# Patient Record
Sex: FEMALE | Race: WHITE | Employment: OTHER | URBAN - METROPOLITAN AREA
[De-identification: names, ages, dates, MRNs, and addresses within clinical notes are randomized per-mention and may not be internally consistent; named-entity substitution may affect disease eponyms.]

---

## 2017-01-03 ENCOUNTER — HOSPITAL ENCOUNTER (OUTPATIENT)
Dept: ULTRASOUND IMAGING | Age: 72
Discharge: HOME | End: 2017-01-03
Payer: MEDICARE

## 2017-01-03 DIAGNOSIS — C50.911 LOBULAR BREAST CANCER, RIGHT (HCC): ICD-10-CM

## 2017-01-03 DIAGNOSIS — N63.20 LEFT BREAST MASS: ICD-10-CM

## 2017-01-03 PROCEDURE — 76642 ULTRASOUND BREAST LIMITED: CPT

## 2017-01-06 DIAGNOSIS — I10 ESSENTIAL HYPERTENSION: ICD-10-CM

## 2017-01-06 RX ORDER — CARVEDILOL 12.5 MG/1
TABLET ORAL
Qty: 60 TABLET | Refills: 2 | Status: SHIPPED | OUTPATIENT
Start: 2017-01-06 | End: 2017-02-13 | Stop reason: SDUPTHER

## 2017-01-07 RX ORDER — FUROSEMIDE 20 MG/1
TABLET ORAL
Qty: 60 TABLET | Refills: 5 | Status: SHIPPED | OUTPATIENT
Start: 2017-01-07 | End: 2017-02-15 | Stop reason: SDUPTHER

## 2017-01-11 ENCOUNTER — OFFICE VISIT (OUTPATIENT)
Dept: PHYSICAL MEDICINE AND REHAB | Age: 72
End: 2017-01-11

## 2017-01-11 VITALS
DIASTOLIC BLOOD PRESSURE: 80 MMHG | HEIGHT: 60 IN | WEIGHT: 221 LBS | SYSTOLIC BLOOD PRESSURE: 118 MMHG | BODY MASS INDEX: 43.39 KG/M2

## 2017-01-11 DIAGNOSIS — Z79.899 HIGH RISK MEDICATION USE: Chronic | ICD-10-CM

## 2017-01-11 DIAGNOSIS — M48.02 CERVICAL STENOSIS OF SPINAL CANAL: ICD-10-CM

## 2017-01-11 DIAGNOSIS — S14.109S INJURY OF CERVICAL SPINAL CORD, SEQUELA (HCC): ICD-10-CM

## 2017-01-11 DIAGNOSIS — M43.10 SPONDYLOLISTHESIS, UNSPECIFIED SPINAL REGION: ICD-10-CM

## 2017-01-11 DIAGNOSIS — N63.20 LEFT BREAST MASS: Primary | ICD-10-CM

## 2017-01-11 PROCEDURE — 99213 OFFICE O/P EST LOW 20 MIN: CPT | Performed by: PHYSICAL MEDICINE & REHABILITATION

## 2017-01-11 RX ORDER — HYDROCODONE BITARTRATE AND ACETAMINOPHEN 7.5; 325 MG/1; MG/1
TABLET ORAL
Qty: 90 TABLET | Refills: 0 | Status: SHIPPED | OUTPATIENT
Start: 2017-01-11 | End: 2017-03-08 | Stop reason: SDUPTHER

## 2017-01-11 ASSESSMENT — ENCOUNTER SYMPTOMS
BACK PAIN: 1
CONSTIPATION: 1
VOMITING: 0
DIARRHEA: 0
SHORTNESS OF BREATH: 0
CHEST TIGHTNESS: 0
ALLERGIC/IMMUNOLOGIC NEGATIVE: 1
NAUSEA: 0
APNEA: 0
EYES NEGATIVE: 1

## 2017-01-18 ENCOUNTER — PREP FOR PROCEDURE (OUTPATIENT)
Dept: SURGERY | Age: 72
End: 2017-01-18

## 2017-01-18 ENCOUNTER — OFFICE VISIT (OUTPATIENT)
Dept: SURGERY | Age: 72
End: 2017-01-18

## 2017-01-18 VITALS
DIASTOLIC BLOOD PRESSURE: 86 MMHG | WEIGHT: 224 LBS | BODY MASS INDEX: 43.98 KG/M2 | TEMPERATURE: 97.2 F | HEIGHT: 60 IN | SYSTOLIC BLOOD PRESSURE: 134 MMHG

## 2017-01-18 DIAGNOSIS — N63.20 LEFT BREAST MASS: Primary | ICD-10-CM

## 2017-01-18 PROCEDURE — 4040F PNEUMOC VAC/ADMIN/RCVD: CPT | Performed by: SURGERY

## 2017-01-18 PROCEDURE — 99212 OFFICE O/P EST SF 10 MIN: CPT | Performed by: SURGERY

## 2017-01-18 PROCEDURE — G8484 FLU IMMUNIZE NO ADMIN: HCPCS | Performed by: SURGERY

## 2017-01-18 PROCEDURE — 1090F PRES/ABSN URINE INCON ASSESS: CPT | Performed by: SURGERY

## 2017-01-18 PROCEDURE — 1036F TOBACCO NON-USER: CPT | Performed by: SURGERY

## 2017-01-18 PROCEDURE — G8427 DOCREV CUR MEDS BY ELIG CLIN: HCPCS | Performed by: SURGERY

## 2017-01-18 PROCEDURE — 1123F ACP DISCUSS/DSCN MKR DOCD: CPT | Performed by: SURGERY

## 2017-01-18 PROCEDURE — 3014F SCREEN MAMMO DOC REV: CPT | Performed by: SURGERY

## 2017-01-18 PROCEDURE — G8399 PT W/DXA RESULTS DOCUMENT: HCPCS | Performed by: SURGERY

## 2017-01-18 PROCEDURE — 3017F COLORECTAL CA SCREEN DOC REV: CPT | Performed by: SURGERY

## 2017-01-18 PROCEDURE — G8419 CALC BMI OUT NRM PARAM NOF/U: HCPCS | Performed by: SURGERY

## 2017-01-18 RX ORDER — DICLOFENAC POTASSIUM 50 MG/1
TABLET, FILM COATED ORAL
COMMUNITY
Start: 2017-01-10 | End: 2017-02-13 | Stop reason: SDUPTHER

## 2017-01-23 RX ORDER — LIDOCAINE HYDROCHLORIDE 10 MG/ML
1 INJECTION, SOLUTION EPIDURAL; INFILTRATION; INTRACAUDAL; PERINEURAL
Status: CANCELLED | OUTPATIENT
Start: 2017-01-23 | End: 2017-01-23

## 2017-01-23 RX ORDER — SODIUM CHLORIDE, SODIUM LACTATE, POTASSIUM CHLORIDE, CALCIUM CHLORIDE 600; 310; 30; 20 MG/100ML; MG/100ML; MG/100ML; MG/100ML
INJECTION, SOLUTION INTRAVENOUS CONTINUOUS
Status: CANCELLED | OUTPATIENT
Start: 2017-01-23

## 2017-01-23 RX ORDER — SODIUM CHLORIDE 0.9 % (FLUSH) 0.9 %
10 SYRINGE (ML) INJECTION EVERY 12 HOURS SCHEDULED
Status: CANCELLED | OUTPATIENT
Start: 2017-01-23

## 2017-01-23 RX ORDER — SODIUM CHLORIDE 0.9 % (FLUSH) 0.9 %
10 SYRINGE (ML) INJECTION PRN
Status: CANCELLED | OUTPATIENT
Start: 2017-01-23

## 2017-01-24 ENCOUNTER — HOSPITAL ENCOUNTER (OUTPATIENT)
Age: 72
Setting detail: OUTPATIENT SURGERY
Discharge: HOME | End: 2017-01-24
Attending: SURGERY | Admitting: SURGERY
Payer: MEDICARE

## 2017-01-24 VITALS
HEART RATE: 70 BPM | TEMPERATURE: 97.8 F | BODY MASS INDEX: 42.21 KG/M2 | DIASTOLIC BLOOD PRESSURE: 76 MMHG | WEIGHT: 215 LBS | OXYGEN SATURATION: 93 % | SYSTOLIC BLOOD PRESSURE: 154 MMHG | HEIGHT: 60 IN

## 2017-01-24 PROCEDURE — 3600000002 HC SURGERY LEVEL 2 BASE: Performed by: SURGERY

## 2017-01-24 PROCEDURE — 3700000000 HC ANESTHESIA ATTENDED CARE: Performed by: SURGERY

## 2017-01-24 PROCEDURE — 88305 TISSUE EXAM BY PATHOLOGIST: CPT

## 2017-01-24 PROCEDURE — 3600000012 HC SURGERY LEVEL 2 ADDTL 15MIN: Performed by: SURGERY

## 2017-01-24 PROCEDURE — 19100 BX BREAST PERCUT W/O IMAGE: CPT | Performed by: SURGERY

## 2017-01-24 PROCEDURE — 2500000003 HC RX 250 WO HCPCS: Performed by: SURGERY

## 2017-01-24 PROCEDURE — 2580000003 HC RX 258: Performed by: SURGERY

## 2017-01-24 PROCEDURE — 3700000001 HC ADD 15 MINUTES (ANESTHESIA): Performed by: SURGERY

## 2017-01-24 RX ORDER — BUPIVACAINE HYDROCHLORIDE 2.5 MG/ML
INJECTION, SOLUTION EPIDURAL; INFILTRATION; INTRACAUDAL PRN
Status: DISCONTINUED | OUTPATIENT
Start: 2017-01-24 | End: 2017-01-24 | Stop reason: HOSPADM

## 2017-01-24 RX ORDER — MAGNESIUM HYDROXIDE 1200 MG/15ML
LIQUID ORAL CONTINUOUS PRN
Status: DISCONTINUED | OUTPATIENT
Start: 2017-01-24 | End: 2017-01-24 | Stop reason: HOSPADM

## 2017-01-24 ASSESSMENT — PAIN - FUNCTIONAL ASSESSMENT: PAIN_FUNCTIONAL_ASSESSMENT: 0-10

## 2017-02-02 ENCOUNTER — OFFICE VISIT (OUTPATIENT)
Dept: SURGERY | Age: 72
End: 2017-02-02

## 2017-02-02 VITALS
BODY MASS INDEX: 44.37 KG/M2 | DIASTOLIC BLOOD PRESSURE: 92 MMHG | WEIGHT: 226 LBS | SYSTOLIC BLOOD PRESSURE: 140 MMHG | TEMPERATURE: 98.3 F | HEIGHT: 60 IN

## 2017-02-02 DIAGNOSIS — N63.20 LEFT BREAST MASS: Primary | ICD-10-CM

## 2017-02-02 PROCEDURE — G8419 CALC BMI OUT NRM PARAM NOF/U: HCPCS | Performed by: SURGERY

## 2017-02-02 PROCEDURE — 1036F TOBACCO NON-USER: CPT | Performed by: SURGERY

## 2017-02-02 PROCEDURE — G8427 DOCREV CUR MEDS BY ELIG CLIN: HCPCS | Performed by: SURGERY

## 2017-02-02 PROCEDURE — 1090F PRES/ABSN URINE INCON ASSESS: CPT | Performed by: SURGERY

## 2017-02-02 PROCEDURE — G8399 PT W/DXA RESULTS DOCUMENT: HCPCS | Performed by: SURGERY

## 2017-02-02 PROCEDURE — G8484 FLU IMMUNIZE NO ADMIN: HCPCS | Performed by: SURGERY

## 2017-02-02 PROCEDURE — 99212 OFFICE O/P EST SF 10 MIN: CPT | Performed by: SURGERY

## 2017-02-02 PROCEDURE — 1123F ACP DISCUSS/DSCN MKR DOCD: CPT | Performed by: SURGERY

## 2017-02-02 PROCEDURE — 3014F SCREEN MAMMO DOC REV: CPT | Performed by: SURGERY

## 2017-02-02 PROCEDURE — 3017F COLORECTAL CA SCREEN DOC REV: CPT | Performed by: SURGERY

## 2017-02-02 PROCEDURE — 4040F PNEUMOC VAC/ADMIN/RCVD: CPT | Performed by: SURGERY

## 2017-02-13 DIAGNOSIS — M43.10 SPONDYLOLISTHESIS, UNSPECIFIED SPINAL REGION: ICD-10-CM

## 2017-02-13 DIAGNOSIS — I10 ESSENTIAL HYPERTENSION: ICD-10-CM

## 2017-02-13 DIAGNOSIS — M48.02 CERVICAL STENOSIS OF SPINAL CANAL: ICD-10-CM

## 2017-02-13 DIAGNOSIS — F32.A DEPRESSION, UNSPECIFIED DEPRESSION TYPE: ICD-10-CM

## 2017-02-13 RX ORDER — POTASSIUM CHLORIDE 750 MG/1
10 TABLET, EXTENDED RELEASE ORAL 2 TIMES DAILY
Qty: 180 TABLET | Refills: 2 | Status: SHIPPED | OUTPATIENT
Start: 2017-02-13 | End: 2017-10-15 | Stop reason: SDUPTHER

## 2017-02-13 RX ORDER — LEVOTHYROXINE SODIUM 0.05 MG/1
50 TABLET ORAL DAILY
Qty: 90 TABLET | Refills: 2 | Status: SHIPPED | OUTPATIENT
Start: 2017-02-13 | End: 2017-10-15 | Stop reason: SDUPTHER

## 2017-02-13 RX ORDER — DICLOFENAC POTASSIUM 50 MG/1
50 TABLET, FILM COATED ORAL 2 TIMES DAILY
Qty: 90 TABLET | Refills: 2 | Status: SHIPPED | OUTPATIENT
Start: 2017-02-13 | End: 2017-03-06 | Stop reason: SDUPTHER

## 2017-02-13 RX ORDER — BUPROPION HYDROCHLORIDE 150 MG/1
150 TABLET ORAL EVERY MORNING
Qty: 180 TABLET | Refills: 2 | Status: SHIPPED | OUTPATIENT
Start: 2017-02-13 | End: 2017-03-13 | Stop reason: SDUPTHER

## 2017-02-13 RX ORDER — CARVEDILOL 12.5 MG/1
12.5 TABLET ORAL 2 TIMES DAILY
Qty: 180 TABLET | Refills: 2 | Status: SHIPPED | OUTPATIENT
Start: 2017-02-13 | End: 2017-04-24 | Stop reason: SDUPTHER

## 2017-02-13 RX ORDER — GABAPENTIN 600 MG/1
TABLET ORAL
Qty: 450 TABLET | Refills: 2 | Status: SHIPPED | OUTPATIENT
Start: 2017-02-13 | End: 2017-10-15 | Stop reason: SDUPTHER

## 2017-02-13 RX ORDER — DULOXETIN HYDROCHLORIDE 60 MG/1
60 CAPSULE, DELAYED RELEASE ORAL DAILY
Qty: 90 CAPSULE | Refills: 2 | Status: SHIPPED | OUTPATIENT
Start: 2017-02-13 | End: 2017-08-31

## 2017-02-15 DIAGNOSIS — F32.A DEPRESSION: ICD-10-CM

## 2017-02-15 RX ORDER — DULOXETIN HYDROCHLORIDE 60 MG/1
CAPSULE, DELAYED RELEASE ORAL
Qty: 30 CAPSULE | Refills: 4 | Status: SHIPPED | OUTPATIENT
Start: 2017-02-15 | End: 2017-03-06 | Stop reason: SDUPTHER

## 2017-02-15 RX ORDER — BUPROPION HYDROCHLORIDE 150 MG/1
TABLET ORAL
Qty: 60 TABLET | Refills: 4 | Status: SHIPPED | OUTPATIENT
Start: 2017-02-15 | End: 2017-03-27 | Stop reason: SDUPTHER

## 2017-02-16 RX ORDER — FUROSEMIDE 20 MG/1
TABLET ORAL
Qty: 180 TABLET | Refills: 2 | Status: SHIPPED | OUTPATIENT
Start: 2017-02-16 | End: 2017-05-27 | Stop reason: DRUGHIGH

## 2017-02-16 RX ORDER — ATORVASTATIN CALCIUM 20 MG/1
20 TABLET, FILM COATED ORAL NIGHTLY
Qty: 90 TABLET | Refills: 2 | Status: SHIPPED | OUTPATIENT
Start: 2017-02-16 | End: 2017-10-15 | Stop reason: SDUPTHER

## 2017-03-06 ENCOUNTER — OFFICE VISIT (OUTPATIENT)
Dept: FAMILY MEDICINE CLINIC | Age: 72
End: 2017-03-06

## 2017-03-06 VITALS
HEART RATE: 78 BPM | OXYGEN SATURATION: 95 % | HEIGHT: 68 IN | WEIGHT: 221.4 LBS | TEMPERATURE: 98.8 F | DIASTOLIC BLOOD PRESSURE: 80 MMHG | SYSTOLIC BLOOD PRESSURE: 136 MMHG | BODY MASS INDEX: 33.56 KG/M2

## 2017-03-06 DIAGNOSIS — E03.9 HYPOTHYROIDISM, UNSPECIFIED TYPE: ICD-10-CM

## 2017-03-06 DIAGNOSIS — R42 DIZZINESS: Primary | ICD-10-CM

## 2017-03-06 DIAGNOSIS — F32.A DEPRESSION, UNSPECIFIED DEPRESSION TYPE: ICD-10-CM

## 2017-03-06 DIAGNOSIS — F43.21 GRIEF REACTION: ICD-10-CM

## 2017-03-06 PROCEDURE — 1123F ACP DISCUSS/DSCN MKR DOCD: CPT | Performed by: NURSE PRACTITIONER

## 2017-03-06 PROCEDURE — 3014F SCREEN MAMMO DOC REV: CPT | Performed by: NURSE PRACTITIONER

## 2017-03-06 PROCEDURE — 99213 OFFICE O/P EST LOW 20 MIN: CPT | Performed by: NURSE PRACTITIONER

## 2017-03-06 PROCEDURE — G8427 DOCREV CUR MEDS BY ELIG CLIN: HCPCS | Performed by: NURSE PRACTITIONER

## 2017-03-06 PROCEDURE — G8484 FLU IMMUNIZE NO ADMIN: HCPCS | Performed by: NURSE PRACTITIONER

## 2017-03-06 PROCEDURE — G8417 CALC BMI ABV UP PARAM F/U: HCPCS | Performed by: NURSE PRACTITIONER

## 2017-03-06 PROCEDURE — 1090F PRES/ABSN URINE INCON ASSESS: CPT | Performed by: NURSE PRACTITIONER

## 2017-03-06 PROCEDURE — G8399 PT W/DXA RESULTS DOCUMENT: HCPCS | Performed by: NURSE PRACTITIONER

## 2017-03-06 PROCEDURE — 1036F TOBACCO NON-USER: CPT | Performed by: NURSE PRACTITIONER

## 2017-03-06 PROCEDURE — 3017F COLORECTAL CA SCREEN DOC REV: CPT | Performed by: NURSE PRACTITIONER

## 2017-03-06 PROCEDURE — 4040F PNEUMOC VAC/ADMIN/RCVD: CPT | Performed by: NURSE PRACTITIONER

## 2017-03-06 RX ORDER — OXYBUTYNIN CHLORIDE 10 MG/1
10 TABLET, EXTENDED RELEASE ORAL DAILY
Qty: 90 TABLET | Refills: 1 | Status: SHIPPED | OUTPATIENT
Start: 2017-03-06 | End: 2017-05-22 | Stop reason: CLARIF

## 2017-03-06 ASSESSMENT — ENCOUNTER SYMPTOMS
NAUSEA: 0
VISUAL CHANGE: 1
SHORTNESS OF BREATH: 0
COUGH: 0
ABDOMINAL PAIN: 0

## 2017-03-08 ENCOUNTER — OFFICE VISIT (OUTPATIENT)
Dept: PHYSICAL MEDICINE AND REHAB | Age: 72
End: 2017-03-08

## 2017-03-08 VITALS
HEIGHT: 60 IN | SYSTOLIC BLOOD PRESSURE: 122 MMHG | WEIGHT: 221 LBS | BODY MASS INDEX: 43.39 KG/M2 | DIASTOLIC BLOOD PRESSURE: 70 MMHG

## 2017-03-08 DIAGNOSIS — M48.02 CERVICAL STENOSIS OF SPINAL CANAL: ICD-10-CM

## 2017-03-08 DIAGNOSIS — M15.9 GENERALIZED OSTEOARTHRITIS: Primary | ICD-10-CM

## 2017-03-08 DIAGNOSIS — M43.10 SPONDYLOLISTHESIS, UNSPECIFIED SPINAL REGION: ICD-10-CM

## 2017-03-08 DIAGNOSIS — E03.9 HYPOTHYROIDISM, UNSPECIFIED TYPE: ICD-10-CM

## 2017-03-08 DIAGNOSIS — Z79.899 HIGH RISK MEDICATION USE: Chronic | ICD-10-CM

## 2017-03-08 DIAGNOSIS — R26.2 DIFFICULTY WALKING: ICD-10-CM

## 2017-03-08 DIAGNOSIS — R42 DIZZINESS: ICD-10-CM

## 2017-03-08 DIAGNOSIS — M96.1 CERVICAL POST-LAMINECTOMY SYNDROME: ICD-10-CM

## 2017-03-08 DIAGNOSIS — M96.1 FAILED BACK SYNDROME OF LUMBAR SPINE: ICD-10-CM

## 2017-03-08 LAB
ALBUMIN SERPL-MCNC: 4.3 G/DL (ref 3.9–4.9)
ALP BLD-CCNC: 145 U/L (ref 40–130)
ALT SERPL-CCNC: 17 U/L (ref 0–33)
ANION GAP SERPL CALCULATED.3IONS-SCNC: 14 MEQ/L (ref 7–13)
AST SERPL-CCNC: 24 U/L (ref 0–35)
BILIRUB SERPL-MCNC: 0.7 MG/DL (ref 0–1.2)
BUN BLDV-MCNC: 26 MG/DL (ref 8–23)
CALCIUM SERPL-MCNC: 9.7 MG/DL (ref 8.6–10.2)
CHLORIDE BLD-SCNC: 102 MEQ/L (ref 98–107)
CO2: 25 MEQ/L (ref 22–29)
CREAT SERPL-MCNC: 0.86 MG/DL (ref 0.5–0.9)
GFR AFRICAN AMERICAN: >60
GFR NON-AFRICAN AMERICAN: >60
GLOBULIN: 2.1 G/DL (ref 2.3–3.5)
GLUCOSE BLD-MCNC: 84 MG/DL (ref 74–109)
HCT VFR BLD CALC: 43.9 % (ref 37–47)
HEMOGLOBIN: 14.5 G/DL (ref 12–16)
MCH RBC QN AUTO: 29.9 PG (ref 27–31.3)
MCHC RBC AUTO-ENTMCNC: 33 % (ref 33–37)
MCV RBC AUTO: 90.4 FL (ref 82–100)
PDW BLD-RTO: 15.1 % (ref 11.5–14.5)
PLATELET # BLD: 218 K/UL (ref 130–400)
POTASSIUM SERPL-SCNC: 4.7 MEQ/L (ref 3.5–5.1)
RBC # BLD: 4.86 M/UL (ref 4.2–5.4)
SODIUM BLD-SCNC: 141 MEQ/L (ref 132–144)
TOTAL PROTEIN: 6.4 G/DL (ref 6.4–8.1)
TSH SERPL DL<=0.05 MIU/L-ACNC: 0.36 UIU/ML (ref 0.27–4.2)
WBC # BLD: 5.8 K/UL (ref 4.8–10.8)

## 2017-03-08 PROCEDURE — G8427 DOCREV CUR MEDS BY ELIG CLIN: HCPCS | Performed by: PHYSICAL MEDICINE & REHABILITATION

## 2017-03-08 PROCEDURE — 1123F ACP DISCUSS/DSCN MKR DOCD: CPT | Performed by: PHYSICAL MEDICINE & REHABILITATION

## 2017-03-08 PROCEDURE — 1036F TOBACCO NON-USER: CPT | Performed by: PHYSICAL MEDICINE & REHABILITATION

## 2017-03-08 PROCEDURE — 4040F PNEUMOC VAC/ADMIN/RCVD: CPT | Performed by: PHYSICAL MEDICINE & REHABILITATION

## 2017-03-08 PROCEDURE — 3014F SCREEN MAMMO DOC REV: CPT | Performed by: PHYSICAL MEDICINE & REHABILITATION

## 2017-03-08 PROCEDURE — 99213 OFFICE O/P EST LOW 20 MIN: CPT | Performed by: PHYSICAL MEDICINE & REHABILITATION

## 2017-03-08 PROCEDURE — G8399 PT W/DXA RESULTS DOCUMENT: HCPCS | Performed by: PHYSICAL MEDICINE & REHABILITATION

## 2017-03-08 PROCEDURE — 1090F PRES/ABSN URINE INCON ASSESS: CPT | Performed by: PHYSICAL MEDICINE & REHABILITATION

## 2017-03-08 PROCEDURE — G8484 FLU IMMUNIZE NO ADMIN: HCPCS | Performed by: PHYSICAL MEDICINE & REHABILITATION

## 2017-03-08 PROCEDURE — G8417 CALC BMI ABV UP PARAM F/U: HCPCS | Performed by: PHYSICAL MEDICINE & REHABILITATION

## 2017-03-08 PROCEDURE — 3017F COLORECTAL CA SCREEN DOC REV: CPT | Performed by: PHYSICAL MEDICINE & REHABILITATION

## 2017-03-08 RX ORDER — HYDROCODONE BITARTRATE AND ACETAMINOPHEN 7.5; 325 MG/1; MG/1
TABLET ORAL
Qty: 90 TABLET | Refills: 0 | Status: SHIPPED | OUTPATIENT
Start: 2017-03-08 | End: 2017-05-11 | Stop reason: SDUPTHER

## 2017-03-08 ASSESSMENT — ENCOUNTER SYMPTOMS
SHORTNESS OF BREATH: 0
NAUSEA: 0
EYES NEGATIVE: 1
VOMITING: 0
DIARRHEA: 0
CONSTIPATION: 1
ALLERGIC/IMMUNOLOGIC NEGATIVE: 1
APNEA: 0
CHEST TIGHTNESS: 0
BACK PAIN: 1

## 2017-03-13 RX ORDER — BUPROPION HYDROCHLORIDE 150 MG/1
150 TABLET ORAL EVERY MORNING
Qty: 180 TABLET | Refills: 2 | Status: SHIPPED | OUTPATIENT
Start: 2017-03-13 | End: 2017-03-16 | Stop reason: SDUPTHER

## 2017-03-16 RX ORDER — BUPROPION HYDROCHLORIDE 150 MG/1
150 TABLET ORAL EVERY MORNING
Qty: 180 TABLET | Refills: 2 | Status: SHIPPED | OUTPATIENT
Start: 2017-03-16 | End: 2017-05-22 | Stop reason: CLARIF

## 2017-03-27 RX ORDER — BUPROPION HYDROCHLORIDE 150 MG/1
150 TABLET ORAL EVERY MORNING
Qty: 180 TABLET | Refills: 1 | Status: SHIPPED | OUTPATIENT
Start: 2017-03-27 | End: 2017-04-03

## 2017-04-03 ENCOUNTER — PREP FOR PROCEDURE (OUTPATIENT)
Dept: SURGERY | Age: 72
End: 2017-04-03

## 2017-04-03 ENCOUNTER — OFFICE VISIT (OUTPATIENT)
Dept: SURGERY | Age: 72
End: 2017-04-03

## 2017-04-03 VITALS
DIASTOLIC BLOOD PRESSURE: 86 MMHG | TEMPERATURE: 98.4 F | SYSTOLIC BLOOD PRESSURE: 128 MMHG | HEIGHT: 60 IN | BODY MASS INDEX: 42.21 KG/M2 | WEIGHT: 215 LBS

## 2017-04-03 DIAGNOSIS — N63.20 LEFT BREAST MASS: Primary | ICD-10-CM

## 2017-04-03 PROCEDURE — 99999 PR OFFICE/OUTPT VISIT,PROCEDURE ONLY: CPT | Performed by: SURGERY

## 2017-04-03 PROCEDURE — 1123F ACP DISCUSS/DSCN MKR DOCD: CPT | Performed by: SURGERY

## 2017-04-03 PROCEDURE — 3017F COLORECTAL CA SCREEN DOC REV: CPT | Performed by: SURGERY

## 2017-04-03 PROCEDURE — 1090F PRES/ABSN URINE INCON ASSESS: CPT | Performed by: SURGERY

## 2017-04-03 PROCEDURE — G8417 CALC BMI ABV UP PARAM F/U: HCPCS | Performed by: SURGERY

## 2017-04-03 PROCEDURE — 4040F PNEUMOC VAC/ADMIN/RCVD: CPT | Performed by: SURGERY

## 2017-04-03 PROCEDURE — 3014F SCREEN MAMMO DOC REV: CPT | Performed by: SURGERY

## 2017-04-03 PROCEDURE — G8399 PT W/DXA RESULTS DOCUMENT: HCPCS | Performed by: SURGERY

## 2017-04-03 PROCEDURE — G8427 DOCREV CUR MEDS BY ELIG CLIN: HCPCS | Performed by: SURGERY

## 2017-04-03 PROCEDURE — 1036F TOBACCO NON-USER: CPT | Performed by: SURGERY

## 2017-04-03 RX ORDER — DICLOFENAC POTASSIUM 50 MG/1
TABLET, FILM COATED ORAL
Qty: 180 TABLET | Refills: 1 | Status: SHIPPED | OUTPATIENT
Start: 2017-04-03 | End: 2017-04-03

## 2017-04-10 ENCOUNTER — ANESTHESIA EVENT (OUTPATIENT)
Dept: OPERATING ROOM | Age: 72
End: 2017-04-10
Payer: MEDICARE

## 2017-04-10 ASSESSMENT — ENCOUNTER SYMPTOMS: SHORTNESS OF BREATH: 1

## 2017-04-11 ENCOUNTER — ANESTHESIA (OUTPATIENT)
Dept: OPERATING ROOM | Age: 72
End: 2017-04-11
Payer: MEDICARE

## 2017-04-11 ENCOUNTER — APPOINTMENT (OUTPATIENT)
Dept: CT IMAGING | Age: 72
End: 2017-04-11
Payer: MEDICARE

## 2017-04-11 ENCOUNTER — HOSPITAL ENCOUNTER (EMERGENCY)
Age: 72
Discharge: HOME | End: 2017-04-11
Attending: EMERGENCY MEDICINE
Payer: MEDICARE

## 2017-04-11 ENCOUNTER — HOSPITAL ENCOUNTER (OUTPATIENT)
Age: 72
Setting detail: OUTPATIENT SURGERY
Discharge: HOME | End: 2017-04-11
Attending: SURGERY | Admitting: SURGERY
Payer: MEDICARE

## 2017-04-11 VITALS
OXYGEN SATURATION: 99 % | RESPIRATION RATE: 18 BRPM | BODY MASS INDEX: 41.01 KG/M2 | HEART RATE: 82 BPM | SYSTOLIC BLOOD PRESSURE: 144 MMHG | WEIGHT: 210 LBS | DIASTOLIC BLOOD PRESSURE: 80 MMHG | TEMPERATURE: 98.2 F

## 2017-04-11 VITALS — DIASTOLIC BLOOD PRESSURE: 68 MMHG | OXYGEN SATURATION: 100 % | TEMPERATURE: 97.2 F | SYSTOLIC BLOOD PRESSURE: 139 MMHG

## 2017-04-11 VITALS
OXYGEN SATURATION: 98 % | WEIGHT: 215 LBS | HEIGHT: 60 IN | SYSTOLIC BLOOD PRESSURE: 161 MMHG | DIASTOLIC BLOOD PRESSURE: 97 MMHG | TEMPERATURE: 98.2 F | BODY MASS INDEX: 42.21 KG/M2 | HEART RATE: 85 BPM | RESPIRATION RATE: 18 BRPM

## 2017-04-11 DIAGNOSIS — I10 BENIGN ESSENTIAL HTN: Primary | ICD-10-CM

## 2017-04-11 DIAGNOSIS — I10 ESSENTIAL HYPERTENSION: Primary | ICD-10-CM

## 2017-04-11 LAB
ALBUMIN SERPL-MCNC: 4.4 G/DL (ref 3.9–4.9)
ALP BLD-CCNC: 149 U/L (ref 40–130)
ALT SERPL-CCNC: 21 U/L (ref 0–33)
ANION GAP SERPL CALCULATED.3IONS-SCNC: 14 MEQ/L (ref 7–13)
ANION GAP SERPL CALCULATED.3IONS-SCNC: 9 MEQ/L (ref 7–13)
AST SERPL-CCNC: 32 U/L (ref 0–35)
BASOPHILS ABSOLUTE: 0 K/UL (ref 0–0.2)
BASOPHILS RELATIVE PERCENT: 0.5 %
BILIRUB SERPL-MCNC: 0.8 MG/DL (ref 0–1.2)
BUN BLDV-MCNC: 16 MG/DL (ref 8–23)
BUN BLDV-MCNC: 22 MG/DL (ref 8–23)
CALCIUM SERPL-MCNC: 9.6 MG/DL (ref 8.6–10.2)
CALCIUM SERPL-MCNC: 9.8 MG/DL (ref 8.6–10.2)
CHLORIDE BLD-SCNC: 102 MEQ/L (ref 98–107)
CHLORIDE BLD-SCNC: 106 MEQ/L (ref 98–107)
CO2: 24 MEQ/L (ref 22–29)
CO2: 26 MEQ/L (ref 22–29)
CREAT SERPL-MCNC: 0.67 MG/DL (ref 0.5–0.9)
CREAT SERPL-MCNC: 0.74 MG/DL (ref 0.5–0.9)
EOSINOPHILS ABSOLUTE: 0.1 K/UL (ref 0–0.7)
EOSINOPHILS RELATIVE PERCENT: 1.2 %
GFR AFRICAN AMERICAN: >60
GFR AFRICAN AMERICAN: >60
GFR NON-AFRICAN AMERICAN: >60
GFR NON-AFRICAN AMERICAN: >60
GLOBULIN: 2.2 G/DL (ref 2.3–3.5)
GLUCOSE BLD-MCNC: 112 MG/DL (ref 74–109)
GLUCOSE BLD-MCNC: 83 MG/DL (ref 60–115)
GLUCOSE BLD-MCNC: 89 MG/DL (ref 74–109)
HCT VFR BLD CALC: 43.5 % (ref 37–47)
HCT VFR BLD CALC: 43.9 % (ref 37–47)
HEMOGLOBIN: 14.6 G/DL (ref 12–16)
HEMOGLOBIN: 14.7 G/DL (ref 12–16)
LYMPHOCYTES ABSOLUTE: 1 K/UL (ref 1–4.8)
LYMPHOCYTES RELATIVE PERCENT: 10.4 %
MCH RBC QN AUTO: 30 PG (ref 27–31.3)
MCH RBC QN AUTO: 30.3 PG (ref 27–31.3)
MCHC RBC AUTO-ENTMCNC: 33.5 % (ref 33–37)
MCHC RBC AUTO-ENTMCNC: 33.6 % (ref 33–37)
MCV RBC AUTO: 89.5 FL (ref 82–100)
MCV RBC AUTO: 90.2 FL (ref 82–100)
MONOCYTES ABSOLUTE: 0.5 K/UL (ref 0.2–0.8)
MONOCYTES RELATIVE PERCENT: 5.2 %
NEUTROPHILS ABSOLUTE: 7.8 K/UL (ref 1.4–6.5)
NEUTROPHILS RELATIVE PERCENT: 82.7 %
PDW BLD-RTO: 14.4 % (ref 11.5–14.5)
PDW BLD-RTO: 14.5 % (ref 11.5–14.5)
PERFORMED ON: NORMAL
PLATELET # BLD: 175 K/UL (ref 130–400)
PLATELET # BLD: 193 K/UL (ref 130–400)
POTASSIUM SERPL-SCNC: 4 MEQ/L (ref 3.5–5.1)
POTASSIUM SERPL-SCNC: 4.2 MEQ/L (ref 3.5–5.1)
RBC # BLD: 4.85 M/UL (ref 4.2–5.4)
RBC # BLD: 4.87 M/UL (ref 4.2–5.4)
SODIUM BLD-SCNC: 140 MEQ/L (ref 132–144)
SODIUM BLD-SCNC: 141 MEQ/L (ref 132–144)
TOTAL PROTEIN: 6.6 G/DL (ref 6.4–8.1)
WBC # BLD: 6.2 K/UL (ref 4.8–10.8)
WBC # BLD: 9.5 K/UL (ref 4.8–10.8)

## 2017-04-11 PROCEDURE — 6360000002 HC RX W HCPCS: Performed by: EMERGENCY MEDICINE

## 2017-04-11 PROCEDURE — 6360000002 HC RX W HCPCS: Performed by: SURGERY

## 2017-04-11 PROCEDURE — 2580000003 HC RX 258: Performed by: SURGERY

## 2017-04-11 PROCEDURE — 2500000003 HC RX 250 WO HCPCS: Performed by: NURSE PRACTITIONER

## 2017-04-11 PROCEDURE — 96374 THER/PROPH/DIAG INJ IV PUSH: CPT

## 2017-04-11 PROCEDURE — 7100000010 HC PHASE II RECOVERY - FIRST 15 MIN: Performed by: SURGERY

## 2017-04-11 PROCEDURE — 7100000000 HC PACU RECOVERY - FIRST 15 MIN: Performed by: SURGERY

## 2017-04-11 PROCEDURE — 80053 COMPREHEN METABOLIC PANEL: CPT

## 2017-04-11 PROCEDURE — 21552 EXC NECK LES SC 3 CM/>: CPT | Performed by: SURGERY

## 2017-04-11 PROCEDURE — 96375 TX/PRO/DX INJ NEW DRUG ADDON: CPT

## 2017-04-11 PROCEDURE — 85027 COMPLETE CBC AUTOMATED: CPT

## 2017-04-11 PROCEDURE — 6370000000 HC RX 637 (ALT 250 FOR IP): Performed by: SURGERY

## 2017-04-11 PROCEDURE — 7100000001 HC PACU RECOVERY - ADDTL 15 MIN: Performed by: SURGERY

## 2017-04-11 PROCEDURE — 6360000002 HC RX W HCPCS: Performed by: NURSE ANESTHETIST, CERTIFIED REGISTERED

## 2017-04-11 PROCEDURE — 70450 CT HEAD/BRAIN W/O DYE: CPT

## 2017-04-11 PROCEDURE — 93005 ELECTROCARDIOGRAM TRACING: CPT

## 2017-04-11 PROCEDURE — 2500000003 HC RX 250 WO HCPCS: Performed by: NURSE ANESTHETIST, CERTIFIED REGISTERED

## 2017-04-11 PROCEDURE — 3700000001 HC ADD 15 MINUTES (ANESTHESIA): Performed by: SURGERY

## 2017-04-11 PROCEDURE — 3700000000 HC ANESTHESIA ATTENDED CARE: Performed by: SURGERY

## 2017-04-11 PROCEDURE — 2580000003 HC RX 258: Performed by: EMERGENCY MEDICINE

## 2017-04-11 PROCEDURE — 6360000002 HC RX W HCPCS

## 2017-04-11 PROCEDURE — 3600000012 HC SURGERY LEVEL 2 ADDTL 15MIN: Performed by: SURGERY

## 2017-04-11 PROCEDURE — 2580000003 HC RX 258: Performed by: NURSE PRACTITIONER

## 2017-04-11 PROCEDURE — 3600000002 HC SURGERY LEVEL 2 BASE: Performed by: SURGERY

## 2017-04-11 PROCEDURE — 88305 TISSUE EXAM BY PATHOLOGIST: CPT

## 2017-04-11 PROCEDURE — 99284 EMERGENCY DEPT VISIT MOD MDM: CPT

## 2017-04-11 PROCEDURE — 6360000002 HC RX W HCPCS: Performed by: ANESTHESIOLOGY

## 2017-04-11 PROCEDURE — 36415 COLL VENOUS BLD VENIPUNCTURE: CPT

## 2017-04-11 PROCEDURE — 7100000011 HC PHASE II RECOVERY - ADDTL 15 MIN: Performed by: SURGERY

## 2017-04-11 PROCEDURE — 2500000003 HC RX 250 WO HCPCS: Performed by: SURGERY

## 2017-04-11 PROCEDURE — 85025 COMPLETE CBC W/AUTO DIFF WBC: CPT

## 2017-04-11 PROCEDURE — 2500000003 HC RX 250 WO HCPCS: Performed by: EMERGENCY MEDICINE

## 2017-04-11 RX ORDER — PROMETHAZINE HYDROCHLORIDE 25 MG/ML
25 INJECTION, SOLUTION INTRAMUSCULAR; INTRAVENOUS
Status: DISCONTINUED | OUTPATIENT
Start: 2017-04-11 | End: 2017-04-11 | Stop reason: HOSPADM

## 2017-04-11 RX ORDER — KETOROLAC TROMETHAMINE 30 MG/ML
30 INJECTION, SOLUTION INTRAMUSCULAR; INTRAVENOUS
Status: COMPLETED | OUTPATIENT
Start: 2017-04-11 | End: 2017-04-11

## 2017-04-11 RX ORDER — SODIUM CHLORIDE 0.9 % (FLUSH) 0.9 %
10 SYRINGE (ML) INJECTION EVERY 12 HOURS SCHEDULED
Status: DISCONTINUED | OUTPATIENT
Start: 2017-04-11 | End: 2017-04-11 | Stop reason: HOSPADM

## 2017-04-11 RX ORDER — SODIUM CHLORIDE 0.9 % (FLUSH) 0.9 %
10 SYRINGE (ML) INJECTION PRN
Status: DISCONTINUED | OUTPATIENT
Start: 2017-04-11 | End: 2017-04-11 | Stop reason: HOSPADM

## 2017-04-11 RX ORDER — BUPIVACAINE HYDROCHLORIDE AND EPINEPHRINE 2.5; 5 MG/ML; UG/ML
INJECTION, SOLUTION EPIDURAL; INFILTRATION; INTRACAUDAL; PERINEURAL PRN
Status: DISCONTINUED | OUTPATIENT
Start: 2017-04-11 | End: 2017-04-11 | Stop reason: HOSPADM

## 2017-04-11 RX ORDER — MAGNESIUM HYDROXIDE 1200 MG/15ML
LIQUID ORAL CONTINUOUS PRN
Status: DISCONTINUED | OUTPATIENT
Start: 2017-04-11 | End: 2017-04-11 | Stop reason: HOSPADM

## 2017-04-11 RX ORDER — HYDROCODONE BITARTRATE AND ACETAMINOPHEN 5; 325 MG/1; MG/1
2 TABLET ORAL EVERY 4 HOURS PRN
Status: DISCONTINUED | OUTPATIENT
Start: 2017-04-11 | End: 2017-04-11 | Stop reason: HOSPADM

## 2017-04-11 RX ORDER — SODIUM CHLORIDE, SODIUM LACTATE, POTASSIUM CHLORIDE, CALCIUM CHLORIDE 600; 310; 30; 20 MG/100ML; MG/100ML; MG/100ML; MG/100ML
INJECTION, SOLUTION INTRAVENOUS CONTINUOUS
Status: DISCONTINUED | OUTPATIENT
Start: 2017-04-11 | End: 2017-04-11 | Stop reason: HOSPADM

## 2017-04-11 RX ORDER — DIPHENHYDRAMINE HYDROCHLORIDE 50 MG/ML
25 INJECTION INTRAMUSCULAR; INTRAVENOUS ONCE
Status: COMPLETED | OUTPATIENT
Start: 2017-04-11 | End: 2017-04-11

## 2017-04-11 RX ORDER — HYDRALAZINE HYDROCHLORIDE 20 MG/ML
INJECTION INTRAMUSCULAR; INTRAVENOUS
Status: COMPLETED
Start: 2017-04-11 | End: 2017-04-11

## 2017-04-11 RX ORDER — LIDOCAINE HYDROCHLORIDE 10 MG/ML
1 INJECTION, SOLUTION EPIDURAL; INFILTRATION; INTRACAUDAL; PERINEURAL
Status: COMPLETED | OUTPATIENT
Start: 2017-04-11 | End: 2017-04-11

## 2017-04-11 RX ORDER — PROPOFOL 10 MG/ML
INJECTION, EMULSION INTRAVENOUS PRN
Status: DISCONTINUED | OUTPATIENT
Start: 2017-04-11 | End: 2017-04-11 | Stop reason: SDUPTHER

## 2017-04-11 RX ORDER — LIDOCAINE HYDROCHLORIDE 20 MG/ML
INJECTION, SOLUTION INFILTRATION; PERINEURAL PRN
Status: DISCONTINUED | OUTPATIENT
Start: 2017-04-11 | End: 2017-04-11 | Stop reason: SDUPTHER

## 2017-04-11 RX ORDER — METOCLOPRAMIDE HYDROCHLORIDE 5 MG/ML
10 INJECTION INTRAMUSCULAR; INTRAVENOUS ONCE
Status: COMPLETED | OUTPATIENT
Start: 2017-04-11 | End: 2017-04-11

## 2017-04-11 RX ORDER — FENTANYL CITRATE 50 UG/ML
50 INJECTION, SOLUTION INTRAMUSCULAR; INTRAVENOUS EVERY 5 MIN PRN
Status: DISCONTINUED | OUTPATIENT
Start: 2017-04-11 | End: 2017-04-11 | Stop reason: HOSPADM

## 2017-04-11 RX ORDER — LABETALOL HYDROCHLORIDE 5 MG/ML
20 INJECTION, SOLUTION INTRAVENOUS ONCE
Status: COMPLETED | OUTPATIENT
Start: 2017-04-11 | End: 2017-04-11

## 2017-04-11 RX ORDER — MORPHINE SULFATE 2 MG/ML
1 INJECTION, SOLUTION INTRAMUSCULAR; INTRAVENOUS
Status: DISCONTINUED | OUTPATIENT
Start: 2017-04-11 | End: 2017-04-11 | Stop reason: HOSPADM

## 2017-04-11 RX ORDER — 0.9 % SODIUM CHLORIDE 0.9 %
1000 INTRAVENOUS SOLUTION INTRAVENOUS ONCE
Status: COMPLETED | OUTPATIENT
Start: 2017-04-11 | End: 2017-04-11

## 2017-04-11 RX ORDER — HYDROCODONE BITARTRATE AND ACETAMINOPHEN 5; 325 MG/1; MG/1
1 TABLET ORAL EVERY 4 HOURS PRN
Status: DISCONTINUED | OUTPATIENT
Start: 2017-04-11 | End: 2017-04-11 | Stop reason: HOSPADM

## 2017-04-11 RX ORDER — ONDANSETRON 2 MG/ML
4 INJECTION INTRAMUSCULAR; INTRAVENOUS EVERY 6 HOURS PRN
Status: DISCONTINUED | OUTPATIENT
Start: 2017-04-11 | End: 2017-04-11 | Stop reason: HOSPADM

## 2017-04-11 RX ORDER — FENTANYL CITRATE 50 UG/ML
INJECTION, SOLUTION INTRAMUSCULAR; INTRAVENOUS PRN
Status: DISCONTINUED | OUTPATIENT
Start: 2017-04-11 | End: 2017-04-11 | Stop reason: SDUPTHER

## 2017-04-11 RX ORDER — ONDANSETRON 2 MG/ML
4 INJECTION INTRAMUSCULAR; INTRAVENOUS
Status: DISCONTINUED | OUTPATIENT
Start: 2017-04-11 | End: 2017-04-11 | Stop reason: HOSPADM

## 2017-04-11 RX ADMIN — HYDROMORPHONE HYDROCHLORIDE 0.5 MG: 1 INJECTION, SOLUTION INTRAMUSCULAR; INTRAVENOUS; SUBCUTANEOUS at 19:47

## 2017-04-11 RX ADMIN — SODIUM CHLORIDE, POTASSIUM CHLORIDE, SODIUM LACTATE AND CALCIUM CHLORIDE: 600; 310; 30; 20 INJECTION, SOLUTION INTRAVENOUS at 08:46

## 2017-04-11 RX ADMIN — HYDRALAZINE HYDROCHLORIDE 10 MG: 20 INJECTION INTRAMUSCULAR; INTRAVENOUS at 12:11

## 2017-04-11 RX ADMIN — FENTANYL CITRATE 50 MCG: 50 INJECTION, SOLUTION INTRAMUSCULAR; INTRAVENOUS at 12:12

## 2017-04-11 RX ADMIN — PROPOFOL 184 MG: 10 INJECTION, EMULSION INTRAVENOUS at 10:32

## 2017-04-11 RX ADMIN — SODIUM CHLORIDE 1000 ML: 9 INJECTION, SOLUTION INTRAVENOUS at 19:47

## 2017-04-11 RX ADMIN — LABETALOL HYDROCHLORIDE 20 MG: 5 INJECTION, SOLUTION INTRAVENOUS at 21:07

## 2017-04-11 RX ADMIN — FENTANYL CITRATE 50 MCG: 50 INJECTION, SOLUTION INTRAMUSCULAR; INTRAVENOUS at 10:36

## 2017-04-11 RX ADMIN — LIDOCAINE HYDROCHLORIDE 92 MG: 20 INJECTION, SOLUTION INFILTRATION; PERINEURAL at 10:32

## 2017-04-11 RX ADMIN — HYDRALAZINE HYDROCHLORIDE 5 MG: 20 INJECTION INTRAMUSCULAR; INTRAVENOUS at 12:28

## 2017-04-11 RX ADMIN — LIDOCAINE HYDROCHLORIDE 0.1 ML: 10 INJECTION, SOLUTION EPIDURAL; INFILTRATION; INTRACAUDAL; PERINEURAL at 08:45

## 2017-04-11 RX ADMIN — HYDRALAZINE HYDROCHLORIDE 10 MG: 20 INJECTION INTRAMUSCULAR; INTRAVENOUS at 13:09

## 2017-04-11 RX ADMIN — FENTANYL CITRATE 50 MCG: 50 INJECTION, SOLUTION INTRAMUSCULAR; INTRAVENOUS at 10:41

## 2017-04-11 RX ADMIN — METOCLOPRAMIDE 10 MG: 5 INJECTION, SOLUTION INTRAMUSCULAR; INTRAVENOUS at 19:46

## 2017-04-11 RX ADMIN — KETOROLAC TROMETHAMINE 30 MG: 30 INJECTION, SOLUTION INTRAMUSCULAR; INTRAVENOUS at 08:55

## 2017-04-11 RX ADMIN — Medication 2 G: at 10:23

## 2017-04-11 RX ADMIN — DIPHENHYDRAMINE HYDROCHLORIDE 25 MG: 50 INJECTION, SOLUTION INTRAMUSCULAR; INTRAVENOUS at 19:46

## 2017-04-11 ASSESSMENT — PAIN SCALES - GENERAL
PAINLEVEL_OUTOF10: 9
PAINLEVEL_OUTOF10: 10
PAINLEVEL_OUTOF10: 2
PAINLEVEL_OUTOF10: 0

## 2017-04-11 ASSESSMENT — ENCOUNTER SYMPTOMS
WHEEZING: 0
VOMITING: 0
SHORTNESS OF BREATH: 0
CHEST TIGHTNESS: 0
EYE DISCHARGE: 0
PHOTOPHOBIA: 0
STRIDOR: 0
SORE THROAT: 0
ABDOMINAL PAIN: 0
ABDOMINAL DISTENTION: 0
COUGH: 0

## 2017-04-11 ASSESSMENT — PAIN DESCRIPTION - ORIENTATION: ORIENTATION: MID

## 2017-04-11 ASSESSMENT — PAIN DESCRIPTION - DESCRIPTORS: DESCRIPTORS: ACHING

## 2017-04-11 ASSESSMENT — PAIN DESCRIPTION - PAIN TYPE: TYPE: ACUTE PAIN

## 2017-04-11 ASSESSMENT — PAIN DESCRIPTION - LOCATION: LOCATION: HEAD

## 2017-04-11 ASSESSMENT — PAIN DESCRIPTION - PROGRESSION: CLINICAL_PROGRESSION: GRADUALLY WORSENING

## 2017-04-11 ASSESSMENT — PAIN DESCRIPTION - ONSET: ONSET: ON-GOING

## 2017-04-11 ASSESSMENT — PAIN DESCRIPTION - FREQUENCY: FREQUENCY: INTERMITTENT

## 2017-04-12 LAB
EKG ATRIAL RATE: 78 BPM
EKG P AXIS: 19 DEGREES
EKG P-R INTERVAL: 166 MS
EKG Q-T INTERVAL: 404 MS
EKG QRS DURATION: 74 MS
EKG QTC CALCULATION (BAZETT): 460 MS
EKG R AXIS: -6 DEGREES
EKG T AXIS: 26 DEGREES
EKG VENTRICULAR RATE: 78 BPM

## 2017-04-14 ENCOUNTER — CARE COORDINATION (OUTPATIENT)
Dept: CARE COORDINATION | Age: 72
End: 2017-04-14

## 2017-04-24 ENCOUNTER — OFFICE VISIT (OUTPATIENT)
Dept: FAMILY MEDICINE CLINIC | Age: 72
End: 2017-04-24

## 2017-04-24 VITALS
SYSTOLIC BLOOD PRESSURE: 162 MMHG | DIASTOLIC BLOOD PRESSURE: 90 MMHG | BODY MASS INDEX: 42.6 KG/M2 | OXYGEN SATURATION: 95 % | HEART RATE: 73 BPM | HEIGHT: 60 IN | WEIGHT: 217 LBS

## 2017-04-24 DIAGNOSIS — M14.672 CHARCOT'S JOINT OF FOOT, LEFT: ICD-10-CM

## 2017-04-24 DIAGNOSIS — Z79.899 HIGH RISK MEDICATION USE: Primary | ICD-10-CM

## 2017-04-24 DIAGNOSIS — G60.9 IDIOPATHIC PERIPHERAL NEUROPATHY: ICD-10-CM

## 2017-04-24 DIAGNOSIS — G89.29 CHRONIC LOW BACK PAIN, UNSPECIFIED BACK PAIN LATERALITY, WITH SCIATICA PRESENCE UNSPECIFIED: ICD-10-CM

## 2017-04-24 DIAGNOSIS — E66.01 MORBID OBESITY DUE TO EXCESS CALORIES (HCC): ICD-10-CM

## 2017-04-24 DIAGNOSIS — M54.5 CHRONIC LOW BACK PAIN, UNSPECIFIED BACK PAIN LATERALITY, WITH SCIATICA PRESENCE UNSPECIFIED: ICD-10-CM

## 2017-04-24 DIAGNOSIS — M54.40 LOW BACK PAIN WITH SCIATICA, SCIATICA LATERALITY UNSPECIFIED, UNSPECIFIED BACK PAIN LATERALITY, UNSPECIFIED CHRONICITY: ICD-10-CM

## 2017-04-24 DIAGNOSIS — I21.4 NSTEMI (NON-ST ELEVATED MYOCARDIAL INFARCTION) (HCC): ICD-10-CM

## 2017-04-24 DIAGNOSIS — I48.0 PAROXYSMAL ATRIAL FIBRILLATION (HCC): ICD-10-CM

## 2017-04-24 DIAGNOSIS — C50.911 LOBULAR BREAST CANCER, RIGHT (HCC): ICD-10-CM

## 2017-04-24 DIAGNOSIS — I10 ESSENTIAL HYPERTENSION: ICD-10-CM

## 2017-04-24 DIAGNOSIS — Z89.511 HX OF RIGHT BKA (HCC): ICD-10-CM

## 2017-04-24 DIAGNOSIS — G64 DISORDER OF PERIPHERAL NERVOUS SYSTEM: ICD-10-CM

## 2017-04-24 PROCEDURE — 1036F TOBACCO NON-USER: CPT | Performed by: FAMILY MEDICINE

## 2017-04-24 PROCEDURE — 3014F SCREEN MAMMO DOC REV: CPT | Performed by: FAMILY MEDICINE

## 2017-04-24 PROCEDURE — 99214 OFFICE O/P EST MOD 30 MIN: CPT | Performed by: FAMILY MEDICINE

## 2017-04-24 PROCEDURE — 4040F PNEUMOC VAC/ADMIN/RCVD: CPT | Performed by: FAMILY MEDICINE

## 2017-04-24 PROCEDURE — G8417 CALC BMI ABV UP PARAM F/U: HCPCS | Performed by: FAMILY MEDICINE

## 2017-04-24 PROCEDURE — 3017F COLORECTAL CA SCREEN DOC REV: CPT | Performed by: FAMILY MEDICINE

## 2017-04-24 PROCEDURE — 1123F ACP DISCUSS/DSCN MKR DOCD: CPT | Performed by: FAMILY MEDICINE

## 2017-04-24 PROCEDURE — 1090F PRES/ABSN URINE INCON ASSESS: CPT | Performed by: FAMILY MEDICINE

## 2017-04-24 PROCEDURE — G8427 DOCREV CUR MEDS BY ELIG CLIN: HCPCS | Performed by: FAMILY MEDICINE

## 2017-04-24 PROCEDURE — G8399 PT W/DXA RESULTS DOCUMENT: HCPCS | Performed by: FAMILY MEDICINE

## 2017-04-24 RX ORDER — CARVEDILOL 25 MG/1
25 TABLET ORAL 2 TIMES DAILY
Qty: 180 TABLET | Refills: 2 | Status: SHIPPED | OUTPATIENT
Start: 2017-04-24 | End: 2017-10-23 | Stop reason: SDUPTHER

## 2017-04-27 ENCOUNTER — OFFICE VISIT (OUTPATIENT)
Dept: SURGERY | Age: 72
End: 2017-04-27

## 2017-04-27 VITALS
SYSTOLIC BLOOD PRESSURE: 130 MMHG | BODY MASS INDEX: 43 KG/M2 | HEIGHT: 60 IN | WEIGHT: 219 LBS | TEMPERATURE: 98.5 F | DIASTOLIC BLOOD PRESSURE: 84 MMHG

## 2017-04-27 DIAGNOSIS — N63.20 LEFT BREAST MASS: Primary | ICD-10-CM

## 2017-04-27 PROCEDURE — 99024 POSTOP FOLLOW-UP VISIT: CPT | Performed by: SURGERY

## 2017-05-11 ENCOUNTER — OFFICE VISIT (OUTPATIENT)
Dept: PHYSICAL MEDICINE AND REHAB | Age: 72
End: 2017-05-11

## 2017-05-11 VITALS
HEIGHT: 60 IN | DIASTOLIC BLOOD PRESSURE: 82 MMHG | SYSTOLIC BLOOD PRESSURE: 110 MMHG | BODY MASS INDEX: 42.21 KG/M2 | WEIGHT: 215 LBS

## 2017-05-11 DIAGNOSIS — G89.29 CHRONIC LOW BACK PAIN WITH SCIATICA, SCIATICA LATERALITY UNSPECIFIED, UNSPECIFIED BACK PAIN LATERALITY: Primary | ICD-10-CM

## 2017-05-11 DIAGNOSIS — M48.02 CERVICAL STENOSIS OF SPINAL CANAL: ICD-10-CM

## 2017-05-11 DIAGNOSIS — M43.10 SPONDYLOLISTHESIS, UNSPECIFIED SPINAL REGION: ICD-10-CM

## 2017-05-11 DIAGNOSIS — M54.2 NECK PAIN ON RIGHT SIDE: ICD-10-CM

## 2017-05-11 DIAGNOSIS — M54.40 CHRONIC LOW BACK PAIN WITH SCIATICA, SCIATICA LATERALITY UNSPECIFIED, UNSPECIFIED BACK PAIN LATERALITY: Primary | ICD-10-CM

## 2017-05-11 DIAGNOSIS — Z79.899 HIGH RISK MEDICATION USE: Chronic | ICD-10-CM

## 2017-05-11 DIAGNOSIS — Z79.899 HIGH RISK MEDICATION USE: ICD-10-CM

## 2017-05-11 DIAGNOSIS — F32.A MELANCHOLIA: ICD-10-CM

## 2017-05-11 DIAGNOSIS — G62.0 DRUG-INDUCED POLYNEUROPATHY (HCC): ICD-10-CM

## 2017-05-11 DIAGNOSIS — M79.10 MYALGIA: ICD-10-CM

## 2017-05-11 DIAGNOSIS — M14.679 CHARCOT'S JOINT OF FOOT, UNSPECIFIED LATERALITY: ICD-10-CM

## 2017-05-11 DIAGNOSIS — E66.01 MORBID OBESITY DUE TO EXCESS CALORIES (HCC): ICD-10-CM

## 2017-05-11 DIAGNOSIS — M15.9 PRIMARY OSTEOARTHRITIS INVOLVING MULTIPLE JOINTS: ICD-10-CM

## 2017-05-11 PROBLEM — M15.0 PRIMARY OSTEOARTHRITIS INVOLVING MULTIPLE JOINTS: Status: ACTIVE | Noted: 2017-05-11

## 2017-05-11 LAB
AMPHETAMINE SCREEN, URINE: ABNORMAL
BARBITURATE SCREEN URINE: ABNORMAL
BENZODIAZEPINE SCREEN, URINE: ABNORMAL
CANNABINOID SCREEN URINE: ABNORMAL
COCAINE METABOLITE SCREEN URINE: ABNORMAL
Lab: ABNORMAL
OPIATE SCREEN URINE: POSITIVE
PHENCYCLIDINE SCREEN URINE: ABNORMAL

## 2017-05-11 PROCEDURE — G8399 PT W/DXA RESULTS DOCUMENT: HCPCS | Performed by: PHYSICAL MEDICINE & REHABILITATION

## 2017-05-11 PROCEDURE — 1036F TOBACCO NON-USER: CPT | Performed by: PHYSICAL MEDICINE & REHABILITATION

## 2017-05-11 PROCEDURE — G8427 DOCREV CUR MEDS BY ELIG CLIN: HCPCS | Performed by: PHYSICAL MEDICINE & REHABILITATION

## 2017-05-11 PROCEDURE — 4040F PNEUMOC VAC/ADMIN/RCVD: CPT | Performed by: PHYSICAL MEDICINE & REHABILITATION

## 2017-05-11 PROCEDURE — 1123F ACP DISCUSS/DSCN MKR DOCD: CPT | Performed by: PHYSICAL MEDICINE & REHABILITATION

## 2017-05-11 PROCEDURE — 3017F COLORECTAL CA SCREEN DOC REV: CPT | Performed by: PHYSICAL MEDICINE & REHABILITATION

## 2017-05-11 PROCEDURE — 99213 OFFICE O/P EST LOW 20 MIN: CPT | Performed by: PHYSICAL MEDICINE & REHABILITATION

## 2017-05-11 PROCEDURE — 1090F PRES/ABSN URINE INCON ASSESS: CPT | Performed by: PHYSICAL MEDICINE & REHABILITATION

## 2017-05-11 PROCEDURE — G8417 CALC BMI ABV UP PARAM F/U: HCPCS | Performed by: PHYSICAL MEDICINE & REHABILITATION

## 2017-05-11 PROCEDURE — 3014F SCREEN MAMMO DOC REV: CPT | Performed by: PHYSICAL MEDICINE & REHABILITATION

## 2017-05-11 RX ORDER — HYDROCODONE BITARTRATE AND ACETAMINOPHEN 7.5; 325 MG/1; MG/1
TABLET ORAL
Qty: 90 TABLET | Refills: 0 | Status: SHIPPED | OUTPATIENT
Start: 2017-05-11 | End: 2017-07-13 | Stop reason: SDUPTHER

## 2017-05-11 ASSESSMENT — ENCOUNTER SYMPTOMS
APNEA: 0
EYES NEGATIVE: 1
ABDOMINAL PAIN: 0
WHEEZING: 0
BACK PAIN: 1
VOMITING: 0
CONSTIPATION: 1
SHORTNESS OF BREATH: 0
DIARRHEA: 0
ALLERGIC/IMMUNOLOGIC NEGATIVE: 1
CHEST TIGHTNESS: 0
NAUSEA: 0

## 2017-05-22 ENCOUNTER — OFFICE VISIT (OUTPATIENT)
Dept: UROLOGY | Age: 72
End: 2017-05-22

## 2017-05-22 ENCOUNTER — PROCEDURE VISIT (OUTPATIENT)
Dept: PHYSICAL MEDICINE AND REHAB | Age: 72
End: 2017-05-22

## 2017-05-22 VITALS — SYSTOLIC BLOOD PRESSURE: 120 MMHG | HEART RATE: 54 BPM | DIASTOLIC BLOOD PRESSURE: 80 MMHG

## 2017-05-22 DIAGNOSIS — M79.10 MYALGIA: Primary | ICD-10-CM

## 2017-05-22 DIAGNOSIS — R31.9 HEMATURIA: Primary | ICD-10-CM

## 2017-05-22 LAB
BILIRUBIN, POC: ABNORMAL
BLOOD URINE, POC: ABNORMAL
CLARITY, POC: CLEAR
COLOR, POC: ABNORMAL
GLUCOSE URINE, POC: ABNORMAL
KETONES, POC: 15
LEUKOCYTE EST, POC: ABNORMAL
NITRITE, POC: ABNORMAL
PH, POC: 5
PROTEIN, POC: 30
SPECIFIC GRAVITY, POC: 1.02
UROBILINOGEN, POC: 0.2

## 2017-05-22 PROCEDURE — 4040F PNEUMOC VAC/ADMIN/RCVD: CPT | Performed by: UROLOGY

## 2017-05-22 PROCEDURE — G8399 PT W/DXA RESULTS DOCUMENT: HCPCS | Performed by: UROLOGY

## 2017-05-22 PROCEDURE — 1090F PRES/ABSN URINE INCON ASSESS: CPT | Performed by: UROLOGY

## 2017-05-22 PROCEDURE — 81003 URINALYSIS AUTO W/O SCOPE: CPT | Performed by: UROLOGY

## 2017-05-22 PROCEDURE — 3014F SCREEN MAMMO DOC REV: CPT | Performed by: UROLOGY

## 2017-05-22 PROCEDURE — G8427 DOCREV CUR MEDS BY ELIG CLIN: HCPCS | Performed by: UROLOGY

## 2017-05-22 PROCEDURE — 3017F COLORECTAL CA SCREEN DOC REV: CPT | Performed by: UROLOGY

## 2017-05-22 PROCEDURE — 1123F ACP DISCUSS/DSCN MKR DOCD: CPT | Performed by: UROLOGY

## 2017-05-22 PROCEDURE — G8417 CALC BMI ABV UP PARAM F/U: HCPCS | Performed by: UROLOGY

## 2017-05-22 PROCEDURE — 20553 NJX 1/MLT TRIGGER POINTS 3/>: CPT | Performed by: PHYSICAL MEDICINE & REHABILITATION

## 2017-05-22 PROCEDURE — 99214 OFFICE O/P EST MOD 30 MIN: CPT | Performed by: UROLOGY

## 2017-05-22 PROCEDURE — 1036F TOBACCO NON-USER: CPT | Performed by: UROLOGY

## 2017-05-22 RX ORDER — BUPROPION HYDROCHLORIDE 150 MG/1
150 TABLET, EXTENDED RELEASE ORAL DAILY
COMMUNITY
End: 2017-08-31

## 2017-05-22 ASSESSMENT — ENCOUNTER SYMPTOMS: ABDOMINAL DISTENTION: 0

## 2017-05-24 ENCOUNTER — OFFICE VISIT (OUTPATIENT)
Dept: FAMILY MEDICINE CLINIC | Age: 72
End: 2017-05-24

## 2017-05-24 VITALS
HEART RATE: 64 BPM | SYSTOLIC BLOOD PRESSURE: 112 MMHG | BODY MASS INDEX: 45.94 KG/M2 | WEIGHT: 234 LBS | TEMPERATURE: 98.2 F | HEIGHT: 60 IN | OXYGEN SATURATION: 98 % | DIASTOLIC BLOOD PRESSURE: 74 MMHG

## 2017-05-24 DIAGNOSIS — Z91.81 AT HIGH RISK FOR FALLS: ICD-10-CM

## 2017-05-24 DIAGNOSIS — R60.0 BILATERAL LOWER EXTREMITY EDEMA: Primary | ICD-10-CM

## 2017-05-24 DIAGNOSIS — R60.0 BILATERAL LOWER EXTREMITY EDEMA: ICD-10-CM

## 2017-05-24 DIAGNOSIS — R06.02 SHORTNESS OF BREATH: ICD-10-CM

## 2017-05-24 LAB
ALBUMIN SERPL-MCNC: 4 G/DL (ref 3.9–4.9)
ALP BLD-CCNC: 129 U/L (ref 40–130)
ALT SERPL-CCNC: 16 U/L (ref 0–33)
ANION GAP SERPL CALCULATED.3IONS-SCNC: 14 MEQ/L (ref 7–13)
AST SERPL-CCNC: 23 U/L (ref 0–35)
BILIRUB SERPL-MCNC: 0.5 MG/DL (ref 0–1.2)
BUN BLDV-MCNC: 24 MG/DL (ref 8–23)
CALCIUM SERPL-MCNC: 9.3 MG/DL (ref 8.6–10.2)
CHLORIDE BLD-SCNC: 103 MEQ/L (ref 98–107)
CO2: 25 MEQ/L (ref 22–29)
CREAT SERPL-MCNC: 0.81 MG/DL (ref 0.5–0.9)
GFR AFRICAN AMERICAN: >60
GFR NON-AFRICAN AMERICAN: >60
GLOBULIN: 2.2 G/DL (ref 2.3–3.5)
GLUCOSE BLD-MCNC: 90 MG/DL (ref 74–109)
HCT VFR BLD CALC: 39.6 % (ref 37–47)
HEMOGLOBIN: 13.1 G/DL (ref 12–16)
MCH RBC QN AUTO: 30 PG (ref 27–31.3)
MCHC RBC AUTO-ENTMCNC: 33 % (ref 33–37)
MCV RBC AUTO: 90.8 FL (ref 82–100)
PDW BLD-RTO: 14.4 % (ref 11.5–14.5)
PLATELET # BLD: 184 K/UL (ref 130–400)
POTASSIUM SERPL-SCNC: 4.3 MEQ/L (ref 3.5–5.1)
RBC # BLD: 4.36 M/UL (ref 4.2–5.4)
SODIUM BLD-SCNC: 142 MEQ/L (ref 132–144)
TOTAL PROTEIN: 6.2 G/DL (ref 6.4–8.1)
WBC # BLD: 5.7 K/UL (ref 4.8–10.8)

## 2017-05-24 PROCEDURE — 3014F SCREEN MAMMO DOC REV: CPT | Performed by: NURSE PRACTITIONER

## 2017-05-24 PROCEDURE — G8427 DOCREV CUR MEDS BY ELIG CLIN: HCPCS | Performed by: NURSE PRACTITIONER

## 2017-05-24 PROCEDURE — 3017F COLORECTAL CA SCREEN DOC REV: CPT | Performed by: NURSE PRACTITIONER

## 2017-05-24 PROCEDURE — 1123F ACP DISCUSS/DSCN MKR DOCD: CPT | Performed by: NURSE PRACTITIONER

## 2017-05-24 PROCEDURE — 1036F TOBACCO NON-USER: CPT | Performed by: NURSE PRACTITIONER

## 2017-05-24 PROCEDURE — 1090F PRES/ABSN URINE INCON ASSESS: CPT | Performed by: NURSE PRACTITIONER

## 2017-05-24 PROCEDURE — 4040F PNEUMOC VAC/ADMIN/RCVD: CPT | Performed by: NURSE PRACTITIONER

## 2017-05-24 PROCEDURE — G8417 CALC BMI ABV UP PARAM F/U: HCPCS | Performed by: NURSE PRACTITIONER

## 2017-05-24 PROCEDURE — 99213 OFFICE O/P EST LOW 20 MIN: CPT | Performed by: NURSE PRACTITIONER

## 2017-05-24 PROCEDURE — G8399 PT W/DXA RESULTS DOCUMENT: HCPCS | Performed by: NURSE PRACTITIONER

## 2017-05-24 ASSESSMENT — ENCOUNTER SYMPTOMS
SHORTNESS OF BREATH: 1
COUGH: 0

## 2017-05-27 ENCOUNTER — OFFICE VISIT (OUTPATIENT)
Dept: FAMILY MEDICINE CLINIC | Age: 72
End: 2017-05-27

## 2017-05-27 VITALS
HEART RATE: 61 BPM | HEIGHT: 60 IN | DIASTOLIC BLOOD PRESSURE: 80 MMHG | BODY MASS INDEX: 43.98 KG/M2 | WEIGHT: 224 LBS | SYSTOLIC BLOOD PRESSURE: 124 MMHG | OXYGEN SATURATION: 96 %

## 2017-05-27 DIAGNOSIS — Z74.09 IMPAIRED MOBILITY AND ACTIVITIES OF DAILY LIVING: ICD-10-CM

## 2017-05-27 DIAGNOSIS — I48.0 PAROXYSMAL ATRIAL FIBRILLATION (HCC): ICD-10-CM

## 2017-05-27 DIAGNOSIS — R60.9 EDEMA, UNSPECIFIED TYPE: Primary | ICD-10-CM

## 2017-05-27 DIAGNOSIS — Z78.9 IMPAIRED MOBILITY AND ACTIVITIES OF DAILY LIVING: ICD-10-CM

## 2017-05-27 PROCEDURE — 3014F SCREEN MAMMO DOC REV: CPT | Performed by: FAMILY MEDICINE

## 2017-05-27 PROCEDURE — 99213 OFFICE O/P EST LOW 20 MIN: CPT | Performed by: FAMILY MEDICINE

## 2017-05-27 PROCEDURE — G8417 CALC BMI ABV UP PARAM F/U: HCPCS | Performed by: FAMILY MEDICINE

## 2017-05-27 PROCEDURE — 1090F PRES/ABSN URINE INCON ASSESS: CPT | Performed by: FAMILY MEDICINE

## 2017-05-27 PROCEDURE — G8427 DOCREV CUR MEDS BY ELIG CLIN: HCPCS | Performed by: FAMILY MEDICINE

## 2017-05-27 PROCEDURE — 1036F TOBACCO NON-USER: CPT | Performed by: FAMILY MEDICINE

## 2017-05-27 PROCEDURE — 1123F ACP DISCUSS/DSCN MKR DOCD: CPT | Performed by: FAMILY MEDICINE

## 2017-05-27 PROCEDURE — G8399 PT W/DXA RESULTS DOCUMENT: HCPCS | Performed by: FAMILY MEDICINE

## 2017-05-27 PROCEDURE — 3017F COLORECTAL CA SCREEN DOC REV: CPT | Performed by: FAMILY MEDICINE

## 2017-05-27 PROCEDURE — 4040F PNEUMOC VAC/ADMIN/RCVD: CPT | Performed by: FAMILY MEDICINE

## 2017-05-27 RX ORDER — FUROSEMIDE 20 MG/1
40 TABLET ORAL DAILY
Qty: 180 TABLET | Refills: 2 | Status: SHIPPED | OUTPATIENT
Start: 2017-05-27 | End: 2018-06-08 | Stop reason: DRUGHIGH

## 2017-05-30 ENCOUNTER — HOSPITAL ENCOUNTER (OUTPATIENT)
Dept: CT IMAGING | Age: 72
Discharge: HOME | End: 2017-05-30
Payer: MEDICARE

## 2017-05-30 ENCOUNTER — TELEPHONE (OUTPATIENT)
Dept: UROLOGY | Age: 72
End: 2017-05-30

## 2017-05-30 ENCOUNTER — HOSPITAL ENCOUNTER (OUTPATIENT)
Dept: GENERAL RADIOLOGY | Age: 72
Discharge: HOME | End: 2017-05-30
Payer: MEDICARE

## 2017-05-30 VITALS — WEIGHT: 215 LBS | HEIGHT: 60 IN | BODY MASS INDEX: 42.21 KG/M2

## 2017-05-30 DIAGNOSIS — N20.0 KIDNEY STONE: Primary | ICD-10-CM

## 2017-05-30 DIAGNOSIS — R31.9 HEMATURIA: ICD-10-CM

## 2017-05-30 DIAGNOSIS — N20.0 KIDNEY STONES: ICD-10-CM

## 2017-05-30 PROCEDURE — 6360000004 HC RX CONTRAST MEDICATION: Performed by: RADIOLOGY

## 2017-05-30 PROCEDURE — 74000 XR ABDOMEN LIMITED (KUB): CPT

## 2017-05-30 PROCEDURE — 74178 CT ABD&PLV WO CNTR FLWD CNTR: CPT

## 2017-05-30 RX ADMIN — IOPAMIDOL 100 ML: 755 INJECTION, SOLUTION INTRAVENOUS at 14:46

## 2017-06-06 ENCOUNTER — PROCEDURE VISIT (OUTPATIENT)
Dept: UROLOGY | Age: 72
End: 2017-06-06

## 2017-06-06 VITALS
HEART RATE: 54 BPM | BODY MASS INDEX: 43.19 KG/M2 | SYSTOLIC BLOOD PRESSURE: 138 MMHG | WEIGHT: 220 LBS | HEIGHT: 60 IN | DIASTOLIC BLOOD PRESSURE: 80 MMHG

## 2017-06-06 DIAGNOSIS — R31.9 HEMATURIA: Primary | ICD-10-CM

## 2017-06-06 LAB
BILIRUBIN, POC: ABNORMAL
BLOOD URINE, POC: ABNORMAL
CLARITY, POC: CLEAR
COLOR, POC: YELLOW
GLUCOSE URINE, POC: ABNORMAL
KETONES, POC: ABNORMAL
LEUKOCYTE EST, POC: ABNORMAL
NITRITE, POC: ABNORMAL
PH, POC: 5
PROTEIN, POC: ABNORMAL
SPECIFIC GRAVITY, POC: 1.02
UROBILINOGEN, POC: 0.2

## 2017-06-06 PROCEDURE — 99214 OFFICE O/P EST MOD 30 MIN: CPT | Performed by: UROLOGY

## 2017-06-06 PROCEDURE — G8427 DOCREV CUR MEDS BY ELIG CLIN: HCPCS | Performed by: UROLOGY

## 2017-06-06 PROCEDURE — 4040F PNEUMOC VAC/ADMIN/RCVD: CPT | Performed by: UROLOGY

## 2017-06-06 PROCEDURE — 1123F ACP DISCUSS/DSCN MKR DOCD: CPT | Performed by: UROLOGY

## 2017-06-06 PROCEDURE — 3014F SCREEN MAMMO DOC REV: CPT | Performed by: UROLOGY

## 2017-06-06 PROCEDURE — 3017F COLORECTAL CA SCREEN DOC REV: CPT | Performed by: UROLOGY

## 2017-06-06 PROCEDURE — 1090F PRES/ABSN URINE INCON ASSESS: CPT | Performed by: UROLOGY

## 2017-06-06 PROCEDURE — 52000 CYSTOURETHROSCOPY: CPT | Performed by: UROLOGY

## 2017-06-06 PROCEDURE — 81003 URINALYSIS AUTO W/O SCOPE: CPT | Performed by: UROLOGY

## 2017-06-06 PROCEDURE — G8399 PT W/DXA RESULTS DOCUMENT: HCPCS | Performed by: UROLOGY

## 2017-06-06 PROCEDURE — 1036F TOBACCO NON-USER: CPT | Performed by: UROLOGY

## 2017-06-06 PROCEDURE — G8417 CALC BMI ABV UP PARAM F/U: HCPCS | Performed by: UROLOGY

## 2017-06-06 RX ORDER — SULFAMETHOXAZOLE AND TRIMETHOPRIM 800; 160 MG/1; MG/1
1 TABLET ORAL ONCE
Qty: 1 TABLET | Refills: 0 | COMMUNITY
Start: 2017-06-06 | End: 2017-06-06

## 2017-06-06 ASSESSMENT — ENCOUNTER SYMPTOMS
ABDOMINAL PAIN: 0
SHORTNESS OF BREATH: 0

## 2017-06-19 ENCOUNTER — HOSPITAL ENCOUNTER (OUTPATIENT)
Dept: PREADMISSION TESTING | Age: 72
Discharge: HOME | End: 2017-06-19
Payer: MEDICARE

## 2017-06-20 ENCOUNTER — HOSPITAL ENCOUNTER (OUTPATIENT)
Dept: PREADMISSION TESTING | Age: 72
Discharge: HOME | End: 2017-06-20
Payer: MEDICARE

## 2017-06-20 VITALS
HEIGHT: 57 IN | BODY MASS INDEX: 47.59 KG/M2 | TEMPERATURE: 98.4 F | DIASTOLIC BLOOD PRESSURE: 60 MMHG | HEART RATE: 72 BPM | OXYGEN SATURATION: 94 % | SYSTOLIC BLOOD PRESSURE: 108 MMHG | WEIGHT: 220.6 LBS | RESPIRATION RATE: 16 BRPM

## 2017-06-20 DIAGNOSIS — R31.9 HEMATURIA: ICD-10-CM

## 2017-06-20 LAB
ABO/RH: NORMAL
ANION GAP SERPL CALCULATED.3IONS-SCNC: 12 MEQ/L (ref 7–13)
ANTIBODY IDENTIFICATION: NORMAL
ANTIBODY SCREEN: NORMAL
BILIRUBIN URINE: NEGATIVE
BLOOD, URINE: NEGATIVE
BUN BLDV-MCNC: 16 MG/DL (ref 8–23)
CALCIUM SERPL-MCNC: 9.5 MG/DL (ref 8.6–10.2)
CHLORIDE BLD-SCNC: 102 MEQ/L (ref 98–107)
CLARITY: CLEAR
CO2: 26 MEQ/L (ref 22–29)
COLOR: YELLOW
CREAT SERPL-MCNC: 0.83 MG/DL (ref 0.5–0.9)
GFR AFRICAN AMERICAN: >60
GFR NON-AFRICAN AMERICAN: >60
GLUCOSE BLD-MCNC: 82 MG/DL (ref 74–109)
GLUCOSE URINE: NEGATIVE MG/DL
HCT VFR BLD CALC: 42.7 % (ref 37–47)
HEMOGLOBIN: 14.2 G/DL (ref 12–16)
KETONES, URINE: NEGATIVE MG/DL
LEUKOCYTE ESTERASE, URINE: NEGATIVE
MCH RBC QN AUTO: 29.9 PG (ref 27–31.3)
MCHC RBC AUTO-ENTMCNC: 33.1 % (ref 33–37)
MCV RBC AUTO: 90.3 FL (ref 82–100)
NITRITE, URINE: NEGATIVE
PDW BLD-RTO: 14.5 % (ref 11.5–14.5)
PH UA: 7 (ref 5–9)
PLATELET # BLD: 197 K/UL (ref 130–400)
POTASSIUM SERPL-SCNC: 4.2 MEQ/L (ref 3.5–5.1)
PROTEIN UA: NEGATIVE MG/DL
RBC # BLD: 4.73 M/UL (ref 4.2–5.4)
SODIUM BLD-SCNC: 140 MEQ/L (ref 132–144)
SPECIFIC GRAVITY UA: 1.02 (ref 1–1.03)
URINE REFLEX TO CULTURE: NORMAL
UROBILINOGEN, URINE: 0.2 E.U./DL
WBC # BLD: 8 K/UL (ref 4.8–10.8)

## 2017-06-20 PROCEDURE — 86850 RBC ANTIBODY SCREEN: CPT

## 2017-06-20 PROCEDURE — 85027 COMPLETE CBC AUTOMATED: CPT

## 2017-06-20 PROCEDURE — 86870 RBC ANTIBODY IDENTIFICATION: CPT

## 2017-06-20 PROCEDURE — 86901 BLOOD TYPING SEROLOGIC RH(D): CPT

## 2017-06-20 PROCEDURE — 80048 BASIC METABOLIC PNL TOTAL CA: CPT

## 2017-06-20 PROCEDURE — 81003 URINALYSIS AUTO W/O SCOPE: CPT

## 2017-06-20 PROCEDURE — 86900 BLOOD TYPING SEROLOGIC ABO: CPT

## 2017-06-20 RX ORDER — TAMOXIFEN CITRATE 10 MG/1
10 TABLET ORAL DAILY
COMMUNITY
End: 2017-07-13 | Stop reason: DRUGHIGH

## 2017-06-20 RX ORDER — SODIUM CHLORIDE 0.9 % (FLUSH) 0.9 %
10 SYRINGE (ML) INJECTION PRN
Status: CANCELLED | OUTPATIENT
Start: 2017-06-20

## 2017-06-20 RX ORDER — GENTAMICIN SULFATE 80 MG/100ML
80 INJECTION, SOLUTION INTRAVENOUS ONCE
Status: CANCELLED | OUTPATIENT
Start: 2017-06-26

## 2017-06-20 RX ORDER — SODIUM CHLORIDE, SODIUM LACTATE, POTASSIUM CHLORIDE, CALCIUM CHLORIDE 600; 310; 30; 20 MG/100ML; MG/100ML; MG/100ML; MG/100ML
INJECTION, SOLUTION INTRAVENOUS CONTINUOUS
Status: CANCELLED | OUTPATIENT
Start: 2017-06-20

## 2017-06-20 RX ORDER — SODIUM CHLORIDE 0.9 % (FLUSH) 0.9 %
10 SYRINGE (ML) INJECTION EVERY 12 HOURS SCHEDULED
Status: CANCELLED | OUTPATIENT
Start: 2017-06-20

## 2017-06-20 RX ORDER — LIDOCAINE HYDROCHLORIDE 10 MG/ML
1 INJECTION, SOLUTION EPIDURAL; INFILTRATION; INTRACAUDAL; PERINEURAL
Status: CANCELLED | OUTPATIENT
Start: 2017-06-20 | End: 2017-06-20

## 2017-06-20 ASSESSMENT — ENCOUNTER SYMPTOMS
EYES NEGATIVE: 1
EYE PAIN: 0
RESPIRATORY NEGATIVE: 1
VOMITING: 0
DIARRHEA: 0
BACK PAIN: 1
NAUSEA: 0
SHORTNESS OF BREATH: 0
COUGH: 0
HEARTBURN: 0
CONSTIPATION: 1
WHEEZING: 0
SORE THROAT: 0

## 2017-06-26 ENCOUNTER — ANESTHESIA EVENT (OUTPATIENT)
Dept: OPERATING ROOM | Age: 72
End: 2017-06-26
Payer: MEDICARE

## 2017-06-26 ENCOUNTER — ANESTHESIA (OUTPATIENT)
Dept: OPERATING ROOM | Age: 72
End: 2017-06-26
Payer: MEDICARE

## 2017-06-26 ENCOUNTER — HOSPITAL ENCOUNTER (OUTPATIENT)
Age: 72
Setting detail: OUTPATIENT SURGERY
Discharge: HOME | End: 2017-06-26
Attending: UROLOGY | Admitting: UROLOGY
Payer: MEDICARE

## 2017-06-26 VITALS
BODY MASS INDEX: 47.46 KG/M2 | RESPIRATION RATE: 14 BRPM | DIASTOLIC BLOOD PRESSURE: 72 MMHG | WEIGHT: 220 LBS | OXYGEN SATURATION: 91 % | SYSTOLIC BLOOD PRESSURE: 160 MMHG | TEMPERATURE: 97.5 F | HEIGHT: 57 IN | HEART RATE: 73 BPM

## 2017-06-26 VITALS — OXYGEN SATURATION: 100 % | DIASTOLIC BLOOD PRESSURE: 83 MMHG | SYSTOLIC BLOOD PRESSURE: 148 MMHG

## 2017-06-26 PROCEDURE — 2500000003 HC RX 250 WO HCPCS: Performed by: STUDENT IN AN ORGANIZED HEALTH CARE EDUCATION/TRAINING PROGRAM

## 2017-06-26 PROCEDURE — 88305 TISSUE EXAM BY PATHOLOGIST: CPT

## 2017-06-26 PROCEDURE — 6360000002 HC RX W HCPCS: Performed by: NURSE ANESTHETIST, CERTIFIED REGISTERED

## 2017-06-26 PROCEDURE — 3600000004 HC SURGERY LEVEL 4 BASE: Performed by: UROLOGY

## 2017-06-26 PROCEDURE — 3700000001 HC ADD 15 MINUTES (ANESTHESIA): Performed by: UROLOGY

## 2017-06-26 PROCEDURE — 7100000001 HC PACU RECOVERY - ADDTL 15 MIN: Performed by: UROLOGY

## 2017-06-26 PROCEDURE — 7100000010 HC PHASE II RECOVERY - FIRST 15 MIN: Performed by: UROLOGY

## 2017-06-26 PROCEDURE — 3700000000 HC ANESTHESIA ATTENDED CARE: Performed by: UROLOGY

## 2017-06-26 PROCEDURE — 2580000003 HC RX 258: Performed by: UROLOGY

## 2017-06-26 PROCEDURE — 6360000002 HC RX W HCPCS: Performed by: UROLOGY

## 2017-06-26 PROCEDURE — 7100000000 HC PACU RECOVERY - FIRST 15 MIN: Performed by: UROLOGY

## 2017-06-26 PROCEDURE — 2500000003 HC RX 250 WO HCPCS: Performed by: NURSE ANESTHETIST, CERTIFIED REGISTERED

## 2017-06-26 PROCEDURE — 52234 CYSTOSCOPY AND TREATMENT: CPT | Performed by: UROLOGY

## 2017-06-26 PROCEDURE — 2580000003 HC RX 258: Performed by: STUDENT IN AN ORGANIZED HEALTH CARE EDUCATION/TRAINING PROGRAM

## 2017-06-26 PROCEDURE — 6370000000 HC RX 637 (ALT 250 FOR IP): Performed by: UROLOGY

## 2017-06-26 PROCEDURE — 3600000014 HC SURGERY LEVEL 4 ADDTL 15MIN: Performed by: UROLOGY

## 2017-06-26 RX ORDER — SODIUM CHLORIDE 0.9 % (FLUSH) 0.9 %
10 SYRINGE (ML) INJECTION EVERY 12 HOURS SCHEDULED
Status: DISCONTINUED | OUTPATIENT
Start: 2017-06-26 | End: 2017-06-26 | Stop reason: HOSPADM

## 2017-06-26 RX ORDER — ONDANSETRON 2 MG/ML
INJECTION INTRAMUSCULAR; INTRAVENOUS PRN
Status: DISCONTINUED | OUTPATIENT
Start: 2017-06-26 | End: 2017-06-26 | Stop reason: SDUPTHER

## 2017-06-26 RX ORDER — MAGNESIUM HYDROXIDE 1200 MG/15ML
LIQUID ORAL PRN
Status: DISCONTINUED | OUTPATIENT
Start: 2017-06-26 | End: 2017-06-26 | Stop reason: HOSPADM

## 2017-06-26 RX ORDER — HYDROCODONE BITARTRATE AND ACETAMINOPHEN 5; 325 MG/1; MG/1
2 TABLET ORAL PRN
Status: DISCONTINUED | OUTPATIENT
Start: 2017-06-26 | End: 2017-06-26 | Stop reason: HOSPADM

## 2017-06-26 RX ORDER — FENTANYL CITRATE 50 UG/ML
50 INJECTION, SOLUTION INTRAMUSCULAR; INTRAVENOUS EVERY 10 MIN PRN
Status: DISCONTINUED | OUTPATIENT
Start: 2017-06-26 | End: 2017-06-26 | Stop reason: HOSPADM

## 2017-06-26 RX ORDER — SODIUM CHLORIDE, SODIUM LACTATE, POTASSIUM CHLORIDE, CALCIUM CHLORIDE 600; 310; 30; 20 MG/100ML; MG/100ML; MG/100ML; MG/100ML
INJECTION, SOLUTION INTRAVENOUS
Status: DISCONTINUED
Start: 2017-06-26 | End: 2017-06-26 | Stop reason: HOSPADM

## 2017-06-26 RX ORDER — ONDANSETRON 2 MG/ML
4 INJECTION INTRAMUSCULAR; INTRAVENOUS
Status: DISCONTINUED | OUTPATIENT
Start: 2017-06-26 | End: 2017-06-26 | Stop reason: HOSPADM

## 2017-06-26 RX ORDER — FENTANYL CITRATE 50 UG/ML
INJECTION, SOLUTION INTRAMUSCULAR; INTRAVENOUS PRN
Status: DISCONTINUED | OUTPATIENT
Start: 2017-06-26 | End: 2017-06-26 | Stop reason: SDUPTHER

## 2017-06-26 RX ORDER — LIDOCAINE HYDROCHLORIDE 20 MG/ML
INJECTION, SOLUTION INFILTRATION; PERINEURAL PRN
Status: DISCONTINUED | OUTPATIENT
Start: 2017-06-26 | End: 2017-06-26 | Stop reason: SDUPTHER

## 2017-06-26 RX ORDER — HYDROCODONE BITARTRATE AND ACETAMINOPHEN 5; 325 MG/1; MG/1
1 TABLET ORAL PRN
Status: DISCONTINUED | OUTPATIENT
Start: 2017-06-26 | End: 2017-06-26 | Stop reason: HOSPADM

## 2017-06-26 RX ORDER — SODIUM CHLORIDE, SODIUM LACTATE, POTASSIUM CHLORIDE, CALCIUM CHLORIDE 600; 310; 30; 20 MG/100ML; MG/100ML; MG/100ML; MG/100ML
INJECTION, SOLUTION INTRAVENOUS CONTINUOUS
Status: DISCONTINUED | OUTPATIENT
Start: 2017-06-26 | End: 2017-06-26 | Stop reason: HOSPADM

## 2017-06-26 RX ORDER — LIDOCAINE HYDROCHLORIDE 10 MG/ML
1 INJECTION, SOLUTION EPIDURAL; INFILTRATION; INTRACAUDAL; PERINEURAL
Status: COMPLETED | OUTPATIENT
Start: 2017-06-26 | End: 2017-06-26

## 2017-06-26 RX ORDER — SODIUM CHLORIDE 0.9 % (FLUSH) 0.9 %
10 SYRINGE (ML) INJECTION PRN
Status: DISCONTINUED | OUTPATIENT
Start: 2017-06-26 | End: 2017-06-26 | Stop reason: HOSPADM

## 2017-06-26 RX ORDER — MEPERIDINE HYDROCHLORIDE 25 MG/ML
12.5 INJECTION INTRAMUSCULAR; INTRAVENOUS; SUBCUTANEOUS EVERY 5 MIN PRN
Status: DISCONTINUED | OUTPATIENT
Start: 2017-06-26 | End: 2017-06-26 | Stop reason: HOSPADM

## 2017-06-26 RX ORDER — PROPOFOL 10 MG/ML
INJECTION, EMULSION INTRAVENOUS PRN
Status: DISCONTINUED | OUTPATIENT
Start: 2017-06-26 | End: 2017-06-26 | Stop reason: SDUPTHER

## 2017-06-26 RX ORDER — GENTAMICIN SULFATE 80 MG/100ML
80 INJECTION, SOLUTION INTRAVENOUS ONCE
Status: COMPLETED | OUTPATIENT
Start: 2017-06-26 | End: 2017-06-26

## 2017-06-26 RX ORDER — DIPHENHYDRAMINE HYDROCHLORIDE 50 MG/ML
12.5 INJECTION INTRAMUSCULAR; INTRAVENOUS
Status: DISCONTINUED | OUTPATIENT
Start: 2017-06-26 | End: 2017-06-26 | Stop reason: HOSPADM

## 2017-06-26 RX ORDER — ACETAMINOPHEN 325 MG/1
650 TABLET ORAL EVERY 4 HOURS PRN
Status: DISCONTINUED | OUTPATIENT
Start: 2017-06-26 | End: 2017-06-26 | Stop reason: HOSPADM

## 2017-06-26 RX ORDER — METOCLOPRAMIDE HYDROCHLORIDE 5 MG/ML
10 INJECTION INTRAMUSCULAR; INTRAVENOUS
Status: DISCONTINUED | OUTPATIENT
Start: 2017-06-26 | End: 2017-06-26 | Stop reason: HOSPADM

## 2017-06-26 RX ADMIN — PROPOFOL 150 MG: 10 INJECTION, EMULSION INTRAVENOUS at 14:04

## 2017-06-26 RX ADMIN — FENTANYL CITRATE 50 MCG: 50 INJECTION, SOLUTION INTRAMUSCULAR; INTRAVENOUS at 14:35

## 2017-06-26 RX ADMIN — LIDOCAINE HYDROCHLORIDE 100 MG: 20 INJECTION, SOLUTION INFILTRATION; PERINEURAL at 14:04

## 2017-06-26 RX ADMIN — ONDANSETRON 4 MG: 2 INJECTION, SOLUTION INTRAMUSCULAR; INTRAVENOUS at 14:26

## 2017-06-26 RX ADMIN — SODIUM CHLORIDE, POTASSIUM CHLORIDE, SODIUM LACTATE AND CALCIUM CHLORIDE: 600; 310; 30; 20 INJECTION, SOLUTION INTRAVENOUS at 12:07

## 2017-06-26 RX ADMIN — LIDOCAINE HYDROCHLORIDE 0.1 ML: 10 INJECTION, SOLUTION EPIDURAL; INFILTRATION; INTRACAUDAL; PERINEURAL at 12:06

## 2017-06-26 RX ADMIN — GENTAMICIN SULFATE 80 MG: 80 INJECTION, SOLUTION INTRAVENOUS at 13:57

## 2017-06-26 RX ADMIN — SODIUM CHLORIDE, POTASSIUM CHLORIDE, SODIUM LACTATE AND CALCIUM CHLORIDE: 600; 310; 30; 20 INJECTION, SOLUTION INTRAVENOUS at 15:27

## 2017-06-26 RX ADMIN — FENTANYL CITRATE 50 MCG: 50 INJECTION, SOLUTION INTRAMUSCULAR; INTRAVENOUS at 14:04

## 2017-06-26 ASSESSMENT — PAIN - FUNCTIONAL ASSESSMENT: PAIN_FUNCTIONAL_ASSESSMENT: 0-10

## 2017-06-26 ASSESSMENT — PAIN DESCRIPTION - DESCRIPTORS: DESCRIPTORS: CONSTANT

## 2017-06-26 ASSESSMENT — ENCOUNTER SYMPTOMS: SHORTNESS OF BREATH: 1

## 2017-06-30 LAB
BLOOD BANK DISPENSE STATUS: NORMAL
BLOOD BANK DISPENSE STATUS: NORMAL
BLOOD BANK PRODUCT CODE: NORMAL
BLOOD BANK PRODUCT CODE: NORMAL
BPU ID: NORMAL
BPU ID: NORMAL
DESCRIPTION BLOOD BANK: NORMAL
DESCRIPTION BLOOD BANK: NORMAL

## 2017-07-13 ENCOUNTER — OFFICE VISIT (OUTPATIENT)
Dept: PHYSICAL MEDICINE AND REHAB | Age: 72
End: 2017-07-13

## 2017-07-13 ENCOUNTER — OFFICE VISIT (OUTPATIENT)
Dept: UROLOGY | Age: 72
End: 2017-07-13

## 2017-07-13 VITALS
DIASTOLIC BLOOD PRESSURE: 80 MMHG | BODY MASS INDEX: 43.19 KG/M2 | SYSTOLIC BLOOD PRESSURE: 132 MMHG | HEIGHT: 60 IN | WEIGHT: 220 LBS | HEART RATE: 82 BPM

## 2017-07-13 VITALS
HEIGHT: 60 IN | BODY MASS INDEX: 43.19 KG/M2 | DIASTOLIC BLOOD PRESSURE: 76 MMHG | SYSTOLIC BLOOD PRESSURE: 120 MMHG | WEIGHT: 220 LBS

## 2017-07-13 DIAGNOSIS — M15.9 PRIMARY OSTEOARTHRITIS INVOLVING MULTIPLE JOINTS: ICD-10-CM

## 2017-07-13 DIAGNOSIS — M43.10 SPONDYLOLISTHESIS, UNSPECIFIED SPINAL REGION: ICD-10-CM

## 2017-07-13 DIAGNOSIS — M54.2 NECK PAIN ON RIGHT SIDE: ICD-10-CM

## 2017-07-13 DIAGNOSIS — Z79.899 HIGH RISK MEDICATION USE: Chronic | ICD-10-CM

## 2017-07-13 DIAGNOSIS — M96.1 FAILED BACK SYNDROME OF LUMBAR SPINE: ICD-10-CM

## 2017-07-13 DIAGNOSIS — R31.9 HEMATURIA: Primary | ICD-10-CM

## 2017-07-13 DIAGNOSIS — Z79.899 HIGH RISK MEDICATION USE: ICD-10-CM

## 2017-07-13 DIAGNOSIS — M48.02 CERVICAL STENOSIS OF SPINAL CANAL: ICD-10-CM

## 2017-07-13 DIAGNOSIS — G89.29 CHRONIC LOW BACK PAIN WITH SCIATICA, SCIATICA LATERALITY UNSPECIFIED, UNSPECIFIED BACK PAIN LATERALITY: Primary | ICD-10-CM

## 2017-07-13 DIAGNOSIS — M96.1 CERVICAL POST-LAMINECTOMY SYNDROME: ICD-10-CM

## 2017-07-13 DIAGNOSIS — M81.0 OSTEOPOROSIS: ICD-10-CM

## 2017-07-13 DIAGNOSIS — S88.111S: ICD-10-CM

## 2017-07-13 DIAGNOSIS — M54.40 CHRONIC LOW BACK PAIN WITH SCIATICA, SCIATICA LATERALITY UNSPECIFIED, UNSPECIFIED BACK PAIN LATERALITY: Primary | ICD-10-CM

## 2017-07-13 PROCEDURE — 1090F PRES/ABSN URINE INCON ASSESS: CPT | Performed by: UROLOGY

## 2017-07-13 PROCEDURE — 4040F PNEUMOC VAC/ADMIN/RCVD: CPT | Performed by: UROLOGY

## 2017-07-13 PROCEDURE — 1123F ACP DISCUSS/DSCN MKR DOCD: CPT | Performed by: UROLOGY

## 2017-07-13 PROCEDURE — 3014F SCREEN MAMMO DOC REV: CPT | Performed by: UROLOGY

## 2017-07-13 PROCEDURE — 3017F COLORECTAL CA SCREEN DOC REV: CPT | Performed by: PHYSICAL MEDICINE & REHABILITATION

## 2017-07-13 PROCEDURE — G8417 CALC BMI ABV UP PARAM F/U: HCPCS | Performed by: PHYSICAL MEDICINE & REHABILITATION

## 2017-07-13 PROCEDURE — 99213 OFFICE O/P EST LOW 20 MIN: CPT | Performed by: PHYSICAL MEDICINE & REHABILITATION

## 2017-07-13 PROCEDURE — G8427 DOCREV CUR MEDS BY ELIG CLIN: HCPCS | Performed by: PHYSICAL MEDICINE & REHABILITATION

## 2017-07-13 PROCEDURE — G8427 DOCREV CUR MEDS BY ELIG CLIN: HCPCS | Performed by: UROLOGY

## 2017-07-13 PROCEDURE — 4040F PNEUMOC VAC/ADMIN/RCVD: CPT | Performed by: PHYSICAL MEDICINE & REHABILITATION

## 2017-07-13 PROCEDURE — 4005F PHARM THX FOR OP RXD: CPT | Performed by: PHYSICAL MEDICINE & REHABILITATION

## 2017-07-13 PROCEDURE — 1036F TOBACCO NON-USER: CPT | Performed by: PHYSICAL MEDICINE & REHABILITATION

## 2017-07-13 PROCEDURE — G8399 PT W/DXA RESULTS DOCUMENT: HCPCS | Performed by: UROLOGY

## 2017-07-13 PROCEDURE — G8417 CALC BMI ABV UP PARAM F/U: HCPCS | Performed by: UROLOGY

## 2017-07-13 PROCEDURE — 1036F TOBACCO NON-USER: CPT | Performed by: UROLOGY

## 2017-07-13 PROCEDURE — 3017F COLORECTAL CA SCREEN DOC REV: CPT | Performed by: UROLOGY

## 2017-07-13 PROCEDURE — 1123F ACP DISCUSS/DSCN MKR DOCD: CPT | Performed by: PHYSICAL MEDICINE & REHABILITATION

## 2017-07-13 PROCEDURE — 3014F SCREEN MAMMO DOC REV: CPT | Performed by: PHYSICAL MEDICINE & REHABILITATION

## 2017-07-13 PROCEDURE — 99213 OFFICE O/P EST LOW 20 MIN: CPT | Performed by: UROLOGY

## 2017-07-13 PROCEDURE — 1090F PRES/ABSN URINE INCON ASSESS: CPT | Performed by: PHYSICAL MEDICINE & REHABILITATION

## 2017-07-13 PROCEDURE — G8399 PT W/DXA RESULTS DOCUMENT: HCPCS | Performed by: PHYSICAL MEDICINE & REHABILITATION

## 2017-07-13 RX ORDER — HYDROCODONE BITARTRATE AND ACETAMINOPHEN 7.5; 325 MG/1; MG/1
TABLET ORAL
Qty: 90 TABLET | Refills: 0 | Status: SHIPPED | OUTPATIENT
Start: 2017-07-13 | End: 2017-09-13 | Stop reason: SDUPTHER

## 2017-07-13 RX ORDER — TAMOXIFEN CITRATE 20 MG/1
1 TABLET ORAL DAILY
COMMUNITY
Start: 2017-06-15 | End: 2022-05-16

## 2017-07-13 ASSESSMENT — ENCOUNTER SYMPTOMS
EYES NEGATIVE: 1
BACK PAIN: 1
APNEA: 0
SHORTNESS OF BREATH: 0
VOMITING: 0
WHEEZING: 0
DIARRHEA: 0
ABDOMINAL PAIN: 0
CONSTIPATION: 1
ALLERGIC/IMMUNOLOGIC NEGATIVE: 1
CHEST TIGHTNESS: 0
NAUSEA: 0

## 2017-07-31 ENCOUNTER — OFFICE VISIT (OUTPATIENT)
Dept: FAMILY MEDICINE CLINIC | Age: 72
End: 2017-07-31

## 2017-07-31 VITALS
OXYGEN SATURATION: 96 % | SYSTOLIC BLOOD PRESSURE: 102 MMHG | BODY MASS INDEX: 43.78 KG/M2 | HEIGHT: 60 IN | TEMPERATURE: 97.1 F | HEART RATE: 69 BPM | DIASTOLIC BLOOD PRESSURE: 56 MMHG | WEIGHT: 223 LBS

## 2017-07-31 DIAGNOSIS — Z83.49 FAMILY HISTORY OF THYROID DISEASE: ICD-10-CM

## 2017-07-31 DIAGNOSIS — R61 DIAPHORESIS: ICD-10-CM

## 2017-07-31 DIAGNOSIS — F33.1 MAJOR DEPRESSIVE DISORDER, RECURRENT, MODERATE (HCC): ICD-10-CM

## 2017-07-31 DIAGNOSIS — F33.1 MODERATE EPISODE OF RECURRENT MAJOR DEPRESSIVE DISORDER (HCC): Primary | ICD-10-CM

## 2017-07-31 LAB — PARATHYROID HORMONE INTACT: 46.4 PG/ML (ref 15–65)

## 2017-07-31 PROCEDURE — 1036F TOBACCO NON-USER: CPT | Performed by: NURSE PRACTITIONER

## 2017-07-31 PROCEDURE — 1123F ACP DISCUSS/DSCN MKR DOCD: CPT | Performed by: NURSE PRACTITIONER

## 2017-07-31 PROCEDURE — G8417 CALC BMI ABV UP PARAM F/U: HCPCS | Performed by: NURSE PRACTITIONER

## 2017-07-31 PROCEDURE — 1090F PRES/ABSN URINE INCON ASSESS: CPT | Performed by: NURSE PRACTITIONER

## 2017-07-31 PROCEDURE — G8427 DOCREV CUR MEDS BY ELIG CLIN: HCPCS | Performed by: NURSE PRACTITIONER

## 2017-07-31 PROCEDURE — 3014F SCREEN MAMMO DOC REV: CPT | Performed by: NURSE PRACTITIONER

## 2017-07-31 PROCEDURE — 3017F COLORECTAL CA SCREEN DOC REV: CPT | Performed by: NURSE PRACTITIONER

## 2017-07-31 PROCEDURE — 99213 OFFICE O/P EST LOW 20 MIN: CPT | Performed by: NURSE PRACTITIONER

## 2017-07-31 PROCEDURE — G8399 PT W/DXA RESULTS DOCUMENT: HCPCS | Performed by: NURSE PRACTITIONER

## 2017-07-31 PROCEDURE — 4040F PNEUMOC VAC/ADMIN/RCVD: CPT | Performed by: NURSE PRACTITIONER

## 2017-07-31 RX ORDER — DULOXETIN HYDROCHLORIDE 30 MG/1
30 CAPSULE, DELAYED RELEASE ORAL DAILY
Qty: 90 CAPSULE | Refills: 1 | Status: SHIPPED | OUTPATIENT
Start: 2017-07-31 | End: 2017-08-10

## 2017-07-31 ASSESSMENT — ENCOUNTER SYMPTOMS
COUGH: 0
SHORTNESS OF BREATH: 0

## 2017-08-01 PROBLEM — Z91.199 NONCOMPLIANCE: Status: ACTIVE | Noted: 2017-08-01

## 2017-08-10 ENCOUNTER — OFFICE VISIT (OUTPATIENT)
Dept: FAMILY MEDICINE CLINIC | Age: 72
End: 2017-08-10

## 2017-08-10 VITALS
DIASTOLIC BLOOD PRESSURE: 78 MMHG | HEIGHT: 60 IN | SYSTOLIC BLOOD PRESSURE: 108 MMHG | WEIGHT: 226 LBS | BODY MASS INDEX: 44.37 KG/M2 | OXYGEN SATURATION: 98 % | HEART RATE: 59 BPM

## 2017-08-10 DIAGNOSIS — F32.A DEPRESSION, UNSPECIFIED DEPRESSION TYPE: Primary | ICD-10-CM

## 2017-08-10 PROCEDURE — 1036F TOBACCO NON-USER: CPT | Performed by: FAMILY MEDICINE

## 2017-08-10 PROCEDURE — 99213 OFFICE O/P EST LOW 20 MIN: CPT | Performed by: FAMILY MEDICINE

## 2017-08-10 PROCEDURE — G8417 CALC BMI ABV UP PARAM F/U: HCPCS | Performed by: FAMILY MEDICINE

## 2017-08-10 PROCEDURE — 3014F SCREEN MAMMO DOC REV: CPT | Performed by: FAMILY MEDICINE

## 2017-08-10 PROCEDURE — 1123F ACP DISCUSS/DSCN MKR DOCD: CPT | Performed by: FAMILY MEDICINE

## 2017-08-10 PROCEDURE — G8427 DOCREV CUR MEDS BY ELIG CLIN: HCPCS | Performed by: FAMILY MEDICINE

## 2017-08-10 PROCEDURE — 1090F PRES/ABSN URINE INCON ASSESS: CPT | Performed by: FAMILY MEDICINE

## 2017-08-10 PROCEDURE — G8399 PT W/DXA RESULTS DOCUMENT: HCPCS | Performed by: FAMILY MEDICINE

## 2017-08-10 PROCEDURE — 3017F COLORECTAL CA SCREEN DOC REV: CPT | Performed by: FAMILY MEDICINE

## 2017-08-10 PROCEDURE — 4040F PNEUMOC VAC/ADMIN/RCVD: CPT | Performed by: FAMILY MEDICINE

## 2017-08-10 RX ORDER — VITAMIN B COMPLEX
1 CAPSULE ORAL DAILY
COMMUNITY

## 2017-08-29 DIAGNOSIS — F32.A DEPRESSION, UNSPECIFIED DEPRESSION TYPE: ICD-10-CM

## 2017-08-31 ENCOUNTER — OFFICE VISIT (OUTPATIENT)
Dept: FAMILY MEDICINE CLINIC | Age: 72
End: 2017-08-31

## 2017-08-31 VITALS
WEIGHT: 219 LBS | HEART RATE: 71 BPM | DIASTOLIC BLOOD PRESSURE: 88 MMHG | BODY MASS INDEX: 43 KG/M2 | HEIGHT: 60 IN | SYSTOLIC BLOOD PRESSURE: 134 MMHG | OXYGEN SATURATION: 98 %

## 2017-08-31 DIAGNOSIS — G89.4 CHRONIC PAIN SYNDROME: ICD-10-CM

## 2017-08-31 DIAGNOSIS — R61 HYPERHIDROSIS: ICD-10-CM

## 2017-08-31 DIAGNOSIS — F32.A DEPRESSION, UNSPECIFIED DEPRESSION TYPE: Primary | ICD-10-CM

## 2017-08-31 PROCEDURE — 1090F PRES/ABSN URINE INCON ASSESS: CPT | Performed by: FAMILY MEDICINE

## 2017-08-31 PROCEDURE — G8427 DOCREV CUR MEDS BY ELIG CLIN: HCPCS | Performed by: FAMILY MEDICINE

## 2017-08-31 PROCEDURE — G8417 CALC BMI ABV UP PARAM F/U: HCPCS | Performed by: FAMILY MEDICINE

## 2017-08-31 PROCEDURE — G8399 PT W/DXA RESULTS DOCUMENT: HCPCS | Performed by: FAMILY MEDICINE

## 2017-08-31 PROCEDURE — 3017F COLORECTAL CA SCREEN DOC REV: CPT | Performed by: FAMILY MEDICINE

## 2017-08-31 PROCEDURE — 3014F SCREEN MAMMO DOC REV: CPT | Performed by: FAMILY MEDICINE

## 2017-08-31 PROCEDURE — 99213 OFFICE O/P EST LOW 20 MIN: CPT | Performed by: FAMILY MEDICINE

## 2017-08-31 PROCEDURE — 1123F ACP DISCUSS/DSCN MKR DOCD: CPT | Performed by: FAMILY MEDICINE

## 2017-08-31 PROCEDURE — 4040F PNEUMOC VAC/ADMIN/RCVD: CPT | Performed by: FAMILY MEDICINE

## 2017-08-31 PROCEDURE — 1036F TOBACCO NON-USER: CPT | Performed by: FAMILY MEDICINE

## 2017-08-31 RX ORDER — GLYCOPYRROLATE 1 MG/1
1 TABLET ORAL 2 TIMES DAILY
Qty: 60 TABLET | Refills: 3 | Status: SHIPPED | OUTPATIENT
Start: 2017-08-31 | End: 2017-10-27 | Stop reason: ALTCHOICE

## 2017-08-31 RX ORDER — SERTRALINE HYDROCHLORIDE 100 MG/1
100 TABLET, FILM COATED ORAL DAILY
Qty: 90 TABLET | Refills: 1 | Status: SHIPPED | OUTPATIENT
Start: 2017-08-31 | End: 2017-09-28 | Stop reason: SDUPTHER

## 2017-08-31 RX ORDER — DULOXETIN HYDROCHLORIDE 30 MG/1
30 CAPSULE, DELAYED RELEASE ORAL DAILY
COMMUNITY
Start: 2017-08-31 | End: 2018-01-31 | Stop reason: SDUPTHER

## 2017-08-31 ASSESSMENT — PATIENT HEALTH QUESTIONNAIRE - PHQ9
SUM OF ALL RESPONSES TO PHQ QUESTIONS 1-9: 2
SUM OF ALL RESPONSES TO PHQ9 QUESTIONS 1 & 2: 2
2. FEELING DOWN, DEPRESSED OR HOPELESS: 1
1. LITTLE INTEREST OR PLEASURE IN DOING THINGS: 1

## 2017-09-05 ENCOUNTER — TELEPHONE (OUTPATIENT)
Dept: FAMILY MEDICINE CLINIC | Age: 72
End: 2017-09-05

## 2017-09-08 ENCOUNTER — OFFICE VISIT (OUTPATIENT)
Dept: CARDIOLOGY | Age: 72
End: 2017-09-08

## 2017-09-08 VITALS
WEIGHT: 227 LBS | BODY MASS INDEX: 44.57 KG/M2 | HEART RATE: 68 BPM | SYSTOLIC BLOOD PRESSURE: 120 MMHG | HEIGHT: 60 IN | DIASTOLIC BLOOD PRESSURE: 80 MMHG

## 2017-09-08 DIAGNOSIS — I10 BENIGN ESSENTIAL HTN: ICD-10-CM

## 2017-09-08 DIAGNOSIS — E66.01 MORBID OBESITY DUE TO EXCESS CALORIES (HCC): ICD-10-CM

## 2017-09-08 DIAGNOSIS — I48.91 ATRIAL FIBRILLATION, UNSPECIFIED TYPE (HCC): Primary | ICD-10-CM

## 2017-09-08 PROCEDURE — 1123F ACP DISCUSS/DSCN MKR DOCD: CPT | Performed by: INTERNAL MEDICINE

## 2017-09-08 PROCEDURE — G8399 PT W/DXA RESULTS DOCUMENT: HCPCS | Performed by: INTERNAL MEDICINE

## 2017-09-08 PROCEDURE — 3017F COLORECTAL CA SCREEN DOC REV: CPT | Performed by: INTERNAL MEDICINE

## 2017-09-08 PROCEDURE — 3014F SCREEN MAMMO DOC REV: CPT | Performed by: INTERNAL MEDICINE

## 2017-09-08 PROCEDURE — 93000 ELECTROCARDIOGRAM COMPLETE: CPT | Performed by: INTERNAL MEDICINE

## 2017-09-08 PROCEDURE — G8417 CALC BMI ABV UP PARAM F/U: HCPCS | Performed by: INTERNAL MEDICINE

## 2017-09-08 PROCEDURE — 4040F PNEUMOC VAC/ADMIN/RCVD: CPT | Performed by: INTERNAL MEDICINE

## 2017-09-08 PROCEDURE — 1036F TOBACCO NON-USER: CPT | Performed by: INTERNAL MEDICINE

## 2017-09-08 PROCEDURE — G8427 DOCREV CUR MEDS BY ELIG CLIN: HCPCS | Performed by: INTERNAL MEDICINE

## 2017-09-08 PROCEDURE — 1090F PRES/ABSN URINE INCON ASSESS: CPT | Performed by: INTERNAL MEDICINE

## 2017-09-08 PROCEDURE — 99213 OFFICE O/P EST LOW 20 MIN: CPT | Performed by: INTERNAL MEDICINE

## 2017-09-11 ENCOUNTER — TELEPHONE (OUTPATIENT)
Dept: FAMILY MEDICINE CLINIC | Age: 72
End: 2017-09-11

## 2017-09-13 ENCOUNTER — OFFICE VISIT (OUTPATIENT)
Dept: PHYSICAL MEDICINE AND REHAB | Age: 72
End: 2017-09-13

## 2017-09-13 VITALS
DIASTOLIC BLOOD PRESSURE: 76 MMHG | HEIGHT: 65 IN | WEIGHT: 220 LBS | BODY MASS INDEX: 36.65 KG/M2 | SYSTOLIC BLOOD PRESSURE: 122 MMHG

## 2017-09-13 DIAGNOSIS — M15.9 PRIMARY OSTEOARTHRITIS INVOLVING MULTIPLE JOINTS: Primary | ICD-10-CM

## 2017-09-13 DIAGNOSIS — Z79.899 HIGH RISK MEDICATION USE: ICD-10-CM

## 2017-09-13 DIAGNOSIS — G89.29 CHRONIC LOW BACK PAIN WITH SCIATICA, SCIATICA LATERALITY UNSPECIFIED, UNSPECIFIED BACK PAIN LATERALITY: ICD-10-CM

## 2017-09-13 DIAGNOSIS — G47.10 SLEEPING EXCESSIVE: ICD-10-CM

## 2017-09-13 DIAGNOSIS — M96.1 POSTLAMINECTOMY SYNDROME OF CERVICAL REGION: ICD-10-CM

## 2017-09-13 DIAGNOSIS — M48.02 CERVICAL STENOSIS OF SPINAL CANAL: ICD-10-CM

## 2017-09-13 DIAGNOSIS — Z79.899 HIGH RISK MEDICATION USE: Chronic | ICD-10-CM

## 2017-09-13 DIAGNOSIS — M96.1 POSTLAMINECTOMY SYNDROME OF LUMBAR REGION: ICD-10-CM

## 2017-09-13 DIAGNOSIS — F32.A DEPRESSION, UNSPECIFIED DEPRESSION TYPE: ICD-10-CM

## 2017-09-13 DIAGNOSIS — M43.10 SPONDYLOLISTHESIS, UNSPECIFIED SPINAL REGION: ICD-10-CM

## 2017-09-13 DIAGNOSIS — Z78.9 IMPAIRED MOBILITY AND ACTIVITIES OF DAILY LIVING: ICD-10-CM

## 2017-09-13 DIAGNOSIS — F32.9 REACTIVE DEPRESSION: ICD-10-CM

## 2017-09-13 DIAGNOSIS — M54.40 CHRONIC LOW BACK PAIN WITH SCIATICA, SCIATICA LATERALITY UNSPECIFIED, UNSPECIFIED BACK PAIN LATERALITY: ICD-10-CM

## 2017-09-13 DIAGNOSIS — E66.01 MORBID OBESITY DUE TO EXCESS CALORIES (HCC): ICD-10-CM

## 2017-09-13 DIAGNOSIS — M15.9 GENERALIZED OSTEOARTHRITIS: ICD-10-CM

## 2017-09-13 DIAGNOSIS — R53.83 FATIGUE DUE TO TREATMENT: ICD-10-CM

## 2017-09-13 DIAGNOSIS — R26.2 DIFFICULTY WALKING: ICD-10-CM

## 2017-09-13 DIAGNOSIS — Z74.09 IMPAIRED MOBILITY AND ACTIVITIES OF DAILY LIVING: ICD-10-CM

## 2017-09-13 PROCEDURE — 99213 OFFICE O/P EST LOW 20 MIN: CPT | Performed by: PHYSICAL MEDICINE & REHABILITATION

## 2017-09-13 PROCEDURE — 1036F TOBACCO NON-USER: CPT | Performed by: PHYSICAL MEDICINE & REHABILITATION

## 2017-09-13 PROCEDURE — 4040F PNEUMOC VAC/ADMIN/RCVD: CPT | Performed by: PHYSICAL MEDICINE & REHABILITATION

## 2017-09-13 PROCEDURE — G8399 PT W/DXA RESULTS DOCUMENT: HCPCS | Performed by: PHYSICAL MEDICINE & REHABILITATION

## 2017-09-13 PROCEDURE — 3014F SCREEN MAMMO DOC REV: CPT | Performed by: PHYSICAL MEDICINE & REHABILITATION

## 2017-09-13 PROCEDURE — 1090F PRES/ABSN URINE INCON ASSESS: CPT | Performed by: PHYSICAL MEDICINE & REHABILITATION

## 2017-09-13 PROCEDURE — 3017F COLORECTAL CA SCREEN DOC REV: CPT | Performed by: PHYSICAL MEDICINE & REHABILITATION

## 2017-09-13 PROCEDURE — G8427 DOCREV CUR MEDS BY ELIG CLIN: HCPCS | Performed by: PHYSICAL MEDICINE & REHABILITATION

## 2017-09-13 PROCEDURE — G8417 CALC BMI ABV UP PARAM F/U: HCPCS | Performed by: PHYSICAL MEDICINE & REHABILITATION

## 2017-09-13 PROCEDURE — 1123F ACP DISCUSS/DSCN MKR DOCD: CPT | Performed by: PHYSICAL MEDICINE & REHABILITATION

## 2017-09-13 RX ORDER — HYDROCODONE BITARTRATE AND ACETAMINOPHEN 7.5; 325 MG/1; MG/1
TABLET ORAL
Qty: 90 TABLET | Refills: 0 | Status: SHIPPED | OUTPATIENT
Start: 2017-09-13 | End: 2018-01-31 | Stop reason: SDUPTHER

## 2017-09-13 ASSESSMENT — ENCOUNTER SYMPTOMS
ALLERGIC/IMMUNOLOGIC NEGATIVE: 1
VOMITING: 0
SHORTNESS OF BREATH: 0
BACK PAIN: 1
CONSTIPATION: 1
EYES NEGATIVE: 1
ABDOMINAL PAIN: 0
NAUSEA: 0
CHEST TIGHTNESS: 0
DIARRHEA: 0
WHEEZING: 0
APNEA: 0

## 2017-09-21 ENCOUNTER — TELEPHONE (OUTPATIENT)
Dept: FAMILY MEDICINE CLINIC | Age: 72
End: 2017-09-21

## 2017-09-28 ENCOUNTER — OFFICE VISIT (OUTPATIENT)
Dept: FAMILY MEDICINE CLINIC | Age: 72
End: 2017-09-28

## 2017-09-28 VITALS
HEART RATE: 66 BPM | OXYGEN SATURATION: 95 % | WEIGHT: 220 LBS | BODY MASS INDEX: 36.65 KG/M2 | SYSTOLIC BLOOD PRESSURE: 120 MMHG | DIASTOLIC BLOOD PRESSURE: 82 MMHG | HEIGHT: 65 IN

## 2017-09-28 DIAGNOSIS — Z23 NEEDS FLU SHOT: ICD-10-CM

## 2017-09-28 DIAGNOSIS — F33.1 MODERATE EPISODE OF RECURRENT MAJOR DEPRESSIVE DISORDER (HCC): Primary | ICD-10-CM

## 2017-09-28 DIAGNOSIS — F32.A DEPRESSION, UNSPECIFIED DEPRESSION TYPE: ICD-10-CM

## 2017-09-28 PROCEDURE — 3014F SCREEN MAMMO DOC REV: CPT | Performed by: FAMILY MEDICINE

## 2017-09-28 PROCEDURE — 1090F PRES/ABSN URINE INCON ASSESS: CPT | Performed by: FAMILY MEDICINE

## 2017-09-28 PROCEDURE — 99213 OFFICE O/P EST LOW 20 MIN: CPT | Performed by: FAMILY MEDICINE

## 2017-09-28 PROCEDURE — G0008 ADMIN INFLUENZA VIRUS VAC: HCPCS | Performed by: FAMILY MEDICINE

## 2017-09-28 PROCEDURE — G8417 CALC BMI ABV UP PARAM F/U: HCPCS | Performed by: FAMILY MEDICINE

## 2017-09-28 PROCEDURE — 90662 IIV NO PRSV INCREASED AG IM: CPT | Performed by: FAMILY MEDICINE

## 2017-09-28 PROCEDURE — 4040F PNEUMOC VAC/ADMIN/RCVD: CPT | Performed by: FAMILY MEDICINE

## 2017-09-28 PROCEDURE — G8399 PT W/DXA RESULTS DOCUMENT: HCPCS | Performed by: FAMILY MEDICINE

## 2017-09-28 PROCEDURE — 1036F TOBACCO NON-USER: CPT | Performed by: FAMILY MEDICINE

## 2017-09-28 PROCEDURE — 1123F ACP DISCUSS/DSCN MKR DOCD: CPT | Performed by: FAMILY MEDICINE

## 2017-09-28 PROCEDURE — G8427 DOCREV CUR MEDS BY ELIG CLIN: HCPCS | Performed by: FAMILY MEDICINE

## 2017-09-28 PROCEDURE — 3017F COLORECTAL CA SCREEN DOC REV: CPT | Performed by: FAMILY MEDICINE

## 2017-09-28 RX ORDER — SERTRALINE HYDROCHLORIDE 100 MG/1
150 TABLET, FILM COATED ORAL DAILY
Qty: 135 TABLET | Refills: 1 | Status: SHIPPED | OUTPATIENT
Start: 2017-09-28 | End: 2018-02-20 | Stop reason: SDUPTHER

## 2017-10-03 ENCOUNTER — PROCEDURE VISIT (OUTPATIENT)
Dept: PHYSICAL MEDICINE AND REHAB | Age: 72
End: 2017-10-03

## 2017-10-03 DIAGNOSIS — M79.10 MYALGIA: ICD-10-CM

## 2017-10-03 DIAGNOSIS — M79.7 FIBROMYALGIA: Primary | ICD-10-CM

## 2017-10-03 PROCEDURE — 20552 NJX 1/MLT TRIGGER POINT 1/2: CPT | Performed by: PHYSICAL MEDICINE & REHABILITATION

## 2017-10-03 PROCEDURE — 1036F TOBACCO NON-USER: CPT | Performed by: PHYSICAL MEDICINE & REHABILITATION

## 2017-10-03 NOTE — MR AVS SNAPSHOT
gabapentin (NEURONTIN) 600 MG tablet Take 1 Tablet by mouth in the morning, 2 tabs in the afternoon, and 2 in the evening as tolerated    potassium chloride (KLOR-CON M) 10 MEQ extended release tablet Take 1 tablet by mouth 2 times daily    levothyroxine (SYNTHROID) 50 MCG tablet Take 1 tablet by mouth daily    acyclovir (ZOVIRAX) 5 % CREA Apply topically    Vitamin D (CHOLECALCIFEROL) 1000 UNITS CAPS capsule Take 1,000 Units by mouth daily.       Allergies              Ciprofloxacin Itching    Oxycontin [Oxycodone Hcl] Itching         Additional Information        Basic Information     Date Of Birth Sex Race Ethnicity Preferred Language    1945 Female White Non-/Non  English      Problem List as of 10/3/2017  Date Reviewed: 8/31/2017                Hematuria    High risk medication use -  07/12/17 OARRS PM&R, 09/12/17 OARRS PM&R, 05/11/17 Urine Drug Screen positive opiates PM&R, 01/11/17 Med Contract PM&R    Moderate episode of recurrent major depressive disorder (HCC)    Chronic low back pain with sciatica    Noncompliance - No Show inject 07/17/17    Hematuria, gross    Primary osteoarthritis involving multiple joints    Left breast mass    Anemia    Cancer ([address]    Morbid obesity due to excess calories (HCC)    Reactive depression    Sleeping excessive    Injury of cervical spinal cord (HCC)    Chronic low back pain    Other specified postprocedural states    Fatigue due to treatment    Lobular breast cancer ([address]    Traumatic amputation of one lower extremity below knee with complication (HCC)    Acquired hallux valgus    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 However these guidelines can be individualized by your provider. 4/24/2022 (Originally 9/29/2017)    Colonoscopy 1/1/2020    Cholesterol Screening 8/3/2020    Tetanus Combination Vaccine (2 - Td) 6/17/2023            MyChart Signup           Our records indicate that you have an active [employer] account. You can view your After Visit Summary by going to [email or web address] and logging in with your [employer] username and password. If you don't have a [employer] username and password but a parent or guardian has access to your record, the parent or guardian should login with their own [employer] username and password and access your record to view the After Visit Summary. Additional Information  If you have questions, please contact the physician practice where you receive care. Remember, [employer] is NOT to be used for urgent needs. For medical emergencies, dial 911. For questions regarding your [employer] account call [phone]. If you have a clinical question, please call your doctor's office.

## 2017-10-11 ENCOUNTER — HOSPITAL ENCOUNTER (EMERGENCY)
Age: 72
Discharge: HOME | End: 2017-10-11
Payer: MEDICARE

## 2017-10-11 ENCOUNTER — APPOINTMENT (OUTPATIENT)
Dept: GENERAL RADIOLOGY | Age: 72
End: 2017-10-11
Payer: MEDICARE

## 2017-10-11 VITALS
BODY MASS INDEX: 42.21 KG/M2 | SYSTOLIC BLOOD PRESSURE: 96 MMHG | HEART RATE: 65 BPM | OXYGEN SATURATION: 96 % | DIASTOLIC BLOOD PRESSURE: 57 MMHG | TEMPERATURE: 98.5 F | RESPIRATION RATE: 20 BRPM | HEIGHT: 60 IN | WEIGHT: 215 LBS

## 2017-10-11 DIAGNOSIS — S82.832A CLOSED FRACTURE OF PROXIMAL FIBULA, LEFT, CLOSED, INITIAL ENCOUNTER: Primary | ICD-10-CM

## 2017-10-11 DIAGNOSIS — S63.502A LEFT WRIST SPRAIN, INITIAL ENCOUNTER: ICD-10-CM

## 2017-10-11 PROCEDURE — 6370000000 HC RX 637 (ALT 250 FOR IP): Performed by: PHYSICIAN ASSISTANT

## 2017-10-11 PROCEDURE — 73564 X-RAY EXAM KNEE 4 OR MORE: CPT

## 2017-10-11 PROCEDURE — 29125 APPL SHORT ARM SPLINT STATIC: CPT

## 2017-10-11 PROCEDURE — 73110 X-RAY EXAM OF WRIST: CPT

## 2017-10-11 PROCEDURE — 99284 EMERGENCY DEPT VISIT MOD MDM: CPT

## 2017-10-11 RX ORDER — HYDROCODONE BITARTRATE AND ACETAMINOPHEN 5; 325 MG/1; MG/1
1 TABLET ORAL ONCE
Status: COMPLETED | OUTPATIENT
Start: 2017-10-11 | End: 2017-10-11

## 2017-10-11 RX ADMIN — HYDROCODONE BITARTRATE AND ACETAMINOPHEN 1 TABLET: 5; 325 TABLET ORAL at 11:23

## 2017-10-11 ASSESSMENT — PAIN DESCRIPTION - ORIENTATION: ORIENTATION: LEFT

## 2017-10-11 ASSESSMENT — PAIN DESCRIPTION - DESCRIPTORS: DESCRIPTORS: SHARP;ACHING

## 2017-10-11 ASSESSMENT — ENCOUNTER SYMPTOMS
RESPIRATORY NEGATIVE: 1
EYES NEGATIVE: 1
GASTROINTESTINAL NEGATIVE: 1

## 2017-10-11 ASSESSMENT — PAIN DESCRIPTION - PAIN TYPE: TYPE: ACUTE PAIN

## 2017-10-11 ASSESSMENT — PAIN DESCRIPTION - LOCATION: LOCATION: KNEE

## 2017-10-11 ASSESSMENT — PAIN SCALES - GENERAL
PAINLEVEL_OUTOF10: 7
PAINLEVEL_OUTOF10: 7

## 2017-10-11 NOTE — ED NOTES
Velcro splint to left wrist. MSP'S intact. Ace wrap to left knee.      Becky Darby LPN  29/79/56 [id number]

## 2017-10-11 NOTE — ED PROVIDER NOTES
amputation) [facility])          SURGICAL HISTORY       Past Surgical History:   Procedure Laterality Date    ABDOMEN SURGERY Left 4/11/2017    EXCISION OF CHEST WALL MASS, UPDATE LABS ON ADMIT  performed by Bridget Herrera MD at [address] BLEPHAROPLASTY  2-5-13    both eyes    BREAST SURGERY      CARDIAC CATHETERIZATION      COLONOSCOPY  1/1/2010    normal    CYSTOSCOPY W BIOPSY OF BLADDER N/A 6/26/2017    CYSTOSCOPY BLADDER BIOPSY AND FULGURATION performed by Brittany Love MD at [address] Bilateral 2012    cataract removal with IOL implants    HYSTERECTOMY      LEG AMPUTATION BELOW KNEE Right 2/1/11    DR Valdo Miller due to CHARCOTS    MASTECTOMY  11/16/2015    Bilateral simple mastectomies with right SNB by DR Lilia Brownlee    [place] REMOVAL OF BREAST LESION Left 1/24/2017    CORE NEEDLE BIOPSY OF  LEFT BREAST MASS performed by Bridget Herrera MD at [facility]      cervical and lumbar         CURRENT MEDICATIONS       Current Discharge Medication List      CONTINUE these medications which have NOT CHANGED    Details   sertraline (ZOLOFT) 100 MG tablet Take 1.5 tablets by mouth daily  Qty: 135 tablet, Refills: 1    Associated Diagnoses: Depression, unspecified depression type      HYDROcodone-acetaminophen (NORCO) 7.5-325 MG per tablet Take one or two tablets every 4 hours as needed for pain max of 3 per day  Generic equivalent acceptable, unless otherwise noted. Sherrill Day Date: 9/13/17  Qty: 90 tablet, Refills: 0    Associated Diagnoses: Cervical stenosis of spinal canal; Spondylolisthesis, unspecified spinal region;  High risk medication use      DULoxetine (CYMBALTA) 30 MG extended release capsule Take 1 capsule by mouth daily    Associated Diagnoses: Depression, unspecified depression type; Chronic pain syndrome      b complex vitamins capsule Take 1 capsule by mouth daily      Multiple Vitamins-Minerals (CENTRUM

## 2017-10-15 DIAGNOSIS — M43.10 SPONDYLOLISTHESIS, UNSPECIFIED SPINAL REGION: ICD-10-CM

## 2017-10-15 DIAGNOSIS — M48.02 CERVICAL STENOSIS OF SPINAL CANAL: ICD-10-CM

## 2017-10-16 RX ORDER — GABAPENTIN 600 MG/1
TABLET ORAL
Qty: 450 TABLET | Refills: 1 | Status: SHIPPED | OUTPATIENT
Start: 2017-10-16 | End: 2018-05-11 | Stop reason: SDUPTHER

## 2017-10-16 RX ORDER — LEVOTHYROXINE SODIUM 0.05 MG/1
50 TABLET ORAL DAILY
Qty: 90 TABLET | Refills: 1 | Status: SHIPPED | OUTPATIENT
Start: 2017-10-16 | End: 2018-05-11 | Stop reason: SDUPTHER

## 2017-10-16 RX ORDER — BUPROPION HYDROCHLORIDE 150 MG/1
TABLET ORAL
Qty: 90 TABLET | Refills: 1 | Status: SHIPPED | OUTPATIENT
Start: 2017-10-16 | End: 2017-10-27 | Stop reason: ALTCHOICE

## 2017-10-16 RX ORDER — POTASSIUM CHLORIDE 750 MG/1
TABLET, EXTENDED RELEASE ORAL
Qty: 180 TABLET | Refills: 1 | Status: SHIPPED | OUTPATIENT
Start: 2017-10-16 | End: 2018-05-11 | Stop reason: SDUPTHER

## 2017-10-17 ENCOUNTER — TELEPHONE (OUTPATIENT)
Dept: PHYSICAL MEDICINE AND REHAB | Age: 72
End: 2017-10-17

## 2017-10-17 RX ORDER — ATORVASTATIN CALCIUM 20 MG/1
20 TABLET, FILM COATED ORAL NIGHTLY
Qty: 90 TABLET | Refills: 3 | Status: SHIPPED | OUTPATIENT
Start: 2017-10-17 | End: 2018-09-01 | Stop reason: SDUPTHER

## 2017-10-17 RX ORDER — FUROSEMIDE 20 MG/1
TABLET ORAL
Qty: 180 TABLET | Refills: 3 | Status: SHIPPED | OUTPATIENT
Start: 2017-10-17 | End: 2017-10-27 | Stop reason: SDUPTHER

## 2017-10-23 DIAGNOSIS — I10 ESSENTIAL HYPERTENSION: ICD-10-CM

## 2017-10-23 RX ORDER — CARVEDILOL 25 MG/1
TABLET ORAL
Qty: 180 TABLET | Refills: 2 | Status: SHIPPED | OUTPATIENT
Start: 2017-10-23 | End: 2018-05-26 | Stop reason: SDUPTHER

## 2017-10-27 ENCOUNTER — OFFICE VISIT (OUTPATIENT)
Dept: FAMILY MEDICINE CLINIC | Age: 72
End: 2017-10-27

## 2017-10-27 VITALS
DIASTOLIC BLOOD PRESSURE: 72 MMHG | OXYGEN SATURATION: 96 % | HEART RATE: 64 BPM | WEIGHT: 222 LBS | HEIGHT: 60 IN | SYSTOLIC BLOOD PRESSURE: 130 MMHG | BODY MASS INDEX: 43.59 KG/M2

## 2017-10-27 DIAGNOSIS — Z74.09 IMPAIRED MOBILITY AND ACTIVITIES OF DAILY LIVING: ICD-10-CM

## 2017-10-27 DIAGNOSIS — M75.122 COMPLETE ROTATOR CUFF RUPTURE OF LEFT SHOULDER: ICD-10-CM

## 2017-10-27 DIAGNOSIS — M96.1 CERVICAL POST-LAMINECTOMY SYNDROME: ICD-10-CM

## 2017-10-27 DIAGNOSIS — M96.1 FAILED BACK SYNDROME OF LUMBAR SPINE: ICD-10-CM

## 2017-10-27 DIAGNOSIS — Z89.511 HX OF RIGHT BKA (HCC): ICD-10-CM

## 2017-10-27 DIAGNOSIS — F33.1 MODERATE EPISODE OF RECURRENT MAJOR DEPRESSIVE DISORDER (HCC): Primary | ICD-10-CM

## 2017-10-27 DIAGNOSIS — Z78.9 IMPAIRED MOBILITY AND ACTIVITIES OF DAILY LIVING: ICD-10-CM

## 2017-10-27 DIAGNOSIS — M79.7 FIBROMYALGIA MUSCLE PAIN: ICD-10-CM

## 2017-10-27 DIAGNOSIS — M14.60 ARTHRITIS, NEUROPATHIC: ICD-10-CM

## 2017-10-27 DIAGNOSIS — L82.1 SEBORRHEIC KERATOSES: ICD-10-CM

## 2017-10-27 DIAGNOSIS — M14.679 CHARCOT'S JOINT OF FOOT, UNSPECIFIED LATERALITY: ICD-10-CM

## 2017-10-27 DIAGNOSIS — G64 DISORDER OF PERIPHERAL NERVOUS SYSTEM: ICD-10-CM

## 2017-10-27 PROCEDURE — G8484 FLU IMMUNIZE NO ADMIN: HCPCS | Performed by: FAMILY MEDICINE

## 2017-10-27 PROCEDURE — G8427 DOCREV CUR MEDS BY ELIG CLIN: HCPCS | Performed by: FAMILY MEDICINE

## 2017-10-27 PROCEDURE — 3017F COLORECTAL CA SCREEN DOC REV: CPT | Performed by: FAMILY MEDICINE

## 2017-10-27 PROCEDURE — 3014F SCREEN MAMMO DOC REV: CPT | Performed by: FAMILY MEDICINE

## 2017-10-27 PROCEDURE — 1090F PRES/ABSN URINE INCON ASSESS: CPT | Performed by: FAMILY MEDICINE

## 2017-10-27 PROCEDURE — G8399 PT W/DXA RESULTS DOCUMENT: HCPCS | Performed by: FAMILY MEDICINE

## 2017-10-27 PROCEDURE — G8598 ASA/ANTIPLAT THER USED: HCPCS | Performed by: FAMILY MEDICINE

## 2017-10-27 PROCEDURE — 4040F PNEUMOC VAC/ADMIN/RCVD: CPT | Performed by: FAMILY MEDICINE

## 2017-10-27 PROCEDURE — G8417 CALC BMI ABV UP PARAM F/U: HCPCS | Performed by: FAMILY MEDICINE

## 2017-10-27 PROCEDURE — 1123F ACP DISCUSS/DSCN MKR DOCD: CPT | Performed by: FAMILY MEDICINE

## 2017-10-27 PROCEDURE — 1036F TOBACCO NON-USER: CPT | Performed by: FAMILY MEDICINE

## 2017-10-27 PROCEDURE — 99213 OFFICE O/P EST LOW 20 MIN: CPT | Performed by: FAMILY MEDICINE

## 2017-10-27 NOTE — PROGRESS NOTES
Lobular breast [facility] ([facility]    Traumatic amputation of one lower extremity below knee with complication ([facility]    Acquired hallux valgus    Fatigue due to treatment    Anemia    Cancer ([facility]    Injury of cervical spinal cord (HCC)    Complete traumatic amputation at level between right knee and ankle ([facility]    Morbid obesity due to excess calories (HCC)    Reactive depression    Sleeping excessive    Chronic low back pain    Left breast mass    Other specified postprocedural states    Primary osteoarthritis involving multiple joints    Hematuria    Hematuria, gross    Noncompliance - No Show inject 07/17/17    Chronic low back pain with sciatica    Moderate episode of recurrent major depressive disorder (HCC)       Current Outpatient Prescriptions   Medication Sig Dispense Refill    carvedilol (COREG) 25 MG tablet TAKE 1 TABLET BY MOUTH TWO  TIMES DAILY 180 tablet 2    atorvastatin (LIPITOR) 20 MG tablet TAKE 1 TABLET BY MOUTH  NIGHTLY 90 tablet 3    levothyroxine (SYNTHROID) 50 MCG tablet TAKE 1 TABLET BY MOUTH  DAILY 90 tablet 1    gabapentin (NEURONTIN) 600 MG tablet TAKE 1 TABLET BY MOUTH IN  THE MORNING, 2 TABS IN THE  AFTERNOON, AND 2 IN THE  EVENING AS TOLERATED 450 tablet 1    diclofenac (VOLTAREN) 50 MG EC tablet TAKE 1 TABLET BY MOUTH TWO  TIMES DAILY 180 tablet 1    potassium chloride (KLOR-CON M) 10 MEQ extended release tablet TAKE 1 TABLET BY MOUTH TWO  TIMES DAILY 180 tablet 1    sertraline (ZOLOFT) 100 MG tablet Take 1.5 tablets by mouth daily 135 tablet 1    HYDROcodone-acetaminophen (NORCO) 7.5-325 MG per tablet Take one or two tablets every 4 hours as needed for pain max of 3 per day  Generic equivalent acceptable, unless otherwise noted. Juliana Offer Date: 9/13/17 90 tablet 0    DULoxetine (CYMBALTA) 30 MG extended release capsule Take 1 capsule by mouth daily      b complex vitamins capsule Take 1 capsule by mouth daily      Multiple Vitamins-Minerals (CENTRUM SILVER

## 2017-10-31 ENCOUNTER — TELEPHONE (OUTPATIENT)
Dept: FAMILY MEDICINE CLINIC | Age: 72
End: 2017-10-31

## 2017-10-31 NOTE — TELEPHONE ENCOUNTER
Ramonita calling, need to you to addend the last office note on 10/27/17. Needs to say pt still needs a power wheel chair and that it is medically necessary.

## 2017-11-07 ENCOUNTER — PROCEDURE VISIT (OUTPATIENT)
Dept: PHYSICAL MEDICINE AND REHAB | Age: 72
End: 2017-11-07

## 2017-11-07 DIAGNOSIS — M79.7 FIBROMYALGIA: Primary | ICD-10-CM

## 2017-11-07 DIAGNOSIS — M79.10 MYALGIA: ICD-10-CM

## 2017-11-07 DIAGNOSIS — R53.81 MALAISE AND FATIGUE: ICD-10-CM

## 2017-11-07 DIAGNOSIS — R53.83 MALAISE AND FATIGUE: ICD-10-CM

## 2017-11-07 PROCEDURE — 96372 THER/PROPH/DIAG INJ SC/IM: CPT | Performed by: PHYSICAL MEDICINE & REHABILITATION

## 2017-11-07 PROCEDURE — 20553 NJX 1/MLT TRIGGER POINTS 3/>: CPT | Performed by: PHYSICAL MEDICINE & REHABILITATION

## 2017-11-17 RX ORDER — CYANOCOBALAMIN 1000 UG/ML
1000 INJECTION INTRAMUSCULAR; SUBCUTANEOUS ONCE
Status: COMPLETED | OUTPATIENT
Start: 2017-11-17 | End: 2017-11-17

## 2017-11-17 RX ADMIN — CYANOCOBALAMIN 1000 MCG: 1000 INJECTION INTRAMUSCULAR; SUBCUTANEOUS at 09:22

## 2017-12-13 ENCOUNTER — PROCEDURE VISIT (OUTPATIENT)
Dept: PHYSICAL MEDICINE AND REHAB | Age: 72
End: 2017-12-13

## 2017-12-13 DIAGNOSIS — M79.10 MYALGIA: ICD-10-CM

## 2017-12-13 DIAGNOSIS — M79.7 FIBROMYALGIA: Primary | ICD-10-CM

## 2017-12-13 PROCEDURE — 20553 NJX 1/MLT TRIGGER POINTS 3/>: CPT | Performed by: PHYSICAL MEDICINE & REHABILITATION

## 2018-01-20 DIAGNOSIS — F33.1 MODERATE EPISODE OF RECURRENT MAJOR DEPRESSIVE DISORDER (HCC): ICD-10-CM

## 2018-01-20 RX ORDER — DULOXETIN HYDROCHLORIDE 30 MG/1
30 CAPSULE, DELAYED RELEASE ORAL DAILY
Qty: 90 CAPSULE | Refills: 2 | Status: SHIPPED | OUTPATIENT
Start: 2018-01-20 | End: 2018-09-01 | Stop reason: SDUPTHER

## 2018-01-31 ENCOUNTER — OFFICE VISIT (OUTPATIENT)
Dept: PHYSICAL MEDICINE AND REHAB | Age: 73
End: 2018-01-31
Payer: MEDICARE

## 2018-01-31 VITALS
HEIGHT: 60 IN | DIASTOLIC BLOOD PRESSURE: 66 MMHG | BODY MASS INDEX: 42.41 KG/M2 | WEIGHT: 216 LBS | SYSTOLIC BLOOD PRESSURE: 100 MMHG

## 2018-01-31 DIAGNOSIS — Z79.899 HIGH RISK MEDICATION USE: ICD-10-CM

## 2018-01-31 DIAGNOSIS — M96.1 POSTLAMINECTOMY SYNDROME OF CERVICAL REGION: ICD-10-CM

## 2018-01-31 DIAGNOSIS — M79.7 FIBROMYALGIA MUSCLE PAIN: ICD-10-CM

## 2018-01-31 DIAGNOSIS — M96.1 FAILED BACK SYNDROME OF LUMBAR SPINE: Primary | ICD-10-CM

## 2018-01-31 DIAGNOSIS — M43.10 SPONDYLOLISTHESIS, UNSPECIFIED SPINAL REGION: ICD-10-CM

## 2018-01-31 DIAGNOSIS — Z79.899 HIGH RISK MEDICATION USE: Chronic | ICD-10-CM

## 2018-01-31 DIAGNOSIS — M48.02 CERVICAL STENOSIS OF SPINAL CANAL: ICD-10-CM

## 2018-01-31 DIAGNOSIS — M54.40 BILATERAL LOW BACK PAIN WITH SCIATICA, SCIATICA LATERALITY UNSPECIFIED, UNSPECIFIED CHRONICITY: ICD-10-CM

## 2018-01-31 DIAGNOSIS — M96.1 POSTLAMINECTOMY SYNDROME OF LUMBAR REGION: ICD-10-CM

## 2018-01-31 PROBLEM — M14.672 CHARCOT ANKLE, LEFT: Status: ACTIVE | Noted: 2018-01-25

## 2018-01-31 PROCEDURE — G8599 NO ASA/ANTIPLAT THER USE RNG: HCPCS | Performed by: PHYSICAL MEDICINE & REHABILITATION

## 2018-01-31 PROCEDURE — 1090F PRES/ABSN URINE INCON ASSESS: CPT | Performed by: PHYSICAL MEDICINE & REHABILITATION

## 2018-01-31 PROCEDURE — 3017F COLORECTAL CA SCREEN DOC REV: CPT | Performed by: PHYSICAL MEDICINE & REHABILITATION

## 2018-01-31 PROCEDURE — 1123F ACP DISCUSS/DSCN MKR DOCD: CPT | Performed by: PHYSICAL MEDICINE & REHABILITATION

## 2018-01-31 PROCEDURE — 1036F TOBACCO NON-USER: CPT | Performed by: PHYSICAL MEDICINE & REHABILITATION

## 2018-01-31 PROCEDURE — G8417 CALC BMI ABV UP PARAM F/U: HCPCS | Performed by: PHYSICAL MEDICINE & REHABILITATION

## 2018-01-31 PROCEDURE — 4040F PNEUMOC VAC/ADMIN/RCVD: CPT | Performed by: PHYSICAL MEDICINE & REHABILITATION

## 2018-01-31 PROCEDURE — 99214 OFFICE O/P EST MOD 30 MIN: CPT | Performed by: PHYSICAL MEDICINE & REHABILITATION

## 2018-01-31 PROCEDURE — G8484 FLU IMMUNIZE NO ADMIN: HCPCS | Performed by: PHYSICAL MEDICINE & REHABILITATION

## 2018-01-31 PROCEDURE — 3014F SCREEN MAMMO DOC REV: CPT | Performed by: PHYSICAL MEDICINE & REHABILITATION

## 2018-01-31 PROCEDURE — G8399 PT W/DXA RESULTS DOCUMENT: HCPCS | Performed by: PHYSICAL MEDICINE & REHABILITATION

## 2018-01-31 PROCEDURE — G8427 DOCREV CUR MEDS BY ELIG CLIN: HCPCS | Performed by: PHYSICAL MEDICINE & REHABILITATION

## 2018-01-31 RX ORDER — HYDROCODONE BITARTRATE AND ACETAMINOPHEN 7.5; 325 MG/1; MG/1
TABLET ORAL
Qty: 90 TABLET | Refills: 0 | Status: SHIPPED | OUTPATIENT
Start: 2018-01-31 | End: 2018-03-02

## 2018-01-31 ASSESSMENT — ENCOUNTER SYMPTOMS
ABDOMINAL PAIN: 0
BACK PAIN: 1
CONSTIPATION: 0
EYES NEGATIVE: 1
VOMITING: 0
SHORTNESS OF BREATH: 0
ALLERGIC/IMMUNOLOGIC NEGATIVE: 1
APNEA: 0
CHEST TIGHTNESS: 0
DIARRHEA: 0
NAUSEA: 0
WHEEZING: 0

## 2018-01-31 NOTE — PROGRESS NOTES
Chest pain 7/2/2015    Colitis     DDD (degenerative disc disease), lumbar 2/12/2013    Depression     Disorder of peripheral nervous system ([facility] 9/1/2010    Dyspnea 7/2/2015    Family history of coronary artery disease 8/29/2014    History of blood transfusion 2011    following chemotherapy    Hx of right BKA ([facility]     Hyperlipidemia     Hypertension     Hypothyroid 6/12/2015    [facility] 10/2015    NSTEMI (non-ST elevated myocardial infarction) ([facility] 8/29/2014    Obesity     Paroxysmal atrial fibrillation ([facility] 8/29/2014    S/P BKA (below knee amputation) ([facility]      Past Surgical History:   Procedure Laterality Date    ABDOMEN SURGERY Left 4/11/2017    EXCISION OF CHEST WALL MASS, UPDATE LABS ON ADMIT  performed by Verner Sams, MD at [address] BLEPHAROPLASTY  2-5-13    both eyes    BREAST SURGERY      CARDIAC CATHETERIZATION      COLONOSCOPY  1/1/2010    normal    CYSTOSCOPY W BIOPSY OF BLADDER N/A 6/26/2017    CYSTOSCOPY BLADDER BIOPSY AND FULGURATION performed by Chari Shipman MD at [address] Bilateral 2012    cataract removal with IOL implants    HYSTERECTOMY      LEG AMPUTATION BELOW KNEE Right 2/1/11    DR Duarte Beasley due to CHARCOTS    MASTECTOMY  11/16/2015    Bilateral simple mastectomies with right SNB by DR Lakeshia Medina    [place] REMOVAL OF BREAST LESION Left 1/24/2017    CORE NEEDLE BIOPSY OF  LEFT BREAST MASS performed by Verner Sams, MD at [address] [place] [address]      cervical and lumbar     Social History     Social History    Marital status:       Spouse name: N/A    Number of children: N/A    Years of education: N/A     Occupational History    retired      Social History Main Topics    Smoking status: Never Smoker    Smokeless tobacco: Never Used    Alcohol use Yes      Comment: social    Drug use: No    Sexual activity: No     Other Topics Concern    None inappropriate. Her speech is not rapid and/or pressured, not delayed, not tangential and not slurred. She is not agitated, not aggressive, not hyperactive, not slowed, not withdrawn, not actively hallucinating and not combative. Thought content is not paranoid and not delusional. Cognition and memory are normal. Cognition and memory are not impaired. She does not express impulsivity or inappropriate judgment. She does not exhibit a depressed mood. She expresses no homicidal and no suicidal ideation. She expresses no suicidal plans and no homicidal plans. She is communicative. She exhibits normal recent memory and normal remote memory. She is attentive. Vitals reviewed. Ortho Exam  Neurologic Exam     Mental Status   Oriented to person, place, and time. Speech: not slurred   Level of consciousness: alert  Knowledge: good. Able to name object. Able to read. Able to repeat. Able to write. Cranial Nerves     CN III, IV, VI   Pupils are equal, round, and reactive to light. Extraocular motions are normal.       After a thorough review and discussion of the previous medical records, patient comprehensive medical, surgical, and family and social history, Review of Systems, their OARRS, their Screener and Opioid Assessment for Patients with Pain (SOAPP®-R), recent diagnostics, and symptomatic results to previous treatment, it is my impression that the patients is suffering with progressive and severe:    1. Failed back syndrome of lumbar spine     2. Bilateral low back pain with sciatica, sciatica laterality unspecified, unspecified chronicity     3. Fibromyalgia muscle pain  FL INJECT TRIGGER POINTS, 3 OR GREATER    FL INJECT TRIGGER POINTS, 3 OR GREATER   4. High risk medication use - 11/13/17 OARRS PM&R, 01/10/18 OARRS PM&R, 05/11/17 Urine Drug Screen positive opiates PM&R, 01/11/17 Med Contract PM&R  Urine Drug Screen   5. Postlaminectomy syndrome of cervical region     6.  Postlaminectomy syndrome of lumbar region     7. Cervical stenosis of spinal canal  HYDROcodone-acetaminophen (NORCO) 7.5-325 MG per tablet   8. Spondylolisthesis, unspecified spinal region  HYDROcodone-acetaminophen (NORCO) 7.5-325 MG per tablet   9. High risk medication use  HYDROcodone-acetaminophen (NORCO) 7.5-325 MG per tablet    OARRS and last refill reviewed. 2016 narcotic contract signed. I am also concerned by lifestyle and mood issues including:    Past Medical History:   Diagnosis Date    Arthritis     Blood transfusion     Cancer [facility])     GALLBLADDER 2009, 2011 OMENTUM    Charcot's joint     left foot    Chest pain 7/2/2015    Colitis     DDD (degenerative disc disease), lumbar 2/12/2013    Depression     Disorder of peripheral nervous system ([facility] 9/1/2010    Dyspnea 7/2/2015    Family history of coronary artery disease 8/29/2014    History of blood transfusion 2011    following chemotherapy    Hx of right BKA ([address]     Hyperlipidemia     Hypertension     Hypothyroid 6/12/2015    Lobular breast cancer ([facility] 10/2015    NSTEMI (non-ST elevated myocardial infarction) ([address] 8/29/2014    Obesity     Paroxysmal atrial fibrillation ([address] 8/29/2014    S/P BKA (below knee amputation) ([facility]            Given their medication, chronic pain and lifestyle and medications they are at risk for :    Falls, constipation, addiction  Loss of livelyhood due to severe pain, debility, weight gain and  vitamin D deficiency    The patient was educated regarding proper diet, fitness routine, and regulatory restrictions concerning pain medications. Previous notes, comprehensive past medical, surgical, family history, and diagnostics were reviewed. Patient education and councelling were provided regarding off label use,treatment options and medication and injection risks.     Current and old OARRS ([place] Automated Prescription Reporting System) records reviewed, all refills reviewed since last visit,  Behavioral agreement/GIRMA

## 2018-02-13 ENCOUNTER — PROCEDURE VISIT (OUTPATIENT)
Dept: PHYSICAL MEDICINE AND REHAB | Age: 73
End: 2018-02-13
Payer: MEDICARE

## 2018-02-13 DIAGNOSIS — D64.9 ANEMIA, UNSPECIFIED TYPE: Primary | ICD-10-CM

## 2018-02-13 DIAGNOSIS — M79.7 FIBROMYALGIA MUSCLE PAIN: ICD-10-CM

## 2018-02-13 DIAGNOSIS — R53.83 FATIGUE DUE TO TREATMENT: ICD-10-CM

## 2018-02-13 PROCEDURE — 20553 NJX 1/MLT TRIGGER POINTS 3/>: CPT | Performed by: PHYSICAL MEDICINE & REHABILITATION

## 2018-02-13 PROCEDURE — 96372 THER/PROPH/DIAG INJ SC/IM: CPT | Performed by: PHYSICAL MEDICINE & REHABILITATION

## 2018-02-20 DIAGNOSIS — F32.A DEPRESSION, UNSPECIFIED DEPRESSION TYPE: ICD-10-CM

## 2018-02-20 RX ORDER — SERTRALINE HYDROCHLORIDE 100 MG/1
TABLET, FILM COATED ORAL
Qty: 135 TABLET | Refills: 2 | Status: SHIPPED | OUTPATIENT
Start: 2018-02-20 | End: 2018-06-08 | Stop reason: ALTCHOICE

## 2018-02-22 RX ORDER — CYANOCOBALAMIN 1000 UG/ML
1000 INJECTION INTRAMUSCULAR; SUBCUTANEOUS ONCE
Status: COMPLETED | OUTPATIENT
Start: 2018-02-22 | End: 2018-02-22

## 2018-02-22 RX ADMIN — CYANOCOBALAMIN 1000 MCG: 1000 INJECTION INTRAMUSCULAR; SUBCUTANEOUS at 11:16

## 2018-03-20 ENCOUNTER — PROCEDURE VISIT (OUTPATIENT)
Dept: PHYSICAL MEDICINE AND REHAB | Age: 73
End: 2018-03-20
Payer: MEDICARE

## 2018-03-20 DIAGNOSIS — M79.7 FIBROMYALGIA MUSCLE PAIN: ICD-10-CM

## 2018-03-20 PROCEDURE — 20553 NJX 1/MLT TRIGGER POINTS 3/>: CPT | Performed by: PHYSICAL MEDICINE & REHABILITATION

## 2018-03-20 PROCEDURE — 96372 THER/PROPH/DIAG INJ SC/IM: CPT | Performed by: PHYSICAL MEDICINE & REHABILITATION

## 2018-03-27 ENCOUNTER — OFFICE VISIT (OUTPATIENT)
Dept: PHYSICAL MEDICINE AND REHAB | Age: 73
End: 2018-03-27
Payer: MEDICARE

## 2018-03-27 VITALS
HEIGHT: 60 IN | DIASTOLIC BLOOD PRESSURE: 78 MMHG | BODY MASS INDEX: 42.21 KG/M2 | SYSTOLIC BLOOD PRESSURE: 126 MMHG | WEIGHT: 215 LBS

## 2018-03-27 DIAGNOSIS — Z74.09 IMPAIRED MOBILITY AND ACTIVITIES OF DAILY LIVING: ICD-10-CM

## 2018-03-27 DIAGNOSIS — M79.7 FIBROMYALGIA MUSCLE PAIN: ICD-10-CM

## 2018-03-27 DIAGNOSIS — R53.83 MALAISE AND FATIGUE: ICD-10-CM

## 2018-03-27 DIAGNOSIS — Z79.899 HIGH RISK MEDICATION USE: ICD-10-CM

## 2018-03-27 DIAGNOSIS — M54.40 BILATERAL LOW BACK PAIN WITH SCIATICA, SCIATICA LATERALITY UNSPECIFIED, UNSPECIFIED CHRONICITY: Primary | ICD-10-CM

## 2018-03-27 DIAGNOSIS — M54.2 NECK PAIN ON RIGHT SIDE: ICD-10-CM

## 2018-03-27 DIAGNOSIS — R26.2 DIFFICULTY WALKING: ICD-10-CM

## 2018-03-27 DIAGNOSIS — M15.9 GENERALIZED OSTEOARTHRITIS: ICD-10-CM

## 2018-03-27 DIAGNOSIS — R53.81 MALAISE AND FATIGUE: ICD-10-CM

## 2018-03-27 DIAGNOSIS — M75.122 COMPLETE ROTATOR CUFF RUPTURE OF LEFT SHOULDER: ICD-10-CM

## 2018-03-27 DIAGNOSIS — Z78.9 IMPAIRED MOBILITY AND ACTIVITIES OF DAILY LIVING: ICD-10-CM

## 2018-03-27 PROCEDURE — G8417 CALC BMI ABV UP PARAM F/U: HCPCS | Performed by: PHYSICAL MEDICINE & REHABILITATION

## 2018-03-27 PROCEDURE — 4040F PNEUMOC VAC/ADMIN/RCVD: CPT | Performed by: PHYSICAL MEDICINE & REHABILITATION

## 2018-03-27 PROCEDURE — 99214 OFFICE O/P EST MOD 30 MIN: CPT | Performed by: PHYSICAL MEDICINE & REHABILITATION

## 2018-03-27 PROCEDURE — 1123F ACP DISCUSS/DSCN MKR DOCD: CPT | Performed by: PHYSICAL MEDICINE & REHABILITATION

## 2018-03-27 PROCEDURE — G8399 PT W/DXA RESULTS DOCUMENT: HCPCS | Performed by: PHYSICAL MEDICINE & REHABILITATION

## 2018-03-27 PROCEDURE — 1036F TOBACCO NON-USER: CPT | Performed by: PHYSICAL MEDICINE & REHABILITATION

## 2018-03-27 PROCEDURE — G8427 DOCREV CUR MEDS BY ELIG CLIN: HCPCS | Performed by: PHYSICAL MEDICINE & REHABILITATION

## 2018-03-27 PROCEDURE — G8599 NO ASA/ANTIPLAT THER USE RNG: HCPCS | Performed by: PHYSICAL MEDICINE & REHABILITATION

## 2018-03-27 PROCEDURE — 3017F COLORECTAL CA SCREEN DOC REV: CPT | Performed by: PHYSICAL MEDICINE & REHABILITATION

## 2018-03-27 PROCEDURE — 1090F PRES/ABSN URINE INCON ASSESS: CPT | Performed by: PHYSICAL MEDICINE & REHABILITATION

## 2018-03-27 PROCEDURE — G8482 FLU IMMUNIZE ORDER/ADMIN: HCPCS | Performed by: PHYSICAL MEDICINE & REHABILITATION

## 2018-03-27 PROCEDURE — 3014F SCREEN MAMMO DOC REV: CPT | Performed by: PHYSICAL MEDICINE & REHABILITATION

## 2018-03-27 RX ORDER — HYDROCODONE BITARTRATE AND ACETAMINOPHEN 5; 325 MG/1; MG/1
1 TABLET ORAL EVERY 8 HOURS PRN
Qty: 90 TABLET | Refills: 0 | Status: SHIPPED | OUTPATIENT
Start: 2018-03-27 | End: 2018-06-14 | Stop reason: SDUPTHER

## 2018-03-27 ASSESSMENT — ENCOUNTER SYMPTOMS
SHORTNESS OF BREATH: 0
CHEST TIGHTNESS: 1
ABDOMINAL PAIN: 0
WHEEZING: 0
VOMITING: 0
NAUSEA: 0
ALLERGIC/IMMUNOLOGIC NEGATIVE: 1
BACK PAIN: 1
CONSTIPATION: 0
APNEA: 0
DIARRHEA: 0
EYES NEGATIVE: 1

## 2018-03-27 NOTE — PROGRESS NOTES
movement. She has tried NSAIDs for the symptoms. The treatment provided mild relief. Past Medical History:   Diagnosis Date    Arthritis     Blood transfusion     Cancer [facility])     GALLBLADDER 2009, 2011 OMENTUM    Charcot's joint     left foot    Chest pain 7/2/2015    Colitis     DDD (degenerative disc disease), lumbar 2/12/2013    Depression     Disorder of peripheral nervous system ([address] 9/1/2010    Dyspnea 7/2/2015    Family history of coronary artery disease 8/29/2014    History of blood transfusion 2011    following chemotherapy    Hx of right BKA ([address]     Hyperlipidemia     Hypertension     Hypothyroid 6/12/2015    Lobular breast [facility] ([facility] 10/2015    NSTEMI (non-ST elevated myocardial infarction) ([facility] 8/29/2014    Obesity     Paroxysmal atrial fibrillation ([facility] 8/29/2014    S/P BKA (below knee amputation) ([facility]      Past Surgical History:   Procedure Laterality Date    ABDOMEN SURGERY Left 4/11/2017    EXCISION OF CHEST WALL MASS, UPDATE LABS ON ADMIT  [facility]  2-5-13    both eyes    BREAST SURGERY      CARDIAC CATHETERIZATION      COLONOSCOPY  1/1/2010    normal    CYSTOSCOPY W BIOPSY OF BLADDER N/A 6/26/2017    CYSTOSCOPY BLADDER BIOPSY AND FULGURATION performed by Vazquez Vega MD at [address] Bilateral 2012    cataract removal with IOL implants    HYSTERECTOMY      LEG AMPUTATION BELOW KNEE Right 2/1/11    DR Parvin zAul due to CHARCOTS    MASTECTOMY  11/16/2015    Bilateral simple mastectomies with right SNB by DR [place]    [place] REMOVAL OF BRE[place] LESION Left 1/24/2017    CORE NEEDLE BIOPSY OF  LEFT BREAST MASS performed by Darren Vargas MD at [address]      cervical and lumbar     Social History     Social History    Marital status:       Spouse name: N/A    Number of children: N/A    Years of education: N/A     Occupational History    retired      Social History Main Topics    Smoking status: Never Smoker    Smokeless tobacco: Never Used    Alcohol use Yes      Comment: social    Drug use: No    Sexual activity: No     Other Topics Concern    None     Social History Narrative    None     Family History   Problem Relation Age of Onset    Heart Disease Mother     Arthritis Mother     Heart Disease Father     Arthritis Father     Cancer Sister      Colon    Thyroid Disease Son     Other Son      Down's syndrome       Allergies   Allergen Reactions    Ciprofloxacin Itching    Oxycontin [Oxycodone Hcl] Itching       Review of Systems   Constitutional: Positive for activity change and fatigue. Eyes: Negative. Respiratory: Positive for chest tightness. Negative for apnea, shortness of breath and wheezing. Cardiovascular: Negative for chest pain, palpitations and leg swelling. Gastrointestinal: Negative for abdominal pain, constipation, diarrhea, nausea and vomiting. Genitourinary: Negative. Musculoskeletal: Positive for arthralgias, back pain, gait problem, myalgias and neck stiffness. Skin: Negative. Allergic/Immunologic: Negative. Neurological: Positive for dizziness, weakness, light-headedness, numbness and headaches. Negative for paresthesias. Hematological: Does not bruise/bleed easily. Psychiatric/Behavioral: Positive for dysphoric mood. Negative for sleep disturbance. The patient is not nervous/anxious. Objective    Vitals:    03/27/18 1532   BP: 126/78   Weight: 215 lb (97.5 kg)   Height: 5' (1.524 m)     Pain Score: Three     Physical Exam   Constitutional: She is oriented to person, place, and time. Vital signs are normal. She appears well-developed and well-nourished. Non-toxic appearance. She does not have a sickly appearance. She does not appear ill. No distress. HENT:   Head: Normocephalic and atraumatic.    Right Ear: Hearing normal.   Left Ear: 12/21/2017    K 4.9 12/21/2017     12/21/2017    CO2 24 12/21/2017    BUN 27 12/21/2017    CREATININE 0.77 12/21/2017    CALCIUM 9.4 12/21/2017    LABALBU 4.5 12/21/2017    LABALBU 4.3 04/06/2012    BILITOT 0.4 12/21/2017    ALKPHOS 118 12/21/2017    AST 29 12/21/2017    ALT 14 12/21/2017     Lab Results   Component Value Date    WBC 8.0 06/20/2017    RBC 4.73 06/20/2017    RBC 4.16 11/04/2011    HGB 14.2 06/20/2017    HCT 42.7 06/20/2017    MCV 90.3 06/20/2017    MCH 29.9 06/20/2017    MCHC 33.1 06/20/2017    RDW 14.5 06/20/2017     06/20/2017    MPV 8.4 08/03/2015       Lab Results   Component Value Date    LABAMPH Neg 05/11/2017    BARBSCNU Neg 05/11/2017    LABBENZ Neg 05/11/2017    CANSU Neg 05/11/2017    COCAIMETSCRU Neg 05/11/2017    PHENCYCLIDINESCREENURINE Neg 05/11/2017    DSCOMMENT see below 05/11/2017       No results found for: CODEINE, MORPHINE, ACETYLMORPHI, OXYCODONE, NOROXYCODONE, NOROXYMU, HYDRCO, NORHYDU, HYDROMO, BUPREN, NORBUPRNOR, FENTA, NORFENT, MEPERIDINE, TAPENU, TAPOSULFUR, METHADONE, LABPROP, TRAM, AMPH, METHAMP, MDMA, ECMDA    No results found for: METPHEN, PHENTERMINE, BENZOYL, ALPRAZ, ALPHAOHALPRA, CLONAZEPAM, 7AMINOCLONAZ, DIAZEP, MASOOD, OXAZ, TEMAZ, LORAZEPAM, MIDAZOLAM, ZOLPIDEM, JAIME, ETG, MARIJMET, PCP, PAINMGTDRUGP, EERPAINMGTPA, LABCREA      , I discussed results with patient. See follow-up plans for new studies. Therapies:  HEP-gentle stretching and relaxation techniques-demonstrated with patient-they are to do them twice a day.   They are also advised to make the following lifestyle changes:  Goals      Exercise 3x per week (30 min per time)      SOAPP-R GOAL LESS THAN 9            11/10/16 SCORE: 16-MODERATE RISK   01/11/17 score: 8-low risk   03/08/17 score: 13-moderate risk   05/11/17 score: 9-low risk  07/13/17 score: 12-moderate risk  09/13/17 score: 12-moderate risk  03/27/18 score: 12-moderate risk       Weight < 200 lb (90.719 kg)           Injections or Epidurals:  Injection options were discussed. Patient gave verbal consent to ordered injections. See follow-up plans for planned injections. Supplements:  Vitamin D,   Education was given on:   Dietary and Fitness             Follow up with Primary Care Physician regarding their general medical needs. They are to follow up in 2 months to review medication, efficacy of injections, pill counts, OARRS check, SOAPPR assessment, review diagnostics, to review previous and future treatment plans and assess appropriateness for continued therapy. New Diagnostics  No orders of the defined types were placed in this encounter.       Augie Chaidez DO

## 2018-04-03 ENCOUNTER — OFFICE VISIT (OUTPATIENT)
Dept: FAMILY MEDICINE CLINIC | Age: 73
End: 2018-04-03
Payer: MEDICARE

## 2018-04-03 VITALS
SYSTOLIC BLOOD PRESSURE: 134 MMHG | BODY MASS INDEX: 42.21 KG/M2 | HEART RATE: 59 BPM | OXYGEN SATURATION: 98 % | HEIGHT: 60 IN | WEIGHT: 215 LBS | DIASTOLIC BLOOD PRESSURE: 74 MMHG

## 2018-04-03 DIAGNOSIS — Z89.511 HX OF RIGHT BKA (HCC): ICD-10-CM

## 2018-04-03 DIAGNOSIS — E03.9 HYPOTHYROIDISM, UNSPECIFIED TYPE: ICD-10-CM

## 2018-04-03 DIAGNOSIS — Z79.899 HIGH RISK MEDICATION USE: Chronic | ICD-10-CM

## 2018-04-03 DIAGNOSIS — G64 DISORDER OF PERIPHERAL NERVOUS SYSTEM: ICD-10-CM

## 2018-04-03 DIAGNOSIS — R53.81 MALAISE AND FATIGUE: ICD-10-CM

## 2018-04-03 DIAGNOSIS — E66.01 MORBID OBESITY DUE TO EXCESS CALORIES (HCC): ICD-10-CM

## 2018-04-03 DIAGNOSIS — R53.83 FATIGUE, UNSPECIFIED TYPE: ICD-10-CM

## 2018-04-03 DIAGNOSIS — R53.83 MALAISE AND FATIGUE: ICD-10-CM

## 2018-04-03 DIAGNOSIS — F32.A MELANCHOLIA: ICD-10-CM

## 2018-04-03 DIAGNOSIS — M79.7 FIBROMYALGIA MUSCLE PAIN: ICD-10-CM

## 2018-04-03 DIAGNOSIS — Z79.899 HIGH RISK MEDICATION USE: ICD-10-CM

## 2018-04-03 DIAGNOSIS — R10.12 LUQ PAIN: ICD-10-CM

## 2018-04-03 DIAGNOSIS — I21.4 NSTEMI (NON-ST ELEVATED MYOCARDIAL INFARCTION) (HCC): ICD-10-CM

## 2018-04-03 DIAGNOSIS — G89.4 CHRONIC PAIN SYNDROME: ICD-10-CM

## 2018-04-03 DIAGNOSIS — C50.911 LOBULAR CARCINOMA OF RIGHT BREAST (HCC): ICD-10-CM

## 2018-04-03 DIAGNOSIS — R53.83 FATIGUE, UNSPECIFIED TYPE: Primary | ICD-10-CM

## 2018-04-03 LAB
ALBUMIN SERPL-MCNC: 4 G/DL (ref 3.9–4.9)
ALP BLD-CCNC: 123 U/L (ref 40–130)
ALT SERPL-CCNC: 16 U/L (ref 0–33)
AMPHETAMINE SCREEN, URINE: ABNORMAL
ANION GAP SERPL CALCULATED.3IONS-SCNC: 12 MEQ/L (ref 7–13)
AST SERPL-CCNC: 25 U/L (ref 0–35)
BARBITURATE SCREEN URINE: ABNORMAL
BENZODIAZEPINE SCREEN, URINE: ABNORMAL
BILIRUB SERPL-MCNC: 0.6 MG/DL (ref 0–1.2)
BUN BLDV-MCNC: 25 MG/DL (ref 8–23)
CALCIUM SERPL-MCNC: 8.8 MG/DL (ref 8.6–10.2)
CANNABINOID SCREEN URINE: ABNORMAL
CHLORIDE BLD-SCNC: 106 MEQ/L (ref 98–107)
CO2: 26 MEQ/L (ref 22–29)
COCAINE METABOLITE SCREEN URINE: ABNORMAL
CREAT SERPL-MCNC: 0.66 MG/DL (ref 0.5–0.9)
GFR AFRICAN AMERICAN: >60
GFR NON-AFRICAN AMERICAN: >60
GLOBULIN: 1.9 G/DL (ref 2.3–3.5)
GLUCOSE BLD-MCNC: 120 MG/DL (ref 74–109)
HCT VFR BLD CALC: 40.9 % (ref 37–47)
HEMOGLOBIN: 13.5 G/DL (ref 12–16)
Lab: ABNORMAL
MCH RBC QN AUTO: 30.2 PG (ref 27–31.3)
MCHC RBC AUTO-ENTMCNC: 32.9 % (ref 33–37)
MCV RBC AUTO: 91.8 FL (ref 82–100)
OPIATE SCREEN URINE: POSITIVE
PDW BLD-RTO: 14.7 % (ref 11.5–14.5)
PHENCYCLIDINE SCREEN URINE: ABNORMAL
PLATELET # BLD: 188 K/UL (ref 130–400)
POTASSIUM SERPL-SCNC: 4.4 MEQ/L (ref 3.5–5.1)
RBC # BLD: 4.45 M/UL (ref 4.2–5.4)
SODIUM BLD-SCNC: 144 MEQ/L (ref 132–144)
TOTAL PROTEIN: 5.9 G/DL (ref 6.4–8.1)
TSH SERPL DL<=0.05 MIU/L-ACNC: 1.07 UIU/ML (ref 0.27–4.2)
WBC # BLD: 5.2 K/UL (ref 4.8–10.8)

## 2018-04-03 PROCEDURE — 1036F TOBACCO NON-USER: CPT | Performed by: FAMILY MEDICINE

## 2018-04-03 PROCEDURE — G8427 DOCREV CUR MEDS BY ELIG CLIN: HCPCS | Performed by: FAMILY MEDICINE

## 2018-04-03 PROCEDURE — G8399 PT W/DXA RESULTS DOCUMENT: HCPCS | Performed by: FAMILY MEDICINE

## 2018-04-03 PROCEDURE — 99213 OFFICE O/P EST LOW 20 MIN: CPT | Performed by: FAMILY MEDICINE

## 2018-04-03 PROCEDURE — 3017F COLORECTAL CA SCREEN DOC REV: CPT | Performed by: FAMILY MEDICINE

## 2018-04-03 PROCEDURE — G8599 NO ASA/ANTIPLAT THER USE RNG: HCPCS | Performed by: FAMILY MEDICINE

## 2018-04-03 PROCEDURE — G8417 CALC BMI ABV UP PARAM F/U: HCPCS | Performed by: FAMILY MEDICINE

## 2018-04-03 PROCEDURE — 4040F PNEUMOC VAC/ADMIN/RCVD: CPT | Performed by: FAMILY MEDICINE

## 2018-04-03 PROCEDURE — 3014F SCREEN MAMMO DOC REV: CPT | Performed by: FAMILY MEDICINE

## 2018-04-03 PROCEDURE — 1090F PRES/ABSN URINE INCON ASSESS: CPT | Performed by: FAMILY MEDICINE

## 2018-04-03 PROCEDURE — 1123F ACP DISCUSS/DSCN MKR DOCD: CPT | Performed by: FAMILY MEDICINE

## 2018-04-10 ENCOUNTER — HOSPITAL ENCOUNTER (OUTPATIENT)
Dept: CT IMAGING | Age: 73
Discharge: HOME | End: 2018-04-12
Payer: MEDICARE

## 2018-04-10 VITALS — BODY MASS INDEX: 42.21 KG/M2 | WEIGHT: 215 LBS | HEIGHT: 60 IN

## 2018-04-10 DIAGNOSIS — R10.12 LUQ PAIN: ICD-10-CM

## 2018-04-10 PROCEDURE — 2580000003 HC RX 258: Performed by: FAMILY MEDICINE

## 2018-04-10 PROCEDURE — 2500000003 HC RX 250 WO HCPCS: Performed by: FAMILY MEDICINE

## 2018-04-10 PROCEDURE — 6360000004 HC RX CONTRAST MEDICATION: Performed by: FAMILY MEDICINE

## 2018-04-10 PROCEDURE — 74177 CT ABD & PELVIS W/CONTRAST: CPT

## 2018-04-10 RX ORDER — SODIUM CHLORIDE 0.9 % (FLUSH) 0.9 %
10 SYRINGE (ML) INJECTION
Status: COMPLETED | OUTPATIENT
Start: 2018-04-10 | End: 2018-04-10

## 2018-04-10 RX ADMIN — SODIUM CHLORIDE, PRESERVATIVE FREE 10 ML: 5 INJECTION INTRAVENOUS at 10:49

## 2018-04-10 RX ADMIN — BARIUM SULFATE 450 ML: 21 SUSPENSION ORAL at 09:40

## 2018-04-10 RX ADMIN — IOPAMIDOL 100 ML: 755 INJECTION, SOLUTION INTRAVENOUS at 10:48

## 2018-05-03 ENCOUNTER — OFFICE VISIT (OUTPATIENT)
Dept: FAMILY MEDICINE CLINIC | Age: 73
End: 2018-05-03
Payer: MEDICARE

## 2018-05-03 VITALS
HEART RATE: 81 BPM | WEIGHT: 228 LBS | OXYGEN SATURATION: 97 % | TEMPERATURE: 97.7 F | DIASTOLIC BLOOD PRESSURE: 78 MMHG | HEIGHT: 60 IN | SYSTOLIC BLOOD PRESSURE: 132 MMHG | BODY MASS INDEX: 44.76 KG/M2

## 2018-05-03 DIAGNOSIS — R10.12 LUQ ABDOMINAL PAIN: Primary | ICD-10-CM

## 2018-05-03 PROCEDURE — 99213 OFFICE O/P EST LOW 20 MIN: CPT | Performed by: NURSE PRACTITIONER

## 2018-05-03 PROCEDURE — G8599 NO ASA/ANTIPLAT THER USE RNG: HCPCS | Performed by: NURSE PRACTITIONER

## 2018-05-03 PROCEDURE — G8399 PT W/DXA RESULTS DOCUMENT: HCPCS | Performed by: NURSE PRACTITIONER

## 2018-05-03 PROCEDURE — G8427 DOCREV CUR MEDS BY ELIG CLIN: HCPCS | Performed by: NURSE PRACTITIONER

## 2018-05-03 PROCEDURE — 3017F COLORECTAL CA SCREEN DOC REV: CPT | Performed by: NURSE PRACTITIONER

## 2018-05-03 PROCEDURE — 1123F ACP DISCUSS/DSCN MKR DOCD: CPT | Performed by: NURSE PRACTITIONER

## 2018-05-03 PROCEDURE — 3288F FALL RISK ASSESSMENT DOCD: CPT | Performed by: NURSE PRACTITIONER

## 2018-05-03 PROCEDURE — 1036F TOBACCO NON-USER: CPT | Performed by: NURSE PRACTITIONER

## 2018-05-03 PROCEDURE — G8417 CALC BMI ABV UP PARAM F/U: HCPCS | Performed by: NURSE PRACTITIONER

## 2018-05-03 PROCEDURE — 1090F PRES/ABSN URINE INCON ASSESS: CPT | Performed by: NURSE PRACTITIONER

## 2018-05-03 PROCEDURE — 4040F PNEUMOC VAC/ADMIN/RCVD: CPT | Performed by: NURSE PRACTITIONER

## 2018-05-03 ASSESSMENT — ENCOUNTER SYMPTOMS
BACK PAIN: 1
VOMITING: 0
ABDOMINAL PAIN: 1
NAUSEA: 0

## 2018-05-11 DIAGNOSIS — M43.10 SPONDYLOLISTHESIS, UNSPECIFIED SPINAL REGION: ICD-10-CM

## 2018-05-11 DIAGNOSIS — M48.02 CERVICAL STENOSIS OF SPINAL CANAL: ICD-10-CM

## 2018-05-11 RX ORDER — POTASSIUM CHLORIDE 750 MG/1
TABLET, EXTENDED RELEASE ORAL
Qty: 180 TABLET | Refills: 2 | Status: SHIPPED | OUTPATIENT
Start: 2018-05-11 | End: 2019-01-17 | Stop reason: SDUPTHER

## 2018-05-11 RX ORDER — LEVOTHYROXINE SODIUM 0.05 MG/1
50 TABLET ORAL DAILY
Qty: 90 TABLET | Refills: 2 | Status: SHIPPED | OUTPATIENT
Start: 2018-05-11 | End: 2019-01-17 | Stop reason: SDUPTHER

## 2018-05-11 RX ORDER — GABAPENTIN 600 MG/1
TABLET ORAL
Qty: 450 TABLET | Refills: 1 | Status: SHIPPED | OUTPATIENT
Start: 2018-05-11 | End: 2018-11-01 | Stop reason: SDUPTHER

## 2018-05-26 DIAGNOSIS — I10 ESSENTIAL HYPERTENSION: ICD-10-CM

## 2018-05-29 RX ORDER — CARVEDILOL 25 MG/1
TABLET ORAL
Qty: 180 TABLET | Refills: 2 | Status: SHIPPED | OUTPATIENT
Start: 2018-05-29 | End: 2019-02-15 | Stop reason: SDUPTHER

## 2018-06-08 ENCOUNTER — OFFICE VISIT (OUTPATIENT)
Dept: CARDIOLOGY CLINIC | Age: 73
End: 2018-06-08
Payer: MEDICARE

## 2018-06-08 VITALS
HEIGHT: 60 IN | HEART RATE: 61 BPM | BODY MASS INDEX: 45.12 KG/M2 | WEIGHT: 229.8 LBS | SYSTOLIC BLOOD PRESSURE: 148 MMHG | DIASTOLIC BLOOD PRESSURE: 90 MMHG

## 2018-06-08 DIAGNOSIS — I10 BENIGN ESSENTIAL HTN: Primary | ICD-10-CM

## 2018-06-08 DIAGNOSIS — E78.2 MIXED HYPERLIPIDEMIA: ICD-10-CM

## 2018-06-08 DIAGNOSIS — R00.0 RACING HEART BEAT: ICD-10-CM

## 2018-06-08 PROCEDURE — G8399 PT W/DXA RESULTS DOCUMENT: HCPCS | Performed by: INTERNAL MEDICINE

## 2018-06-08 PROCEDURE — 99213 OFFICE O/P EST LOW 20 MIN: CPT | Performed by: INTERNAL MEDICINE

## 2018-06-08 PROCEDURE — 1123F ACP DISCUSS/DSCN MKR DOCD: CPT | Performed by: INTERNAL MEDICINE

## 2018-06-08 PROCEDURE — 1036F TOBACCO NON-USER: CPT | Performed by: INTERNAL MEDICINE

## 2018-06-08 PROCEDURE — G8427 DOCREV CUR MEDS BY ELIG CLIN: HCPCS | Performed by: INTERNAL MEDICINE

## 2018-06-08 PROCEDURE — 4040F PNEUMOC VAC/ADMIN/RCVD: CPT | Performed by: INTERNAL MEDICINE

## 2018-06-08 PROCEDURE — 3017F COLORECTAL CA SCREEN DOC REV: CPT | Performed by: INTERNAL MEDICINE

## 2018-06-08 PROCEDURE — 1090F PRES/ABSN URINE INCON ASSESS: CPT | Performed by: INTERNAL MEDICINE

## 2018-06-08 PROCEDURE — G8417 CALC BMI ABV UP PARAM F/U: HCPCS | Performed by: INTERNAL MEDICINE

## 2018-06-08 PROCEDURE — 93000 ELECTROCARDIOGRAM COMPLETE: CPT | Performed by: INTERNAL MEDICINE

## 2018-06-08 PROCEDURE — G8599 NO ASA/ANTIPLAT THER USE RNG: HCPCS | Performed by: INTERNAL MEDICINE

## 2018-06-08 RX ORDER — FUROSEMIDE 20 MG/1
20 TABLET ORAL PRN
COMMUNITY
End: 2020-02-11

## 2018-06-14 ENCOUNTER — OFFICE VISIT (OUTPATIENT)
Dept: PHYSICAL MEDICINE AND REHAB | Age: 73
End: 2018-06-14
Payer: MEDICARE

## 2018-06-14 VITALS
WEIGHT: 229 LBS | HEART RATE: 63 BPM | DIASTOLIC BLOOD PRESSURE: 62 MMHG | BODY MASS INDEX: 44.72 KG/M2 | SYSTOLIC BLOOD PRESSURE: 112 MMHG | RESPIRATION RATE: 16 BRPM

## 2018-06-14 DIAGNOSIS — M54.40 BILATERAL LOW BACK PAIN WITH SCIATICA, SCIATICA LATERALITY UNSPECIFIED, UNSPECIFIED CHRONICITY: ICD-10-CM

## 2018-06-14 DIAGNOSIS — M79.7 FIBROMYALGIA MUSCLE PAIN: ICD-10-CM

## 2018-06-14 DIAGNOSIS — Z78.9 IMPAIRED MOBILITY AND ACTIVITIES OF DAILY LIVING: ICD-10-CM

## 2018-06-14 DIAGNOSIS — M15.9 GENERALIZED OSTEOARTHRITIS: ICD-10-CM

## 2018-06-14 DIAGNOSIS — Z74.09 IMPAIRED MOBILITY AND ACTIVITIES OF DAILY LIVING: ICD-10-CM

## 2018-06-14 DIAGNOSIS — M75.122 COMPLETE ROTATOR CUFF RUPTURE OF LEFT SHOULDER: ICD-10-CM

## 2018-06-14 DIAGNOSIS — Z79.899 HIGH RISK MEDICATION USE: ICD-10-CM

## 2018-06-14 PROCEDURE — 1123F ACP DISCUSS/DSCN MKR DOCD: CPT | Performed by: PHYSICAL MEDICINE & REHABILITATION

## 2018-06-14 PROCEDURE — G8427 DOCREV CUR MEDS BY ELIG CLIN: HCPCS | Performed by: PHYSICAL MEDICINE & REHABILITATION

## 2018-06-14 PROCEDURE — 1036F TOBACCO NON-USER: CPT | Performed by: PHYSICAL MEDICINE & REHABILITATION

## 2018-06-14 PROCEDURE — 1090F PRES/ABSN URINE INCON ASSESS: CPT | Performed by: PHYSICAL MEDICINE & REHABILITATION

## 2018-06-14 PROCEDURE — G8599 NO ASA/ANTIPLAT THER USE RNG: HCPCS | Performed by: PHYSICAL MEDICINE & REHABILITATION

## 2018-06-14 PROCEDURE — G8417 CALC BMI ABV UP PARAM F/U: HCPCS | Performed by: PHYSICAL MEDICINE & REHABILITATION

## 2018-06-14 PROCEDURE — 99213 OFFICE O/P EST LOW 20 MIN: CPT | Performed by: PHYSICAL MEDICINE & REHABILITATION

## 2018-06-14 PROCEDURE — 3017F COLORECTAL CA SCREEN DOC REV: CPT | Performed by: PHYSICAL MEDICINE & REHABILITATION

## 2018-06-14 PROCEDURE — G8399 PT W/DXA RESULTS DOCUMENT: HCPCS | Performed by: PHYSICAL MEDICINE & REHABILITATION

## 2018-06-14 PROCEDURE — 4040F PNEUMOC VAC/ADMIN/RCVD: CPT | Performed by: PHYSICAL MEDICINE & REHABILITATION

## 2018-06-14 RX ORDER — HYDROCODONE BITARTRATE AND ACETAMINOPHEN 5; 325 MG/1; MG/1
1 TABLET ORAL EVERY 8 HOURS PRN
Qty: 90 TABLET | Refills: 0 | Status: SHIPPED | OUTPATIENT
Start: 2018-06-14 | End: 2018-09-06 | Stop reason: SDUPTHER

## 2018-06-14 ASSESSMENT — ENCOUNTER SYMPTOMS
WHEEZING: 0
SHORTNESS OF BREATH: 0
NAUSEA: 0
EYES NEGATIVE: 1
CHEST TIGHTNESS: 1
APNEA: 0
ABDOMINAL PAIN: 0
VOMITING: 0
BACK PAIN: 1
DIARRHEA: 0
CONSTIPATION: 0
ALLERGIC/IMMUNOLOGIC NEGATIVE: 1

## 2018-06-18 ENCOUNTER — HOSPITAL ENCOUNTER (OUTPATIENT)
Dept: NON INVASIVE DIAGNOSTICS | Age: 73
Discharge: HOME | End: 2018-06-18
Payer: MEDICARE

## 2018-06-21 ENCOUNTER — HOSPITAL ENCOUNTER (OUTPATIENT)
Dept: NON INVASIVE DIAGNOSTICS | Age: 73
Discharge: HOME | End: 2018-06-21
Payer: MEDICARE

## 2018-06-21 PROCEDURE — 93227 XTRNL ECG REC<48 HR R&I: CPT | Performed by: INTERNAL MEDICINE

## 2018-06-21 PROCEDURE — 93225 XTRNL ECG REC<48 HRS REC: CPT

## 2018-06-21 PROCEDURE — 93226 XTRNL ECG REC<48 HR SCAN A/R: CPT

## 2018-07-10 ENCOUNTER — PROCEDURE VISIT (OUTPATIENT)
Dept: PHYSICAL MEDICINE AND REHAB | Age: 73
End: 2018-07-10
Payer: MEDICARE

## 2018-07-10 DIAGNOSIS — M79.7 FIBROMYALGIA: Primary | ICD-10-CM

## 2018-07-10 DIAGNOSIS — M79.10 MYALGIA: ICD-10-CM

## 2018-07-10 DIAGNOSIS — M54.40 BILATERAL LOW BACK PAIN WITH SCIATICA, SCIATICA LATERALITY UNSPECIFIED, UNSPECIFIED CHRONICITY: ICD-10-CM

## 2018-07-10 PROCEDURE — 20553 NJX 1/MLT TRIGGER POINTS 3/>: CPT | Performed by: PHYSICAL MEDICINE & REHABILITATION

## 2018-07-31 ENCOUNTER — OFFICE VISIT (OUTPATIENT)
Dept: UROLOGY | Age: 73
End: 2018-07-31
Payer: MEDICARE

## 2018-07-31 VITALS
DIASTOLIC BLOOD PRESSURE: 70 MMHG | HEART RATE: 74 BPM | HEIGHT: 60 IN | BODY MASS INDEX: 42.21 KG/M2 | SYSTOLIC BLOOD PRESSURE: 118 MMHG | WEIGHT: 215 LBS

## 2018-07-31 DIAGNOSIS — R31.0 GROSS HEMATURIA: ICD-10-CM

## 2018-07-31 DIAGNOSIS — R32 INCONTINENCE IN FEMALE: Primary | ICD-10-CM

## 2018-07-31 LAB
BILIRUBIN, POC: ABNORMAL
BLOOD URINE, POC: ABNORMAL
CLARITY, POC: CLEAR
COLOR, POC: YELLOW
GLUCOSE URINE, POC: ABNORMAL
KETONES, POC: ABNORMAL
LEUKOCYTE EST, POC: ABNORMAL
NITRITE, POC: ABNORMAL
PH, POC: 5
PROTEIN, POC: 30
SPECIFIC GRAVITY, POC: >1.03
UROBILINOGEN, POC: 0.2

## 2018-07-31 PROCEDURE — G8399 PT W/DXA RESULTS DOCUMENT: HCPCS | Performed by: UROLOGY

## 2018-07-31 PROCEDURE — 4040F PNEUMOC VAC/ADMIN/RCVD: CPT | Performed by: UROLOGY

## 2018-07-31 PROCEDURE — 1036F TOBACCO NON-USER: CPT | Performed by: UROLOGY

## 2018-07-31 PROCEDURE — 99213 OFFICE O/P EST LOW 20 MIN: CPT | Performed by: UROLOGY

## 2018-07-31 PROCEDURE — 1090F PRES/ABSN URINE INCON ASSESS: CPT | Performed by: UROLOGY

## 2018-07-31 PROCEDURE — 1101F PT FALLS ASSESS-DOCD LE1/YR: CPT | Performed by: UROLOGY

## 2018-07-31 PROCEDURE — 81003 URINALYSIS AUTO W/O SCOPE: CPT | Performed by: UROLOGY

## 2018-07-31 PROCEDURE — 0509F URINE INCON PLAN DOCD: CPT | Performed by: UROLOGY

## 2018-07-31 PROCEDURE — 3017F COLORECTAL CA SCREEN DOC REV: CPT | Performed by: UROLOGY

## 2018-07-31 PROCEDURE — G8417 CALC BMI ABV UP PARAM F/U: HCPCS | Performed by: UROLOGY

## 2018-07-31 PROCEDURE — 1123F ACP DISCUSS/DSCN MKR DOCD: CPT | Performed by: UROLOGY

## 2018-07-31 PROCEDURE — G8599 NO ASA/ANTIPLAT THER USE RNG: HCPCS | Performed by: UROLOGY

## 2018-07-31 PROCEDURE — G8427 DOCREV CUR MEDS BY ELIG CLIN: HCPCS | Performed by: UROLOGY

## 2018-07-31 ASSESSMENT — ENCOUNTER SYMPTOMS
ABDOMINAL DISTENTION: 0
ABDOMINAL PAIN: 0

## 2018-07-31 NOTE — PROGRESS NOTES
nightly ) 90 capsule 2    atorvastatin (LIPITOR) 20 MG tablet TAKE 1 TABLET BY MOUTH  NIGHTLY 90 tablet 3    b complex vitamins capsule Take 1 capsule by mouth daily      Multiple Vitamins-Minerals (CENTRUM SILVER 50+WOMEN PO) Take by mouth      tamoxifen (NOLVADEX) 20 MG tablet Take 1 tablet by mouth daily      acyclovir (ZOVIRAX) 5 % CREA Apply topically as needed       Vitamin D (CHOLECALCIFEROL) 1000 UNITS CAPS capsule Take 1,000 Units by mouth daily. No current facility-administered medications for this visit. Ciprofloxacin and Oxycontin [oxycodone hcl]  reviewed    Review of Systems   Constitutional: Negative for fever and unexpected weight change. Cardiovascular: Negative for chest pain. Gastrointestinal: Negative for abdominal distention and abdominal pain. Genitourinary: Positive for hematuria. Negative for dysuria and flank pain. Objective:   Physical Exam   Constitutional: She appears well-developed and well-nourished. Abdominal: Soft. She exhibits no distension. There is no tenderness. There is no rebound. Neurological: She is alert. Psychiatric: She has a normal mood and affect. Her behavior is normal.       7/31/2018  9:22 AM - Ignacio Sharma CMA ([facility])     Component Results     Component Collected Lab   Color, UA 07/31/2018  9:21 AM Unknown   yellow    Clarity, UA 07/31/2018  9:21 AM Unknown   clear    Glucose, UA [facility] 07/31/2018  9:21 AM Unknown   neg    Bilirubin, UA 07/31/2018  9:21 AM Unknown   neg    Ketones, UA 07/31/2018  9:21 AM Unknown   neg    Spec Grav, UA 07/31/2018  9:21 AM Unknown   >1.030    Blood, UA [facility] 07/31/2018  9:21 AM Unknown   large    pH, UA 07/31/2018  9:21 AM Unknown   5.0    Protein, UA POC 07/31/2018  9:21 AM Unknown   30    Urobilinogen, UA 07/31/2018  9:21 AM Unknown   0.2    Leukocytes, UA 07/31/2018  9:21 AM Unknown   trace    Nitrite, UA 07/31/2018  9:21 AM Unknown   neg        Assessment:       This is a 75 yo white female with h/o

## 2018-08-02 LAB — URINE CULTURE, ROUTINE: NORMAL

## 2018-08-10 ENCOUNTER — OFFICE VISIT (OUTPATIENT)
Dept: FAMILY MEDICINE CLINIC | Age: 73
End: 2018-08-10
Payer: MEDICARE

## 2018-08-10 VITALS
OXYGEN SATURATION: 96 % | HEIGHT: 60 IN | SYSTOLIC BLOOD PRESSURE: 138 MMHG | HEART RATE: 71 BPM | DIASTOLIC BLOOD PRESSURE: 80 MMHG | BODY MASS INDEX: 45.94 KG/M2 | WEIGHT: 234 LBS

## 2018-08-10 DIAGNOSIS — G89.29 CHRONIC LOW BACK PAIN, UNSPECIFIED BACK PAIN LATERALITY, WITH SCIATICA PRESENCE UNSPECIFIED: ICD-10-CM

## 2018-08-10 DIAGNOSIS — R53.81 MALAISE AND FATIGUE: ICD-10-CM

## 2018-08-10 DIAGNOSIS — G60.9 HEREDITARY AND IDIOPATHIC PERIPHERAL NEUROPATHY: ICD-10-CM

## 2018-08-10 DIAGNOSIS — M79.7 FIBROMYALGIA MUSCLE PAIN: ICD-10-CM

## 2018-08-10 DIAGNOSIS — F33.1 MODERATE EPISODE OF RECURRENT MAJOR DEPRESSIVE DISORDER (HCC): Primary | ICD-10-CM

## 2018-08-10 DIAGNOSIS — E66.01 MORBID OBESITY DUE TO EXCESS CALORIES (HCC): ICD-10-CM

## 2018-08-10 DIAGNOSIS — M54.5 CHRONIC LOW BACK PAIN, UNSPECIFIED BACK PAIN LATERALITY, WITH SCIATICA PRESENCE UNSPECIFIED: ICD-10-CM

## 2018-08-10 DIAGNOSIS — R53.83 MALAISE AND FATIGUE: ICD-10-CM

## 2018-08-10 PROCEDURE — 1036F TOBACCO NON-USER: CPT | Performed by: FAMILY MEDICINE

## 2018-08-10 PROCEDURE — G8399 PT W/DXA RESULTS DOCUMENT: HCPCS | Performed by: FAMILY MEDICINE

## 2018-08-10 PROCEDURE — G8417 CALC BMI ABV UP PARAM F/U: HCPCS | Performed by: FAMILY MEDICINE

## 2018-08-10 PROCEDURE — 4040F PNEUMOC VAC/ADMIN/RCVD: CPT | Performed by: FAMILY MEDICINE

## 2018-08-10 PROCEDURE — 1101F PT FALLS ASSESS-DOCD LE1/YR: CPT | Performed by: FAMILY MEDICINE

## 2018-08-10 PROCEDURE — 1090F PRES/ABSN URINE INCON ASSESS: CPT | Performed by: FAMILY MEDICINE

## 2018-08-10 PROCEDURE — 3017F COLORECTAL CA SCREEN DOC REV: CPT | Performed by: FAMILY MEDICINE

## 2018-08-10 PROCEDURE — 99213 OFFICE O/P EST LOW 20 MIN: CPT | Performed by: FAMILY MEDICINE

## 2018-08-10 PROCEDURE — G8599 NO ASA/ANTIPLAT THER USE RNG: HCPCS | Performed by: FAMILY MEDICINE

## 2018-08-10 PROCEDURE — G8427 DOCREV CUR MEDS BY ELIG CLIN: HCPCS | Performed by: FAMILY MEDICINE

## 2018-08-10 PROCEDURE — 1123F ACP DISCUSS/DSCN MKR DOCD: CPT | Performed by: FAMILY MEDICINE

## 2018-08-10 RX ORDER — BUPROPION HYDROCHLORIDE 150 MG/1
TABLET ORAL
Qty: 180 TABLET | Refills: 1 | Status: SHIPPED | OUTPATIENT
Start: 2018-08-10 | End: 2018-12-25 | Stop reason: SDUPTHER

## 2018-08-10 NOTE — PROGRESS NOTES
Chief Complaint   Patient presents with    Depression     check up, zoloft did not help, started wellutrin again        HPI:  Alla Bowens is a 74 y.o. female    She is doing better  Changed from zoloft to wellbutrin    Wants to do physical therapy      Energy still loow    She is on multiple sedating meds    Also is hypothyroid  Dose has been same for some time        Patient Active Problem List   Diagnosis    Back pain OARRS RUN 11/9/16    Neck pain on right side    Obesity    High risk medication use - 01/30/18 OARRS PM&R, 03/26/18 OARRS PM&R, 04/03/18 Urine Drug Screen positive opiates PM&R, 01/31/18 Med Contract PM&R    Impaired mobility and activities of daily living    Hx of right BKA ([address]    Family history of coronary artery disease    NSTEMI (non-ST elevated myocardial infarction) ([address]    Arthritis, neuropathic    Charcot's joint of foot    Closed dislocation of ankle    Closed dislocation of foot    Complete rotator cuff rupture of left shoulder    Clinical depression    Difficulty walking    Benign essential HTN    Acquired flat foot    Closed fracture of tarsal and metatarsal bones    Myogenic ptosis    Disorder of peripheral nervous system    Arthritis of ankle or foot, degenerative    HLD (hyperlipidemia)    Cervical post-laminectomy syndrome    Failed back syndrome of lumbar spine    Low back pain with sciatica    Fibromyalgia muscle pain    Malaise and fatigue    Melancholia    Complete tear of left rotator cuff    Generalized osteoarthritis    Peripheral neuropathy    Osteoporosis    Postlaminectomy syndrome of cervical region    Postlaminectomy syndrome of lumbar region    Tibialis tendinitis    Hereditary and idiopathic peripheral neuropathy ([address]    Hypothyroid    Lobular breast cancer ([address]    Traumatic amputation of one lower extremity below knee with complication ([address]    Acquired hallux valgus    Fatigue due to treatment    Anemia    Cancer [facility])    Cervical spinal cord injury [facility]    Complete traumatic amputation at level between right knee and ankle ([facility]    Morbid obesity due to excess calories ([facility])    Reactive depression    Sleeping excessive    Chronic low back pain    Left breast mass    Other specified postprocedural states    Primary osteoarthritis involving multiple joints    Hematuria    Hematuria, gross    Noncompliance - No Show inject 07/17/17, No Show F/U 1/11/18    Chronic low back pain with sciatica    Moderate episode of recurrent major depressive disorder ([facility]    Charcot ankle, left    Below knee amputation status, right ([facility])    Chronic pain syndrome    Racing heart beat       Current Outpatient Prescriptions   Medication Sig Dispense Refill    buPROPion (WELLBUTRIN XL) 150 MG extended release tablet TAKE 2 TABLET BY MOUTH  EVERY MORNING 180 tablet 1    furosemide (LASIX) 20 MG tablet Take 20 mg by mouth as needed (edema)      carvedilol (COREG) 25 MG tablet TAKE 1 TABLET BY MOUTH TWO  TIMES DAILY 180 tablet 2    gabapentin (NEURONTIN) 600 MG tablet TAKE 1 TABLET BY MOUTH IN  THE MORNING, 2 TABS IN THE  AFTERNOON, AND 2 TABS IN  THE EVENING AS TOLERATED 450 tablet 1    diclofenac (VOLTAREN) 50 MG EC tablet TAKE 1 TABLET BY MOUTH TWO  TIMES DAILY 180 tablet 2    potassium chloride (KLOR-CON M) 10 MEQ extended release tablet TAKE 1 TABLET BY MOUTH TWO  TIMES DAILY 180 tablet 2    levothyroxine (SYNTHROID) 50 MCG tablet TAKE 1 TABLET BY MOUTH  DAILY 90 tablet 2    DULoxetine (CYMBALTA) 30 MG extended release capsule TAKE 1 CAPSULE BY MOUTH  DAILY (Patient taking differently: Take 30 mg by mouth nightly ) 90 capsule 2    atorvastatin (LIPITOR) 20 MG tablet TAKE 1 TABLET BY MOUTH  NIGHTLY 90 tablet 3    b complex vitamins capsule Take 1 capsule by mouth daily      Multiple Vitamins-Minerals (CENTRUM SILVER 50+WOMEN PO) Take by mouth      tamoxifen (NOLVADEX) 20 MG tablet Take 1 tablet by mouth daily      referral to Physical Therapy       Ongoing follow up with urology, pain mgmt    PT referral    wellbutrin XL to 300mg        Jose Luis Jacobo MD

## 2018-08-17 ENCOUNTER — PROCEDURE VISIT (OUTPATIENT)
Dept: UROLOGY | Age: 73
End: 2018-08-17
Payer: MEDICARE

## 2018-08-17 VITALS
HEIGHT: 60 IN | BODY MASS INDEX: 43.19 KG/M2 | SYSTOLIC BLOOD PRESSURE: 114 MMHG | WEIGHT: 220 LBS | DIASTOLIC BLOOD PRESSURE: 80 MMHG | HEART RATE: 58 BPM

## 2018-08-17 DIAGNOSIS — R31.0 GROSS HEMATURIA: ICD-10-CM

## 2018-08-17 DIAGNOSIS — R32 URINARY INCONTINENCE, UNSPECIFIED TYPE: Primary | ICD-10-CM

## 2018-08-17 LAB
BILIRUBIN, POC: ABNORMAL
BLOOD URINE, POC: ABNORMAL
CLARITY, POC: CLEAR
COLOR, POC: YELLOW
GLUCOSE URINE, POC: ABNORMAL
KETONES, POC: ABNORMAL
LEUKOCYTE EST, POC: ABNORMAL
NITRITE, POC: ABNORMAL
PH, POC: 6
PROTEIN, POC: 100
SPECIFIC GRAVITY, POC: >1.03
UROBILINOGEN, POC: 1

## 2018-08-17 PROCEDURE — G8417 CALC BMI ABV UP PARAM F/U: HCPCS | Performed by: UROLOGY

## 2018-08-17 PROCEDURE — 1123F ACP DISCUSS/DSCN MKR DOCD: CPT | Performed by: UROLOGY

## 2018-08-17 PROCEDURE — 3017F COLORECTAL CA SCREEN DOC REV: CPT | Performed by: UROLOGY

## 2018-08-17 PROCEDURE — 0509F URINE INCON PLAN DOCD: CPT | Performed by: UROLOGY

## 2018-08-17 PROCEDURE — G8427 DOCREV CUR MEDS BY ELIG CLIN: HCPCS | Performed by: UROLOGY

## 2018-08-17 PROCEDURE — 81003 URINALYSIS AUTO W/O SCOPE: CPT | Performed by: UROLOGY

## 2018-08-17 PROCEDURE — 1090F PRES/ABSN URINE INCON ASSESS: CPT | Performed by: UROLOGY

## 2018-08-17 PROCEDURE — G8399 PT W/DXA RESULTS DOCUMENT: HCPCS | Performed by: UROLOGY

## 2018-08-17 PROCEDURE — 99212 OFFICE O/P EST SF 10 MIN: CPT | Performed by: UROLOGY

## 2018-08-17 PROCEDURE — 1101F PT FALLS ASSESS-DOCD LE1/YR: CPT | Performed by: UROLOGY

## 2018-08-17 PROCEDURE — 52000 CYSTOURETHROSCOPY: CPT | Performed by: UROLOGY

## 2018-08-17 PROCEDURE — G8599 NO ASA/ANTIPLAT THER USE RNG: HCPCS | Performed by: UROLOGY

## 2018-08-17 PROCEDURE — 4040F PNEUMOC VAC/ADMIN/RCVD: CPT | Performed by: UROLOGY

## 2018-08-17 PROCEDURE — 1036F TOBACCO NON-USER: CPT | Performed by: UROLOGY

## 2018-08-17 RX ORDER — SULFAMETHOXAZOLE AND TRIMETHOPRIM 800; 160 MG/1; MG/1
1 TABLET ORAL ONCE
Qty: 1 TABLET | Refills: 0 | COMMUNITY
Start: 2018-08-17 | End: 2018-08-17

## 2018-08-17 ASSESSMENT — ENCOUNTER SYMPTOMS
ABDOMINAL DISTENTION: 0
ABDOMINAL PAIN: 0

## 2018-08-17 NOTE — PROGRESS NOTES
Subjective:      Patient ID: Christ Mckinney is a 74 y.o. female. 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 Since last seen on 7/31/18, she has no new complaints. She denies dysuria or pain. She has noticed occasional mild pink urine over the last year and I reviewed the interval C/S and cytology reports. She has no other new medical or surgical problems reported.  She wants to proceed with cystoscopy today.      Cysto/bladder biopsy 6/26/17: inflammation  Cysto/Bladder biopsy 5/12: neg  cytol 4/2013: neg      Past Medical History:   Diagnosis Date    Arthritis     Blood transfusion     Cancer [facility])     GALLBLADDER 2009, 2011 OMENTUM    Charcot's joint     left foot    Chest pain 7/2/2015    Colitis     DDD (degenerative disc disease), lumbar 2/12/2013    Depression     Disorder of peripheral nervous system 9/1/2010    Dyspnea 7/2/2015    Family history of coronary artery disease 8/29/2014    History of blood transfusion 2011    following chemotherapy    Hx of right BKA ([address]     Hyperlipidemia     Hypertension     Hypothyroid 6/12/2015    Lobular breast cancer ([address] 10/2015    NSTEMI (non-ST elevated myocardial infarction) ([address] 8/29/2014    Obesity     Paroxysmal atrial fibrillation ([address] 8/29/2014    S/P BKA (below knee amputation) ([address]      Past Surgical History:   Procedure Laterality Date    ABDOMEN SURGERY Left 4/11/2017    EXCISION OF CHEST WALL MASS, UPDATE LABS ON ADMIT  performed by Matty Kwan MD at [address] BLEPHAROPLASTY  2-5-13    both eyes    BREAST SURGERY      CARDIAC CATHETERIZATION      COLONOSCOPY  1/1/2010    normal    CYSTOSCOPY W BIOPSY OF BLADDER N/A 6/26/2017    CYSTOSCOPY BLADDER Nitrite, UA 08/17/2018  1:11 PM Unknown   neg      Cystoscopy Procedure Note    Pre-operative Diagnosis: Hematuria    Post-operative Diagnosis: Same plus    Surgeon: Becca Carrington     Assistants: Alexandrea Pulido. MATT Manriquez    Anesthesia: Local anesthesia topical 2% lidocaine gel    Procedure Details   The risks, benefits, complications, treatment options, and expected outcomes were discussed with the patient. The patient concurred with the proposed plan, giving informed consent. Cystoscopy was performed today under local anesthesia, using sterile technique. The patient was placed in the supine position, prepped and draped in the usual sterile fashion. A flexible cystoscope was used to inspect the entire bladder including retroflexion. Findings:  Urethra:normal  Bladder: The posterior bladder wall inflammation has improved since last year. This is localized and there are no other areas of concern and no papillary tumors, stones, clots or FB  Ureteral orifice(s) were normal . Ureteral orifice(s) were in the normal location. Specimens: None                 Complications:  None; patient tolerated the procedure well. Disposition: To home. Condition: stable    Assessment: This is a 75 yo white female with h/o AFib, OAB/MELY on Ditropan 15mg, Papillary cancer of the omentum, HTN, OA, DJD/chronic low back pain, Depression and bladder biopsy last year showing inflammation and improved appearance of posterior BW inflammation. The option of another biopsy vs continued yearly follow-up as discussed and she wants the latter. She understands the unlikely possibility of missing an early bladder malignancy. She will call if gets worsened gross hematuria. Plan:      1. F/U 1 yr for U/A  2.  Bactrim DS x one prior        Becca Carrington MD

## 2018-08-31 ENCOUNTER — HOSPITAL ENCOUNTER (OUTPATIENT)
Dept: PHYSICAL THERAPY | Age: 73
Setting detail: THERAPIES SERIES
Discharge: HOME | End: 2018-08-31
Payer: MEDICARE

## 2018-09-01 DIAGNOSIS — F33.1 MODERATE EPISODE OF RECURRENT MAJOR DEPRESSIVE DISORDER (HCC): ICD-10-CM

## 2018-09-04 RX ORDER — DULOXETIN HYDROCHLORIDE 30 MG/1
30 CAPSULE, DELAYED RELEASE ORAL DAILY
Qty: 90 CAPSULE | Refills: 2 | Status: SHIPPED | OUTPATIENT
Start: 2018-09-04 | End: 2019-02-15 | Stop reason: SDUPTHER

## 2018-09-06 ENCOUNTER — HOSPITAL ENCOUNTER (OUTPATIENT)
Dept: PHYSICAL THERAPY | Age: 73
Setting detail: THERAPIES SERIES
Discharge: HOME | End: 2018-09-06
Payer: MEDICARE

## 2018-09-06 ENCOUNTER — OFFICE VISIT (OUTPATIENT)
Dept: PHYSICAL MEDICINE AND REHAB | Age: 73
End: 2018-09-06
Payer: MEDICARE

## 2018-09-06 VITALS
HEIGHT: 60 IN | SYSTOLIC BLOOD PRESSURE: 120 MMHG | BODY MASS INDEX: 42.21 KG/M2 | WEIGHT: 215 LBS | DIASTOLIC BLOOD PRESSURE: 70 MMHG

## 2018-09-06 DIAGNOSIS — M15.9 GENERALIZED OSTEOARTHRITIS: ICD-10-CM

## 2018-09-06 DIAGNOSIS — Z78.9 IMPAIRED MOBILITY AND ACTIVITIES OF DAILY LIVING: ICD-10-CM

## 2018-09-06 DIAGNOSIS — M79.7 FIBROMYALGIA MUSCLE PAIN: ICD-10-CM

## 2018-09-06 DIAGNOSIS — Z79.899 HIGH RISK MEDICATION USE: ICD-10-CM

## 2018-09-06 DIAGNOSIS — M54.40 BILATERAL LOW BACK PAIN WITH SCIATICA, SCIATICA LATERALITY UNSPECIFIED, UNSPECIFIED CHRONICITY: ICD-10-CM

## 2018-09-06 DIAGNOSIS — Z74.09 IMPAIRED MOBILITY AND ACTIVITIES OF DAILY LIVING: ICD-10-CM

## 2018-09-06 DIAGNOSIS — M75.122 COMPLETE ROTATOR CUFF RUPTURE OF LEFT SHOULDER: ICD-10-CM

## 2018-09-06 PROCEDURE — 3017F COLORECTAL CA SCREEN DOC REV: CPT | Performed by: PHYSICAL MEDICINE & REHABILITATION

## 2018-09-06 PROCEDURE — 4040F PNEUMOC VAC/ADMIN/RCVD: CPT | Performed by: PHYSICAL MEDICINE & REHABILITATION

## 2018-09-06 PROCEDURE — 1090F PRES/ABSN URINE INCON ASSESS: CPT | Performed by: PHYSICAL MEDICINE & REHABILITATION

## 2018-09-06 PROCEDURE — 97163 PT EVAL HIGH COMPLEX 45 MIN: CPT

## 2018-09-06 PROCEDURE — G8399 PT W/DXA RESULTS DOCUMENT: HCPCS | Performed by: PHYSICAL MEDICINE & REHABILITATION

## 2018-09-06 PROCEDURE — G8427 DOCREV CUR MEDS BY ELIG CLIN: HCPCS | Performed by: PHYSICAL MEDICINE & REHABILITATION

## 2018-09-06 PROCEDURE — 1036F TOBACCO NON-USER: CPT | Performed by: PHYSICAL MEDICINE & REHABILITATION

## 2018-09-06 PROCEDURE — 99214 OFFICE O/P EST MOD 30 MIN: CPT | Performed by: PHYSICAL MEDICINE & REHABILITATION

## 2018-09-06 PROCEDURE — 97110 THERAPEUTIC EXERCISES: CPT

## 2018-09-06 PROCEDURE — G8417 CALC BMI ABV UP PARAM F/U: HCPCS | Performed by: PHYSICAL MEDICINE & REHABILITATION

## 2018-09-06 PROCEDURE — 1101F PT FALLS ASSESS-DOCD LE1/YR: CPT | Performed by: PHYSICAL MEDICINE & REHABILITATION

## 2018-09-06 PROCEDURE — G8978 MOBILITY CURRENT STATUS: HCPCS

## 2018-09-06 PROCEDURE — G8979 MOBILITY GOAL STATUS: HCPCS

## 2018-09-06 PROCEDURE — G8599 NO ASA/ANTIPLAT THER USE RNG: HCPCS | Performed by: PHYSICAL MEDICINE & REHABILITATION

## 2018-09-06 PROCEDURE — 1123F ACP DISCUSS/DSCN MKR DOCD: CPT | Performed by: PHYSICAL MEDICINE & REHABILITATION

## 2018-09-06 RX ORDER — HYDROCODONE BITARTRATE AND ACETAMINOPHEN 5; 325 MG/1; MG/1
1 TABLET ORAL EVERY 8 HOURS PRN
Qty: 90 TABLET | Refills: 0 | Status: SHIPPED | OUTPATIENT
Start: 2018-09-06 | End: 2018-11-13 | Stop reason: SDUPTHER

## 2018-09-06 RX ORDER — ATORVASTATIN CALCIUM 20 MG/1
20 TABLET, FILM COATED ORAL NIGHTLY
Qty: 90 TABLET | Refills: 2 | Status: SHIPPED | OUTPATIENT
Start: 2018-09-06 | End: 2019-02-15 | Stop reason: SDUPTHER

## 2018-09-06 RX ORDER — CYCLOBENZAPRINE HCL 10 MG
10 TABLET ORAL 2 TIMES DAILY PRN
Qty: 20 TABLET | Refills: 0 | Status: SHIPPED | OUTPATIENT
Start: 2018-09-06 | End: 2018-09-16

## 2018-09-06 ASSESSMENT — PAIN DESCRIPTION - FREQUENCY: FREQUENCY: INTERMITTENT

## 2018-09-06 ASSESSMENT — ENCOUNTER SYMPTOMS
CONSTIPATION: 0
DIARRHEA: 0
WHEEZING: 0
VOMITING: 0
SHORTNESS OF BREATH: 0
CHEST TIGHTNESS: 1
ABDOMINAL PAIN: 0
EYES NEGATIVE: 1
BACK PAIN: 1
APNEA: 0
ALLERGIC/IMMUNOLOGIC NEGATIVE: 1
NAUSEA: 0

## 2018-09-06 ASSESSMENT — PAIN DESCRIPTION - LOCATION: LOCATION: BACK

## 2018-09-06 ASSESSMENT — PAIN DESCRIPTION - PAIN TYPE: TYPE: CHRONIC PAIN

## 2018-09-06 ASSESSMENT — PAIN SCALES - GENERAL: PAINLEVEL_OUTOF10: 5

## 2018-09-06 ASSESSMENT — PAIN DESCRIPTION - DESCRIPTORS: DESCRIPTORS: ACHING

## 2018-09-06 NOTE — PROGRESS NOTES
[address]ree[address] alicea, [address]     Ph: [phone]  Fax: [phone]    [x] Certification  [] Recertification [x]  Plan of Care  [] Progress Note [] Discharge      To:  Referring Practitioner: Alina Figueroa MD      From:  Bogdan Renner, PT, DPT  Patient: Lorena Cerna     : 1945  Diagnosis: Other malaise; fibromyalgia; low back pain     Date: 2018  Treatment Diagnosis: neck pain, LBP, B LE weakness, core weakness, impaired functional activity tolerance, impaired cardiopulmonary endurance, impaired balance with history of recent falls    Plan of Care/Certification Expiration Date: 18  Progress Report Period from:  2018  to 2018    Total # of Visits to Date: 1   No Show: 0    Canceled Appointment: 0     OBJECTIVE: Long Term Goals - Time Frame for Long term goals : 5 weeks  Goals Current/ Discharge status Met   Long term goal 1: Pt will be indep and compliant with HEP to manage symtoms. In progress [] yes  [] no   Long term goal 2: Pt will increase lumbar ROM to >75% of normal limits facilitate improved ability to perform ADLs 25% in all planes [] yes  [] no   Long term goal 3: Pt will increase B knee and hip strength to >/= 4+/5 improve functional mobility Strength RLE  Comment: hip flex, ext , abd 4-/5, hip add 4/5knee flex 3+/5, ext 4/5  Strength LLE  Comment: hip flex, ext , abd 4/5, hip add 4/5knee flex 3+/5, ext 4/5       [] yes  [] no   Long term goal 4: Pt will improve KACIE to </= 17/50 demonstrate improved functional activity tolerance 24/50 [] yes  [] no   Long term goal 5: Pt will perform 5 xSTS  (modified with use of hands) <15.0 seconds to demonstrate decreased risk for falls based on age related norms.  18.6 sec [] yes  [] no        Body structures, Functions, Activity limitations: Decreased functional mobility , Decreased ROM, Decreased strength, Decreased balance  Assessment: Pt presets with visits:          Signature: Electronically signed by Rob Beltrán PT, DPT on 9/6/18 at 10:34 AM      If you have any questions or concerns, please don't hesitate to call. Thank you for your referral.    I have reviewed this plan of care and certify a need for medically necessary rehabilitation services.     Physician Signature:__________________________________________________________  Date:  Please sign and return

## 2018-09-06 NOTE — PROGRESS NOTES
[address]  PHYSICAL THERAPY EVALUATION    Date: 2018  Patient Name: Jamee Angelo       MRN: [id number]   Account: [de-identified]   : 1945  (74 y.o.)   Gender: female   Referring Practitioner: Karine Harmon MD                 Diagnosis: Other malaise; fibromyalgia; low back pain  Treatment Diagnosis: neck pain, LBP, B LE weakness, core weakness, impaired functional activity tolerance, impaired cardiopulmonary endurance, impaired balance with history of recent falls  Additional Pertinent Hx: BKA        Past Medical History:  has a past medical history of Arthritis; Blood transfusion; Cancer ([facility] Charcot's joint; Chest pain; Colitis; DDD (degenerative disc disease), lumbar; Depression; Disorder of peripheral nervous system; Dyspnea; Family history of coronary artery disease; History of blood transfusion; Hx of right BKA ([facility] Hyperlipidemia; Hypertension; Hypothyroid; Lobular breast cancer [facility]); NSTEMI (non-ST elevated myocardial infarction) ([facility] Obesity; Paroxysmal atrial fibrillation ([facility] and S/P BKA (below knee amputation) ([facility] Past Surgical History:   has a past surgical history that includes Hysterectomy; Appendectomy; back surgery; Breast surgery; Leg amputation below knee (Right, 11); Spine surgery; Blepharoplasty (13); skin biopsy; Cardiac catheterization; Mastectomy (2015); pr removal of breast lesion (Left, 2017); Abdomen surgery (Left, 2017); eye surgery (Bilateral, ); Colonoscopy (2010); and cystoscopy w biopsy of bladder (N/A, 2017). Vital Signs  Patient Currently in Pain: Yes   Pain Screening  Patient Currently in Pain: Yes  Pain Assessment  Pain Assessment: 0-10  Pain Level: 5 (Worst 7/10, best 1/10)  Pain Type: Chronic pain  Pain Location: Back  Pain Descriptors: Aching  Pain Frequency: Intermittent  Pain Intervention(s): Medication (see eMar); Shower; Rest                Lives With: Alone  Type of Home: falls. The above limitations, restiction, and decreased functional activity tolerance negatively impact her QOL. Pt would benefit from physical therapy to address above deficits, decrease pain, restore function, address pt's goals, and improve QOL. Specific instructions for Next Treatment: use chair lift to enter and exit pool  Prognosis: Good  Discharge Recommendations: Continue to assess pending progress  Activity Tolerance: Patient Tolerated treatment well     Decision Making: High Complexity  History: high  Exam: high  Clinical Presentation: high, high risk for falls and high medical complexity      Plan  Frequency/Duration:  Plan  Times per week: 2  Times per day: Daily  Plan weeks: 5  Specific instructions for Next Treatment: use chair lift to enter and exit pool  Current Treatment Recommendations: Strengthening, ROM, Balance Training, Neuromuscular Re-education, Home Exercise Program, Safety Education & Training, Patient/Caregiver Education & Training, Transfer Training, Gait Training, Endurance Training, Manual Therapy - Soft Tissue Mobilization, Manual Therapy - Joint Manipulation, Modalities, Aquatics, Functional Mobility Training  Plan Comment: 1:1 pool:land      G-Codes  PT G-Codes  Functional Assessment Tool Used: clinical judgement  Score: KACIE 24/50; 48% disability  Functional Limitation: Mobility: Walking and moving around  Mobility: Walking and Moving Around Current Status (): At least 40 percent but less than 60 percent impaired, limited or restricted  Mobility: Walking and Moving Around Goal Status (): At least 20 percent but less than 40 percent impaired, limited or restricted    Patient Education  New Education Provided: PT POC, benefits of land and aquatic physical therapy, TA stengthening    POST-PAIN     Pain Rating (0-10 pain scale):  3 /10  Location and pain description same as pre-treatment unless indicated.    Action: [] NA  [] Call Physician  [x] Perform HEP  [x] Meds as

## 2018-09-06 NOTE — PROGRESS NOTES
Subjective  Suyapa Ponce, 74 y.o. female presents today with:       Back Pain TP injection 7/11/18 % pain reduction X 1 month. Patient would like to schedule more injections today. Patient started PT this morning . Will be in pool x weekly and on land 1 x weekly. Hip Pain right    Leg Pain right    Shoulder Pain bilat    Wrist Pain bilat Right worse then left. Swollen today. Medication Refill norco pill count today- compliant        She is doing well on her current dose of medication she does not exceeded or show any signs of overuse or abuse. She is able to be more functional with friends family and had better emotional and physical quality of life with her medications as opposed to without them. has flare up neck pain needs refill of Fleeril which helps without side-effects. Back Pain   This is a chronic problem. The current episode started more than 1 year ago. The problem occurs constantly. The problem is unchanged. The pain is present in the lumbar spine. The quality of the pain is described as aching. Radiates to: Bilateral legs and hips. The pain is at a severity of 5/10. The pain is moderate. The pain is the same all the time. The symptoms are aggravated by standing, bending and position (Walking ). Associated symptoms include headaches, leg pain, numbness and weakness. Pertinent negatives include no abdominal pain, chest pain, paresis, paresthesias or perianal numbness. Risk factors include history of cancer, lack of exercise, menopause, obesity, sedentary lifestyle, poor posture and recent trauma. She has tried ice, heat, muscle relaxant, NSAIDs and home exercises (Inj, Ultracet, Norco) for the symptoms. The treatment provided moderate relief. Hip Pain    The incident occurred more than 1 week ago. The incident occurred at home. There was no injury mechanism. The pain is present in the left leg and right hip (lt is worse than right).  The quality of the pain is described as aching. The pain is at a severity of 8/10. The pain is severe. The pain has been fluctuating since onset. Associated symptoms include numbness. She reports no foreign bodies present. The symptoms are aggravated by movement. She has tried NSAIDs for the symptoms. The treatment provided mild relief.        Past Medical History:   Diagnosis Date    Arthritis     Blood transfusion     Cancer [facility])     GALLBLADDER 2009, 2011 OMENTUM    Charcot's joint     left foot    Chest pain 7/2/2015    Colitis     DDD (degenerative disc disease), lumbar 2/12/2013    Depression     Disorder of peripheral nervous system 9/1/2010    Dyspnea 7/2/2015    Family history of coronary artery disease 8/29/2014    History of blood transfusion 2011    following chemotherapy    [facility]     Hyperlipidemia     Hypertension     Hypothyroid 6/12/2015    Lobular breast cancer ([facility] 10/2015    NSTEMI (non-ST elevated myocardial infarction) ([facility] 8/29/2014    Obesity     Paroxysmal atrial fibrillation ([facility] 8/29/2014    S/P BKA (below knee amputation) ([facility]      Past Surgical History:   Procedure Laterality Date    ABDOMEN SURGERY Left 4/11/2017    EXCISION OF CHEST WALL MASS, UPDATE LABS ON ADMIT  performed by Caryn Tran MD at [address] BLEPHAROPLASTY  2-5-13    both eyes    BREAST SURGERY      CARDIAC CATHETERIZATION      COLONOSCOPY  1/1/2010    normal    CYSTOSCOPY W BIOPSY OF BLADDER N/A 6/26/2017    CYSTOSCOPY BLADDER BIOPSY AND FULGURATION performed by Spencer Carballo MD at [address] Bilateral 2012    cataract removal with IOL implants    HYSTERECTOMY      LEG AMPUTATION BELOW KNEE Right 2/1/11    DR Juanjose Elizabeth due to CHARCOTS    MASTECTOMY  11/16/2015    Bilateral simple mastectomies with right SNB by DR Any Downing    MI REMOVAL OF BREAST LESION Left 1/24/2017    CORE NEEDLE BIOPSY OF  LEFT BREAST MASS performed by Caryn Tran MD at [facility] Babinski's sign on the right side. She displays no Babinski's sign on the left side. Right BKA   Skin: Skin is warm, dry and intact. No abrasion, no bruising, no ecchymosis, no laceration, no petechiae and no rash noted. Rash is not macular, not pustular and not urticarial. She is not diaphoretic. No cyanosis or erythema. No pallor. Nails show no clubbing. Psychiatric: Her behavior is normal. Judgment and thought content normal. Her mood appears not anxious. Her affect is not angry, not blunt, not labile and not inappropriate. Her speech is not rapid and/or pressured, not delayed, not tangential and not slurred. She is not agitated, not aggressive, not hyperactive, not slowed, not withdrawn, not actively hallucinating and not combative. Thought content is not paranoid and not delusional. Cognition and memory are normal. Cognition and memory are not impaired. She does not express impulsivity or inappropriate judgment. She does not exhibit a depressed mood. She expresses no homicidal and no suicidal ideation. She expresses no suicidal plans and no homicidal plans. She is communicative. She exhibits normal recent memory and normal remote memory. She is attentive. Vitals reviewed. Ortho Exam  Neurologic Exam     Mental Status   Oriented to person, place, and time. Speech: not slurred     Cranial Nerves     CN III, IV, VI   Pupils are equal, round, and reactive to light. Extraocular motions are normal.       After a thorough review of the previous medical records, patient comprehensive medical, surgical, and family and social history, their OARRS, recent diagnostics, and symptomatic results to previous treatment, it is my impression that the patients is suffering with progressive and severe:     Diagnosis Orders   1.  Bilateral low back pain with sciatica, sciatica laterality unspecified, unspecified chronicity  HYDROcodone-acetaminophen (NORCO) 5-325 MG per tablet    MI ARTHROCENTESIS ASPIR&/INJ MAJOR atorvastatin, gabapentin, diclofenac, potassium chloride, levothyroxine, carvedilol, furosemide, buPROPion, and DULoxetine. I also recommend the following Medications:    Orders Placed This Encounter   Medications    HYDROcodone-acetaminophen (NORCO) 5-325 MG per tablet     Sig: Take 1 tablet by mouth every 8 hours as needed for Pain (Max of 3 per day) for up to 30 days. Mary Jo Sams Date: 9/6/18     Dispense:  90 tablet     Refill:  0    cyclobenzaprine (FLEXERIL) 10 MG tablet     Sig: Take 1 tablet by mouth 2 times daily as needed for Muscle spasms     Dispense:  20 tablet     Refill:  0        -which helps with pain and function. Otherwise, continue the current pain medications that I have prescibed. Radiologic:   Old films reviewed  ,     I discussed results with patients. see Follow up plans below  For any new studies. Care Everywhere Updates:  requested and reviewed. No new issues noted.            Labs:  Previous labs reviewed     Lab Results   Component Value Date     06/07/2018    K 4.5 06/07/2018     06/07/2018    CO2 23 06/07/2018    BUN 21 06/07/2018    CREATININE 0.94 06/07/2018    CALCIUM 9.2 06/07/2018    LABALBU 3.8 06/07/2018    LABALBU 4.3 04/06/2012    BILITOT 0.6 06/07/2018    ALKPHOS 92 06/07/2018    AST 23 06/07/2018    ALT 11 06/07/2018     Lab Results   Component Value Date    WBC 5.2 04/03/2018    RBC 4.45 04/03/2018    RBC 4.16 11/04/2011    HGB 13.5 04/03/2018    HCT 40.9 04/03/2018    MCV 91.8 04/03/2018    MCH 30.2 04/03/2018    MCHC 32.9 04/03/2018    RDW 14.7 04/03/2018     04/03/2018    MPV 8.4 08/03/2015       Lab Results   Component Value Date    LABAMPH Neg 04/03/2018    BARBSCNU Neg 04/03/2018    LABBENZ Neg 04/03/2018    CANSU Neg 04/03/2018    COCAIMETSCRU Neg 04/03/2018    PHENCYCLIDINESCREENURINE Neg 04/03/2018    DSCOMMENT see below 04/03/2018       No results found for: CODEINE, MORPHINE, ACETYLMORPHI, OXYCODONE, NOROXYCODONE, NOROXYMU, HYDRCO, NORHYDU, HYDROMO, BUPREN, NORBUPRNOR, FENTA, NORFENT, MEPERIDINE, TAPENU, TAPOSULFUR, METHADONE, LABPROP, TRAM, AMPH, METHAMP, MDMA, ECMDA    No results found for: METPHEN, PHENTERMINE, BENZOYL, ALPRAZ, ALPHAOHALPRA, CLONAZEPAM, 7AMINOCLONAZ, DIAZEP, MASOOD, OXAZ, TEMAZ, LORAZEPAM, MIDAZOLAM, ZOLPIDEM, JAIME, ETG, MARIJMET, PCP, PAINMGTDRUGP, EERPAINMGTPA, LABCREA      , I discussed results with patient. See follow-up plans for new studies. Therapies:  HEP-gentle stretching and relaxation techniques-demonstrated with patient-they are to do them twice a day. They are also advised to make the following lifestyle changes:  Goals      Exercise 3x per week (30 min per time)      SOAPP-R GOAL LESS THAN 9            11/10/16 SCORE: 16-MODERATE RISK   01/11/17 score: 8-low risk   03/08/17 score: 13-moderate risk   05/11/17 score: 9-low risk  07/13/17 score: 12-moderate risk  09/13/17 score: 12-moderate risk  03/27/18 score: 12-moderate risk  6/14/18 score: 6-low risk       Weight < 200 lb (90.719 kg)           Injections or Epidurals:  Injection options were discussed. Patient gave verbal consent to ordered injections. See follow-up plans for planned injections. Supplements:  Vitamin D,   Education was given on:   Dietary and Fitness             Follow up with Primary Care Physician regarding their general medical needs. They are to follow up in 2 months to review medication, efficacy of injections, pill counts, OARRS check, SOAPPR assessment, review diagnostics, to review previous and future treatment plans and assess appropriateness for continued therapy.         New Diagnostics  Orders Placed This Encounter   Procedures    [place] INJECT TRIGGER POINTS, 3 OR GREATER    [place] INJECT TRIGGER POINTS, 3 OR GREATER    [place] ARTHROCENTESIS ASPIR&/INJ MAJOR JT/BURSA W/O US Karyn Silva DO

## 2018-09-10 ENCOUNTER — HOSPITAL ENCOUNTER (OUTPATIENT)
Dept: PHYSICAL THERAPY | Age: 73
Setting detail: THERAPIES SERIES
Discharge: HOME | End: 2018-09-10
Payer: MEDICARE

## 2018-09-14 ENCOUNTER — HOSPITAL ENCOUNTER (OUTPATIENT)
Dept: PHYSICAL THERAPY | Age: 73
Setting detail: THERAPIES SERIES
Discharge: HOME | End: 2018-09-14
Payer: MEDICARE

## 2018-09-17 ENCOUNTER — HOSPITAL ENCOUNTER (OUTPATIENT)
Dept: PHYSICAL THERAPY | Age: 73
Setting detail: THERAPIES SERIES
Discharge: HOME | End: 2018-09-17
Payer: MEDICARE

## 2018-09-21 ENCOUNTER — HOSPITAL ENCOUNTER (OUTPATIENT)
Dept: PHYSICAL THERAPY | Age: 73
Setting detail: THERAPIES SERIES
End: 2018-09-21
Payer: MEDICARE

## 2018-09-24 ENCOUNTER — APPOINTMENT (OUTPATIENT)
Dept: PHYSICAL THERAPY | Age: 73
End: 2018-09-24
Payer: MEDICARE

## 2018-09-25 ENCOUNTER — PROCEDURE VISIT (OUTPATIENT)
Dept: PHYSICAL MEDICINE AND REHAB | Age: 73
End: 2018-09-25
Payer: MEDICARE

## 2018-09-25 DIAGNOSIS — M54.40 BILATERAL LOW BACK PAIN WITH SCIATICA, SCIATICA LATERALITY UNSPECIFIED, UNSPECIFIED CHRONICITY: ICD-10-CM

## 2018-09-25 DIAGNOSIS — M79.10 MYALGIA: ICD-10-CM

## 2018-09-25 DIAGNOSIS — M79.7 FIBROMYALGIA: Primary | ICD-10-CM

## 2018-09-25 PROCEDURE — 20553 NJX 1/MLT TRIGGER POINTS 3/>: CPT | Performed by: PHYSICAL MEDICINE & REHABILITATION

## 2018-09-25 RX ORDER — LIDOCAINE HYDROCHLORIDE 5 MG/ML
30 INJECTION, SOLUTION INFILTRATION; INTRAVENOUS ONCE
Status: COMPLETED | OUTPATIENT
Start: 2018-09-25 | End: 2018-09-25

## 2018-09-25 RX ADMIN — LIDOCAINE HYDROCHLORIDE 30 ML: 5 INJECTION, SOLUTION INFILTRATION; INTRAVENOUS at 15:01

## 2018-09-28 ENCOUNTER — APPOINTMENT (OUTPATIENT)
Dept: PHYSICAL THERAPY | Age: 73
End: 2018-09-28
Payer: MEDICARE

## 2018-10-12 ENCOUNTER — HOSPITAL ENCOUNTER (OUTPATIENT)
Dept: PHYSICAL THERAPY | Age: 73
Setting detail: THERAPIES SERIES
Discharge: HOME | End: 2018-10-12
Payer: MEDICARE

## 2018-10-31 NOTE — PROGRESS NOTES
[address]                                  Ph: [phone]  Fax: [phone]     [] Certification  [] Recertification  []  Plan of Care  [] Progress Note [x] Discharge                            To:  Referring Practitioner: Bere Black MD             From:  Marissa Spence, PT, DPT  Patient: Eduard Gore     : 1945  Diagnosis: Other malaise; fibromyalgia; low back pain     Date 10/31/2018  Treatment Diagnosis: neck pain, LBP, B LE weakness, core weakness, impaired functional activity tolerance, impaired cardiopulmonary endurance, impaired balance with history of recent falls     Plan of Care/Certification Expiration Date: 18  Progress Report Period from:  2018  to 2018     Total # of Visits to Date: 1   No Show: 0    Canceled Appointment: 3     OBJECTIVE: Long Term Goals - Time Frame for Long term goals : 5 weeks  Goals Current/ Discharge status Met   Long term goal 1: Pt will be indep and compliant with HEP to manage symtoms. NT d/t unexpected D/C [] yes  [x] no   Long term goal 2: Pt will increase lumbar ROM to >75% of normal limits facilitate improved ability to perform ADLs NT d/t unexpected D/C [] yes  [x] no   Long term goal 3: Pt will increase B knee and hip strength to >/= 4+/5 improve functional mobility NT d/t unexpected D/C [] yes  [x] no   Long term goal 4: Pt will improve KACIE to </= 17/50 demonstrate improved functional activity tolerance NT d/t unexpected D/C [] yes  [x] no   Long term goal 5: Pt will perform 5 xSTS  (modified with use of hands) <15.0 seconds to demonstrate decreased risk for falls based on age related norms. NT d/t unexpected D/C [] yes  [x] no        Body structures, Functions, Activity limitations: Decreased functional mobility , Decreased ROM, Decreased strength, Decreased balance  Assessment: Pt discharged from PT POC d/t attendance policy.  Pt seen

## 2018-11-01 DIAGNOSIS — M48.02 CERVICAL STENOSIS OF SPINAL CANAL: ICD-10-CM

## 2018-11-01 DIAGNOSIS — M43.10 SPONDYLOLISTHESIS, UNSPECIFIED SPINAL REGION: ICD-10-CM

## 2018-11-01 RX ORDER — GABAPENTIN 600 MG/1
TABLET ORAL
Qty: 450 TABLET | Refills: 1 | Status: SHIPPED | OUTPATIENT
Start: 2018-11-01 | End: 2019-02-15 | Stop reason: SDUPTHER

## 2018-11-13 ENCOUNTER — OFFICE VISIT (OUTPATIENT)
Dept: FAMILY MEDICINE CLINIC | Age: 73
End: 2018-11-13
Payer: MEDICARE

## 2018-11-13 ENCOUNTER — OFFICE VISIT (OUTPATIENT)
Dept: PHYSICAL MEDICINE AND REHAB | Age: 73
End: 2018-11-13
Payer: MEDICARE

## 2018-11-13 VITALS
BODY MASS INDEX: 45.55 KG/M2 | HEART RATE: 68 BPM | HEIGHT: 60 IN | DIASTOLIC BLOOD PRESSURE: 80 MMHG | OXYGEN SATURATION: 97 % | SYSTOLIC BLOOD PRESSURE: 126 MMHG | WEIGHT: 232 LBS

## 2018-11-13 VITALS
BODY MASS INDEX: 45.75 KG/M2 | WEIGHT: 233 LBS | SYSTOLIC BLOOD PRESSURE: 122 MMHG | HEIGHT: 60 IN | DIASTOLIC BLOOD PRESSURE: 80 MMHG

## 2018-11-13 DIAGNOSIS — R42 VERTIGO: ICD-10-CM

## 2018-11-13 DIAGNOSIS — M15.9 GENERALIZED OSTEOARTHRITIS: ICD-10-CM

## 2018-11-13 DIAGNOSIS — Z74.09 IMPAIRED MOBILITY AND ACTIVITIES OF DAILY LIVING: ICD-10-CM

## 2018-11-13 DIAGNOSIS — M54.40 BILATERAL LOW BACK PAIN WITH SCIATICA, SCIATICA LATERALITY UNSPECIFIED, UNSPECIFIED CHRONICITY: ICD-10-CM

## 2018-11-13 DIAGNOSIS — Z79.899 HIGH RISK MEDICATION USE: ICD-10-CM

## 2018-11-13 DIAGNOSIS — Z23 NEEDS FLU SHOT: ICD-10-CM

## 2018-11-13 DIAGNOSIS — Z78.9 IMPAIRED MOBILITY AND ACTIVITIES OF DAILY LIVING: ICD-10-CM

## 2018-11-13 DIAGNOSIS — H93.19 TINNITUS, UNSPECIFIED LATERALITY: ICD-10-CM

## 2018-11-13 DIAGNOSIS — H93.8X1 EAR FULLNESS, RIGHT: ICD-10-CM

## 2018-11-13 DIAGNOSIS — M75.122 COMPLETE ROTATOR CUFF RUPTURE OF LEFT SHOULDER: ICD-10-CM

## 2018-11-13 DIAGNOSIS — F33.1 MODERATE EPISODE OF RECURRENT MAJOR DEPRESSIVE DISORDER (HCC): Primary | ICD-10-CM

## 2018-11-13 DIAGNOSIS — M79.7 FIBROMYALGIA MUSCLE PAIN: ICD-10-CM

## 2018-11-13 PROCEDURE — 1123F ACP DISCUSS/DSCN MKR DOCD: CPT | Performed by: FAMILY MEDICINE

## 2018-11-13 PROCEDURE — G8417 CALC BMI ABV UP PARAM F/U: HCPCS | Performed by: FAMILY MEDICINE

## 2018-11-13 PROCEDURE — G8399 PT W/DXA RESULTS DOCUMENT: HCPCS | Performed by: PHYSICAL MEDICINE & REHABILITATION

## 2018-11-13 PROCEDURE — G8482 FLU IMMUNIZE ORDER/ADMIN: HCPCS | Performed by: FAMILY MEDICINE

## 2018-11-13 PROCEDURE — 90662 IIV NO PRSV INCREASED AG IM: CPT | Performed by: FAMILY MEDICINE

## 2018-11-13 PROCEDURE — 1123F ACP DISCUSS/DSCN MKR DOCD: CPT | Performed by: PHYSICAL MEDICINE & REHABILITATION

## 2018-11-13 PROCEDURE — 99214 OFFICE O/P EST MOD 30 MIN: CPT | Performed by: PHYSICAL MEDICINE & REHABILITATION

## 2018-11-13 PROCEDURE — G8427 DOCREV CUR MEDS BY ELIG CLIN: HCPCS | Performed by: FAMILY MEDICINE

## 2018-11-13 PROCEDURE — G8599 NO ASA/ANTIPLAT THER USE RNG: HCPCS | Performed by: PHYSICAL MEDICINE & REHABILITATION

## 2018-11-13 PROCEDURE — 1101F PT FALLS ASSESS-DOCD LE1/YR: CPT | Performed by: PHYSICAL MEDICINE & REHABILITATION

## 2018-11-13 PROCEDURE — 1090F PRES/ABSN URINE INCON ASSESS: CPT | Performed by: PHYSICAL MEDICINE & REHABILITATION

## 2018-11-13 PROCEDURE — 4040F PNEUMOC VAC/ADMIN/RCVD: CPT | Performed by: PHYSICAL MEDICINE & REHABILITATION

## 2018-11-13 PROCEDURE — G8427 DOCREV CUR MEDS BY ELIG CLIN: HCPCS | Performed by: PHYSICAL MEDICINE & REHABILITATION

## 2018-11-13 PROCEDURE — G8417 CALC BMI ABV UP PARAM F/U: HCPCS | Performed by: PHYSICAL MEDICINE & REHABILITATION

## 2018-11-13 PROCEDURE — 3017F COLORECTAL CA SCREEN DOC REV: CPT | Performed by: PHYSICAL MEDICINE & REHABILITATION

## 2018-11-13 PROCEDURE — 1101F PT FALLS ASSESS-DOCD LE1/YR: CPT | Performed by: FAMILY MEDICINE

## 2018-11-13 PROCEDURE — 1090F PRES/ABSN URINE INCON ASSESS: CPT | Performed by: FAMILY MEDICINE

## 2018-11-13 PROCEDURE — 4040F PNEUMOC VAC/ADMIN/RCVD: CPT | Performed by: FAMILY MEDICINE

## 2018-11-13 PROCEDURE — G0008 ADMIN INFLUENZA VIRUS VAC: HCPCS | Performed by: FAMILY MEDICINE

## 2018-11-13 PROCEDURE — 1036F TOBACCO NON-USER: CPT | Performed by: FAMILY MEDICINE

## 2018-11-13 PROCEDURE — 3017F COLORECTAL CA SCREEN DOC REV: CPT | Performed by: FAMILY MEDICINE

## 2018-11-13 PROCEDURE — 1036F TOBACCO NON-USER: CPT | Performed by: PHYSICAL MEDICINE & REHABILITATION

## 2018-11-13 PROCEDURE — 99213 OFFICE O/P EST LOW 20 MIN: CPT | Performed by: FAMILY MEDICINE

## 2018-11-13 PROCEDURE — G8482 FLU IMMUNIZE ORDER/ADMIN: HCPCS | Performed by: PHYSICAL MEDICINE & REHABILITATION

## 2018-11-13 PROCEDURE — G8399 PT W/DXA RESULTS DOCUMENT: HCPCS | Performed by: FAMILY MEDICINE

## 2018-11-13 PROCEDURE — G8599 NO ASA/ANTIPLAT THER USE RNG: HCPCS | Performed by: FAMILY MEDICINE

## 2018-11-13 RX ORDER — MECLIZINE HYDROCHLORIDE 25 MG/1
25 TABLET ORAL 3 TIMES DAILY PRN
Qty: 30 TABLET | Refills: 0 | Status: SHIPPED | OUTPATIENT
Start: 2018-11-13 | End: 2018-11-23

## 2018-11-13 RX ORDER — HYDROCODONE BITARTRATE AND ACETAMINOPHEN 5; 325 MG/1; MG/1
1 TABLET ORAL EVERY 8 HOURS PRN
Qty: 90 TABLET | Refills: 0 | Status: SHIPPED | OUTPATIENT
Start: 2018-11-13 | End: 2019-02-22 | Stop reason: SDUPTHER

## 2018-11-13 ASSESSMENT — ENCOUNTER SYMPTOMS
DIARRHEA: 0
BACK PAIN: 1
APNEA: 0
CHEST TIGHTNESS: 1
VOMITING: 0
EYES NEGATIVE: 1
NAUSEA: 0
WHEEZING: 0
ALLERGIC/IMMUNOLOGIC NEGATIVE: 1
CONSTIPATION: 0
SHORTNESS OF BREATH: 0
ABDOMINAL PAIN: 0

## 2018-11-13 NOTE — PROGRESS NOTES
Vaccine Information Sheet, \"Influenza - Inactivated\"  given to Erasmo Choi, or parent/legal guardian of  Erasmo Choi and verbalized understanding. Patient responses:    Have you ever had a reaction to a flu vaccine? No  Are you able to eat eggs without adverse effects? Yes  Do you have any current illness? No  Have you ever had Guillian [place] Syndrome? No    Flu vaccine given per order. Please see immunization tab.

## 2018-11-13 NOTE — PROGRESS NOTES
Chief Complaint   Patient presents with    Dizziness     having a lot more issues with vertigo     Ear Fullness     right ear, feels like there is water in it all the time        HPI:  Eduard Gore is a 74 y.o. female    \"3 month checkup\"  Back hurting  3year old great-grandson at her house for last few days    Also is hypothyroid  Dose has been same for some time    Vertigo with position change      Patient Active Problem List   Diagnosis    Back pain OARRS RUN 11/9/16    Neck pain on right side    Obesity    High risk medication use - 01/30/18 OARRS PM&R, 03/26/18 OARRS PM&R, 04/03/18 Urine Drug Screen positive opiates PM&R, 01/31/18 Med Contract PM&R    Impaired mobility and activities of daily living    Hx of right B[address] ([address]    Family history of coronary artery disease    NSTEMI (non-ST elevated myocardial infarction) ([address]    Arthritis, neuropathic    Charcot's joint of foot    Closed dislocation of ankle    Closed dislocation of foot    Complete rotator cuff rupture of left shoulder    Clinical depression    Difficulty walking    Benign essential HTN    Acquired flat foot    Closed fracture of tarsal and metatarsal bones    Myogenic ptosis    Disorder of peripheral nervous system    Arthritis of ankle or foot, degenerative    HLD (hyperlipidemia)    Cervical post-laminectomy syndrome    Failed back syndrome of lumbar spine    Low back pain with sciatica    Fibromyalgia muscle pain    Malaise and fatigue    Melancholia    Complete tear of left rotator cuff    Generalized osteoarthritis    Peripheral neuropathy    Osteoporosis    Postlaminectomy syndrome of cervical region    Postlaminectomy syndrome of lumbar region    Tibialis tendinitis    Hereditary and idiopathic peripheral neuropathy ([address]    Hypothyroid    Lobular breast cancer ([address]    Traumatic amputation of one lower extremity below knee with complication ([address]    Acquired hallux valgus    Fatigue due to treatment    Anemia    [facility]    Cervical spinal cord injury ([facility]    Complete traumatic amputation at level between right knee and ankle ([facility]    Morbid obesity due to excess calories ([facility])    Reactive depression    Sleeping excessive    Chronic low back pain    Left breast mass    Other specified postprocedural states    Primary osteoarthritis involving multiple joints    Hematuria    Hematuria, gross    Noncompliance - No Show inject 07/17/17, No Show F/U 1/11/18    Chronic low back pain with sciatica    Moderate episode of recurrent major depressive disorder ([facility]    Charcot ankle, left    Below knee amputation status, right (HCC)    Chronic pain syndrome    Racing heart beat       Current Outpatient Prescriptions   Medication Sig Dispense Refill    meclizine (ANTIVERT) 25 MG tablet Take 1 tablet by mouth 3 times daily as needed for Dizziness 30 tablet 0    gabapentin (NEURONTIN) 600 MG tablet TAKE 1 TABLET BY MOUTH IN  THE MORNING, 2 TABS IN THE  AFTERNOON, AND 2 TABS IN  THE EVENING AS TOLERATED 450 tablet 1    atorvastatin (LIPITOR) 20 MG tablet TAKE 1 TABLET BY MOUTH  NIGHTLY 90 tablet 2    DULoxetine (CYMBALTA) 30 MG extended release capsule TAKE 1 CAPSULE BY MOUTH  DAILY 90 capsule 2    buPROPion (WELLBUTRIN XL) 150 MG extended release tablet TAKE 2 TABLET BY MOUTH  EVERY MORNING 180 tablet 1    furosemide (LASIX) 20 MG tablet Take 20 mg by mouth as needed (edema)      carvedilol (COREG) 25 MG tablet TAKE 1 TABLET BY MOUTH TWO  TIMES DAILY 180 tablet 2    diclofenac (VOLTAREN) 50 MG EC tablet TAKE 1 TABLET BY MOUTH TWO  TIMES DAILY 180 tablet 2    potassium chloride (KLOR-CON M) 10 MEQ extended release tablet TAKE 1 TABLET BY MOUTH TWO  TIMES DAILY 180 tablet 2    levothyroxine (SYNTHROID) 50 MCG tablet TAKE 1 TABLET BY MOUTH  DAILY 90 tablet 2    b complex vitamins capsule Take 1 capsule by mouth daily      Multiple Vitamins-Minerals (CENTRUM SILVER 50+WOMEN PO) Take by mouth      tamoxifen (NOLVADEX) 20 MG tablet Take 1 tablet by mouth daily      acyclovir (ZOVIRAX) 5 % CREA Apply topically as needed       Vitamin D (CHOLECALCIFEROL) 1000 UNITS CAPS capsule Take 1,000 Units by mouth daily. No current facility-administered medications for this visit. Patient's medications, allergies, past medical, surgical, social and family histories were reviewed and updated as appropriate. Review of Systems:   General ROS: fatigue, worse lately, but is ongoing  Respiratory ROS: no cough, shortness of breath, or wheezing  Cardiovascular ROS: per HPI  Gastrointestinal ROS: no change in bowel habits, or black or bloody stools      In general patient otherwise reports feeling well. Physical Exam:  /80 (Site: Right Upper Arm)   Pulse 68   Ht 5' (1.524 m)   Wt 232 lb (105.2 kg)   SpO2 97%   Breastfeeding? No   BMI 45.31 kg/m²     Gen: Well, NAD, Alert, Oriented x 3   HEENT: EOMI, eyes clear, MMM,   Skin: no rash or jaundice  Neuro: no current vertigo in office  Psych: euthymic       Lab Results   Component Value Date    WBC 5.2 04/03/2018    HGB 13.5 04/03/2018    HCT 40.9 04/03/2018     04/03/2018    CHOL 148 08/03/2015    TRIG 186 08/03/2015    HDL 50 08/03/2015    ALT 11 06/07/2018    AST 23 06/07/2018     06/07/2018    K 4.5 06/07/2018     06/07/2018    CREATININE 0.94 (H) 06/07/2018    BUN 21 06/07/2018    CO2 23 06/07/2018    TSH 1.070 04/03/2018    INR 1.0 08/03/2015    LABA1C 5.3 12/19/2013         A&P   Diagnosis Orders   1. Moderate episode of recurrent major depressive disorder (HCC)     2. Below knee amputation status, right (HCC)     3. Ear fullness, right     4. Vertigo  meclizine (ANTIVERT) 25 MG tablet   5. Tinnitus, unspecified laterality     6.  Needs flu shot  INFLUENZA, HIGH DOSE, 65 YRS +, IM, PF, PREFILL SYR, 0.5ML (FLUZONE HD)       Ongoing follow up with urology, pain mgmt    Flu shot    See pt instructions about vertigo

## 2018-11-13 NOTE — PROGRESS NOTES
status:      Spouse name: N/A    Number of children: N/A    Years of education: N/A     Occupational History    retired      Social History Main Topics    Smoking status: Never Smoker    Smokeless tobacco: Never Used    Alcohol use Yes      Comment: social    Drug use: No    Sexual activity: No     Other Topics Concern    None     Social History Narrative    None     Family History   Problem Relation Age of Onset    Heart Disease Mother     Arthritis Mother     Heart Disease Father     Arthritis Father     Cancer Sister         Colon    Thyroid Disease Son     Other Son         Down's syndrome       Allergies   Allergen Reactions    Ciprofloxacin Itching    Oxycontin [Oxycodone Hcl] Itching       Review of Systems   Constitutional: Positive for activity change and fatigue. Eyes: Negative. Respiratory: Positive for chest tightness. Negative for apnea, shortness of breath and wheezing. Cardiovascular: Negative for chest pain, palpitations and leg swelling. Gastrointestinal: Negative for abdominal pain, constipation, diarrhea, nausea and vomiting. Genitourinary: Negative. Musculoskeletal: Positive for arthralgias, back pain, gait problem, myalgias and neck stiffness. Skin: Negative. Allergic/Immunologic: Negative. Neurological: Positive for dizziness, weakness, light-headedness, numbness and headaches. Negative for paresthesias. Hematological: Does not bruise/bleed easily. Psychiatric/Behavioral: Positive for dysphoric mood. Negative for sleep disturbance. The patient is not nervous/anxious. Objective    Vitals:    11/13/18 1413   BP: 122/80   Site: Right Upper Arm   Position: Sitting   Cuff Size: Large Adult   Weight: 233 lb (105.7 kg)   Height: 5' (1.524 m)     Pain Score: SIX     Physical Exam   Constitutional: She is oriented to person, place, and time. Vital signs are normal. She appears well-developed and well-nourished. Non-toxic appearance.  She does Placed This Encounter   Procedures    [place] INJECT TRIGGER POINTS, 3 OR GREATER    [place] INJECT TRIGGER POINTS, 3 OR GREATER       Karyn Scullin, DO

## 2018-12-13 DIAGNOSIS — C50.411 MALIGNANT NEOPLASM OF UPPER-OUTER QUADRANT OF RIGHT FEMALE BREAST, UNSPECIFIED ESTROGEN RECEPTOR STATUS (HCC): ICD-10-CM

## 2018-12-13 LAB
ALBUMIN SERPL-MCNC: 3.8 G/DL (ref 3.9–4.9)
ALP BLD-CCNC: 101 U/L (ref 40–130)
ALT SERPL-CCNC: 14 U/L (ref 0–33)
ANION GAP SERPL CALCULATED.3IONS-SCNC: 15 MEQ/L (ref 7–13)
AST SERPL-CCNC: 23 U/L (ref 0–35)
BILIRUB SERPL-MCNC: 0.6 MG/DL (ref 0–1.2)
BUN BLDV-MCNC: 20 MG/DL (ref 8–23)
CALCIUM SERPL-MCNC: 8.9 MG/DL (ref 8.6–10.2)
CHLORIDE BLD-SCNC: 106 MEQ/L (ref 98–107)
CO2: 24 MEQ/L (ref 22–29)
CREAT SERPL-MCNC: 0.75 MG/DL (ref 0.5–0.9)
GFR AFRICAN AMERICAN: >60
GFR NON-AFRICAN AMERICAN: >60
GLOBULIN: 2.3 G/DL (ref 2.3–3.5)
GLUCOSE BLD-MCNC: 117 MG/DL (ref 74–109)
POTASSIUM SERPL-SCNC: 4.4 MEQ/L (ref 3.5–5.1)
SODIUM BLD-SCNC: 145 MEQ/L (ref 132–144)
TOTAL PROTEIN: 6.1 G/DL (ref 6.4–8.1)

## 2018-12-15 LAB — CA 27-29: 23 U/ML (ref 0–38)

## 2018-12-25 DIAGNOSIS — F33.1 MODERATE EPISODE OF RECURRENT MAJOR DEPRESSIVE DISORDER (HCC): ICD-10-CM

## 2018-12-26 RX ORDER — BUPROPION HYDROCHLORIDE 150 MG/1
TABLET ORAL
Qty: 180 TABLET | Refills: 2 | Status: SHIPPED | OUTPATIENT
Start: 2018-12-26 | End: 2019-02-15 | Stop reason: SDUPTHER

## 2019-01-17 RX ORDER — POTASSIUM CHLORIDE 750 MG/1
TABLET, EXTENDED RELEASE ORAL
Qty: 180 TABLET | Refills: 2 | Status: SHIPPED | OUTPATIENT
Start: 2019-01-17 | End: 2020-01-02 | Stop reason: SDUPTHER

## 2019-01-17 RX ORDER — LEVOTHYROXINE SODIUM 0.05 MG/1
50 TABLET ORAL DAILY
Qty: 90 TABLET | Refills: 2 | Status: SHIPPED | OUTPATIENT
Start: 2019-01-17 | End: 2019-02-15 | Stop reason: SDUPTHER

## 2019-02-01 ENCOUNTER — OFFICE VISIT (OUTPATIENT)
Dept: FAMILY MEDICINE CLINIC | Age: 74
End: 2019-02-01
Payer: MEDICARE

## 2019-02-01 VITALS
BODY MASS INDEX: 44.65 KG/M2 | OXYGEN SATURATION: 95 % | HEART RATE: 81 BPM | HEIGHT: 60 IN | SYSTOLIC BLOOD PRESSURE: 136 MMHG | DIASTOLIC BLOOD PRESSURE: 86 MMHG | WEIGHT: 227.4 LBS

## 2019-02-01 DIAGNOSIS — I21.4 NSTEMI (NON-ST ELEVATED MYOCARDIAL INFARCTION) (HCC): ICD-10-CM

## 2019-02-01 DIAGNOSIS — Z89.511 HX OF RIGHT BKA (HCC): ICD-10-CM

## 2019-02-01 DIAGNOSIS — E03.9 HYPOTHYROIDISM, UNSPECIFIED TYPE: ICD-10-CM

## 2019-02-01 DIAGNOSIS — F33.1 MODERATE EPISODE OF RECURRENT MAJOR DEPRESSIVE DISORDER (HCC): Primary | ICD-10-CM

## 2019-02-01 DIAGNOSIS — M81.0 OSTEOPOROSIS, UNSPECIFIED OSTEOPOROSIS TYPE, UNSPECIFIED PATHOLOGICAL FRACTURE PRESENCE: ICD-10-CM

## 2019-02-01 DIAGNOSIS — C80.1 CANCER (HCC): ICD-10-CM

## 2019-02-01 DIAGNOSIS — S88.119S: ICD-10-CM

## 2019-02-01 DIAGNOSIS — M79.7 FIBROMYALGIA MUSCLE PAIN: ICD-10-CM

## 2019-02-01 DIAGNOSIS — E78.2 MIXED HYPERLIPIDEMIA: ICD-10-CM

## 2019-02-01 DIAGNOSIS — G60.9 HEREDITARY AND IDIOPATHIC PERIPHERAL NEUROPATHY: ICD-10-CM

## 2019-02-01 DIAGNOSIS — C50.911 LOBULAR CARCINOMA OF RIGHT BREAST (HCC): ICD-10-CM

## 2019-02-01 DIAGNOSIS — E66.01 MORBID OBESITY DUE TO EXCESS CALORIES (HCC): ICD-10-CM

## 2019-02-01 DIAGNOSIS — I10 BENIGN ESSENTIAL HTN: ICD-10-CM

## 2019-02-01 DIAGNOSIS — S14.109S INJURY OF CERVICAL SPINAL CORD, SEQUELA (HCC): ICD-10-CM

## 2019-02-01 LAB
CHOLESTEROL, TOTAL: 105 MG/DL (ref 0–199)
HDLC SERPL-MCNC: 46 MG/DL (ref 40–59)
LDL CHOLESTEROL CALCULATED: 33 MG/DL (ref 0–129)
TRIGL SERPL-MCNC: 129 MG/DL (ref 0–200)
TSH SERPL DL<=0.05 MIU/L-ACNC: 1.28 UIU/ML (ref 0.27–4.2)

## 2019-02-01 PROCEDURE — 99213 OFFICE O/P EST LOW 20 MIN: CPT | Performed by: FAMILY MEDICINE

## 2019-02-01 PROCEDURE — G8482 FLU IMMUNIZE ORDER/ADMIN: HCPCS | Performed by: FAMILY MEDICINE

## 2019-02-01 PROCEDURE — G8417 CALC BMI ABV UP PARAM F/U: HCPCS | Performed by: FAMILY MEDICINE

## 2019-02-01 PROCEDURE — 1036F TOBACCO NON-USER: CPT | Performed by: FAMILY MEDICINE

## 2019-02-01 PROCEDURE — G8427 DOCREV CUR MEDS BY ELIG CLIN: HCPCS | Performed by: FAMILY MEDICINE

## 2019-02-01 PROCEDURE — 1101F PT FALLS ASSESS-DOCD LE1/YR: CPT | Performed by: FAMILY MEDICINE

## 2019-02-01 PROCEDURE — 1090F PRES/ABSN URINE INCON ASSESS: CPT | Performed by: FAMILY MEDICINE

## 2019-02-01 PROCEDURE — G8599 NO ASA/ANTIPLAT THER USE RNG: HCPCS | Performed by: FAMILY MEDICINE

## 2019-02-01 PROCEDURE — 4040F PNEUMOC VAC/ADMIN/RCVD: CPT | Performed by: FAMILY MEDICINE

## 2019-02-01 PROCEDURE — 3017F COLORECTAL CA SCREEN DOC REV: CPT | Performed by: FAMILY MEDICINE

## 2019-02-01 PROCEDURE — 1123F ACP DISCUSS/DSCN MKR DOCD: CPT | Performed by: FAMILY MEDICINE

## 2019-02-01 PROCEDURE — G8399 PT W/DXA RESULTS DOCUMENT: HCPCS | Performed by: FAMILY MEDICINE

## 2019-02-14 ENCOUNTER — HOSPITAL ENCOUNTER (EMERGENCY)
Age: 74
Discharge: HOME | End: 2019-02-14
Attending: EMERGENCY MEDICINE
Payer: MEDICARE

## 2019-02-14 VITALS
OXYGEN SATURATION: 91 % | TEMPERATURE: 97.6 F | DIASTOLIC BLOOD PRESSURE: 69 MMHG | RESPIRATION RATE: 18 BRPM | HEIGHT: 60 IN | WEIGHT: 225 LBS | SYSTOLIC BLOOD PRESSURE: 129 MMHG | HEART RATE: 72 BPM | BODY MASS INDEX: 44.17 KG/M2

## 2019-02-14 DIAGNOSIS — J20.9 ACUTE BRONCHITIS, UNSPECIFIED ORGANISM: Primary | ICD-10-CM

## 2019-02-14 DIAGNOSIS — R42 LIGHTHEADEDNESS: ICD-10-CM

## 2019-02-14 LAB
CHP ED QC CHECK: NORMAL
EKG ATRIAL RATE: 73 BPM
EKG P AXIS: 24 DEGREES
EKG P-R INTERVAL: 186 MS
EKG Q-T INTERVAL: 448 MS
EKG QRS DURATION: 84 MS
EKG QTC CALCULATION (BAZETT): 493 MS
EKG R AXIS: -4 DEGREES
EKG T AXIS: 12 DEGREES
EKG VENTRICULAR RATE: 73 BPM
GLUCOSE BLD-MCNC: 108 MG/DL
GLUCOSE BLD-MCNC: 108 MG/DL (ref 60–115)
PERFORMED ON: NORMAL

## 2019-02-14 PROCEDURE — 6370000000 HC RX 637 (ALT 250 FOR IP): Performed by: EMERGENCY MEDICINE

## 2019-02-14 PROCEDURE — 93005 ELECTROCARDIOGRAM TRACING: CPT

## 2019-02-14 PROCEDURE — 6360000002 HC RX W HCPCS: Performed by: EMERGENCY MEDICINE

## 2019-02-14 PROCEDURE — 96372 THER/PROPH/DIAG INJ SC/IM: CPT

## 2019-02-14 PROCEDURE — 99284 EMERGENCY DEPT VISIT MOD MDM: CPT

## 2019-02-14 RX ORDER — DEXAMETHASONE SODIUM PHOSPHATE 10 MG/ML
6 INJECTION INTRAMUSCULAR; INTRAVENOUS ONCE
Status: COMPLETED | OUTPATIENT
Start: 2019-02-14 | End: 2019-02-14

## 2019-02-14 RX ORDER — AZITHROMYCIN 250 MG/1
TABLET, FILM COATED ORAL
Qty: 1 PACKET | Refills: 0 | Status: SHIPPED | OUTPATIENT
Start: 2019-02-14 | End: 2019-05-01 | Stop reason: ALTCHOICE

## 2019-02-14 RX ORDER — AZITHROMYCIN 500 MG/1
500 TABLET, FILM COATED ORAL ONCE
Status: COMPLETED | OUTPATIENT
Start: 2019-02-14 | End: 2019-02-14

## 2019-02-14 RX ADMIN — DEXAMETHASONE SODIUM PHOSPHATE 6 MG: 10 INJECTION INTRAMUSCULAR; INTRAVENOUS at 21:57

## 2019-02-14 RX ADMIN — AZITHROMYCIN 500 MG: 500 TABLET, FILM COATED ORAL at 21:58

## 2019-02-15 DIAGNOSIS — F33.1 MODERATE EPISODE OF RECURRENT MAJOR DEPRESSIVE DISORDER (HCC): ICD-10-CM

## 2019-02-15 DIAGNOSIS — M48.02 CERVICAL STENOSIS OF SPINAL CANAL: ICD-10-CM

## 2019-02-15 DIAGNOSIS — I10 ESSENTIAL HYPERTENSION: ICD-10-CM

## 2019-02-15 DIAGNOSIS — M43.10 SPONDYLOLISTHESIS, UNSPECIFIED SPINAL REGION: ICD-10-CM

## 2019-02-15 PROCEDURE — 93010 ELECTROCARDIOGRAM REPORT: CPT | Performed by: INTERNAL MEDICINE

## 2019-02-15 RX ORDER — GABAPENTIN 600 MG/1
TABLET ORAL
Qty: 450 TABLET | Refills: 1 | Status: SHIPPED | OUTPATIENT
Start: 2019-02-15 | End: 2019-02-19 | Stop reason: SDUPTHER

## 2019-02-15 RX ORDER — ATORVASTATIN CALCIUM 20 MG/1
20 TABLET, FILM COATED ORAL NIGHTLY
Qty: 90 TABLET | Refills: 2 | Status: SHIPPED | OUTPATIENT
Start: 2019-02-15 | End: 2019-02-19 | Stop reason: SDUPTHER

## 2019-02-15 RX ORDER — DULOXETIN HYDROCHLORIDE 30 MG/1
30 CAPSULE, DELAYED RELEASE ORAL DAILY
Qty: 90 CAPSULE | Refills: 2 | Status: SHIPPED | OUTPATIENT
Start: 2019-02-15 | End: 2019-02-19 | Stop reason: SDUPTHER

## 2019-02-15 RX ORDER — CARVEDILOL 25 MG/1
TABLET ORAL
Qty: 180 TABLET | Refills: 2 | Status: SHIPPED | OUTPATIENT
Start: 2019-02-15 | End: 2019-02-19 | Stop reason: SDUPTHER

## 2019-02-15 RX ORDER — LEVOTHYROXINE SODIUM 0.05 MG/1
50 TABLET ORAL DAILY
Qty: 90 TABLET | Refills: 2 | Status: SHIPPED | OUTPATIENT
Start: 2019-02-15 | End: 2019-02-19 | Stop reason: SDUPTHER

## 2019-02-15 RX ORDER — BUPROPION HYDROCHLORIDE 150 MG/1
TABLET ORAL
Qty: 180 TABLET | Refills: 2 | Status: SHIPPED | OUTPATIENT
Start: 2019-02-15 | End: 2019-02-19 | Stop reason: SDUPTHER

## 2019-02-19 DIAGNOSIS — M48.02 CERVICAL STENOSIS OF SPINAL CANAL: ICD-10-CM

## 2019-02-19 DIAGNOSIS — I10 ESSENTIAL HYPERTENSION: ICD-10-CM

## 2019-02-19 DIAGNOSIS — F33.1 MODERATE EPISODE OF RECURRENT MAJOR DEPRESSIVE DISORDER (HCC): ICD-10-CM

## 2019-02-19 DIAGNOSIS — M43.10 SPONDYLOLISTHESIS, UNSPECIFIED SPINAL REGION: ICD-10-CM

## 2019-02-19 RX ORDER — CARVEDILOL 25 MG/1
TABLET ORAL
Qty: 180 TABLET | Refills: 2 | Status: SHIPPED | OUTPATIENT
Start: 2019-02-19 | End: 2019-11-05 | Stop reason: SDUPTHER

## 2019-02-19 RX ORDER — GABAPENTIN 600 MG/1
TABLET ORAL
Qty: 450 TABLET | Refills: 1 | Status: SHIPPED | OUTPATIENT
Start: 2019-02-19 | End: 2019-11-18 | Stop reason: SDUPTHER

## 2019-02-19 RX ORDER — LEVOTHYROXINE SODIUM 0.05 MG/1
50 TABLET ORAL DAILY
Qty: 90 TABLET | Refills: 2 | Status: SHIPPED | OUTPATIENT
Start: 2019-02-19 | End: 2019-11-13 | Stop reason: SDUPTHER

## 2019-02-19 RX ORDER — DULOXETIN HYDROCHLORIDE 30 MG/1
30 CAPSULE, DELAYED RELEASE ORAL DAILY
Qty: 90 CAPSULE | Refills: 2 | Status: SHIPPED | OUTPATIENT
Start: 2019-02-19 | End: 2019-11-18 | Stop reason: SDUPTHER

## 2019-02-19 RX ORDER — BUPROPION HYDROCHLORIDE 150 MG/1
TABLET ORAL
Qty: 180 TABLET | Refills: 2 | Status: SHIPPED | OUTPATIENT
Start: 2019-02-19 | End: 2019-11-13 | Stop reason: SDUPTHER

## 2019-02-19 RX ORDER — ATORVASTATIN CALCIUM 20 MG/1
20 TABLET, FILM COATED ORAL NIGHTLY
Qty: 90 TABLET | Refills: 2 | Status: SHIPPED | OUTPATIENT
Start: 2019-02-19 | End: 2019-11-18 | Stop reason: SDUPTHER

## 2019-02-22 DIAGNOSIS — Z79.899 HIGH RISK MEDICATION USE: ICD-10-CM

## 2019-02-22 DIAGNOSIS — Z78.9 IMPAIRED MOBILITY AND ACTIVITIES OF DAILY LIVING: ICD-10-CM

## 2019-02-22 DIAGNOSIS — M79.7 FIBROMYALGIA MUSCLE PAIN: ICD-10-CM

## 2019-02-22 DIAGNOSIS — M54.40 BILATERAL LOW BACK PAIN WITH SCIATICA, SCIATICA LATERALITY UNSPECIFIED, UNSPECIFIED CHRONICITY: ICD-10-CM

## 2019-02-22 DIAGNOSIS — M15.9 GENERALIZED OSTEOARTHRITIS: ICD-10-CM

## 2019-02-22 DIAGNOSIS — M75.122 COMPLETE ROTATOR CUFF RUPTURE OF LEFT SHOULDER: ICD-10-CM

## 2019-02-22 DIAGNOSIS — Z74.09 IMPAIRED MOBILITY AND ACTIVITIES OF DAILY LIVING: ICD-10-CM

## 2019-02-22 RX ORDER — HYDROCODONE BITARTRATE AND ACETAMINOPHEN 5; 325 MG/1; MG/1
1 TABLET ORAL EVERY 8 HOURS PRN
Qty: 90 TABLET | Refills: 0 | Status: SHIPPED | OUTPATIENT
Start: 2019-02-22 | End: 2019-02-28 | Stop reason: SDUPTHER

## 2019-02-28 ENCOUNTER — OFFICE VISIT (OUTPATIENT)
Dept: PHYSICAL MEDICINE AND REHAB | Age: 74
End: 2019-02-28
Payer: MEDICARE

## 2019-02-28 VITALS
DIASTOLIC BLOOD PRESSURE: 70 MMHG | BODY MASS INDEX: 43.19 KG/M2 | SYSTOLIC BLOOD PRESSURE: 130 MMHG | WEIGHT: 220 LBS | HEIGHT: 60 IN

## 2019-02-28 DIAGNOSIS — Z78.9 IMPAIRED MOBILITY AND ACTIVITIES OF DAILY LIVING: ICD-10-CM

## 2019-02-28 DIAGNOSIS — Z79.899 HIGH RISK MEDICATION USE: ICD-10-CM

## 2019-02-28 DIAGNOSIS — M54.41 CHRONIC BILATERAL LOW BACK PAIN WITH BILATERAL SCIATICA: Primary | ICD-10-CM

## 2019-02-28 DIAGNOSIS — M75.122 COMPLETE ROTATOR CUFF RUPTURE OF LEFT SHOULDER: ICD-10-CM

## 2019-02-28 DIAGNOSIS — M79.10 MYALGIA: ICD-10-CM

## 2019-02-28 DIAGNOSIS — M15.9 GENERALIZED OSTEOARTHRITIS: ICD-10-CM

## 2019-02-28 DIAGNOSIS — M79.7 FIBROMYALGIA MUSCLE PAIN: ICD-10-CM

## 2019-02-28 DIAGNOSIS — Z89.511 HX OF RIGHT BKA (HCC): ICD-10-CM

## 2019-02-28 DIAGNOSIS — M54.40 BILATERAL LOW BACK PAIN WITH SCIATICA, SCIATICA LATERALITY UNSPECIFIED, UNSPECIFIED CHRONICITY: ICD-10-CM

## 2019-02-28 DIAGNOSIS — S14.109A INJURY OF CERVICAL SPINAL CORD, INITIAL ENCOUNTER (HCC): ICD-10-CM

## 2019-02-28 DIAGNOSIS — Z74.09 IMPAIRED MOBILITY AND ACTIVITIES OF DAILY LIVING: ICD-10-CM

## 2019-02-28 DIAGNOSIS — M54.42 CHRONIC BILATERAL LOW BACK PAIN WITH BILATERAL SCIATICA: Primary | ICD-10-CM

## 2019-02-28 DIAGNOSIS — G89.29 CHRONIC BILATERAL LOW BACK PAIN WITH BILATERAL SCIATICA: Primary | ICD-10-CM

## 2019-02-28 PROCEDURE — G8427 DOCREV CUR MEDS BY ELIG CLIN: HCPCS | Performed by: PHYSICAL MEDICINE & REHABILITATION

## 2019-02-28 PROCEDURE — 1123F ACP DISCUSS/DSCN MKR DOCD: CPT | Performed by: PHYSICAL MEDICINE & REHABILITATION

## 2019-02-28 PROCEDURE — 99214 OFFICE O/P EST MOD 30 MIN: CPT | Performed by: PHYSICAL MEDICINE & REHABILITATION

## 2019-02-28 PROCEDURE — 3017F COLORECTAL CA SCREEN DOC REV: CPT | Performed by: PHYSICAL MEDICINE & REHABILITATION

## 2019-02-28 PROCEDURE — G8599 NO ASA/ANTIPLAT THER USE RNG: HCPCS | Performed by: PHYSICAL MEDICINE & REHABILITATION

## 2019-02-28 PROCEDURE — 1101F PT FALLS ASSESS-DOCD LE1/YR: CPT | Performed by: PHYSICAL MEDICINE & REHABILITATION

## 2019-02-28 PROCEDURE — G8399 PT W/DXA RESULTS DOCUMENT: HCPCS | Performed by: PHYSICAL MEDICINE & REHABILITATION

## 2019-02-28 PROCEDURE — G8482 FLU IMMUNIZE ORDER/ADMIN: HCPCS | Performed by: PHYSICAL MEDICINE & REHABILITATION

## 2019-02-28 PROCEDURE — 4040F PNEUMOC VAC/ADMIN/RCVD: CPT | Performed by: PHYSICAL MEDICINE & REHABILITATION

## 2019-02-28 PROCEDURE — 1090F PRES/ABSN URINE INCON ASSESS: CPT | Performed by: PHYSICAL MEDICINE & REHABILITATION

## 2019-02-28 PROCEDURE — G8417 CALC BMI ABV UP PARAM F/U: HCPCS | Performed by: PHYSICAL MEDICINE & REHABILITATION

## 2019-02-28 PROCEDURE — 1036F TOBACCO NON-USER: CPT | Performed by: PHYSICAL MEDICINE & REHABILITATION

## 2019-02-28 RX ORDER — HYDROCODONE BITARTRATE AND ACETAMINOPHEN 5; 325 MG/1; MG/1
1 TABLET ORAL EVERY 8 HOURS PRN
Qty: 90 TABLET | Refills: 0 | Status: SHIPPED | OUTPATIENT
Start: 2019-02-28 | End: 2019-05-16 | Stop reason: SDUPTHER

## 2019-02-28 RX ORDER — NALOXONE HYDROCHLORIDE 4 MG/.1ML
1 SPRAY NASAL PRN
Qty: 1 EACH | Refills: 2 | Status: SHIPPED | OUTPATIENT
Start: 2019-02-28 | End: 2020-07-15

## 2019-02-28 ASSESSMENT — PATIENT HEALTH QUESTIONNAIRE - PHQ9
2. FEELING DOWN, DEPRESSED OR HOPELESS: 0
SUM OF ALL RESPONSES TO PHQ QUESTIONS 1-9: 0
1. LITTLE INTEREST OR PLEASURE IN DOING THINGS: 0
SUM OF ALL RESPONSES TO PHQ9 QUESTIONS 1 & 2: 0
SUM OF ALL RESPONSES TO PHQ QUESTIONS 1-9: 0

## 2019-02-28 ASSESSMENT — ENCOUNTER SYMPTOMS
EYES NEGATIVE: 1
ABDOMINAL PAIN: 0
CHEST TIGHTNESS: 1
VOMITING: 0
CONSTIPATION: 0
DIARRHEA: 1
NAUSEA: 0
ALLERGIC/IMMUNOLOGIC NEGATIVE: 1
WHEEZING: 0
SHORTNESS OF BREATH: 0
APNEA: 0
BACK PAIN: 1

## 2019-05-01 ENCOUNTER — OFFICE VISIT (OUTPATIENT)
Dept: FAMILY MEDICINE CLINIC | Age: 74
End: 2019-05-01
Payer: MEDICARE

## 2019-05-01 VITALS
HEIGHT: 60 IN | HEART RATE: 71 BPM | WEIGHT: 229.6 LBS | BODY MASS INDEX: 45.07 KG/M2 | SYSTOLIC BLOOD PRESSURE: 136 MMHG | OXYGEN SATURATION: 97 % | DIASTOLIC BLOOD PRESSURE: 84 MMHG

## 2019-05-01 DIAGNOSIS — M54.2 NECK PAIN ON RIGHT SIDE: ICD-10-CM

## 2019-05-01 DIAGNOSIS — S14.109S INJURY OF CERVICAL SPINAL CORD, SEQUELA (HCC): ICD-10-CM

## 2019-05-01 DIAGNOSIS — M96.1 CERVICAL POST-LAMINECTOMY SYNDROME: Primary | ICD-10-CM

## 2019-05-01 DIAGNOSIS — M96.1 POSTLAMINECTOMY SYNDROME OF CERVICAL REGION: ICD-10-CM

## 2019-05-01 DIAGNOSIS — Z91.81 AT HIGH RISK FOR FALLS: ICD-10-CM

## 2019-05-01 PROCEDURE — 99213 OFFICE O/P EST LOW 20 MIN: CPT | Performed by: FAMILY MEDICINE

## 2019-05-01 PROCEDURE — 1036F TOBACCO NON-USER: CPT | Performed by: FAMILY MEDICINE

## 2019-05-01 PROCEDURE — G8427 DOCREV CUR MEDS BY ELIG CLIN: HCPCS | Performed by: FAMILY MEDICINE

## 2019-05-01 PROCEDURE — 4040F PNEUMOC VAC/ADMIN/RCVD: CPT | Performed by: FAMILY MEDICINE

## 2019-05-01 PROCEDURE — 3017F COLORECTAL CA SCREEN DOC REV: CPT | Performed by: FAMILY MEDICINE

## 2019-05-01 PROCEDURE — G8399 PT W/DXA RESULTS DOCUMENT: HCPCS | Performed by: FAMILY MEDICINE

## 2019-05-01 PROCEDURE — 1123F ACP DISCUSS/DSCN MKR DOCD: CPT | Performed by: FAMILY MEDICINE

## 2019-05-01 PROCEDURE — G8417 CALC BMI ABV UP PARAM F/U: HCPCS | Performed by: FAMILY MEDICINE

## 2019-05-01 PROCEDURE — 1090F PRES/ABSN URINE INCON ASSESS: CPT | Performed by: FAMILY MEDICINE

## 2019-05-01 PROCEDURE — G8599 NO ASA/ANTIPLAT THER USE RNG: HCPCS | Performed by: FAMILY MEDICINE

## 2019-05-01 RX ORDER — ACYCLOVIR 50 MG/G
CREAM TOPICAL
Qty: 5 G | Refills: 1 | Status: SHIPPED | OUTPATIENT
Start: 2019-05-01

## 2019-05-01 NOTE — PROGRESS NOTES
Chief Complaint   Patient presents with    Depression     3 month        HPI:  Ruth Deleon is a 74 y.o. female    \"3 month checkup\"  Depression hx  Doing well    Having some \"neck problems\"  Cervical vertebrae feel like pinching on   Has been doing exercises from PT   Not helping  History of 6 spinal surgeries  Wants to see neurosurgeon    Hypothyroid  Dose has been same for some time    Denies any issues with meds    Has some tinnitus    Long problem list, but sees specialists, no real acute issues    Patient Active Problem List   Diagnosis    Neck pain on right side    Obesity    High risk medication use - 01/30/18 OARRS PM&R, 03/26/18 OARRS PM&R, 04/03/18 Urine Drug Screen positive opiates PM&R, 01/31/18 Med Contract PM&R    Impaired mobility and activities of daily living    Hx of right BKA ([address]    Family history of coronary artery disease    NSTEMI (non-ST elevated myocardial infarction) ([address]    Arthritis, neuropathic    Charcot's joint of foot    Closed dislocation of ankle    Closed dislocation of foot    Complete rotator cuff rupture of left shoulder    Clinical depression    Difficulty walking    Benign essential HTN    Acquired flat foot    Closed fracture of tarsal and metatarsal bones    Myogenic ptosis    Disorder of peripheral nervous system    Arthritis of ankle or foot, degenerative    HLD (hyperlipidemia)    Cervical post-laminectomy syndrome    Failed back syndrome of lumbar spine    Low back pain with sciatica    Fibromyalgia muscle pain    Malaise and fatigue    Melancholia    Complete tear of left rotator cuff    Generalized osteoarthritis    Peripheral neuropathy    Osteoporosis    Postlaminectomy syndrome of cervical region    Postlaminectomy syndrome of lumbar region    Tibialis tendinitis    Hereditary and idiopathic peripheral neuropathy ([address]    Hypothyroid    Lobular breast cancer ([address]    Traumatic amputation of one lower extremity below knee with complication ([address]    Acquired hallux valgus    Fatigue due to treatment    Anemia    Cancer ([address]    Cervical spinal cord injury ([address]    Complete traumatic amputation at level between right knee and ankle [facility]    Morbid obesity due to excess calories ([address]    Reactive depression    Sleeping excessive    Chronic low back pain    Left breast mass    Other specified postprocedural states    Primary osteoarthritis involving multiple joints    Hematuria    Hematuria, gross    Noncompliance - No Show inject 07/17/17, No Show F/U 1/11/18    Chronic low back pain with sciatica    Moderate episode of recurrent major depressive disorder ([facility]    Charcot ankle, left    Below knee amputation status, right (HCC)    Chronic pain syndrome    Racing heart beat       Current Outpatient Medications   Medication Sig Dispense Refill    acyclovir (ZOVIRAX) 5 % CREA Apply topically as needed 5 g 1    naloxone (NARCAN) 4 MG/0.1ML LIQD nasal spray 1 spray by Nasal route as needed for Opioid Reversal 1 each 2    carvedilol (COREG) 25 MG tablet TAKE 1 TABLET BY MOUTH TWO  TIMES DAILY 180 tablet 2    levothyroxine (SYNTHROID) 50 MCG tablet Take 1 tablet by mouth daily 90 tablet 2    buPROPion (WELLBUTRIN XL) 150 MG extended release tablet TAKE 2 TABLETS BY MOUTH  EVERY MORNING 180 tablet 2    atorvastatin (LIPITOR) 20 MG tablet Take 1 tablet by mouth nightly 90 tablet 2    gabapentin (NEURONTIN) 600 MG tablet TAKE 1 TABLET BY MOUTH IN  THE MORNING, 2 TABS IN THE  AFTERNOON, AND 2 TABS IN  THE EVENING AS TOLERATED. 450 tablet 1    diclofenac (VOLTAREN) 50 MG EC tablet TAKE 1 TABLET BY MOUTH TWO  TIMES DAILY 180 tablet 2    DULoxetine (CYMBALTA) 30 MG extended release capsule Take 1 capsule by mouth daily 90 capsule 2    potassium chloride (KLOR-CON M) 10 MEQ extended release tablet TAKE 1 TABLET BY MOUTH TWO  TIMES DAILY 180 tablet 2    furosemide (LASIX) 20 MG tablet Take 20 mg by mouth as needed (edema)      b complex vitamins capsule Take 1 capsule by mouth daily      Multiple Vitamins-Minerals (CENTRUM SILVER 50+WOMEN PO) Take by mouth      tamoxifen (NOLVADEX) 20 MG tablet Take 1 tablet by mouth daily      Vitamin D (CHOLECALCIFEROL) 1000 UNITS CAPS capsule Take 1,000 Units by mouth daily. No current facility-administered medications for this visit. Patient's medications, allergies, past medical, surgical, social and family histories were reviewed and updated as appropriate. Review of Systems:   General ROS: fatigue, worse lately, but is ongoing  Respiratory ROS: no cough, shortness of breath, or wheezing  Cardiovascular ROS: no CP or KRAFT  Gastrointestinal ROS: no change in bowel habits, or black or bloody stools      In general patient otherwise reports feeling well. Physical Exam:  /84 (Site: Left Upper Arm)   Pulse 71   Ht 5' (1.524 m)   Wt 229 lb 9.6 oz (104.1 kg)   SpO2 97%   Breastfeeding? No   BMI 44.84 kg/m²     Gen: Well, NAD, Alert, Oriented x 3   HEENT: EOMI, eyes clear, MMM,   Skin: no rash or jaundice  Neuro: nonfocal  Psych: euthymic   Heart s1s2 RRR  Lungs CTAB    Lab Results   Component Value Date    WBC 6.0 12/13/2018    HGB 13.9 12/13/2018    HCT 41.6 12/13/2018     12/13/2018    CHOL 105 02/01/2019    TRIG 129 02/01/2019    HDL 46 02/01/2019    ALT 14 12/13/2018    AST 23 12/13/2018     (H) 12/13/2018    K 4.4 12/13/2018     12/13/2018    CREATININE 0.75 12/13/2018    BUN 20 12/13/2018    CO2 24 12/13/2018    TSH 1.280 02/01/2019    INR 1.0 08/03/2015    LABA1C 5.3 12/19/2013         A&P   Diagnosis Orders   1. Cervical post-laminectomy syndrome  AFL - ([facility]) - Aayush Hawkins MD, Neurosurgery, [place]   2. At high risk for falls     3. Neck pain on right side  [facility]   4.  Postlaminectomy syndrome of cervical region  ABAD - ([facility]) - Aayush Hawkins MD, Neurosurgery,

## 2019-05-01 NOTE — PATIENT INSTRUCTIONS
Patient Education        Tinnitus: Care Instructions  Your Care Instructions    Many people have some ringing sounds in their ears once in a while. You may hear a roar, a hiss, a tinkle, or a buzz. The sound usually lasts only a few minutes. If it goes on all the time, you may have tinnitus. Tinnitus is usually caused by long-term exposure to loud noise. This damages the nerves in the inner ear. It can occur with all types of hearing loss. It may be a symptom of almost any ear problem. Tinnitus may be caused by a buildup of earwax. Or it may be caused by ear infections or certain medicines (especially antibiotics or large amounts of aspirin). You can also hear noises in your ears because of an injury to the ears, drinking too much alcohol or caffeine, or a medical condition. You may need tests to evaluate your hearing and to find causes of long-lasting tinnitus. Your doctor may suggest one or more treatments to help you cope with it. You can also do things at home to help reduce symptoms. Follow-up care is a key part of your treatment and safety. Be sure to make and go to all appointments, and call your doctor if you are having problems. It's also a good idea to know your test results and keep a list of the medicines you take. How can you care for yourself at home? · Limit or cut out alcohol and caffeine. They can make your symptoms worse. · Do not smoke or use other tobacco products. Nicotine reduces blood flow to the ear and makes tinnitus worse. If you need help quitting, talk to your doctor about stop-smoking programs and medicines. These can increase your chances of quitting for good. · Talk to your doctor about whether to stop taking aspirin and similar products such as ibuprofen or naproxen. · Get exercise often. It can improve blood flow to the ear. Ways to cope with noise  Some tinnitus may last a long time. To cope with noise, try to:  · Avoid noises that you think caused your tinnitus.  If you can't avoid loud noises, wear earplugs or earmuffs. · Ignore the sound by paying attention to other things. · Relax using biofeedback, meditation, or yoga. Feeling stressed and being tired can make tinnitus worse. · Play music or white noise to help you sleep. Background noise may cover up the noise that you hear in your ears. You can buy a machine that makes soothing sounds, such as ocean waves. When should you call for help? Call 911 anytime you think you may need emergency care. For example, call if:    · You have symptoms of a stroke. These may include:  ? Sudden numbness, tingling, weakness, or loss of movement in your face, arm, or leg, especially on only one side of your body. ? Sudden vision changes. ? Sudden trouble speaking. ? Sudden confusion or trouble understanding simple statements. ? Sudden problems with walking or balance. ? A sudden, severe headache that is different from past headaches.    Call your doctor now or seek immediate medical care if:    · You develop other symptoms. These may include hearing loss (or worse hearing loss), balance problems, dizziness, nausea, or vomiting.    Watch closely for changes in your health, and be sure to contact your doctor if:    · Your tinnitus moves from both ears to one ear.     · Your hearing loss gets worse within 1 day after an ear injury.     · Your tinnitus or hearing loss does not get better within 1 week after an ear injury.     · Your tinnitus bothers you enough that you want to take medicines to help you cope with it. Where can you learn more? Go to [email or web address] org and sign in to your [employer] account. Enter S165 in the [facility] box to learn more about \"Tinnitus: Care Instructions. \"     If you do not have an account, please click on the \"Sign Up Now\" link. Current as of: March 27, 2018  Content Version: 11.9  © 3370-1010 [employer].  Care instructions adapted under license by [facility]. If you have questions about a medical condition or this instruction, always ask your healthcare professional. [place] [address] any warranty or liability for your use of this information.

## 2019-05-16 DIAGNOSIS — Z79.899 HIGH RISK MEDICATION USE: ICD-10-CM

## 2019-05-16 DIAGNOSIS — M15.9 GENERALIZED OSTEOARTHRITIS: ICD-10-CM

## 2019-05-16 DIAGNOSIS — Z78.9 IMPAIRED MOBILITY AND ACTIVITIES OF DAILY LIVING: ICD-10-CM

## 2019-05-16 DIAGNOSIS — Z74.09 IMPAIRED MOBILITY AND ACTIVITIES OF DAILY LIVING: ICD-10-CM

## 2019-05-16 DIAGNOSIS — M54.40 BILATERAL LOW BACK PAIN WITH SCIATICA, SCIATICA LATERALITY UNSPECIFIED, UNSPECIFIED CHRONICITY: ICD-10-CM

## 2019-05-16 DIAGNOSIS — M79.7 FIBROMYALGIA MUSCLE PAIN: ICD-10-CM

## 2019-05-16 DIAGNOSIS — M75.122 COMPLETE ROTATOR CUFF RUPTURE OF LEFT SHOULDER: ICD-10-CM

## 2019-05-16 RX ORDER — HYDROCODONE BITARTRATE AND ACETAMINOPHEN 5; 325 MG/1; MG/1
1 TABLET ORAL EVERY 8 HOURS PRN
Qty: 90 TABLET | Refills: 0 | Status: SHIPPED | OUTPATIENT
Start: 2019-05-16 | End: 2019-06-15

## 2019-05-28 ENCOUNTER — OFFICE VISIT (OUTPATIENT)
Dept: PHYSICAL MEDICINE AND REHAB | Age: 74
End: 2019-05-28
Payer: MEDICARE

## 2019-05-28 VITALS
BODY MASS INDEX: 43.59 KG/M2 | SYSTOLIC BLOOD PRESSURE: 136 MMHG | WEIGHT: 222 LBS | HEIGHT: 60 IN | DIASTOLIC BLOOD PRESSURE: 82 MMHG

## 2019-05-28 DIAGNOSIS — M54.2 NECK PAIN ON RIGHT SIDE: ICD-10-CM

## 2019-05-28 DIAGNOSIS — M14.679 CHARCOT'S JOINT OF FOOT, UNSPECIFIED LATERALITY: Primary | ICD-10-CM

## 2019-05-28 DIAGNOSIS — Z89.511 HX OF RIGHT BKA (HCC): ICD-10-CM

## 2019-05-28 DIAGNOSIS — M54.40 BILATERAL LOW BACK PAIN WITH SCIATICA, SCIATICA LATERALITY UNSPECIFIED, UNSPECIFIED CHRONICITY: ICD-10-CM

## 2019-05-28 DIAGNOSIS — M50.30 DDD (DEGENERATIVE DISC DISEASE), CERVICAL: ICD-10-CM

## 2019-05-28 DIAGNOSIS — Z79.899 HIGH RISK MEDICATION USE: ICD-10-CM

## 2019-05-28 DIAGNOSIS — M51.36 DDD (DEGENERATIVE DISC DISEASE), LUMBAR: ICD-10-CM

## 2019-05-28 PROCEDURE — 4040F PNEUMOC VAC/ADMIN/RCVD: CPT | Performed by: PHYSICAL MEDICINE & REHABILITATION

## 2019-05-28 PROCEDURE — 1123F ACP DISCUSS/DSCN MKR DOCD: CPT | Performed by: PHYSICAL MEDICINE & REHABILITATION

## 2019-05-28 PROCEDURE — 1090F PRES/ABSN URINE INCON ASSESS: CPT | Performed by: PHYSICAL MEDICINE & REHABILITATION

## 2019-05-28 PROCEDURE — G8399 PT W/DXA RESULTS DOCUMENT: HCPCS | Performed by: PHYSICAL MEDICINE & REHABILITATION

## 2019-05-28 PROCEDURE — G8599 NO ASA/ANTIPLAT THER USE RNG: HCPCS | Performed by: PHYSICAL MEDICINE & REHABILITATION

## 2019-05-28 PROCEDURE — G8427 DOCREV CUR MEDS BY ELIG CLIN: HCPCS | Performed by: PHYSICAL MEDICINE & REHABILITATION

## 2019-05-28 PROCEDURE — 1036F TOBACCO NON-USER: CPT | Performed by: PHYSICAL MEDICINE & REHABILITATION

## 2019-05-28 PROCEDURE — G8417 CALC BMI ABV UP PARAM F/U: HCPCS | Performed by: PHYSICAL MEDICINE & REHABILITATION

## 2019-05-28 PROCEDURE — 99215 OFFICE O/P EST HI 40 MIN: CPT | Performed by: PHYSICAL MEDICINE & REHABILITATION

## 2019-05-28 PROCEDURE — 3017F COLORECTAL CA SCREEN DOC REV: CPT | Performed by: PHYSICAL MEDICINE & REHABILITATION

## 2019-05-28 ASSESSMENT — ENCOUNTER SYMPTOMS
APNEA: 0
SHORTNESS OF BREATH: 0
DIARRHEA: 1
CONSTIPATION: 0
ABDOMINAL PAIN: 0
WHEEZING: 0
CHEST TIGHTNESS: 1
VOMITING: 0
NAUSEA: 0
EYES NEGATIVE: 1
BACK PAIN: 1

## 2019-05-28 NOTE — PROGRESS NOTES
Subjective  Shakira Machado, 74 y.o. female presents today with:    Chief Complaint           Back Pain lower back    Neck Pain wakes up with stiff neck in morning    Hip Pain L4 & L5     Wrist Pain Bilateral    Medication Refill Norco pill count today- pt did not bring meds    Mental Health Problem Depression anxiety stabilizing        She is doing well on her current dose of medication she does not exceeded or show any signs of overuse or abuse. She is able to be more functional with friends family and had better emotional and physical quality of life with her medications as opposed to without them. Pain flare up neck and L spine--needs updated MRI and to see Dr Eunice RICO.     has flare up neck pain needs refill of Flexeril which helps without side-effects. Back Pain   This is a chronic problem. The current episode started more than 1 year ago. The problem occurs constantly. The problem is unchanged. The pain is present in the lumbar spine. The quality of the pain is described as aching. Radiates to: Bilateral legs and hips. The pain is at a severity of 6/10. The pain is moderate. The pain is the same all the time. The symptoms are aggravated by standing, bending and position (Walking ). Associated symptoms include headaches, leg pain, numbness and weakness. Pertinent negatives include no abdominal pain, chest pain, paresis, paresthesias or perianal numbness. Risk factors include history of cancer, lack of exercise, menopause, obesity, sedentary lifestyle, poor posture and recent trauma. She has tried ice, heat, muscle relaxant, NSAIDs and home exercises (Inj, Ultracet, Norco) for the symptoms. The treatment provided moderate relief. Hip Pain    The incident occurred more than 1 week ago. The incident occurred at home. There was no injury mechanism. The pain is present in the left leg and right hip (lt is worse than right). The quality of the pain is described as aching. The pain is at a severity of 8/10. The pain is severe. The pain has been fluctuating since onset. Associated symptoms include numbness. She reports no foreign bodies present. The symptoms are aggravated by movement. She has tried NSAIDs for the symptoms. The treatment provided mild relief.        Past Medical History:   Diagnosis Date    Arthritis     Blood transfusion     Cancer [facility])     GALLBLADDER 2009, 2011 OMENTUM    Charcot's joint     left foot    Chest pain 7/2/2015    Colitis     DDD (degenerative disc disease), lumbar 2/12/2013    Depression     Disorder of peripheral nervous system 9/1/2010    Dyspnea 7/2/2015    Family history of coronary artery disease 8/29/2014    History of blood transfusion 2011    following chemotherapy    Hx of right BKA ([facility]     Hyperlipidemia     Hypertension     Hypothyroid 6/12/2015    Lobular breast cancer ([facility] 10/2015    NSTEMI (non-ST elevated myocardial infarction) ([facility] [facility] 8/29/2014    Obesity     Paroxysmal atrial fibrillation ([facility] [facility] 8/29/2014    S/P BKA (below knee amputation) ([facility] [facility]      Past Surgical History:   Procedure Laterality Date    ABDOMEN SURGERY Left 4/11/2017    EXCISION OF CHEST WALL MASS, UPDATE LABS ON ADMIT  performed by Jose Duong MD at [address] BLEPHAROPLASTY  2-5-13    both eyes    BREAST SURGERY      CARDIAC CATHETERIZATION      COLONOSCOPY  1/1/2010    normal    CYSTOSCOPY W BIOPSY OF BLADDER N/A 6/26/2017    CYSTOSCOPY BLADDER BIOPSY AND FULGURATION performed by Alix Green MD at [address] Bilateral 2012    cataract removal with IOL implants    HYSTERECTOMY      LEG AMPUTATION BELOW KNEE Right 2/1/11    DR Michaela Mendosa due to CHARCOTS    MASTECTOMY  11/16/2015    Bilateral simple mastectomies with right SNB by DR Anny Dotson    AR REMOVAL OF BREAST LESION Left 1/24/2017    CORE NEEDLE BIOPSY OF  LEFT BREAST MASS performed by Jose Duong MD at [address] cervical and lumbar     Social History     Socioeconomic History    Marital status:      Spouse name: None    Number of children: None    Years of education: None    Highest education level: None   Occupational History    Occupation: retired   Social Needs    Financial resource strain: None    Food insecurity:     Worry: None     Inability: None    Transportation needs:     Medical: None     Non-medical: None   Tobacco Use    Smoking status: Never Smoker    Smokeless tobacco: Never Used   Substance and Sexual Activity    Alcohol use: Yes     Comment: social    Drug use: No    Sexual activity: Never   Lifestyle    Physical activity:     Days per week: None     Minutes per session: None    Stress: None   Relationships    Social connections:     Talks on phone: None     Gets together: None     Attends Am[employer] service: None     Active member of club or organization: None     Attends meetings of clubs or organizations: None     Relationship status: None    Intimate partner violence:     Fear of current or ex partner: None     Emotionally abused: None     Physically abused: None     Forced sexual activity: None   Other Topics Concern    None   Social History Narrative    None     Family History   Problem Relation Age of Onset    Heart Disease Mother     Arthritis Mother     Heart Disease Father     Arthritis Father     Cancer Sister         Colon    Thyroid Disease Son     Other Son         Down's syndrome       Allergies   Allergen Reactions    Ciprofloxacin Itching    Oxycontin [Oxycodone Hcl] Itching       Review of Systems   Constitutional: Positive for activity change and fatigue. Eyes: Negative. Respiratory: Positive for chest tightness. Negative for apnea, shortness of breath and wheezing. Cardiovascular: Negative for chest pain, palpitations and leg swelling. Gastrointestinal: Positive for diarrhea. Negative for abdominal pain, constipation, nausea and vomiting. No respiratory distress. She has no wheezes. She has no rales. She exhibits no tenderness. Abdominal: Soft. Bowel sounds are normal. She exhibits no distension and no mass. There is no tenderness. There is no rebound and no guarding. Musculoskeletal: She exhibits tenderness. She exhibits no edema. Right shoulder: She exhibits decreased range of motion, tenderness and bony tenderness. Left shoulder: She exhibits decreased range of motion, tenderness and bony tenderness. Right elbow: Normal.       Left elbow: Normal.        Right wrist: She exhibits decreased range of motion, tenderness, bony tenderness, crepitus and deformity. Left wrist: She exhibits decreased range of motion, tenderness, crepitus and deformity. Right hip: Normal.        Left hip: She exhibits decreased range of motion, decreased strength and tenderness. Right knee: She exhibits decreased range of motion. Left knee: She exhibits decreased range of motion. Left ankle: She exhibits decreased range of motion. Tenderness. Lateral malleolus and medial malleolus tenderness found. Achilles tendon normal.        Cervical back: She exhibits decreased range of motion, tenderness, bony tenderness, pain and spasm. Thoracic back: She exhibits decreased range of motion, tenderness, bony tenderness, pain and spasm. Lumbar back: She exhibits decreased range of motion, tenderness, bony tenderness and pain. She exhibits no swelling, no edema, no deformity, no laceration and normal pulse. Back:         Right upper arm: Normal.        Left upper arm: Normal.        Right forearm: Normal.        Left forearm: Normal.        Arms:       Right hand: She exhibits decreased range of motion, tenderness and bony tenderness. Decreased sensation noted. Decreased sensation is present in the medial distribution. Decreased strength noted.         Left hand: She exhibits decreased range of motion, communicative. She exhibits normal recent memory and normal remote memory. She is attentive. Vitals reviewed. Ortho Exam  Neurologic Exam     Mental Status   Oriented to person, place, and time. Speech: not slurred   Level of consciousness: alert  Knowledge: good. Able to name object. Able to read. Able to repeat. Able to write. Normal comprehension. Cranial Nerves     CN III, IV, VI   Pupils are equal, round, and reactive to light. Extraocular motions are normal.     Gait, Coordination, and Reflexes     Gait  Gait: wide-based    Coordination   Romberg: positive  Finger to nose coordination: abnormal  Heel to shin coordination: abnormal  Tandem walking coordination: abnormal      After a thorough review and discussion of the previous medical records, patient comprehensive medical, surgical, and family and social history, Review of Systems, their OARRS, their Screener and Opioid Assessment for Patients with Pain (SOAPP®-R), recent diagnostics, and symptomatic results to previous treatment, it is my impression that the patients is suffering with progressive and severe:     Diagnosis Orders   1. Charcot's joint of foot, unspecified laterality     2. Hx of right BKA ([facility] [address]     3. High risk medication use - 01/30/18 OARRS PM&R, 03/26/18 OARRS PM&R, 04/03/18 Urine Drug Screen positive opiates PM&R, 01/31/18 Med Contract PM&R  Urine Drug Screen   4. Neck pain on right side     5. Bilateral low back pain with sciatica, sciatica laterality unspecified, unspecified chronicity     6. DDD (degenerative disc disease), cervical  MRI CERVICAL SPINE W WO CONTRAST   7.  DDD (degenerative disc disease), lumbar  MRI LUMBAR SPINE W WO CONTRAST       I am also concerned by lifestyle and mood issues including:    Past Medical History:   Diagnosis Date    Arthritis     Blood transfusion     Cancer [facility])     GALLBLADDER 2009, 2011 OMENTUM    Charcot's joint     left foot    Chest pain 7/2/2015    Colitis     DDD therapies for pain have not been effective including nonopiate medications. Injections and exercises are only partially effective. A Rx for Narcan was offered to help prevent accidental overdose. RX Monitoring 5/28/2019   Attestation The Prescription Monitoring Report for this patient was reviewed today. Chronic Pain Routine Monitoring No signs of potential drug abuse or diversion identified: otherwise, see note documentation;Obtaining appropriate analgesic effect of treatment.;Possible medication side effects, risk of tolerance/dependence & alternative treatments discussed. Chronic Pain > 50 MEDD Obtained or confirmened \"Consent of Opioid Use\" on file.;Considered consultation with a specialist.;Re-evaluated the status of the patient's underlying condition causing pain. Chronic Pain > 80 MEDD -       Attestation: The Prescription Monitoring Report for this patient was reviewed today. (Samuel Sharpe DO)  Chronic Pain Routine Monitoring: No signs of potential drug abuse or diversion identified: otherwise, see note documentation, Obtaining appropriate analgesic effect of treatment. , Possible medication side effects, risk of tolerance/dependence & alternative treatments discussed. (Samuel Sharpe DO)       Patient is currently taking:       I am having Alysha Rodriguez maintain her Vitamin D, tamoxifen, Multiple Vitamins-Minerals (CENTRUM SILVER 50+WOMEN PO), b complex vitamins, furosemide, potassium chloride, carvedilol, levothyroxine, buPROPion, atorvastatin, gabapentin, diclofenac, DULoxetine, naloxone, acyclovir, and HYDROcodone-acetaminophen. I also recommend the following Medications:    No orders of the defined types were placed in this encounter. Norco and vitamin D     -which helps with pain and function. Otherwise, continue the current pain medications that I have prescibed.       Radiologic:   Old films reviewed  severe DDD DJD throughout spine and in hands shoulders knees and hips,     I discussed results with patients. see Follow up plans below  For any new studies. Care Everywhere Updates:  requested and reviewed. No new issues noted. Electrodiagnostic:  Previous studies requested,     I discussed results with patient. See follow-up plans for new studies. Labs:  Previous labs reviewed     Lab Results   Component Value Date     12/13/2018    K 4.4 12/13/2018     12/13/2018    CO2 24 12/13/2018    BUN 20 12/13/2018    CREATININE 0.75 12/13/2018    CALCIUM 8.9 12/13/2018    LABALBU 3.8 12/13/2018    LABALBU 4.3 04/06/2012    BILITOT 0.6 12/13/2018    ALKPHOS 101 12/13/2018    AST 23 12/13/2018    ALT 14 12/13/2018     Lab Results   Component Value Date    WBC 6.0 12/13/2018    RBC 4.45 04/03/2018    RBC 4.16 11/04/2011    HGB 13.9 12/13/2018    HCT 41.6 12/13/2018    MCV 91.8 04/03/2018    MCH 30.2 04/03/2018    MCHC 32.9 04/03/2018    RDW 14.7 04/03/2018     12/13/2018    MPV 8.4 08/03/2015       Lab Results   Component Value Date    LABAMPH Neg 04/03/2018    BARBSCNU Neg 04/03/2018    LABBENZ Neg 04/03/2018    CANSU Neg 04/03/2018    COCAIMETSCRU Neg 04/03/2018    PHENCYCLIDINESCREENURINE Neg 04/03/2018    DSCOMMENT see below 04/03/2018       No results found for: CODEINE, MORPHINE, ACETYLMORPHI, OXYCODONE, NOROXYCODONE, NOROXYMU, HYDRCO, NORHYDU, HYDROMO, BUPREN, NORBUPRNOR, FENTA, NORFENT, MEPERIDINE, TAPENU, TAPOSULFUR, METHADONE, LABPROP, TRAM, AMPH, METHAMP, MDMA, ECMDA    No results found for: METPHEN, PHENTERMINE, BENZOYL, ALPRAZ, ALPHAOHALPRA, CLONAZEPAM, 7AMINOCLONAZ, DIAZEP, MASOOD, OXAZ, TEMAZ, LORAZEPAM, MIDAZOLAM, ZOLPIDEM, JAIME, ETG, MARIJMET, PCP, PAINMGTDRUGP, EERPAINMGTPA, LABCREA      , I discussed results with patient. See follow-up plans for new studies. Therapies:  HEP-gentle stretching and relaxation techniques-demonstrated with patient-they are to do them twice a day.   They are also advised to make the following lifestyle changes:  Goals      Exercise 3x per week (30 min per time)      SOAPP-R GOAL LESS THAN 9      11/10/16 SCORE: 16-MODERATE RISK   01/11/17 score: 8-low risk   03/08/17 score: 13-moderate risk   05/11/17 score: 9-low risk  07/13/17 score: 12-moderate risk  09/13/17 score: 12-moderate risk  03/27/18 score: 12-moderate risk  6/14/18 score: 6-low-risk  2/28/19 score: 8- low risk  5/2819 score: 7- low risk       Weight < 200 lb (90.719 kg)           Injections or Epidurals:  Injection options were discussed. Patient gave verbal consent to ordered injections. See follow-up plans for planned injections. Supplements:  Vitamin D with increased dosing during the rainy months,   Education was given on:   Dietary and Fitness--daily stretches and low carb diet-in chair Yoga when possible             Follow up with Primary Care Physician regarding their general medical needs. Consult Dr Gab Mays for surg opinion. They are to follow up in 2 months to review medication, efficacy of injections, pill counts, OARRS check, SOAPPR assessment, review diagnostics, to review previous and future treatment plans and assess appropriateness for continued therapy.         New Diagnostics  Orders Placed This Encounter   Procedures    MRI CERVICAL SPINE W WO CONTRAST    MRI LUMBAR SPINE W WO CONTRAST    Urine Drug Haseebe [address], DO

## 2019-06-06 ENCOUNTER — HOSPITAL ENCOUNTER (OUTPATIENT)
Dept: MRI IMAGING | Age: 74
Discharge: HOME | End: 2019-06-08
Payer: MEDICARE

## 2019-06-06 DIAGNOSIS — M50.30 DDD (DEGENERATIVE DISC DISEASE), CERVICAL: ICD-10-CM

## 2019-06-06 DIAGNOSIS — M51.36 DDD (DEGENERATIVE DISC DISEASE), LUMBAR: ICD-10-CM

## 2019-06-06 LAB
GFR AFRICAN AMERICAN: >60
GFR NON-AFRICAN AMERICAN: >60
PERFORMED ON: NORMAL
POC CREATININE: 0.9 MG/DL (ref 0.6–1.2)
POC SAMPLE TYPE: NORMAL

## 2019-06-06 PROCEDURE — 72156 MRI NECK SPINE W/O & W/DYE: CPT

## 2019-06-06 PROCEDURE — A9577 INJ MULTIHANCE: HCPCS | Performed by: PHYSICAL MEDICINE & REHABILITATION

## 2019-06-06 PROCEDURE — 6360000004 HC RX CONTRAST MEDICATION: Performed by: PHYSICAL MEDICINE & REHABILITATION

## 2019-06-06 PROCEDURE — 72158 MRI LUMBAR SPINE W/O & W/DYE: CPT

## 2019-06-06 RX ORDER — 0.9 % SODIUM CHLORIDE 0.9 %
10 VIAL (ML) INJECTION ONCE
Status: DISCONTINUED | OUTPATIENT
Start: 2019-06-06 | End: 2019-06-09 | Stop reason: HOSPADM

## 2019-06-06 RX ADMIN — GADOBENATE DIMEGLUMINE 20 ML: 529 INJECTION, SOLUTION INTRAVENOUS at 12:32

## 2019-06-18 PROBLEM — M14.60 CHARCOT'S ARTHROPATHY: Status: ACTIVE | Noted: 2018-01-25

## 2019-06-18 PROBLEM — Q76.1 CERVICAL FUSION SYNDROME: Status: ACTIVE | Noted: 2019-06-18

## 2019-11-05 DIAGNOSIS — I10 ESSENTIAL HYPERTENSION: ICD-10-CM

## 2019-11-05 RX ORDER — CARVEDILOL 25 MG/1
TABLET ORAL
Qty: 180 TABLET | Refills: 2 | Status: SHIPPED | OUTPATIENT
Start: 2019-11-05 | End: 2020-04-08

## 2019-11-13 DIAGNOSIS — F33.1 MODERATE EPISODE OF RECURRENT MAJOR DEPRESSIVE DISORDER (HCC): ICD-10-CM

## 2019-11-13 RX ORDER — LEVOTHYROXINE SODIUM 0.05 MG/1
50 TABLET ORAL DAILY
Qty: 90 TABLET | Refills: 0 | Status: SHIPPED | OUTPATIENT
Start: 2019-11-13 | End: 2020-01-02

## 2019-11-13 RX ORDER — BUPROPION HYDROCHLORIDE 150 MG/1
TABLET ORAL
Qty: 180 TABLET | Refills: 0 | Status: SHIPPED | OUTPATIENT
Start: 2019-11-13 | End: 2020-01-02

## 2019-11-18 ENCOUNTER — OFFICE VISIT (OUTPATIENT)
Dept: FAMILY MEDICINE CLINIC | Age: 74
End: 2019-11-18
Payer: MEDICARE

## 2019-11-18 VITALS
SYSTOLIC BLOOD PRESSURE: 136 MMHG | RESPIRATION RATE: 22 BRPM | OXYGEN SATURATION: 95 % | HEIGHT: 60 IN | HEART RATE: 77 BPM | DIASTOLIC BLOOD PRESSURE: 80 MMHG | TEMPERATURE: 97.9 F | WEIGHT: 228.6 LBS | BODY MASS INDEX: 44.88 KG/M2

## 2019-11-18 DIAGNOSIS — E78.2 MIXED HYPERLIPIDEMIA: ICD-10-CM

## 2019-11-18 DIAGNOSIS — M14.672 CHARCOT ANKLE, LEFT: ICD-10-CM

## 2019-11-18 DIAGNOSIS — I21.4 NSTEMI (NON-ST ELEVATED MYOCARDIAL INFARCTION) (HCC): ICD-10-CM

## 2019-11-18 DIAGNOSIS — D64.9 ANEMIA, UNSPECIFIED TYPE: ICD-10-CM

## 2019-11-18 DIAGNOSIS — M43.10 SPONDYLOLISTHESIS, UNSPECIFIED SPINAL REGION: ICD-10-CM

## 2019-11-18 DIAGNOSIS — F33.1 MODERATE EPISODE OF RECURRENT MAJOR DEPRESSIVE DISORDER (HCC): ICD-10-CM

## 2019-11-18 DIAGNOSIS — Z23 NEED FOR INFLUENZA VACCINATION: ICD-10-CM

## 2019-11-18 DIAGNOSIS — M48.02 CERVICAL STENOSIS OF SPINAL CANAL: Primary | ICD-10-CM

## 2019-11-18 DIAGNOSIS — E03.9 HYPOTHYROIDISM, UNSPECIFIED TYPE: ICD-10-CM

## 2019-11-18 DIAGNOSIS — M48.02 CERVICAL STENOSIS OF SPINAL CANAL: ICD-10-CM

## 2019-11-18 PROCEDURE — 99214 OFFICE O/P EST MOD 30 MIN: CPT | Performed by: NURSE PRACTITIONER

## 2019-11-18 PROCEDURE — G8417 CALC BMI ABV UP PARAM F/U: HCPCS | Performed by: NURSE PRACTITIONER

## 2019-11-18 PROCEDURE — 1123F ACP DISCUSS/DSCN MKR DOCD: CPT | Performed by: NURSE PRACTITIONER

## 2019-11-18 PROCEDURE — G0008 ADMIN INFLUENZA VIRUS VAC: HCPCS | Performed by: NURSE PRACTITIONER

## 2019-11-18 PROCEDURE — 4040F PNEUMOC VAC/ADMIN/RCVD: CPT | Performed by: NURSE PRACTITIONER

## 2019-11-18 PROCEDURE — 90653 IIV ADJUVANT VACCINE IM: CPT | Performed by: NURSE PRACTITIONER

## 2019-11-18 PROCEDURE — G8482 FLU IMMUNIZE ORDER/ADMIN: HCPCS | Performed by: NURSE PRACTITIONER

## 2019-11-18 PROCEDURE — 1036F TOBACCO NON-USER: CPT | Performed by: NURSE PRACTITIONER

## 2019-11-18 PROCEDURE — 1090F PRES/ABSN URINE INCON ASSESS: CPT | Performed by: NURSE PRACTITIONER

## 2019-11-18 PROCEDURE — G8599 NO ASA/ANTIPLAT THER USE RNG: HCPCS | Performed by: NURSE PRACTITIONER

## 2019-11-18 PROCEDURE — G8427 DOCREV CUR MEDS BY ELIG CLIN: HCPCS | Performed by: NURSE PRACTITIONER

## 2019-11-18 PROCEDURE — G8399 PT W/DXA RESULTS DOCUMENT: HCPCS | Performed by: NURSE PRACTITIONER

## 2019-11-18 RX ORDER — GABAPENTIN 600 MG/1
TABLET ORAL
Qty: 450 TABLET | Refills: 1 | Status: SHIPPED | OUTPATIENT
Start: 2019-11-18 | End: 2019-11-19 | Stop reason: SDUPTHER

## 2019-11-18 RX ORDER — ATORVASTATIN CALCIUM 20 MG/1
20 TABLET, FILM COATED ORAL NIGHTLY
Qty: 90 TABLET | Refills: 2 | Status: SHIPPED | OUTPATIENT
Start: 2019-11-18 | End: 2019-11-19 | Stop reason: SDUPTHER

## 2019-11-18 RX ORDER — DULOXETIN HYDROCHLORIDE 30 MG/1
30 CAPSULE, DELAYED RELEASE ORAL DAILY
Qty: 90 CAPSULE | Refills: 2 | Status: SHIPPED | OUTPATIENT
Start: 2019-11-18 | End: 2019-11-19 | Stop reason: SDUPTHER

## 2019-11-18 ASSESSMENT — ENCOUNTER SYMPTOMS
SHORTNESS OF BREATH: 0
BACK PAIN: 1
CHEST TIGHTNESS: 0

## 2019-11-19 RX ORDER — GABAPENTIN 600 MG/1
TABLET ORAL
Qty: 450 TABLET | Refills: 1 | Status: SHIPPED | OUTPATIENT
Start: 2019-11-19 | End: 2020-03-17

## 2019-11-19 RX ORDER — ATORVASTATIN CALCIUM 20 MG/1
20 TABLET, FILM COATED ORAL NIGHTLY
Qty: 90 TABLET | Refills: 2 | Status: SHIPPED | OUTPATIENT
Start: 2019-11-19 | End: 2020-06-12

## 2019-11-19 RX ORDER — DULOXETIN HYDROCHLORIDE 30 MG/1
30 CAPSULE, DELAYED RELEASE ORAL DAILY
Qty: 90 CAPSULE | Refills: 2 | Status: SHIPPED | OUTPATIENT
Start: 2019-11-19 | End: 2020-07-01

## 2020-01-02 RX ORDER — BUPROPION HYDROCHLORIDE 150 MG/1
TABLET ORAL
Qty: 180 TABLET | Refills: 0 | Status: SHIPPED | OUTPATIENT
Start: 2020-01-02 | End: 2020-03-12

## 2020-01-02 RX ORDER — LEVOTHYROXINE SODIUM 0.05 MG/1
50 TABLET ORAL DAILY
Qty: 90 TABLET | Refills: 0 | Status: SHIPPED | OUTPATIENT
Start: 2020-01-02 | End: 2020-02-14 | Stop reason: SDUPTHER

## 2020-01-02 RX ORDER — POTASSIUM CHLORIDE 750 MG/1
TABLET, EXTENDED RELEASE ORAL
Qty: 180 TABLET | Refills: 2 | Status: SHIPPED | OUTPATIENT
Start: 2020-01-02 | End: 2020-08-12

## 2020-01-02 NOTE — TELEPHONE ENCOUNTER
Pharmacy  requesting medication refill. Please approve or deny this request.    Rx requested:  Requested Prescriptions     Pending Prescriptions Disp Refills    levothyroxine (SYNTHROID) 50 MCG tablet [Pharmacy Med Name: LEVOTHYROXINE  50MCG  TAB] 90 tablet 0     Sig: TAKE 1 TABLET BY MOUTH  DAILY    buPROPion (WELLBUTRIN XL) 150 MG extended release tablet [Pharmacy Med Name: BUPROPION  150MG  TAB  XL 24 HR] 180 tablet 0     Sig: TAKE 2 TABLETS BY MOUTH  EVERY MORNING         Last Office Visit:   11/18/2019      Next Visit Date:  No future appointments.

## 2020-02-03 DIAGNOSIS — E78.2 MIXED HYPERLIPIDEMIA: ICD-10-CM

## 2020-02-03 DIAGNOSIS — E03.9 HYPOTHYROIDISM, UNSPECIFIED TYPE: ICD-10-CM

## 2020-02-03 DIAGNOSIS — D64.9 ANEMIA, UNSPECIFIED TYPE: ICD-10-CM

## 2020-02-03 LAB
ALBUMIN SERPL-MCNC: 4.1 G/DL (ref 3.5–4.6)
ALP BLD-CCNC: 84 U/L (ref 40–130)
ALT SERPL-CCNC: 11 U/L (ref 0–33)
ANION GAP SERPL CALCULATED.3IONS-SCNC: 12 MEQ/L (ref 9–15)
AST SERPL-CCNC: 24 U/L (ref 0–35)
BASOPHILS ABSOLUTE: 0.1 K/UL (ref 0–0.2)
BASOPHILS RELATIVE PERCENT: 1.1 %
BILIRUB SERPL-MCNC: 0.6 MG/DL (ref 0.2–0.7)
BUN BLDV-MCNC: 23 MG/DL (ref 8–23)
CALCIUM SERPL-MCNC: 9.3 MG/DL (ref 8.5–9.9)
CHLORIDE BLD-SCNC: 106 MEQ/L (ref 95–107)
CHOLESTEROL, TOTAL: 137 MG/DL (ref 0–199)
CO2: 23 MEQ/L (ref 20–31)
CREAT SERPL-MCNC: 0.91 MG/DL (ref 0.5–0.9)
EOSINOPHILS ABSOLUTE: 0.1 K/UL (ref 0–0.7)
EOSINOPHILS RELATIVE PERCENT: 2.5 %
GFR AFRICAN AMERICAN: >60
GFR NON-AFRICAN AMERICAN: >60
GLOBULIN: 2.3 G/DL (ref 2.3–3.5)
GLUCOSE BLD-MCNC: 97 MG/DL (ref 70–99)
HCT VFR BLD CALC: 43 % (ref 37–47)
HDLC SERPL-MCNC: 43 MG/DL (ref 40–59)
HEMOGLOBIN: 14.3 G/DL (ref 12–16)
LDL CHOLESTEROL CALCULATED: 63 MG/DL (ref 0–129)
LYMPHOCYTES ABSOLUTE: 1.6 K/UL (ref 1–4.8)
LYMPHOCYTES RELATIVE PERCENT: 27.2 %
MCH RBC QN AUTO: 31.6 PG (ref 27–31.3)
MCHC RBC AUTO-ENTMCNC: 33.3 % (ref 33–37)
MCV RBC AUTO: 95 FL (ref 82–100)
MONOCYTES ABSOLUTE: 0.5 K/UL (ref 0.2–0.8)
MONOCYTES RELATIVE PERCENT: 7.8 %
NEUTROPHILS ABSOLUTE: 3.6 K/UL (ref 1.4–6.5)
NEUTROPHILS RELATIVE PERCENT: 61.4 %
PDW BLD-RTO: 14.5 % (ref 11.5–14.5)
PLATELET # BLD: 202 K/UL (ref 130–400)
POTASSIUM SERPL-SCNC: 4.5 MEQ/L (ref 3.4–4.9)
RBC # BLD: 4.53 M/UL (ref 4.2–5.4)
SODIUM BLD-SCNC: 141 MEQ/L (ref 135–144)
TOTAL PROTEIN: 6.4 G/DL (ref 6.3–8)
TRIGL SERPL-MCNC: 156 MG/DL (ref 0–150)
TSH REFLEX: 1.17 UIU/ML (ref 0.44–3.86)
WBC # BLD: 5.9 K/UL (ref 4.8–10.8)

## 2020-02-11 ENCOUNTER — OFFICE VISIT (OUTPATIENT)
Dept: FAMILY MEDICINE CLINIC | Age: 75
End: 2020-02-11
Payer: MEDICARE

## 2020-02-11 VITALS
OXYGEN SATURATION: 99 % | SYSTOLIC BLOOD PRESSURE: 120 MMHG | HEIGHT: 60 IN | BODY MASS INDEX: 44.84 KG/M2 | WEIGHT: 228.4 LBS | TEMPERATURE: 98.5 F | HEART RATE: 73 BPM | DIASTOLIC BLOOD PRESSURE: 84 MMHG

## 2020-02-11 PROCEDURE — 1090F PRES/ABSN URINE INCON ASSESS: CPT | Performed by: NURSE PRACTITIONER

## 2020-02-11 PROCEDURE — G8427 DOCREV CUR MEDS BY ELIG CLIN: HCPCS | Performed by: NURSE PRACTITIONER

## 2020-02-11 PROCEDURE — 1123F ACP DISCUSS/DSCN MKR DOCD: CPT | Performed by: NURSE PRACTITIONER

## 2020-02-11 PROCEDURE — G8399 PT W/DXA RESULTS DOCUMENT: HCPCS | Performed by: NURSE PRACTITIONER

## 2020-02-11 PROCEDURE — G8482 FLU IMMUNIZE ORDER/ADMIN: HCPCS | Performed by: NURSE PRACTITIONER

## 2020-02-11 PROCEDURE — 1036F TOBACCO NON-USER: CPT | Performed by: NURSE PRACTITIONER

## 2020-02-11 PROCEDURE — G8417 CALC BMI ABV UP PARAM F/U: HCPCS | Performed by: NURSE PRACTITIONER

## 2020-02-11 PROCEDURE — 4040F PNEUMOC VAC/ADMIN/RCVD: CPT | Performed by: NURSE PRACTITIONER

## 2020-02-11 PROCEDURE — 99213 OFFICE O/P EST LOW 20 MIN: CPT | Performed by: NURSE PRACTITIONER

## 2020-02-11 ASSESSMENT — ENCOUNTER SYMPTOMS
COUGH: 0
SHORTNESS OF BREATH: 0

## 2020-02-11 NOTE — PROGRESS NOTES
Subjective  Chief Complaint   Patient presents with    Discuss Medications     ADD meds     Annual Exam       HPI     Pt states that she has always had issues with organizing thoughts and being scattered. 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 She is here today to discuss options of treatment.      Patient Active Problem List    Diagnosis Date Noted    Hematuria 06/20/2017     Priority: High    High risk medication use - 01/30/18 OARRS PM&R, 03/26/18 OARRS PM&R, 04/03/18 Urine Drug Screen positive opiates PM&R, 01/31/18 Med Contract PM&R 06/11/2013     Priority: High    Cervical fusion syndrome 06/18/2019    Racing heart beat 06/08/2018    Chronic pain syndrome 04/03/2018    Charcot's arthropathy 01/25/2018    Below knee amputation status, right 01/25/2018    Moderate episode of recurrent major depressive disorder ([address] 09/28/2017    Chronic low back pain with sciatica 09/13/2017    Noncompliance - No Show inject 07/17/17, No Show F/U 1/11/18 08/01/2017    Hematuria, gross     Primary osteoarthritis involving multiple joints 05/11/2017    Left breast mass 12/19/2016    Anemia 07/11/2016    Cancer ([address] 07/11/2016    Morbid obesity due to excess calories ([address] 07/11/2016    Reactive depression 07/11/2016    Sleeping excessive 07/11/2016    Cervical spinal cord injury ([address] 06/10/2016    Chronic low back pain 06/10/2016    Other specified postprocedural states 06/10/2016    Fatigue due to treatment 04/11/2016    Lobular breast cancer ([address] 10/26/2015    Traumatic amputation of one lower extremity below knee with complication ([address] 23/78/2481    Acquired hallux valgus 07/13/2015    Complete traumatic amputation at level between right knee and ankle ([address] 07/13/2015    Hypothyroid 06/12/2015    Melancholia 03/16/2015    Complete tear of left rotator cuff 03/16/2015    Generalized osteoarthritis 03/16/2015    Drug use: No    Sexual activity: Never   Lifestyle    Physical activity:     Days per week: None     Minutes per session: None    Stress: None   Relationships    Social connections:     Talks on phone: None     Gets together: None     Attends Buddhist service: None     Active member of club or organization: None     Attends meetings of clubs or organizations: None     Relationship status: None    Intimate partner violence:     Fear of current or ex partner: None     Emotionally abused: None     Physically abused: None     Forced sexual activity: None   Other Topics Concern    None   Social History Narrative    None     Current Outpatient Medications on File Prior to Visit   Medication Sig Dispense Refill    levothyroxine (SYNTHROID) 50 MCG tablet TAKE 1 TABLET BY MOUTH  DAILY 90 tablet 0    buPROPion (WELLBUTRIN XL) 150 MG extended release tablet TAKE 2 TABLETS BY MOUTH  EVERY MORNING 180 tablet 0    potassium chloride (KLOR-CON M) 10 MEQ extended release tablet TAKE 1 TABLET BY MOUTH TWO  TIMES DAILY 180 tablet 2    diclofenac (VOLTAREN) 50 MG EC tablet TAKE 1 TABLET BY MOUTH TWO  TIMES DAILY 180 tablet 0    DULoxetine (CYMBALTA) 30 MG extended release capsule Take 1 capsule by mouth daily 90 capsule 2    gabapentin (NEURONTIN) 600 MG tablet TAKE 1 TABLET BY MOUTH IN  THE MORNING, 2 TABS IN THE  AFTERNOON, AND 2 TABS IN  THE EVENING AS TOLERATED 450 tablet 1    atorvastatin (LIPITOR) 20 MG tablet Take 1 tablet by mouth nightly 90 tablet 2    carvedilol (COREG) 25 MG tablet TAKE 1 TABLET BY MOUTH TWO  TIMES DAILY 180 tablet 2    acyclovir (ZOVIRAX) 5 % CREA Apply topically as needed 5 g 1    b complex vitamins capsule Take 1 capsule by mouth daily      Multiple Vitamins-Minerals (CENTRUM SILVER 50+WOMEN PO) Take by mouth      tamoxifen (NOLVADEX) 20 MG tablet Take 1 tablet by mouth daily      Vitamin D (CHOLECALCIFEROL) 1000 UNITS CAPS capsule Take 1,000 Units by mouth daily.       naloxone

## 2020-02-14 RX ORDER — LEVOTHYROXINE SODIUM 0.05 MG/1
50 TABLET ORAL DAILY
Qty: 90 TABLET | Refills: 0 | Status: SHIPPED | OUTPATIENT
Start: 2020-02-14 | End: 2020-06-04

## 2020-02-14 NOTE — TELEPHONE ENCOUNTER
Pt states that [facility] wouldn't allow these, for the last time you refilled. She is almost out of these meds.  lov 5/1/19

## 2020-02-28 ENCOUNTER — OFFICE VISIT (OUTPATIENT)
Dept: BEHAVIORAL/MENTAL HEALTH CLINIC | Age: 75
End: 2020-02-28
Payer: MEDICARE

## 2020-02-28 PROBLEM — F32.0 CURRENT MILD EPISODE OF MAJOR DEPRESSIVE DISORDER (HCC): Status: ACTIVE | Noted: 2020-02-28

## 2020-02-28 PROCEDURE — 90792 PSYCH DIAG EVAL W/MED SRVCS: CPT | Performed by: PSYCHIATRY & NEUROLOGY

## 2020-02-28 PROCEDURE — 1036F TOBACCO NON-USER: CPT | Performed by: PSYCHIATRY & NEUROLOGY

## 2020-02-28 NOTE — PROGRESS NOTES
Outpatient Medications:     levothyroxine (SYNTHROID) 50 MCG tablet, Take 1 tablet by mouth daily, Disp: 90 tablet, Rfl: 0    diclofenac (VOLTAREN) 50 MG EC tablet, TAKE 1 TABLET BY MOUTH TWO  TIMES DAILY, Disp: 180 tablet, Rfl: 0    buPROPion (WELLBUTRIN XL) 150 MG extended release tablet, TAKE 2 TABLETS BY MOUTH  EVERY MORNING, Disp: 180 tablet, Rfl: 0    potassium chloride (KLOR-CON M) 10 MEQ extended release tablet, TAKE 1 TABLET BY MOUTH TWO  TIMES DAILY, Disp: 180 tablet, Rfl: 2    DULoxetine (CYMBALTA) 30 MG extended release capsule, Take 1 capsule by mouth daily, Disp: 90 capsule, Rfl: 2    gabapentin (NEURONTIN) 600 MG tablet, TAKE 1 TABLET BY MOUTH IN  THE MORNING, 2 TABS IN THE  AFTERNOON, AND 2 TABS IN  THE EVENING AS TOLERATED, Disp: 450 tablet, Rfl: 1    atorvastatin (LIPITOR) 20 MG tablet, Take 1 tablet by mouth nightly, Disp: 90 tablet, Rfl: 2    carvedilol (COREG) 25 MG tablet, TAKE 1 TABLET BY MOUTH TWO  TIMES DAILY, Disp: 180 tablet, Rfl: 2    acyclovir (ZOVIRAX) 5 % CREA, Apply topically as needed, Disp: 5 g, Rfl: 1    naloxone (NARCAN) 4 MG/0.1ML LIQD nasal spray, 1 spray by Nasal route as needed for Opioid Reversal (Patient not taking: Reported on 2/11/2020), Disp: 1 each, Rfl: 2    b complex vitamins capsule, Take 1 capsule by mouth daily, Disp: , Rfl:     Multiple Vitamins-Minerals (CENTRUM SILVER 50+WOMEN PO), Take by mouth, Disp: , Rfl:     tamoxifen (NOLVADEX) 20 MG tablet, Take 1 tablet by mouth daily, Disp: , Rfl:     Vitamin D (CHOLECALCIFEROL) 1000 UNITS CAPS capsule, Take 1,000 Units by mouth daily. , Disp: , Rfl:     Allergies  Allergies   Allergen Reactions    Ciprofloxacin Itching    Oxycontin [Oxycodone Hcl] Itching       Past Psychiatric History (over the past 6 months, unless otherwise specified):  Previous diagnoses/symptoms: Depression  Previous therapy: had seen a therapist  Self-injurious behavior/risky thoughts or behaviors (past suicidal ideation/attempt): 09/01/2010    Osteoporosis 02/05/2009    Postlaminectomy syndrome of cervical region 10/22/2009    Postlaminectomy syndrome of lumbar region 10/22/2009    Tibialis tendinitis 12/12/2007    Hereditary and idiopathic peripheral neuropathy ([address] 06/18/2009    Hypothyroid 06/12/2015    Lobular breast cancer ([address] 10/26/2015    Traumatic amputation of one lower extremity below knee with complication ([address] 74/60/2956    Acquired hallux valgus 07/13/2015    Fatigue due to treatment 04/11/2016    Anemia 07/11/2016    Cancer ([address] 07/11/2016    Cervical spinal cord injury ([address] 06/10/2016    Complete traumatic amputation at level between right knee and ankle ([address] 07/13/2015    Morbid obesity due to excess calories ([address] 07/11/2016    Reactive depression 07/11/2016    Sleeping excessive 07/11/2016    Chronic low back pain 06/10/2016    Left breast mass 12/19/2016    Other specified postprocedural states 06/10/2016    Primary osteoarthritis involving multiple joints 05/11/2017    Hematuria 06/20/2017    Hematuria, gross     Noncompliance - No Show inject 07/17/17, No Show F/U 1/11/18 08/01/2017    Chronic low back pain with sciatica 09/13/2017    Moderate episode of recurrent major depressive disorder ([address] 09/28/2017    Charcot's arthropathy 01/25/2018    Below knee amputation status, right 01/25/2018    Chronic pain syndrome 04/03/2018    Racing heart beat 06/08/2018    Cervical fusion syndrome 06/18/2019    Current mild episode of major depressive disorder ([address] 02/28/2020     Resolved Ambulatory Problems     Diagnosis Date Noted    Hypertension     Arthritis     Hyperlipidemia     Spondylolisthesis 12/27/2012    Back pain OARRS RUN 11/9/16 12/27/2012    Cervical stenosis of spinal canal 12/27/2012    DDD (degenerative disc disease), lumbar 02/12/2013    Shoulder pain, bilateral 02/12/2013    Thoracic back pain 02/12/2013    Obesity (BMI 30-39.9) 02/12/2013    Depression energy. Pt had a sleep study done 6-7 years ago, did not warrant a CPAP at that time. Pt reports poor sleep, daytime somnolence and fatigue, and low energy during the day. Currently on cymbalta and wellbutrin. Pt has a history of palpitations and AFIB, s/p ablation, due to cardiac risk, will request cardiologist clear patient prior to starting stimulant medications. Perhaps more importantly, pt may benefit from sleep study. Administered MOCA today. Pt scored perfect 30/30, no gross cognitive dysfunction. Recent TSH WNL (1.17) 2/3/20. EKG 2/14/2019 Qtc 493 regular      Pt is jodie for safety and denies thoughts of SI/HI. Amenable to plan. No acute concerns to address regarding medications.      Medical Diagnoses:  Patient Active Problem List   Diagnosis    Neck pain on right side    Obesity    High risk medication use - 01/30/18 OARRS PM&R, 03/26/18 OARRS PM&R, 04/03/18 Urine Drug Screen positive opiates PM&R, 01/31/18 Med Contract PM&R    Impaired mobility and activities of daily living    Hx of right BKA ([facility]    Family history of coronary artery disease    NSTEMI (non-ST elevated myocardial infarction) (HCC)    Arthritis, neuropathic    Charcot's joint of foot    Closed dislocation of ankle    Closed dislocation of foot    Complete rotator cuff rupture of left shoulder    Clinical depression    Difficulty walking    Benign essential HTN    Acquired flat foot    Closed fracture of tarsal and metatarsal bones    Myogenic ptosis    Disorder of peripheral nervous system    Arthritis of ankle or foot, degenerative    HLD (hyperlipidemia)    Cervical post-laminectomy syndrome    Failed back syndrome of lumbar spine    Low back pain with sciatica    Fibromyalgia muscle pain    Malaise and fatigue    Melancholia    Complete tear of left rotator cuff    Generalized osteoarthritis    Peripheral neuropathy    Osteoporosis    Postlaminectomy syndrome of cervical region    Postlaminectomy syndrome of lumbar region    Tibialis tendinitis    Hereditary and idiopathic peripheral neuropathy ([address]    Hypothyroid    Lobular breast cancer ([address]    Traumatic amputation of one lower extremity below knee with complication ([address]    Acquired hallux valgus    Fatigue due to treatment    Anemia    Cancer ([address]    Cervical spinal cord injury ([address]    Complete traumatic amputation at level between right knee and ankle ([address]    Morbid obesity due to excess calories ([address]    Reactive depression    Sleeping excessive    Chronic low back pain    Left breast mass    Other specified postprocedural states    Primary osteoarthritis involving multiple joints    Hematuria    Hematuria, gross    Noncompliance - No Show inject 07/17/17, No Show F/U 1/11/18    Chronic low back pain with sciatica    Moderate episode of recurrent major depressive disorder (HCC)    Charcot's arthropathy    Below knee amputation status, right    Chronic pain syndrome    Racing heart beat    Cervical fusion syndrome    Current mild episode of major depressive disorder ([facility]       Psychiatric Diagnoses:  1. Current mild episode of major depressive disorder, unspecified whether recurrent ([address]      Plan:  -  No changes to medications today  -  Start/Continue Cymbalta 30 mg   -  Wellbutrin  mg    -  Referral for cardiac clearance prior to initiating stimulants (prolonged Qtc, cardiac arrhythmia in history s/p ablation)   -  Sleep medicine referral, high risk for sleep apnea   -  Crisis plan reviewed and patient verbally contracts for safety. Go to ED with emergent symptoms or safety concerns.  -  Risks, benefits, side effects of medications, including any / all black box warnings, discussed with patient, who verbalizes their understanding.      Disposition:  home    Follow Up:  Return in 1 month, or call in the interim for any side-effects, decompensation, questions, or problems between now and the next visit. Thais Ball MD  Psychiatrist  [place] [phone]      Psychiatry     1 hour spent with patient in face to face encounter

## 2020-03-12 RX ORDER — BUPROPION HYDROCHLORIDE 150 MG/1
TABLET ORAL
Qty: 180 TABLET | Refills: 0 | Status: SHIPPED | OUTPATIENT
Start: 2020-03-12 | End: 2020-06-04

## 2020-03-17 RX ORDER — GABAPENTIN 600 MG/1
TABLET ORAL
Qty: 450 TABLET | Refills: 1 | Status: SHIPPED | OUTPATIENT
Start: 2020-03-17 | End: 2020-09-06

## 2020-04-02 ENCOUNTER — VIRTUAL VISIT (OUTPATIENT)
Dept: PULMONOLOGY | Age: 75
End: 2020-04-02
Payer: MEDICARE

## 2020-04-02 PROCEDURE — G8428 CUR MEDS NOT DOCUMENT: HCPCS | Performed by: INTERNAL MEDICINE

## 2020-04-02 PROCEDURE — G8399 PT W/DXA RESULTS DOCUMENT: HCPCS | Performed by: INTERNAL MEDICINE

## 2020-04-02 PROCEDURE — 4040F PNEUMOC VAC/ADMIN/RCVD: CPT | Performed by: INTERNAL MEDICINE

## 2020-04-02 PROCEDURE — 1090F PRES/ABSN URINE INCON ASSESS: CPT | Performed by: INTERNAL MEDICINE

## 2020-04-02 PROCEDURE — 99204 OFFICE O/P NEW MOD 45 MIN: CPT | Performed by: INTERNAL MEDICINE

## 2020-04-02 PROCEDURE — 1123F ACP DISCUSS/DSCN MKR DOCD: CPT | Performed by: INTERNAL MEDICINE

## 2020-04-02 ASSESSMENT — ENCOUNTER SYMPTOMS
EYE DISCHARGE: 0
EYE ITCHING: 0
RHINORRHEA: 0
WHEEZING: 0
SHORTNESS OF BREATH: 0
VOICE CHANGE: 0
TROUBLE SWALLOWING: 0
ABDOMINAL PAIN: 0
SORE THROAT: 0
DIARRHEA: 0
COUGH: 0
VOMITING: 0
CHEST TIGHTNESS: 0
NAUSEA: 0
SINUS PRESSURE: 0

## 2020-04-02 NOTE — PROGRESS NOTES
Provider   gabapentin (NEURONTIN) 600 MG tablet TAKE 1 TABLET BY MOUTH IN  THE MORNING, 2 TABS IN THE  AFTERNOON, AND 2 TABS IN  THE EVENING AS TOLERATED  Ja Shepherd MD   buPROPion (WELLBUTRIN XL) 150 MG extended release tablet TAKE 2 TABLETS BY MOUTH  EVERY MORNING  Ja Shepherd MD   levothyroxine (SYNTHROID) 50 MCG tablet Take 1 tablet by mouth daily  Ja Shepherd MD   diclofenac (VOLTAREN) 50 MG EC tablet TAKE 1 TABLET BY MOUTH TWO  TIMES DAILY  Ja Shepherd MD   potassium chloride (KLOR-CON M) 10 MEQ extended release tablet TAKE 1 TABLET BY MOUTH TWO  TIMES DAILY  Ja Shepherd MD   DULoxetine (CYMBALTA) 30 MG extended release capsule Take 1 capsule by mouth daily  Ja Shepherd MD   atorvastatin (LIPITOR) 20 MG tablet Take 1 tablet by mouth nightly  Ja Shepherd MD   carvedilol (COREG) 25 MG tablet TAKE 1 TABLET BY MOUTH TWO  TIMES DAILY  Ja Shepherd MD   acyclovir (ZOVIRAX) 5 % CREA Apply topically as needed  Ja Shepherd MD   naloxone [facility]) 4 MG/0.1ML LIQD nasal spray 1 spray by Nasal route as needed for Opioid Reversal  Patient not taking: Reported on 2/11/2020  Karyn Silva DO   b complex vitamins capsule Take 1 capsule by mouth daily  Historical Provider, MD   Multiple Vitamins-Minerals (CENTRUM SILVER 50+WOMEN PO) Take by mouth  Historical Provider, MD   tamoxifen (NOLVADEX) 20 MG tablet Take 1 tablet by mouth daily  Historical Provider, MD   Vitamin D (CHOLECALCIFEROL) 1000 UNITS CAPS capsule Take 1,000 Units by mouth daily. Historical Provider, MD       Social History     Tobacco Use    Smoking status: Never Smoker    Smokeless tobacco: Never Used   Substance Use Topics    Alcohol use: Yes     Comment: social    Drug use:  No            PHYSICAL EXAMINATION:  [ INSTRUCTIONS:  \"[x]\" Indicates a positive item  \"[]\" Indicates a negative item  -- DELETE ALL ITEMS NOT EXAMINED]  Vital Signs: (As obtained by patient/caregiver or practitioner observation)    Blood pressure-  Heart rate-

## 2020-04-08 RX ORDER — CARVEDILOL 25 MG/1
TABLET ORAL
Qty: 180 TABLET | Refills: 2 | Status: SHIPPED | OUTPATIENT
Start: 2020-04-08 | End: 2021-01-11

## 2020-05-12 ENCOUNTER — HOSPITAL ENCOUNTER (OUTPATIENT)
Dept: SLEEP CENTER | Age: 75
Discharge: HOME | End: 2020-05-14
Payer: MEDICARE

## 2020-05-12 PROCEDURE — 95810 POLYSOM 6/> YRS 4/> PARAM: CPT | Performed by: INTERNAL MEDICINE

## 2020-05-12 PROCEDURE — 95810 POLYSOM 6/> YRS 4/> PARAM: CPT

## 2020-05-26 ENCOUNTER — VIRTUAL VISIT (OUTPATIENT)
Dept: FAMILY MEDICINE CLINIC | Age: 75
End: 2020-05-26
Payer: MEDICARE

## 2020-05-26 ENCOUNTER — HOSPITAL ENCOUNTER (OUTPATIENT)
Dept: SLEEP CENTER | Age: 75
Discharge: HOME | End: 2020-05-28
Payer: MEDICARE

## 2020-05-26 PROCEDURE — 4040F PNEUMOC VAC/ADMIN/RCVD: CPT | Performed by: NURSE PRACTITIONER

## 2020-05-26 PROCEDURE — 95811 POLYSOM 6/>YRS CPAP 4/> PARM: CPT | Performed by: INTERNAL MEDICINE

## 2020-05-26 PROCEDURE — 1123F ACP DISCUSS/DSCN MKR DOCD: CPT | Performed by: NURSE PRACTITIONER

## 2020-05-26 PROCEDURE — 95811 POLYSOM 6/>YRS CPAP 4/> PARM: CPT

## 2020-05-26 PROCEDURE — G0438 PPPS, INITIAL VISIT: HCPCS | Performed by: NURSE PRACTITIONER

## 2020-05-26 ASSESSMENT — LIFESTYLE VARIABLES
HOW OFTEN DURING THE LAST YEAR HAVE YOU BEEN UNABLE TO REMEMBER WHAT HAPPENED THE NIGHT BEFORE BECAUSE YOU HAD BEEN DRINKING: 0
HOW OFTEN DURING THE LAST YEAR HAVE YOU NEEDED AN ALCOHOLIC DRINK FIRST THING IN THE MORNING TO GET YOURSELF GOING AFTER A NIGHT OF HEAVY DRINKING: 0
AUDIT-C TOTAL SCORE: 3
HOW MANY STANDARD DRINKS CONTAINING ALCOHOL DO YOU HAVE ON A TYPICAL DAY: 0
HOW OFTEN DURING THE LAST YEAR HAVE YOU HAD A FEELING OF GUILT OR REMORSE AFTER DRINKING: 0
AUDIT TOTAL SCORE: 3
HOW OFTEN DURING THE LAST YEAR HAVE YOU FAILED TO DO WHAT WAS NORMALLY EXPECTED FROM YOU BECAUSE OF DRINKING: 0
HAVE YOU OR SOMEONE ELSE BEEN INJURED AS A RESULT OF YOUR DRINKING: 0
HOW OFTEN DO YOU HAVE SIX OR MORE DRINKS ON ONE OCCASION: 0
HAS A RELATIVE, FRIEND, DOCTOR, OR ANOTHER HEALTH PROFESSIONAL EXPRESSED CONCERN ABOUT YOUR DRINKING OR SUGGESTED YOU CUT DOWN: 0
HOW OFTEN DURING THE LAST YEAR HAVE YOU FOUND THAT YOU WERE NOT ABLE TO STOP DRINKING ONCE YOU HAD STARTED: 0
HOW OFTEN DO YOU HAVE A DRINK CONTAINING ALCOHOL: 3

## 2020-05-26 ASSESSMENT — PATIENT HEALTH QUESTIONNAIRE - PHQ9
SUM OF ALL RESPONSES TO PHQ QUESTIONS 1-9: 2
SUM OF ALL RESPONSES TO PHQ QUESTIONS 1-9: 2

## 2020-05-26 NOTE — PROGRESS NOTES
ready to increase his/her physical activity level at this time    Hearing/Vision:  No exam data present  Hearing/Vision  Do you or your family notice any trouble with your hearing?: No  Do you have difficulty driving, watching TV, or doing any of your daily activities because of your eyesight?: (!) Yes(driving only)  Have you had an eye exam within the past year?: (!) No  Hearing/Vision Interventions:  · Vision concerns:  patient encouraged to make appointment with his/her eye specialist    Personalized Preventive Plan   Current Health Maintenance Status  Immunization History   Administered Date(s) Administered    Influenza Virus Vaccine 12/26/2013    Influenza Whole 01/26/2010    Influenza, High Dose (Fluzone 65 yrs and older) 09/15/2015, 09/28/2017, 11/13/2018    Influenza, Triv, inactivated, subunit, adjuvanted, IM (Fluad 65 yrs and older) 11/18/2019    Pneumococcal Conjugate 13-valent ([id number]) 09/15/2015    Pneumococcal Conjugate 7-valent (Prevnar7) 01/01/2008    Pneumococcal Polysaccharide ([id number]) 06/26/2014    Tdap (Boostrix, Adacel) 06/17/2013    Tetanus 09/01/2003        Health Maintenance   Topic Date Due    Shingles Vaccine (1 of 2) 07/09/1993    Annual Wellness Visit (AWV)  05/29/2019    Lipid screen  02/03/2021    TSH testing  02/03/2021    Potassium monitoring  02/03/2021    Creatinine monitoring  02/03/2021    DTaP/Tdap/Td vaccine (2 - Td) 06/17/2023    DEXA (modify frequency per FRAX score)  Completed    Flu vaccine  Completed    Pneumococcal 65+ years Vaccine  Completed    Hepatitis A vaccine  Aged Out    Hepatitis B vaccine  Aged Out    Hib vaccine  Aged Out    Meningococcal (ACWY) vaccine  Aged Out     Recommendations for [employer] Due: see orders and patient instructions/AVS.  .   Recommended screening schedule for the next 5-10 years is provided to the patient in written form: see Patient Sonal Santiago was seen today for medicare

## 2020-06-04 RX ORDER — LEVOTHYROXINE SODIUM 0.05 MG/1
50 TABLET ORAL DAILY
Qty: 90 TABLET | Refills: 0 | Status: SHIPPED | OUTPATIENT
Start: 2020-06-04 | End: 2020-08-31

## 2020-06-04 RX ORDER — BUPROPION HYDROCHLORIDE 150 MG/1
TABLET ORAL
Qty: 180 TABLET | Refills: 0 | Status: SHIPPED | OUTPATIENT
Start: 2020-06-04 | End: 2020-08-31

## 2020-06-08 ENCOUNTER — VIRTUAL VISIT (OUTPATIENT)
Dept: PULMONOLOGY | Age: 75
End: 2020-06-08
Payer: MEDICARE

## 2020-06-08 PROCEDURE — 99443 PR PHYS/QHP TELEPHONE EVALUATION 21-30 MIN: CPT | Performed by: INTERNAL MEDICINE

## 2020-06-08 ASSESSMENT — ENCOUNTER SYMPTOMS
COUGH: 0
WHEEZING: 0
NAUSEA: 0
SHORTNESS OF BREATH: 0
EYE ITCHING: 0
RHINORRHEA: 0
VOMITING: 0
VOICE CHANGE: 0
DIARRHEA: 0
ABDOMINAL PAIN: 0
SINUS PRESSURE: 0
TROUBLE SWALLOWING: 0
SORE THROAT: 0
CHEST TIGHTNESS: 0
EYE DISCHARGE: 0

## 2020-06-08 NOTE — PROGRESS NOTES
Subjective: Krystal Yang is a 66 y.o. female who complains today of:     Chief Complaint   Patient presents with    Follow-up       HPI  Patient has PSG done on 5/12/20    an shows KEI. AHI  14.2  RDI 18.8  CPAP titration study done on shows therapeutic CPAP at 8  She is complaining of snoring and daytime sleepiness and tiredness. C/o witness apnea. She wakes up with gasping for air. C/o wakes up frequently during sleep . No complaint of morning headache. She  does not have restful sleep.     Allergies:  Ciprofloxacin and Oxycontin [oxycodone hcl]  Past Medical History:   Diagnosis Date    Arthritis     Blood transfusion     Cancer [facility])     GALLBLADDER 2009, 2011 OMENTUM    Charcot's joint     left foot    Chest pain 7/2/2015    Colitis     DDD (degenerative disc disease), lumbar 2/12/2013    Depression     Disorder of peripheral nervous system 9/1/2010    Dyspnea 7/2/2015    Family history of coronary artery disease 8/29/2014    History of blood transfusion 2011    following chemotherapy    Hx of right BKA ([facility]     Hyperlipidemia     Hypertension     Hypothyroid 6/12/2015    Lobular breast cancer ([address] 10/2015    NSTEMI (non-ST elevated myocardial infarction) ([address] 8/29/2014    Obesity     Paroxysmal atrial fibrillation ([address] 8/29/2014    S/P BKA (below knee amputation) ([address]      Past Surgical History:   Procedure Laterality Date    ABDOMEN SURGERY Left 4/11/2017    EXCISION OF CHEST WALL MASS, UPDATE LABS ON ADMIT  performed by Yin Car MD at [address] BLEPHAROPLASTY  2-5-13    both eyes    BREAST SURGERY      CARDIAC CATHETERIZATION      COLONOSCOPY  1/1/2010    normal    CYSTOSCOPY W BIOPSY OF BLADDER N/A 6/26/2017    CYSTOSCOPY BLADDER BIOPSY AND FULGURATION performed by Mary Jane Zamora MD at [address] Bilateral 2012    cataract removal with IOL implants    HYSTERECTOMY      LEG AMPUTATION BELOW KNEE Right 2/1/11    DR Yaz Patel due to CHARCOTS    MASTECTOMY  11/16/2015    Bilateral simple mastectomies with right SNB by DR Dario Charles    [place] REMOVAL OF BREAST LESION Left 1/24/2017    CORE NEEDLE BIOPSY OF  LEFT BREAST MASS performed [facility] Tammie Colin MD at [facility] SURGERY      cervical and lumbar     Family History   Problem Relation Age of Onset    Heart Disease Mother     Arthritis Mother     Heart Disease Father     Arthritis Father     Cancer Sister         Colon    Thyroid Disease Son     Other Son         Down's syndrome     Social History     Socioeconomic History    Marital status:       Spouse name: Not on file    Number of children: Not on file    Years of education: Not on file    Highest education level: Not on file   Occupational History    Occupation: retired   Social Needs    Financial resource strain: Not on file    Food insecurity     Worry: Not on file     Inability: Not on file   [employer] needs     Medical: Not on file     Non-medical: Not on file   Tobacco Use    Smoking status: Never Smoker    Smokeless tobacco: Never Used   Substance and Sexual Activity    Alcohol use: Yes     Comment: social    Drug use: No    Sexual activity: Never   Lifestyle    Physical activity     Days per week: Not on file     Minutes per session: Not on file    Stress: Not on file   Relationships    Social connections     Talks on phone: Not on file     Gets together: Not on file     Attends Confucianism service: Not on file     Active member of club or organization: Not on file     Attends meetings of clubs or organizations: Not on file     Relationship status: Not on file    Intimate partner violence     Fear of current or ex partner: Not on file     Emotionally abused: Not on file     Physically abused: Not on file     Forced sexual activity: Not on file   Other Topics Concern    Not on file   Social History Narrative    Not on file TIMES DAILY 180 tablet 2    DULoxetine (CYMBALTA) 30 MG extended release capsule Take 1 capsule by mouth daily 90 capsule 2    atorvastatin (LIPITOR) 20 MG tablet Take 1 tablet by mouth nightly 90 tablet 2    acyclovir (ZOVIRAX) 5 % CREA Apply topically as needed 5 g 1    naloxone (NARCAN) 4 MG/0.1ML LIQD nasal spray 1 spray by Nasal route as needed for Opioid Reversal (Patient not taking: Reported on 2/11/2020) 1 each 2    b complex vitamins capsule Take 1 capsule by mouth daily      Multiple Vitamins-Minerals (CENTRUM SILVER 50+WOMEN PO) Take by mouth      tamoxifen (NOLVADEX) 20 MG tablet Take 1 tablet by mouth daily      Vitamin D (CHOLECALCIFEROL) 1000 UNITS CAPS capsule Take 1,000 Units by mouth daily. No current facility-administered medications for this visit. Results for orders placed in visit on 05/24/17   XR CHEST STANDARD TWO VW    Narrative EXAMINATION: CHEST X-RAY, PA AND LATERAL    CLINICAL HISTORY: BILATERAL LOWER EXTREMITY EDEMA, BILATERAL PEDAL EDEMA AND DYSPNEA, EVALUATE FOR POSSIBLE CONGESTIVE HEART FAILURE    COMPARISONS: 12/7/2015    FINDINGS: There is borderline cardiomegaly and a tortuous aorta. The lungs appear clear without pulmonary infiltrate or pleural effusion. There are no radiographic findings suggesting CHF at this time. There is degenerative bone spurring throughout the   spine and bilateral shoulder prostheses. Impression 1. BORDERLINE CARDIOMEGALY AND A TORTUOUS AORTA. 2. LUNGS APPEAR CLEAR WITHOUT A PULMONARY INFILTRATE OR PLEURAL EFFUSION. Assessment/Plan:     1. Obstructive sleep apnea  She  has PSG done on 5/12/20  And it  shows KEI. AHI  14.2  RDI 18.8  CPAP titration study done on shows therapeutic CPAP at 8 cm. She is complaining of snoring and daytime sleepiness and tiredness. C/o witness apnea. She wakes up with gasping for air. C/o wakes up frequently during sleep . She  does not have restful sleep.     She will be started on CPAP with 8 cm     Counseling: CPAP/BiPAP uses, patient advised to use CPAP at least 5-6 hours every night. Driving: patient is advised for extreme caution when driving or operating machinery if there is a feeling of drowsiness, especially while driving it is preferable to stop driving and take a brief nap. Sleep hygiene:Avoid supine sleep, sleep on  sides. Avoid  sleep deprivation. Explained sleep hygiene. Advice to avoid Alcohol and sedative    2. Obesity (BMI 30-39. 9)  Patient patient is advised try to lose weight. obesity related risk explained to the patient ,  Suggested weight control approaches, including dietary changes , exercise, behavioral modification. Patient notified that this is a billable service and has given verbal consent for telehealth services. Time spent with patient 25   minutes. Return in about 4 weeks (around 7/6/2020) for massimo.       Hans Elizalde MD

## 2020-06-12 RX ORDER — ATORVASTATIN CALCIUM 20 MG/1
20 TABLET, FILM COATED ORAL NIGHTLY
Qty: 90 TABLET | Refills: 1 | Status: SHIPPED | OUTPATIENT
Start: 2020-06-12 | End: 2021-02-16

## 2020-07-01 RX ORDER — DULOXETIN HYDROCHLORIDE 30 MG/1
30 CAPSULE, DELAYED RELEASE ORAL DAILY
Qty: 90 CAPSULE | Refills: 2 | Status: SHIPPED | OUTPATIENT
Start: 2020-07-01 | End: 2021-03-05

## 2020-07-07 ENCOUNTER — VIRTUAL VISIT (OUTPATIENT)
Dept: CARDIOLOGY CLINIC | Age: 75
End: 2020-07-07
Payer: MEDICARE

## 2020-07-07 PROCEDURE — 99442 PR PHYS/QHP TELEPHONE EVALUATION 11-20 MIN: CPT | Performed by: INTERNAL MEDICINE

## 2020-07-07 ASSESSMENT — ENCOUNTER SYMPTOMS
ALLERGIC/IMMUNOLOGIC NEGATIVE: 1
WHEEZING: 0
NAUSEA: 0
EYES NEGATIVE: 1
SHORTNESS OF BREATH: 0
VOMITING: 0
ABDOMINAL PAIN: 0
GASTROINTESTINAL NEGATIVE: 1

## 2020-07-07 NOTE — PROGRESS NOTES
7/7/2020    TELEHEALTH EVALUATION -- Audio/Visual (During KSXUX-33 public health emergency)    Due to COVID 19 outbreak, patient's office visit was converted to a virtual visit. Patient was contacted and agreed to proceed with a virtual visit via Telephone Visit  The risks and benefits of converting to a virtual visit were discussed in light of the current infectious disease epidemic. Patient also understood that insurance coverage and co-pays are up to their individual insurance plans. HPI:    8-29-14: Patient presents for initial medical evaluation. Patient is followed on a regular basis by Dr. Alyssa Hernandez. S/p hospitalization for ARF/Dehydration/ urosepsis, NSTEMI with mildly elevated troponin. S/p normal nuclear stress test with normal LVF. Echo with normal LVF, mild TR, grade I DD. 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 No hx of LE procedures. No hx of LHC. Pt denies chest pain, dyspnea, dyspnea on exertion, change in exercise capacity, fatigue,  nausea, vomiting, diarrhea, constipation, motor weakness, insomnia, weight loss, syncope, dizziness, lightheadedness, palpitations, PND, orthopnea. On Eliquis due to PAF on presentation to ED, currently in NSR. Hx of ablation at [facility] [place], ? For svt.      9-25-24: as above, s/p holter monitor with no signs of atrial fibb. Feeling good overall. Will start to go to cardiac rehab. Pt denies chest pain, dyspnea, dyspnea on exertion, change in exercise capacity, fatigue,  nausea, vomiting, diarrhea, constipation, motor weakness, insomnia, weight loss, syncope, dizziness, lightheadedness, palpitations, PND, orthopnea, or claudication. No bleedign issues on coumadin.      12-19-14: as above, doing well overall. Enjoying cardiac rehab. EKG is in NSR today.  Pt denies chest pain, dyspnea, dyspnea on exertion, change in exercise capacity, fatigue,  nausea, vomiting, diarrhea, constipation, motor weakness, insomnia, weight loss, syncope, dizziness, lightheadedness, palpitations, PND, orthopnea, or claudication.     3-19-15: as above, doing well overall. We had stopped coumadin a while back, she would like take Volatren for arthiritis issues.      6-26-15: as above, states she's been very SOB with very minimal exertion, started about a month ago and progressively getting worse. In January she states she was doing 2 hours of exercise and now cant even do 5 minutes. States her legs have also been swollen and her lasix dose was doubled. S/p normal LE venous dupplex US, CXR with no acute process. BNP was 114. She states she has some chest tightness with these SOB episodes.      7-2-15: as above, s/p CT of chest which was negative for a PE. States she continues to be very SOB with minimal exertion. Cannot walk far at all. LE edema has improved since increasing her lasix. There is a definite drop in her functional capacity. Did have a  chest tightness episode after walking 50 feet and felt she was about to pass out a few days ago. She was really SOB. HTN: HER BP is under control today, compliant with meds. As for her Afibb, its under control and not on any [address]      9-18-15: as above, s/p LHC with normal coronaries, normal LVDF and LVEDP. Pt denies chest pain,  nausea, vomiting, diarrhea, constipation, motor weakness, insomnia, weight loss, syncope, dizziness, lightheadedness, palpitations, PND, orthopnea, or claudication. BP is high today in office, did not take BP meds this am yet. Continues to have SOB but getting better, states she's walking more.      3-25-16: as above, had CP a week ago with irregular heart beat. Coreg was increased. Pt denies fatigue,  nausea, vomiting, diarrhea, constipation, motor weakness, insomnia, weight loss, syncope, dizziness, lightheadedness, palpitations, PND, orthopnea, or claudication. BP is good. EKG in NSR>         12-2-16: as above, her  meryl just passed away at [facility] a month or so after having CABG.  Pt denies chest pain, does have SOB with walking and some wheezing. Does not follow up with a pulmonologist.   nausea, vomiting, diarrhea, constipation, motor weakness, insomnia, weight loss, syncope, dizziness, lightheadedness, palpitations, PND, orthopnea, or claudication. No nitro use. BP and hr are good. CAD is stable. No LE discoloration or ulcers. No LE edema. No CHF type symptoms. Lipid profile is normal.      17: Pt denies chest pain, dyspnea, dyspnea on exertion, change in exercise capacity, fatigue,  nausea, vomiting, diarrhea, constipation, motor weakness, insomnia, weight loss, syncope, dizziness, lightheadedness, palpitations, PND, orthopnea, or claudication. No nitro use. BP and hr are good. CAD is stable. No LE discoloration or ulcers. No LE edema. No CHF type symptoms. Lipid profile is normal. EKG with NSR. No hosp.      18: did have racing heart beat for 4 hours 2 weeks ago. Resolved on its own. She did not go to ER  Does have hx of PAfibb in  and had ablation procedure in  for racing heart beat. Pt denies chest pain, dyspnea, dyspnea on exertion, change in exercise capacity, fatigue,  nausea, vomiting, diarrhea, constipation, motor weakness, insomnia, weight loss, syncope, dizziness, lightheadedness, palpitations, PND, orthopnea, or claudication. EKG with NSR today. No excessive stimulants. BP is mildly elevated today. She does snore.        20: Clement Rodriguez (:  1945) has requested an audio/video evaluation for the following concern(s):    Following with psychiatrist and diagnosed with ADD. Needs clearance to go on Adderall or Ritalin. Patient with history of paroxysmal atrial fibrillation, recently in normal sinus rhythm. History of remote paroxysmal atrial fibrillation during an episode of acute renal failure/dehydration. Has been in normal sinus rhythm ever since. Positive history of KEI. She denies any palpitations chest pain shortness of breath or syncope.       Review of Systems   Constitutional: Negative. Negative for chills and fever. HENT: Negative. Eyes: Negative. Respiratory: Negative for shortness of breath and wheezing. Cardiovascular: Negative for chest pain, palpitations and leg swelling. Gastrointestinal: Negative. Negative for abdominal pain, nausea and vomiting. Endocrine: Negative. Genitourinary: Negative. Musculoskeletal: Negative. Skin: Negative. Negative for rash. Allergic/Immunologic: Negative. Neurological: Negative for dizziness, weakness and headaches. Hematological: Negative. Psychiatric/Behavioral: Negative. Prior to Visit Medications    Medication Sig Taking?  Authorizing Provider   DULoxetine (CYMBALTA) 30 MG extended release capsule TAKE 1 CAPSULE BY MOUTH  DAILY  Lizbeth Driver MD   diclofenac (VOLTAREN) 50 MG EC tablet TAKE 1 TABLET BY MOUTH  TWICE DAILY  Lizbeth Driver MD   atorvastatin (LIPITOR) 20 MG tablet TAKE 1 TABLET BY MOUTH  NIGHTLY  Lizbeth Driver MD   CPAP Machine MISC by Does not apply route New CPAP with 8 cm  Leydi Rodriguez MD   buPROPion (WELLBUTRIN XL) 150 MG extended release tablet TAKE 2 TABLETS BY MOUTH IN  THE MORNING  Lizbeth Driver MD   levothyroxine (SYNTHROID) 50 MCG tablet TAKE 1 TABLET BY MOUTH  DAILY  Lizbeth Driver MD   carvedilol (COREG) 25 MG tablet TAKE 1 TABLET BY MOUTH TWO  TIMES DAILY  Lizbeth Driver MD   gabapentin (NEURONTIN) 600 MG tablet TAKE 1 TABLET BY MOUTH IN  THE MORNING, 2 TABS IN THE  AFTERNOON, AND 2 TABS IN  THE EVENING AS TOLERATED  Lizbeth Driver MD   potassium chloride (KLOR-CON M) 10 MEQ extended release tablet TAKE 1 TABLET BY MOUTH TWO  TIMES DAILY  Lizbeth Driver MD   acyclovir (ZOVIRAX) 5 % CREA Apply topically as needed  Lizbeth Driver MD   naloxone [facility]) 4 MG/0.1ML LIQD nasal spray 1 spray by Nasal route as needed for Opioid Reversal  Patient not taking: Reported on 2/11/2020  Karyn Silva DO   b complex vitamins capsule Take 1 capsule by mouth daily  Historical Provider, MD   Multiple Vitamins-Minerals (CENTRUM SILVER 50+WOMEN PO) Take by mouth  Historical Provider, MD   tamoxifen (NOLVADEX) 20 MG tablet Take 1 tablet by mouth daily  Historical Provider, MD   Vitamin D (CHOLECALCIFEROL) 1000 UNITS CAPS capsule Take 1,000 Units by mouth daily.   Historical Provider, MD       Social History     Tobacco Use    Smoking status: Never Smoker    Smokeless tobacco: Never Used   Substance Use Topics    Alcohol use: Yes     Comment: social    Drug use: No        Allergies   Allergen Reactions    Ciprofloxacin Itching    Oxycontin [Oxycodone Hcl] Itching   ,   Past Medical History:   Diagnosis Date    Arthritis     Blood transfusion     Cancer ([facility] [address]     GALLBLADDER 2009, 2011 OMENTUM    Charcot's joint     left foot    Chest pain 7/2/2015    Colitis     DDD (degenerative disc disease), lumbar 2/12/2013    Depression     Disorder of peripheral nervous system 9/1/2010    Dyspnea 7/2/2015    Family history of coronary artery disease 8/29/2014    History of blood transfusion 2011    following chemotherapy    Hx of right BKA ([facility] [address]     Hyperlipidemia     Hypertension     Hypothyroid 6/12/2015    Lobular breast cancer ([facility] [address] 10/2015    NSTEMI (non-ST elevated myocardial infarction) ([facility] [address] 8/29/2014    Obesity     Paroxysmal atrial fibrillation ([address] 8/29/2014    S/P BKA (below knee amputation) ([facility] [address]    ,   Past Surgical History:   Procedure Laterality Date    ABDOMEN SURGERY Left 4/11/2017    EXCISION OF CHEST WALL MASS, UPDATE LABS ON ADMIT  performed by Mabel Cortes MD at [address] BLEPHAROPLASTY  2-5-13    both eyes    BREAST SURGERY      CARDIAC CATHETERIZATION      COLONOSCOPY  1/1/2010    normal    CYSTOSCOPY W BIOPSY OF BLADDER N/A 6/26/2017    CYSTOSCOPY BLADDER BIOPSY AND FULGURATION performed by Dago Belcher MD at [address] Bilateral 2012    cataract removal with IOL implants    HYSTERECTOMY      LEG AMPUTATION BELOW KNEE Right 2/1/11    DR Kang Cleveland due to CHARCOTS    MASTECTOMY  11/16/2015    Bilateral simple mastectomies with right SNB by DR Nayely Abebe    HI REMOVAL OF BREAST LESION Left 1/24/2017    CORE NEEDLE BIOPSY OF  LEFT BREAST MASS performed by José Antonio Bill MD at [place] [address] SPINE SURGERY      cervical and lumbar   ,   Family History   Problem Relation Age of Onset    Heart Disease Mother     Arthritis Mother     Heart Disease Father     Arthritis Father     Cancer Sister         Colon    Thyroid Disease Son     Other Son         Down's syndrome       PHYSICAL EXAMINATION:  [ INSTRUCTIONS:  \"[x]\" Indicates a positive item  \"[]\" Indicates a negative item  -- DELETE ALL ITEMS NOT EXAMINED]  [x] Alert  [x] Oriented to person/place/time    [x] No apparent distress  [] Toxic appearing    [] Face flushed appearing [x] Sclera clear  [] Lips are cyanotic      [x] Breathing appears normal  [] Appears tachypneic      [] Rash on visible skin    [] Cranial Nerves II-XII grossly intact    [] Motor grossly intact in visible upper extremities    [] Motor grossly intact in visible lower extremities    [x] Normal Mood  [] Anxious appearing    [] Depressed appearing  [] Confused appearing      [] Poor short term memory  [] Poor long term memory    [] OTHER:      Due to this being a TeleHealth encounter, evaluation of the following organ systems is limited: Vitals/Constitutional/EENT/Resp/CV/GI//MS/Neuro/Skin/Heme-Lymph-Imm. ASSESSMENT:        Diagnosis Orders   1. Benign essential HTN     2. KEI (obstructive sleep apnea)       History of paroxysmal atrial fibrillation, normal sinus rhythm recently      PLAN:    Patient will need to continue to follow up with you for their general medical care      As always, aggressive risk factor modification is strongly recommended.  We should adhere to the [address] guidelines for HTN management and the NCEP ATP III guidelines for LDL-C management.     Cardiac diet is always recommended with low fat, cholesterol, calories and sodium.     Continue medications at current doses.      No need for oral anticoagulation as atrial fibrillation was an isolated event during dehydration/renal failure episode and has been in normal sinus rhythm ever since. Patient to start Ritalin/Adderall/stimulants and will monitor heart rate. Patient was advised and encouraged to check blood pressure at home or at a pharmacy, maintain a logbook, and also call us back if blood pressure are above the target ranges or if it is low. Patient clearly understands and agrees to the instructions.      We will need to continue to monitor muscle and liver enzymes, BUN, CR, and electrolytes.     Thank you for allowing me to participate in the care of your patient, please don't hesitate to contact me if you have any further questions. Return if symptoms worsen or fail to improve. An  electronic signature was used to authenticate this note. --Alexis Nicolasa, DO on 7/7/2020 at 9:53 AM  9}    Pursuant to the emergency declaration under the [facility] waiver authority and the [employer] Act, this Virtual  Visit was conducted, with patient's consent, to reduce the patient's risk of exposure to COVID-19 and provide continuity of care for an established patient. Services were provided through a video synchronous discussion virtually to substitute for in-person clinic visit. Total Virtual Visit time spent:11 min      Please note this report has been partially produced using speech recognition software and may cause contain errors related to that system including grammar, punctuation and spelling as well as words and phrases that may seem inappropriate. If there are questions or concerns please feel free to contact me to clarify.

## 2020-07-10 ENCOUNTER — HOSPITAL ENCOUNTER (EMERGENCY)
Age: 75
Discharge: HOME | End: 2020-07-10
Attending: EMERGENCY MEDICINE
Payer: MEDICARE

## 2020-07-10 ENCOUNTER — APPOINTMENT (OUTPATIENT)
Dept: ULTRASOUND IMAGING | Age: 75
End: 2020-07-10
Payer: MEDICARE

## 2020-07-10 ENCOUNTER — NURSE TRIAGE (OUTPATIENT)
Dept: OTHER | Facility: CLINIC | Age: 75
End: 2020-07-10

## 2020-07-10 VITALS
HEART RATE: 69 BPM | SYSTOLIC BLOOD PRESSURE: 123 MMHG | BODY MASS INDEX: 42.21 KG/M2 | OXYGEN SATURATION: 94 % | RESPIRATION RATE: 16 BRPM | HEIGHT: 60 IN | TEMPERATURE: 98 F | WEIGHT: 215 LBS | DIASTOLIC BLOOD PRESSURE: 68 MMHG

## 2020-07-10 LAB
ALBUMIN SERPL-MCNC: 3.7 G/DL (ref 3.5–4.6)
ALP BLD-CCNC: 69 U/L (ref 40–130)
ALT SERPL-CCNC: 18 U/L (ref 0–33)
ANION GAP SERPL CALCULATED.3IONS-SCNC: 8 MEQ/L (ref 9–15)
AST SERPL-CCNC: 27 U/L (ref 0–35)
BASOPHILS ABSOLUTE: 0 K/UL (ref 0–0.2)
BASOPHILS RELATIVE PERCENT: 0.7 %
BILIRUB SERPL-MCNC: 0.7 MG/DL (ref 0.2–0.7)
BUN BLDV-MCNC: 19 MG/DL (ref 8–23)
CALCIUM SERPL-MCNC: 9 MG/DL (ref 8.5–9.9)
CHLORIDE BLD-SCNC: 106 MEQ/L (ref 95–107)
CO2: 29 MEQ/L (ref 20–31)
CREAT SERPL-MCNC: 0.83 MG/DL (ref 0.5–0.9)
EOSINOPHILS ABSOLUTE: 0.2 K/UL (ref 0–0.7)
EOSINOPHILS RELATIVE PERCENT: 3.2 %
GFR AFRICAN AMERICAN: >60
GFR NON-AFRICAN AMERICAN: >60
GLOBULIN: 2.1 G/DL (ref 2.3–3.5)
GLUCOSE BLD-MCNC: 106 MG/DL (ref 70–99)
HCT VFR BLD CALC: 39.7 % (ref 37–47)
HEMOGLOBIN: 13.1 G/DL (ref 12–16)
LYMPHOCYTES ABSOLUTE: 1.4 K/UL (ref 1–4.8)
LYMPHOCYTES RELATIVE PERCENT: 27.6 %
MAGNESIUM: 1.7 MG/DL (ref 1.7–2.4)
MCH RBC QN AUTO: 31.1 PG (ref 27–31.3)
MCHC RBC AUTO-ENTMCNC: 33.1 % (ref 33–37)
MCV RBC AUTO: 93.8 FL (ref 82–100)
MONOCYTES ABSOLUTE: 0.4 K/UL (ref 0.2–0.8)
MONOCYTES RELATIVE PERCENT: 7.4 %
NEUTROPHILS ABSOLUTE: 3 K/UL (ref 1.4–6.5)
NEUTROPHILS RELATIVE PERCENT: 61.1 %
PDW BLD-RTO: 14.5 % (ref 11.5–14.5)
PLATELET # BLD: 166 K/UL (ref 130–400)
POTASSIUM SERPL-SCNC: 4 MEQ/L (ref 3.4–4.9)
PRO-BNP: 235 PG/ML
RBC # BLD: 4.23 M/UL (ref 4.2–5.4)
SODIUM BLD-SCNC: 143 MEQ/L (ref 135–144)
TOTAL PROTEIN: 5.8 G/DL (ref 6.3–8)
TROPONIN: <0.01 NG/ML (ref 0–0.01)
TSH SERPL DL<=0.05 MIU/L-ACNC: 0.61 UIU/ML (ref 0.44–3.86)
WBC # BLD: 4.9 K/UL (ref 4.8–10.8)

## 2020-07-10 PROCEDURE — 84443 ASSAY THYROID STIM HORMONE: CPT

## 2020-07-10 PROCEDURE — 84484 ASSAY OF TROPONIN QUANT: CPT

## 2020-07-10 PROCEDURE — 93971 EXTREMITY STUDY: CPT

## 2020-07-10 PROCEDURE — 80053 COMPREHEN METABOLIC PANEL: CPT

## 2020-07-10 PROCEDURE — 83880 ASSAY OF NATRIURETIC PEPTIDE: CPT

## 2020-07-10 PROCEDURE — 36415 COLL VENOUS BLD VENIPUNCTURE: CPT

## 2020-07-10 PROCEDURE — 99283 EMERGENCY DEPT VISIT LOW MDM: CPT

## 2020-07-10 PROCEDURE — 85025 COMPLETE CBC W/AUTO DIFF WBC: CPT

## 2020-07-10 PROCEDURE — 83735 ASSAY OF MAGNESIUM: CPT

## 2020-07-10 RX ORDER — FUROSEMIDE 20 MG/1
20 TABLET ORAL DAILY
Qty: 5 TABLET | Refills: 0 | Status: SHIPPED | OUTPATIENT
Start: 2020-07-10 | End: 2021-12-22

## 2020-07-10 ASSESSMENT — ENCOUNTER SYMPTOMS
SHORTNESS OF BREATH: 0
VOMITING: 0
COUGH: 0
SORE THROAT: 0
BACK PAIN: 0
DIARRHEA: 0
NAUSEA: 0
ABDOMINAL PAIN: 0

## 2020-07-10 NOTE — TELEPHONE ENCOUNTER
Left leg is swollen up toward knee for 3 days. Today she is noticing the foot is now red. It is not hot to touch. History of RT BKA. Reason for Disposition   Thigh, calf, or ankle swelling in only one leg    Answer Assessment - Initial Assessment Questions  1. ONSET: \"When did the swelling start? \" (e.g., minutes, hours, days)      See above    2. LOCATION: \"What part of the leg is swollen? \"  \"Are both legs swollen or just one leg? \"      See above    3. SEVERITY: \"How bad is the swelling? \" (e.g., localized; mild, moderate, severe)   - Localized - small area of swelling localized to one leg   - MILD pedal edema - swelling limited to foot and ankle, pitting edema < 1/4 inch (6 mm) deep, rest and elevation eliminate most or all swelling   - MODERATE edema - swelling of lower leg to knee, pitting edema > 1/4 inch (6 mm) deep, rest and elevation only partially reduce swelling   - SEVERE edema - swelling extends above knee, facial or hand swelling present       Unable to get her AFO and shoes on today. There is pitting edema. She took a lasix 40mg yesterday without any relief. 4. REDNESS: \"Does the swelling look red or infected? \"      It is red. Doesn't look infected. 5. PAIN: \"Is the swelling painful to touch? \" If so, ask: \"How painful is it? \"   (Scale 1-10; mild, moderate or severe)      Denies    6. FEVER: \"Do you have a fever? \" If so, ask: \"What is it, how was it measured, and when did it start? \"       Denies    7. CAUSE: \"What do you think is causing the leg swelling? \"      Unsure    8. MEDICAL HISTORY: \"Do you have a history of heart failure, kidney disease, liver failure, or cancer? \"      Bone issues, neuropathy, multiple cancers in the past.      9. RECURRENT SYMPTOM: \"Have you had leg swelling before? \" If so, ask: \"When was the last time? \" \"What happened that time? \"       A long time ago. Related to shifting of bones in her ankles. 10. OTHER SYMPTOMS: \"Do you have any other symptoms? \" (e.g., chest pain, difficulty breathing)        Denies    11. PREGNANCY: \"Is there any chance you are pregnant? \" \"When was your last menstrual period? \"        NA    Protocols used: LEG SWELLING AND EDEMA-ADULT-[place]    Patient called [facility]) to schedule appointment, with red flag complaint, transferred to RN access for triage. See above questions and answers. Caller talking full sentences without any distress on phone. Discussed disposition and patient agreeable. Discussed potential consequences for not following disposition recommendation. Aware to call back with any concerns or persistent, worsening, or new symptoms develop. Contacted [facility] for second level triage. Recommendation ED for appropriate treatment. Please do not respond to the triage nurse through this encounter. Any subsequent communication should be directly with the patient.

## 2020-07-10 NOTE — ED NOTES
Discharge instructions reviewed with pt and signed. Prescription given to pt. No questions at this time. Pt left in stable condition via wheelchair with friend at discharge.      Chuyita Romero RN  07/10/20 [id number]

## 2020-07-10 NOTE — ED PROVIDER NOTES
[facility] ED  eMERGENCYdEPARTMENT eNCOUnter      Pt Name: Gume Morrissey  MRN: [id number]  Emilgfpetr 1945  Date of evaluation: 7/10/2020  Yael Chua MD    CHIEF COMPLAINT           HPI  Gume Morrissey is a 66 y.o. female per chart review has a h/o colitis, low back pain, CAD, R BKA presents to the ED with L leg pain, swelling. Pt notes gradual onset, moderate, intermittent, aching, L leg pain x 3 days. +Swelling. Pt denies recent travel, surgery. Pt denies fever, n/v, cp, sob, dysuria, diarrhea. ROS  Review of Systems   Constitutional: Negative for activity change, chills and fever. HENT: Negative for ear pain and sore throat. Eyes: Negative for visual disturbance. Respiratory: Negative for cough and shortness of breath. Cardiovascular: Negative for chest pain, palpitations and leg swelling. Gastrointestinal: Negative for abdominal pain, diarrhea, nausea and vomiting. Genitourinary: Negative for dysuria. Musculoskeletal: Negative for back pain. L leg pain, swelling   Skin: Negative for rash. Neurological: Negative for dizziness and weakness. Except as noted above the remainder of the review of systems was reviewed and negative.        PAST MEDICAL HISTORY     Past Medical History:   Diagnosis Date    Arthritis     Blood transfusion     Cancer [facility])     GALLBLADDER 2009, 2011 OMENTUM    Charcot's joint     left foot    Chest pain 7/2/2015    Colitis     DDD (degenerative disc disease), lumbar 2/12/2013    Depression     Disorder of peripheral nervous system 9/1/2010    Dyspnea 7/2/2015    Family history of coronary artery disease 8/29/2014    History of blood transfusion 2011    following chemotherapy    Hx of right BKA ([facility] [address]     Hyperlipidemia     Hypertension     Hypothyroid 6/12/2015    Lobular breast cancer ([address] 10/2015    NSTEMI (non-ST elevated myocardial infarction) ([facility] [address] 8/29/2014    Obesity     Paroxysmal atrial fibrillation ([facility] [address] 8/29/2014    S/P BKA (below knee amputation) ([facility] [address]          SURGICAL HISTORY       Past Surgical History:   Procedure Laterality Date    ABDOMEN SURGERY Left 4/11/2017    EXCISION OF CHEST WALL MASS, UPDATE LABS ON ADMIT  performed by Adan Tam MD at [address] BLEPHAROPLASTY  2-5-13    both eyes    BREAST SURGERY      CARDIAC CATHETERIZATION      COLONOSCOPY  1/1/2010    normal    CYSTOSCOPY W BIOPSY OF BLADDER N/A 6/26/2017    CYSTOSCOPY BLADDER BIOPSY AND FULGURATION performed by Ben Savage MD at [facility] [place] Bilateral 2012    cataract removal with IOL implants    HYSTERECTOMY      LEG AMPUTATION BELOW KNEE Right 2/1/11    DR Volodymyr Whatley due to CHARCOTS    MASTECTOMY  11/16/2015    Bilateral simple mastectomies with right SNB by DR Alivia Hammond    [place] REMOVAL OF BREAST LESION Left 1/24/2017    CORE NEEDLE BIOPSY OF  LEFT BREAST MASS performed by Adan Tam MD at [place] [address] SPINE SURGERY      cervical and lumbar         CURRENTMEDICATIONS       Previous Medications    ACYCLOVIR (ZOVIRAX) 5 % CREA    Apply topically as needed    ATORVASTATIN (LIPITOR) 20 MG TABLET    TAKE 1 TABLET BY MOUTH  NIGHTLY    B COMPLEX VITAMINS CAPSULE    Take 1 capsule by mouth daily    BUPROPION (WELLBUTRIN XL) 150 MG EXTENDED RELEASE TABLET    TAKE 2 TABLETS BY MOUTH IN  THE MORNING    CARVEDILOL (COREG) 25 MG TABLET    TAKE 1 TABLET BY MOUTH TWO  TIMES DAILY    CPAP MACHINE MISC    by Does not apply route New CPAP with 8 cm    DICLOFENAC (VOLTAREN) 50 MG EC TABLET    TAKE 1 TABLET BY MOUTH  TWICE DAILY    DULOXETINE (CYMBALTA) 30 MG EXTENDED RELEASE CAPSULE    TAKE 1 CAPSULE BY MOUTH  DAILY    GABAPENTIN (NEURONTIN) 600 MG TABLET    TAKE 1 TABLET BY MOUTH IN  THE MORNING, 2 TABS IN THE  AFTERNOON, AND 2 TABS IN  THE EVENING AS TOLERATED    LEVOTHYROXINE (SYNTHROID) 50 MCG TABLET    TAKE 1 TABLET BY MOUTH  DAILY MULTIPLE VITAMINS-MINERALS (CENTRUM SILVER 50+WOMEN PO)    Take by mouth    NALOXONE (NARCAN) 4 MG/0.1ML LIQD NASAL SPRAY    1 spray by Nasal route as needed for Opioid Reversal    POTASSIUM CHLORIDE (KLOR-CON M) 10 MEQ EXTENDED RELEASE TABLET    TAKE 1 TABLET BY MOUTH TWO  TIMES DAILY    TAMOXIFEN (NOLVADEX) 20 MG TABLET    Take 1 tablet by mouth daily    VITAMIN D (CHOLECALCIFEROL) 1000 UNITS CAPS CAPSULE    Take 1,000 Units by mouth daily. ALLERGIES     Ciprofloxacin and Oxycontin [oxycodone hcl]    FAMILY HISTORY       Family History   Problem Relation Age of Onset    Heart Disease Mother     Arthritis Mother     Heart Disease Father     Arthritis Father     Cancer Sister         Colon    Thyroid Disease Son     Other Son         Down's syndrome          SOCIAL HISTORY       Social History     Socioeconomic History    Marital status:       Spouse name: None    Number of children: None    Years of education: None    Highest education level: None   Occupational History    Occupation: retired   Social Needs    Financial resource strain: None    Food insecurity     Worry: None     Inability: None    Transportation needs     Medical: None     Non-medical: None   Tobacco Use    Smoking status: Never Smoker    Smokeless tobacco: Never Used   Substance and Sexual Activity    Alcohol use: Yes     Comment: social    Drug use: No    Sexual activity: Never   Lifestyle    Physical activity     Days per week: None     Minutes per session: None    Stress: None   Relationships    Social connections     Talks on phone: None     Gets together: None     Attends Spiritism service: None     Active member of club or organization: None     Attends meetings of clubs or organizations: None     Relationship status: None    Intimate partner violence     Fear of current or ex partner: None     Emotionally abused: None     Physically abused: None     Forced sexual activity: None   Other Topics Concern    None   Social History Narrative    None         PHYSICAL EXAM       ED Triage Vitals [07/10/20 1237]   BP Temp Temp Source Pulse Resp SpO2 Height Weight   117/72 98 °F (36.7 °C) Temporal 64 18 93 % 5' (1.524 m) 215 lb (97.5 kg)       Physical Exam  Vitals signs and nursing note reviewed. Constitutional:       Appearance: She is well-developed. HENT:      Head: Normocephalic. Right Ear: External ear normal.      Left Ear: External ear normal.   Eyes:      Conjunctiva/sclera: Conjunctivae normal.      Pupils: Pupils are equal, round, and reactive to light. Neck:      Musculoskeletal: Normal range of motion and neck supple. Cardiovascular:      Rate and Rhythm: Normal rate and regular rhythm. Heart sounds: Normal heart sounds. Pulmonary:      Effort: Pulmonary effort is normal.      Breath sounds: Normal breath sounds. Abdominal:      General: Bowel sounds are normal. There is no distension. Palpations: Abdomen is soft. Tenderness: There is no abdominal tenderness. Musculoskeletal: Normal range of motion. Comments: R BKA noted. 1+ edema noted in LLE. Nontender to palpation. 2+ L DP pulse. Skin:     General: Skin is warm and dry. Neurological:      Mental Status: She is alert and oriented to person, place, and time. Psychiatric:         Mood and Affect: Mood normal.           MDM  65 yo female presents to the ED with L leg pain, swelling. Pt is afebrile, hemodynamically stable. Labs unremarkable. US of leg negative. Pt reassessed and feels much better. Suspect likely lymphedema. Pt educated about lymphedema. Pt given prescription for lasix, leg swelling and pain warning signs and will f/u with pcp. Pt understands plan. FINAL IMPRESSION      1. Lymphedema    2.  Leg swelling          DISPOSITION/PLAN   DISPOSITION Decision To Discharge 07/10/2020 02:38:44 PM        DISCHARGE MEDICATIONS:  [unfilled]         Toby Wilson MD(electronically signed)  Attending Emergency Physician            Arlen Dominique MD  07/10/20 [id number]

## 2020-07-11 ENCOUNTER — CARE COORDINATION (OUTPATIENT)
Dept: CARE COORDINATION | Age: 75
End: 2020-07-11

## 2020-07-11 NOTE — CARE COORDINATION
Patient contacted regarding recent discharge and COVID-19 risk. Discussed COVID-19 related testing which was not done at this time. Test results were not done. Patient informed of results, if available? No testing     Care Transition Nurse/ Ambulatory Care Manager contacted the patient by telephone to perform post discharge assessment. Verified name and  with patient as identifiers. Patient has following risk factors of: no known risk factors. CTN/ACM reviewed discharge instructions, medical action plan and red flags related to discharge diagnosis. Reviewed and educated them on any new and changed medications related to discharge diagnosis. Advised obtaining a 90-day supply of all daily and as-needed medications. Education provided regarding infection prevention, and signs and symptoms of COVID-19 and when to seek medical attention with patient who verbalized understanding. Discussed exposure protocols and quarantine from [address] you at higher risk for severe illness  and given an opportunity for questions and concerns. The patient agrees to contact the COVID-19 hotline [phone] or PCP office for questions related to their healthcare. CTN/ACM provided contact information for future reference. From CDC: Are you at higher risk for severe illness?  Wash your hands often.  Avoid close contact (6 feet, which is about two arm lengths) with people who are sick.  Put distance between yourself and other people if COVID-19 is spreading in your community.  Clean and disinfect frequently touched surfaces.  Avoid all cruise travel and non-essential air travel.  Call your healthcare professional if you have concerns about COVID-19 and your underlying condition or if you are sick. For more information on steps you can take to protect yourself, see CDC's How to Mario for follow-up call in 7-14 days based on severity of symptoms and risk factors.     Call to art due to recent er visit. She reports continued edema to leg. She has obtained lasix and has taken this am. She is elevating leg. No other symptoms.  She will contact pcp office on Monday for follow up

## 2020-07-15 ENCOUNTER — VIRTUAL VISIT (OUTPATIENT)
Dept: FAMILY MEDICINE CLINIC | Age: 75
End: 2020-07-15
Payer: MEDICARE

## 2020-07-15 PROCEDURE — G8399 PT W/DXA RESULTS DOCUMENT: HCPCS | Performed by: NURSE PRACTITIONER

## 2020-07-15 PROCEDURE — 4040F PNEUMOC VAC/ADMIN/RCVD: CPT | Performed by: NURSE PRACTITIONER

## 2020-07-15 PROCEDURE — G8427 DOCREV CUR MEDS BY ELIG CLIN: HCPCS | Performed by: NURSE PRACTITIONER

## 2020-07-15 PROCEDURE — 1123F ACP DISCUSS/DSCN MKR DOCD: CPT | Performed by: NURSE PRACTITIONER

## 2020-07-15 PROCEDURE — 99213 OFFICE O/P EST LOW 20 MIN: CPT | Performed by: NURSE PRACTITIONER

## 2020-07-15 PROCEDURE — 1090F PRES/ABSN URINE INCON ASSESS: CPT | Performed by: NURSE PRACTITIONER

## 2020-07-15 ASSESSMENT — ENCOUNTER SYMPTOMS
SHORTNESS OF BREATH: 0
COUGH: 0

## 2020-07-15 NOTE — PROGRESS NOTES
7/15/2020    TELEHEALTH EVALUATION -- Audio/Visual (During [place]-14 public health emergency)    Due to [place] 19 outbreak, patient's office visit was converted to a virtual visit. Patient was contacted and agreed to proceed with a virtual visit via Doxy. me  The risks and benefits of converting to a virtual visit were discussed in light of the current infectious disease epidemic. Patient also understood that insurance coverage and co-pays are up to their individual insurance plans. HPI:    Cassie Younger (:  1945) has requested an audio/video evaluation for the following concern(s):    Went to to ER on 7/10 for leg swelling and cramping. Dx with lymphedema/leg swelling in left leg. Has been taking lasix which has helped some. Otherwise, pt feeling well with no complaints. Review of Systems   Constitutional: Negative for fatigue. Respiratory: Negative for cough and shortness of breath. Cardiovascular: Positive for leg swelling. Negative for chest pain. Prior to Visit Medications    Medication Sig Taking?  Authorizing Provider   furosemide (LASIX) 20 MG tablet Take 1 tablet by mouth daily for 5 days  Patient taking differently: Take 20 mg by mouth daily as needed  Yes Natty Greer MD   DULoxetine (CYMBALTA) 30 MG extended release capsule TAKE 1 CAPSULE BY MOUTH  DAILY Yes Pebbles Chahal MD   diclofenac (VOLTAREN) 50 MG EC tablet TAKE 1 TABLET BY MOUTH  TWICE DAILY Yes Pebbles Chahal MD   atorvastatin (LIPITOR) 20 MG tablet TAKE 1 TABLET BY MOUTH  NIGHTLY Yes Pebbles Chahal MD   CPAP Machine MISC by Does not apply route New CPAP with 8 cm Yes Pedro Lo MD   buPROPion (WELLBUTRIN XL) 150 MG extended release tablet TAKE 2 TABLETS BY MOUTH IN  THE MORNING Yes Pebbles Chahal MD   levothyroxine (SYNTHROID) 50 MCG tablet TAKE 1 TABLET BY MOUTH  DAILY Yes Pebbles Chahal MD   carvedilol (COREG) 25 MG tablet TAKE 1 TABLET BY MOUTH TWO  TIMES DAILY Yes Pebbles Chahal MD   gabapentin (NEURONTIN) 600 MG tablet TAKE 1 TABLET BY MOUTH IN  THE MORNING, 2 TABS IN THE  AFTERNOON, AND 2 TABS IN  THE EVENING AS TOLERATED Yes Austin Jeter MD   potassium chloride (KLOR-CON M) 10 MEQ extended release tablet TAKE 1 TABLET BY MOUTH TWO  TIMES DAILY Yes Austin Jeter MD   acyclovir (ZOVIRAX) 5 % CREA Apply topically as needed Yes Austin Jeter MD   b complex vitamins capsule Take 1 capsule by mouth daily Yes Historical Provider, MD   Multiple Vitamins-Minerals (CENTRUM SILVER 50+WOMEN PO) Take by mouth Yes Historical Provider, MD   tamoxifen (NOLVADEX) 20 MG tablet Take 1 tablet by mouth daily Yes Historical Provider, MD   Vitamin D (CHOLECALCIFEROL) 1000 UNITS CAPS capsule Take 1,000 Units by mouth daily.  Yes Historical Provider, MD       Social History     Tobacco Use    Smoking status: Never Smoker    Smokeless tobacco: Never Used   Substance Use Topics    Alcohol use: Yes     Comment: social    Drug use: No        Allergies   Allergen Reactions    Ciprofloxacin Itching    Oxycontin [Oxycodone Hcl] Itching   ,   Past Medical History:   Diagnosis Date    Arthritis     Blood transfusion     Cancer ([facility] [address]     GALLBLADDER 2009, 2011 OMENTUM    Charcot's joint     left foot    Chest pain 7/2/2015    Colitis     DDD (degenerative disc disease), lumbar 2/12/2013    Depression     Disorder of peripheral nervous system 9/1/2010    Dyspnea 7/2/2015    Family history of coronary artery disease 8/29/2014    History of blood transfusion 2011    following chemotherapy    Hx of right BKA ([facility] [address]     Hyperlipidemia     Hypertension     Hypothyroid 6/12/2015    Lobular breast cancer ([facility] [address] 10/2015    NSTEMI (non-ST elevated myocardial infarction) ([facility] [address] 8/29/2014    Obesity     Paroxysmal atrial fibrillation ([facility] [address] 8/29/2014    S/P BKA (below knee amputation) ([facility] [address]    ,   Past Surgical History:   Procedure Laterality Date    ABDOMEN SURGERY Left 4/11/2017    EXCISION OF CHEST WALL MASS, UPDATE LABS ON ADMIT  performed by Analisa Dickinson MD at [address] BLEPHAROPLASTY  2-5-13    both eyes    BREAST SURGERY      CARDIAC CATHETERIZATION      COLONOSCOPY  1/1/2010    normal    CYSTOSCOPY W BIOPSY OF BLADDER N/A 6/26/2017    CYSTOSCOPY BLADDER BIOPSY AND FULGURATION performed by Lidya Cho MD at [address] Bilateral 2012    cataract removal with IOL implants    HYSTERECTOMY      LEG AMPUTATION BELOW KNEE Right 2/1/11    DR Evans  due to CHARCOTS    MASTECTOMY  11/16/2015    Bilateral simple mastectomies with right SNB by DR No Plan    [place] REMOVAL OF BREAST LESION Left 1/24/2017    CORE NEEDLE BIOPSY OF  LEFT BREAST MASS performed by Analisa Dicknison MD at [facility]    SPINE SURGERY      cervical and lumbar   ,   Social History     Tobacco Use    Smoking status: Never Smoker    Smokeless tobacco: Never Used   Substance Use Topics    Alcohol use: Yes     Comment: social    Drug use: No   ,   Family History   Problem Relation Age of Onset    Heart Disease Mother     Arthritis Mother     Heart Disease Father     Arthritis Father     Cancer Sister         Colon    Thyroid Disease Son     Other Son         Down's syndrome       PHYSICAL EXAMINATION:  [ INSTRUCTIONS:  \"[x]\" Indicates a positive item  \"[]\" Indicates a negative item  -- DELETE ALL ITEMS NOT EXAMINED]  [x] Alert  [x] Oriented to person/place/time    [x] No apparent distress  [] Toxic appearing    [] Face flushed appearing [x] Sclera clear  [] Lips are cyanotic      [x] Breathing appears normal  [] Appears tachypneic      [] Rash on visible skin    [x] Cranial Nerves II-XII grossly intact    [x] Motor grossly intact in visible upper extremities    [x] Motor grossly intact in visible lower extremities    [x] Normal Mood  [] Anxious appearing    [] Depressed appearing  [] Confused appearing      [] Poor short term memory  [] Poor long term memory    [] OTHER:      Due to this being a TeleHealth encounter, evaluation of the following organ systems is limited: Vitals/Constitutional/EENT/Resp/CV/GI//MS/Neuro/Skin/Heme-Lymph-Imm. ASSESSMENT/PLAN:  1. Lymphedema    - [facility] [place]    2. Leg swelling    - [facility] [place]    Side effects, adverse effects of the medication prescribed today, as well as treatment plan/ rationale and result expectations have been discussed with the patient who expresses understanding and desires to proceed. Close follow up to evaluate treatment results and for coordination of care. I have reviewed the patient's medical history in detail and updated the computerized patient record. As always, patient is advised that if symptoms worsen in any way they will proceed to the nearest emergency room. An  electronic signature was used to authenticate this note. --YURY Alvarez - CNP on 7/15/2020 at 12:49 PM        Pursuant to the emergency declaration under the [facility], [facility] waiver authority and the [employer] Act, this Virtual  Visit was conducted, with patient's consent, to reduce the patient's risk of exposure to COVID-19 and provide continuity of care for an established patient. Services were provided through a video synchronous discussion virtually to substitute for in-person clinic visit.

## 2020-07-21 ENCOUNTER — VIRTUAL VISIT (OUTPATIENT)
Dept: PULMONOLOGY | Age: 75
End: 2020-07-21
Payer: MEDICARE

## 2020-07-21 PROCEDURE — 99214 OFFICE O/P EST MOD 30 MIN: CPT | Performed by: INTERNAL MEDICINE

## 2020-07-21 ASSESSMENT — ENCOUNTER SYMPTOMS
CHEST TIGHTNESS: 0
SHORTNESS OF BREATH: 0
DIARRHEA: 0
VOICE CHANGE: 0
SINUS PRESSURE: 0
ABDOMINAL PAIN: 0
SORE THROAT: 0
COUGH: 0
EYE ITCHING: 0
VOMITING: 0
NAUSEA: 0
TROUBLE SWALLOWING: 0
EYE DISCHARGE: 0
WHEEZING: 0
RHINORRHEA: 0

## 2020-07-21 NOTE — PROGRESS NOTES
CYSTOSCOPY BLADDER BIOPSY AND FULGURATION performed by Arnold Sales MD at [address] Bilateral 2012    cataract removal with IOL implants    HYSTERECTOMY      LEG AMPUTATION BELOW KNEE Right 2/1/11    DR Fritz Navarro due to CHARCOTS    MASTECTOMY  11/16/2015    Bilateral simple mastectomies with right SNB by DR Francia Peterson    ID REMOVAL OF BREAST LESION Left 1/24/2017    CORE NEEDLE BIOPSY OF  LEFT BREAST MASS performed by Leah Contreras MD at [address]      cervical and lumbar     Family History   Problem Relation Age of Onset    Heart Disease Mother     Arthritis Mother     Heart Disease Father     Arthritis Father     Cancer Sister         Colon    Thyroid Disease Son     Other Son         Down's syndrome     Social History     Socioeconomic History    Marital status:       Spouse name: Not on file    Number of children: Not on file    Years of education: Not on file    Highest education level: Not on file   Occupational History    Occupation: retired   Social Needs    Financial resource strain: Not on file    Food insecurity     Worry: Not on file     Inability: Not on file   [employer] needs     Medical: Not on file     Non-medical: Not on file   Tobacco Use    Smoking status: Never Smoker    Smokeless tobacco: Never Used   Substance and Sexual Activity    Alcohol use: Yes     Comment: social    Drug use: No    Sexual activity: Never   Lifestyle    Physical activity     Days per week: Not on file     Minutes per session: Not on file    Stress: Not on file   Relationships    Social connections     Talks on phone: Not on file     Gets together: Not on file     Attends Christian service: Not on file     Active member of club or organization: Not on file     Attends meetings of clubs or organizations: Not on file     Relationship status: Not on file    Intimate partner violence     Fear of current or ex partner: Not on file Emotionally abused: Not on file     Physically abused: Not on file     Forced sexual activity: Not on file   Other Topics Concern    Not on file   Social History Narrative    Not on file         Review of Systems   Constitutional: Negative for chills, diaphoresis, fatigue and fever. HENT: Negative for congestion, mouth sores, nosebleeds, postnasal drip, rhinorrhea, sinus pressure, sneezing, sore throat, trouble swallowing and voice change. Eyes: Negative for discharge, itching and visual disturbance. Respiratory: Negative for cough, chest tightness, shortness of breath and wheezing. Cardiovascular: Negative for chest pain, palpitations and leg swelling. Gastrointestinal: Negative for abdominal pain, diarrhea, nausea and vomiting. Genitourinary: Negative for difficulty urinating and hematuria. Musculoskeletal: Negative for arthralgias, joint swelling and myalgias. Skin: Negative for rash. Allergic/Immunologic: Negative for environmental allergies and food allergies. Neurological: Negative for dizziness, tremors, weakness and headaches. Psychiatric/Behavioral: Positive for sleep disturbance. Negative for behavioral problems.         :   There were no vitals filed for this visit.   Wt Readings from Last 3 Encounters:   07/10/20 215 lb (97.5 kg)   02/11/20 228 lb 6.4 oz (103.6 kg)   11/18/19 228 lb 9.6 oz (103.7 kg)         Physical exam:phone visit    Current Outpatient Medications   Medication Sig Dispense Refill    furosemide (LASIX) 20 MG tablet Take 1 tablet by mouth daily for 5 days (Patient taking differently: Take 20 mg by mouth daily as needed ) 5 tablet 0    DULoxetine (CYMBALTA) 30 MG extended release capsule TAKE 1 CAPSULE BY MOUTH  DAILY 90 capsule 2    diclofenac (VOLTAREN) 50 MG EC tablet TAKE 1 TABLET BY MOUTH  TWICE DAILY 180 tablet 0    atorvastatin (LIPITOR) 20 MG tablet TAKE 1 TABLET BY MOUTH  NIGHTLY 90 tablet 1    CPAP Machine MISC by Does not apply route New CPAP with   Nasal pillow Mask, she order full mask since she had dry mouth . She said  sleep is restful with the CPAP use. She is compliant with CPAP therapy and benefiting with CPAP use. No snoring with CPAP use. continue CPAP as before. Counseling: CPAP/BiPAP uses, patient advised to use CPAP at least 5-6 hours every night. Driving: patient is advised for extreme caution when driving or operating machinery if there is a feeling of drowsiness, especially while driving it is preferable to stop driving and take a brief nap. Sleep hygiene:Avoid supine sleep, sleep on  sides. Avoid  sleep deprivation. Explained sleep hygiene. Advice to avoid Alcohol and sedative    2. Obesity (BMI 30-39. 9)  Patient patient is advised try to lose weight. obesity related risk explained to the patient ,  Current weight:    Lbs. BMI:  There is no height or weight on file to calculate BMI. Suggested weight control approaches, including dietary changes , exercise, behavioral modification. Patient notified that this is a billable service and has given verbal consent for telehealth services. Time spent with patient 23  minutes. Return in about 3 months (around 10/21/2020) for massimo.       Lynda Zhong MD

## 2020-07-22 ENCOUNTER — HOSPITAL ENCOUNTER (OUTPATIENT)
Dept: PHYSICAL THERAPY | Age: 75
Setting detail: THERAPIES SERIES
Discharge: HOME | End: 2020-07-22
Payer: MEDICARE

## 2020-07-22 PROCEDURE — 97163 PT EVAL HIGH COMPLEX 45 MIN: CPT

## 2020-07-22 NOTE — PROGRESS NOTES
Functions, Activity limitations: Decreased functional mobility , Decreased strength, Decreased balance(Stephane LE edema)  Assessment: Pt presents with a recent worsening of edema in stephane LEs impacting fit of Lt AFO and Rt prosthetic. Pt also reports 3 falls in the past week with a worsening of gait and balance. Pt with good seated strength but likely to have decreased stephane hip strength based on function. Pt with increased edema in stephane LEs with some areas of fibrotic tissue in Lt lower leg. Pt is at a high risk for falls per Antwon Zepeda and ambulates with a LBQC with a FWD flexed posture and decreased stephane foot clearance. Pt would benefit from further skilled PT to improve her mobility, balance and edema to improve her overall QOL. Prognosis: Good  Discharge Recommendations: Continue to assess pending progress      PT Education: Goals;PT Role;Plan of Care;Home Exercise Program    PLAN: [x] Evaluate and Treat  Frequency/Duration:  Plan  Times per week: 2  Plan weeks: 6  Current Treatment Recommendations: Strengthening, Balance Training, Functional Mobility Training, Transfer Training, Gait Training, Stair training, Neuromuscular Re-education, Manual Therapy - Soft Tissue Mobilization, Manual Lymphatic Drainage, Patient/Caregiver Education & Training, Equipment Evaluation, Education, & procurement, Modalities, Home Exercise Program, Safety Education & Training     Precautions:  falls                          Patient Status:[x] Continue/ Initiate plan of Care    [] Discharge PT. Recommend pt continue with HEP. [] Additional visits requested, Please re-certify for additional visits:          Signature: Electronically signed by Alice Cordova PT on 7/22/20 at 6:20 PM EDT      If you have any questions or concerns, please don't hesitate to call.   Thank you for your referral.

## 2020-07-22 NOTE — PROGRESS NOTES
[facility]    [x]  [address]  []  [facility]   []  [address] [facility]     Date: 2020  Patient: Syed Castillo  : 1945  ACCT #: [de-identified]  Referring physician: Referring Practitioner: Trinidad MIDDLETON    Referring Practitioner: Trinidad MIDDLETON    Referral Date : 07/15/20    Diagnosis: Lymphedema, Leg swelling    Treatment Diagnosis: Stephane LE lymphedema, Impaired mobility  PT Insurance Information: Medicare      Total # of Visits to Date: 1  No Show: 0  Canceled Appointment: 0    History   has a past medical history of Arthritis, Blood transfusion, Cancer ([facility] [address] Charcot's joint, Chest pain, Colitis, DDD (degenerative disc disease), lumbar, Depression, Disorder of peripheral nervous system, Dyspnea, Family history of coronary artery disease, History of blood transfusion, Hx of right BKA ([address] Hyperlipidemia, Hypertension, Hypothyroid, Lobular breast cancer ([address] NSTEMI (non-ST elevated myocardial infarction) ([address] Obesity, Paroxysmal atrial fibrillation ([address] and S/P BKA (below knee amputation) ([facility] [address] has a past surgical history that includes Hysterectomy; Appendectomy; back surgery; Breast surgery; Leg amputation below knee (Right, 11); Spine surgery; Blepharoplasty (--13); skin biopsy; Cardiac catheterization; Mastectomy (2015); pr removal of breast lesion (Left, 2017); Abdomen surgery (Left, 2017); eye surgery (Bilateral, ); Colonoscopy (2010); and cystoscopy w biopsy of bladder (N/A, 2017). Subjective  Subjective: Had an episode a little over a week ago where both legs were swollen causing difficulty getting AFO on Lt and prosthetic on Rt. Went to ER to get an ultrasound - no blood clots. Was on Lasix for 5 days and the swelling has gone down so brace and prosthetic fit but still having swelling.   Has Charcot arthroneuropathy causing LLE  Strength LLE: WFL  Comment: Expect hip abd and ext weakness     Transfers  Sit to Stand: Independent(Must rely on UEs, unable to complete without)  Stand to sit: Independent  Ambulation 1  Surface: carpet  Device: Large Isaias Embarrass  Other Apparatus: AFO, Left(Rt LE prosthetic)  Assistance: Independent  Quality of Gait: FWD flexed, mild occasional Lt circumduction with decreased Rt stance time, decreased ginny foot clearance, occasional lateral deviations to path  Gait Deviations: Slow Blank, Decreased step length, Decreased step height  Distance: 80'     Ambulation  Ambulation?: Yes  Stairs  # Steps : 4  Stairs Height: 6\"  Rails: Bilateral  Device: No Device  Assistance: Supervision, Independent  Comment: NR, increased reliance on UEs when ascending     Car Balance Score: 34        Pitting Edema []None [x]Slight(+1)     []Moderate(+2)     []Severe(>+3)   Comment(s):       Skin Appearance/           Condition [x]Normal []Mild redness    []Moderate redness     []Mottled    []Rough     []Dry      []Flaky      []Leathery        Open Wound(s) [x]None Location(s):     Description(s):    Tissue Temperature [x]Normal []Cool     []Warm     []Hot     []Uneven   Skin Folds [x]None []Small     []Medium     []Large  Location:   Fibrotic Tissue []None [x]  Minimal     []  Moderate     []  Severe/Hard     Orange Peel Texture [x]None []Mild     []Moderate     []Severe  Location:    Nailbed Appearance/                 Condition [x]Normal Description(s):   Leakage of Fluid [x]None Amount: []Small     []Moderate     []Large  Description of fluid: []Clear     []Cloudy       []Other   Other []NA        Circumferential Measurements:  []Measurements to be taken during the initial treatment session.     Measurements [] Upper Extremity  [x] Lower Extremity      Location Left (cm) Location Right (cm)    Great toe 8.0 5 cm from end of residual limb 32.0   8 cm from great toe 24.0 10 37.5   5 cm from heel 33.0 15 38.5   10 35.0 20 44.5 15 37.5     20 39.5     25 44.0     30 47.5     35 46.5     40 50.5       Exercises:   Exercises  Exercise 1: Lymphedema exercises*  Exercise 2: Lymphatic massage*  Exercise 3: Static standing*  Exercise 4: Reaching*  Exercise 5: Single stepping*  Exercise 6: Gait drills*  Exercise 7: Sink ex*      TREATMENT  [x]No treatment was initiated during this evaluation. Treatment will begin during the next    treatment session. []Yes treatment was initiated during this evaluation and included the following:   []Lymphatic Drainage Massage   []Skin Care     []Compression Bandaging     []Exercises     []Other:     Treatment tolerance: Patient tolerance to treatment:  [x]Good     []Fair     []Poor  Limitations due to:     []Pain     []Fatigue     []Other medical complications     []Other:    Post-Pain:  Pain Ratin/10  Location and Pain Description same as pre-pain unless otherwise indicated. Action taken: [x] NA  [] Call Physician  [] Perform HEP  [] Meds as prescribed       ASSESSMENT/PLAN:  Assessment   Conditions Requiring Skilled Therapeutic Intervention  Body structures, Functions, Activity limitations: Decreased functional mobility , Decreased strength, Decreased balance(Stephane LE edema)  Assessment: Pt presents with a recent worsening of edema in stephane LEs impacting fit of Lt AFO and Rt prosthetic. Pt also reports 3 falls in the past week with a worsening of gait and balance. Pt with good seated strength but likely to have decreased stephane hip strength based on function. Pt with increased edema in stephane LEs with some areas of fibrotic tissue in Lt lower leg. Pt is at a high risk for falls per Nicky Carte and ambulates with a LBQC with a FWD flexed posture and decreased stephane foot clearance. Pt would benefit from further skilled PT to improve her mobility, balance and edema to improve her overall QOL.   Treatment Diagnosis: Stephane LE lymphedema, Impaired mobility  Prognosis: Good  Decision Making: High Complexity  History: PMH: h/o gallbladder ca with removal, omentum ca with removal, lymph nodes and chemo, Rt breast ca with ginny mastectomy, h/o 6 lumbar surgeries, ginny TSR, ginny TKR, Rt BKA  Exam: Decreased strength and balance with increased edema impacting gait and safety. Clinical Presentation: Complicated  REQUIRES PT FOLLOW UP: Yes  Discharge Recommendations: Continue to assess pending progress    Patient Education   PT Education: Goals, PT Role, Plan of Care, Home Exercise Program    Pt verbalized/demonstrated good understanding:     [x] Yes         [] No, pt required further clarification. Goals   Long term goals  Time Frame for Long term goals : 6 weeks  Long term goal 1: Improve ginny hip strength to >/= 4+/5 to improve stability with standing and walking. Long term goal 2: Pt will be independent with home management of lymphedema symptoms. Long term goal 3: Reduce edema in ginny LEs by >/= 1-2 cm to increase ease with dressing and fit of AFO and prosthetic. Long term goal 4: Car >/= 45/56 to reduce risk for falls. Long term goal 5: Pt will ambulate with LRD >/= 200' with improved quality and decreased deviations S/I. Plan:  Plan  Times per week: 2  Plan weeks: 6  Current Treatment Recommendations: Strengthening, Balance Training, Functional Mobility Training, Transfer Training, Gait Training, Stair training, Neuromuscular Re-education, Manual Therapy - Soft Tissue Mobilization, Manual Lymphatic Drainage, Patient/Caregiver Education & Training, Equipment Evaluation, Education, & procurement, Modalities, Home Exercise Program, Safety Education & Training        Evaluation and patient rights have been reviewed and patient agrees with plan of care.  Yes  [x]  No  [] Explain:     Electronically signed by: Electronically signed by Pierre Steinberg, PT on 7/22/2020 at 6:19 PM     PT Individual Minutes  Time In: 4548  Time Out: 9093  Minutes: 42                                        Jack Fall Risk Assessment    Risk Factor Scale Score   History of Falls [x] Yes  [] No 25  0 25   Secondary Diagnosis [x] Yes  [] No 15  0 15   Ambulatory Aid [] Furniture  [x] Crutches/cane/walker  [] None/bedrest/wheelchair/nurse 30  15  0 15   IV/Heparin Lock [] Yes  [x] No 20  0 0   Gait/Transferring [x] Impaired  [] Weak  [] Normal/bedrest/immobile 20  10  0 20   Mental Status [] Forgets limitations  [x] Oriented to own ability 15  0 0      Total:75     Based on the Assessment score: check the appropriate box.     []  No intervention needed   Low =   Score of 0-24    []  Use standard prevention interventions Moderate =  Score of 24-44   [] Discuss fall prevention strategies   [] Indicate moderate falls risk on eval    [x]  Use high risk prevention interventions High = Score of 45 and higher   [x] Discuss fall prevention strategies   [x] Provide supervision during treatment time

## 2020-07-24 NOTE — TELEPHONE ENCOUNTER
Spoke to Addy [place] Bridge from Nurse Triage. Advised to have patient seek further treatment today at the emergency room.

## 2020-07-25 ENCOUNTER — CARE COORDINATION (OUTPATIENT)
Dept: CARE COORDINATION | Age: 75
End: 2020-07-25

## 2020-07-26 ENCOUNTER — CARE COORDINATION (OUTPATIENT)
Dept: CARE COORDINATION | Age: 75
End: 2020-07-26

## 2020-07-26 NOTE — CARE COORDINATION
You Patient resolved from the Care Transitions episode on 7/26/20  Discussed COVID-19 related testing which was not done at this time. Test results were not done. Patient informed of results, if available? No testing    Patient/family has been provided the following resources and education related to COVID-19:                         Signs, symptoms and red flags related to COVID-19            CDC exposure and quarantine guidelines            [employer] exposure contact - [phone]            Contact for their local Department of Health                 Patient currently reports that the following symptoms have improved:  no new/worsening symptoms     No further outreach scheduled with this CTN/ACM. Episode of Care resolved. Patient has this CTN/ACM contact information if future needs arise. Call to art to resolve covid episode. She reports improvement in edema due to lasix and physical therapy.  She has no other symptoms

## 2020-07-29 ENCOUNTER — HOSPITAL ENCOUNTER (OUTPATIENT)
Dept: PHYSICAL THERAPY | Age: 75
Setting detail: THERAPIES SERIES
Discharge: HOME | End: 2020-07-29
Payer: MEDICARE

## 2020-07-29 PROCEDURE — 97110 THERAPEUTIC EXERCISES: CPT

## 2020-07-29 PROCEDURE — 97140 MANUAL THERAPY 1/> REGIONS: CPT

## 2020-07-29 ASSESSMENT — PAIN DESCRIPTION - LOCATION: LOCATION: NECK;WRIST

## 2020-07-29 ASSESSMENT — PAIN DESCRIPTION - ORIENTATION: ORIENTATION: RIGHT

## 2020-07-29 ASSESSMENT — PAIN SCALES - GENERAL: PAINLEVEL_OUTOF10: 3

## 2020-07-29 NOTE — PROGRESS NOTES
management of lymphedema symptoms. Long term goal 3: Reduce edema in ginny LEs by >/= 1-2 cm to increase ease with dressing and fit of AFO and prosthetic. Long term goal 4: Car >/= 45/56 to reduce risk for falls. Long term goal 5: Pt will ambulate with LRD >/= 200' with improved quality and decreased deviations S/I. Progress toward goals: Edema, strength    POST-PAIN       Pain Rating (0-10 pain scale):   3/10   Location and pain description same as pre-treatment unless indicated. Action: [] NA   [] Perform HEP  [x] Meds as prescribed  [] Modalities as prescribed   [] Call Physician     Frequency/Duration:  Plan  Times per week: 2  Plan weeks: 6  Current Treatment Recommendations: Strengthening, Balance Training, Functional Mobility Training, Transfer Training, Gait Training, Stair training, Neuromuscular Re-education, Manual Therapy - Soft Tissue Mobilization, Manual Lymphatic Drainage, Patient/Caregiver Education & Training, Equipment Evaluation, Education, & procurement, Modalities, Home Exercise Program, Safety Education & Training     Pt to continue current HEP. See objective section for any therapeutic exercise changes, additions or modifications this date.          PT Individual Minutes  Time In: 1330  Time Out: 6050  Minutes: 58  Timed Code Treatment Minutes: 58 Minutes  Procedure Minutes:0     Timed Activity Minutes Units   Ther Ex 30 2   Manual  28 2       Signature:  Electronically signed by Rafaela Barrera PTA on 7/29/20 at 9:31 AM EDT

## 2020-07-31 ENCOUNTER — HOSPITAL ENCOUNTER (OUTPATIENT)
Dept: PHYSICAL THERAPY | Age: 75
Setting detail: THERAPIES SERIES
Discharge: HOME | End: 2020-07-31
Payer: MEDICARE

## 2020-07-31 PROCEDURE — 97110 THERAPEUTIC EXERCISES: CPT

## 2020-07-31 PROCEDURE — 97140 MANUAL THERAPY 1/> REGIONS: CPT

## 2020-07-31 NOTE — PROGRESS NOTES
[address] [address]  Outpatient Physical Therapy    Treatment Note        Date: 2020  Patient: Melina Topete  : 1945  ACCT #: [de-identified]  Referring Practitioner: Carisa MIDDLETON  Diagnosis: Lymphedema, Leg swelling    Visit Information:  PT Visit Information  PT Insurance Information: Medicare  Total # of Visits to Date: 3  Plan of Care/Certification Expiration Date: 20  No Show: 0  Progress Note Due Date: 20  Canceled Appointment: 0  Progress Note Counter: 3/12    Subjective: Reports doing lymphedema exercises at home without difficulty. Pt reports able to wear a 3 ply sock on Rt LE today. HEP Compliance:  [x] Good [] Fair [] Poor [] Reports not doing due to:    Vital Signs  Patient Currently in Pain: Yes   Pain Screening  Patient Currently in Pain: Yes    OBJECTIVE:   Exercises  Exercise 2: Lymphatic massage: first page therapist and pt ea completing 5 reps, therapist completing rest of massage  Exercise 4: NuStep L1 8' to improve lymphatic flow  Exercise 7: 2 way SLR x10 stephane  Exercise 8: Bridges with bolster under knees 5s x10  Exercise 20: HEP: first page of lymphedema massage    Assessment:   Activity Tolerance  Activity Tolerance: Patient Tolerated treatment well    Body structures, Functions, Activity limitations: Decreased functional mobility , Decreased strength, Decreased balance(Stephane LE edema)  Assessment: Initiated supine exercises to improve stephane LE strength. Pt with difficulty with all supine exercises due to fatigue. Requires A without prosthetic on Rt LE to roll onto Lt side. Educated pt on 1st page of massage. Pt has difficulty obtaining hand flat to perform massage due to arthritis and limited ROM. Discussed using a small paint roller or spatula to A with reaching and completing massage.   Treatment Diagnosis: Stephane LE lymphedema, Impaired mobility  Prognosis: Good       Goals:  Long term goals  Time Frame for Long term goals : 6 weeks  Long term goal 1: Improve ginny hip strength to >/= 4+/5 to improve stability with standing and walking. Long term goal 2: Pt will be independent with home management of lymphedema symptoms. Long term goal 3: Reduce edema in ginny LEs by >/= 1-2 cm to increase ease with dressing and fit of AFO and prosthetic. Long term goal 4: Car >/= 45/56 to reduce risk for falls. Long term goal 5: Pt will ambulate with LRD >/= 200' with improved quality and decreased deviations S/I. Progress toward goals: strength, home management    POST-PAIN       Pain Rating (0-10 pain scale):  0 /10   Location and pain description same as pre-treatment unless indicated. Action: [x] NA   [] Perform HEP  [] Meds as prescribed  [] Modalities as prescribed   [] Call Physician     Frequency/Duration:  Plan  Times per week: 2  Plan weeks: 6  Current Treatment Recommendations: Strengthening, Balance Training, Functional Mobility Training, Transfer Training, Gait Training, Stair training, Neuromuscular Re-education, Manual Therapy - Soft Tissue Mobilization, Manual Lymphatic Drainage, Patient/Caregiver Education & Training, Equipment Evaluation, Education, & procurement, Modalities, Home Exercise Program, Safety Education & Training     Pt to continue current HEP. See objective section for any therapeutic exercise changes, additions or modifications this date.     PT Individual Minutes  Time In: 2894  Time Out: 7162  Minutes: 45  Timed Code Treatment Minutes: 45 Minutes  Procedure Minutes:0     Timed Activity Minutes Units   Ther Ex 30 2   Manual  15 1       Signature:  Electronically signed by Michaela Payton PT on 7/31/20 at 2:10 PM EDT

## 2020-08-04 ENCOUNTER — HOSPITAL ENCOUNTER (OUTPATIENT)
Dept: PHYSICAL THERAPY | Age: 75
Setting detail: THERAPIES SERIES
Discharge: HOME | End: 2020-08-04
Payer: MEDICARE

## 2020-08-04 NOTE — PROGRESS NOTES
[address]       Physical Therapy  Cancellation/No-show Note  Patient Name:  Jaime Spence  :  1945   Date:  2020  Referring Practitioner: Momo MIDDLETON  Diagnosis: Lymphedema, Leg swelling    Visit Information:  PT Visit Information  PT Insurance Information: Medicare  Total # of Visits to Date: 3  Plan of Care/Certification Expiration Date: 20  No Show: 0  Canceled Appointment: 1  Progress Note Counter: 3/12, cxl 2020    For today's appointment patient:  [x]  Cancelled  []  Rescheduled appointment  []  No-show   []  Called pt to remind of next appointment     Reason given by patient:  []  Patient ill  []  Conflicting appointment  []  No transportation    []  Conflict with work  []  No reason given  [x]  Other:  Pt cancelled d/t fall and unable to move     Comments:       Signature: Electronically signed by Emily Farrell PTA on 20 at 10:48 AM EDT

## 2020-08-07 ENCOUNTER — HOSPITAL ENCOUNTER (OUTPATIENT)
Dept: PHYSICAL THERAPY | Age: 75
Setting detail: THERAPIES SERIES
Discharge: HOME | End: 2020-08-07
Payer: MEDICARE

## 2020-08-07 PROCEDURE — 97112 NEUROMUSCULAR REEDUCATION: CPT

## 2020-08-07 PROCEDURE — 97140 MANUAL THERAPY 1/> REGIONS: CPT

## 2020-08-07 PROCEDURE — 97110 THERAPEUTIC EXERCISES: CPT

## 2020-08-07 ASSESSMENT — PAIN DESCRIPTION - DESCRIPTORS: DESCRIPTORS: DULL;ACHING;CONSTANT

## 2020-08-07 ASSESSMENT — PAIN DESCRIPTION - ORIENTATION: ORIENTATION: RIGHT;LOWER;LEFT

## 2020-08-07 ASSESSMENT — PAIN SCALES - GENERAL: PAINLEVEL_OUTOF10: 5

## 2020-08-07 ASSESSMENT — PAIN DESCRIPTION - PAIN TYPE: TYPE: ACUTE PAIN

## 2020-08-07 NOTE — PROGRESS NOTES
[address] [address]  Outpatient Physical Therapy    Treatment Note        Date: 2020  Patient: Urmila Painting  : 1945  ACCT #: [de-identified]  Referring Practitioner: Shan MIDDLETON  Diagnosis: Lymphedema, Leg swelling    Visit Information:  PT Visit Information  PT Insurance Information: Medicare  Total # of Visits to Date: 4  Plan of Care/Certification Expiration Date: 20  No Show: 0  Progress Note Due Date: 20  Canceled Appointment: 1  Progress Note Counter:     Subjective: Pt reports falling Monday , catching toe in sidewalk crack,and back and Rt wrist are hurting     HEP Compliance:  [] Good [x] Fair [] Poor [] Reports not doing due to:    Vital Signs  Patient Currently in Pain: Yes   Pain Screening  Patient Currently in Pain: Yes  Pain Assessment  Pain Level: 5  Pain Type: Acute pain  Pain Location: Wrist;Back;Leg  Pain Orientation: Right; Lower; Left  Pain Descriptors: Dull;Aching;Constant    OBJECTIVE:   Exercises  Exercise 2: Lymphatic massage: first 2 pages pt completing, therapist completing rest of massage  Exercise 3: Static standing: FA/FT approx 30 s with inc'd LBP  Exercise 4: NuStep L2 5' to improve lymphatic flow  Exercise 5: Single stepping: X 10 Stephane over SC, F/L  Exercise 6: Open chain ex's: LAQ 5\" x 10, attempted marching with lean on UEs for tolerance  Exercise 7: 2 way SLR x10 stephane,( held abd d/t pain)  Exercise 8: Bridges with bolster under knees 5s x10, (held d/t pain)  Exercise 9: LTR x 10 to ease LBP  Exercise 10: Gait drills;F/L/R/  Exercise 20: HEP: last 2 pages of lymphedema massage    *Indicates exercise, modality, or manual techniques to be initiated when appropriate    Assessment: Body structures, Functions, Activity limitations: Decreased functional mobility , Decreased strength, Decreased balance(Stephane LE edema)  Assessment: Pt utilizing spatula for reach with Lymphedema massage, therapist assited where pt unable.  Reviewed last 2 pages of passage with good understanding, needing to sit upright to reach Lower Legs. Initiated balance drills to improve balance reactions and stability. Pt challenged by NBOS with inc'd pain. Pt LBP dec'd, wrist pain the aggrivated by Nustep. Treatment Diagnosis: Stephane LE lymphedema, Impaired mobility  Prognosis: Good       Goals:       Long term goals  Time Frame for Long term goals : 6 weeks  Long term goal 1: Improve stephane hip strength to >/= 4+/5 to improve stability with standing and walking. Long term goal 2: Pt will be independent with home management of lymphedema symptoms. Long term goal 3: Reduce edema in stephane LEs by >/= 1-2 cm to increase ease with dressing and fit of AFO and prosthetic. Long term goal 4: Car >/= 45/56 to reduce risk for falls. Long term goal 5: Pt will ambulate with LRD >/= 200' with improved quality and decreased deviations S/I. Progress toward goals:reducing edema, strength, balance  POST-PAIN       Pain Rating (0-10 pain scale):  2-5 /10   Location and pain description same as pre-treatment unless indicated. Action: [] NA   [] Perform HEP  [x] Meds as prescribed  [x] Modalities as prescribed   [] Call Physician     Frequency/Duration:  Plan  Times per week: 2  Plan weeks: 6  Current Treatment Recommendations: Strengthening, Balance Training, Functional Mobility Training, Transfer Training, Gait Training, Stair training, Neuromuscular Re-education, Manual Therapy - Soft Tissue Mobilization, Manual Lymphatic Drainage, Patient/Caregiver Education & Training, Equipment Evaluation, Education, & procurement, Modalities, Home Exercise Program, Safety Education & Training     Pt to continue current HEP. See objective section for any therapeutic exercise changes, additions or modifications this date.          PT Individual Minutes  Time In: 1302  Time Out: 1400  Minutes: 58  Timed Code Treatment Minutes: 58 Minutes  Procedure Minutes:0     Timed Activity Minutes Units   Ther Ex 25 2   Neuro 23 1 Manual  10 1       Signature:  Electronically signed by Vero Brownlee PTA on 8/7/20 at 3:26 PM EDT

## 2020-08-11 ENCOUNTER — HOSPITAL ENCOUNTER (OUTPATIENT)
Dept: PHYSICAL THERAPY | Age: 75
Setting detail: THERAPIES SERIES
Discharge: HOME | End: 2020-08-11
Payer: MEDICARE

## 2020-08-11 PROCEDURE — 97112 NEUROMUSCULAR REEDUCATION: CPT

## 2020-08-11 PROCEDURE — 97110 THERAPEUTIC EXERCISES: CPT

## 2020-08-11 PROCEDURE — 97140 MANUAL THERAPY 1/> REGIONS: CPT

## 2020-08-11 ASSESSMENT — PAIN SCALES - GENERAL: PAINLEVEL_OUTOF10: 2

## 2020-08-11 ASSESSMENT — PAIN DESCRIPTION - LOCATION: LOCATION: BACK;HIP;WRIST

## 2020-08-11 ASSESSMENT — PAIN DESCRIPTION - DESCRIPTORS: DESCRIPTORS: CONSTANT;ACHING;DULL

## 2020-08-11 ASSESSMENT — PAIN DESCRIPTION - ORIENTATION: ORIENTATION: LEFT;LOWER

## 2020-08-11 NOTE — PROGRESS NOTES
[id number] [address]  Outpatient Physical Therapy    Treatment Note        Date: 2020  Patient: Ana Laura Nuñez  : 1945  ACCT #: [de-identified]  Referring Practitioner: Chari MIDDLETON  Diagnosis: Lymphedema, Leg swelling    Visit Information:  PT Visit Information  PT Insurance Information: Medicare  Total # of Visits to Date: 5  Plan of Care/Certification Expiration Date: 20  No Show: 0  Progress Note Due Date: 20  Canceled Appointment: 1  Progress Note Counter:     Subjective: Pt reports massage going well with spatula, worked on bridges with difficulty     HEP Compliance:  [x] Good [] Rachel Duong [] Poor [] Reports not doing due to:    Vital Signs  Patient Currently in Pain: Yes   Pain Screening  Patient Currently in Pain: Yes  Pain Assessment  Pain Assessment: 0-10  Pain Level: 2(2-4/10)  Pain Location: Back;Hip;Wrist  Pain Orientation: Left; Lower  Pain Descriptors: Constant; Aching;Dull    OBJECTIVE:   Exercises  Exercise 2: Lymphatic massage: first 3 pages pt completing, therapist completing rest of massage  Exercise 4: NuStep L2 5' to improve lymphatic flow  Exercise 6: Open chain ex's: LAQ 5\" x 10, attempted marching with lean on UEs for tolerance  Exercise 7: 2 way SLR x10 ginny  Exercise 8: Bridges with bolster under knees 5s x10,  Exercise 9: LTR with Pball 5\"x 10 to ease LBP  Exercise 10: Gait drills;F/L/R/march 0-2 UE support  Exercise 11: alt taps 2\" box 1-2 ue support  Exercise 12: SLS supported on 2\" box, Ginny , approx 30\"    Modalities:        *Indicates exercise, modality, or manual techniques to be initiated when appropriate    Assessment: Body structures, Functions, Activity limitations: Decreased functional mobility , Decreased strength, Decreased balance(Ginny LE edema)  Assessment: Pt utilized roller for reach in place of spatula that pt left in the car. Pt able to reach most of entire leg with exception of Lat malleoi and top of foot.  Pt performed all except for distal and reverse flow. Pt demo'd improved stabiltiy in SLS activites with decreased UE support. Treatment Diagnosis: Stephane LE lymphedema, Impaired mobility  Prognosis: Good       Goals:       Long term goals  Time Frame for Long term goals : 6 weeks  Long term goal 1: Improve stephane hip strength to >/= 4+/5 to improve stability with standing and walking. Long term goal 2: Pt will be independent with home management of lymphedema symptoms. Long term goal 3: Reduce edema in stephane LEs by >/= 1-2 cm to increase ease with dressing and fit of AFO and prosthetic. Long term goal 4: Car >/= 45/56 to reduce risk for falls. Long term goal 5: Pt will ambulate with LRD >/= 200' with improved quality and decreased deviations S/I. Progress toward goals:    POST-PAIN       Pain Rating (0-10 pain scale):  0 /10   Location and pain description same as pre-treatment unless indicated. Action: [x] NA   [] Perform HEP  [] Meds as prescribed  [] Modalities as prescribed   [] Call Physician     Frequency/Duration:  Plan  Times per week: 2  Plan weeks: 6  Current Treatment Recommendations: Strengthening, Balance Training, Functional Mobility Training, Transfer Training, Gait Training, Stair training, Neuromuscular Re-education, Manual Therapy - Soft Tissue Mobilization, Manual Lymphatic Drainage, Patient/Caregiver Education & Training, Equipment Evaluation, Education, & procurement, Modalities, Home Exercise Program, Safety Education & Training     Pt to continue current HEP. See objective section for any therapeutic exercise changes, additions or modifications this date.          PT Individual Minutes  Time In: 7459  Time Out: 1400  Minutes: 57  Timed Code Treatment Minutes: 57 Minutes  Procedure Minutes:0     Timed Activity Minutes Units   Ther Ex 27 2   Neuro 15 1   Manual 15 1       Signature:  Electronically signed by Ariel Wilhelm PTA on 8/11/20 at 1:36 PM EDT

## 2020-08-12 RX ORDER — POTASSIUM CHLORIDE 750 MG/1
TABLET, EXTENDED RELEASE ORAL
Qty: 180 TABLET | Refills: 0 | Status: SHIPPED | OUTPATIENT
Start: 2020-08-12 | End: 2021-01-27

## 2020-08-13 ENCOUNTER — HOSPITAL ENCOUNTER (OUTPATIENT)
Dept: PHYSICAL THERAPY | Age: 75
Setting detail: THERAPIES SERIES
Discharge: HOME | End: 2020-08-13
Payer: MEDICARE

## 2020-08-13 PROCEDURE — 97110 THERAPEUTIC EXERCISES: CPT

## 2020-08-13 PROCEDURE — 97112 NEUROMUSCULAR REEDUCATION: CPT

## 2020-08-13 NOTE — PROGRESS NOTES
[address] [address]  Outpatient Physical Therapy    Treatment Note        Date: 2020  Patient: Patricia Tinoco  : 1945  ACCT #: [de-identified]  Referring Practitioner: Julita MIDDLETON  Diagnosis: Lymphedema, Leg swelling    Visit Information:  PT Visit Information  PT Insurance Information: Medicare  Total # of Visits to Date: 6  Plan of Care/Certification Expiration Date: 20  No Show: 0  Canceled Appointment: 1  Progress Note Counter:     Subjective: Pt reports feeling as if she is getting a UTI, experiencing burning sensation. Pt reports compliance with massage     HEP Compliance:  [x] Good [] Fair [] Poor [] Reports not doing due to:    Vital Signs  Patient Currently in Pain: No   Pain Screening  Patient Currently in Pain: No    OBJECTIVE:   Exercises  Exercise 2: Lymphatic massage: first 3 pages pt completing, therapist assisting with distal reach to improve flow  Exercise 10: Gait drills;F/L/R/march 0-2 UE support  Exercise 11: alt taps 2\" & 4\"  box 1-2 ue support  Exercise 12: SLS supported on 2\" box, Stephane , approx 30\", with boil shoulder flex with SC x 5 ea  Exercise 13: LLE measures  Exercise 14: Gait ladder with F/ stepping 0-1 ue support  Exercise 15: 90 deg turns 2-3 steps, no LOB         Measurements []? Upper Extremity    [x]? Lower Extremity      Location Left (cm) Location Right (cm)  LTcm 2020   Great toe 8.0 5 cm from end of residual limb 32.0 8   8 cm from great toe 24.0 10 37.5 23.5   5 cm from heel 33.0 15 38.5 33   10 35.0 20 44.5 34   15 37.5     37   20 39.5     40   25 44.0     43   30 47.5     45.5   35 46.5     44   40 50.5    47      *Indicates exercise, modality, or manual techniques to be initiated when appropriate    Assessment: Body structures, Functions, Activity limitations: Decreased functional mobility , Decreased strength, Decreased balance  Assessment: Pt with good reductions in measures this date 0.5-2.5 cm RLE.  Less reductions around ankle which is difficult to reach. Pt with improved ability with massage, utilizing spatula for reach as able. Pt possibly could benefit from ICP to assist with difficult to reach areas and decrease edema. Will discuss with PT re: compression pump. Pt demo'd improved balance reactions with gait drills this date. Treatment Diagnosis: Stephane LE lymphedema, Impaired mobility          Goals:       Long term goals  Time Frame for Long term goals : 6 weeks  Long term goal 1: Improve stephane hip strength to >/= 4+/5 to improve stability with standing and walking. Long term goal 2: Pt will be independent with home management of lymphedema symptoms. Long term goal 3: Reduce edema in stephane LEs by >/= 1-2 cm to increase ease with dressing and fit of AFO and prosthetic. Long term goal 4: Car >/= 45/56 to reduce risk for falls. Long term goal 5: Pt will ambulate with LRD >/= 200' with improved quality and decreased deviations S/I. Progress toward goals:balance, reducing edema    POST-PAIN       Pain Rating (0-10 pain scale):  0 /10   Location and pain description same as pre-treatment unless indicated. Action: [x] NA   [] Perform HEP  [] Meds as prescribed  [] Modalities as prescribed   [] Call Physician     Frequency/Duration:  Plan  Times per week: 2  Plan weeks: 6  Current Treatment Recommendations: Strengthening, Balance Training, Functional Mobility Training, Transfer Training, Gait Training, Stair training, Neuromuscular Re-education, Manual Therapy - Soft Tissue Mobilization, Manual Lymphatic Drainage, Patient/Caregiver Education & Training, Equipment Evaluation, Education, & procurement, Modalities, Home Exercise Program, Safety Education & Training     Pt to continue current HEP. See objective section for any therapeutic exercise changes, additions or modifications this date.          PT Individual Minutes  Time In: 1401  Time Out: 1500  Minutes: 59  Timed Code Treatment Minutes: 59 Minutes  Procedure Minutes:0     Timed

## 2020-08-18 ENCOUNTER — HOSPITAL ENCOUNTER (OUTPATIENT)
Dept: PHYSICAL THERAPY | Age: 75
Setting detail: THERAPIES SERIES
Discharge: HOME | End: 2020-08-18
Payer: MEDICARE

## 2020-08-18 PROCEDURE — 97140 MANUAL THERAPY 1/> REGIONS: CPT

## 2020-08-18 PROCEDURE — 97110 THERAPEUTIC EXERCISES: CPT

## 2020-08-18 PROCEDURE — 97116 GAIT TRAINING THERAPY: CPT

## 2020-08-18 NOTE — PROGRESS NOTES
[address] [address]  Outpatient Physical Therapy    Treatment Note        Date: 2020  Patient: Governor Haywood  : 1945  ACCT #: [de-identified]  Referring Practitioner: Lashae MIDDLETON  Diagnosis: Lymphedema, Leg swelling    Visit Information:  PT Visit Information  PT Insurance Information: Medicare  Total # of Visits to Date: 7  Plan of Care/Certification Expiration Date: 20  No Show: 0  Progress Note Due Date: 20  Canceled Appointment: 1  Progress Note Counter:     Subjective: Pt reports she feels like giving up, she feels aching all over. HEP Compliance:  [] Good [x] Fair [] Poor [] Reports not doing due to:    Vital Signs  Patient Currently in Pain: No   Pain Screening  Patient Currently in Pain: No    OBJECTIVE:   Exercises  Exercise 2: Lymphatic massage: first 3 pages pt completing, therapist assisting with distal reach to improve flow  Exercise 13: RLE measures,  Exercise 16: Gym equipment:Leg curl 20# x 10, Ext 5# x 10, ABadd 10# x 10, trialed pec fly/lat pull down for HEP Independently    Ambulation 1  Surface: level tile, carpet  Device: Forseva Device[employer]vance  Other Apparatus: AFO, Left(RtLE prosthetic)  Quality of Gait: FWD flexed,decreased Lt circumduction with decreased Rt stance time,improving ginny foot clearance, improved mikhail, however fatigues quickly w/o LBQC, VCs to correct rolling of QC for safety      Measurements []? ? Upper Extremity    [x]? ? Lower Extremity      Location Left (cm) Location Right (cm)  Rtcm 2020   Great toe 8.0 5 cm from end of residual limb 32.0 33.0   8 cm from great toe 24.0 10 37.5 38   5 cm from heel 33.0 15 38.5 39   10 35.0 20 44.5 46   15 37.5        20 39.5        25 44.0        30 47.5        35 46.5        40 50.5             *Indicates exercise, modality, or manual techniques to be initiated when appropriate    Assessment:         Body structures, Functions, Activity limitations: Decreased functional mobility , Decreased strength, Decreased balance  Assessment: Pt measure for Rt residual limb taken with increases from 0.5-1.5cm. LLE measures taken last visit with improved reductions. Discussed with pt to focus on Stephane massage to reduce edema despite pt claim of prosthetic compression. Pt requested training on gym equipment to further independence after D/C with strengthening. Treatment Diagnosis: Stephane LE lymphedema, Impaired mobility          Goals:       Long term goals  Time Frame for Long term goals : 6 weeks  Long term goal 1: Improve stephane hip strength to >/= 4+/5 to improve stability with standing and walking. Long term goal 2: Pt will be independent with home management of lymphedema symptoms. Long term goal 3: Reduce edema in stephane LEs by >/= 1-2 cm to increase ease with dressing and fit of AFO and prosthetic. Long term goal 4: Car >/= 45/56 to reduce risk for falls. Long term goal 5: Pt will ambulate with LRD >/= 200' with improved quality and decreased deviations S/I. Progress toward goals:reducing edema, strength  POST-PAIN       Pain Rating (0-10 pain scale):  0 /10   Location and pain description same as pre-treatment unless indicated. Action: [x] NA   [] Perform HEP  [] Meds as prescribed  [] Modalities as prescribed   [] Call Physician     Frequency/Duration:  Plan  Times per week: 2  Plan weeks: 6  Current Treatment Recommendations: Strengthening, Balance Training, Functional Mobility Training, Transfer Training, Gait Training, Stair training, Neuromuscular Re-education, Manual Therapy - Soft Tissue Mobilization, Manual Lymphatic Drainage, Patient/Caregiver Education & Training, Equipment Evaluation, Education, & procurement, Modalities, Home Exercise Program, Safety Education & Training     Pt to continue current HEP. See objective section for any therapeutic exercise changes, additions or modifications this date.          PT Individual Minutes  Time In: 7480  Time Out: 1500  Minutes: 56  Timed Code Treatment Minutes: 64 Minutes  Procedure Minutes:0     Timed Activity Minutes Units   Gait 10 1   Ther Ex 36 2   Manual  10 1       Signature:  Electronically signed by Muna Leonard PTA on 8/18/20 at 2:26 PM EDT

## 2020-08-20 ENCOUNTER — HOSPITAL ENCOUNTER (OUTPATIENT)
Dept: PHYSICAL THERAPY | Age: 75
Setting detail: THERAPIES SERIES
Discharge: HOME | End: 2020-08-20
Payer: MEDICARE

## 2020-08-20 NOTE — PROGRESS NOTES
[address]       Physical Therapy  Cancellation/No-show Note  Patient Name:  Christian Runner  :  1945   Date:  2020  Referring Practitioner: Jason MIDDLETON  Diagnosis: Lymphedema, Leg swelling    Visit Information:  PT Visit Information  PT Insurance Information: Medicare  Total # of Visits to Date: 7  Plan of Care/Certification Expiration Date: 20  No Show: 0  Canceled Appointment: 2  Progress Note Counter: , Cxl     For today's appointment patient:  [x]  Cancelled  []  Rescheduled appointment  []  No-show   []  Called pt to remind of next appointment     Reason given by patient:  []  Patient ill  []  Conflicting appointment  []  No transportation    []  Conflict with work  []  No reason given  [x]  Other:  Pt fell yesterday and hurt her knee     Comments:       Signature: Electronically signed by Sally Busch PTA on 20 at 10:26 AM EDT

## 2020-08-25 ENCOUNTER — HOSPITAL ENCOUNTER (OUTPATIENT)
Dept: PHYSICAL THERAPY | Age: 75
Setting detail: THERAPIES SERIES
Discharge: HOME | End: 2020-08-25
Payer: MEDICARE

## 2020-08-25 NOTE — PROGRESS NOTES
[address]       Physical Therapy  Cancellation/No-show Note  Patient Name:  Olvin Pelayo  :  1945   Date:  2020  Referring Practitioner: Michael MIDDLETON  Diagnosis: Lymphedema, Leg swelling    Visit Information:  PT Visit Information  PT Insurance Information: Medicare  Total # of Visits to Date: 7  Plan of Care/Certification Expiration Date: 20  No Show: 0  Progress Note Due Date: 20  Canceled Appointment: 2  Progress Note Counter: ,cxl 20    For today's appointment patient:  [x]  Cancelled  []  Rescheduled appointment  []  No-show   []  Called pt to remind of next appointment     Reason given by patient:  []  Patient ill  []  Conflicting appointment  []  No transportation    []  Conflict with work  []  No reason given  [x]  Other:  Death in family     Comments:       Signature: Electronically signed by Finas Romberg, PTA on 20 at 12:17 PM EDT

## 2020-08-27 ENCOUNTER — HOSPITAL ENCOUNTER (OUTPATIENT)
Dept: PHYSICAL THERAPY | Age: 75
Setting detail: THERAPIES SERIES
Discharge: HOME | End: 2020-08-27
Payer: MEDICARE

## 2020-08-29 ENCOUNTER — HOSPITAL ENCOUNTER (EMERGENCY)
Age: 75
Discharge: HOME | End: 2020-08-30
Attending: EMERGENCY MEDICINE
Payer: MEDICARE

## 2020-08-29 LAB
ALBUMIN SERPL-MCNC: 4.4 G/DL (ref 3.5–4.6)
ALP BLD-CCNC: 76 U/L (ref 40–130)
ALT SERPL-CCNC: 14 U/L (ref 0–33)
ANION GAP SERPL CALCULATED.3IONS-SCNC: 16 MEQ/L (ref 9–15)
AST SERPL-CCNC: 33 U/L (ref 0–35)
BASOPHILS ABSOLUTE: 0.1 K/UL (ref 0–0.2)
BASOPHILS RELATIVE PERCENT: 0.6 %
BILIRUB SERPL-MCNC: 1 MG/DL (ref 0.2–0.7)
BILIRUBIN DIRECT: 0.3 MG/DL (ref 0–0.4)
BILIRUBIN, INDIRECT: 0.7 MG/DL (ref 0–0.6)
BUN BLDV-MCNC: 11 MG/DL (ref 8–23)
CALCIUM SERPL-MCNC: 9.1 MG/DL (ref 8.5–9.9)
CHLORIDE BLD-SCNC: 100 MEQ/L (ref 95–107)
CO2: 22 MEQ/L (ref 20–31)
CREAT SERPL-MCNC: 0.65 MG/DL (ref 0.5–0.9)
EOSINOPHILS ABSOLUTE: 0 K/UL (ref 0–0.7)
EOSINOPHILS RELATIVE PERCENT: 0.1 %
GFR AFRICAN AMERICAN: >60
GFR NON-AFRICAN AMERICAN: >60
GLUCOSE BLD-MCNC: 98 MG/DL (ref 70–99)
HCT VFR BLD CALC: 43.2 % (ref 37–47)
HEMOGLOBIN: 14.4 G/DL (ref 12–16)
LACTIC ACID: 2 MMOL/L (ref 0.5–2.2)
LIPASE: 19 U/L (ref 12–95)
LYMPHOCYTES ABSOLUTE: 1.2 K/UL (ref 1–4.8)
LYMPHOCYTES RELATIVE PERCENT: 12.8 %
MCH RBC QN AUTO: 30.6 PG (ref 27–31.3)
MCHC RBC AUTO-ENTMCNC: 33.3 % (ref 33–37)
MCV RBC AUTO: 91.7 FL (ref 82–100)
MONOCYTES ABSOLUTE: 0.6 K/UL (ref 0.2–0.8)
MONOCYTES RELATIVE PERCENT: 5.8 %
NEUTROPHILS ABSOLUTE: 7.8 K/UL (ref 1.4–6.5)
NEUTROPHILS RELATIVE PERCENT: 80.7 %
PDW BLD-RTO: 13.7 % (ref 11.5–14.5)
PLATELET # BLD: 248 K/UL (ref 130–400)
POTASSIUM SERPL-SCNC: 3.4 MEQ/L (ref 3.4–4.9)
RBC # BLD: 4.71 M/UL (ref 4.2–5.4)
SODIUM BLD-SCNC: 138 MEQ/L (ref 135–144)
TOTAL PROTEIN: 6.5 G/DL (ref 6.3–8)
WBC # BLD: 9.6 K/UL (ref 4.8–10.8)

## 2020-08-29 PROCEDURE — 2580000003 HC RX 258: Performed by: EMERGENCY MEDICINE

## 2020-08-29 PROCEDURE — 96365 THER/PROPH/DIAG IV INF INIT: CPT

## 2020-08-29 PROCEDURE — 6360000002 HC RX W HCPCS: Performed by: EMERGENCY MEDICINE

## 2020-08-29 PROCEDURE — 80048 BASIC METABOLIC PNL TOTAL CA: CPT

## 2020-08-29 PROCEDURE — 80076 HEPATIC FUNCTION PANEL: CPT

## 2020-08-29 PROCEDURE — 99283 EMERGENCY DEPT VISIT LOW MDM: CPT

## 2020-08-29 PROCEDURE — 36415 COLL VENOUS BLD VENIPUNCTURE: CPT

## 2020-08-29 PROCEDURE — 83605 ASSAY OF LACTIC ACID: CPT

## 2020-08-29 PROCEDURE — 85025 COMPLETE CBC W/AUTO DIFF WBC: CPT

## 2020-08-29 PROCEDURE — 96372 THER/PROPH/DIAG INJ SC/IM: CPT

## 2020-08-29 PROCEDURE — 83690 ASSAY OF LIPASE: CPT

## 2020-08-29 RX ORDER — ONDANSETRON 2 MG/ML
4 INJECTION INTRAMUSCULAR; INTRAVENOUS ONCE
Status: COMPLETED | OUTPATIENT
Start: 2020-08-29 | End: 2020-08-29

## 2020-08-29 RX ORDER — PROMETHAZINE HYDROCHLORIDE 25 MG/ML
6.25 INJECTION, SOLUTION INTRAMUSCULAR; INTRAVENOUS ONCE
Status: COMPLETED | OUTPATIENT
Start: 2020-08-29 | End: 2020-08-29

## 2020-08-29 RX ORDER — 0.9 % SODIUM CHLORIDE 0.9 %
1000 INTRAVENOUS SOLUTION INTRAVENOUS ONCE
Status: COMPLETED | OUTPATIENT
Start: 2020-08-29 | End: 2020-08-29

## 2020-08-29 RX ORDER — ONDANSETRON 4 MG/1
4 TABLET, ORALLY DISINTEGRATING ORAL EVERY 8 HOURS PRN
Qty: 20 TABLET | Refills: 0 | Status: SHIPPED | OUTPATIENT
Start: 2020-08-29 | End: 2020-09-04

## 2020-08-29 RX ADMIN — PROMETHAZINE HYDROCHLORIDE 6.25 MG: 25 INJECTION INTRAMUSCULAR; INTRAVENOUS at 21:32

## 2020-08-29 RX ADMIN — SODIUM CHLORIDE 1000 ML: 9 INJECTION, SOLUTION INTRAVENOUS at 21:32

## 2020-08-29 RX ADMIN — ONDANSETRON 4 MG: 2 INJECTION INTRAMUSCULAR; INTRAVENOUS at 21:32

## 2020-08-29 ASSESSMENT — ENCOUNTER SYMPTOMS
CHEST TIGHTNESS: 0
BLOOD IN STOOL: 0
ABDOMINAL PAIN: 0
SORE THROAT: 0
RESPIRATORY NEGATIVE: 1
SINUS PAIN: 0
VOMITING: 1
EYE PAIN: 0
EYE DISCHARGE: 0
COUGH: 0
ABDOMINAL DISTENTION: 0
EYES NEGATIVE: 1
WHEEZING: 0
SHORTNESS OF BREATH: 0
NAUSEA: 1
DIARRHEA: 0
BACK PAIN: 0

## 2020-08-29 ASSESSMENT — PAIN DESCRIPTION - LOCATION: LOCATION: ABDOMEN

## 2020-08-29 ASSESSMENT — PAIN DESCRIPTION - PAIN TYPE: TYPE: ACUTE PAIN

## 2020-08-29 ASSESSMENT — PAIN DESCRIPTION - DESCRIPTORS: DESCRIPTORS: BURNING

## 2020-08-29 ASSESSMENT — PAIN SCALES - GENERAL: PAINLEVEL_OUTOF10: 4

## 2020-08-30 VITALS
DIASTOLIC BLOOD PRESSURE: 103 MMHG | RESPIRATION RATE: 16 BRPM | OXYGEN SATURATION: 100 % | BODY MASS INDEX: 42.21 KG/M2 | SYSTOLIC BLOOD PRESSURE: 169 MMHG | WEIGHT: 215 LBS | HEIGHT: 60 IN | TEMPERATURE: 99.4 F | HEART RATE: 94 BPM

## 2020-08-30 NOTE — ED PROVIDER NOTES
[facility] ED  eMERGENCY dEPARTMENT eNCOUnter      Pt Name: Stanford John  MRN: [id number]  [place] 1945  Date of evaluation: 8/29/2020  Provider: Flor Avina, [address] [place] [address]       Chief Complaint   Patient presents with    Nausea     vomiting diarrhea          HISTORY OF PRESENT ILLNESS   (Location/Symptom, Timing/Onset,Context/Setting, Quality, Duration, Modifying Factors, Severity)  Note limiting factors. Stanford John is a 66 y.o. female who presents to the emergency department complaining of nausea vomiting since eating ham last night and she woke up with the nausea and vomiting now she is dry heaving. Patient states she is the only one that ate the pre-cooked ham.  Does not need anything for pain. Patient denies any fevers or chills urinary symptoms. She is had a surgical history appendectomy cholecystectomy hysterectomy. HPI    NursingNotes were reviewed. REVIEW OF SYSTEMS    (2-9 systems for level 4, 10 or more for level 5)     Review of Systems   Constitutional: Negative. Negative for chills and fever. HENT: Negative. Negative for ear pain, sinus pain and sore throat. Eyes: Negative. Negative for pain and discharge. Respiratory: Negative. Negative for cough, chest tightness, shortness of breath and wheezing. Cardiovascular: Negative. Negative for chest pain, palpitations and leg swelling. Gastrointestinal: Positive for nausea and vomiting. Negative for abdominal distention, abdominal pain, blood in stool and diarrhea. Endocrine: Negative. Negative for polydipsia and polyuria. Genitourinary: Negative. Negative for difficulty urinating, dysuria, flank pain, frequency, hematuria and urgency. Musculoskeletal: Negative. Negative for arthralgias, back pain, myalgias and neck pain. Skin: Negative. Negative for rash and wound. Neurological: Negative. Negative for dizziness, seizures, syncope, weakness and headaches.    Hematological: Negative. Negative for adenopathy. Does not bruise/bleed easily. Psychiatric/Behavioral: Negative. Negative for confusion, hallucinations, self-injury and suicidal ideas. All other systems reviewed and are negative. Except as noted above the remainder of the review of systems was reviewed and negative.        PAST MEDICAL HISTORY     Past Medical History:   Diagnosis Date    Arthritis     Blood transfusion     Cancer [facility])     GALLBLADDER 2009, 2011 OMENTUM    Charcot's joint     left foot    Chest pain 7/2/2015    Colitis     DDD (degenerative disc disease), lumbar 2/12/2013    Depression     Disorder of peripheral nervous system 9/1/2010    Dyspnea 7/2/2015    Family history of coronary artery disease 8/29/2014    History of blood transfusion 2011    following chemotherapy    [facility] [address] [facility]     Hyperlipidemia     Hypertension     Hypothyroid 6/12/2015    Lobular breast cancer ([facility] [address] 10/2015    NSTEMI (non-ST elevated myocardial infarction) ([facility] [address] 8/29/2014    Obesity     Paroxysmal atrial fibrillation ([facility] [address] 8/29/2014    S/P BKA (below knee amputation) ([facility] [address]          SURGICALHISTORY       Past Surgical History:   Procedure Laterality Date    ABDOMEN SURGERY Left 4/11/2017    EXCISION OF CHEST WALL MASS, UPDATE LABS ON ADMIT  performed by Adan Tam MD at [address] BLEPHAROPLASTY  2-5-13    both eyes    BREAST SURGERY      CARDIAC CATHETERIZATION      COLONOSCOPY  1/1/2010    normal    CYSTOSCOPY W BIOPSY OF BLADDER N/A 6/26/2017    CYSTOSCOPY BLADDER BIOPSY AND FULGURATION performed by Ben Savage MD at [address] Bilateral 2012    cataract removal with IOL implants    HYSTERECTOMY      LEG AMPUTATION BELOW KNEE Right 2/1/11    DR Volodymyr Whatley due to CHARCOTS    MASTECTOMY  11/16/2015    Bilateral simple mastectomies with right SNB by DR Alivia Hammond    CO REMOVAL OF BREAST LESION Left 1/24/2017    CORE NEEDLE BIOPSY OF  LEFT BREAST MASS performed by Thelma Valenzuela MD at [place] [address] SPINE SURGERY      cervical and lumbar         CURRENT MEDICATIONS       Previous Medications    ACYCLOVIR (ZOVIRAX) 5 % CREA    Apply topically as needed    ATORVASTATIN (LIPITOR) 20 MG TABLET    TAKE 1 TABLET BY MOUTH  NIGHTLY    B COMPLEX VITAMINS CAPSULE    Take 1 capsule by mouth daily    BUPROPION (WELLBUTRIN XL) 150 MG EXTENDED RELEASE TABLET    TAKE 2 TABLETS BY MOUTH IN  THE MORNING    CARVEDILOL (COREG) 25 MG TABLET    TAKE 1 TABLET BY MOUTH TWO  TIMES DAILY    CPAP MACHINE MISC    by Does not apply route New CPAP with 8 cm    DICLOFENAC (VOLTAREN) 50 MG EC TABLET    TAKE 1 TABLET BY MOUTH  TWICE DAILY    DULOXETINE (CYMBALTA) 30 MG EXTENDED RELEASE CAPSULE    TAKE 1 CAPSULE BY MOUTH  DAILY    FUROSEMIDE (LASIX) 20 MG TABLET    Take 1 tablet by mouth daily for 5 days    GABAPENTIN (NEURONTIN) 600 MG TABLET    TAKE 1 TABLET BY MOUTH IN  THE MORNING, 2 TABS IN THE  AFTERNOON, AND 2 TABS IN  THE EVENING AS TOLERATED    LEVOTHYROXINE (SYNTHROID) 50 MCG TABLET    TAKE 1 TABLET BY MOUTH  DAILY    MULTIPLE VITAMINS-MINERALS (CENTRUM SILVER 50+WOMEN PO)    Take by mouth    POTASSIUM CHLORIDE (KLOR-CON M) 10 MEQ EXTENDED RELEASE TABLET    TAKE 1 TABLET BY MOUTH TWO  TIMES DAILY    TAMOXIFEN (NOLVADEX) 20 MG TABLET    Take 1 tablet by mouth daily    VITAMIN D (CHOLECALCIFEROL) 1000 UNITS CAPS CAPSULE    Take 1,000 Units by mouth daily. ALLERGIES     Ciprofloxacin and Oxycontin [oxycodone hcl]    FAMILY HISTORY       Family History   Problem Relation Age of Onset    Heart Disease Mother     Arthritis Mother     Heart Disease Father     Arthritis Father     Cancer Sister         Colon    Thyroid Disease Son     Other Son         Down's syndrome          SOCIAL HISTORY       Social History     Socioeconomic History    Marital status:       Spouse name: None    Number of children: None    Years of education: None    Highest education level: None   Occupational History    Occupation: retired   Social Needs    Financial resource strain: None    Food insecurity     Worry: None     Inability: None    Transportation needs     Medical: None     Non-medical: None   Tobacco Use    Smoking status: Never Smoker    Smokeless tobacco: Never Used   Substance and Sexual Activity    Alcohol use: Yes     Comment: social    Drug use: No    Sexual activity: Never   Lifestyle    Physical activity     Days per week: None     Minutes per session: None    Stress: None   Relationships    Social connections     Talks on phone: None     Gets together: None     Attends [employer] service: None     Active member of club or organization: None     Attends meetings of clubs or organizations: None     Relationship status: None    Intimate partner violence     Fear of current or ex partner: None     Emotionally abused: None     Physically abused: None     Forced sexual activity: None   Other Topics Concern    None   Social History Narrative    None       SCREENINGS      @FLOW([id number])@      PHYSICAL EXAM    (up to 7 for level 4, 8 or more for level 5)     ED Triage Vitals [08/29/20 2100]   BP Temp Temp Source Pulse Resp SpO2 Height Weight   (!) 147/110 99.4 °F (37.4 °C) Oral 89 20 99 % 5' (1.524 m) 215 lb (97.5 kg)       Physical Exam  Vitals signs and nursing note reviewed. Constitutional:       General: She is not in acute distress. Appearance: She is well-developed. She is not ill-appearing, toxic-appearing or diaphoretic. HENT:      Head: Normocephalic and atraumatic. Mouth/Throat:      Mouth: Mucous membranes are dry. Eyes:      General: No scleral icterus. Right eye: No discharge. Left eye: No discharge. Extraocular Movements: Extraocular movements intact. Conjunctiva/sclera: Conjunctivae normal.      Pupils: Pupils are equal, round, and reactive to light.    Neck: Musculoskeletal: Normal range of motion and neck supple. Cardiovascular:      Rate and Rhythm: Normal rate and regular rhythm. Heart sounds: Normal heart sounds. No murmur. No friction rub. Pulmonary:      Effort: Pulmonary effort is normal. No respiratory distress. Breath sounds: Normal breath sounds. No stridor. No wheezing or rhonchi. Abdominal:      General: Bowel sounds are normal.      Palpations: Abdomen is soft. Musculoskeletal: Normal range of motion. Skin:     General: Skin is warm and dry. Coloration: Skin is not jaundiced or pale. Findings: No bruising or erythema. Neurological:      Mental Status: She is alert and oriented to person, place, and time. Psychiatric:         Behavior: Behavior normal.         DIAGNOSTIC RESULTS     EKG: All EKG's are interpreted by the Emergency Department Physician who either signs or Co-signsthis chart in the absence of a cardiologist.    None    RADIOLOGY:   Non-plain filmimages such as CT, Ultrasound and MRI are read by the radiologist. Plain radiographic images are visualized and preliminarily interpreted by the emergency physician with the below findings:    None    Interpretation per the Radiologist below, if available at the time ofthis note:    No orders to display         ED BEDSIDE ULTRASOUND:   Performed by ED Physician - none    LABS:  Labs Reviewed   BASIC METABOLIC PANEL - Abnormal; Notable for the following components:       Result Value    Anion Gap 16 (*)     All other components within normal limits   HEPATIC FUNCTION PANEL - Abnormal; Notable for the following components:     Total Bilirubin 1.0 (*)     Bilirubin, Indirect 0.7 (*)     All other components within normal limits   CBC WITH AUTO DIFFERENTIAL - Abnormal; Notable for the following components:    Neutrophils Absolute 7.8 (*)     All other components within normal limits   LIPASE   LACTIC ACID, PLASMA       All other labs were within normal range or not returned as of this dictation. EMERGENCY DEPARTMENT COURSE and DIFFERENTIAL DIAGNOSIS/MDM:   Vitals:    Vitals:    08/29/20 2100   BP: (!) 147/110   Pulse: 89   Resp: 20   Temp: 99.4 °F (37.4 °C)   TempSrc: Oral   SpO2: 99%   Weight: 215 lb (97.5 kg)   Height: 5' (1.524 m)       Patient's x-rays were canceled she is feeling better after IV fluids and antiemetics. The patient is no longer vomiting her laboratories are completely normal with a white count of 9.6 liver profile normal lactate normal or near normal and the lipase is only 19. Electrolytes without any significant metabolic acidosis patient skin to be released gave her Zofran for home my impression is acute nausea and vomiting secondary to bad hand. MDM    CRITICAL CARE TIME   Total Critical Care time was 0 minutes, excluding separately reportableprocedures. There was a high probability of clinicallysignificant/life threatening deterioration in the patient's condition which required my urgent intervention. CONSULTS:  None    PROCEDURES:  Unless otherwise noted below, none     Procedures    FINAL IMPRESSION      1. Nausea and vomiting, intractability of vomiting not specified, unspecified vomiting type    2.  Dehydration          DISPOSITION/PLAN   DISPOSITION Decision To Discharge 08/29/2020 11:06:54 PM      PATIENT REFERRED TO:  Lizbeth Driver MD  [phone]      As needed      DISCHARGE MEDICATIONS:  New Prescriptions    ONDANSETRON (ZOFRAN ODT) 4 MG DISINTEGRATING TABLET    Take 1 tablet by mouth every 8 hours as needed for Nausea          (Please note that portions of this note were completed with a voice recognition program.  Efforts were made to edit the dictations but occasionally words are mis-transcribed.)    Sanchez Mast DO (electronically signed)  Attending Emergency Physician          Sanchez Mast DO  08/29/20 0555

## 2020-08-30 NOTE — ED TRIAGE NOTES
Pt states she woke up this morning with nausea, vomiting, and diarrhea. Pt is A&Ox4, skin intact, right BKA, afebrile, breaths are equal and unlabored.

## 2020-08-30 NOTE — ED NOTES
Patient incontinent of urine. Pericare provided. Repositioned patient for comfort. Patient tolerated well. Warm blanket provided, patient denies further needs or complaints at this time.       Gustavo Monroy RN  08/29/20 [id number]

## 2020-08-30 NOTE — TELEPHONE ENCOUNTER
LOV 11/18/19 has seen you for an acute since. Last lab 7/10/20 don't know if this is proper dose of synthroid. Lab ordered by Dr. Jami Bhatti don't know if it was addressed.

## 2020-08-31 ENCOUNTER — CARE COORDINATION (OUTPATIENT)
Dept: CARE COORDINATION | Age: 75
End: 2020-08-31

## 2020-08-31 RX ORDER — LEVOTHYROXINE SODIUM 0.05 MG/1
50 TABLET ORAL DAILY
Qty: 90 TABLET | Refills: 3 | Status: SHIPPED | OUTPATIENT
Start: 2020-08-31 | End: 2021-07-05

## 2020-08-31 RX ORDER — BUPROPION HYDROCHLORIDE 150 MG/1
TABLET ORAL
Qty: 180 TABLET | Refills: 3 | Status: SHIPPED | OUTPATIENT
Start: 2020-08-31 | End: 2021-09-27

## 2020-08-31 NOTE — PROGRESS NOTES
2020    Sai Rodriguez (:  1945) is a 66 y.o. female, here for evaluation of the following medical concerns:  Chief Complaint   Patient presents with   [address] from [facility]     2020 nausea and vomiting. Dehydration. Elevated BP Pt no longer experiencing the nausea or vomiting.  Dizziness     Pt has been taking tylenol.  Fatigue    Headache       HPI  ER  with nausea, vomiting and diarrhea. Pt received IV fluids and antiemetics with improvement in symptoms. Labs normal. D/c home with zofran. /110 in the ER  ---------------------  Feeling dizzy and off balance for the last three days  Patient has been doing a bland diet since she's been in the ER  No more nausea or vomiting  Mild headache, taking tylenol   Fatigue  Feels off balance because it feels like the room is spinning  Symptoms worse with head movement  Denied worsening ringing of the ears, no recent URI       Review of Systems    Prior to Visit Medications    Medication Sig Taking?  Authorizing Provider   meclizine (ANTIVERT) 25 MG tablet Take 1 tablet by mouth 3 times daily as needed for Dizziness Yes Ezra Arreola, DO   buPROPion (WELLBUTRIN XL) 150 MG extended release tablet TAKE 2 TABLETS BY MOUTH IN  THE MORNING Yes Tiana Polanco MD   levothyroxine (SYNTHROID) 50 MCG tablet TAKE 1 TABLET BY MOUTH  DAILY Yes Tiana Polanco MD   ondansetron (ZOFRAN ODT) 4 MG disintegrating tablet Take 1 tablet by mouth every 8 hours as needed for Nausea Yes Collette Lush,    potassium chloride (KLOR-CON M) 10 MEQ extended release tablet TAKE 1 TABLET BY MOUTH TWO  TIMES DAILY Yes Tiana Polanco MD   furosemide (LASIX) 20 MG tablet Take 1 tablet by mouth daily for 5 days  Patient taking differently: Take 20 mg by mouth daily as needed  Yes Brenda Ashley MD   DULoxetine (CYMBALTA) 30 MG extended release capsule TAKE 1 CAPSULE BY MOUTH  DAILY Yes Tiana Polanco MD   diclofenac (VOLTAREN) 50 MG EC tablet TAKE 1 TABLET BY MOUTH  TWICE DAILY Yes Kyle Euceda MD   atorvastatin (LIPITOR) 20 MG tablet TAKE 1 TABLET BY MOUTH  NIGHTLY Yes Kyle Euceda MD   CPAP Machine MISC by Does not apply route New CPAP with 8 cm Yes Mali Nix MD   carvedilol (COREG) 25 MG tablet TAKE 1 TABLET BY MOUTH TWO  TIMES DAILY Yes Kyle Euceda MD   gabapentin (NEURONTIN) 600 MG tablet TAKE 1 TABLET BY MOUTH IN  THE MORNING, 2 TABS IN THE  AFTERNOON, AND 2 TABS IN  THE EVENING AS TOLERATED Yes Kyle Euceda MD   acyclovir (ZOVIRAX) 5 % CREA Apply topically as needed Yes Kyle Euceda MD   b complex vitamins capsule Take 1 capsule by mouth daily Yes Historical Provider, MD   Multiple Vitamins-Minerals (CENTRUM SILVER 50+WOMEN PO) Take by mouth Yes Historical Provider, MD   tamoxifen (NOLVADEX) 20 MG tablet Take 1 tablet by mouth daily Yes Historical Provider, MD   Vitamin D (CHOLECALCIFEROL) 1000 UNITS CAPS capsule Take 1,000 Units by mouth daily.  Yes Historical Provider, MD        Allergies   Allergen Reactions    Ciprofloxacin Itching    Oxycontin [Oxycodone Hcl] Itching       Past Medical History:   Diagnosis Date    Arthritis     Blood transfusion     Cancer ([facility] [address]     GALLBLADDER 2009, 2011 OMENTUM    Charcot's joint     left foot    Chest pain 7/2/2015    Colitis     DDD (degenerative disc disease), lumbar 2/12/2013    Depression     Disorder of peripheral nervous system 9/1/2010    Dyspnea 7/2/2015    Family history of coronary artery disease 8/29/2014    History of blood transfusion 2011    following chemotherapy    Hx of right BKA ([facility] [address]     Hyperlipidemia     Hypertension     Hypothyroid 6/12/2015    Lobular breast cancer ([address] 10/2015    NSTEMI (non-ST elevated myocardial infarction) ([address] 8/29/2014    Obesity     Paroxysmal atrial fibrillation ([address] 8/29/2014    S/P BKA (below knee amputation) ([address]        Past Surgical History:   Procedure Laterality Date    ABDOMEN SURGERY Left 4/11/2017    EXCISION OF CHEST WALL MASS, UPDATE LABS ON or ex partner: Not on file     Emotionally abused: Not on file     Physically abused: Not on file     Forced sexual activity: Not on file   Other Topics Concern    Not on file   Social History Narrative    Not on file        Family History   Problem Relation Age of Onset    Heart Disease Mother     Arthritis Mother     Heart Disease Father     Arthritis Father     Cancer Sister         Colon    Thyroid Disease Son    [facility] Other Son         Down's syndrome       Vitals:    09/01/20 0955   BP: 128/64   Pulse: 68   Temp: 98.9 °F (37.2 °C)   SpO2: 96%   Weight: 222 lb (100.7 kg)   Height: 5' (1.524 m)       Estimated body mass index is 43.36 kg/m² as calculated from the following:    Height as of this encounter: 5' (1.524 m). Weight as of this encounter: 222 lb (100.7 kg). Recent Labs     08/29/20 2115   WBC 9.6   RBC 4.71   HGB 14.4   HCT 43.2   MCV 91.7   MCH 30.6   MCHC 33.3   RDW 13.7          Recent Labs     08/29/20 2115      K 3.4      CO2 22   BUN 11   CREATININE 0.65   GLUCOSE 98   CALCIUM 9.1   PROT 6.5   LABALBU 4.4   BILITOT 1.0*   ALKPHOS 76   AST 33   ALT 14       Lab Results   Component Value Date    LABA1C 5.3 12/19/2013       No results found. Physical Exam  Constitutional:       Appearance: Normal appearance. She is normal weight. HENT:      Head: Normocephalic and atraumatic. Right Ear: Tympanic membrane, ear canal and external ear normal.      Left Ear: Tympanic membrane, ear canal and external ear normal.      Nose: Nose normal. No congestion or rhinorrhea. Mouth/Throat:      Mouth: Mucous membranes are moist.      Pharynx: Oropharynx is clear. No oropharyngeal exudate or posterior oropharyngeal erythema. Eyes:      Extraocular Movements: Extraocular movements intact. Conjunctiva/sclera: Conjunctivae normal.   Neck:      Musculoskeletal: Normal range of motion and neck supple.    Cardiovascular:      Rate and Rhythm: Normal rate and regular rhythm. Pulses: Normal pulses. Heart sounds: No murmur. Pulmonary:      Effort: Pulmonary effort is normal. No respiratory distress. Breath sounds: Normal breath sounds. Abdominal:      General: Abdomen is flat. Bowel sounds are normal.      Palpations: Abdomen is soft. Tenderness: There is no abdominal tenderness. There is no guarding or rebound. Lymphadenopathy:      Cervical: No cervical adenopathy. Skin:     General: Skin is warm. Capillary Refill: Capillary refill takes less than 2 seconds. Findings: No lesion or rash. Neurological:      General: No focal deficit present. Mental Status: She is alert and oriented to person, place, and time. Mental status is at baseline. Comments: Off balance but patient states she also has balance issues normally, worsened dizziness with head movement   Psychiatric:         Mood and Affect: Mood normal.         Thought Content: Thought content normal.         Judgment: Judgment normal.         ASSESSMENT/PLAN:  1. Non-intractable vomiting with nausea, unspecified vomiting type  - COVID-19 Ambulatory; Future    2. Vertigo  - Symptoms worse with head movement but also has baseline dvertigo  - meclizine (ANTIVERT) 25 MG tablet; Take 1 tablet by mouth 3 times daily as needed for Dizziness  Dispense: 30 tablet; Refill: 0      ---------------------------------------------------------------------  Side effects, adverse effects of the medication prescribed today, as well as treatment plan/ rationale and result expectations have been discussed with the patient who expresses understanding and desires to proceed. Close follow up to evaluate treatment results and for coordination of care. I have reviewed the patient's medical history in detail and updated the computerized patient record. As always, patient is advised that if symptoms worsen in any way they will proceed to the nearest emergency room. --------------------------------------------------------------------    Return in about 4 days (around 9/5/2020) for vertigo, telephone visit. An  electronic signature was used to authenticate this note.     --Umm Cedillo DO on 9/1/2020 at 10:45 AM

## 2020-09-01 ENCOUNTER — HOSPITAL ENCOUNTER (OUTPATIENT)
Dept: PHYSICAL THERAPY | Age: 75
Setting detail: THERAPIES SERIES
Discharge: HOME | End: 2020-09-01
Payer: MEDICARE

## 2020-09-01 ENCOUNTER — OFFICE VISIT (OUTPATIENT)
Dept: FAMILY MEDICINE CLINIC | Age: 75
End: 2020-09-01
Payer: MEDICARE

## 2020-09-01 VITALS
TEMPERATURE: 98.9 F | DIASTOLIC BLOOD PRESSURE: 64 MMHG | HEART RATE: 68 BPM | HEIGHT: 60 IN | BODY MASS INDEX: 43.59 KG/M2 | WEIGHT: 222 LBS | OXYGEN SATURATION: 96 % | SYSTOLIC BLOOD PRESSURE: 128 MMHG

## 2020-09-01 DIAGNOSIS — R11.2 NON-INTRACTABLE VOMITING WITH NAUSEA, UNSPECIFIED VOMITING TYPE: ICD-10-CM

## 2020-09-01 PROCEDURE — 1036F TOBACCO NON-USER: CPT | Performed by: STUDENT IN AN ORGANIZED HEALTH CARE EDUCATION/TRAINING PROGRAM

## 2020-09-01 PROCEDURE — G8399 PT W/DXA RESULTS DOCUMENT: HCPCS | Performed by: STUDENT IN AN ORGANIZED HEALTH CARE EDUCATION/TRAINING PROGRAM

## 2020-09-01 PROCEDURE — 1123F ACP DISCUSS/DSCN MKR DOCD: CPT | Performed by: STUDENT IN AN ORGANIZED HEALTH CARE EDUCATION/TRAINING PROGRAM

## 2020-09-01 PROCEDURE — 99213 OFFICE O/P EST LOW 20 MIN: CPT | Performed by: STUDENT IN AN ORGANIZED HEALTH CARE EDUCATION/TRAINING PROGRAM

## 2020-09-01 PROCEDURE — 4040F PNEUMOC VAC/ADMIN/RCVD: CPT | Performed by: STUDENT IN AN ORGANIZED HEALTH CARE EDUCATION/TRAINING PROGRAM

## 2020-09-01 PROCEDURE — G8427 DOCREV CUR MEDS BY ELIG CLIN: HCPCS | Performed by: STUDENT IN AN ORGANIZED HEALTH CARE EDUCATION/TRAINING PROGRAM

## 2020-09-01 PROCEDURE — G8417 CALC BMI ABV UP PARAM F/U: HCPCS | Performed by: STUDENT IN AN ORGANIZED HEALTH CARE EDUCATION/TRAINING PROGRAM

## 2020-09-01 PROCEDURE — 1090F PRES/ABSN URINE INCON ASSESS: CPT | Performed by: STUDENT IN AN ORGANIZED HEALTH CARE EDUCATION/TRAINING PROGRAM

## 2020-09-01 RX ORDER — MECLIZINE HYDROCHLORIDE 25 MG/1
25 TABLET ORAL 3 TIMES DAILY PRN
Qty: 30 TABLET | Refills: 0 | Status: SHIPPED | OUTPATIENT
Start: 2020-09-01 | End: 2020-09-04

## 2020-09-01 NOTE — PROGRESS NOTES
Therapy                            Cancellation/No-show Note      Date:  2020  Patient Name:  Andres Marquez  :  1945   MRN:  [id number]  Referring Practitioner: Angel MIDDLETON  Diagnosis: Lymphedema, Leg swelling    Visit Information:  PT Visit Information  PT Insurance Information: Medicare  Total # of Visits to Date: 7  Plan of Care/Certification Expiration Date: 20  No Show: 0  Progress Note Due Date: 20  Canceled Appointment: 4  Progress Note Counter:  (cx 20)    For today's appointment patient:  [x]  Cancelled  []  Rescheduled appointment  []  No-show   []  Called pt to remind of next appointment     Reason given by patient:  []  Patient ill  []  Conflicting appointment  []  No transportation    []  Conflict with work  []  No reason given  [x]  Other:   Was in ER, still not feeling well    [x] Pt has future appointments scheduled, no follow up needed  [] Pt requests to be on hold.     Reason:   If > 2 weeks please discuss with therapist.  [] Therapist to call pt for follow up     Comments:       Signature: Electronically signed by Pierre Steinberg PT on 20 at 3:30 PM EDT

## 2020-09-01 NOTE — PROGRESS NOTES
This visit began at 9:37am    Location of the visit was the [facility] primary care site. TELEHEALTH APPOINTMENT  Patient has been screened to determine that this visit qualifies for a \"Telephone Visit\". Patient is currently established with the current medical practice, the condition being reviewed was not addressed within the previous 7 days and is not likely be determined to need a procedure within the next 24 hours. This visit was via telephone due to the restrictions of the COVID-19 pandemic. All issues as below were discussed and addressed but no physical exam was performed. It was felt the patient should be evaluated in the clinic there will be comment below demonstrating they were directed there. The patient is aware and has given verbal consent to be billed for this telephone encounter. Patient Name: Christian Runner  MRN: [id number]    Chief Complaint   Patient presents with    Follow-up     Pt states she is doing much better. Still having some fatigue, but getting better.      Dizziness       HPI  Vertigo, constant but worse with head movements, started on Antivert  --------------------------------  Dizziness and nausea have improved  No other concerns at this time  Continues to have fatigue but this is baseline for her    Pt got approved for massage boots for lymphedema    PMH:    Current Outpatient Medications on File Prior to Visit   Medication Sig Dispense Refill    buPROPion (WELLBUTRIN XL) 150 MG extended release tablet TAKE 2 TABLETS BY MOUTH IN  THE MORNING 180 tablet 3    levothyroxine (SYNTHROID) 50 MCG tablet TAKE 1 TABLET BY MOUTH  DAILY 90 tablet 3    potassium chloride (KLOR-CON M) 10 MEQ extended release tablet TAKE 1 TABLET BY MOUTH TWO  TIMES DAILY 180 tablet 0    furosemide (LASIX) 20 MG tablet Take 1 tablet by mouth daily for 5 days (Patient taking differently: Take 20 mg by mouth daily as needed ) 5 tablet 0    DULoxetine (CYMBALTA) 30 MG extended release capsule TAKE 1 CAPSULE BY MOUTH  DAILY 90 capsule 2    diclofenac (VOLTAREN) 50 MG EC tablet TAKE 1 TABLET BY MOUTH  TWICE DAILY 180 tablet 0    atorvastatin (LIPITOR) 20 MG tablet TAKE 1 TABLET BY MOUTH  NIGHTLY 90 tablet 1    CPAP Machine MISC by Does not apply route New CPAP with 8 cm 1 each 0    carvedilol (COREG) 25 MG tablet TAKE 1 TABLET BY MOUTH TWO  TIMES DAILY 180 tablet 2    gabapentin (NEURONTIN) 600 MG tablet TAKE 1 TABLET BY MOUTH IN  THE MORNING, 2 TABS IN THE  AFTERNOON, AND 2 TABS IN  THE EVENING AS TOLERATED 450 tablet 1    acyclovir (ZOVIRAX) 5 % CREA Apply topically as needed 5 g 1    b complex vitamins capsule Take 1 capsule by mouth daily      Multiple Vitamins-Minerals (CENTRUM SILVER 50+WOMEN PO) Take by mouth      tamoxifen (NOLVADEX) 20 MG tablet Take 1 tablet by mouth daily      Vitamin D (CHOLECALCIFEROL) 1000 UNITS CAPS capsule Take 1,000 Units by mouth daily. No current facility-administered medications on file prior to visit.       Past Medical History:   Diagnosis Date    Arthritis     Blood transfusion     Cancer [facility])     GALLBLADDER 2009, 2011 OMENTUM    Charcot's joint     left foot    Chest pain 7/2/2015    Colitis     DDD (degenerative disc disease), lumbar 2/12/2013    Depression     Disorder of peripheral nervous system 9/1/2010    Dyspnea 7/2/2015    Family history of coronary artery disease 8/29/2014    History of blood transfusion 2011    following chemotherapy    Hx of right BKA ([facility]     Hyperlipidemia     Hypertension     Hypothyroid 6/12/2015    Lobular breast cancer ([facility] [address] 10/2015    NSTEMI (non-ST elevated myocardial infarction) ([address] 8/29/2014    Obesity     Paroxysmal atrial fibrillation ([address] 8/29/2014    S/P BKA (below knee amputation) ([address]      Past Surgical History:   Procedure Laterality Date    ABDOMEN SURGERY Left 4/11/2017    EXCISION OF CHEST WALL MASS, UPDATE LABS ON ADMIT  performed by Hiram Bullock MD at [address] [place] [address] APPENDECTOMY      BACK SURGERY      BLEPHAROPLASTY  2-5-13    both eyes    BREAST SURGERY      CARDIAC CATHETERIZATION      COLONOSCOPY  1/1/2010    normal    CYSTOSCOPY W BIOPSY OF BLADDER N/A 6/26/2017    CYSTOSCOPY BLADDER BIOPSY AND FULGURATION performed by Lonnie Hernandez MD at [address] [place] [address] Bilateral 2012    cataract removal with IOL implants    HYSTERECTOMY      LEG AMPUTATION BELOW KNEE Right 2/1/11    DR Mariam Singh due to CHARCOTS    MASTECTOMY  11/16/2015    Bilateral simple mastectomies with right SNB by DR Ann Saunders    AR REMOVAL OF BREAST LESION Left 1/24/2017    CORE NEEDLE BIOPSY OF  LEFT BREAST MASS performed by Lily Kamara MD at [place] [address] SPINE SURGERY      cervical and lumbar     Social History     Socioeconomic History    Marital status:       Spouse name: Not on file    Number of children: Not on file    Years of education: Not on file    Highest education level: Not on file   Occupational History    Occupation: retired   Social Needs    Financial resource strain: Not on file    Food insecurity     Worry: Not on file     Inability: Not on file   [employer] needs     Medical: Not on file     Non-medical: Not on file   Tobacco Use    Smoking status: Never Smoker    Smokeless tobacco: Never Used   Substance and Sexual Activity    Alcohol use: Yes     Comment: social    Drug use: No    Sexual activity: Never   Lifestyle    Physical activity     Days per week: Not on file     Minutes per session: Not on file    Stress: Not on file   Relationships    Social connections     Talks on phone: Not on file     Gets together: Not on file     Attends [employer] service: Not on file     Active member of club or organization: Not on file     Attends meetings of clubs or organizations: Not on file     Relationship status: Not on file    Intimate partner violence     Fear of current or ex partner: Not on file     Emotionally abused: Not on file     Physically abused: Not on file     Forced sexual activity: Not on file   Other Topics Concern    Not on file   Social History Narrative    Not on file     Family History   Problem Relation Age of Onset    Heart Disease Mother     Arthritis Mother     Heart Disease Father     Arthritis Father     Cancer Sister         Colon    Thyroid Disease Son    Schuyler Olp Other Son         Down's syndrome     Allergies:  Ciprofloxacin and Oxycontin [oxycodone hcl]    Review of Systems   Constitutional: Positive for fatigue. Negative for chills and fever. HENT: Negative for congestion, sinus pressure and sore throat. Respiratory: Negative for cough and shortness of breath. Cardiovascular: Negative for chest pain and palpitations. Gastrointestinal: Negative for abdominal pain and vomiting. Musculoskeletal: Negative for arthralgias and myalgias. Skin: Negative for rash and wound. Neurological: Negative for speech difficulty and light-headedness. Psychiatric/Behavioral: Negative for suicidal ideas. The patient is not nervous/anxious. PHYSICAL EXAM/ RESULTS    There were no vitals taken for this visit.     Vitals signs: pt does not have the proper equipment to take all VS.    The physical is not performed due to the visit being a telephone counter as indicated due to the restrictions of the COVID-19 pandemic    Recent Results (from the past 2016 hour(s))   Comprehensive Metabolic Panel    Collection Time: 07/10/20  1:00 PM   Result Value Ref Range    Sodium 143 135 - 144 mEq/L    Potassium 4.0 3.4 - 4.9 mEq/L    Chloride 106 95 - 107 mEq/L    CO2 29 20 - 31 mEq/L    Anion Gap 8 (L) 9 - 15 mEq/L    Glucose 106 (H) 70 - 99 mg/dL    BUN 19 8 - 23 mg/dL    CREATININE 0.83 0.50 - 0.90 mg/dL    GFR Non-African American >60.0 >60    GFR  >60.0 >60    Calcium 9.0 8.5 - 9.9 mg/dL    Total Protein 5.8 (L) 6.3 - 8.0 g/dL    Alb 3.7 3.5 - 4.6 g/dL    Total Bilirubin 0.7 0.2 - 0.7 mg/dL Alkaline Phosphatase 69 40 - 130 U/L    ALT 18 0 - 33 U/L    AST 27 0 - 35 U/L    Globulin 2.1 (L) 2.3 - 3.5 g/dL   CBC Auto Differential    Collection Time: 07/10/20  1:00 PM   Result Value Ref Range    WBC 4.9 4.8 - 10.8 K/uL    RBC 4.23 4.20 - 5.40 M/uL    Hemoglobin 13.1 12.0 - 16.0 g/dL    Hematocrit 39.7 37.0 - 47.0 %    MCV 93.8 82.0 - 100.0 fL    MCH 31.1 27.0 - 31.3 pg    MCHC 33.1 33.0 - 37.0 %    RDW 14.5 11.5 - 14.5 %    Platelets 079 141 - 423 K/uL    Neutrophils % 61.1 %    Lymphocytes % 27.6 %    Monocytes % 7.4 %    Eosinophils % 3.2 %    Basophils % 0.7 %    Neutrophils Absolute 3.0 1.4 - 6.5 K/uL    Lymphocytes Absolute 1.4 1.0 - 4.8 K/uL    Monocytes Absolute 0.4 0.2 - 0.8 K/uL    Eosinophils Absolute 0.2 0.0 - 0.7 K/uL    Basophils Absolute 0.0 0.0 - 0.2 K/uL   Magnesium    Collection Time: 07/10/20  1:00 PM   Result Value Ref Range    Magnesium 1.7 1.7 - 2.4 mg/dL   Troponin    Collection Time: 07/10/20  1:00 PM   Result Value Ref Range    Troponin <0.010 0.000 - 0.010 ng/mL   Brain Natriuretic Peptide    Collection Time: 07/10/20  1:00 PM   Result Value Ref Range    Pro- pg/mL   TSH without Reflex    Collection Time: 07/10/20  1:00 PM   Result Value Ref Range    TSH 0.613 0.440 - 3.860 uIU/mL   Basic Metabolic Panel    Collection Time: 08/29/20  9:15 PM   Result Value Ref Range    Sodium 138 135 - 144 mEq/L    Potassium 3.4 3.4 - 4.9 mEq/L    Chloride 100 95 - 107 mEq/L    CO2 22 20 - 31 mEq/L    Anion Gap 16 (H) 9 - 15 mEq/L    Glucose 98 70 - 99 mg/dL    BUN 11 8 - 23 mg/dL    CREATININE 0.65 0.50 - 0.90 mg/dL    GFR Non-African American >60.0 >60    GFR  >60.0 >60    Calcium 9.1 8.5 - 9.9 mg/dL   Hepatic Function Panel    Collection Time: 08/29/20  9:15 PM   Result Value Ref Range    Total Protein 6.5 6.3 - 8.0 g/dL    Alb 4.4 3.5 - 4.6 g/dL    Alkaline Phosphatase 76 40 - 130 U/L    ALT 14 0 - 33 U/L    AST 33 0 - 35 U/L    Total Bilirubin 1.0 (H) 0.2 - 0.7 mg/dL Bilirubin, Direct 0.3 0.0 - 0.4 mg/dL    Bilirubin, Indirect 0.7 (H) 0.0 - 0.6 mg/dL   CBC Auto Differential    Collection Time: 08/29/20  9:15 PM   Result Value Ref Range    WBC 9.6 4.8 - 10.8 K/uL    RBC 4.71 4.20 - 5.40 M/uL    Hemoglobin 14.4 12.0 - 16.0 g/dL    Hematocrit 43.2 37.0 - 47.0 %    MCV 91.7 82.0 - 100.0 fL    MCH 30.6 27.0 - 31.3 pg    MCHC 33.3 33.0 - 37.0 %    RDW 13.7 11.5 - 14.5 %    Platelets 780 128 - 832 K/uL    Neutrophils % 80.7 %    Lymphocytes % 12.8 %    Monocytes % 5.8 %    Eosinophils % 0.1 %    Basophils % 0.6 %    Neutrophils Absolute 7.8 (H) 1.4 - 6.5 K/uL    Lymphocytes Absolute 1.2 1.0 - 4.8 K/uL    Monocytes Absolute 0.6 0.2 - 0.8 K/uL    Eosinophils Absolute 0.0 0.0 - 0.7 K/uL    Basophils Absolute 0.1 0.0 - 0.2 K/uL   Lipase    Collection Time: 08/29/20  9:15 PM   Result Value Ref Range    Lipase 19 12 - 95 U/L   Lactic Acid, Plasma    Collection Time: 08/29/20  9:15 PM   Result Value Ref Range    Lactic Acid 2.0 0.5 - 2.2 mmol/L   COVID-19 Ambulatory    Collection Time: 09/01/20  2:38 PM    Specimen: Nasopharyngeal Swab   Result Value Ref Range    SARS-CoV-2 Not Detected Not Detected    Source Oral swab            Assessment:       Diagnosis Orders   1. Vertigo           No orders of the defined types were placed in this encounter. Plan:   Return in about 3 months (around 12/4/2020) for Follow up chronic conditions w/ Dr Cholo Apodaca pt would like to change PCPs. There are no Patient Instructions on file for this visit.     This visit ended at 9:46am    Lolis Friend DO

## 2020-09-01 NOTE — PATIENT INSTRUCTIONS
Patient Education        Vertigo: Care Instructions  Your Care Instructions     Vertigo is the feeling that you or your surroundings are moving when there is no actual movement. It is often described as a feeling of spinning, whirling, falling, or tilting. Vertigo may make you vomit or feel nauseated. You may have trouble standing or walking and may lose your balance. Vertigo is often related to an inner ear problem, but it can have other more serious causes. If vertigo continues, you may need more tests to find its cause. Follow-up care is a key part of your treatment and safety. Be sure to make and go to all appointments, and call your doctor if you are having problems. It's also a good idea to know your test results and keep a list of the medicines you take. How can you care for yourself at home? · Do not lie flat on your back. Prop yourself up slightly. This may reduce the spinning feeling. Keep your eyes open. · Move slowly so that you do not fall. · If your doctor recommends medicine, take it exactly as directed. · Do not drive while you are having vertigo. Certain exercises, called Taylor-Daroff exercises, can help decrease vertigo. To do Taylor-Daroff exercises:  · Sit on the edge of a bed or sofa and quickly lie down on the side that causes the worst vertigo. Lie on your side with your ear down. · Stay in this position for at least 30 seconds or until the vertigo goes away. · Sit up. If this causes vertigo, wait for it to stop. · Repeat the procedure on the other side. · Repeat this 10 times. Do these exercises 2 times a day until the vertigo is gone. When should you call for help? [id number] anytime you think you may need emergency care. For example, call if:  · You passed out (lost consciousness). · You have symptoms of a stroke. These may include:  ? Sudden numbness, tingling, weakness, or loss of movement in your face, arm, or leg, especially on only one side of your body.   ? Sudden vision changes. ? Sudden trouble speaking. ? Sudden confusion or trouble understanding simple statements. ? Sudden problems with walking or balance. ? A sudden, severe headache that is different from past headaches. Call your doctor now or seek immediate medical care if:  · Vertigo occurs with a fever, a headache, or ringing in your ears. · You have new or increased nausea and vomiting. Watch closely for changes in your health, and be sure to contact your doctor if:  · Vertigo gets worse or happens more often. · Vertigo has not gotten better after 2 weeks. Where can you learn more? Go to https://[employer]. org and sign in to your [email or web address] account. Enter E360 in the [employer] box to learn more about \"Vertigo: Care Instructions. \"     If you do not have an account, please click on the \"Sign Up Now\" link. Current as of: July 29, 2019               Content Version: 12.5  © 4156-2596 Healthwise, Incorporated. Care instructions adapted under license by [facility] ([facility]). If you have questions about a medical condition or this instruction, always ask your healthcare professional. [place] [address] any warranty or liability for your use of this information.

## 2020-09-04 ENCOUNTER — TELEPHONE (OUTPATIENT)
Dept: FAMILY MEDICINE CLINIC | Age: 75
End: 2020-09-04

## 2020-09-04 ENCOUNTER — VIRTUAL VISIT (OUTPATIENT)
Dept: FAMILY MEDICINE CLINIC | Age: 75
End: 2020-09-04
Payer: MEDICARE

## 2020-09-04 PROCEDURE — 99421 OL DIG E/M SVC 5-10 MIN: CPT | Performed by: STUDENT IN AN ORGANIZED HEALTH CARE EDUCATION/TRAINING PROGRAM

## 2020-09-04 ASSESSMENT — ENCOUNTER SYMPTOMS
COUGH: 0
SINUS PRESSURE: 0
SHORTNESS OF BREATH: 0
VOMITING: 0
ABDOMINAL PAIN: 0
SORE THROAT: 0

## 2020-09-04 NOTE — TELEPHONE ENCOUNTER
Home health care calling, regarding referral they received from Dr. Lobito Villavicencio. They state patient goes to out of home therapy treatments so she is not home bound. States patient can only receive home healthcare if they are home bound.      Ph. [phone]

## 2020-09-04 NOTE — TELEPHONE ENCOUNTER
Patient had requested home health to visit her once a week - I told her I wasnt sure if she would qualify.

## 2020-09-09 LAB
SARS-COV-2: NOT DETECTED
SOURCE: NORMAL

## 2020-10-15 ENCOUNTER — CLINICAL DOCUMENTATION (OUTPATIENT)
Dept: PHYSICAL THERAPY | Age: 75
End: 2020-10-15

## 2020-10-15 NOTE — PROGRESS NOTES
[address] [facility] [address]      [address]     [address] [facility] [address]  Ph: [phone]     Ph: [phone]  Fax: [phone]     Fax: [phone]      Date: 10/15/2020  Patient Name: Ray Cornelius  : 1945  MRN: [id number]    To:    YURY Gardner-CNP    From: Joo Seymour PT     [x]   Patient has not been seen in PT since 2020 and has not responded to attempts    to contact. Patient will be discharged. Comments: Thank you for your referral and the opportunity to treat this patient. Please contact us with any questions or concerns.         Electronically signed by Galileo Gracia PTA on 10/15/2020 at 10:28 AM

## 2020-10-15 NOTE — PROGRESS NOTES
[address] [facility] [address]      [address]     [place], [address] [place] [address]  Ph: [phone]     Ph: [phone]  Fax: [phone]     Fax: [phone]      Date: 10/15/2020  Patient Name: Terry Angeles  : 1945  MRN: [id number]    To: YURY Ramirez-CNP      From: Evita Felipe PT     [x]   Patient has not been seen in PT since 2020 and has not responded to attempts    to contact. Patient will be discharged. Comments: Thank you for your referral and the opportunity to treat this patient. Please contact us with any questions or concerns.         Electronically signed by Marleni Rich PTA on 10/15/2020 at 3:58 PM

## 2020-10-23 ENCOUNTER — NURSE ONLY (OUTPATIENT)
Dept: FAMILY MEDICINE CLINIC | Age: 75
End: 2020-10-23
Payer: MEDICARE

## 2020-10-23 PROCEDURE — G0008 ADMIN INFLUENZA VIRUS VAC: HCPCS | Performed by: FAMILY MEDICINE

## 2020-10-23 PROCEDURE — 90694 VACC AIIV4 NO PRSRV 0.5ML IM: CPT | Performed by: FAMILY MEDICINE

## 2020-10-23 NOTE — PROGRESS NOTES
After obtaining consent, and per orders of Dr. Lovetta Shone, injection of high dose flu given in Right deltoid by Michael Cornejo. Patient instructed to remain in clinic for 20 minutes afterwards, and to report any adverse reaction to me immediately. Vaccine Information Sheet, \"Influenza - Inactivated\"  given to Maddie Tillman, or parent/legal guardian of  Maddie Tillman and verbalized understanding. Patient responses:    Have you ever had a reaction to a flu vaccine? No  Are you able to eat eggs without adverse effects? Yes  Do you have any current illness? No  Have you ever had [place] Syndrome? No    Flu vaccine given per order. Please see immunization tab.

## 2020-10-27 ENCOUNTER — VIRTUAL VISIT (OUTPATIENT)
Dept: PULMONOLOGY | Age: 75
End: 2020-10-27
Payer: MEDICARE

## 2020-10-27 PROBLEM — C48.2 MALIGNANT NEOPLASM OF PERITONEUM, UNSPECIFIED (HCC): Status: ACTIVE | Noted: 2020-10-27

## 2020-10-27 PROCEDURE — 99443 PR PHYS/QHP TELEPHONE EVALUATION 21-30 MIN: CPT | Performed by: INTERNAL MEDICINE

## 2020-10-27 ASSESSMENT — ENCOUNTER SYMPTOMS
EYE ITCHING: 0
SINUS PRESSURE: 0
TROUBLE SWALLOWING: 0
ABDOMINAL PAIN: 0
DIARRHEA: 0
SHORTNESS OF BREATH: 0
VOMITING: 0
WHEEZING: 0
NAUSEA: 0
EYE DISCHARGE: 0
VOICE CHANGE: 0
RHINORRHEA: 0
CHEST TIGHTNESS: 0
COUGH: 0
SORE THROAT: 0

## 2020-10-27 NOTE — PROGRESS NOTES
Subjective: Ben Mccarthy is a 66 y.o. female who complains today of:     Chief Complaint   Patient presents with    Follow-up       HPI  She is using CPAP with  8  centimeters of H2O with heated humidity. She is using CPAP for about  7 hours every night. She is using CPAP with   Nasal pillow . She did not get  full mask . She said  sleep is restful with the CPAP use. She is compliant with CPAP therapy and benefiting with CPAP use. No snoring with CPAP use. No complaint of daytime sleepiness or tiredness with CPAP use. She denies taking naps. No sleepiness with driving. She denies difficulty falling asleep or staying asleep.       Allergies:  Ciprofloxacin and Oxycontin [oxycodone hcl]  Past Medical History:   Diagnosis Date    Arthritis     Blood transfusion     Cancer [facility])     GALLBLADDER 2009, 2011 OMENTUM    Charcot's joint     left foot    Chest pain 7/2/2015    Colitis     DDD (degenerative disc disease), lumbar 2/12/2013    Depression     Disorder of peripheral nervous system 9/1/2010    Dyspnea 7/2/2015    Family history of coronary artery disease 8/29/2014    History of blood transfusion 2011    following chemotherapy    Hx of right BKA ([address]     Hyperlipidemia     Hypertension     Hypothyroid 6/12/2015    Lobular breast cancer ([address] 10/2015    NSTEMI (non-ST elevated myocardial infarction) ([address] 8/29/2014    Obesity     Paroxysmal atrial fibrillation ([address] 8/29/2014    S/P BKA (below knee amputation) ([address]      Past Surgical History:   Procedure Laterality Date    ABDOMEN SURGERY Left 4/11/2017    EXCISION OF CHEST WALL MASS, UPDATE LABS ON ADMIT  performed by Patriciaann Denver, MD at [address] BLEPHAROPLASTY  2-5-13    both eyes    BREAST SURGERY      CARDIAC CATHETERIZATION      COLONOSCOPY  1/1/2010    normal    CYSTOSCOPY W BIOPSY OF BLADDER N/A 6/26/2017    CYSTOSCOPY BLADDER BIOPSY AND FULGURATION performed by Tammie Rolle MD at [address] Bilateral 2012    cataract removal with IOL implants    HYSTERECTOMY      LEG AMPUTATION BELOW KNEE Right 2/1/11    DR Celi Allen due to CHARCOTS    MASTECTOMY  11/16/2015    Bilateral simple mastectomies with right SNB by DR Jose Alberto Doan    [place] REMOVAL OF BREAST LESION Left 1/24/2017    CORE NEEDLE BIOPSY OF  LEFT BREAST MASS performed by Clyde Ochoa MD at [address]      cervical and lumbar     Family History   Problem Relation Age of Onset    Heart Disease Mother     Arthritis Mother     Heart Disease Father     Arthritis Father     Cancer Sister         Colon    Thyroid Disease Son     Other Son         Down's syndrome     Social History     Socioeconomic History    Marital status:       Spouse name: Not on file    Number of children: Not on file    Years of education: Not on file    Highest education level: Not on file   Occupational History    Occupation: retired   Social Needs    Financial resource strain: Not on file    Food insecurity     Worry: Not on file     Inability: Not on file   [employer] needs     Medical: Not on file     Non-medical: Not on file   Tobacco Use    Smoking status: Never Smoker    Smokeless tobacco: Never Used   Substance and Sexual Activity    Alcohol use: Yes     Comment: social    Drug use: No    Sexual activity: Never   Lifestyle    Physical activity     Days per week: Not on file     Minutes per session: Not on file    Stress: Not on file   Relationships    Social connections     Talks on phone: Not on file     Gets together: Not on file     Attends Faith service: Not on file     Active member of club or organization: Not on file     Attends meetings of clubs or organizations: Not on file     Relationship status: Not on file    Intimate partner violence     Fear of current or ex partner: Not on file     Emotionally abused: Not on file     Physically abused: Not on file     Forced sexual activity: Not on file   Other Topics Concern    Not on file   Social History Narrative    Not on file         Review of Systems   Constitutional: Negative for chills, diaphoresis, fatigue and fever. HENT: Negative for congestion, mouth sores, nosebleeds, postnasal drip, rhinorrhea, sinus pressure, sneezing, sore throat, trouble swallowing and voice change. Dry mouth    Eyes: Negative for discharge, itching and visual disturbance. Respiratory: Negative for cough, chest tightness, shortness of breath and wheezing. Cardiovascular: Negative for chest pain, palpitations and leg swelling. Gastrointestinal: Negative for abdominal pain, diarrhea, nausea and vomiting. Genitourinary: Negative for difficulty urinating and hematuria. Musculoskeletal: Negative for arthralgias, joint swelling and myalgias. Skin: Negative for rash. Allergic/Immunologic: Negative for environmental allergies and food allergies. Neurological: Negative for dizziness, tremors, weakness and headaches. Psychiatric/Behavioral: Positive for sleep disturbance. Negative for behavioral problems.         :   There were no vitals filed for this visit.   Wt Readings from Last 3 Encounters:   09/01/20 222 lb (100.7 kg)   08/29/20 215 lb (97.5 kg)   07/10/20 215 lb (97.5 kg)         Physical exam: phone visit    Current Outpatient Medications   Medication Sig Dispense Refill    Respiratory Therapy Supplies SHAE New CPAP, full face  mask and chin strap       Fax [phone] Device 0    diclofenac (VOLTAREN) 50 MG EC tablet TAKE 1 TABLET BY MOUTH  TWICE DAILY 180 tablet 3    gabapentin (NEURONTIN) 600 MG tablet TAKE 1 TABLET BY MOUTH IN  THE MORNING, 2 TABS IN THE  AFTERNOON, AND 2 TABS IN  THE EVENING AS TOLERATED 450 tablet 0    buPROPion (WELLBUTRIN XL) 150 MG extended release tablet TAKE 2 TABLETS BY MOUTH IN  THE MORNING 180 tablet 3    levothyroxine (SYNTHROID) 50 MCG tablet TAKE 1 TABLET BY MOUTH  DAILY 90 tablet 3    potassium chloride (KLOR-CON M) 10 MEQ extended release tablet TAKE 1 TABLET BY MOUTH TWO  TIMES DAILY 180 tablet 0    furosemide (LASIX) 20 MG tablet Take 1 tablet by mouth daily for 5 days (Patient taking differently: Take 20 mg by mouth daily as needed ) 5 tablet 0    DULoxetine (CYMBALTA) 30 MG extended release capsule TAKE 1 CAPSULE BY MOUTH  DAILY 90 capsule 2    atorvastatin (LIPITOR) 20 MG tablet TAKE 1 TABLET BY MOUTH  NIGHTLY 90 tablet 1    CPAP Machine MISC by Does not apply route New CPAP with 8 cm 1 each 0    carvedilol (COREG) 25 MG tablet TAKE 1 TABLET BY MOUTH TWO  TIMES DAILY 180 tablet 2    acyclovir (ZOVIRAX) 5 % CREA Apply topically as needed 5 g 1    b complex vitamins capsule Take 1 capsule by mouth daily      Multiple Vitamins-Minerals (CENTRUM SILVER 50+WOMEN PO) Take by mouth      tamoxifen (NOLVADEX) 20 MG tablet Take 1 tablet by mouth daily      Vitamin D (CHOLECALCIFEROL) 1000 UNITS CAPS capsule Take 1,000 Units by mouth daily. No current facility-administered medications for this visit. Results for orders placed in visit on 05/24/17   XR CHEST STANDARD TWO VW    Narrative EXAMINATION: CHEST X-RAY, PA AND LATERAL    CLINICAL HISTORY: BILATERAL LOWER EXTREMITY EDEMA, BILATERAL PEDAL EDEMA AND DYSPNEA, EVALUATE FOR POSSIBLE CONGESTIVE HEART FAILURE    COMPARISONS: 12/7/2015    FINDINGS: There is borderline cardiomegaly and a tortuous aorta. The lungs appear clear without pulmonary infiltrate or pleural effusion. There are no radiographic findings suggesting CHF at this time. There is degenerative bone spurring throughout the   spine and bilateral shoulder prostheses. Impression 1. BORDERLINE CARDIOMEGALY AND A TORTUOUS AORTA. 2. LUNGS APPEAR CLEAR WITHOUT A PULMONARY INFILTRATE OR PLEURAL EFFUSION. Assessment/Plan:     1. Obstructive sleep apnea  She is using CPAP with  8  centimeters of H2O with heated humidity. She is using CPAP for about  7 hours every night. She is using CPAP with   Nasal pillow . She did not get  full mask . She said  sleep is restful with the CPAP use. She is compliant with CPAP therapy and benefiting with CPAP use. No snoring with CPAP use. Continue CPAP therapy as before  - Respiratory Therapy Supplies SHAE; New CPAP, full face  mask and chin strap   Fax [phone]  Dispense: 1 Device; Refill: 0    2. Obesity (BMI 30-39. 9)  Patient patient is advised try to lose weight. obesity related risk explained to the patient ,  Current weight:    Lbs. BMI:  There is no height or weight on file to calculate BMI. Suggested weight control approaches, including dietary changes , exercise, behavioral modification. Patient notified that this is a billable service and has given verbal consent for telehealth services. Time spent with patient 21  minutes. Return in about 3 months (around 1/27/2021) for massimo.       Elysia Eid MD

## 2020-11-19 ENCOUNTER — HOSPITAL ENCOUNTER (OUTPATIENT)
Dept: WOMENS IMAGING | Age: 75
Discharge: HOME | End: 2020-11-21
Payer: MEDICARE

## 2020-11-19 PROCEDURE — 77080 DXA BONE DENSITY AXIAL: CPT

## 2020-12-04 ENCOUNTER — OFFICE VISIT (OUTPATIENT)
Dept: FAMILY MEDICINE CLINIC | Age: 75
End: 2020-12-04
Payer: MEDICARE

## 2020-12-04 VITALS
DIASTOLIC BLOOD PRESSURE: 80 MMHG | TEMPERATURE: 96.2 F | SYSTOLIC BLOOD PRESSURE: 140 MMHG | BODY MASS INDEX: 44.37 KG/M2 | OXYGEN SATURATION: 97 % | HEART RATE: 65 BPM | WEIGHT: 226 LBS | HEIGHT: 60 IN

## 2020-12-04 PROCEDURE — G8484 FLU IMMUNIZE NO ADMIN: HCPCS | Performed by: STUDENT IN AN ORGANIZED HEALTH CARE EDUCATION/TRAINING PROGRAM

## 2020-12-04 PROCEDURE — G8427 DOCREV CUR MEDS BY ELIG CLIN: HCPCS | Performed by: STUDENT IN AN ORGANIZED HEALTH CARE EDUCATION/TRAINING PROGRAM

## 2020-12-04 PROCEDURE — 1090F PRES/ABSN URINE INCON ASSESS: CPT | Performed by: STUDENT IN AN ORGANIZED HEALTH CARE EDUCATION/TRAINING PROGRAM

## 2020-12-04 PROCEDURE — 99213 OFFICE O/P EST LOW 20 MIN: CPT | Performed by: STUDENT IN AN ORGANIZED HEALTH CARE EDUCATION/TRAINING PROGRAM

## 2020-12-04 PROCEDURE — 1036F TOBACCO NON-USER: CPT | Performed by: STUDENT IN AN ORGANIZED HEALTH CARE EDUCATION/TRAINING PROGRAM

## 2020-12-04 PROCEDURE — G8399 PT W/DXA RESULTS DOCUMENT: HCPCS | Performed by: STUDENT IN AN ORGANIZED HEALTH CARE EDUCATION/TRAINING PROGRAM

## 2020-12-04 PROCEDURE — G8417 CALC BMI ABV UP PARAM F/U: HCPCS | Performed by: STUDENT IN AN ORGANIZED HEALTH CARE EDUCATION/TRAINING PROGRAM

## 2020-12-04 PROCEDURE — 1123F ACP DISCUSS/DSCN MKR DOCD: CPT | Performed by: STUDENT IN AN ORGANIZED HEALTH CARE EDUCATION/TRAINING PROGRAM

## 2020-12-04 PROCEDURE — 4040F PNEUMOC VAC/ADMIN/RCVD: CPT | Performed by: STUDENT IN AN ORGANIZED HEALTH CARE EDUCATION/TRAINING PROGRAM

## 2020-12-04 ASSESSMENT — ENCOUNTER SYMPTOMS
SINUS PRESSURE: 0
VOMITING: 0
SHORTNESS OF BREATH: 0
COUGH: 0
ABDOMINAL PAIN: 0
SORE THROAT: 0
DIARRHEA: 1

## 2020-12-04 NOTE — PATIENT INSTRUCTIONS
Patient Education        Diarrhea: Care Instructions  Your Care Instructions     Diarrhea is loose, watery stools (bowel movements). The exact cause is often hard to find. Sometimes diarrhea is your body's way of getting rid of what caused an upset stomach. Viruses, food poisoning, and many medicines can cause diarrhea. Some people get diarrhea in response to emotional stress, anxiety, or certain foods. Almost everyone has diarrhea now and then. It usually isn't serious, and your stools will return to normal soon. The important thing to do is replace the fluids you have lost, so you can prevent dehydration. The doctor has checked you carefully, but problems can develop later. If you notice any problems or new symptoms, get medical treatment right away. Follow-up care is a key part of your treatment and safety. Be sure to make and go to all appointments, and call your doctor if you are having problems. It's also a good idea to know your test results and keep a list of the medicines you take. How can you care for yourself at home? · Watch for signs of dehydration, which means your body has lost too much water. Dehydration is a serious condition and should be treated right away. Signs of dehydration are:  ? Increasing thirst and dry eyes and mouth. ? Feeling faint or lightheaded. ? A smaller amount of urine than normal.  · To prevent dehydration, drink plenty of fluids. Choose water and other caffeine-free clear liquids until you feel better. If you have kidney, heart, or liver disease and have to limit fluids, talk with your doctor before you increase the amount of fluids you drink. · Begin eating small amounts of mild foods the next day, if you feel like it. ? Try yogurt that has live cultures of Lactobacillus. (Check the label.)  ? Avoid spicy foods, fruits, alcohol, and caffeine until 48 hours after all symptoms are gone. ? Avoid chewing gum that contains sorbitol. ?  Avoid dairy products (except for yogurt with Lactobacillus) while you have diarrhea and for 3 days after symptoms are gone. · The doctor may recommend that you take over-the-counter medicine, such as loperamide (Imodium), if you still have diarrhea after 6 hours. Read and follow all instructions on the label. Do not use this medicine if you have bloody diarrhea, a high fever, or other signs of serious illness. Call your doctor if you think you are having a problem with your medicine. When should you call for help? Call 911 anytime you think you may need emergency care. For example, call if:    · You passed out (lost consciousness).     · Your stools are maroon or very bloody. Call your doctor now or seek immediate medical care if:    · You are dizzy or lightheaded, or you feel like you may faint.     · Your stools are black and look like tar, or they have streaks of blood.     · You have new or worse belly pain.     · You have symptoms of dehydration, such as:  ? Dry eyes and a dry mouth. ? Passing only a little dark urine. ? Feeling thirstier than usual.     · You have a new or higher fever. Watch closely for changes in your health, and be sure to contact your doctor if:    · Your diarrhea is getting worse.     · You see pus in the diarrhea.     · You are not getting better after 2 days (48 hours). Where can you learn more? Go to https://[employer]. org and sign in to your [employer] account. Enter Q220 in the [employer] box to learn more about \"Diarrhea: Care Instructions. \"     If you do not have an account, please click on the \"Sign Up Now\" link. Current as of: June 26, 2019               Content Version: 12.6  © 0509-2542 "[employer] Inc[employer]. Care instructions adapted under license by [facility] ([facility]).  If you have questions about a medical condition or this instruction, always ask your healthcare professional. Jem Bales any warranty or liability for your use of this information.

## 2020-12-17 ENCOUNTER — VIRTUAL VISIT (OUTPATIENT)
Dept: BEHAVIORAL/MENTAL HEALTH CLINIC | Age: 75
End: 2020-12-17
Payer: MEDICARE

## 2020-12-17 PROCEDURE — 99214 OFFICE O/P EST MOD 30 MIN: CPT | Performed by: PSYCHIATRY & NEUROLOGY

## 2020-12-17 PROCEDURE — 4040F PNEUMOC VAC/ADMIN/RCVD: CPT | Performed by: PSYCHIATRY & NEUROLOGY

## 2020-12-17 PROCEDURE — 1090F PRES/ABSN URINE INCON ASSESS: CPT | Performed by: PSYCHIATRY & NEUROLOGY

## 2020-12-17 PROCEDURE — G8428 CUR MEDS NOT DOCUMENT: HCPCS | Performed by: PSYCHIATRY & NEUROLOGY

## 2020-12-17 PROCEDURE — G8399 PT W/DXA RESULTS DOCUMENT: HCPCS | Performed by: PSYCHIATRY & NEUROLOGY

## 2020-12-17 PROCEDURE — 1123F ACP DISCUSS/DSCN MKR DOCD: CPT | Performed by: PSYCHIATRY & NEUROLOGY

## 2020-12-17 RX ORDER — DEXMETHYLPHENIDATE HYDROCHLORIDE 10 MG/1
10 CAPSULE, EXTENDED RELEASE ORAL EVERY MORNING
Qty: 30 CAPSULE | Refills: 0 | Status: SHIPPED | OUTPATIENT
Start: 2020-12-17 | End: 2021-02-02

## 2020-12-17 RX ORDER — BLOOD PRESSURE TEST KIT
1 KIT MISCELLANEOUS PRN
Qty: 1 KIT | Refills: 0 | Status: SHIPPED | OUTPATIENT
Start: 2020-12-17

## 2020-12-17 NOTE — PROGRESS NOTES
12/17/2020    TELEHEALTH PSYCHIATRY FOLLOWUP -- Audio/Visual (During CBBRV-32 public health emergency)      Psychiatric Diagnoses:  1. Attention deficit hyperactivity disorder (ADHD), combined type    2. Current mild episode of major depressive disorder, unspecified whether recurrent ([facility]          Due to COVID 19 outbreak, patient's office visit was converted to a virtual visit. Patient was contacted and agreed to proceed with a virtual visit via DOXY  30 minutes with direct communication with patient for encounter The risks and benefits of converting to a virtual visit were discussed in light of the current infectious disease epidemic. Patient also understood that insurance coverage and co-pays are up to their individual insurance plans. Patient Location:        Patient's home address  Provider Location (City/State):        [place]        Assessment/Plan:   Patient was seen in March of 2020 for ADHD, and I requested sleep medicine referral and cardiac clearance prior to initiating stimulants:   Per Dr. Daniel Ramirez note in July 2020:   PLAN:  Patient will need to continue to follow up with you for their general medical care   As always, aggressive risk factor modification is strongly recommended. We should adhere to the [address] VII guidelines for HTN management and the NCEP ATP III guidelines for LDL-C management. Cardiac diet is always recommended with low fat, cholesterol, calories and sodium. Continue medications at current doses.   No need for oral anticoagulation as atrial fibrillation was an isolated event during dehydration/renal failure episode and has been in normal sinus rhythm ever since. Patient to start Ritalin/Adderall/stimulants and will monitor heart rate. Patient was advised and encouraged to check blood pressure at home or at a pharmacy, maintain a logbook, and also call us back if blood pressure are above the target ranges or if it is low. Patient clearly understands and agrees to the instructions. We will need to continue to monitor muscle and liver enzymes, BUN, CR, and electrolytes    Also, referral for sleep medicine was placed, and patient was diagnosed with sleep apnea and is using CPAP   1. Obstructive sleep apnea  She is using CPAP with  8  centimeters of H2O with heated humidity. She is using CPAP for about  7 hours every night. She is using CPAP with   Nasal pillow . She did not get  full mask . She said  sleep is restful with the CPAP use. She is compliant with CPAP therapy and benefiting with CPAP use. No snoring with CPAP use. Continue CPAP therapy as before  - Respiratory Therapy Supplies [facility]AE; New CPAP, full face  mask and chin strap   Fax [phone]  Dispense: 1 Device; Refill:     Medical Diagnoses:  Patient Active Problem List   Diagnosis    Neck pain on right side    Obesity (BMI 30-39. 9)    Obesity    High risk medication use - 01/30/18 OARRS PM&R, 03/26/18 OARRS PM&R, 04/03/18 Urine Drug Screen positive opiates PM&R, 01/31/18 Med Contract PM&R    Impaired mobility and activities of daily living    Hx of right BKA ([facility]    Family history of coronary artery disease    NSTEMI (non-ST elevated myocardial infarction) (HCC)    Arthritis, neuropathic    Charcot's joint of foot    Closed dislocation of ankle    Closed dislocation of foot    Complete rotator cuff rupture of left shoulder    Clinical depression    Difficulty walking    Benign essential HTN    Acquired flat foot    Closed fracture of tarsal and metatarsal bones    Myogenic ptosis    Disorder of peripheral nervous system    Arthritis of ankle or foot, degenerative    HLD (hyperlipidemia)    Cervical post-laminectomy syndrome  Failed back syndrome of lumbar spine    Low back pain with sciatica    Fibromyalgia muscle pain    Malaise and fatigue    Melancholia    Complete tear of left rotator cuff    Generalized osteoarthritis    Peripheral neuropathy    Osteoporosis    Postlaminectomy syndrome of cervical region    Postlaminectomy syndrome of lumbar region    Tibialis tendinitis    Hereditary and idiopathic peripheral neuropathy ([address]    Hypothyroid    Lobular breast cancer ([address]    Traumatic amputation of one lower extremity below knee with complication (HCC)    Acquired hallux valgus    Fatigue due to treatment    Anemia    Cancer ([address]    Cervical spinal cord injury ([address]    Complete traumatic amputation at level between right knee and ankle ([address]    Morbid obesity due to excess calories ([address]    Reactive depression    Sleeping excessive    Chronic low back pain    Left breast mass    Other specified postprocedural states    Primary osteoarthritis involving multiple joints    Hematuria    Hematuria, gross    Noncompliance - No Show inject 07/17/17, No Show F/U 1/11/18    Chronic low back pain with sciatica    Moderate episode of recurrent major depressive disorder ([address]    Charcot's arthropathy    Below knee amputation status, right    Chronic pain syndrome    Racing heart beat    Cervical fusion syndrome    Current mild episode of major depressive disorder ([address]    Obstructive sleep apnea    Malignant neoplasm of peritoneum, 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[address]           DATE and changes made  ? 2/28/20  o Start/Continue Cymbalta 30 mg   o Wellbutrin  mg      ? 12/17  o Monitor BP and HR at home   o Focalin 10 mg XR will start this today and see back in one week     AT TODAY'S VISIT     1. No Labs ordered today   2. Crisis plan reviewed and patient verbally contracts for safety. Go to ED with emergent symptoms or safety concerns. At today's visit, pt reports that since our last visit, their average APPETITE has been    [] Decreased     [x] Normal/Unchanged   [] Increased      At today's visit, pt reports that since our last visit, their average HOURS OF SLEEP (every 24 hours) has been    [] 0-3   [] 4-5    [] 5-6    [] 6-7    [x] 8-9    [] 10-11   [] 11+    At today's visit, pt reports that since our last visit, their average Energy has been     [] Poor    [x] Average  [] Increased         Aggression:  [] yes  [x] no    Patient is [x] Able to contract for safety  [] unable to CONTRACT FOR SAFETY     ROS:  [x] All negative/unchanged except if checked.  Explain positive(checked items) below:     [] Constitutional  [] Eyes  [] Ear/Nose/Mouth/Throat  [] Respiratory  [] CV  [] GI  []   [] Musculoskeletal  [] Skin/Breast  [] Neurological  [] Endocrine  [] Heme/Lymph  [] Allergic/Immunologic      MEDICATIONS:    Current Outpatient Medications:     Respiratory Therapy Supplies [facility]AE, New CPAP, full face  mask and chin strap    Fax [phone], Disp: 1 Device, Rfl: 0    diclofenac (VOLTAREN) 50 MG EC tablet, TAKE 1 TABLET BY MOUTH  TWICE DAILY, Disp: 180 tablet, Rfl: 3    gabapentin (NEURONTIN) 600 MG tablet, TAKE 1 TABLET BY MOUTH IN  THE MORNING, 2 TABS IN THE  AFTERNOON, AND 2 TABS IN  THE EVENING AS TOLERATED, Disp: 450 tablet, Rfl: 0    buPROPion (WELLBUTRIN XL) 150 MG extended release tablet, TAKE 2 TABLETS BY MOUTH IN  THE MORNING, Disp: 180 tablet, Rfl: 3    levothyroxine (SYNTHROID) 50 MCG tablet, TAKE 1 TABLET BY MOUTH  DAILY, Disp: 90 tablet, Rfl: 3    potassium chloride (KLOR-CON M) 10 MEQ extended release tablet, TAKE 1 TABLET BY MOUTH TWO  TIMES DAILY, Disp: 180 tablet, Rfl: 0    furosemide (LASIX) 20 MG tablet, Take 1 tablet by mouth daily for 5 days (Patient taking differently: Take 20 mg by mouth daily as needed ), Disp: 5 tablet, Rfl: 0   DULoxetine (CYMBALTA) 30 MG extended release capsule, TAKE 1 CAPSULE BY MOUTH  DAILY, Disp: 90 capsule, Rfl: 2    atorvastatin (LIPITOR) 20 MG tablet, TAKE 1 TABLET BY MOUTH  NIGHTLY, Disp: 90 tablet, Rfl: 1    CPAP Machine MISC, by Does not apply route New CPAP with 8 cm, Disp: 1 each, Rfl: 0    carvedilol (COREG) 25 MG tablet, TAKE 1 TABLET BY MOUTH TWO  TIMES DAILY, Disp: 180 tablet, Rfl: 2    acyclovir (ZOVIRAX) 5 % CREA, Apply topically as needed, Disp: 5 g, Rfl: 1    b complex vitamins capsule, Take 1 capsule by mouth daily, Disp: , Rfl:     Multiple Vitamins-Minerals (CENTRUM SILVER 50+WOMEN PO), Take by mouth, Disp: , Rfl:     tamoxifen (NOLVADEX) 20 MG tablet, Take 1 tablet by mouth daily, Disp: , Rfl:     Vitamin D (CHOLECALCIFEROL) 1000 UNITS CAPS capsule, Take 1,000 Units by mouth daily. , Disp: , Rfl:     Examination:    Vitals: not taken in person, most recent vitals in chart reviewed  There were no vitals filed for this visit. Wt Readings from Last 3 Encounters:   12/04/20 226 lb (102.5 kg)   09/01/20 222 lb (100.7 kg)   08/29/20 215 lb (97.5 kg)       Labs:   no recent labs    Mental Status Examination:    Level of consciousness:  alert and oriented to person, place, and situation  Appearance:  well-appearing good grooming and good hygiene  Behavior/Motor:  no abnormalities noted  Attitude toward examiner:  attentive and good eye contact  Speech:  spontaneous, normal rate and normal volume   Mood: \"fine\"  Affect:  mood congruent  Thought processes:  linear and logical  Thought content:  Denies suicidal or homicidal ideation, denies auditory or visual hallucinations  Cognition:  no deficits in attention, concentration notable, recent memory grossly intact  Concentration intact  Memory intact  Insight good   Judgement fair   Fund of Knowledge adequate    Treatment Plan:  Reviewed current Medications with the patient. Education provided on the compliance with treatment. Reviewed OARRs, no concerns identified     The anticipated benefits and side effects of the medications, including the anticipated results of not receiving the medication, and of alternatives to the medications were explained to the patient and their informed consent was obtained for starting medications as well as adjusting the doses (titration or tapering) as indicated. The above information was given by physician in verbal form and sufficient understanding was in evidence. The patient participated in discussion of the information and question and/or concerns were addressed before the medication was given. PSYCHOTHERAPY/COUNSELING:  Encourage patient to attend outpatient appointments and therapy. [x] Therapeutic interview  [] Supportive  [x] CBT  [x] Ongoing  [] Other    No follow-ups on file. Follow-up in 2 weeks, patient informed to call for follow-up    Please note this report has been partially produced using speech recognition software  And may cause contain errors related to that system including grammar, punctuation and spelling as well as words and phrases that may seem inappropriate. If there are questions or concerns please feel free to contact me to clarify. Suyapa Lazcano MD  Electronically signed by Suyapa Lazcano MD on 12/17/2020 at 2:55 PM  12/17/2020 2:55 PM    Psychiatry   Due to this being a TeleHealth encounter, evaluation of the following organ systems is limited: Vitals/Constitutional/EENT/Resp/CV/GI//MS/Neuro/Skin/Heme-Lymph-Imm. An  electronic signature was used to authenticate this note.   --Suyapa Lazcano MD on 12/17/2020 at 2:55 PM

## 2020-12-30 ENCOUNTER — TELEPHONE (OUTPATIENT)
Dept: BEHAVIORAL/MENTAL HEALTH CLINIC | Age: 75
End: 2020-12-30

## 2020-12-30 RX ORDER — METHYLPHENIDATE HYDROCHLORIDE 5 MG/1
5 TABLET ORAL EVERY MORNING
Qty: 30 TABLET | Refills: 0 | Status: SHIPPED | OUTPATIENT
Start: 2020-12-30 | End: 2021-01-29

## 2021-01-10 DIAGNOSIS — I10 ESSENTIAL HYPERTENSION: ICD-10-CM

## 2021-01-11 RX ORDER — CARVEDILOL 25 MG/1
TABLET ORAL
Qty: 180 TABLET | Refills: 3 | Status: SHIPPED | OUTPATIENT
Start: 2021-01-11 | End: 2021-12-09

## 2021-01-27 RX ORDER — POTASSIUM CHLORIDE 750 MG/1
TABLET, EXTENDED RELEASE ORAL
Qty: 180 TABLET | Refills: 3 | Status: ON HOLD | OUTPATIENT
Start: 2021-01-27 | End: 2021-09-16 | Stop reason: HOSPADM

## 2021-01-28 ENCOUNTER — OFFICE VISIT (OUTPATIENT)
Dept: PULMONOLOGY | Age: 76
End: 2021-01-28
Payer: MEDICARE

## 2021-01-28 VITALS
WEIGHT: 225 LBS | OXYGEN SATURATION: 94 % | HEART RATE: 75 BPM | HEIGHT: 60 IN | BODY MASS INDEX: 44.17 KG/M2 | SYSTOLIC BLOOD PRESSURE: 130 MMHG | RESPIRATION RATE: 16 BRPM | TEMPERATURE: 97.8 F | DIASTOLIC BLOOD PRESSURE: 88 MMHG

## 2021-01-28 DIAGNOSIS — G47.33 OBSTRUCTIVE SLEEP APNEA: Primary | ICD-10-CM

## 2021-01-28 DIAGNOSIS — E66.01 MORBID OBESITY (HCC): ICD-10-CM

## 2021-01-28 PROCEDURE — 1090F PRES/ABSN URINE INCON ASSESS: CPT | Performed by: INTERNAL MEDICINE

## 2021-01-28 PROCEDURE — G8417 CALC BMI ABV UP PARAM F/U: HCPCS | Performed by: INTERNAL MEDICINE

## 2021-01-28 PROCEDURE — 1036F TOBACCO NON-USER: CPT | Performed by: INTERNAL MEDICINE

## 2021-01-28 PROCEDURE — G8484 FLU IMMUNIZE NO ADMIN: HCPCS | Performed by: INTERNAL MEDICINE

## 2021-01-28 PROCEDURE — G8427 DOCREV CUR MEDS BY ELIG CLIN: HCPCS | Performed by: INTERNAL MEDICINE

## 2021-01-28 PROCEDURE — 4040F PNEUMOC VAC/ADMIN/RCVD: CPT | Performed by: INTERNAL MEDICINE

## 2021-01-28 PROCEDURE — 1123F ACP DISCUSS/DSCN MKR DOCD: CPT | Performed by: INTERNAL MEDICINE

## 2021-01-28 PROCEDURE — G8399 PT W/DXA RESULTS DOCUMENT: HCPCS | Performed by: INTERNAL MEDICINE

## 2021-01-28 PROCEDURE — 99213 OFFICE O/P EST LOW 20 MIN: CPT | Performed by: INTERNAL MEDICINE

## 2021-01-28 ASSESSMENT — ENCOUNTER SYMPTOMS
SORE THROAT: 0
SHORTNESS OF BREATH: 0
VOICE CHANGE: 0
EYE DISCHARGE: 0
CHEST TIGHTNESS: 0
NAUSEA: 0
EYE ITCHING: 0
RHINORRHEA: 0
SINUS PRESSURE: 0
TROUBLE SWALLOWING: 0
ABDOMINAL PAIN: 0
WHEEZING: 0
VOMITING: 0
COUGH: 0
DIARRHEA: 0

## 2021-01-28 NOTE — PROGRESS NOTES
Subjective: Erick Wills is a 66 y.o. female who complains today of:     Chief Complaint   Patient presents with    Follow-up     three month f/u for KEI. HPI  She is using CPAP with   8  centimeters of H2O with heated humidity. She is using CPAP for about  7-9   hours every night. She is using CPAP with  Nasal  Mask. She said  sleep is restful with the CPAP use. She is compliant with CPAP therapy and benefiting with CPAP use. No snoring with CPAP use. .    No complaint of daytime sleepiness or tiredness with CPAP use. She denies taking naps. No sleepiness with driving. She denies difficulty falling asleep or staying asleep.   Patient said she never received full face mask     Allergies:  Ciprofloxacin and Oxycontin [oxycodone hcl]  Past Medical History:   Diagnosis Date    Arthritis     Blood transfusion     Cancer [facility])     GALLBLADDER 2009, 2011 OMENTUM    Charcot's joint     left foot    Chest pain 7/2/2015    Colitis     DDD (degenerative disc disease), lumbar 2/12/2013    Depression     Disorder of peripheral nervous system 9/1/2010    Dyspnea 7/2/2015    Family history of coronary artery disease 8/29/2014    History of blood transfusion 2011    following chemotherapy    Hx of right BKA ([address]     Hyperlipidemia     Hypertension     Hypothyroid 6/12/2015    Lobular breast cancer ([address] 10/2015    NSTEMI (non-ST elevated myocardial infarction) ([address] 8/29/2014    Obesity     Paroxysmal atrial fibrillation ([address] 8/29/2014    S/P BKA (below knee amputation) ([address]      Past Surgical History:   Procedure Laterality Date    ABDOMEN SURGERY Left 4/11/2017    EXCISION OF CHEST WALL MASS, UPDATE LABS ON ADMIT  performed by Rupert Ledesma MD at [address] BLEPHAROPLASTY  2-5-13    both eyes    BREAST SURGERY      CARDIAC CATHETERIZATION      COLONOSCOPY  1/1/2010    normal    CYSTOSCOPY W BIOPSY OF BLADDER N/A 6/26/2017 CYSTOSCOPY BLADDER BIOPSY AND FULGURATION performed by Yunior Adame MD at [address] Bilateral 2012    cataract removal with IOL implants    HYSTERECTOMY      LEG AMPUTATION BELOW KNEE Right 2/1/11    DR Vignesh Weir due to CHARCOTS    MASTECTOMY  11/16/2015    Bilateral simple mastectomies with right SNB by DR Dorian Mckeon    IL REMOVAL OF BREAST LESION Left 1/24/2017    CORE NEEDLE BIOPSY OF  LEFT BREAST MASS performed by Lillian Menendez MD at [address]      cervical and lumbar     Family History   Problem Relation Age of Onset    Heart Disease Mother     Arthritis Mother     Heart Disease Father     Arthritis Father     Cancer Sister         Colon    Thyroid Disease Son     Other Son         Down's syndrome     Social History     Socioeconomic History    Marital status:       Spouse name: Not on file    Number of children: Not on file    Years of education: Not on file    Highest education level: Not on file   Occupational History    Occupation: retired   Social Needs    Financial resource strain: Not on file    Food insecurity     Worry: Not on file     Inability: Not on file   [employer] needs     Medical: Not on file     Non-medical: Not on file   Tobacco Use    Smoking status: Never Smoker    Smokeless tobacco: Never Used   Substance and Sexual Activity    Alcohol use: Yes     Comment: social    Drug use: No    Sexual activity: Never   Lifestyle    Physical activity     Days per week: Not on file     Minutes per session: Not on file    Stress: Not on file   Relationships    Social connections     Talks on phone: Not on file     Gets together: Not on file     Attends Restorationist service: Not on file     Active member of club or organization: Not on file     Attends meetings of clubs or organizations: Not on file     Relationship status: Not on file    Intimate partner violence Fear of current or ex partner: Not on file     Emotionally abused: Not on file     Physically abused: Not on file     Forced sexual activity: Not on file   Other Topics Concern    Not on file   Social History Narrative    Not on file         Review of Systems   Constitutional: Negative for chills, diaphoresis, fatigue and fever. HENT: Negative for congestion, mouth sores, nosebleeds, postnasal drip, rhinorrhea, sinus pressure, sneezing, sore throat, trouble swallowing and voice change. Eyes: Negative for discharge, itching and visual disturbance. Respiratory: Negative for cough, chest tightness, shortness of breath and wheezing. Cardiovascular: Negative for chest pain, palpitations and leg swelling. Gastrointestinal: Negative for abdominal pain, diarrhea, nausea and vomiting. Genitourinary: Negative for difficulty urinating and hematuria. Musculoskeletal: Negative for arthralgias, joint swelling and myalgias. Skin: Negative for rash. Allergic/Immunologic: Negative for environmental allergies and food allergies. Neurological: Negative for dizziness, tremors, weakness and headaches. Psychiatric/Behavioral: Positive for sleep disturbance. Negative for behavioral problems. :     Vitals:    01/28/21 1354 01/28/21 1358   BP: 130/88 130/88   Pulse: 75    Resp: 16    Temp: 97.8 °F (36.6 °C)    TempSrc: Temporal    SpO2: 94%    Weight: 225 lb (102.1 kg)    Height: 5' (1.524 m)      Wt Readings from Last 3 Encounters:   01/28/21 225 lb (102.1 kg)   12/04/20 226 lb (102.5 kg)   09/01/20 222 lb (100.7 kg)         Physical Exam  Constitutional:       General: She is not in acute distress. Appearance: She is well-developed. She is obese. She is not diaphoretic. HENT:      Head: Normocephalic and atraumatic. Nose: Nose normal.   Eyes:      Pupils: Pupils are equal, round, and reactive to light. Neck:      Thyroid: No thyromegaly. Vascular: No JVD. Trachea: No tracheal deviation. Cardiovascular:      Rate and Rhythm: Normal rate and regular rhythm. Heart sounds: No murmur. No friction rub. No gallop. Pulmonary:      Effort: No respiratory distress. Breath sounds: No wheezing or rales. Chest:      Chest wall: No tenderness. Abdominal:      General: There is no distension. Tenderness: There is no abdominal tenderness. There is no rebound. Musculoskeletal: Normal range of motion. Lymphadenopathy:      Cervical: No cervical adenopathy. Skin:     General: Skin is warm and dry. Neurological:      Mental Status: She is alert and oriented to person, place, and time. Coordination: Coordination normal.         Current Outpatient Medications   Medication Sig Dispense Refill    Respiratory Therapy Supplies SHAE New Full face Mask , please do not sent nasal pillow . (She want to try full face Mask )    Dx KEI 1 Device 0    potassium chloride (KLOR-CON M) 10 MEQ extended release tablet TAKE 1 TABLET BY MOUTH  TWICE DAILY 180 tablet 3    carvedilol (COREG) 25 MG tablet TAKE 1 TABLET BY MOUTH  TWICE DAILY 180 tablet 3    methylphenidate (RITALIN) 5 MG tablet Take 1 tablet by mouth every morning for 30 days. 30 tablet 0    Blood Pressure KIT 1 Units by Does not apply route as needed (daily monitoring of blood pressure) One electronic sphygmomanometer and cuff for home monitoring of blood pressure and heart rate. Upper arm type of cuff preferred. Adult regular size.  1 kit 0    Respiratory Therapy Supplies SHAE New CPAP, full face  mask and chin strap       Fax [phone] 1 Device 0    diclofenac (VOLTAREN) 50 MG EC tablet TAKE 1 TABLET BY MOUTH  TWICE DAILY 180 tablet 3    buPROPion (WELLBUTRIN XL) 150 MG extended release tablet TAKE 2 TABLETS BY MOUTH IN  THE MORNING 180 tablet 3    levothyroxine (SYNTHROID) 50 MCG tablet TAKE 1 TABLET BY MOUTH  DAILY 90 tablet 3 She is using CPAP with   8  centimeters of H2O with heated humidity. She is using CPAP for about  7-9   hours every night. She is using CPAP with  Nasal  Mask. She said  sleep is restful with the CPAP use. She is compliant with CPAP therapy and benefiting with CPAP use. No snoring with CPAP use. .  Continue CPAP as before. Counseling: CPAP/BiPAP uses, She advised to use CPAP at least 5-6 hours every night. Driving: She is advised for extreme caution when driving or operating machinery if there is a feeling of drowsiness, especially while driving it is preferable to stop driving and take a brief nap. Sleep hygiene:Avoid supine sleep, sleep on  sides. Avoid  sleep deprivation. Explained sleep hygiene. Advice to avoid Alcohol and sedative    2. Morbid obesity ([address]  She is advised try to lose weight. obesity related risk explained to the patient ,  Current weight:  225 lb (102.1 kg) Lbs. BMI:  Body mass index is 43.94 kg/m². Suggested weight control approaches, including dietary changes , exercise, behavioral modification. Return in about 4 months (around 6/2/2021) for massimo.       Ana Nicholas MD

## 2021-02-02 ENCOUNTER — VIRTUAL VISIT (OUTPATIENT)
Dept: BEHAVIORAL/MENTAL HEALTH CLINIC | Age: 76
End: 2021-02-02
Payer: MEDICARE

## 2021-02-02 DIAGNOSIS — F32.0 CURRENT MILD EPISODE OF MAJOR DEPRESSIVE DISORDER, UNSPECIFIED WHETHER RECURRENT (HCC): Primary | ICD-10-CM

## 2021-02-02 PROCEDURE — 1090F PRES/ABSN URINE INCON ASSESS: CPT | Performed by: PSYCHIATRY & NEUROLOGY

## 2021-02-02 PROCEDURE — G8428 CUR MEDS NOT DOCUMENT: HCPCS | Performed by: PSYCHIATRY & NEUROLOGY

## 2021-02-02 PROCEDURE — 1123F ACP DISCUSS/DSCN MKR DOCD: CPT | Performed by: PSYCHIATRY & NEUROLOGY

## 2021-02-02 PROCEDURE — 99214 OFFICE O/P EST MOD 30 MIN: CPT | Performed by: PSYCHIATRY & NEUROLOGY

## 2021-02-02 PROCEDURE — G8399 PT W/DXA RESULTS DOCUMENT: HCPCS | Performed by: PSYCHIATRY & NEUROLOGY

## 2021-02-02 PROCEDURE — 4040F PNEUMOC VAC/ADMIN/RCVD: CPT | Performed by: PSYCHIATRY & NEUROLOGY

## 2021-02-02 RX ORDER — METHYLPHENIDATE HYDROCHLORIDE 10 MG/1
10 TABLET ORAL DAILY
Qty: 30 TABLET | Refills: 0 | Status: SHIPPED | OUTPATIENT
Start: 2021-02-02 | End: 2021-03-04

## 2021-02-02 NOTE — PROGRESS NOTES
2/2/2021    TELEHEALTH 603/[id number] [address] Elena Blevins (During PPXOZ-31 public health emergency)      Psychiatric Diagnoses:  1. Current mild episode of major depressive disorder, unspecified whether recurrent ([facility]          Due to COVID 19 outbreak, patient's office visit was converted to a virtual visit. Patient was contacted and agreed to proceed with a virtual visit via DOXY  30 minutes with direct communication with patient for encounter The risks and benefits of converting to a virtual visit were discussed in light of the current infectious disease epidemic. Patient also understood that insurance coverage and co-pays are up to their individual insurance plans. Patient Location:        Patient's home address  Provider Location (City/State):        [facility] [address]      Assessment/Plan:     Patient tolerating current medication regimen but will proceed with changes below in order to address continued mood symptoms. Able to focus more, sleeping well, less naps during the day, more active on current medication. Medication has been helpful so far and patient has had no side effects other than those listed in the subjective portion of this note. Patient is less depressed but still motivated to complete work. Less anxiety throughout the day, however able to attend to ADLs. Continue to encourage physical activity and adherence to medication regimen. No acute concerns voiced. Medical Diagnoses:  Patient Active Problem List   Diagnosis    Neck pain on right side    Obesity (BMI 30-39. 9)    Obesity    High risk medication use - 01/30/18 OARRS PM&R, 03/26/18 OARRS PM&R, 04/03/18 Urine Drug Screen positive opiates PM&R, 01/31/18 Med Contract PM&R    Impaired mobility and activities of daily living    Hx of right BKA ([facility]    Family history of coronary artery disease    NSTEMI (non-ST elevated myocardial infarction) (HCC)    Arthritis, neuropathic    Charcot's joint of foot  Closed dislocation of ankle    Closed dislocation of foot    Complete rotator cuff rupture of left shoulder    Clinical depression    Difficulty walking    Benign essential HTN    Acquired flat foot    Closed fracture of tarsal and metatarsal bones    Myogenic ptosis    Disorder of peripheral nervous system    Arthritis of ankle or foot, degenerative    HLD (hyperlipidemia)    Cervical post-laminectomy syndrome    Failed back syndrome of lumbar spine    Low back pain with sciatica    Fibromyalgia muscle pain    Malaise and fatigue    Melancholia    Complete tear of left rotator cuff    Generalized osteoarthritis    Peripheral neuropathy    Osteoporosis    Postlaminectomy syndrome of cervical region    Postlaminectomy syndrome of lumbar region    Tibialis tendinitis    Hereditary and idiopathic peripheral neuropathy ([address]    Hypothyroid    Lobular breast cancer ([address]    Traumatic amputation of one lower extremity below knee with complication (HCC)    Acquired hallux valgus    Fatigue due to treatment    Anemia    Cancer ([address]    Cervical spinal cord injury ([address]    Complete traumatic amputation at level between right knee and ankle ([address]    Morbid obesity due to excess calories (HCC)    Reactive depression    Sleeping excessive    Chronic low back pain    Left breast mass    Other specified postprocedural states    Primary osteoarthritis involving multiple joints    Hematuria    Hematuria, gross    Noncompliance - No Show inject 07/17/17, No Show F/U 1/11/18    Chronic low back pain with sciatica    Moderate episode of recurrent major depressive disorder (HCC)    Charcot's arthropathy    Below knee amputation status, right    Chronic pain syndrome    Racing heart beat    Cervical fusion syndrome    Current mild episode of major depressive disorder ([address]    Obstructive sleep apnea    Malignant neoplasm of peritoneum, 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[address] DATE and changes made  · 2/28/20  ? Start/Continue Cymbalta 30 mg   ? Wellbutrin  mg       · 12/17  ? Monitor BP and HR at home   ? Focalin 10 mg XR will start this today and see back in one week   · 2/2/21  ? Started on ritalin per formulary alternative, has been taking for one month   ? Methylphenidate 5 mg QAM, increase to 10 mg QD     AT TODAY'S VISIT     1. No Labs ordered today   2. Crisis plan reviewed and patient verbally contracts for safety. Go to ED with emergent symptoms or safety concerns. 3. Risks, benefits, side effects of medications, including any / all black box warnings, discussed with patient, who verbalizes their understanding. Pt is jodie for safety and denies thoughts of SI/HI. Amenable to plan. No acute concerns to address regarding medications. Subjective:      Pt reports feels like she likes leaving the house less \"but that's not a problem, I enjoy reading and cleaning around my house\"   Memory focus and energy improved with methylphenidate   Sleeping well   Denies confusion. Was having falls more prior  To starting new medication ritalin. Says she may be having less falls since starting ritalin, has fallen twice in home. Denies hitting her head and she does wear a life alert, no LOC with either fall which she describes as mechanical (tripping over something)   Says she has had less appetite, trying to eat healthier     Patient reports they have been compliant with current medication regimen and have not missed a dose. Patient denies medication side effects. At today's visit, patient denies thoughts of harm to self or others since last appointment, and denies auditory or visual hallucinations.      At today's visit, pt reports that since our last visit, their average MOOD has been (on a scale of 1-10, with 1 being severely depressed and 10 being not depressed at all)  Very depressed [] 1  [] 2  [] 3  [] 4  [] 5  [] 6  [x] 7  [] 8  [] 9  [] 10  Not depressed At today's visit, pt reports that since our last visit, their average ANXIETY has been (on a scale of 1-10, with 10 being severely anxious and 1 being not anxious at all)  Not anxious [] 1     [] 2     [] 3     [x] 4   [] 5    [] 6      [] 7   [] 8   [] 9       [] 10  Very anxious       At today's visit, pt reports that since our last visit, their average APPETITE has been    [] Decreased     [x] Normal/Unchanged   [] Increased      At today's visit, pt reports that since our last visit, their average HOURS OF SLEEP (every 24 hours) has been    [] 0-3   [] 4-5    [] 5-6    [] 6-7    [x] 8-9    [] 10-11   [] 11+    At today's visit, pt reports that since our last visit, their average Energy has been     [] Poor    [x] Average  [] Increased         Aggression:  [] yes  [x] no    Patient is [x] Able to contract for safety  [] unable to CONTRACT FOR SAFETY     ROS:  [x] All negative/unchanged except if checked. Explain positive(checked items) below:     [] Constitutional  [] Eyes  [] Ear/Nose/Mouth/Throat  [] Respiratory  [] CV  [] GI  []   [] Musculoskeletal  [] Skin/Breast  [] Neurological  [] Endocrine  [] Heme/Lymph  [] Allergic/Immunologic      MEDICATIONS:    Current Outpatient Medications:     Respiratory Therapy Supplies SHAE, New Full face Mask , please do not sent nasal pillow . (She want to try full face Mask )  Dx KEI, Disp: 1 Device, Rfl: 0    potassium chloride (KLOR-CON M) 10 MEQ extended release tablet, TAKE 1 TABLET BY MOUTH  TWICE DAILY, Disp: 180 tablet, Rfl: 3    carvedilol (COREG) 25 MG tablet, TAKE 1 TABLET BY MOUTH  TWICE DAILY, Disp: 180 tablet, Rfl: 3    Blood Pressure KIT, 1 Units by Does not apply route as needed (daily monitoring of blood pressure) One electronic sphygmomanometer and cuff for home monitoring of blood pressure and heart rate. Upper arm type of cuff preferred. Adult regular size. , Disp: 1 kit, Rfl: 0   Dexmethylphenidate HCl ER (FOCALIN XR) 10 MG CP24, Take 10 mg by mouth every morning for 30 days. , Disp: 30 capsule, Rfl: 0    Respiratory Therapy Supplies SHAE, New CPAP, full face  mask and chin strap    Fax [phone], Disp: 1 Device, Rfl: 0    diclofenac (VOLTAREN) 50 MG EC tablet, TAKE 1 TABLET BY MOUTH  TWICE DAILY, Disp: 180 tablet, Rfl: 3    gabapentin (NEURONTIN) 600 MG tablet, TAKE 1 TABLET BY MOUTH IN  THE MORNING, 2 TABS IN THE  AFTERNOON, AND 2 TABS IN  THE EVENING AS TOLERATED, Disp: 450 tablet, Rfl: 0    buPROPion (WELLBUTRIN XL) 150 MG extended release tablet, TAKE 2 TABLETS BY MOUTH IN  THE MORNING, Disp: 180 tablet, Rfl: 3    levothyroxine (SYNTHROID) 50 MCG tablet, TAKE 1 TABLET BY MOUTH  DAILY, Disp: 90 tablet, Rfl: 3    furosemide (LASIX) 20 MG tablet, Take 1 tablet by mouth daily for 5 days (Patient taking differently: Take 20 mg by mouth daily as needed ), Disp: 5 tablet, Rfl: 0    DULoxetine (CYMBALTA) 30 MG extended release capsule, TAKE 1 CAPSULE BY MOUTH  DAILY, Disp: 90 capsule, Rfl: 2    atorvastatin (LIPITOR) 20 MG tablet, TAKE 1 TABLET BY MOUTH  NIGHTLY, Disp: 90 tablet, Rfl: 1    CPAP Machine MISC, by Does not apply route New CPAP with 8 cm, Disp: 1 each, Rfl: 0    acyclovir (ZOVIRAX) 5 % CREA, Apply topically as needed, Disp: 5 g, Rfl: 1    b complex vitamins capsule, Take 1 capsule by mouth daily, Disp: , Rfl:     Multiple Vitamins-Minerals (CENTRUM SILVER 50+WOMEN PO), Take by mouth, Disp: , Rfl:     tamoxifen (NOLVADEX) 20 MG tablet, Take 1 tablet by mouth daily, Disp: , Rfl:     Vitamin D (CHOLECALCIFEROL) 1000 UNITS CAPS capsule, Take 1,000 Units by mouth daily. , Disp: , Rfl:     Examination:    Vitals: not taken in person, most recent vitals in chart reviewed  There were no vitals filed for this visit.     Wt Readings from Last 3 Encounters:   01/28/21 225 lb (102.1 kg)   12/04/20 226 lb (102.5 kg)   09/01/20 222 lb (100.7 kg) Wt Readings from Last 3 Encounters:   01/28/21 225 lb (102.1 kg)   12/04/20 226 lb (102.5 kg)   09/01/20 222 lb (100.7 kg)     Temp Readings from Last 3 Encounters:   01/28/21 97.8 °F (36.6 °C) (Temporal)   12/04/20 96.2 °F (35.7 °C)   09/01/20 98.9 °F (37.2 °C)     BP Readings from Last 3 Encounters:   01/28/21 130/88   12/04/20 (!) 140/80   09/01/20 128/64     Pulse Readings from Last 3 Encounters:   01/28/21 75   12/04/20 65   09/01/20 68         Labs:   no recent labs    Mental Status Examination:    Level of consciousness:  alert and oriented to person, place, and situation  Appearance:  well-appearing good grooming and good hygiene  Behavior/Motor:  no abnormalities noted  Attitude toward examiner: friendly, pleasant and cooperative attentive and good eye contact  Speech:  spontaneous, normal rate and normal volume   Mood: \"better\"  Affect:  mood congruent  Thought processes:  linear and logical  Thought content:  Denies suicidal or homicidal ideation, denies auditory or visual hallucinations  Cognition:  no deficits in attention, concentration notable, recent memory grossly intact  Concentration intact  Memory intact  Insight good   Judgement fair   Fund of Knowledge adequate    Treatment Plan:  Reviewed current Medications with the patient. Education provided on the compliance with treatment. Reviewed OARRs, no concerns identified     The anticipated benefits and side effects of the medications, including the anticipated results of not receiving the medication, and of alternatives to the medications were explained to the patient and their informed consent was obtained for starting medications as well as adjusting the doses (titration or tapering) as indicated. The above information was given by physician in verbal form and sufficient understanding was in evidence. The patient participated in discussion of the information and question and/or concerns were addressed before the medication was given. PSYCHOTHERAPY/COUNSELING:  Encourage patient to attend outpatient appointments and therapy. [x] Therapeutic interview  [] Supportive  [x] CBT  [x] Ongoing  [] Other    No follow-ups on file. Follow-up in 2 weeks, patient informed to call for follow-up    Please note this report has been partially produced using speech recognition software  And may cause contain errors related to that system including grammar, punctuation and spelling as well as words and phrases that may seem inappropriate. If there are questions or concerns please feel free to contact me to clarify. Laura Flores MD  Electronically signed by Laura Flores MD on 2/2/2021 at 9:48 AM  2/2/2021 9:48 AM    Psychiatry   Due to this being a TeleHealth encounter, evaluation of the following organ systems is limited: Vitals/Constitutional/EENT/Resp/CV/GI//MS/Neuro/Skin/Heme-Lymph-Imm. An  electronic signature was used to authenticate this note. --Laura Flores MD on 2/2/2021 at 9:48 AM    Pursuant to the emergency declaration under the [facility] waiver authority and the [employer] Act, this Virtual  Visit was conducted, with patient's consent, to reduce the patient's risk of exposure to COVID-19 and provide continuity of care for an established patient Services were provided through a video synchronous discussion virtually to substitute for in-person clinic visit.

## 2021-02-16 DIAGNOSIS — M48.02 CERVICAL STENOSIS OF SPINAL CANAL: ICD-10-CM

## 2021-02-16 DIAGNOSIS — M43.10 SPONDYLOLISTHESIS, UNSPECIFIED SPINAL REGION: ICD-10-CM

## 2021-02-16 RX ORDER — GABAPENTIN 600 MG/1
TABLET ORAL
Qty: 450 TABLET | Refills: 0 | Status: SHIPPED | OUTPATIENT
Start: 2021-02-16 | End: 2021-10-19 | Stop reason: SDUPTHER

## 2021-03-04 ENCOUNTER — NURSE ONLY (OUTPATIENT)
Dept: FAMILY MEDICINE CLINIC | Age: 76
End: 2021-03-04

## 2021-03-04 VITALS — DIASTOLIC BLOOD PRESSURE: 84 MMHG | HEART RATE: 77 BPM | SYSTOLIC BLOOD PRESSURE: 164 MMHG

## 2021-03-04 DIAGNOSIS — I10 ESSENTIAL HYPERTENSION: Primary | ICD-10-CM

## 2021-03-04 RX ORDER — HYDROCHLOROTHIAZIDE 12.5 MG/1
12.5 TABLET ORAL EVERY MORNING
Qty: 30 TABLET | Refills: 0 | Status: SHIPPED | OUTPATIENT
Start: 2021-03-04 | End: 2021-06-22 | Stop reason: ALTCHOICE

## 2021-03-04 NOTE — PROGRESS NOTES
Pt with elevated BP readings today at [place] visit. Denied dietary changes. No missed doses of carvedilol. Pt does eat a lot of processed foods because she lives alone. States she was placed on ritalin a few weeks ago but discontinued it two weeks ago. No CP, SOB, palpitations, vision changes, HA. At this time will start Hctz 12.5mg and recheck BP in one week  BP currently has wrist cuff that is very inaccurate, she will buy new upper arm cuff and bring to next week's apt.

## 2021-03-11 ENCOUNTER — NURSE ONLY (OUTPATIENT)
Dept: FAMILY MEDICINE CLINIC | Age: 76
End: 2021-03-11

## 2021-03-11 VITALS — SYSTOLIC BLOOD PRESSURE: 126 MMHG | OXYGEN SATURATION: 95 % | DIASTOLIC BLOOD PRESSURE: 72 MMHG | HEART RATE: 72 BPM

## 2021-03-11 DIAGNOSIS — I10 ESSENTIAL HYPERTENSION: Primary | ICD-10-CM

## 2021-03-11 RX ORDER — POTASSIUM CHLORIDE 750 MG/1
10 TABLET, FILM COATED, EXTENDED RELEASE ORAL
COMMUNITY
Start: 2020-04-09 | End: 2021-03-11 | Stop reason: SDUPTHER

## 2021-03-15 NOTE — PROGRESS NOTES
3/16/2021    Parish Rodriguez (:  1945) is a 66 y.o. female, here for evaluation of the following medical concerns:  Chief Complaint   Patient presents with    Skin Problem     Lesion on back. x1 year. States its itchy and bleeds. HPI  Skin check  Lesion on back she would like examined  States it has been there for about a year  Now getting caught on clothing and bleeds  Was told in the past that it was non cancerous    Also looking into nursing homes  States she has fallen several times over the last few months    Review of Systems   Constitutional: Negative for chills and fever. HENT: Negative for congestion, sinus pressure and sore throat. Respiratory: Negative for cough and shortness of breath. Cardiovascular: Negative for chest pain and palpitations. Gastrointestinal: Negative for abdominal pain and vomiting. Musculoskeletal: Negative for arthralgias and myalgias. Skin: Negative for rash and wound. Skin lesion back   Neurological: Negative for speech difficulty and light-headedness. Psychiatric/Behavioral: Negative for suicidal ideas. The patient is not nervous/anxious. Prior to Visit Medications    Medication Sig Taking? Authorizing Provider   DULoxetine (CYMBALTA) 30 MG extended release capsule TAKE 1 CAPSULE BY MOUTH  DAILY Yes Clement Swenson,    hydroCHLOROthiazide (HYDRODIURIL) 12.5 MG tablet Take 1 tablet by mouth every morning Yes Clement Swenson DO   atorvastatin (LIPITOR) 20 MG tablet Take 1 tablet by mouth daily Yes Clement Swenson DO   gabapentin (NEURONTIN) 600 MG tablet TAKE 1 TABLET BY MOUTH IN  THE MORNING, 2 TABS IN THE  AFTERNOON, AND 2 TABS IN  THE EVENING AS TOLERATED Yes Clement Swenson DO   Respiratory Therapy Supplies SHAE New Full face Mask , please do not sent nasal pillow .  (She want to try full face Mask )    Dx KEI Yes Yuko Vidal MD   potassium chloride (KLOR-CON M) 10 MEQ extended release tablet TAKE 1 TABLET BY MOUTH  TWICE DAILY Yes William Gould MD   carvedilol (COREG) 25 MG tablet TAKE 1 TABLET BY MOUTH  TWICE DAILY Yes William Gould MD   Blood Pressure KIT 1 Units by Does not apply route as needed (daily monitoring of blood pressure) One electronic sphygmomanometer and cuff for home monitoring of blood pressure and heart rate. Upper arm type of cuff preferred. Adult regular size. Yes Alfonso Casarez MD   Respiratory Therapy Supplies SHAE New CPAP, full face  mask and chin strap       Fax [phone] Yes Yuko Vidal MD   diclofenac (VOLTAREN) 50 MG EC tablet TAKE 1 TABLET BY MOUTH  TWICE DAILY Yes Clement Swenson DO   buPROPion (WELLBUTRIN XL) 150 MG extended release tablet TAKE 2 TABLETS BY MOUTH IN  THE MORNING Yes William Gould MD   levothyroxine (SYNTHROID) 50 MCG tablet TAKE 1 TABLET BY MOUTH  DAILY Yes William Gould MD   CPAP Machine MISC by Does not apply route New CPAP with 8 cm Yes Yuko Vidal MD   acyclovir (ZOVIRAX) 5 % CREA Apply topically as needed Yes William Gould MD   b complex vitamins capsule Take 1 capsule by mouth daily Yes Historical Provider, MD   Multiple Vitamins-Minerals (CENTRUM SILVER 50+WOMEN PO) Take by mouth Yes Historical Provider, MD   tamoxifen (NOLVADEX) 20 MG tablet Take 1 tablet by mouth daily Yes Historical Provider, MD   Vitamin D (CHOLECALCIFEROL) 1000 UNITS CAPS capsule Take 1,000 Units by mouth daily.  Yes Historical Provider, MD   furosemide (LASIX) 20 MG tablet Take 1 tablet by mouth daily for 5 days  Patient taking differently: Take 20 mg by mouth daily as needed   Jaimie Veloz MD        Allergies   Allergen Reactions    Ciprofloxacin Itching    Oxycontin [Oxycodone Hcl] Itching       Past Medical History:   Diagnosis Date    Arthritis     Blood transfusion     Cancer [facility])     GALLBLADDER 2009, 2011 OMENTUM    Charcot's joint     left foot    Chest pain 7/2/2015    Colitis     DDD (degenerative disc disease), lumbar 2/12/2013    Depression     Disorder of peripheral nervous system 9/1/2010    Dyspnea 7/2/2015    Family history of coronary artery disease 8/29/2014    History of blood transfusion 2011    following chemotherapy    Hx of right BKA ([facility] [address]     Hyperlipidemia     Hypertension     Hypothyroid 6/12/2015    Lobular breast cancer ([facility] [address] 10/2015    NSTEMI (non-ST elevated myocardial infarction) ([facility] [address] 8/29/2014    Obesity     Paroxysmal atrial fibrillation ([facility] [address] 8/29/2014    S/P BKA (below knee amputation[facility])        Past Surgical History:   Procedure Laterality Date    ABDOMEN SURGERY Left 4/11/2017    EXCISION OF CHEST WALL MASS, UPDATE LABS ON ADMIT  performed by Lillian Menendez MD at [address] BLEPHAROPLASTY  2-5-13    both eyes    BREAST SURGERY      CARDIAC CATHETERIZATION      COLONOSCOPY  1/1/2010    normal    CYSTOSCOPY W BIOPSY OF BLADDER N/A 6/26/2017    CYSTOSCOPY BLADDER BIOPSY AND FULGURATION performed by Yunior Adame MD at [address] [facility] Bilateral 2012    cataract removal with IOL implants    HYSTERECTOMY      LEG AMPUTATION BELOW KNEE Right 2/1/11    DR Vignesh Weir due to CHARCOTS    MASTECTOMY  11/16/2015    Bilateral simple mastectomies with right SNB by DR Dorian Mckeon    MA REMOVAL OF BREAST LESION Left 1/24/2017    CORE NEEDLE BIOPSY OF  LEFT BREAST MASS performed by Lillian Menendez MD at [address] [place] [address]      cervical and lumbar       Social History     Socioeconomic History    Marital status:       Spouse name: Not on file    Number of children: Not on file    Years of education: Not on file    Highest education level: Not on file   Occupational History    Occupation: retired   Social Needs    Financial resource strain: Not on file    Food insecurity     Worry: Not on file     Inability: Not on file   [employer] needs     Medical: Not on file     Non-medical: Not on file   Tobacco Use    Smoking status: Never Smoker    Smokeless tobacco: Never Used   Substance and Sexual Activity    Alcohol use: Yes     Comment: social    Drug use: No    Sexual activity: Never   Lifestyle    Physical activity     Days per week: Not on file     Minutes per session: Not on file    Stress: Not on file   Relationships    Social connections     Talks on phone: Not on file     Gets together: Not on file     Attends [employer] service: Not on file     Active member of club or organization: Not on file     Attends meetings of clubs or organizations: Not on file     Relationship status: Not on file    Intimate partner violence     Fear of current or ex partner: Not on file     Emotionally abused: Not on file     Physically abused: Not on file     Forced sexual activity: Not on file   Other Topics Concern    Not on file   Social History Narrative    Not on file        Family History   Problem Relation Age of Onset    Heart Disease Mother     Arthritis Mother     Heart Disease Father     Arthritis Father     Cancer Sister         Colon    Thyroid Disease Son     Other Son         Down's syndrome       Vitals:    03/16/21 0857   BP: 138/82   Pulse: 80   Temp: 97.7 °F (36.5 °C)   SpO2: 98%   Weight: 223 lb (101.2 kg)       Estimated body mass index is 43.55 kg/m² as calculated from the following:    Height as of 1/28/21: 5' (1.524 m). Weight as of this encounter: 223 lb (101.2 kg). No results for input(s): WBC, RBC, HGB, HCT, MCV, MCH, MCHC, RDW, PLT, MPV in the last 72 hours. No results for input(s): NA, K, CL, CO2, BUN, CREATININE, GLUCOSE, CALCIUM, PROT, LABALBU, BILITOT, ALKPHOS, AST, ALT in the last 72 hours. Lab Results   Component Value Date    LABA1C 5.3 12/19/2013       No results found. Physical Exam  Constitutional:       Appearance: Normal appearance. She is normal weight. HENT:      Head: Normocephalic and atraumatic. Eyes:      Extraocular Movements: Extraocular movements intact.       Conjunctiva/sclera: Conjunctivae normal.   Skin:     General: Skin is warm. Capillary Refill: Capillary refill takes less than 2 seconds. Findings: No rash. Comments: Pink lobulated lesion with mild surrounding erythema   Neurological:      Mental Status: She is alert. Psychiatric:         Mood and Affect: Mood normal.         Thought Content: Thought content normal.         Judgment: Judgment normal.                       ASSESSMENT/PLAN:  1. Changing skin lesion  - Will schedule for shave biopsy  - BCC vs irritated skin tag      ---------------------------------------------------------------------  Side effects, adverse effects of the medication prescribed today, as well as treatment plan/ rationale and result expectations have been discussed with the patient who expresses understanding and desires to proceed. Close follow up to evaluate treatment results and for coordination of care. I have reviewed the patient's medical history in detail and updated the computerized patient record. As always, patient is advised that if symptoms worsen in any way they will proceed to the nearest emergency room. --------------------------------------------------------------------    Return in about 2 days (around 3/18/2021) for shave bx Matthias. An  electronic signature was used to authenticate this note.     --Porter Hansen DO on 3/16/2021 at 9:14 AM

## 2021-03-16 ENCOUNTER — OFFICE VISIT (OUTPATIENT)
Dept: FAMILY MEDICINE CLINIC | Age: 76
End: 2021-03-16
Payer: MEDICARE

## 2021-03-16 VITALS
WEIGHT: 223 LBS | BODY MASS INDEX: 43.55 KG/M2 | TEMPERATURE: 97.7 F | SYSTOLIC BLOOD PRESSURE: 138 MMHG | OXYGEN SATURATION: 98 % | HEART RATE: 80 BPM | DIASTOLIC BLOOD PRESSURE: 82 MMHG

## 2021-03-16 DIAGNOSIS — L98.9 CHANGING SKIN LESION: Primary | ICD-10-CM

## 2021-03-16 PROCEDURE — 1036F TOBACCO NON-USER: CPT | Performed by: STUDENT IN AN ORGANIZED HEALTH CARE EDUCATION/TRAINING PROGRAM

## 2021-03-16 PROCEDURE — 4040F PNEUMOC VAC/ADMIN/RCVD: CPT | Performed by: STUDENT IN AN ORGANIZED HEALTH CARE EDUCATION/TRAINING PROGRAM

## 2021-03-16 PROCEDURE — G8427 DOCREV CUR MEDS BY ELIG CLIN: HCPCS | Performed by: STUDENT IN AN ORGANIZED HEALTH CARE EDUCATION/TRAINING PROGRAM

## 2021-03-16 PROCEDURE — 99213 OFFICE O/P EST LOW 20 MIN: CPT | Performed by: STUDENT IN AN ORGANIZED HEALTH CARE EDUCATION/TRAINING PROGRAM

## 2021-03-16 PROCEDURE — 1090F PRES/ABSN URINE INCON ASSESS: CPT | Performed by: STUDENT IN AN ORGANIZED HEALTH CARE EDUCATION/TRAINING PROGRAM

## 2021-03-16 PROCEDURE — G8484 FLU IMMUNIZE NO ADMIN: HCPCS | Performed by: STUDENT IN AN ORGANIZED HEALTH CARE EDUCATION/TRAINING PROGRAM

## 2021-03-16 PROCEDURE — G8417 CALC BMI ABV UP PARAM F/U: HCPCS | Performed by: STUDENT IN AN ORGANIZED HEALTH CARE EDUCATION/TRAINING PROGRAM

## 2021-03-16 PROCEDURE — G8399 PT W/DXA RESULTS DOCUMENT: HCPCS | Performed by: STUDENT IN AN ORGANIZED HEALTH CARE EDUCATION/TRAINING PROGRAM

## 2021-03-16 PROCEDURE — 1123F ACP DISCUSS/DSCN MKR DOCD: CPT | Performed by: STUDENT IN AN ORGANIZED HEALTH CARE EDUCATION/TRAINING PROGRAM

## 2021-03-16 ASSESSMENT — ENCOUNTER SYMPTOMS
VOMITING: 0
SORE THROAT: 0
ABDOMINAL PAIN: 0
SINUS PRESSURE: 0
COUGH: 0
SHORTNESS OF BREATH: 0

## 2021-03-16 NOTE — PATIENT INSTRUCTIONS
Patient Education        Skin Cancer Prevention: Care Instructions  Your Care Instructions     Skin cancer is the abnormal growth of cells in the skin. It usually appears as a growth that changes in color, shape, or size. This can be a sore that does not heal or a change in a wart or a mole. Skin cancer is almost always curable when found early and treated. So it is important to see your doctor if you have any of these changes in your skin. Skin cancer is the most common type of cancer. It often appears on areas of the body that have been exposed to the sun, such as the head, face, neck, back, chest, or shoulders. Follow-up care is a key part of your treatment and safety. Be sure to make and go to all appointments, and call your doctor if you are having problems. It's also a good idea to know your test results and keep a list of the medicines you take. How can you care for yourself at home? · Wear a wide-brimmed hat and long sleeves and pants if you are going to be outdoors for a long time. · Avoid the sun between 10 a.m. and 4 p.m., which is the peak time for UV rays. · Wear sunscreen on exposed skin. Make sure to use a broad-spectrum sunscreen that has a sun protection factor (SPF) of 30 or higher. Use it every day, even when it is cloudy. · Do not use tanning booths or sunlamps. · Use lip balm or cream that has sun protection factor (SPF) to protect your lips from getting sunburned. · Wear sunglasses that block UV rays. When should you call for help? Call your doctor now or seek immediate medical care if:    · You have signs of infection, such as:  ? Increased pain, swelling, warmth, or redness. ? Red streaks leading from the area. ? Pus draining from the area. ? A fever. Watch closely for changes in your health, and be sure to contact your doctor if:    · You see a change in your skin, such as a growth or mole that:  ? Grows bigger. This may happen very slowly. ? Changes color. ?  Changes shape.  ? Starts to bleed easily.     · You have swollen glands in your armpits, groin, or neck.     · You do not get better as expected. Where can you learn more? Go to https://MedGRCpeadonayGiveyeb.[employer]. org and sign in to your [employer] account. Enter P392 in the [facility] box to learn more about \"Skin Cancer Prevention: Care Instructions. \"     If you do not have an account, please click on the \"Sign Up Now\" link. Current as of: December 17, 2020               Content Version: 12.8  © 6706-6119 Healthwise, [employer]. Care instructions adapted under license by [facility] ([facility]). If you have questions about a medical condition or this instruction, always ask your healthcare professional. [address] any warranty or liability for your use of this information.

## 2021-03-17 NOTE — PROGRESS NOTES
HPI  Skin check  Lesion on back she would like examined  States it has been there for about a year  Now getting caught on clothing and bleeds  Was told in the past that it was non cancerous    Current Outpatient Medications on File Prior to Visit   Medication Sig Dispense Refill    DULoxetine (CYMBALTA) 30 MG extended release capsule TAKE 1 CAPSULE BY MOUTH  DAILY 90 capsule 3    hydroCHLOROthiazide (HYDRODIURIL) 12.5 MG tablet Take 1 tablet by mouth every morning 30 tablet 0    atorvastatin (LIPITOR) 20 MG tablet Take 1 tablet by mouth daily 90 tablet 3    gabapentin (NEURONTIN) 600 MG tablet TAKE 1 TABLET BY MOUTH IN  THE MORNING, 2 TABS IN THE  AFTERNOON, AND 2 TABS IN  THE EVENING AS TOLERATED 450 tablet 0    Respiratory Therapy Supplies SHAE New Full face Mask , please do not sent nasal pillow . (She want to try full face Mask )    Dx KEI 1 Device 0    potassium chloride (KLOR-CON M) 10 MEQ extended release tablet TAKE 1 TABLET BY MOUTH  TWICE DAILY 180 tablet 3    carvedilol (COREG) 25 MG tablet TAKE 1 TABLET BY MOUTH  TWICE DAILY 180 tablet 3    Blood Pressure KIT 1 Units by Does not apply route as needed (daily monitoring of blood pressure) One electronic sphygmomanometer and cuff for home monitoring of blood pressure and heart rate. Upper arm type of cuff preferred. Adult regular size.  1 kit 0    Respiratory Therapy Supplies SHAE New CPAP, full face  mask and chin strap       Fax [phone] 1 Device 0    diclofenac (VOLTAREN) 50 MG EC tablet TAKE 1 TABLET BY MOUTH  TWICE DAILY 180 tablet 3    buPROPion (WELLBUTRIN XL) 150 MG extended release tablet TAKE 2 TABLETS BY MOUTH IN  THE MORNING 180 tablet 3    levothyroxine (SYNTHROID) 50 MCG tablet TAKE 1 TABLET BY MOUTH  DAILY 90 tablet 3    CPAP Machine MISC by Does not apply route New CPAP with 8 cm 1 each 0    acyclovir (ZOVIRAX) 5 % CREA Apply topically as needed 5 g 1    b complex vitamins capsule Take 1 capsule by mouth daily      Multiple Vitamins-Minerals (CENTRUM SILVER 50+WOMEN PO) Take by mouth      tamoxifen (NOLVADEX) 20 MG tablet Take 1 tablet by mouth daily      Vitamin D (CHOLECALCIFEROL) 1000 UNITS CAPS capsule Take 1,000 Units by mouth daily.  furosemide (LASIX) 20 MG tablet Take 1 tablet by mouth daily for 5 days (Patient taking differently: Take 20 mg by mouth daily as needed ) 5 tablet 0     No current facility-administered medications on file prior to visit. Ciprofloxacin and Oxycontin [oxycodone hcl]    Review of Systems   Constitutional: Negative for chills and fever. HENT: Negative for congestion, sinus pressure and sore throat. Respiratory: Negative for cough and shortness of breath. Cardiovascular: Negative for chest pain and palpitations. Gastrointestinal: Negative for abdominal pain and vomiting. Musculoskeletal: Negative for arthralgias and myalgias. Skin: Negative for rash and wound. Skin lesion back   Neurological: Negative for speech difficulty and light-headedness. Psychiatric/Behavioral: Negative for suicidal ideas. The patient is not nervous/anxious. /82   Temp 98.6 °F (37 °C)   Ht 5' (1.524 m)   Wt 223 lb (101.2 kg)   BMI 43.55 kg/m²      Physical Exam  Constitutional:       Appearance: Normal appearance. She is normal weight. HENT:      Head: Normocephalic and atraumatic. Eyes:      Extraocular Movements: Extraocular movements intact. Conjunctiva/sclera: Conjunctivae normal.   Skin:     General: Skin is warm. Capillary Refill: Capillary refill takes less than 2 seconds. Findings: No rash. Comments: Left of midline mid thoracic back: Pink lobulated lesion with mild surrounding erythema   Neurological:      Mental Status: She is alert. Psychiatric:         Mood and Affect: Mood normal.         Thought Content: Thought content normal.         Judgment: Judgment normal.         Informed consent was given by the patient.   Risks including infection, bleeding and scarring were discussed. No guarantee how the scar will look. Patient skin was prepped with alcohol. Wound was anesthetized using 0.5\". \"1.0 cc of 1% lidocaine with epinephrine for anesthesia. A Dermablade was used to obtain the shave biopsy. Drysol was used at the base of site. Post op wound care instructions were given to the patient. He/She will f/u in 2 weeks or sooner if needed for problems. Mary Daniels was seen today for procedure. Diagnoses and all orders for this visit:    Changing skin lesion  -     Specimen to Pathology Outpatient; Future    Frequent falls  -     Amb External Referral To Home Health    Drug-induced polyneuropathy ([facility] [address]  -     Amb External Referral To Home Health    Bilateral low back pain with sciatica, sciatica laterality unspecified, unspecified chronicity  -     Amb External Referral To Home Health        Return if symptoms worsen or fail to improve, for will call pt with pathology results. DO NOT USE TRIPLE ANTIBIOTIC ON THE WOUND. Patient Instructions   Incision/ Shave biopsy/ Laceration repair    - Wash your hands with soap and water before cleaning the surgical area, this is the most common cause of infection  - Clean the surgical area with antibacterial soap and water once daily  - Keep surgical site moist with Vaseline and apply a fresh bandage daily until a solid scab forms or if the wound is at risk for trauma or dirt. - Follow up immediately if any growing redness (minimal redness or pale pink is normal along wound edges) surrounds the surgical site or if drainage occurs at the surgical site. - Once a solid scab forms the bandage is no longer needed. A wet scab can look yellow. This is not infection, just moisture.   - Once the lesion is healed be sure to apply sunscreen to the area to prevent burn of the new and more delicate skin during the first 6 months of healing.    - If the scar begins to be raised you may massage removed. They will disappear on their own. This care sheet gives you a general idea about how long it will take for you to recover. But each person recovers at a different pace. Follow the steps below to get better as quickly as possible. How can you care for yourself at home? Activity    · For the first few days, try not to bump or knock your wound. · Depending on where your wound is, you may need to avoid strenuous exercise for 2 weeks after the procedure or until your doctor says it is okay. · If you have had a lesion removed from your face, do not use makeup near your wound until you have your stitches taken out. · Ask your doctor when it is okay to shower, bathe, or swim. Medicines    · Your doctor will tell you if and when you can restart your medicines. He or she will also give you instructions about taking any new medicines. · If you take blood thinners, such as warfarin (Coumadin), clopidogrel (Plavix), or aspirin, be sure to talk to your doctor. He or she will tell you if and when to start taking those medicines again. Make sure that you understand exactly what your doctor wants you to do. · Be safe with medicines. Take pain medicines exactly as directed. ? If the doctor gave you a prescription medicine for pain, take it as prescribed. ? If you are not taking a prescription pain medicine, ask your doctor if you can take an over-the-counter medicine. Wound care    · If your doctor told you how to care for your incision, follow your doctor's instructions. If you did not get instructions, follow this general advice:  ? Keep the wound bandaged and dry for the first day. ? After the first 24 to 48 hours, wash around the wound with clean water 2 times a day. Don't use hydrogen peroxide or alcohol, which can slow healing. ? You may cover the wound with a thin layer of petroleum jelly, such as Vaseline, and a nonstick bandage. ?  Apply more petroleum jelly and replace the bandage as needed. · If you have stitches, you may get other instructions. · If a scab forms, do not pull it off. Let it fall off on its own. Wounds heal faster if no scab forms. Washing the area every day and using petroleum jelly will help prevent a scab from forming. · If the wound bleeds, put direct pressure on it with a clean cloth until the bleeding stops. · If you had a growth \"frozen\" off, you may get a blister. Do not break it. Let it dry up on its own. It is common for the blister to fill with blood. You do not need to do anything about this, but if it becomes too painful, call your doctor. · Avoid the sun until your stitches are removed. Follow-up care is a key part of your treatment and safety. Be sure to make and go to all appointments, and call your doctor if you are having problems. It's also a good idea to know your test results and keep a list of the medicines you take. When should you call for help? Call 911 anytime you think you may need emergency care. For example, call if:    · You passed out (lost consciousness). · You have severe trouble breathing. · You have sudden chest pain and shortness of breath, or you cough up blood. Call your doctor now or seek immediate medical care if:    · You have symptoms of a blood clot in your leg (called a deep vein thrombosis), such as:  ? Pain in the calf, back of the knee, thigh, or groin. ? Redness and swelling in your leg or groin. · You have signs of infection, such as:  ? Increased pain, swelling, warmth, or redness. ? Red streaks leading from the wound. ? Pus draining from the wound. ? A fever. · You have pain that does not get better after you take pain medicine. · You have loose stitches. Watch closely for changes in your health, and be sure to contact your doctor if you have any problems.

## 2021-03-18 ENCOUNTER — PROCEDURE VISIT (OUTPATIENT)
Dept: FAMILY MEDICINE CLINIC | Age: 76
End: 2021-03-18
Payer: MEDICARE

## 2021-03-18 VITALS
BODY MASS INDEX: 43.78 KG/M2 | SYSTOLIC BLOOD PRESSURE: 138 MMHG | TEMPERATURE: 98.6 F | HEIGHT: 60 IN | DIASTOLIC BLOOD PRESSURE: 82 MMHG | WEIGHT: 223 LBS

## 2021-03-18 DIAGNOSIS — R29.6 FREQUENT FALLS: ICD-10-CM

## 2021-03-18 DIAGNOSIS — L98.9 CHANGING SKIN LESION: ICD-10-CM

## 2021-03-18 DIAGNOSIS — L98.9 CHANGING SKIN LESION: Primary | ICD-10-CM

## 2021-03-18 DIAGNOSIS — G62.0 DRUG-INDUCED POLYNEUROPATHY (HCC): ICD-10-CM

## 2021-03-18 DIAGNOSIS — M54.40 BILATERAL LOW BACK PAIN WITH SCIATICA, SCIATICA LATERALITY UNSPECIFIED, UNSPECIFIED CHRONICITY: ICD-10-CM

## 2021-03-18 PROCEDURE — 11102 TANGNTL BX SKIN SINGLE LES: CPT | Performed by: STUDENT IN AN ORGANIZED HEALTH CARE EDUCATION/TRAINING PROGRAM

## 2021-03-18 ASSESSMENT — ENCOUNTER SYMPTOMS
SHORTNESS OF BREATH: 0
VOMITING: 0
ABDOMINAL PAIN: 0
COUGH: 0
SORE THROAT: 0
SINUS PRESSURE: 0

## 2021-03-23 NOTE — RESULT ENCOUNTER NOTE
Please schedule pt in the next 1-2 weeks for 30 min procedure with Dr. Teresa Loomis for excision of multinodular BCC     ------  Discussed results with patient. Discussed further treatment with excision, explained step by step the procedure and follow up. Pt with no questions at this time.

## 2021-03-29 ENCOUNTER — PROCEDURE VISIT (OUTPATIENT)
Dept: FAMILY MEDICINE CLINIC | Age: 76
End: 2021-03-29
Payer: MEDICARE

## 2021-03-29 VITALS
WEIGHT: 223 LBS | HEIGHT: 60 IN | BODY MASS INDEX: 43.78 KG/M2 | HEART RATE: 72 BPM | TEMPERATURE: 96.7 F | OXYGEN SATURATION: 98 %

## 2021-03-29 DIAGNOSIS — C44.91 CANCER OF THE SKIN, BASAL CELL: Primary | ICD-10-CM

## 2021-03-29 DIAGNOSIS — C44.91 CANCER OF THE SKIN, BASAL CELL: ICD-10-CM

## 2021-03-29 PROCEDURE — 11602 EXC TR-EXT MAL+MARG 1.1-2 CM: CPT | Performed by: FAMILY MEDICINE

## 2021-03-29 NOTE — PROGRESS NOTES
Diagnosis Orders   1. Cancer of the skin, basal cell  Specimen to Pathology Outpatient    00235 - [place] EXC SKIN MALIG 1.1-2CM TRUNK,ARM,LEG     Return in about 2 weeks (around 4/12/2021) for Suture removal.    Patient has had initial biopsy of the lesion on her back which came back positive for nodular cell basal cell cancer. She would like to have that lesion removed today. The initial biopsy is healing      Current Outpatient Medications on File Prior to Visit   Medication Sig Dispense Refill    DULoxetine (CYMBALTA) 30 MG extended release capsule TAKE 1 CAPSULE BY MOUTH  DAILY 90 capsule 3    hydroCHLOROthiazide (HYDRODIURIL) 12.5 MG tablet Take 1 tablet by mouth every morning 30 tablet 0    atorvastatin (LIPITOR) 20 MG tablet Take 1 tablet by mouth daily 90 tablet 3    gabapentin (NEURONTIN) 600 MG tablet TAKE 1 TABLET BY MOUTH IN  THE MORNING, 2 TABS IN THE  AFTERNOON, AND 2 TABS IN  THE EVENING AS TOLERATED 450 tablet 0    Respiratory Therapy Supplies SHAE New Full face Mask , please do not sent nasal pillow . (She want to try full face Mask )    Dx KEI 1 Device 0    potassium chloride (KLOR-CON M) 10 MEQ extended release tablet TAKE 1 TABLET BY MOUTH  TWICE DAILY 180 tablet 3    carvedilol (COREG) 25 MG tablet TAKE 1 TABLET BY MOUTH  TWICE DAILY 180 tablet 3    Blood Pressure KIT 1 Units by Does not apply route as needed (daily monitoring of blood pressure) One electronic sphygmomanometer and cuff for home monitoring of blood pressure and heart rate. Upper arm type of cuff preferred. Adult regular size.  1 kit 0    Respiratory Therapy Supplies SHAE New CPAP, full face  mask and chin strap       Fax [phone] 1 Device 0    diclofenac (VOLTAREN) 50 MG EC tablet TAKE 1 TABLET BY MOUTH  TWICE DAILY 180 tablet 3    buPROPion (WELLBUTRIN XL) 150 MG extended release tablet TAKE 2 TABLETS BY MOUTH IN  THE MORNING 180 tablet 3    levothyroxine (SYNTHROID) 50 MCG tablet TAKE 1 TABLET BY MOUTH  DAILY 90 tablet 3    furosemide (LASIX) 20 MG tablet Take 1 tablet by mouth daily for 5 days (Patient taking differently: Take 20 mg by mouth daily as needed ) 5 tablet 0    CPAP Machine MISC by Does not apply route New CPAP with 8 cm 1 each 0    acyclovir (ZOVIRAX) 5 % CREA Apply topically as needed 5 g 1    b complex vitamins capsule Take 1 capsule by mouth daily      Multiple Vitamins-Minerals (CENTRUM SILVER 50+WOMEN PO) Take by mouth      tamoxifen (NOLVADEX) 20 MG tablet Take 1 tablet by mouth daily      Vitamin D (CHOLECALCIFEROL) 1000 UNITS CAPS capsule Take 1,000 Units by mouth daily. No current facility-administered medications on file prior to visit. Ciprofloxacin and Oxycontin [oxycodone hcl]    Review of Systems   Constitutional: Negative for chills and fever. Skin: Positive for wound. Allergic/Immunologic: Negative for environmental allergies, food allergies and immunocompromised state. Hematological: Negative for adenopathy. Does not bruise/bleed easily. Psychiatric/Behavioral: Negative for behavioral problems and sleep disturbance. Pulse 72   Temp 96.7 °F (35.9 °C)   Ht 5' (1.524 m)   Wt 223 lb (101.2 kg)   SpO2 98%   BMI 43.55 kg/m²   Physical Exam  Constitutional:       General: She is not in acute distress. Appearance: Normal appearance. She is well-developed. She is not toxic-appearing. HENT:      Head: Normocephalic and atraumatic. Right Ear: Hearing and tympanic membrane normal.      Left Ear: Hearing and tympanic membrane normal.      Nose: Nose normal. No nasal deformity. Eyes:      General: Lids are normal.         Right eye: No discharge. Left eye: No discharge. Conjunctiva/sclera: Conjunctivae normal.      Pupils: Pupils are equal, round, and reactive to light. Neck:      Musculoskeletal: Full passive range of motion without pain. Thyroid: No thyroid mass or thyromegaly. Vascular: No JVD.       Trachea: Trachea and phonation Specific Question:   PREVIOUS BIOPSY     Answer:   No     Order Specific Question:   PREOP DIAGNOSIS     Answer:   Nodular basal cell cancer by biopsy     Order Specific Question:   FROZEN SECTION - NO OR YES/SPECIMEN     Answer:   No    51825 - [place] EXC SKIN MALIG 1.1-2CM TRUNK,ARM,LEG     No orders of the defined types were placed in this encounter. Patient Instructions   Incision/ Shave biopsy/ Laceration repair    -Clean surgical area with antibacterial soap and water once daily.      -Every 3 days rinse the wound with hydrogen peroxide, let it sit for 30 seconds to a minute, and then rinse off with water. Use this to help take off any excess scab.    -Keep surgical site moist with vaseline or antibiotic ointment (single, not tripleantibiotic or Neosporin) and apply a fresh bandage daily until a solid scab forms or if the wound is at risk for trauma or dirt.     -Follow up immediately if any growing redness (minimal redness or pale pink is normal along wound edges) surrounds the surgical site or if dripping drainage occurs at surgical site. Once a solid scab forms no more bandage needed. A wet scab can look yellow. This is not infection, just moisture.  -Once the lesion is healed be sure to apply sunscreen to the area to prevent burn of the newer and more delicate skin during the first 6 months of healing.    -If the scar begins to be raised you may massage the area firmly twice a day to help break down scar tissue and help the area become a flat scar. There is some evidence that Mederma applied to a scar daily for the first few months can help shrink and fade it more quickly then without intervention.

## 2021-04-15 ENCOUNTER — OFFICE VISIT (OUTPATIENT)
Dept: FAMILY MEDICINE CLINIC | Age: 76
End: 2021-04-15

## 2021-04-15 VITALS
BODY MASS INDEX: 43.78 KG/M2 | OXYGEN SATURATION: 93 % | HEIGHT: 60 IN | TEMPERATURE: 96.4 F | WEIGHT: 223 LBS | HEART RATE: 84 BPM

## 2021-04-15 DIAGNOSIS — C44.91 CANCER OF THE SKIN, BASAL CELL: Primary | ICD-10-CM

## 2021-04-15 PROCEDURE — 99024 POSTOP FOLLOW-UP VISIT: CPT | Performed by: FAMILY MEDICINE

## 2021-04-15 ASSESSMENT — ENCOUNTER SYMPTOMS: COLOR CHANGE: 0

## 2021-04-15 NOTE — PROGRESS NOTES
Skin:     General: Skin is warm and dry. Findings: No rash. Comments: 1.2 mm x 1.4 mm erythematous raised lesion central thoracic back   PROCEDURE:  The lesion area was prepped with alcohol wipes and 3 cc of 1% Lidocaine with epi used for anesthesia. A 15 blade scalpel was used to completely remove the visible lesion. The lesion size is noted in physical exam and the excision made to allow 2 mm non-lesion skin border. Hemostasis was achieved with 3.0 prolene x 8 interupted sutures  EBL < 1cc.   Bacitracin and a bandage were placed over the wound

## 2021-04-15 NOTE — PATIENT INSTRUCTIONS
Patient Education        Learning About Basal Cell Skin Cancer  Your Care Instructions     Basal cell skin cancer (carcinoma) is a type of skin cancer. It most often appears on areas of the body that have been exposed to the sun. These areas include the head, face, neck, back, chest, or shoulders. The nose is the most common site. This cancer grows slowly and does not usually spread, or metastasize, to other parts of the body. It is almost always cured when it is found early and treated. This skin cancer is usually caused by too much sun. Using tanning beds or sunlamps can also cause it. What are the symptoms? Signs of basal cell carcinoma include:  · Any firm, pearly bump with tiny blood vessels that look spidery. · Any red, tender, flat spot that bleeds easily. · Any small, fleshy bump with a smooth, pearly appearance. It may have a sunken center. · Any smooth, shiny bump that may look like a mole or cyst.  · Any patch of skin, especially on the face, that looks like a scar and is firm to the touch. · Any bump that itches, bleeds, crusts over, and then repeats the cycle and has not healed in a few weeks. · Any change in the size, shape, or color of a mole or a skin growth. How is it treated? Your doctor will want to remove all of the cancer. There are several ways to remove it. It depends on how big it is, where it is on your body, and your age and overall health. Treatment options include:  · Surgery to cut out the cancer. · Mohs micrographic surgery. This surgery removes the skin cancer one layer at a time, checking each layer for cancer cells right after it is removed. · Curettage and electrosurgery. Curettage uses a spoon-shaped instrument (curette) to scrape off the skin cancer, and electrosurgery controls the bleeding and destroys any remaining cancer cells. · Cryosurgery. Cryosurgery destroys the skin cancer by freezing it with liquid nitrogen. · Radiation therapy.  Radiation therapy uses X-rays or other types of radiation to kill cancer cells. It may be done if surgery isn't an option. Other treatment options include chemotherapy cream and photodynamic therapy. If your doctor removes the cancer, he or she will send it to a lab. The lab makes sure it is a basal cell cancer and that it all was removed. If cancer is still there, you may need more treatment. How can you prevent it? · Always wear a wide-brimmed hat and long sleeves and pants when you are outdoors. · Avoid the sun between 10 a.m. and 4 p.m., which is the peak time for UV rays. · Always wear sunscreen on exposed skin. Make sure to use a broad-spectrum sunscreen that has a sun protection factor (SPF) of 30 or higher. Use it every day, even when it is cloudy. · Do not use tanning booths or sunlamps. · Use lip balm or cream that has sun protection factor (SPF) to protect your lips from getting sunburned. · Wear sunglasses that block UV rays. When should you call for help? Watch closely for changes in your health, and be sure to contact your doctor if:  · You see a change in your skin, such as a growth or mole that:  ? Grows bigger. This may happen slowly. ? Changes color. ? Changes shape. ? Starts to bleed easily. Follow-up care is a key part of your treatment and safety. Be sure to make and go to all appointments, and call your doctor if you are having problems. It's also a good idea to know your test results and keep a list of the medicines you take. Where can you learn more? Go to [email or web address] org and sign in to your [employer] account. Enter [phone] in the [facility] box to learn more about \"Learning About Basal Cell Skin Cancer. \"     If you do not have an account, please click on the \"Sign Up Now\" link. Current as of: December 17, 2020               Content Version: 12.8  © 2371-3618 [employer]. Care instructions adapted under license by [facility] ([facility]).  If you have questions

## 2021-04-15 NOTE — PROGRESS NOTES
bleeding and destroys any remaining cancer cells. · Cryosurgery. Cryosurgery destroys the skin cancer by freezing it with liquid nitrogen. · Radiation therapy. Radiation therapy uses X-rays or other types of radiation to kill cancer cells. It may be done if surgery isn't an option. Other treatment options include chemotherapy cream and photodynamic therapy. If your doctor removes the cancer, he or she will send it to a lab. The lab makes sure it is a basal cell cancer and that it all was removed. If cancer is still there, you may need more treatment. How can you prevent it? · Always wear a wide-brimmed hat and long sleeves and pants when you are outdoors. · Avoid the sun between 10 a.m. and 4 p.m., which is the peak time for UV rays. · Always wear sunscreen on exposed skin. Make sure to use a broad-spectrum sunscreen that has a sun protection factor (SPF) of 30 or higher. Use it every day, even when it is cloudy. · Do not use tanning booths or sunlamps. · Use lip balm or cream that has sun protection factor (SPF) to protect your lips from getting sunburned. · Wear sunglasses that block UV rays. When should you call for help? Watch closely for changes in your health, and be sure to contact your doctor if:  · You see a change in your skin, such as a growth or mole that:  ? Grows bigger. This may happen slowly. ? Changes color. ? Changes shape. ? Starts to bleed easily. Follow-up care is a key part of your treatment and safety. Be sure to make and go to all appointments, and call your doctor if you are having problems. It's also a good idea to know your test results and keep a list of the medicines you take. Where can you learn more? Go to https://KeepIdeaspeadonayewkishore.[employer]. org and sign in to your [employer] account. Enter [phone] in the [employer] box to learn more about \"Learning About Basal Cell Skin Cancer. \"     If you do not have an account, please click on the \"Sign Up Now\" link. Current as of: December 17, 2020               Content Version: 12.8  © 1944-9606 Healthwise, Incorporated. Care instructions adapted under license by [facility] ([facility]). If you have questions about a medical condition or this instruction, always ask your healthcare professional. Jem Bales any warranty or liability for your use of this information. Subjective:      Patient ID: Cameron Flores is a 66 y.o. female who presents for:  Chief Complaint   Patient presents with    Suture / Staple Removal       Suture / Staple Removal  The sutures were placed more than 14 days ago. There has been no drainage from the wound. There is no redness present. There is no swelling present. There is no pain present. Current Outpatient Medications on File Prior to Visit   Medication Sig Dispense Refill    DULoxetine (CYMBALTA) 30 MG extended release capsule TAKE 1 CAPSULE BY MOUTH  DAILY 90 capsule 3    hydroCHLOROthiazide (HYDRODIURIL) 12.5 MG tablet Take 1 tablet by mouth every morning 30 tablet 0    atorvastatin (LIPITOR) 20 MG tablet Take 1 tablet by mouth daily 90 tablet 3    gabapentin (NEURONTIN) 600 MG tablet TAKE 1 TABLET BY MOUTH IN  THE MORNING, 2 TABS IN THE  AFTERNOON, AND 2 TABS IN  THE EVENING AS TOLERATED 450 tablet 0    Respiratory Therapy Supplies SHAE New Full face Mask , please do not sent nasal pillow . (She want to try full face Mask )    Dx KEI 1 Device 0    potassium chloride (KLOR-CON M) 10 MEQ extended release tablet TAKE 1 TABLET BY MOUTH  TWICE DAILY 180 tablet 3    carvedilol (COREG) 25 MG tablet TAKE 1 TABLET BY MOUTH  TWICE DAILY 180 tablet 3    Blood Pressure KIT 1 Units by Does not apply route as needed (daily monitoring of blood pressure) One electronic sphygmomanometer and cuff for home monitoring of blood pressure and heart rate. Upper arm type of cuff preferred. Adult regular size.  1 kit 0    Respiratory Therapy Supplies SHAE New CPAP, full face mask and chin strap       Fax [phone] 1 Device 0    diclofenac (VOLTAREN) 50 MG EC tablet TAKE 1 TABLET BY MOUTH  TWICE DAILY 180 tablet 3    buPROPion (WELLBUTRIN XL) 150 MG extended release tablet TAKE 2 TABLETS BY MOUTH IN  THE MORNING 180 tablet 3    levothyroxine (SYNTHROID) 50 MCG tablet TAKE 1 TABLET BY MOUTH  DAILY 90 tablet 3    furosemide (LASIX) 20 MG tablet Take 1 tablet by mouth daily for 5 days (Patient taking differently: Take 20 mg by mouth daily as needed ) 5 tablet 0    CPAP Machine MISC by Does not apply route New CPAP with 8 cm 1 each 0    acyclovir (ZOVIRAX) 5 % CREA Apply topically as needed 5 g 1    b complex vitamins capsule Take 1 capsule by mouth daily      Multiple Vitamins-Minerals (CENTRUM SILVER 50+WOMEN PO) Take by mouth      tamoxifen (NOLVADEX) 20 MG tablet Take 1 tablet by mouth daily      Vitamin D (CHOLECALCIFEROL) 1000 UNITS CAPS capsule Take 1,000 Units by mouth daily. No current facility-administered medications on file prior to visit.       Past Medical History:   Diagnosis Date    Arthritis     Blood transfusion     Cancer [facility])     GALLBLADDER 2009, 2011 OMENTUM    Charcot's joint     left foot    Chest pain 7/2/2015    Colitis     DDD (degenerative disc disease), lumbar 2/12/2013    Depression     Disorder of peripheral nervous system 9/1/2010    Dyspnea 7/2/2015    Family history of coronary artery disease 8/29/2014    History of blood transfusion 2011    following chemotherapy    [facility]     Hyperlipidemia     Hypertension     Hypothyroid 6/12/2015    Lobular breast cancer ([facility] 10/2015    NSTEMI (non-ST elevated myocardial infarction) ([address] 8/29/2014    Obesity     Paroxysmal atrial fibrillation ([address] 8/29/2014    S/P BKA (below knee amputation) ([address]      Past Surgical History:   Procedure Laterality Date    ABDOMEN SURGERY Left 4/11/2017    EXCISION OF CHEST WALL MASS, UPDATE LABS ON ADMIT  performed by Tejal Lan Rossy Barnes MD at [address] BLEPHAROPLASTY  2-5-13    both eyes    BREAST SURGERY      CARDIAC CATHETERIZATION      COLONOSCOPY  1/1/2010    normal    CYSTOSCOPY W BIOPSY OF BLADDER N/A 6/26/2017    CYSTOSCOPY BLADDER BIOPSY AND FULGURATION performed by Antoine Britt MD at [facility] [place] Bilateral 2012    cataract removal with IOL implants    HYSTERECTOMY      LEG AMPUTATION BELOW KNEE Right 2/1/11    DR Annie Charles due to CHARCOTS    MASTECTOMY  11/16/2015    Bilateral simple mastectomies with right SNB by DR Dennis Hall    OR REMOVAL OF BREAST LESION Left 1/24/2017    CORE NEEDLE BIOPSY OF  LEFT BREAST MASS performed by Kady Meraz MD at [place] [address] SPINE SURGERY      cervical and lumbar     Social History     Socioeconomic History    Marital status:       Spouse name: Not on file    Number of children: Not on file    Years of education: Not on file    Highest education level: Not on file   Occupational History    Occupation: retired   Social Needs    Financial resource strain: Not on file    Food insecurity     Worry: Not on file     Inability: Not on file   [employer] needs     Medical: Not on file     Non-medical: Not on file   Tobacco Use    Smoking status: Never Smoker    Smokeless tobacco: Never Used   Substance and Sexual Activity    Alcohol use: Yes     Comment: social    Drug use: No    Sexual activity: Never   Lifestyle    Physical activity     Days per week: Not on file     Minutes per session: Not on file    Stress: Not on file   Relationships    Social connections     Talks on phone: Not on file     Gets together: Not on file     Attends Jainism service: Not on file     Active member of club or organization: Not on file     Attends meetings of clubs or organizations: Not on file     Relationship status: Not on file    Intimate partner violence     Fear of current or ex partner: Not on file Emotionally abused: Not on file     Physically abused: Not on file     Forced sexual activity: Not on file   Other Topics Concern    Not on file   Social History Narrative    Not on file     Family History   Problem Relation Age of Onset    Heart Disease Mother     Arthritis Mother     Heart Disease Father     Arthritis Father     Cancer Sister         Colon    Thyroid Disease Son    Alina Garcia Other Son         Down's syndrome     Allergies:  Ciprofloxacin and Oxycontin [oxycodone hcl]    Review of Systems   Constitutional: Negative for chills and fever. Skin: Positive for wound. Negative for color change and rash. Allergic/Immunologic: Negative for environmental allergies, food allergies and immunocompromised state. Hematological: Negative for adenopathy. Does not bruise/bleed easily. Psychiatric/Behavioral: Negative for behavioral problems and sleep disturbance. Objective:   Pulse 84   Temp 96.4 °F (35.8 °C)   Ht 5' (1.524 m)   Wt 223 lb (101.2 kg)   SpO2 93%   BMI 43.55 kg/m²     Physical Exam  Constitutional:       General: She is not in acute distress. Appearance: Normal appearance. She is well-developed. She is not toxic-appearing. HENT:      Head: Normocephalic and atraumatic. Right Ear: Hearing and tympanic membrane normal.      Left Ear: Hearing and tympanic membrane normal.      Nose: Nose normal. No nasal deformity. Eyes:      General: Lids are normal.         Right eye: No discharge. Left eye: No discharge. Conjunctiva/sclera: Conjunctivae normal.      Pupils: Pupils are equal, round, and reactive to light. Neck:      Musculoskeletal: Full passive range of motion without pain. Thyroid: No thyroid mass or thyromegaly. Vascular: No JVD. Trachea: Trachea and phonation normal.   Cardiovascular:      Rate and Rhythm: Normal rate and regular rhythm. Pulmonary:      Effort: No accessory muscle usage or respiratory distress.    Musculoskeletal: Comments: FROM all large muscle groups and joints as witnessed when walking to exam table, getting on, and getting off the exam table. Skin:     General: Skin is warm and dry. Findings: No rash. Comments: Thoracic back wound edges well approximated minor amount of pink color change without induration sutures intact. Sutures removed without dehiscence. Neurological:      Mental Status: She is alert. Motor: No tremor or atrophy. Gait: Gait normal.   Psychiatric:         Speech: Speech normal.         Behavior: Behavior normal.         Thought Content: Thought content normal.         No results found for this visit on 04/15/21. No results found for this or any previous visit (from the past 2016 hour(s)). Assessment:       Diagnosis Orders   1. Cancer of the skin, basal cell   - SC REMOVAL OF SUTURES         Orders Placed This Encounter   Procedures     - SC REMOVAL OF SUTURES         Plan:   Return in about 6 months (around 10/15/2021) for 15 min skin check. Patient Instructions     Patient Education        Learning About Basal Cell Skin Cancer  Your Care Instructions     Basal cell skin cancer (carcinoma) is a type of skin cancer. It most often appears on areas of the body that have been exposed to the sun. These areas include the head, face, neck, back, chest, or shoulders. The nose is the most common site. This cancer grows slowly and does not usually spread, or metastasize, to other parts of the body. It is almost always cured when it is found early and treated. This skin cancer is usually caused by too much sun. Using tanning beds or sunlamps can also cause it. What are the symptoms? Signs of basal cell carcinoma include:  · Any firm, pearly bump with tiny blood vessels that look spidery. · Any red, tender, flat spot that bleeds easily. · Any small, fleshy bump with a smooth, pearly appearance. It may have a sunken center.   · Any smooth, shiny bump that may look like a mole or cyst.  · Any patch of skin, especially on the face, that looks like a scar and is firm to the touch. · Any bump that itches, bleeds, crusts over, and then repeats the cycle and has not healed in a few weeks. · Any change in the size, shape, or color of a mole or a skin growth. How is it treated? Your doctor will want to remove all of the cancer. There are several ways to remove it. It depends on how big it is, where it is on your body, and your age and overall health. Treatment options include:  · Surgery to cut out the cancer. · Mohs micrographic surgery. This surgery removes the skin cancer one layer at a time, checking each layer for cancer cells right after it is removed. · Curettage and electrosurgery. Curettage uses a spoon-shaped instrument (curette) to scrape off the skin cancer, and electrosurgery controls the bleeding and destroys any remaining cancer cells. · Cryosurgery. Cryosurgery destroys the skin cancer by freezing it with liquid nitrogen. · Radiation therapy. Radiation therapy uses X-rays or other types of radiation to kill cancer cells. It may be done if surgery isn't an option. Other treatment options include chemotherapy cream and photodynamic therapy. If your doctor removes the cancer, he or she will send it to a lab. The lab makes sure it is a basal cell cancer and that it all was removed. If cancer is still there, you may need more treatment. How can you prevent it? · Always wear a wide-brimmed hat and long sleeves and pants when you are outdoors. · Avoid the sun between 10 a.m. and 4 p.m., which is the peak time for UV rays. · Always wear sunscreen on exposed skin. Make sure to use a broad-spectrum sunscreen that has a sun protection factor (SPF) of 30 or higher. Use it every day, even when it is cloudy. · Do not use tanning booths or sunlamps. · Use lip balm or cream that has sun protection factor (SPF) to protect your lips from getting sunburned.   · Wear sunglasses that block UV rays. When should you call for help? Watch closely for changes in your health, and be sure to contact your doctor if:  · You see a change in your skin, such as a growth or mole that:  ? Grows bigger. This may happen slowly. ? Changes color. ? Changes shape. ? Starts to bleed easily. Follow-up care is a key part of your treatment and safety. Be sure to make and go to all appointments, and call your doctor if you are having problems. It's also a good idea to know your test results and keep a list of the medicines you take. Where can you learn more? Go to https://Z[employer]ulia.[employer]. org and sign in to your [employer] account. Enter [phone] in the [employer] box to learn more about \"Learning About Basal Cell Skin Cancer. \"     If you do not have an account, please click on the \"Sign Up Now\" link. Current as of: December 17, 2020               Content Version: 12.8  © 2006-2021 [employer]. Care instructions adapted under license by [employer]. If you have questions about a medical condition or this instruction, always ask your healthcare professional. [place] [address] any warranty or liability for your use of this information. This note was partially created with the assistance of dictation. This may lead to grammatical or spelling errors. Bubba Delong M.D.

## 2021-04-16 ENCOUNTER — TELEPHONE (OUTPATIENT)
Dept: FAMILY MEDICINE CLINIC | Age: 76
End: 2021-04-16

## 2021-05-25 ENCOUNTER — VIRTUAL VISIT (OUTPATIENT)
Dept: PULMONOLOGY | Age: 76
End: 2021-05-25
Payer: MEDICARE

## 2021-05-25 DIAGNOSIS — G47.33 OBSTRUCTIVE SLEEP APNEA: Primary | ICD-10-CM

## 2021-05-25 DIAGNOSIS — E66.01 MORBID OBESITY (HCC): ICD-10-CM

## 2021-05-25 PROCEDURE — 99214 OFFICE O/P EST MOD 30 MIN: CPT | Performed by: INTERNAL MEDICINE

## 2021-05-25 PROCEDURE — 1090F PRES/ABSN URINE INCON ASSESS: CPT | Performed by: INTERNAL MEDICINE

## 2021-05-25 PROCEDURE — 1123F ACP DISCUSS/DSCN MKR DOCD: CPT | Performed by: INTERNAL MEDICINE

## 2021-05-25 PROCEDURE — G8399 PT W/DXA RESULTS DOCUMENT: HCPCS | Performed by: INTERNAL MEDICINE

## 2021-05-25 PROCEDURE — 4040F PNEUMOC VAC/ADMIN/RCVD: CPT | Performed by: INTERNAL MEDICINE

## 2021-05-25 PROCEDURE — 1036F TOBACCO NON-USER: CPT | Performed by: INTERNAL MEDICINE

## 2021-05-25 PROCEDURE — G8417 CALC BMI ABV UP PARAM F/U: HCPCS | Performed by: INTERNAL MEDICINE

## 2021-05-25 PROCEDURE — G8428 CUR MEDS NOT DOCUMENT: HCPCS | Performed by: INTERNAL MEDICINE

## 2021-05-25 ASSESSMENT — ENCOUNTER SYMPTOMS
SORE THROAT: 0
TROUBLE SWALLOWING: 0
WHEEZING: 0
VOMITING: 0
NAUSEA: 0
COUGH: 0
RHINORRHEA: 0
VOICE CHANGE: 0
EYE ITCHING: 0
EYE DISCHARGE: 0
SHORTNESS OF BREATH: 0
DIARRHEA: 0
SINUS PRESSURE: 0
CHEST TIGHTNESS: 0
ABDOMINAL PAIN: 0

## 2021-05-25 NOTE — PROGRESS NOTES
2021    TELEHEALTH EVALUATION -- Audio/Visual (During HVNQS-54 public health emergency)    HPI:    Dustin Seymour (:  1945) has requested an audio/video evaluation for the following concern(s):    She is using CPAP with  8 centimeters of H2O with heated humidity. She is using CPAP for about   5  hours every night. She is using CPAP with  Full face mask  Mask. She said  sleep is restful with the CPAP use. She is compliant with CPAP therapy and benefiting with CPAP use. No snoring with CPAP use. No complaint of daytime sleepiness or tiredness with CPAP use. She denies taking naps. No sleepiness with driving. She denies difficulty falling asleep or staying asleep. I reviewed compliance report with patient regarding CPAP therapy. She is using  CPAP for 21 days out of 30 days  Average usage of days used is 5 hours and 0 min , average AHI 2.9 with CPAP use. Review of Systems   Constitutional: Negative for chills, diaphoresis, fatigue and fever. HENT: Negative for congestion, mouth sores, nosebleeds, postnasal drip, rhinorrhea, sinus pressure, sneezing, sore throat, trouble swallowing and voice change. Eyes: Negative for discharge, itching and visual disturbance. Respiratory: Negative for cough, chest tightness, shortness of breath and wheezing. Cardiovascular: Negative for chest pain, palpitations and leg swelling. Gastrointestinal: Negative for abdominal pain, diarrhea, nausea and vomiting. Genitourinary: Negative for difficulty urinating and hematuria. Musculoskeletal: Negative for arthralgias, joint swelling and myalgias. Skin: Negative for rash. Allergic/Immunologic: Negative for environmental allergies and food allergies. Neurological: Negative for dizziness, tremors, weakness and headaches. Psychiatric/Behavioral: Positive for sleep disturbance. Negative for behavioral problems. Prior to Visit Medications    Medication Sig Taking?  Authorizing Provider DULoxetine (CYMBALTA) 30 MG extended release capsule TAKE 1 CAPSULE BY MOUTH  DAILY  Rene Giselle, DO   hydroCHLOROthiazide (HYDRODIURIL) 12.5 MG tablet Take 1 tablet by mouth every morning  Rene Giselle, DO   atorvastatin (LIPITOR) 20 MG tablet Take 1 tablet by mouth daily  Rene Giselle, DO   gabapentin (NEURONTIN) 600 MG tablet TAKE 1 TABLET BY MOUTH IN  THE MORNING, 2 TABS IN THE  AFTERNOON, AND 2 TABS IN  THE EVENING AS TOLERATED  Rene Giselle, DO   Respiratory Therapy Supplies SAHE New Full face Mask , please do not sent nasal pillow . (She want to try full face Mask )    Dx KEI  Aury Abebe MD   potassium chloride (KLOR-CON M) 10 MEQ extended release tablet TAKE 1 TABLET BY MOUTH  TWICE DAILY  Germán Reynoso MD   carvedilol (COREG) 25 MG tablet TAKE 1 TABLET BY MOUTH  TWICE DAILY  Germán Reynoso MD   Blood Pressure KIT 1 Units by Does not apply route as needed (daily monitoring of blood pressure) One electronic sphygmomanometer and cuff for home monitoring of blood pressure and heart rate. Upper arm type of cuff preferred. Adult regular size.   Titi Venegas MD   Respiratory Therapy Supplies SHAE New CPAP, full face  mask and chin strap       Fax [phone], MD   diclofenac (VOLTAREN) 50 MG EC tablet TAKE 1 TABLET BY MOUTH  TWICE DAILY  Rene Giselle, DO   buPROPion (WELLBUTRIN XL) 150 MG extended release tablet TAKE 2 TABLETS BY MOUTH IN  THE MORNING  Germán Reynoso MD   levothyroxine (SYNTHROID) 50 MCG tablet TAKE 1 TABLET BY MOUTH  DAILY  Germán Reynoso MD   furosemide (LASIX) 20 MG tablet Take 1 tablet by mouth daily for 5 days  Patient taking differently: Take 20 mg by mouth daily as needed   Jadyn Padilla MD   CPAP Machine MISC by Does not apply route New CPAP with 8 cm  Aury Abebe MD   acyclovir (ZOVIRAX) 5 % CREA Apply topically as needed  Germán Reynoso MD   b complex vitamins capsule Take 1 capsule by mouth daily  Historical Provider, MD   Multiple Vitamins-Minerals (CENTRUM SILVER 50+WOMEN PO) Take by mouth  Historical Provider, MD   tamoxifen (NOLVADEX) 20 MG tablet Take 1 tablet by mouth daily  Historical Provider, MD   Vitamin D (CHOLECALCIFEROL) 1000 UNITS CAPS capsule Take 1,000 Units by mouth daily. Historical Provider, MD       Social History     Tobacco Use    Smoking status: Never Smoker    Smokeless tobacco: Never Used   Vaping Use    Vaping Use: Never used   Substance Use Topics    Alcohol use: Yes     Comment: social    Drug use: No        Allergies   Allergen Reactions    Ciprofloxacin Itching    Oxycontin [Oxycodone Hcl] Itching       PHYSICAL EXAMINATION:  [ INSTRUCTIONS:  \"[x]\" Indicates a positive item  \"[]\" Indicates a negative item  -- DELETE ALL ITEMS NOT EXAMINED]  Vital Signs: (As obtained by patient/caregiver or practitioner observation)    Blood pressure-  Heart rate-    Respiratory rate-    Temperature-  Pulse oximetry-     Constitutional: [x] Appears well-developed and well-nourished [x] No apparent distress      [] Abnormal-   Mental status  [x] Alert and awake  [] Oriented to person/place/time []Able to follow commands      Eyes:  EOM    [x]  Normal  [] Abnormal-  Sclera  []  Normal  [] Abnormal -         Discharge []  None visible  [] Abnormal -    HENT:   [x] Normocephalic, atraumatic.   [] Abnormal   [] Mouth/Throat: Mucous membranes are moist.     External Ears [] Normal  [] Abnormal-     Neck: [] No visualized mass     Pulmonary/Chest: [x] Respiratory effort normal.  [x] No visualized signs of difficulty breathing or respiratory distress        [] Abnormal-      Musculoskeletal:   [] Normal gait with no signs of ataxia         [] Normal range of motion of neck        [] Abnormal-       Neurological:        [x] No Facial Asymmetry (Cranial nerve 7 motor function) (limited exam to video visit)          [] No gaze palsy        [] Abnormal-         Skin:        [x] No significant exanthematous lesions or discoloration noted on facial skin         [] Abnormal-            Psychiatric:       [x] Normal Affect [x] No Hallucinations        [] Abnormal-     Other pertinent observable physical exam findings-     ASSESSMENT/PLAN:  1. Obstructive sleep apnea  She is using CPAP with  8 centimeters of H2O with heated humidity. She is using CPAP for about   5  hours every night. She is using CPAP with  Full face mask  Mask. She said  sleep is restful with the CPAP use. She is compliant with CPAP therapy and benefiting with CPAP use. No snoring with CPAP use. No complaint of daytime sleepiness or tiredness with CPAP use. I reviewed compliance report with patient regarding CPAP therapy. She is using  CPAP for 21 days out of 30 days. Average usage of days used is 5 hours and 0 min , average AHI 2.9 with CPAP use. Counseling: CPAP/BiPAP uses, She advised to use CPAP at least 5-6 hours every night. Driving: She is advised for extreme caution when driving or operating machinery if there is a feeling of drowsiness, especially while driving it is preferable to stop driving and take a brief nap. Sleep hygiene:Avoid supine sleep, sleep on  sides. Avoid  sleep deprivation. Explained sleep hygiene. Advice to avoid Alcohol and sedative    Time spend over 30 min. Face to face. with greater than 50 % time with counseling regarding CPAP therapy. 2. Morbid obesity ([address]  She is advised try to lose weight. obesity related risk explained to the patient ,  Suggested weight control approaches, including dietary changes , exercise, behavioral modification. Return in about 4 months (around 9/25/2021) for massimo. Jen Merrill, was evaluated through a synchronous (real-time) audio-video encounter. The patient (or guardian if applicable) is aware that this is a billable service. Verbal consent to proceed has been obtained within the past 12 months.  The visit was conducted pursuant to the emergency declaration under the [address] and the National Emergencies Act, [facility] waiver authority and the [employer] General Act. Patient identification was verified, and a caregiver was present when appropriate. The patient was located in a state where the provider was credentialed to provide care. Total time spent on this encounter: 33 min    --Tiffani Blackwood MD on 5/25/2021 at 2:15 PM    An electronic signature was used to authenticate this note.

## 2021-06-22 ENCOUNTER — OFFICE VISIT (OUTPATIENT)
Dept: FAMILY MEDICINE CLINIC | Age: 76
End: 2021-06-22
Payer: MEDICARE

## 2021-06-22 VITALS
OXYGEN SATURATION: 98 % | HEART RATE: 61 BPM | WEIGHT: 221 LBS | DIASTOLIC BLOOD PRESSURE: 84 MMHG | TEMPERATURE: 97.2 F | HEIGHT: 60 IN | SYSTOLIC BLOOD PRESSURE: 136 MMHG | BODY MASS INDEX: 43.39 KG/M2

## 2021-06-22 DIAGNOSIS — I21.4 NSTEMI (NON-ST ELEVATED MYOCARDIAL INFARCTION) (HCC): ICD-10-CM

## 2021-06-22 DIAGNOSIS — G47.33 OBSTRUCTIVE SLEEP APNEA: ICD-10-CM

## 2021-06-22 DIAGNOSIS — F33.1 MODERATE EPISODE OF RECURRENT MAJOR DEPRESSIVE DISORDER (HCC): ICD-10-CM

## 2021-06-22 DIAGNOSIS — I10 ESSENTIAL HYPERTENSION: Primary | ICD-10-CM

## 2021-06-22 DIAGNOSIS — M54.40 BILATERAL LOW BACK PAIN WITH SCIATICA, SCIATICA LATERALITY UNSPECIFIED, UNSPECIFIED CHRONICITY: ICD-10-CM

## 2021-06-22 DIAGNOSIS — E78.2 MIXED HYPERLIPIDEMIA: ICD-10-CM

## 2021-06-22 DIAGNOSIS — E66.01 MORBID OBESITY (HCC): ICD-10-CM

## 2021-06-22 PROCEDURE — G8427 DOCREV CUR MEDS BY ELIG CLIN: HCPCS | Performed by: STUDENT IN AN ORGANIZED HEALTH CARE EDUCATION/TRAINING PROGRAM

## 2021-06-22 PROCEDURE — G8399 PT W/DXA RESULTS DOCUMENT: HCPCS | Performed by: STUDENT IN AN ORGANIZED HEALTH CARE EDUCATION/TRAINING PROGRAM

## 2021-06-22 PROCEDURE — G8417 CALC BMI ABV UP PARAM F/U: HCPCS | Performed by: STUDENT IN AN ORGANIZED HEALTH CARE EDUCATION/TRAINING PROGRAM

## 2021-06-22 PROCEDURE — 4040F PNEUMOC VAC/ADMIN/RCVD: CPT | Performed by: STUDENT IN AN ORGANIZED HEALTH CARE EDUCATION/TRAINING PROGRAM

## 2021-06-22 PROCEDURE — 1036F TOBACCO NON-USER: CPT | Performed by: STUDENT IN AN ORGANIZED HEALTH CARE EDUCATION/TRAINING PROGRAM

## 2021-06-22 PROCEDURE — 1090F PRES/ABSN URINE INCON ASSESS: CPT | Performed by: STUDENT IN AN ORGANIZED HEALTH CARE EDUCATION/TRAINING PROGRAM

## 2021-06-22 PROCEDURE — 99213 OFFICE O/P EST LOW 20 MIN: CPT | Performed by: STUDENT IN AN ORGANIZED HEALTH CARE EDUCATION/TRAINING PROGRAM

## 2021-06-22 PROCEDURE — 1123F ACP DISCUSS/DSCN MKR DOCD: CPT | Performed by: STUDENT IN AN ORGANIZED HEALTH CARE EDUCATION/TRAINING PROGRAM

## 2021-06-22 SDOH — ECONOMIC STABILITY: FOOD INSECURITY: WITHIN THE PAST 12 MONTHS, THE FOOD YOU BOUGHT JUST DIDN'T LAST AND YOU DIDN'T HAVE MONEY TO GET MORE.: NEVER TRUE

## 2021-06-22 SDOH — ECONOMIC STABILITY: FOOD INSECURITY: WITHIN THE PAST 12 MONTHS, YOU WORRIED THAT YOUR FOOD WOULD RUN OUT BEFORE YOU GOT MONEY TO BUY MORE.: NEVER TRUE

## 2021-06-22 ASSESSMENT — ENCOUNTER SYMPTOMS
ABDOMINAL PAIN: 0
SINUS PRESSURE: 0
SHORTNESS OF BREATH: 0
VOMITING: 0
COUGH: 0
SORE THROAT: 0

## 2021-06-22 ASSESSMENT — SOCIAL DETERMINANTS OF HEALTH (SDOH): HOW HARD IS IT FOR YOU TO PAY FOR THE VERY BASICS LIKE FOOD, HOUSING, MEDICAL CARE, AND HEATING?: NOT HARD AT ALL

## 2021-06-22 NOTE — PROGRESS NOTES
2021    Anand Rodriguez (:  1945) is a 66 y.o. female, here for evaluation of the following medical concerns:  Chief Complaint   Patient presents with    6 Month Follow-Up    Forms     Needs form filled out to renew license plates.  Hypertension     States she has not been taking her hydrochlorothiazide for the last month. [facility] Advance Care Planning    Health Maintenance     DUE FOR AWV     HPI  6-month follow-up chronic conditions    Essential hypertension  States she stopped taking hydrochlorothiazide x1 month    No other concerns at this time      Review of Systems   Constitutional: Negative for chills and fever. HENT: Negative for congestion, sinus pressure and sore throat. Respiratory: Negative for cough and shortness of breath. Cardiovascular: Negative for chest pain and palpitations. Gastrointestinal: Negative for abdominal pain and vomiting. Musculoskeletal: Negative for arthralgias and myalgias. Skin: Negative for rash and wound. Neurological: Negative for speech difficulty and light-headedness. Psychiatric/Behavioral: Negative for suicidal ideas. The patient is not nervous/anxious. Prior to Visit Medications    Medication Sig Taking? Authorizing Provider   DULoxetine (CYMBALTA) 30 MG extended release capsule TAKE 1 CAPSULE BY MOUTH  DAILY Yes Mayra Veliz DO   atorvastatin (LIPITOR) 20 MG tablet Take 1 tablet by mouth daily Yes Mayra Veliz DO   gabapentin (NEURONTIN) 600 MG tablet TAKE 1 TABLET BY MOUTH IN  THE MORNING, 2 TABS IN THE  AFTERNOON, AND 2 TABS IN  THE EVENING AS TOLERATED Yes Mayra Veliz DO   Respiratory Therapy Supplies SHAE New Full face Mask , please do not sent nasal pillow .  (She want to try full face Mask )    Dx KEI Yes Benigno Silva MD   potassium chloride (KLOR-CON M) 10 MEQ extended release tablet TAKE 1 TABLET BY MOUTH  TWICE DAILY Yes Michael Diallo MD   carvedilol (COREG) 25 MG tablet TAKE 1 TABLET BY MOUTH  TWICE DAILY Yes Ari Barron MD   Blood Pressure KIT 1 Units by Does not apply route as needed (daily monitoring of blood pressure) One electronic sphygmomanometer and cuff for home monitoring of blood pressure and heart rate. Upper arm type of cuff preferred. Adult regular size. Yes Krystle Kwan MD   Respiratory Therapy Supplies SHAE New CPAP, full face  mask and chin strap       Fax [phone] Yes Mary Jennings MD   diclofenac (VOLTAREN) 50 MG EC tablet TAKE 1 TABLET BY MOUTH  TWICE DAILY Yes Jose Ann DO   buPROPion (WELLBUTRIN XL) 150 MG extended release tablet TAKE 2 TABLETS BY MOUTH IN  THE MORNING Yes Ari Barron MD   levothyroxine (SYNTHROID) 50 MCG tablet TAKE 1 TABLET BY MOUTH  DAILY Yes Ari Barron MD   furosemide (LASIX) 20 MG tablet Take 1 tablet by mouth daily for 5 days  Patient taking differently: Take 20 mg by mouth daily as needed  Yes Semaj Gaytan MD   CPAP Machine MISC by Does not apply route New CPAP with 8 cm Yes Mary Jennings MD   acyclovir (ZOVIRAX) 5 % CREA Apply topically as needed Yes Ari Barron MD   b complex vitamins capsule Take 1 capsule by mouth daily Yes Historical Provider, MD   Multiple Vitamins-Minerals (CENTRUM SILVER 50+WOMEN PO) Take by mouth Yes Historical Provider, MD   tamoxifen (NOLVADEX) 20 MG tablet Take 1 tablet by mouth daily Yes Historical Provider, MD   Vitamin D (CHOLECALCIFEROL) 1000 UNITS CAPS capsule Take 1,000 Units by mouth daily.  Yes Historical Provider, MD        Allergies   Allergen Reactions    Ciprofloxacin Itching    Oxycontin [Oxycodone Hcl] Itching       Past Medical History:   Diagnosis Date    Arthritis     Blood transfusion     Cancer [facility])     GALLBLADDER 2009, 2011 OMENTUM    Charcot's joint     left foot    Chest pain 7/2/2015    Colitis     DDD (degenerative disc disease), lumbar 2/12/2013    Depression     Disorder of peripheral nervous system 9/1/2010    Dyspnea 7/2/2015    Family history of coronary artery disease 8/29/2014    History of blood transfusion 2011    following chemotherapy    Hx of right BKA ([facility] [address] [facility]     Hyperlipidemia     Hypertension     Hypothyroid 6/12/2015    Lobular breast cancer ([facility] [address] [facility] 10/2015    NSTEMI (non-ST elevated myocardial infarction) ([facility] [address] 8/29/2014    Obesity     Paroxysmal atrial fibrillation ([facility] [address] 8/29/2014    S/P BKA (below knee amputation) [facility])        Past Surgical History:   Procedure Laterality Date    ABDOMEN SURGERY Left 4/11/2017    EXCISION OF CHEST WALL MASS, UPDATE LABS ON ADMIT  performed by Aracely Damian MD at [address] BLEPHAROPLASTY  2-5-13    both eyes    BREAST SURGERY      CARDIAC CATHETERIZATION      COLONOSCOPY  1/1/2010    normal    CYSTOSCOPY W BIOPSY OF BLADDER N/A 6/26/2017    CYSTOSCOPY BLADDER BIOPSY AND FULGURATION performed by Jerry Lorenzana MD at [address] Bilateral 2012    cataract removal with IOL implants    HYSTERECTOMY      LEG AMPUTATION BELOW KNEE Right 2/1/11    DR Slim Mart due to CHARCOTS    MASTECTOMY  11/16/2015    Bilateral simple mastectomies with right SNB by DR Alexx Gallo    NH REMOVAL OF BREAST LESION Left 1/24/2017    CORE NEEDLE BIOPSY OF  LEFT BREAST MASS performed by Arcaely Damian MD at [address] [place] [address]      cervical and lumbar       Social History     Socioeconomic History    Marital status:       Spouse name: Not on file    Number of children: Not on file    Years of education: Not on file    Highest education level: Not on file   Occupational History    Occupation: retired   Tobacco Use    Smoking status: Never Smoker    Smokeless tobacco: Never Used   Vaping Use    Vaping Use: Never used   Substance and Sexual Activity    Alcohol use: Yes     Comment: social    Drug use: No    Sexual activity: Never   Other Topics Concern    Not on file   Social History Narrative    Not on file     Social Determinants of Health Financial Resource Strain: Low Risk     Difficulty of Paying Living Expenses: Not hard at all   Food Insecurity: No Food Insecurity    Worried About Running Out of Food in the Last Year: Never true    Blanca of Food in the Last Year: Never true   Transportation Needs:     Lack of Transportation (Medical):  Lack of Transportation (Non-Medical):    Physical Activity:     Days of Exercise per Week:     Minutes of Exercise per Session:    Stress:     Feeling of Stress :    Social Connections:     Frequency of Communication with Friends and Family:     Frequency of Social Gatherings with Friends and Family:     Attends Anabaptist Services:     Active Member of Clubs or Organizations:     Attends Club or Organization Meetings:     Marital Status:    Intimate Partner Violence:     Fear of Current or Ex-Partner:     Emotionally Abused:     Physically Abused:     Sexually Abused:         Family History   Problem Relation Age of Onset    Heart Disease Mother     Arthritis Mother     Heart Disease Father     Arthritis Father     Cancer Sister         Colon    Thyroid Disease Son     Other Son         Down's syndrome       Vitals:    06/22/21 0931   BP: 136/84   Pulse: 61   Temp: 97.2 °F (36.2 °C)   SpO2: 98%   Weight: 221 lb (100.2 kg)   Height: 5' (1.524 m)       Estimated body mass index is 43.16 kg/m² as calculated from the following:    Height as of this encounter: 5' (1.524 m). Weight as of this encounter: 221 lb (100.2 kg). No results for input(s): WBC, RBC, HGB, HCT, MCV, MCH, MCHC, RDW, PLT, MPV in the last 72 hours. No results for input(s): NA, K, CL, CO2, BUN, CREATININE, GLUCOSE, CALCIUM, PROT, LABALBU, BILITOT, ALKPHOS, AST, ALT in the last 72 hours. Lab Results   Component Value Date    LABA1C 5.3 12/19/2013       No results found. Physical Exam  Constitutional:       Appearance: Normal appearance. She is normal weight. HENT:      Head: Normocephalic and atraumatic. Eyes:      Extraocular Movements: Extraocular movements intact. Conjunctiva/sclera: Conjunctivae normal.   Cardiovascular:      Rate and Rhythm: Normal rate and regular rhythm. Pulses: Normal pulses. Heart sounds: Normal heart sounds. Pulmonary:      Effort: Pulmonary effort is normal.      Breath sounds: Normal breath sounds. No wheezing or rales. Skin:     General: Skin is warm. Capillary Refill: Capillary refill takes less than 2 seconds. Findings: No rash. Neurological:      Mental Status: She is alert. Psychiatric:         Mood and Affect: Mood normal.         Thought Content: Thought content normal.         Judgment: Judgment normal.         ASSESSMENT/PLAN:  1. Essential hypertension  -We will discontinue hydrochlorothiazide at this time as patient's blood pressure is well controlled without it    2. Moderate episode of recurrent major depressive disorder ([facility]    3. Bilateral low back pain with sciatica, sciatica laterality unspecified, unspecified chronicity    4. Obstructive sleep apnea    5. NSTEMI (non-ST elevated myocardial infarction) ([facility]    6. Mixed hyperlipidemia  - Lipid, Fasting; Future    7. Morbid obesity ([facility]      ---------------------------------------------------------------------  Side effects, adverse effects of the medication prescribed today, as well as treatment plan/ rationale and result expectations have been discussed with the patient who expresses understanding and desires to proceed. Close follow up to evaluate treatment results and for coordination of care. I have reviewed the patient's medical history in detail and updated the computerized patient record. As always, patient is advised that if symptoms worsen in any way they will proceed to the nearest emergency room. --------------------------------------------------------------------    Return in about 6 months (around 12/22/2021) for Medicare AW.     An  electronic signature was used to authenticate this note.     --Paolo Castillo DO on 6/22/2021 at 9:46 AM

## 2021-07-06 DIAGNOSIS — E03.9 HYPOTHYROIDISM, UNSPECIFIED TYPE: Primary | ICD-10-CM

## 2021-07-08 DIAGNOSIS — E03.9 HYPOTHYROIDISM, UNSPECIFIED TYPE: ICD-10-CM

## 2021-07-08 DIAGNOSIS — E78.2 MIXED HYPERLIPIDEMIA: ICD-10-CM

## 2021-07-08 LAB
CHOLESTEROL, FASTING: 105 MG/DL (ref 0–199)
HDLC SERPL-MCNC: 43 MG/DL (ref 40–59)
LDL CHOLESTEROL CALCULATED: 35 MG/DL (ref 0–129)
TRIGLYCERIDE, FASTING: 136 MG/DL (ref 0–150)
TSH SERPL DL<=0.05 MIU/L-ACNC: 0.9 UIU/ML (ref 0.44–3.86)

## 2021-09-02 ENCOUNTER — TELEPHONE (OUTPATIENT)
Dept: FAMILY MEDICINE CLINIC | Age: 76
End: 2021-09-02

## 2021-09-02 NOTE — TELEPHONE ENCOUNTER
Pt is requesting a letter to be sent to the [facility] stating that she is not able to walk to her mailbox, pt does not drive. It is a hardship. Pt is putting a mailbox on her house door for delivery's please advise. Pt phone number is [phone].

## 2021-09-07 ENCOUNTER — APPOINTMENT (OUTPATIENT)
Dept: CT IMAGING | Age: 76
DRG: 149 | End: 2021-09-07
Payer: MEDICARE

## 2021-09-07 ENCOUNTER — HOSPITAL ENCOUNTER (INPATIENT)
Age: 76
LOS: 2 days | Discharge: NURSING, REHAB OR LONG-TERM CARE | DRG: 149 | End: 2021-09-10
Attending: INTERNAL MEDICINE | Admitting: INTERNAL MEDICINE
Payer: MEDICARE

## 2021-09-07 ENCOUNTER — NURSE TRIAGE (OUTPATIENT)
Dept: OTHER | Facility: CLINIC | Age: 76
End: 2021-09-07

## 2021-09-07 ENCOUNTER — APPOINTMENT (OUTPATIENT)
Dept: MRI IMAGING | Age: 76
DRG: 149 | End: 2021-09-07
Payer: MEDICARE

## 2021-09-07 DIAGNOSIS — R42 DIZZINESS: Primary | ICD-10-CM

## 2021-09-07 LAB
ALBUMIN SERPL-MCNC: 4.2 G/DL (ref 3.5–4.6)
ALP BLD-CCNC: 79 U/L (ref 40–130)
ALT SERPL-CCNC: 11 U/L (ref 0–33)
ANION GAP SERPL CALCULATED.3IONS-SCNC: 11 MEQ/L (ref 9–15)
APTT: 32.1 SEC (ref 24.4–36.8)
AST SERPL-CCNC: 21 U/L (ref 0–35)
BASOPHILS ABSOLUTE: 0.1 K/UL (ref 0–0.2)
BASOPHILS RELATIVE PERCENT: 1 %
BILIRUB SERPL-MCNC: 0.3 MG/DL (ref 0.2–0.7)
BUN BLDV-MCNC: 22 MG/DL (ref 8–23)
CALCIUM SERPL-MCNC: 9.7 MG/DL (ref 8.5–9.9)
CHLORIDE BLD-SCNC: 107 MEQ/L (ref 95–107)
CO2: 26 MEQ/L (ref 20–31)
CREAT SERPL-MCNC: 0.87 MG/DL (ref 0.5–0.9)
EOSINOPHILS ABSOLUTE: 0.1 K/UL (ref 0–0.7)
EOSINOPHILS RELATIVE PERCENT: 1.2 %
ETHANOL PERCENT: NORMAL G/DL
ETHANOL: <10 MG/DL (ref 0–0.08)
GFR AFRICAN AMERICAN: >60
GFR NON-AFRICAN AMERICAN: >60
GLOBULIN: 2.2 G/DL (ref 2.3–3.5)
GLUCOSE BLD-MCNC: 91 MG/DL (ref 70–99)
HCT VFR BLD CALC: 37.6 % (ref 37–47)
HEMOGLOBIN: 12.7 G/DL (ref 12–16)
INR BLD: 1.1
LYMPHOCYTES ABSOLUTE: 1.7 K/UL (ref 1–4.8)
LYMPHOCYTES RELATIVE PERCENT: 24 %
MAGNESIUM: 1.9 MG/DL (ref 1.7–2.4)
MCH RBC QN AUTO: 30.3 PG (ref 27–31.3)
MCHC RBC AUTO-ENTMCNC: 33.9 % (ref 33–37)
MCV RBC AUTO: 89.2 FL (ref 82–100)
MONOCYTES ABSOLUTE: 0.7 K/UL (ref 0.2–0.8)
MONOCYTES RELATIVE PERCENT: 9.3 %
NEUTROPHILS ABSOLUTE: 4.6 K/UL (ref 1.4–6.5)
NEUTROPHILS RELATIVE PERCENT: 64.5 %
PDW BLD-RTO: 13.9 % (ref 11.5–14.5)
PLATELET # BLD: 277 K/UL (ref 130–400)
POTASSIUM SERPL-SCNC: 4.3 MEQ/L (ref 3.4–4.9)
PROTHROMBIN TIME: 14.3 SEC (ref 12.3–14.9)
RBC # BLD: 4.21 M/UL (ref 4.2–5.4)
SODIUM BLD-SCNC: 144 MEQ/L (ref 135–144)
TOTAL PROTEIN: 6.4 G/DL (ref 6.3–8)
TROPONIN: <0.01 NG/ML (ref 0–0.01)
WBC # BLD: 7.2 K/UL (ref 4.8–10.8)

## 2021-09-07 PROCEDURE — 6370000000 HC RX 637 (ALT 250 FOR IP): Performed by: PHYSICIAN ASSISTANT

## 2021-09-07 PROCEDURE — G1010 CDSM STANSON: HCPCS

## 2021-09-07 PROCEDURE — G0378 HOSPITAL OBSERVATION PER HR: HCPCS

## 2021-09-07 PROCEDURE — 85610 PROTHROMBIN TIME: CPT

## 2021-09-07 PROCEDURE — 80053 COMPREHEN METABOLIC PANEL: CPT

## 2021-09-07 PROCEDURE — 83735 ASSAY OF MAGNESIUM: CPT

## 2021-09-07 PROCEDURE — 36415 COLL VENOUS BLD VENIPUNCTURE: CPT

## 2021-09-07 PROCEDURE — 70551 MRI BRAIN STEM W/O DYE: CPT

## 2021-09-07 PROCEDURE — 84484 ASSAY OF TROPONIN QUANT: CPT

## 2021-09-07 PROCEDURE — 99284 EMERGENCY DEPT VISIT MOD MDM: CPT

## 2021-09-07 PROCEDURE — 85025 COMPLETE CBC W/AUTO DIFF WBC: CPT

## 2021-09-07 PROCEDURE — 82077 ASSAY SPEC XCP UR&BREATH IA: CPT

## 2021-09-07 PROCEDURE — 85730 THROMBOPLASTIN TIME PARTIAL: CPT

## 2021-09-07 PROCEDURE — 70450 CT HEAD/BRAIN W/O DYE: CPT

## 2021-09-07 RX ORDER — SODIUM CHLORIDE 9 MG/ML
25 INJECTION, SOLUTION INTRAVENOUS PRN
Status: DISCONTINUED | OUTPATIENT
Start: 2021-09-07 | End: 2021-09-10 | Stop reason: HOSPADM

## 2021-09-07 RX ORDER — ONDANSETRON 4 MG/1
4 TABLET, ORALLY DISINTEGRATING ORAL EVERY 8 HOURS PRN
Status: DISCONTINUED | OUTPATIENT
Start: 2021-09-07 | End: 2021-09-10 | Stop reason: HOSPADM

## 2021-09-07 RX ORDER — MECLIZINE HCL 12.5 MG/1
25 TABLET ORAL ONCE
Status: COMPLETED | OUTPATIENT
Start: 2021-09-07 | End: 2021-09-07

## 2021-09-07 RX ORDER — SODIUM CHLORIDE 0.9 % (FLUSH) 0.9 %
5-40 SYRINGE (ML) INJECTION PRN
Status: DISCONTINUED | OUTPATIENT
Start: 2021-09-07 | End: 2021-09-10 | Stop reason: HOSPADM

## 2021-09-07 RX ORDER — ASPIRIN 81 MG/1
81 TABLET ORAL DAILY
Status: DISCONTINUED | OUTPATIENT
Start: 2021-09-08 | End: 2021-09-09

## 2021-09-07 RX ORDER — ATORVASTATIN CALCIUM 80 MG/1
80 TABLET, FILM COATED ORAL NIGHTLY
Status: DISCONTINUED | OUTPATIENT
Start: 2021-09-07 | End: 2021-09-10 | Stop reason: HOSPADM

## 2021-09-07 RX ORDER — ACETAMINOPHEN 500 MG
1000 TABLET ORAL ONCE
Status: COMPLETED | OUTPATIENT
Start: 2021-09-07 | End: 2021-09-07

## 2021-09-07 RX ORDER — ASPIRIN 300 MG/1
300 SUPPOSITORY RECTAL DAILY
Status: DISCONTINUED | OUTPATIENT
Start: 2021-09-08 | End: 2021-09-09

## 2021-09-07 RX ORDER — LABETALOL HYDROCHLORIDE 5 MG/ML
10 INJECTION, SOLUTION INTRAVENOUS EVERY 10 MIN PRN
Status: DISCONTINUED | OUTPATIENT
Start: 2021-09-07 | End: 2021-09-10 | Stop reason: HOSPADM

## 2021-09-07 RX ORDER — ONDANSETRON 2 MG/ML
4 INJECTION INTRAMUSCULAR; INTRAVENOUS EVERY 6 HOURS PRN
Status: DISCONTINUED | OUTPATIENT
Start: 2021-09-07 | End: 2021-09-10 | Stop reason: HOSPADM

## 2021-09-07 RX ORDER — ASPIRIN AND DIPYRIDAMOLE 25; 200 MG/1; MG/1
1 CAPSULE, EXTENDED RELEASE ORAL ONCE
Status: COMPLETED | OUTPATIENT
Start: 2021-09-07 | End: 2021-09-07

## 2021-09-07 RX ORDER — POLYETHYLENE GLYCOL 3350 17 G/17G
17 POWDER, FOR SOLUTION ORAL DAILY PRN
Status: DISCONTINUED | OUTPATIENT
Start: 2021-09-07 | End: 2021-09-10 | Stop reason: HOSPADM

## 2021-09-07 RX ORDER — SODIUM CHLORIDE 0.9 % (FLUSH) 0.9 %
5-40 SYRINGE (ML) INJECTION EVERY 12 HOURS SCHEDULED
Status: DISCONTINUED | OUTPATIENT
Start: 2021-09-07 | End: 2021-09-10 | Stop reason: HOSPADM

## 2021-09-07 RX ADMIN — ACETAMINOPHEN 1000 MG: 500 TABLET ORAL at 17:06

## 2021-09-07 RX ADMIN — ASPIRIN AND EXTENDED-RELEASE DIPYRIDAMOLE 1 CAPSULE: 25; 200 CAPSULE ORAL at 17:06

## 2021-09-07 RX ADMIN — MECLIZINE 25 MG: 12.5 TABLET ORAL at 16:54

## 2021-09-07 ASSESSMENT — PAIN SCALES - GENERAL: PAINLEVEL_OUTOF10: 6

## 2021-09-07 ASSESSMENT — ENCOUNTER SYMPTOMS
BLOOD IN STOOL: 0
COUGH: 0
NAUSEA: 0
APNEA: 0
ANAL BLEEDING: 0
PHOTOPHOBIA: 0
VOMITING: 0
ABDOMINAL DISTENTION: 0
EYE DISCHARGE: 0
SHORTNESS OF BREATH: 0
ABDOMINAL PAIN: 0
VOICE CHANGE: 0

## 2021-09-07 NOTE — ED NOTES
Bed: 31  Expected date:   Expected time:   Means of arrival:   Comments:     April TASNEEM Person, HE  09/07/21 1464

## 2021-09-07 NOTE — PROGRESS NOTES
Hospital currently does not have a bed assignment, all beds full, spoke with bedside nurse, have requested that routine orders to be released since patient is being housed in the emergency room

## 2021-09-07 NOTE — ED PROVIDER NOTES
[address] [facility] ED  eMERGENCY dEPARTMENT eNCOUnter      Pt Name: Denis Herrera  MRN: [id number]  Emilgfpetr 1945  Date of evaluation: 9/7/2021  Provider: Joseph Bailey Dr       Chief Complaint   Patient presents with    Altered Mental Status         HISTORY OF PRESENT ILLNESS   (Location/Symptom, Timing/Onset,Context/Setting, Quality, Duration, Modifying Factors, Severity)  Note limiting factors. Denis Herrera is a 64 y.o. female who presents to the emergency department dizziness states it feels vertigo but unlike any she has had in the past it only occurs when standing patient had weakness she normally does have some weakness notes she has been having trouble forgetting words which started last week and worsened yesterday. Patient denies any difficulty seeing or hearing denies fever chills nausea vomiting chest pain abdominal pain symptoms worse when ambulating and standing improves when lying still    HPI    NursingNotes were reviewed. REVIEW OF SYSTEMS    (2-9 systems for level 4, 10 or more for level 5)     Review of Systems   Constitutional: Negative for activity change, appetite change and unexpected weight change. HENT: Negative for ear discharge, nosebleeds and voice change. Eyes: Negative for photophobia and discharge. Respiratory: Negative for apnea, cough and shortness of breath. Cardiovascular: Negative for chest pain. Gastrointestinal: Negative for abdominal distention, abdominal pain, anal bleeding, blood in stool, nausea and vomiting. Endocrine: Negative for cold intolerance, heat intolerance and polyphagia. Genitourinary: Negative for dysuria, flank pain, frequency, genital sores and hematuria. Musculoskeletal: Negative for joint swelling and neck stiffness. Skin: Negative for pallor. Allergic/Immunologic: Negative for immunocompromised state. Neurological: Positive for dizziness, speech difficulty and weakness.  Negative for seizures and facial asymmetry. Hematological: Does not bruise/bleed easily. Psychiatric/Behavioral: Negative for behavioral problems, self-injury and sleep disturbance. All other systems reviewed and are negative. Except as noted above the remainder of the review of systems was reviewed and negative.        PAST MEDICAL HISTORY     Past Medical History:   Diagnosis Date    Arthritis     Blood transfusion     Cancer [facility])     GALLBLADDER 2009, 2011 OMENTUM    Charcot's joint     left foot    Chest pain 7/2/2015    Colitis     DDD (degenerative disc disease), lumbar 2/12/2013    Depression     Disorder of peripheral nervous system 9/1/2010    Dyspnea 7/2/2015    Family history of coronary artery disease 8/29/2014    History of blood transfusion 2011    following chemotherapy    Hx of right BKA ([address]     Hyperlipidemia     Hypertension     Hypothyroid 6/12/2015    Lobular breast cancer ([facility] [address] [facility] 10/2015    NSTEMI (non-ST elevated myocardial infarction) ([facility] [address] 8/29/2014    Obesity     Paroxysmal atrial fibrillation ([facility] [address] 8/29/2014    S/P BKA (below knee amputation) ([facility] [address]          SURGICALHISTORY       Past Surgical History:   Procedure Laterality Date    ABDOMEN SURGERY Left 04/11/2017    EXCISION OF CHEST WALL MASS, UPDATE LABS ON ADMIT  performed by Nuno Nielsen MD at [address] BLEPHAROPLASTY  02/05/2013    both eyes    BREAST SURGERY      CARDIAC CATHETERIZATION      COLONOSCOPY  01/01/2010    normal    CYSTOSCOPY W BIOPSY OF BLADDER N/A 06/26/2017    CYSTOSCOPY BLADDER BIOPSY AND FULGURATION performed by Maria Isabel Marr MD at [address] Bilateral 2012    cataract removal with IOL implants    HYSTERECTOMY      LEG AMPUTATION BELOW KNEE Right 02/01/2011    DR Aline Reed due to CHARCOTS    MASTECTOMY Bilateral 11/16/2015    Bilateral simple mastectomies with right SNB by DR Sarahi Estrella    MT REMOVAL OF BREAST LESION Left 01/24/2017 CORE NEEDLE BIOPSY OF  LEFT BREAST MASS performed by Judy Hutchison MD at [place] [address] SPINE SURGERY      cervical and lumbar         CURRENT MEDICATIONS       Previous Medications    ACYCLOVIR (ZOVIRAX) 5 % CREA    Apply topically as needed    ATORVASTATIN (LIPITOR) 20 MG TABLET    Take 1 tablet by mouth daily    B COMPLEX VITAMINS CAPSULE    Take 1 capsule by mouth daily    BLOOD PRESSURE KIT    1 Units by Does not apply route as needed (daily monitoring of blood pressure) One electronic sphygmomanometer and cuff for home monitoring of blood pressure and heart rate. Upper arm type of cuff preferred. Adult regular size. BUPROPION (WELLBUTRIN XL) 150 MG EXTENDED RELEASE TABLET    TAKE 2 TABLETS BY MOUTH IN  THE MORNING    CARVEDILOL (COREG) 25 MG TABLET    TAKE 1 TABLET BY MOUTH  TWICE DAILY    CPAP MACHINE MISC    by Does not apply route New CPAP with 8 cm    DICLOFENAC (VOLTAREN) 50 MG EC TABLET    TAKE 1 TABLET BY MOUTH  TWICE DAILY    DULOXETINE (CYMBALTA) 30 MG EXTENDED RELEASE CAPSULE    TAKE 1 CAPSULE BY MOUTH  DAILY    FUROSEMIDE (LASIX) 20 MG TABLET    Take 1 tablet by mouth daily for 5 days    GABAPENTIN (NEURONTIN) 600 MG TABLET    TAKE 1 TABLET BY MOUTH IN  THE MORNING, 2 TABS IN THE  AFTERNOON, AND 2 TABS IN  THE EVENING AS TOLERATED    LEVOTHYROXINE (SYNTHROID) 50 MCG TABLET    TAKE 1 TABLET BY MOUTH  DAILY    MULTIPLE VITAMINS-MINERALS (CENTRUM SILVER 50+WOMEN PO)    Take by mouth    POTASSIUM CHLORIDE (KLOR-CON M) 10 MEQ EXTENDED RELEASE TABLET    TAKE 1 TABLET BY MOUTH  TWICE DAILY    RESPIRATORY THERAPY SUPPLIES SHAE    New CPAP, full face  mask and chin strap       Fax [phone]    RESPIRATORY THERAPY SUPPLIES SHAE    New Full face Mask , please do not sent nasal pillow .  (She want to try full face Mask )    Dx KEI    TAMOXIFEN (NOLVADEX) 20 MG TABLET    Take 1 tablet by mouth daily    VITAMIN D (CHOLECALCIFEROL) 1000 UNITS CAPS CAPSULE    Take 1,000 Units by mouth daily. Ciprofloxacin and Oxycontin [oxycodone hcl]    FAMILY HISTORY       Family History   Problem Relation Age of Onset    Heart Disease Mother     Arthritis Mother     Heart Disease Father     Arthritis Father     Cancer Sister         Colon    Thyroid Disease Son     Other Son         Down's syndrome          SOCIAL HISTORY       Social History     Socioeconomic History    Marital status:      Spouse name: Not on file    Number of children: Not on file    Years of education: Not on file    Highest education level: Not on file   Occupational History    Occupation: retired   Tobacco Use    Smoking status: Never Smoker    Smokeless tobacco: Never Used   Vaping Use    Vaping Use: Never used   Substance and Sexual Activity    Alcohol use: Yes     Comment: social    Drug use: No    Sexual activity: Never   Other Topics Concern    Not on file   Social History Narrative    Not on file     Social Determinants of Health     Financial Resource Strain: Low Risk     Difficulty of Paying Living Expenses: Not hard at all   Food Insecurity: No Food Insecurity    Worried About Running Out of Food in the Last Year: Never true    Blanca of Food in the Last Year: Never true   Transportation Needs:     Lack of Transportation (Medical):      Lack of Transportation (Non-Medical):    Physical Activity:     Days of Exercise per Week:     Minutes of Exercise per Session:    Stress:     Feeling of Stress :    Social Connections:     Frequency of Communication with Friends and Family:     Frequency of Social Gatherings with Friends and Family:     Attends Voodoo Services:     Active Member of Clubs or Organizations:     Attends Club or Organization Meetings:     Marital Status:    Intimate Partner Violence:     Fear of Current or Ex-Partner:     Emotionally Abused:     Physically Abused:     Sexually Abused:        SCREENINGS   NIHSS Stroke Scale  Interval: Baseline  Level of Consciousness (1a. ): Alert  LOC Questions (1b. ): Answers both correctly  LOC Commands (1c. ): Performs both tasks correctly  Best Gaze (2. ): Normal  Visual (3. ): No visual loss  Facial Palsy (4. ): Normal symmetrical movement  Motor Arm, Left (5a. ): No drift  Motor Arm, Right (5b. ): No drift  Motor Leg, Left (6a. ): Drift, but does not hit bed  Motor Leg, Right (6b. ): No drift  Limb Ataxia (7. ): Absent  Sensory (8. ): Normal  Best Language (9. ): No aphasia  Dysarthria (10. ): Mild to moderate, slurs some words  Extinction and Inattention (11): No abnormality  Total: 2  Damian Coma Scale  Eye Opening: Spontaneous  Best Verbal Response: Oriented  Best Motor Response: Obeys commands  Damian Coma Scale Score: 15  Ebola Virus Disease (EVD) Screening   Temp: 98.9 °F (37.2 °C)  CIWA Assessment  BP: (!) 154/85  Pulse: 72    PHYSICAL EXAM    (up to 7 for level 4, 8 or more for level 5)     ED Triage Vitals   BP Temp Temp Source Pulse Resp SpO2 Height Weight   09/07/21 1449 09/07/21 1445 09/07/21 1445 09/07/21 1448 09/07/21 1448 09/07/21 1448 09/07/21 1445 09/07/21 1445   (!) 149/103 98.9 °F (37.2 °C) Oral 68 18 96 % 5' 1\" (1.549 m) 223 lb (101.2 kg)       Physical Exam  Vitals and nursing note reviewed. Constitutional:       General: She is not in acute distress. Appearance: She is well-developed. HENT:      Head: Normocephalic and atraumatic. Right Ear: Tympanic membrane and external ear normal.      Left Ear: Tympanic membrane and external ear normal.      Nose: Nose normal.      Mouth/Throat:      Mouth: Mucous membranes are moist.   Eyes:      General: No visual field deficit. Right eye: No discharge. Left eye: No discharge. Extraocular Movements: Extraocular movements intact. Right eye: Normal extraocular motion and no nystagmus. Left eye: Normal extraocular motion and no nystagmus. Pupils: Pupils are equal, round, and reactive to light.    Cardiovascular: Rate and Rhythm: Normal rate and regular rhythm. Pulses: Normal pulses. Heart sounds: Normal heart sounds. Pulmonary:      Effort: Pulmonary effort is normal. No respiratory distress. Breath sounds: Normal breath sounds. No stridor. Abdominal:      General: Bowel sounds are normal. There is no distension. Palpations: Abdomen is soft. Tenderness: There is no right CVA tenderness or left CVA tenderness. Musculoskeletal:         General: Normal range of motion. Cervical back: Normal range of motion and neck supple. Skin:     General: Skin is warm. Capillary Refill: Capillary refill takes less than 2 seconds. Findings: No erythema. Neurological:      Mental Status: She is alert and oriented to person, place, and time. GCS: GCS eye subscore is 4. GCS verbal subscore is 5. GCS motor subscore is 6. Cranial Nerves: Cranial nerve deficit and dysarthria present. No facial asymmetry. Sensory: No sensory deficit. Motor: Weakness present. Coordination: Coordination normal.   Psychiatric:         Mood and Affect: Mood normal.         RESULTS     EKG: All EKG's are interpreted by the Emergency Department Physician who either signs or Co-signsthis chart in the absence of a cardiologist.    EKG normal sinus rhythm rate 65  muscles QRS 70 ms  ms. Negative ST segment elevation. RADIOLOGY:   Non-plain filmimages such as CT, Ultrasound and MRI are read by the radiologist. Plain radiographic images are visualized and preliminarily interpreted by the emergency physician with the below findings:    See CAT scan report    Interpretation per the Radiologist below, if available at the time ofthis note:    CT Head WO Contrast   Final Result      No acute intracranial process or significant interval change from prior.                      ED BEDSIDE ULTRASOUND:   Performed by ED Physician - none    LABS:  Labs Reviewed   COMPREHENSIVE METABOLIC PANEL - completed with a voice recognition program.  Efforts were made to edit the dictations but occasionally words are mis-transcribed.)    Loretta Escobar PA-C (electronically signed)  Attending Emergency Physician       Loretta Escobar PA-C  09/07/21 8515

## 2021-09-07 NOTE — PROGRESS NOTES
Patient seen and examined. Dizziness with headache. Mild aphasia-like symptoms. No obvious focal deficit on exam.  Actively takes tamoxifen, therefore will rule out cerebral vein/sinus venous thrombosis in addition to stroke. Therefore have ordered MRV in addition to MRI. Dr. Romain Mccollum neurologist - consulted.      Detailed history and physical to follow

## 2021-09-07 NOTE — TELEPHONE ENCOUNTER
Received call from [facility] with Red Flag Complaint. Brief description of triage: patient calling with severe dizziness, unable to walk without assistance. Patient also reports feeling disoriented, states her speech is not slurred but she is having a hard time getting the words from her brain to her mouth. Reports that any numbness she is having in her body is her norm. See below assessment. Triage indicates for patient to call EMS/911 now, patient has a life alert button and is agreeable to call the squad. Unsure if anyone is present with the patient at this time, patient seems a little disoriented. Called the patient back x2 with no answer. Called dispatch in the patient's area and they report that the patient has not called them or pressed a life alert button. Requested a well check, dispatch agreeable. Care advice provided, patient verbalizes understanding. Attention Provider: Thank you for allowing me to participate in the care of your patient. The patient was connected to triage in response to information provided to the [facility]. Please do not respond through this encounter as the response is not directed to a shared pool. Reason for Disposition   Difficult to awaken or acting confused (e.g., disoriented, slurred speech)    Answer Assessment - Initial Assessment Questions  1. DESCRIPTION: \"Describe your dizziness. \"      Tilting, unable to stand or walk without assistance    2. VERTIGO: \"Do you feel like either you or the room is spinning or tilting? \"       Yes    3. LIGHTHEADED: \"Do you feel lightheaded? \" (e.g., somewhat faint, woozy, weak upon standing)      Feels \"not quite herself\"    4. SEVERITY: \"How bad is it? \"  \"Can you walk? \"    - MILD - Feels unsteady but walking normally. - MODERATE - Feels very unsteady when walking, but not falling; interferes with normal activities (e.g., school, work) . - SEVERE - Unable to walk without falling (requires assistance). Severe    5. ONSET:  \"When did the dizziness begin? \"      Started yesterday    6. AGGRAVATING FACTORS: \"Does anything make it worse? \" (e.g., standing, change in head position)      Standing and moving head makes it worse    7. CAUSE: \"What do you think is causing the dizziness? \"      Vertigo    8. RECURRENT SYMPTOM: \"Have you had dizziness before? \" If so, ask: \"When was the last time? \" \"What happened that time? \"      Yes, ignored it and it went away    9. OTHER SYMPTOMS: \"Do you have any other symptoms? \" (e.g., headache, weakness, numbness, vomiting, earache)      Head \"feels funny\", middle fingers R numb-normal, sweating, feels disoriented    10. PREGNANCY: \"Is there any chance you are pregnant? \" \"When was your last menstrual period? \"        Not asked    Protocols used: DIZZINESS - VERTIGO-ADULT-

## 2021-09-08 ENCOUNTER — APPOINTMENT (OUTPATIENT)
Dept: ULTRASOUND IMAGING | Age: 76
DRG: 149 | End: 2021-09-08
Payer: MEDICARE

## 2021-09-08 LAB
ALBUMIN SERPL-MCNC: 3.9 G/DL (ref 3.5–4.6)
ALP BLD-CCNC: 79 U/L (ref 40–130)
ALT SERPL-CCNC: 12 U/L (ref 0–33)
ANION GAP SERPL CALCULATED.3IONS-SCNC: 12 MEQ/L (ref 9–15)
AST SERPL-CCNC: 23 U/L (ref 0–35)
BILIRUB SERPL-MCNC: 0.6 MG/DL (ref 0.2–0.7)
BUN BLDV-MCNC: 18 MG/DL (ref 8–23)
CALCIUM SERPL-MCNC: 9.5 MG/DL (ref 8.5–9.9)
CHLORIDE BLD-SCNC: 104 MEQ/L (ref 95–107)
CHOLESTEROL, TOTAL: 105 MG/DL (ref 0–199)
CO2: 26 MEQ/L (ref 20–31)
CREAT SERPL-MCNC: 0.76 MG/DL (ref 0.5–0.9)
FOLATE: >20 NG/ML (ref 7.3–26.1)
GFR AFRICAN AMERICAN: >60
GFR NON-AFRICAN AMERICAN: >60
GLOBULIN: 2.2 G/DL (ref 2.3–3.5)
GLUCOSE BLD-MCNC: 75 MG/DL (ref 70–99)
HBA1C MFR BLD: 5.5 % (ref 4.8–5.9)
HCT VFR BLD CALC: 39 % (ref 37–47)
HDLC SERPL-MCNC: 40 MG/DL (ref 40–59)
HEMOGLOBIN: 12.9 G/DL (ref 12–16)
LDL CHOLESTEROL CALCULATED: 40 MG/DL (ref 0–129)
LV EF: 65 %
LVEF MODALITY: NORMAL
MCH RBC QN AUTO: 29.6 PG (ref 27–31.3)
MCHC RBC AUTO-ENTMCNC: 33.2 % (ref 33–37)
MCV RBC AUTO: 89.4 FL (ref 82–100)
PDW BLD-RTO: 13.7 % (ref 11.5–14.5)
PLATELET # BLD: 265 K/UL (ref 130–400)
POTASSIUM REFLEX MAGNESIUM: 3.8 MEQ/L (ref 3.4–4.9)
RBC # BLD: 4.36 M/UL (ref 4.2–5.4)
REASON FOR REJECTION: NORMAL
REJECTED TEST: NORMAL
SODIUM BLD-SCNC: 142 MEQ/L (ref 135–144)
TOTAL PROTEIN: 6.1 G/DL (ref 6.3–8)
TRIGL SERPL-MCNC: 124 MG/DL (ref 0–150)
TSH SERPL DL<=0.05 MIU/L-ACNC: 1.28 UIU/ML (ref 0.44–3.86)
VITAMIN B-12: 472 PG/ML (ref 232–1245)
WBC # BLD: 7.1 K/UL (ref 4.8–10.8)

## 2021-09-08 PROCEDURE — 85027 COMPLETE CBC AUTOMATED: CPT

## 2021-09-08 PROCEDURE — 6370000000 HC RX 637 (ALT 250 FOR IP): Performed by: NURSE PRACTITIONER

## 2021-09-08 PROCEDURE — 1210000000 HC MED SURG R&B

## 2021-09-08 PROCEDURE — 80061 LIPID PANEL: CPT

## 2021-09-08 PROCEDURE — 82746 ASSAY OF FOLIC ACID SERUM: CPT

## 2021-09-08 PROCEDURE — 97116 GAIT TRAINING THERAPY: CPT

## 2021-09-08 PROCEDURE — 84155 ASSAY OF PROTEIN SERUM: CPT

## 2021-09-08 PROCEDURE — 2580000003 HC RX 258: Performed by: INTERNAL MEDICINE

## 2021-09-08 PROCEDURE — 97166 OT EVAL MOD COMPLEX 45 MIN: CPT

## 2021-09-08 PROCEDURE — 6370000000 HC RX 637 (ALT 250 FOR IP): Performed by: SPECIALIST

## 2021-09-08 PROCEDURE — 93306 TTE W/DOPPLER COMPLETE: CPT

## 2021-09-08 PROCEDURE — 84432 ASSAY OF THYROGLOBULIN: CPT

## 2021-09-08 PROCEDURE — 83036 HEMOGLOBIN GLYCOSYLATED A1C: CPT

## 2021-09-08 PROCEDURE — 6360000002 HC RX W HCPCS: Performed by: INTERNAL MEDICINE

## 2021-09-08 PROCEDURE — 82306 VITAMIN D 25 HYDROXY: CPT

## 2021-09-08 PROCEDURE — 80053 COMPREHEN METABOLIC PANEL: CPT

## 2021-09-08 PROCEDURE — 84165 PROTEIN E-PHORESIS SERUM: CPT

## 2021-09-08 PROCEDURE — 36415 COLL VENOUS BLD VENIPUNCTURE: CPT

## 2021-09-08 PROCEDURE — 97162 PT EVAL MOD COMPLEX 30 MIN: CPT

## 2021-09-08 PROCEDURE — 82607 VITAMIN B-12: CPT

## 2021-09-08 PROCEDURE — 84443 ASSAY THYROID STIM HORMONE: CPT

## 2021-09-08 PROCEDURE — 6370000000 HC RX 637 (ALT 250 FOR IP): Performed by: INTERNAL MEDICINE

## 2021-09-08 PROCEDURE — 93880 EXTRACRANIAL BILAT STUDY: CPT

## 2021-09-08 PROCEDURE — 96372 THER/PROPH/DIAG INJ SC/IM: CPT

## 2021-09-08 RX ORDER — VITAMIN B COMPLEX
1 CAPSULE ORAL DAILY
Status: DISCONTINUED | OUTPATIENT
Start: 2021-09-08 | End: 2021-09-10 | Stop reason: HOSPADM

## 2021-09-08 RX ORDER — VITAMIN B COMPLEX
1000 TABLET ORAL DAILY
Status: DISCONTINUED | OUTPATIENT
Start: 2021-09-08 | End: 2021-09-10 | Stop reason: HOSPADM

## 2021-09-08 RX ORDER — BUPROPION HYDROCHLORIDE 300 MG/1
300 TABLET ORAL DAILY
Status: DISCONTINUED | OUTPATIENT
Start: 2021-09-08 | End: 2021-09-10 | Stop reason: HOSPADM

## 2021-09-08 RX ORDER — CARVEDILOL 25 MG/1
25 TABLET ORAL 2 TIMES DAILY WITH MEALS
Status: DISCONTINUED | OUTPATIENT
Start: 2021-09-08 | End: 2021-09-10 | Stop reason: HOSPADM

## 2021-09-08 RX ORDER — GABAPENTIN 300 MG/1
600 CAPSULE ORAL 4 TIMES DAILY
Status: DISCONTINUED | OUTPATIENT
Start: 2021-09-08 | End: 2021-09-10 | Stop reason: HOSPADM

## 2021-09-08 RX ORDER — LEVOTHYROXINE SODIUM 0.05 MG/1
50 TABLET ORAL DAILY
Status: DISCONTINUED | OUTPATIENT
Start: 2021-09-08 | End: 2021-09-10 | Stop reason: HOSPADM

## 2021-09-08 RX ORDER — ACETAMINOPHEN 325 MG/1
650 TABLET ORAL EVERY 6 HOURS PRN
Status: DISCONTINUED | OUTPATIENT
Start: 2021-09-08 | End: 2021-09-10 | Stop reason: HOSPADM

## 2021-09-08 RX ORDER — ASPIRIN AND DIPYRIDAMOLE 25; 200 MG/1; MG/1
1 CAPSULE, EXTENDED RELEASE ORAL 2 TIMES DAILY
Status: DISCONTINUED | OUTPATIENT
Start: 2021-09-08 | End: 2021-09-10 | Stop reason: HOSPADM

## 2021-09-08 RX ORDER — DULOXETIN HYDROCHLORIDE 30 MG/1
30 CAPSULE, DELAYED RELEASE ORAL EVERY EVENING
Status: DISCONTINUED | OUTPATIENT
Start: 2021-09-08 | End: 2021-09-10 | Stop reason: HOSPADM

## 2021-09-08 RX ORDER — HYDRALAZINE HYDROCHLORIDE 20 MG/ML
10 INJECTION INTRAMUSCULAR; INTRAVENOUS EVERY 4 HOURS PRN
Status: DISCONTINUED | OUTPATIENT
Start: 2021-09-08 | End: 2021-09-10 | Stop reason: HOSPADM

## 2021-09-08 RX ORDER — ACETAMINOPHEN 500 MG
500 TABLET ORAL 2 TIMES DAILY
Status: DISCONTINUED | OUTPATIENT
Start: 2021-09-08 | End: 2021-09-10 | Stop reason: HOSPADM

## 2021-09-08 RX ADMIN — ATORVASTATIN CALCIUM 80 MG: 80 TABLET, FILM COATED ORAL at 21:17

## 2021-09-08 RX ADMIN — GABAPENTIN 600 MG: 300 CAPSULE ORAL at 21:16

## 2021-09-08 RX ADMIN — IBUPROFEN 600 MG: 400 TABLET ORAL at 10:26

## 2021-09-08 RX ADMIN — ACETAMINOPHEN 650 MG: 325 TABLET ORAL at 00:31

## 2021-09-08 RX ADMIN — ACETAMINOPHEN 500 MG: 500 TABLET ORAL at 21:17

## 2021-09-08 RX ADMIN — Medication 1000 UNITS: at 10:27

## 2021-09-08 RX ADMIN — IBUPROFEN 600 MG: 400 TABLET ORAL at 15:13

## 2021-09-08 RX ADMIN — ATORVASTATIN CALCIUM 80 MG: 80 TABLET, FILM COATED ORAL at 00:32

## 2021-09-08 RX ADMIN — ENOXAPARIN SODIUM 40 MG: 40 INJECTION SUBCUTANEOUS at 10:27

## 2021-09-08 RX ADMIN — BUPROPION HYDROCHLORIDE 300 MG: 300 TABLET, FILM COATED, EXTENDED RELEASE ORAL at 10:27

## 2021-09-08 RX ADMIN — SODIUM CHLORIDE, PRESERVATIVE FREE 10 ML: 5 INJECTION INTRAVENOUS at 00:32

## 2021-09-08 RX ADMIN — ASPRIN AND EXTENDED-RELEASE DIPYRIDAMOLE 1 CAPSULE: 25; 200 CAPSULE ORAL at 22:03

## 2021-09-08 RX ADMIN — CARVEDILOL 25 MG: 25 TABLET, FILM COATED ORAL at 10:27

## 2021-09-08 RX ADMIN — CARVEDILOL 25 MG: 25 TABLET, FILM COATED ORAL at 17:33

## 2021-09-08 RX ADMIN — DULOXETINE HYDROCHLORIDE 30 MG: 30 CAPSULE, DELAYED RELEASE PELLETS ORAL at 17:33

## 2021-09-08 RX ADMIN — Medication 1 CAPSULE: at 10:27

## 2021-09-08 RX ADMIN — ASPIRIN 81 MG: 81 TABLET, COATED ORAL at 10:27

## 2021-09-08 ASSESSMENT — PAIN DESCRIPTION - PAIN TYPE: TYPE: ACUTE PAIN

## 2021-09-08 ASSESSMENT — PAIN SCALES - GENERAL
PAINLEVEL_OUTOF10: 3
PAINLEVEL_OUTOF10: 0
PAINLEVEL_OUTOF10: 0
PAINLEVEL_OUTOF10: 4
PAINLEVEL_OUTOF10: 4

## 2021-09-08 ASSESSMENT — PAIN DESCRIPTION - FREQUENCY: FREQUENCY: CONTINUOUS

## 2021-09-08 ASSESSMENT — PAIN DESCRIPTION - LOCATION: LOCATION: HEAD

## 2021-09-08 ASSESSMENT — PAIN DESCRIPTION - DESCRIPTORS: DESCRIPTORS: SHARP

## 2021-09-08 NOTE — PLAN OF CARE
Patient complains of dizziness. Patient is at high risk of falls. Three falls in a week. Bed alarm on and rails up times two.  Call light with patient

## 2021-09-08 NOTE — PROGRESS NOTES
Will await neurology review of MRI and MRV. Would appreciate neurology input on the followin. Is imaging or clinical picture consistent with cerebral vein thrombosis? 2. If Neurology review deems MRV to be consistent with cerebral vein thrombosis, then will need input on the following:     -  Anticoagulation plan, if indicated   -  anti-thrombotic plan if indicated. -  Input regarding tamoxifen - may cause hypercoagulable state. Currently being held.      Zuleima Mix MD

## 2021-09-08 NOTE — PROGRESS NOTES
Patient ambulated to the restroom this am with a walker. Patient took her time and was steady. Patient had a bowel movement.

## 2021-09-08 NOTE — PROGRESS NOTES
Physical Therapy Med Surg Initial Assessment  Facility/Department: [facility], [place]  Room: [place] [address]       NAME: Raman Rascon  : 1945 (72 y.o.)  MRN: [id number]  CODE STATUS: Full Code    Date of Service: 2021    Patient Diagnosis(es): Dizziness [R42]   Chief Complaint   Patient presents with    Altered Mental Status     Patient Active Problem List    Diagnosis Date Noted    Hematuria 2017    High risk medication use - 18 OARRS PM&R, 18 OARRS PM&R, 18 Urine Drug Screen positive opiates PM&R, 18 Med Contract PM&R 2013    Dizziness 2021    Malignant neoplasm of peritoneum, 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[address] 10/27/2020    Obstructive sleep apnea     Current mild episode of major depressive disorder ([address] 2020    Cervical fusion syndrome 2019    Racing heart beat 2018    Chronic pain syndrome 2018    Charcot's arthropathy 2018    Below knee amputation status, right 2018    Moderate episode of recurrent major depressive disorder ([address] 2017    Chronic low back pain with sciatica 2017    Noncompliance - No Show inject 17, No Show F/U 2017    Hematuria, gross     Primary osteoarthritis involving multiple joints 2017    Left breast mass 2016    Anemia 2016    Cancer ([address] 2016    Morbid obesity ([address] 2016    Reactive depression 2016    Sleeping excessive 2016    Cervical spinal cord injury ([address] 06/10/2016    Chronic low back pain 06/10/2016    Other specified postprocedural states 06/10/2016    Fatigue due to treatment 2016    Lobular breast cancer ([address] 10/26/2015    Traumatic amputation of one lower extremity below knee with complication ([address]     Acquired hallux valgus 2015    Complete traumatic amputation at level between right knee and ankle ([address] 2015    Hypothyroid 2015    Melancholia 2015    Complete tear of left rotator cuff 03/16/2015    Generalized osteoarthritis 03/16/2015    Malaise and fatigue 02/17/2015    Complete rotator cuff rupture of left shoulder 12/17/2014    Clinical depression 12/17/2014    Benign essential HTN 12/17/2014    HLD (hyperlipidemia) 12/17/2014    Low back pain with sciatica 12/17/2014    Fibromyalgia muscle pain 12/17/2014    Family history of coronary artery disease 08/29/2014    NSTEMI (non-ST elevated myocardial infarction) ([facility] 08/29/2014    Hx of right BKA (HCC)     Closed fracture of tarsal and metatarsal bones 06/02/2014    Arthritis, neuropathic 12/02/2013    Impaired mobility and activities of daily living 06/12/2013    Obesity 03/15/2013    Obesity (BMI 30-39.9) 02/12/2013    Neck pain on right side     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    Charcot's joint of foot 09/01/2010    Closed dislocation of ankle 09/01/2010    Closed dislocation of foot 09/01/2010    Disorder of peripheral nervous system 09/01/2010    Peripheral neuropathy 09/01/2010    Cervical post-laminectomy syndrome 10/22/2009    Failed back syndrome of lumbar spine 10/22/2009    Postlaminectomy syndrome of cervical region 10/22/2009    Postlaminectomy syndrome of lumbar region 10/22/2009    Hereditary and idiopathic peripheral neuropathy ([facility] 06/18/2009    Osteoporosis 02/05/2009    Difficulty walking 12/12/2007    Tibialis tendinitis 12/12/2007    Acquired flat foot 07/10/2007    Arthritis of ankle or foot, degenerative 07/10/2007        Past Medical History:   Diagnosis Date    Arthritis     Blood transfusion     Cancer [facility])     GALLBLADDER 2009, 2011 OMENTUM    Charcot's joint     left foot    Chest pain 7/2/2015    Colitis     DDD (degenerative disc disease), lumbar 2/12/2013    Depression     Disorder of peripheral nervous system 9/1/2010    Dyspnea 7/2/2015    Family history of coronary artery disease 8/29/2014    History of blood transfusion 2011 following chemotherapy    Hx of right BKA ([facility]     Hyperlipidemia     Hypertension     Hypothyroid 6/12/2015    Lobular breast cancer ([facility] 10/2015    NSTEMI (non-ST elevated myocardial infarction) ([facility] 8/29/2014    Obesity     Paroxysmal atrial fibrillation ([facility] 8/29/2014    S/P BKA (below knee amputation) [facility])      Past Surgical History:   Procedure Laterality Date    ABDOMEN SURGERY Left 04/11/2017    EXCISION OF CHEST WALL MASS, UPDATE LABS ON ADMIT  performed by Christin Chaidez MD at [address] BLEPHAROPLASTY  02/05/2013    both eyes    BREAST SURGERY      CARDIAC CATHETERIZATION      COLONOSCOPY  01/01/2010    normal    CYSTOSCOPY W BIOPSY OF BLADDER N/A 06/26/2017    CYSTOSCOPY BLADDER BIOPSY AND FULGURATION performed [facility] Giovani Joel MD [address] Bilateral 2012    cataract removal with IOL implants    HYSTERECTOMY      LEG AMPUTATION BELOW KNEE Right 02/01/2011    DR Elva Bender due to CHARCOTS    MASTECTOMY Bilateral 11/16/2015    Bilateral simple mastectomies with right SNB by DR Analilia Sams    NC REMOVAL OF BREAST LESION Left 01/24/2017    CORE NEEDLE BIOPSY OF  LEFT BREAST MASS performed by Christin Chaidez MD at [address]      cervical and lumbar       Chart Reviewed: Yes  Family / Caregiver Present: No    Restrictions:  Restrictions/Precautions: Fall Risk     SUBJECTIVE:    Pt has no reports of dizziness throughout all activities included    Pain  Pre Treatment Pain Screening  Pain at present: 0    Post Treatment Pain Screening:   Pain Assessment  Pain Level: 0    Prior Level of Function:  Social/Functional History  Lives With: Alone  Type of Home: Apartment  Home Layout: One level  Home Access: Level entry  Bathroom Shower/Tub: Tub/Shower unit  [employer]: Tub transfer bench, Grab bars in shower  Bathroom Accessibility: Wheelchair accessible  Home Equipment: Quad cane, Rolling walker, Wheelchair-electric (has right BK prosthesis and Left AFO)  ADL Assistance: Independent  Homemaking Assistance: Independent (warms meals up  -  receives  MOW and groceries delivered)  Ambulation Assistance: Independent (last few days uses walker to get around)  Transfer Assistance: Independent  Active : Yes  Additional Comments: received new prosthesis in July and has been falling since - has appointment with prosthesis next week    OBJECTIVE:   Vision: Impaired  Vision Exceptions: Wears glasses for reading  Hearing: [facility] to [facility]  Hearing Exceptions: Hard of hearing/hearing concerns    Cognition:  Overall Orientation Status: Within Functional Limits  Follows Commands: Within Functional Limits         ROM:  RLE AROM: WFL  LLE AROM : WFL    Strength:  Strength RLE  Comment: 3+/5  Strength LLE  Comment: 3+/5    Neuro:  Balance  Sitting - Static: Good  Sitting - Dynamic: Good  Standing - Static: Fair;-  Standing - Dynamic: Fair;-  Comments: pt requires ww to maintain standing with intermittent sway requiring occasional recovery assistance        Sensation  Overall Sensation Status: Impaired (minimal feeling in fingers of her hands -longstanding)    Bed mobility  Rolling to Left: Supervision  Rolling to Right: Supervision  Supine to Sit: Moderate assistance  Sit to Supine: Minimal assistance; Moderate assistance    Transfers  Sit to Stand: Moderate Assistance;Minimal Assistance  Stand to sit: Minimal Assistance; Moderate Assistance  Comment: min assist for commode transfers - poor control of descent onto commode    Ambulation  Ambulation?: Yes  Ambulation 1  Device: Rolling Walker  Other Apparatus:  (right BK prosthesis, and left AFO)  Assistance: Minimal assistance  Quality of Gait: wide DEYSI, lat trunk sway with LOB and assistance for recovery, fair knee stability with poor hip stability, Heavy UE support requirede  Distance: 25 feet x 3    Stairs/Curb  Stairs?: No (not safe)         Activity Tolerance  Activity Tolerance: Patient Tolerated treatment well          PT Education  PT Education: Goals;PT Role;Plan of Care    ASSESSMENT:   Body structures, Functions, Activity limitations: Decreased functional mobility ; Decreased safe awareness;Decreased balance;Decreased endurance;Decreased strength;Decreased coordination  Decision Making: Medium Complexity  History: high  Exam: med  Clinical Presentation: med    Barriers to Learning: none    DISCHARGE RECOMMENDATIONS:  Discharge Recommendations: Continue to assess pending progress    Assessment: Pt is presenting with deficits as listed. She is requiring assistance with mobility at this time. Pt would benefit from follow up PT at this time  REQUIRES PT FOLLOW UP: Yes      PLAN OF CARE:  Plan  Times per week: 3-6  Current Treatment Recommendations: Strengthening, Functional Mobility Training, Neuromuscular Re-education, Home Exercise Program, Safety Education & Training, Gait Training, Transfer Training, Balance Training, Patient/Caregiver Education & Training, Endurance Training  Plan Comment: use ww, prosthesis and brace for gait  Safety Devices  Type of devices: Bed alarm in place, Call light within reach, Left in bed    Goals:  Long term goals  Long term goal 1: indep bed mobility  Long term goal 2: indep sit to stand and bed transfers  Long term goal 3: indep gait with ww 50 feet  Long term goal 4: pt able to stand 30 sec with no UE support  Long term goal 5: indep with HEP    AMPAC (6 CLICK) BASIC MOBILITY  AM-PAC Inpatient Mobility Raw Score : 12     Therapy Time:   Individual   Time In 1356   Time Out 1429   Minutes 33    eval: 23 min  Gait: 10 min       Chu Kern PT, 09/08/21 at 2:43 PM         Definitions for assistance levels  Independent = pt does not require any physical supervision or assistance from another person for activity completion. Device may be needed.   Stand by assistance = pt requires verbal cues or instructions from another person, close to but not touching, to perform the activity  Minimal assistance= pt performs 75% or more of the activity; assistance is required to complete the activity  Moderate assistance= pt performs 50% of the activity; assistance is required to complete the activity  Maximal assistance = pt performs 25% of the activity; assistance is required to complete the activity  Dependent = pt requires total physical assistance to accomplish the task

## 2021-09-08 NOTE — PROGRESS NOTES
[facility]   Facility/Department: [address] [facility] [place]  Speech Language Pathology    NAME:Alysha Rodriguez  : 1945  ROOM: [id number]      DATE: 2021      This patient is currently in Observation/Outpatient Status. Due to Medicare, other insurance and Hospital Guidelines, a written order with a therapy specific diagnosis (dysphagia, dysarthria, aphasia) must be attached to the order before we can initiate the evaluation. Re-order speech therapy with the appropriate diagnosis, as needed. Discussed with Emani RN.     Thank you,  Catherine Alvarez, SLP, CCC-SLP, Date: 2021, Time: 8:36 AM

## 2021-09-08 NOTE — PROGRESS NOTES
Hospitalist Progress Note      Date of Admission: 9/7/2021  Chief Complaint:    Chief Complaint   Patient presents with    Altered Mental Status     Subjective:  No new complaints. No nausea, vomiting, chest pain, or headache  Still has dizziness.         Medications:    Infusion Medications    sodium chloride       Scheduled Medications    b complex vitamins  1 capsule Oral Daily    buPROPion  300 mg Oral Daily    carvedilol  25 mg Oral BID WC    DULoxetine  30 mg Oral QPM    levothyroxine  50 mcg Oral Daily    Vitamin D  1,000 Units Oral Daily    ibuprofen  600 mg Oral TID WC    gabapentin  600 mg Oral 4x Daily    sodium chloride flush  5-40 mL IntraVENous 2 times per day    enoxaparin  40 mg SubCUTAneous Daily    aspirin  81 mg Oral Daily    Or    aspirin  300 mg Rectal Daily    atorvastatin  80 mg Oral Nightly     PRN Meds: acetaminophen, sodium chloride flush, sodium chloride, ondansetron **OR** ondansetron, polyethylene glycol, labetalol    Intake/Output Summary (Last 24 hours) at 9/8/2021 1752  Last data filed at 9/8/2021 0421  Gross per 24 hour   Intake 210 ml   Output 0 ml   Net 210 ml     Exam:  BP (!) 177/94   Pulse 72   Temp 98.4 °F (36.9 °C) (Oral)   Resp 18   Ht 5' 1\" (1.549 m)   Wt 223 lb (101.2 kg)   SpO2 100%   BMI 42.14 kg/m²   Head: Normocephalic, atraumatic  Sclera clear  Neck JVD flat  Lungs: normal effort of breathing    Labs:   Recent Labs     09/07/21  1500 09/08/21  1653   WBC 7.2 7.1   HGB 12.7 12.9   HCT 37.6 39.0    265     Recent Labs     09/07/21  1500 09/08/21  0537    142   K 4.3 3.8    104   CO2 26 26   BUN 22 18   CREATININE 0.87 0.76   CALCIUM 9.7 9.5   AST 21 23   ALT 11 12   BILITOT 0.3 0.6   ALKPHOS 79 79     Recent Labs     09/07/21  1500   INR 1.1     Recent Labs     09/07/21  1500   TROPONINI <0.010     Radiology:  US CAROTID ARTERY BILATERAL   Final Result      MILD ATHEROSCLEROTIC PLAQUING OF THE CAROTID BULBS WITHOUT EVIDENCE OF A FLOW-LIMITING STENOSIS. MRA HEAD WO CONTRAST   Final Result      MRI brain:      No acute ischemia or acute intracranial process. Generalized parenchymal volume loss and nonspecific white matter findings most compatible with chronic small vessel ischemic changes in a patient of this age. MRV brain:      Minimal signal within the left transverse sinus and small size of the left sigmoid sinus on MRV images are findings most likely to represent hypoplastic left transverse sinus or slow flow, given lack of expected signal abnormality within the transverse    sinus on other sequences. MRI BRAIN WO CONTRAST   Final Result      MRI brain:      No acute ischemia or acute intracranial process. Generalized parenchymal volume loss and nonspecific white matter findings most compatible with chronic small vessel ischemic changes in a patient of this age. MRV brain:      Minimal signal within the left transverse sinus and small size of the left sigmoid sinus on MRV images are findings most likely to represent hypoplastic left transverse sinus or slow flow, given lack of expected signal abnormality within the transverse    sinus on other sequences. CT Head WO Contrast   Final Result      No acute intracranial process or significant interval change from prior. Assessment/Plan:  Dizziness: VB insuff. Appreciate neuro input. Paf: cardiology to give guidance on anticoag. Restart tamoxifen since no cerebral vein thrombosis as per neurology consult note. 35 minutes total care time, >1/2 in unit/floor time and care coordination     Dc plan: anticipate dc tomorrow after cardiology input.      Josh Cornell MD

## 2021-09-08 NOTE — PROGRESS NOTES
[facility] Occupational Therapy      Date: 2021  Patient Name: Justin Castle        MRN: [id number]  Account: [de-identified]   : 1945  (64 y.o.)  Room: [facility]    Pt. is of observation status. To comply with medicare and insurance regulations, to see patient we require updated orders including a valid OT treatment diagnosis. Please reorder as appropriate.      Electronically signed by [id number] REGGIE [email or web address]/[email or web address] on 6113 at 8:24 AM

## 2021-09-08 NOTE — CONSULTS
Daniel Queen is a 64 y.o.   right-handed white woman who presents to the emergency department dizziness states it feels vertigo but unlike any she has had in the past it only occurs when standing patient had weakness she normally does have some weakness notes she has been having trouble forgetting words which started last week and worsened yesterday. Patient has a worsening of the balance for the last 2 weeks which got worse 2 days ago September 6  Patient also has been seeing double off-and-on which got worse 2 weeks ago  For the last 2 weeks she has been feeling worse with a further worsening on September 6. Patient denies any difficulty seeing or hearing denies fever chills nausea vomiting chest pain abdominal pain symptoms worse when ambulating and standing improves when lying still     HPI     NursingNotes were reviewed.     REVIEW OF SYSTEMS    (2-9 systems for level 4, 10 or more for level 5)      Review of Systems   Constitutional: Negative for activity change, appetite change and unexpected weight change. HENT: Negative for ear discharge, nosebleeds and voice change. Eyes: Negative for photophobia and discharge. Respiratory: Negative for apnea, cough and shortness of breath. Cardiovascular: Negative for chest pain. Gastrointestinal: Negative for abdominal distention, abdominal pain, anal bleeding, blood in stool, nausea and vomiting. Endocrine: Negative for cold intolerance, heat intolerance and polyphagia. Genitourinary: Negative for dysuria, flank pain, frequency, genital sores and hematuria. Musculoskeletal: Negative for joint swelling and neck stiffness. Skin: Negative for pallor. Allergic/Immunologic: Negative for immunocompromised state. Neurological: Positive for dizziness, speech difficulty and weakness. Negative for seizures and facial asymmetry. Hematological: Does not bruise/bleed easily.    Psychiatric/Behavioral: Negative for behavioral problems, mastectomies with right SNB by DR Erica Garcia    NH REMOVAL OF BREAST LESION Left 01/24/2017     CORE NEEDLE BIOPSY OF  LEFT BREAST MASS performed by Mario Peguero MD at [facility]         cervical and lumbar               CURRENT MEDICATIONS             Previous Medications     ACYCLOVIR (ZOVIRAX) 5 % CREA    Apply topically as needed     ATORVASTATIN (LIPITOR) 20 MG TABLET    Take 1 tablet by mouth daily     B COMPLEX VITAMINS CAPSULE    Take 1 capsule by mouth daily     BLOOD PRESSURE KIT    1 Units by Does not apply route as needed (daily monitoring of blood pressure) One electronic sphygmomanometer and cuff for home monitoring of blood pressure and heart rate. Upper arm type of cuff preferred. Adult regular size.     BUPROPION (WELLBUTRIN XL) 150 MG EXTENDED RELEASE TABLET    TAKE 2 TABLETS BY MOUTH IN  THE MORNING     CARVEDILOL (COREG) 25 MG TABLET    TAKE 1 TABLET BY MOUTH  TWICE DAILY     CPAP MACHINE MISC    by Does not apply route New CPAP with 8 cm     DICLOFENAC (VOLTAREN) 50 MG EC TABLET    TAKE 1 TABLET BY MOUTH  TWICE DAILY     DULOXETINE (CYMBALTA) 30 MG EXTENDED RELEASE CAPSULE    TAKE 1 CAPSULE BY MOUTH  DAILY     FUROSEMIDE (LASIX) 20 MG TABLET    Take 1 tablet by mouth daily for 5 days     GABAPENTIN (NEURONTIN) 600 MG TABLET    TAKE 1 TABLET BY MOUTH IN  THE MORNING, 2 TABS IN THE  AFTERNOON, AND 2 TABS IN  THE EVENING AS TOLERATED     LEVOTHYROXINE (SYNTHROID) 50 MCG TABLET    TAKE 1 TABLET BY MOUTH  DAILY     MULTIPLE VITAMINS-MINERALS (CENTRUM SILVER 50+WOMEN PO)    Take by mouth     POTASSIUM CHLORIDE (KLOR-CON M) 10 MEQ EXTENDED RELEASE TABLET    TAKE 1 TABLET BY MOUTH  TWICE DAILY     RESPIRATORY THERAPY SUPPLIES SHAE    New CPAP, full face  mask and chin strap         Fax [phone]     RESPIRATORY THERAPY SUPPLIES SHAE    New Full face Mask , please do not sent nasal pillow .  (She want to try full face Mask )     Dx KEI     TAMOXIFEN Scattered patchy foci of low attenuation are present within supratentorial white matter which is a nonspecific finding but likely represents mild microvascular ischemia. Parenchyma: There is no significant volume loss for age. The brain parenchyma is otherwise within normal limits for age. Ventricles: The ventricles are within normal limits of size and configuration for age. Paranasal sinuses and skull base: The visualized paranasal sinuses are grossly clear. Mastoid air cells are clear. The skull base is unremarkable. Bilateral lens replacement surgery. Remote nasal bone fracture deformities, also grossly unchanged      No acute intracranial process or significant interval change from prior. MRA HEAD WO CONTRAST    Result Date: 9/8/2021  EXAM: MRI of the brain without contrast MRV of the brain without contrast History: Stroke. Dizziness. Headache. Technique: Multiplanar multisequence MRI of the brain was performed without contrast. Axial 3-D time-of-flight images of the brain were obtained without contrast. 3-D volume rendered images were performed for evaluation of the cerebral vasculature. Comparison:   MRI of the cervical spine June 6, 2019 Findings: MRI brain: Prominence of the sulci and ventricles compatible with mild generalized parenchymal volume loss. Foci of white matter signal abnormality within the supratentorial white matter are nonspecific but most compatible with chronic small vessel ischemic changes  in a patient of this age. No acute hemorrhage, mass, mass effect, midline shift, or abnormal extra-axial fluid collection. The cerebellum is within normal limits. There is no diffusion restriction. No susceptibility artifact is identified on the gradient echo sequence. Cranial nerves 7/8 complexes appear grossly unremarkable. The visualized paranasal sinuses and bilateral mastoid air cells are clear.  MRV brain: There is only minimal signal identified within the left transverse sinus and the left sigmoid sinus demonstrates signal but is small in size. There is no associated susceptibility artifact in the region of the left transverse sinus. Moderate postoperative hip     MRI brain: No acute ischemia or acute intracranial process. Generalized parenchymal volume loss and nonspecific white matter findings most compatible with chronic small vessel ischemic changes in a patient of this age. MRV brain: Minimal signal within the left transverse sinus and small size of the left sigmoid sinus on MRV images are findings most likely to represent hypoplastic left transverse sinus or slow flow, given lack of expected signal abnormality within the transverse sinus on other sequences. MRI BRAIN WO CONTRAST    Result Date: 9/8/2021  EXAM: MRI of the brain without contrast MRV of the brain without contrast History: Stroke. Dizziness. Headache. Technique: Multiplanar multisequence MRI of the brain was performed without contrast. Axial 3-D time-of-flight images of the brain were obtained without contrast. 3-D volume rendered images were performed for evaluation of the cerebral vasculature. Comparison:   MRI of the cervical spine June 6, 2019 Findings: MRI brain: Prominence of the sulci and ventricles compatible with mild generalized parenchymal volume loss. Foci of white matter signal abnormality within the supratentorial white matter are nonspecific but most compatible with chronic small vessel ischemic changes  in a patient of this age. No acute hemorrhage, mass, mass effect, midline shift, or abnormal extra-axial fluid collection. The cerebellum is within normal limits. There is no diffusion restriction. No susceptibility artifact is identified on the gradient echo sequence. Cranial nerves 7/8 complexes appear grossly unremarkable. The visualized paranasal sinuses and bilateral mastoid air cells are clear.  MRV brain: There is only minimal signal identified within the left transverse sinus and the left sigmoid sinus demonstrates signal but is small in size. There is no associated susceptibility artifact in the region of the left transverse sinus. Moderate postoperative hip     MRI brain: No acute ischemia or acute intracranial process. Generalized parenchymal volume loss and nonspecific white matter findings most compatible with chronic small vessel ischemic changes in a patient of this age. MRV brain: Minimal signal within the left transverse sinus and small size of the left sigmoid sinus on MRV images are findings most likely to represent hypoplastic left transverse sinus or slow flow, given lack of expected signal abnormality within the transverse sinus on other sequences. US CAROTID ARTERY BILATERAL    Result Date: 9/8/2021  US CAROTID ARTERY BILATERAL: 9/8/2021 CLINICAL HISTORY:  stroke . COMPARISON: None available. Grayscale, color and waveform Doppler analysis of the cervical carotid and vertebral arteries was performed. Validated velocity measurements with angiographic measurements, velocity criteria are extrapolated from diameter data as defined by the Society of Radiologist in [address] [place] [address] Drive Radiology 2003; 680;329-203. FINDINGS: There is mild atherosclerotic plaquing of the carotid bulbs without significantly elevated velocities, spectral waveform broadening, or incidental findings of concern identified. There is antegrade flow in both vertebral arteries. ARTERIAL BLOOD FLOW VELOCITY RIGHT Peak Systolic                                              Prox CCA:  104 cm/s             Mid CCA:  91 cm/s              Dist CCA:  69 cm/s              Prox ICA:  63 cm/s               Mid ICA:  90 cm/s            Dist ICA:  62 cm/s             Prox ECA:  107 cm/s             Prox VERT:  77 cm/s              ICA/CCA    1.0, which indicates less than 50% narrowing by velocity criteria.  LEFT Peak Systolic Prox CCA:  60 cm/s             Mid CCA:  56 cm/s              Dist CCA:  59 cm/s Prox ICA:  75 cm/s               Mid ICA:  100 cm/s            Dist ICA:  122 cm/s             Prox ECA:  72 cm/s             Prox VERT:  85 cm/s                 ICA/CCA     2.2, which indicates less than 50% narrowing by maximum velocity criteria. MILD ATHEROSCLEROTIC PLAQUING OF THE CAROTID BULBS WITHOUT EVIDENCE OF A FLOW-LIMITING STENOSIS. Labs    CBC:   Recent Labs     09/07/21  1500   WBC 7.2   HGB 12.7        BMP:    Recent Labs     09/07/21  1500 09/08/21  0537    142   K 4.3 3.8    104   CO2 26 26   BUN 22 18   CREATININE 0.87 0.76   GLUCOSE 91 75     TSH: No results for input(s): TSH in the last 72 hours. Folic Acid: No results for input(s): FOLATE in the last 72 hours. B12:  No results found for: [id number]  Vit. D: No components found for: VITAMIND  Lipids:   Recent Labs     09/08/21  0537   CHOL 105   TRIG 124   HDL 40   LDLCALC 40     Ammonia: No results for input(s): AMMONIA in the last 72 hours. LFT:   Recent Labs     09/07/21  1500 09/08/21  0537   AST 21 23   ALT 11 12   BILITOT 0.3 0.6   ALKPHOS 79 79        Urine: No results for input(s): COLORU, PHUR, LABCAST, WBCUA, RBCUA, MUCUS, YEAST, BACTERIA, CLARITYU, SPECGRAV, LEUKOCYTESUR, UROBILINOGEN, BILIRUBINUR, BLOODU in the last 72 hours. Invalid input(s): NITRATE, GLUCOSEUKETONESUAMORPHOUS       BP (!) 155/69   Pulse 69   Temp 98.4 °F (36.9 °C) (Oral)   Resp 18   Ht 5' 1\" (1.549 m)   Wt 223 lb (101.2 kg)   SpO2 100%   BMI 42.14 kg/m²    Systemic Exam    All the peripheral pulsations are normal    No bruit are heard over the eyeballs, head, neck, abdominal,aorta,iliacs or the femorals. Heart is regular with normal heart sounds and no murmurs. There is no hepatosplenomegaly. The patient has right below-knee amputation she has a prosthesis which she can use easily.       Neurological Examination    On Neurological examination, Earl Naylor is well oriented to day,date, month and the year.    Speech, comprehension and expression is intact     Higher cortical functions are normal for patient's age. Both the pupils are equal and react to light. Visual fields are full    Extraocular movements show mild limitation of the abduction in the left eye on the left lateral gaze. Patient developed diplopia towards the extreme of lateral deviation especially on the left lateral gaze    Face is symmetrical..  There is no facial weakness  She is handling her pharyngeal secretions very well  There was no significant nystagmus seen. She had minimal overshooting of the eyes on the lateral gaze on the rapid eye movements. The rest of the cranial nerves are within normal limits. She has normal muscle tone and normal muscle mass. No fasciculations or involuntary movements are seen. On Carson testing, there is no pronation or drift. The power is normal in all the muscle groups. The deep tendon reflexes are 1+ bilaterally symmetrical.    The plantar responses are extensor on the left side, cannot be tested on the right side. The coordination is normal for the patient's age. No cortical release signs are seen. The patient's gait is possible with a walker after she has a prosthesis for the right leg under supervision. She cannot cooperate for the Romberg patient testing    She has decreased her pinprick and temperature sensation over the elbows and below the left knee. Vibratory sensation decreased in the distal extremities. Proprioception is mildly impaired    IMPRESSION:  The patient is a vertebrobasilar transient ischemic events for the last 2 days. For the last 2 weeks that she has been feeling somewhat weaker. She has a feeling of imbalance for the last several months which got worse on September 6.   Diplopia could be part of a vertebrobasilar ischemic event along with a feeling of imbalance or, dizziness, sensation of showed vertigo dizziness episodes  History of peripheral neuropathy probably due to hypothyroidism  Right Charcot joint she has had a right below-knee amputation. According patient her right foot and ankle were getting infected often  Depression  Overweight  Hypertension  Hyperlipidemia  Right breast carcinoma for which she has a bilateral mastectomy 2017 she did not need chemo or therapy or radiation. She is on tamoxifen. Dr. [place] Doctor is watching her  History of non-ST elevation myocardial infarction she has been on aspirin. History of coronary disease  History of paroxysmal atrial fibrillation  Muscle contraction type headache, Tylenol, Motrin help her      Recommendations: The patient was started on Aggrenox yesterday September 7 she is doing a little better  Additional lab work  MRI of the brain had shown change of small vessel cerebrovascular disease. There is no acute infarction  MR venogram had shown hypoplastic left transverse and sigmoid sinuses. There is no evidence of venous thrombus. No hemorrhages were seen  Patient has a history of hypothyroidism with a peripheral neuropathy, which was sent for any evidence of Graves' eye disease.   There is no evidence of orbital pseudotumor  Patient can go to rehab  Dr. Caio Bo active the patient on tamoxifen, this can be adjusted depending on his input  Thank you for the consult  We shall follow the patient with you      Gabriel Santacruz MD

## 2021-09-08 NOTE — FLOWSHEET NOTE
Spiritual Care Services     Summary of Visit:  Patient was joyful and pleasant to talk to throughout the visit, patient shared with tears in her eyes that she needs to let her Jain know to be praying for her, patient misses going to Jain and seeing her friends, patient shared that her divya gives her hope and peace as she recovers from her procedure, patient shared that she has a son who has down syndrome and is 62, while talking with the patient, Mariah Jeff came up and she stated that she would need to fill out a new HCPOA, left a copy for the patient to review, prayed for the patient before leaving the room,     Spiritual Assessment/Intervention/Outcomes:    Encounter Summary  Services provided to[de-identified] (P) Patient  Referral/Consult From[de-identified] (P) Rounding  Support System: (P) Children, Family members, Zoroastrianism/divya community  Continue Visiting: (P) No  Complexity of Encounter: (P) Moderate  Length of Encounter: (P) 30 minutes  Spiritual Assessment Completed: (P) Yes  Advance Care Planning: (P) Yes  Routine  Type: (P) Initial     Spiritual/Roman Catholic  Type: (P) Spiritual support  Assessment: (P) Approachable, Calm, Tearful, Concerns with suffering  Intervention: (P) Active listening, Explored feelings, thoughts, concerns, Explored coping resources, Nurtured hope, Prayer, Sustaining presence/ Ministry of presence, Discussed illness/injury and it's impact, Discussed relationship with God, Discussed meaning/purpose, Discussed belief system/Adventism practices/divya  Outcome: (P) Receptive, Encouraged, Engaged in conversation, Connection/belonging, Expressed gratitude, Shared life review, Shared reminiscences                    Values / Beliefs  Do you have any ethnic, cultural, sacramental, or spiritual Adventism needs you would like us to be aware of while you are in the hospital?: No    Care Plan:        [id number] [address]   Electronically signed by Vandana Ba on 9/8/21 at 10:20 AM EDT     To reach a  for emotional and spiritual support, place an EPIC consult request.   If a  is needed immediately, dial 0 and ask to page the on-call .

## 2021-09-08 NOTE — PROGRESS NOTES
Patient is alert and oriented. Admission complete and home medications are done. Patient has a headache. Dr. Golden Garcias ordered Tylenol. Patient was incontinent of urine and stool. Patient was cleaned and repositioned in the bed. No breakdown on her skin, but skin is red from moisture. Incontinent wipes were used. Call light with patient. Patient passed her swallow evaluation.

## 2021-09-08 NOTE — PROGRESS NOTES
[facility] OCCUPATIONAL THERAPY EVALUATION - ACUTE     NAME: Red Leyva  : 1945 (64 y.o.)  MRN: [id number]  CODE STATUS: Full Code  [facility]    Date of Service: 2021    Patient Diagnosis(es): Dizziness [R42]   Chief Complaint   Patient presents with    Altered Mental Status     Patient Active Problem List    Diagnosis Date Noted    Hematuria 2017    High risk medication use - 18 OARRS PM&R, 18 OARRS PM&R, 18 Urine Drug Screen positive opiates PM&R, 18 Med Contract PM&R 2013    Dizziness 2021    Malignant neoplasm of peritoneum, 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[facility] 10/27/2020    Obstructive sleep apnea     Current mild episode of major depressive disorder ([address] 2020    Cervical fusion syndrome 2019    Racing heart beat 2018    Chronic pain syndrome 2018    Charcot's arthropathy 2018    Below knee amputation status, right 2018    Moderate episode of recurrent major depressive disorder ([address] 2017    Chronic low back pain with sciatica 2017    Noncompliance - No Show inject 17, No Show F/U 2017    Hematuria, gross     Primary osteoarthritis involving multiple joints 2017    Left breast mass 2016    Anemia 2016    Cancer ([address] 2016    Morbid obesity ([address] 2016    Reactive depression 2016    Sleeping excessive 2016    Cervical spinal cord injury ([address] 06/10/2016    Chronic low back pain 06/10/2016    Other specified postprocedural states 06/10/2016    Fatigue due to treatment 2016    Lobular breast cancer ([address] 10/26/2015    Traumatic amputation of one lower extremity below knee with complication ([address]     Acquired hallux valgus 2015    Complete traumatic amputation at level between right knee and ankle ([address] 2015    Hypothyroid 2015    Melancholia 2015    Complete tear of left rotator cuff 03/16/2015    Generalized osteoarthritis 03/16/2015    Malaise and fatigue 02/17/2015    Complete rotator cuff rupture of left shoulder 12/17/2014    Clinical depression 12/17/2014    Benign essential HTN 12/17/2014    HLD (hyperlipidemia) 12/17/2014    Low back pain with sciatica 12/17/2014    Fibromyalgia muscle pain 12/17/2014    Family history of coronary artery disease 08/29/2014    NSTEMI (non-ST elevated myocardial infarction) ([facility] 08/29/2014    Hx of right BKA (HCC)     Closed fracture of tarsal and metatarsal bones 06/02/2014    Arthritis, neuropathic 12/02/2013    Impaired mobility and activities of daily living 06/12/2013    Obesity 03/15/2013    Obesity (BMI 30-39.9) 02/12/2013    Neck pain on right side     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    Charcot's joint of foot 09/01/2010    Closed dislocation of ankle 09/01/2010    Closed dislocation of foot 09/01/2010    Disorder of peripheral nervous system 09/01/2010    Peripheral neuropathy 09/01/2010    Cervical post-laminectomy syndrome 10/22/2009    Failed back syndrome of lumbar spine 10/22/2009    Postlaminectomy syndrome of cervical region 10/22/2009    Postlaminectomy syndrome of lumbar region 10/22/2009    [facility] and [facility] 06/18/2009    Osteoporosis 02/05/2009    Difficulty walking 12/12/2007    Tibialis tendinitis 12/12/2007    Acquired flat foot 07/10/2007    Arthritis of ankle or foot, degenerative 07/10/2007        Past Medical History:   Diagnosis Date    Arthritis     Blood transfusion     Cancer [facility])     GALLBLADDER 2009, 2011 OMENTUM    Charcot's joint     left foot    Chest pain 7/2/2015    Colitis     DDD (degenerative disc disease), lumbar 2/12/2013    Depression     Disorder of peripheral nervous system 9/1/2010    Dyspnea 7/2/2015    Family history of coronary artery disease 8/29/2014    History of blood transfusion 2011    following chemotherapy    [facility]x of right BKA ([facility]     Hyperlipidemia     Hypertension     Hypothyroid 6/12/2015    [facility] 10[facility]    NSTEMI (non-ST elevated myocardial infarction) ([facility] 8/29/2014    Obesity     Paroxysmal atrial fibrillation ([facility] 8/29/2014    S/P BKA (below knee amputation) [facility])      Past Surgical History:   Procedure Laterality Date    ABDOMEN SURGERY Left 04/11/2017    EXCISION OF CHEST WALL MASS, UPDATE LABS ON ADMIT  performed by Joe Evans MD at [address] BLEPHAROPLASTY  02/05/2013    both eyes    BREAST SURGERY      CARDIAC CATHETERIZATION      COLONOSCOPY  01/01/2010    normal    CYSTOSCOPY W BIOPSY OF BLADDER N/A 06/26/2017    CYSTOSCOPY BLADDER BIOPSY AND FULGURATION performed by Cindy Rivera MD at [address] Bilateral 2012    cataract removal with IOL implants    HYSTERECTOMY      LEG AMPUTATION BELOW KNEE Right 02/01/2011    DR [address] due to CHARCOTS    MASTECTOMY Bilateral 11/16/2015    Bilateral simple mastectomies with right SNB by DR Chacha Cameron    [place] REMOVAL OF BREAST LESION Left 01/24/2017    CORE NEEDLE BIOPSY OF  LEFT BREAST MASS performed by Joe Evans MD at [address]      cervical and lumbar        Restrictions:Fall        Safety Devices: Safety Devices  Safety Devices in place: Yes  Type of devices: All fall risk precautions in place   Initially in place: No    Subjective:Pt pleasant and cooperative. \"until a little over a month ago I was driving to get my groceries. \"       Pain Reassessment: 0/10 pain reported during session.           Prior Level of Function:  Social/Functional History  Lives With: Alone  Type of Home: Apartment  Home Layout: One level  Home Access: Level entry  Bathroom Shower/Tub: Tub/Shower unit  Bathroom Equipment: Tub transfer bench, Grab bars in shower  Bathroom Accessibility: Wheelchair accessible  Home Equipment: Quad cane, Rolling walker, Wheelchair-electric (has right BK prosthesis and Left AFO)  ADL Assistance: Independent  Homemaking Assistance: Independent (warms meals up  -  receives  MOW and groceries delivered)  [employer] Responsibilities: Yes  Meal Prep Responsibility: Secondary  Laundry Responsibility: Primary  Cleaning Responsibility: Secondary  Ambulation Assistance: Independent (last few days uses walker to get around)  Transfer Assistance: Independent  Active : Yes  Additional Comments: received new prosthesis in July and has been falling since - has appointment with prosthesis next week. Pt reports prior to 8/21 she was ambulating with wheeled walker and since then she has used power w/c    OBJECTIVE:     Orientation Status:  Orientation  Overall Orientation Status: Within Functional Limits    Observation:  Observation/Palpation  Posture: Good  Observation: mild flexion noted when seated    Cognition Status:  Cognition  Cognition Comment: Follows commands consistently    Perception Status:       Sensation Status:  Sensation  Overall Sensation Status: Impaired  Light Touch: Partial deficits in the RUE, Partial deficits in the LUE    Vision and Hearing Status:  Vision  Vision: Impaired  Vision Exceptions: Wears glasses for reading  Hearing  Hearing: Exceptions to [facility]  Hearing Exceptions: Hard of hearing/hearing concerns     ROM:   LUE AROM (degrees)  LUE AROM : WFL  Left Hand AROM (degrees)  Left Hand AROM: WFL  RUE AROM (degrees)  RUE AROM : WFL  Right Hand AROM (degrees)  Right Hand AROM: WFL    Strength:  LUE Strength  Gross LUE Strength: WFL  L Hand General: 4/5  RUE Strength  Gross RUE Strength: WFL  R Hand General: 4/5    Coordination, Tone, Quality of Movement:    Tone RUE  RUE Tone: Normotonic  Tone LUE  LUE Tone: Normotonic  Coordination  Movements Are Fluid And Coordinated: No  Coordination and Movement description: Fine motor impairments, Decreased speed, Right UE, Left UE    Hand Dominance:  Hand Dominance  Hand Dominance: Right    ADL Status:  ADL  Feeding: Unable to assess(comment)  Grooming: Setup  UE Bathing: Contact guard assistance  LE Bathing: Minimal assistance  UE Dressing: Stand by assistance  LE Dressing:  Moderate assistance  Toileting: Unable to assess(comment)          Therapy key for assistance levels -   Independent = Pt. is able to perform task with no assistance but may require a device   Stand by assistance = Pt. does not perform task at an independent level but does not need physical assistance, requires verbal cues  Minimal, Moderate, Maximal Assistance = Pt. requires physical assistance (25%, 50%, 75% assist from helper) for task but is able to actively participate in task   Dependent = Pt. requires total assistance with task and is not able to actively participate with task completion     Functional Mobility:          Bed Mobility  Bed mobility  Rolling to Right: Supervision  Supine to Sit: Stand by assistance  Sit to Supine: Stand by assistance  Scooting: Stand by assistance    Seated and Standing Balance:  Balance  Sitting Balance: Supervision  Standing Balance: Unable to assess(comment)    Functional Endurance:  Activity Tolerance  Activity Tolerance: Treatment limited secondary to medical complications (free text)    D/C Recommendations:  OT D/C RECOMMENDATIONS  REQUIRES OT FOLLOW UP: Yes    Equipment Recommendations:       OT Education:        OT Follow Up:  OT D/C RECOMMENDATIONS  REQUIRES OT FOLLOW UP: Yes       Assessment/Discharge Disposition:     Performance deficits / Impairments: Decreased functional mobility , Decreased strength, Decreased endurance, Decreased ADL status, Decreased high-level IADLs, Decreased balance, Decreased fine motor control, Decreased posture  Prognosis: Good  Discharge Recommendations: Continue to assess pending progress  Decision Making: Medium Complexity  History: multiple  Exam: 8 deficits  Assistance / Modification: Min/Mod A    Six Click Score    How much help for putting on and taking off regular lower body clothing?: A Little  How much help for Bathing?: A Little  How much help for Toileting?: None  How much help for putting on and taking off regular upper body clothing?: A Little  How much help for taking care of personal grooming?: None  How much help for eating meals?: None  AM-[facility] Inpatient Daily Activity Raw Score: 21  AM-PAC Inpatient ADL T-Scale Score : 44.27  ADL Inpatient CMS 0-100% Score: 32.79    Plan:  Plan  Times per week: 1-4x/wk  Current Treatment Recommendations: Strengthening, Functional Mobility Training, Endurance Training, Balance Training, Neuromuscular Re-education, Safety Education & Training, Self-Care / ADL    Goals:   Patient will:    - Improve functional endurance to tolerate/complete 15-20 mins of ADL's  - Be independent in UB ADLs   - Be SBA in LB ADLs  - Be SBA in ADL transfers without LOB  - Be SBA in toileting tasks  - Improve bilaterl UE strength and endurance to Fair+ in order to participate in self-care activities as projected.     Patient Goal: Patient goals : TO return to home when ready      Discussed and agreed upon: Yes Comments:     Therapy Time:   OT Individual Minutes  Time In: 1525  Time Out: 1545  Minutes: 20    Eval: 20 minutes     Electronically signed by:    Richar Rattler, OTR/L, OTR/L  4/4/4466, 3:56 PM Electronically signed by TRINITY Butt on 9/9/09 at 3:58 PM EDT

## 2021-09-08 NOTE — H&P
Hospital Medicine  History and Physical    Patient:  Rustam Pal  MRN: [id number]    CHIEF COMPLAINT:    Chief Complaint   Patient presents with    Altered Mental Status       History Obtained From:  Patient, EMR  Primary Care Physician: Uma Gotti DO    HISTORY OF PRESENT ILLNESS:   The patient is a 64 y.o. female who presents with dizziness. Duration of symptoms, present since yesterday morning. Timing: Acute, persistent since presentation. No aggravating relieving factors.   Associated with difficulty speaking and headache    Past Medical History:      Diagnosis Date    Arthritis     Blood transfusion     Cancer [facility])     GALLBLADDER 2009, 2011 OMENTUM    Charcot's joint     left foot    Chest pain 7/2/2015    Colitis     DDD (degenerative disc disease), lumbar 2/12/2013    Depression     Disorder of peripheral nervous system 9/1/2010    Dyspnea 7/2/2015    Family history of coronary artery disease 8/29/2014    History of blood transfusion 2011    following chemotherapy    Hx of right BKA ([address]     Hyperlipidemia     Hypertension     Hypothyroid 6/12/2015    Lobular breast cancer ([address] 10/2015    NSTEMI (non-ST elevated myocardial infarction) ([address] 8/29/2014    Obesity     Paroxysmal atrial fibrillation ([address] 8/29/2014    S/P BKA (below knee amputation) ([address]        Past Surgical History:      Procedure Laterality Date    ABDOMEN SURGERY Left 04/11/2017    EXCISION OF CHEST WALL MASS, UPDATE LABS ON ADMIT  performed by Surya Pimentel MD at [address] BLEPHAROPLASTY  02/05/2013    both eyes    BREAST SURGERY      CARDIAC CATHETERIZATION      COLONOSCOPY  01/01/2010    normal    CYSTOSCOPY W BIOPSY OF BLADDER N/A 06/26/2017    CYSTOSCOPY BLADDER BIOPSY AND FULGURATION performed by Tessie Slade MD at [address] Bilateral 2012    cataract removal with IOL implants    HYSTERECTOMY      LEG AMPUTATION BELOW KNEE Right 02/01/2011    DR [address] due to CHARCOTS    MASTECTOMY Bilateral 11/16/2015    Bilateral simple mastectomies with right SNB by DR Chacha Cameron    [place] REMOVAL OF BREAST LESION Left 01/24/2017    CORE NEEDLE BIOPSY OF  LEFT BREAST MASS performed by Joe Evans MD at [address] [place] [address]      cervical and lumbar       Medications Prior to Admission:    Prior to Admission medications    Medication Sig Start Date End Date Taking? Authorizing Provider   levothyroxine (SYNTHROID) 50 MCG tablet TAKE 1 TABLET BY MOUTH  DAILY 7/6/21   Trisha Pae, DO   DULoxetine (CYMBALTA) 30 MG extended release capsule TAKE 1 CAPSULE BY MOUTH  DAILY 3/5/21   Trisha Pae, DO   atorvastatin (LIPITOR) 20 MG tablet Take 1 tablet by mouth daily 2/16/21 2/11/22  Trisha Pae, DO   gabapentin (NEURONTIN) 600 MG tablet TAKE 1 TABLET BY MOUTH IN  THE MORNING, 2 TABS IN THE  AFTERNOON, AND 2 TABS IN  THE EVENING AS TOLERATED 2/16/21 6/22/21  Trisha Pae, DO   Respiratory Therapy Supplies SHAE New Full face Mask , please do not sent nasal pillow . (She want to try full face Mask )    Dx KEI 1/28/21   Aniyah Giron MD   potassium chloride (KLOR-CON M) 10 MEQ extended release tablet TAKE 1 TABLET BY MOUTH  TWICE DAILY 1/27/21   Kathy Mark MD   carvedilol (COREG) 25 MG tablet TAKE 1 TABLET BY MOUTH  TWICE DAILY 1/11/21   Kathy Mark MD   Blood Pressure KIT 1 Units by Does not apply route as needed (daily monitoring of blood pressure) One electronic sphygmomanometer and cuff for home monitoring of blood pressure and heart rate. Upper arm type of cuff preferred. Adult regular size.  12/17/20   Teresa Miranda MD   Respiratory Therapy Supplies [facility]AE New CPAP, full face  mask and chin strap       Fax [phone] 10/27/20   Aniyah Giron MD   diclofenac (VOLTAREN) 50 MG EC tablet TAKE 1 TABLET BY MOUTH  TWICE DAILY 10/15/20   Trisha Pae, DO   buPROPion (WELLBUTRIN XL) 150 MG extended release tablet TAKE 2 TABLETS BY MOUTH IN  THE MORNING 8/31/20   Carlton Bah MD   furosemide (LASIX) 20 MG tablet Take 1 tablet by mouth daily for 5 days  Patient taking differently: Take 20 mg by mouth daily as needed  7/10/20 6/22/21  Dane Freed MD   CPAP Machine MISC by Does not apply route New CPAP with 8 cm 6/8/20   Lee Petersen MD   acyclovir (ZOVIRAX) 5 % CREA Apply topically as needed 5/1/19   Carlton Bah MD   b complex vitamins capsule Take 1 capsule by mouth daily    Historical Provider, MD   Multiple Vitamins-Minerals (CENTRUM SILVER 50+WOMEN PO) Take by mouth    Historical Provider, MD   tamoxifen (NOLVADEX) 20 MG tablet Take 1 tablet by mouth daily 6/15/17   Historical Provider, MD   Vitamin D (CHOLECALCIFEROL) 1000 UNITS CAPS capsule Take 1,000 Units by mouth daily. Historical Provider, MD       Allergies:  Ciprofloxacin and Oxycontin [oxycodone hcl]    Social History:   TOBACCO:   reports that she has never smoked. She has never used smokeless tobacco.  ETOH:   reports current alcohol use. Family History:       Problem Relation Age of Onset    Heart Disease Mother     Arthritis Mother     Heart Disease Father     Arthritis Father     Cancer Sister         Colon    Thyroid Disease Son    Verl Raw Other Son         Down's syndrome       REVIEW OF SYSTEMS:  Ten systems reviewed and negative except as stated in the HPI    Physical Exam:    Vitals: BP (!) 147/101   Pulse 69   Temp 99.1 °F (37.3 °C) (Oral)   Resp 18   Ht 5' 1\" (1.549 m)   Wt 223 lb (101.2 kg)   SpO2 93%   BMI 42.14 kg/m²   General appearance: alert, appears stated age and cooperative  Skin: Skin color, texture, turgor normal. No rashes or lesions  HEENT: Head: Normocephalic, no lesions, without obvious abnormality. . No dental issues   Neck: no jugular venous distention  Lungs: clear to auscultation bilaterally  Heart: regular rate and rhythm, S1, S2 normal, no murmur, click, rub or gallop  Abdomen: soft, non-tender; bowel sounds normal; no masses,  no organomegaly  Extremities: extremities normal, atraumatic, no cyanosis or edema  Neurologic: Mental status: Alert, oriented, thought content appropriate. Recent Labs     09/07/21  1500   WBC 7.2   HGB 12.7        Recent Labs     09/07/21  1500      K 4.3      CO2 26   BUN 22   CREATININE 0.87   GLUCOSE 91   AST 21   ALT 11   BILITOT 0.3   ALKPHOS 79     Troponin T:   Recent Labs     09/07/21  1500   TROPONINI <0.010       ABGs: No results found for: PHART, PO2ART, HUP9PCC  INR:   Recent Labs     09/07/21  1500   INR 1.1     URINALYSIS:No results for input(s): NITRITE, COLORU, PHUR, LABCAST, WBCUA, RBCUA, MUCUS, TRICHOMONAS, YEAST, BACTERIA, CLARITYU, SPECGRAV, LEUKOCYTESUR, UROBILINOGEN, BILIRUBINUR, BLOODU, GLUCOSEU, AMORPHOUS in the last 72 hours. Invalid input(s): Vikram Windsor  -----------------------------------------------------------------   CT Head WO Contrast    Result Date: 9/7/2021  EXAMINATION:  CT HEAD WO CONTRAST HISTORY:  Dizziness. Altered mental status. Confusion. TECHNIQUE:  Serial axial images without IV contrast were obtained from the vertex to the foramen magnum. Sagittal and coronal reconstructions. All CT scans at this facility use dose modulation, iterative reconstruction, and/or weight based dosing when appropriate to reduce radiation dose to as low as reasonably achievable. COMPARISON:  CT head 4/11/2017. RESULT: Acute change:   No evidence of an acute infarct or other acute parenchymal process. Hemorrhage:    No evidence of acute intracranial hemorrhage. Mass Lesion / Mass Effect:   There is no evidence of an intracranial mass or extraaxial fluid collection. No significant mass effect. Chronic change:   Scattered patchy foci of low attenuation are present within supratentorial white matter which is a nonspecific finding but likely represents mild microvascular ischemia. Parenchyma: There is no significant volume loss for age.   The brain parenchyma is otherwise within normal limits for age. Ventricles: The ventricles are within normal limits of size and configuration for age. Paranasal sinuses and skull base: The visualized paranasal sinuses are grossly clear. Mastoid air cells are clear. The skull base is unremarkable. Bilateral lens replacement surgery. Remote nasal bone fracture deformities, also grossly unchanged      No acute intracranial process or significant interval change from prior. Assessment and Plan   1. Dizziness: Rule out stroke. MRI ordered, antithrombotic, statin as per neurology. 2. Given findings of headache dizziness and difficulty in speech fluency will rule out cerebral vein/sinus venous thrombosis. MRV ordered. Hold tamoxifen until results of testing complete  3. DVT ppx  4. Disposition: Dependent on hospital course. Will discharge once medically stable. SW on board for discharge planning. Patient Active Problem List   Diagnosis Code    Neck pain on right side M54.2    Obesity (BMI 30-39. 9) E66.9    Obesity E66.9    High risk medication use - 01/30/18 OARRS PM&R, 03/26/18 OARRS PM&R, 04/03/18 Urine Drug Screen positive opiates PM&R, 01/31/18 Med Contract PM&R Z79.899    Impaired mobility and activities of daily living Z74.09, Z78.9    Hx of right BKA ([facility] Z89.511    Family history of coronary artery disease Z82.49    NSTEMI (non-ST elevated myocardial infarction) ([facility] I21.4    Arthritis, neuropathic M14.60    Charcot's joint of foot M14.679    Closed dislocation of ankle S93. 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    Disorder of peripheral nervous system G64    Arthritis of ankle or foot, degenerative M19.079    HLD (hyperlipidemia) E78.5    Cervical post-laminectomy syndrome M96.1    Failed back syndrome of lumbar spine M96.1    Low back pain with sciatica M54.40    Fibromyalgia muscle pain M79.7    Malaise and fatigue R53.81, R53.83    Melancholia F32.9    Complete tear of left rotator cuff M75.122    Generalized osteoarthritis M15.9    Peripheral neuropathy G62.9    Osteoporosis M81.0    Postlaminectomy syndrome of cervical region M96.1    Postlaminectomy syndrome of lumbar region M96.1    Tibialis tendinitis M76.829    Hereditary and idiopathic peripheral neuropathy ([facility]) G60.9    Hypothyroid E03.9    Lobular breast cancer ([facility]) C50.919    Traumatic amputation of one lower extremity below knee with complication ([facility]) D53.202F    Acquired hallux valgus M20.10    Fatigue due to treatment R53.83    Anemia D64.9    Cancer ([facility]) C80.1    Cervical spinal cord injury ([facility]) S14.109A    Complete traumatic amputation at level between right knee and ankle ([facility]) F36.240K    Morbid obesity ([facility]) E66.01    Reactive depression F32.9    Sleeping excessive G47.10    Chronic low back pain M54.5, G89.29    Left breast mass N63.20    Other specified postprocedural states Z98.890    Primary osteoarthritis involving multiple joints M89.49    Hematuria R31.9    Hematuria, gross R31.0    Noncompliance - No Show inject 07/17/17, No Show F/U 1/11/18 Z91.19    Chronic low back pain with sciatica M54.40, G89.29    Moderate episode of recurrent major depressive disorder ([facility]) F33.1    Charcot's arthropathy M14.60    Below knee amputation status, right [id number]    Chronic pain syndrome G89.4    Racing heart beat R00.0    Cervical fusion syndrome Q76.1    Current mild episode of major depressive disorder ([address] F32.0    Obstructive sleep apnea G47.33    Malignant neoplasm of peritoneum, 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[facility] C48.2    Dizziness R42       Tong Melendez MD

## 2021-09-08 NOTE — CARE COORDINATION
LSW spoke with the pt to discuss her DC needs. Pt reports multiple falls and she is agreeable to [address] [place] [address] rehab if accepted and covered by her insurance. LSW to follow for transfer to [address] [place], [place] [address]ab.

## 2021-09-08 NOTE — DISCHARGE INSTR - COC
Continuity of Care Form    Patient Name: Jaden Motley   :  1945  MRN:  [id number]    Admit date:  2021  Discharge date:  ***    Code Status Order: Full Code   Advance Directives:     Admitting Physician:  Wisam العراقي MD  PCP: Tamika Jolley DO    Discharging Nurse: [facility] Unit/Room#: W072/J384-97  Discharging Unit Phone Number: ***    Emergency Contact:   Extended Emergency Contact Information  Primary Emergency Contact: Afia Castaneda   [facility] [address] [facility] Phone: [phone]  Work Phone: [phone]  Mobile Phone: [phone]  Relation: Other  Secondary Emergency Contact: [facility] [address] Phone: [phone]  Work Phone: [phone]  Mobile Phone: [phone]  Relation: Child    Past Surgical History:  Past Surgical History:   Procedure Laterality Date    ABDOMEN SURGERY Left 2017    EXCISION OF CHEST WALL MASS, UPDATE LABS ON ADMIT  performed by Addie To MD at [address] [place] [address]. BLEPHAROPLASTY  2013    both eyes    BREAST SURGERY      CARDIAC CATHETERIZATION      COLONOSCOPY  2010    normal    CYSTOSCOPY W BIOPSY OF BLADDER N/A 2017    CYSTOSCOPY BLADDER BIOPSY AND FULGURATION performed by Addison Harris MD at [address] [place] [address] Bilateral     cataract removal with IOL implants    HYSTERECTOMY      LEG AMPUTATION BELOW KNEE Right 2011    DR Yo Robert due to CHARCOTS    MASTECTOMY Bilateral 2015    Bilateral simple mastectomies with right SNB by DR Jean Carlos Shaver    RI REMOVAL OF BREAST LESION Left 2017    CORE NEEDLE BIOPSY OF  LEFT BREAST MASS performed by Addie To MD at [address] [place] [address]      cervical and lumbar       Immunization History:   Immunization History   Administered Date(s) Administered    COVID-19, Moderna, PF, 100mcg/0.5mL 2021, 2021    Hep B Immune Globulin 2005, 03/01/2006, 07/01/2006    Influenza Virus Vaccine 11/10/2006, 11/28/2007, 11/04/2008, 10/20/2012, 12/26/2013, 09/15/2015    Influenza Whole 01/26/2010    Influenza, High Dose (Fluzone 65 yrs and older) 09/15/2015, 09/28/2017, 11/13/2018    Influenza, Quadv, adjuvanted, 65 yrs +, IM, PF (Fluad) 10/23/2020    Influenza, Triv, inactivated, subunit, adjuvanted, IM (Fluad 65 yrs and older) 11/18/2019    Pneumococcal Conjugate 13-valent ([id number]) 09/15/2015    Pneumococcal Conjugate 7-valent (Prevnar7) 01/01/2008    Pneumococcal Polysaccharide ([id number]) 06/26/2014    Tdap (Boostrix, Adacel) 06/17/2013    Tetanus 09/01/2003       Active Problems:  Patient Active Problem List   Diagnosis Code    Neck pain on right side M54.2    Obesity (BMI 30-39. 9) E66.9    Obesity E66.9    High risk medication use - 01/30/18 OARRS PM&R, 03/26/18 OARRS PM&R, 04/03/18 Urine Drug Screen positive opiates PM&R, 01/31/18 Med Contract PM&R Z79.899    Impaired mobility and activities of daily living Z74.09, Z78.9    Hx of right BKA ([facility] Z89.511    Family history of coronary artery disease Z82.49    NSTEMI (non-ST elevated myocardial infarction) ([facility] I21.4    Arthritis, neuropathic M14.60    Charcot's joint of foot M14.679    Closed dislocation of ankle S93. 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    Disorder of peripheral nervous system G64    Arthritis of ankle or foot, degenerative M19.079    HLD (hyperlipidemia) E78.5    Cervical post-laminectomy syndrome M96.1    Failed back syndrome of lumbar spine M96.1    Low back pain with sciatica M54.40    Fibromyalgia muscle pain M79.7    Malaise and fatigue R53.81, R53.83    Melancholia F32.9    Complete tear of left rotator cuff M75.122    Generalized osteoarthritis M15.9    Peripheral neuropathy G62.9    Osteoporosis M81.0    Postlaminectomy syndrome of cervical region M96.1    Postlaminectomy syndrome of lumbar region M96.1    Tibialis tendinitis M76.829    Hereditary and idiopathic peripheral neuropathy ([facility]) G60.9    Hypothyroid E03.9    Lobular breast cancer ([facility] C50.919    Traumatic amputation of one lower extremity below knee with complication ([facility]) K44.773F    Acquired hallux valgus M20.10    Fatigue due to treatment R53.83    Anemia D64.9    Cancer ([facility]) C80.1    Cervical spinal cord injury ([facility] S14.109A    Complete traumatic amputation at level between right knee and ankle ([facility]) Y64.372X    Morbid obesity ([facility]) E66.01    Reactive depression F32.9    Sleeping excessive G47.10    Chronic low back pain M54.5, G89.29    Left breast mass N63.20    Other specified postprocedural states Z98.890    Primary osteoarthritis involving multiple joints M89.49    Hematuria R31.9    Hematuria, gross R31.0    Noncompliance - No Show inject 07/17/17, No Show F/U 1/11/18 Z91.19    Chronic low back pain with sciatica M54.40, G89.29    Moderate episode of recurrent major depressive disorder ([facility]) F33.1    Charcot's arthropathy M14.60    Below knee amputation status, right [id number]    Chronic pain syndrome G89.4    Racing heart beat R00.0    Cervical fusion syndrome Q76.1    Current mild episode of major depressive disorder ([facility] F32.0    Obstructive sleep apnea G47.33    Malignant neoplasm of peritoneum, 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[facility]) C48.2    Dizziness R42       Isolation/Infection:   Isolation          No Isolation        Patient Infection Status     Infection Onset Added Last Indicated Last Indicated By Review Planned Expiration Resolved Resolved By    None active    Resolved    COVID-19 Rule Out 09/01/20 09/01/20 09/01/20 COVID-19 Ambulatory (Ordered)   09/03/20 Rule-Out Test ***    Patient's personal belongings (please select all that are sent with patient):  Glasses, Dentures upper and lower, prosthetic leg, clothing, black boots, cell phone    RN SIGNATURE:  {Esignature:[id number]}    CASE MANAGEMENT/SOCIAL WORK SECTION    Inpatient Status Date: ***    Readmission Risk Assessment Score:  Readmission Risk              Risk of Unplanned Readmission:  0           Discharging to Facility/ Agency   · Name: [facility]  · Address:  · Phone:  · Fax:    Dialysis Facility (if applicable)   · Name:  · Address:  · Dialysis Schedule:  · Phone:  · Fax:    / signature: Electronically signed by MARTINEZ Shetty on 9/8/21 at 4:23 PM EDT    PHYSICIAN SECTION    Prognosis: {Prognosis:2886869742}    Condition at Discharge: [address] [place] [address] Patient Condition:[id number]}    Rehab Potential (if transferring to Rehab): {Prognosis:1027760659}    Recommended Labs or Other Treatments After Discharge: ***    Physician Certification: I certify the above information and transfer of Earl Naylor  is necessary for the continuing treatment of the diagnosis listed and that she requires {Admit to Appropriate Level of Care:65625} for {GREATER/LESS:426605679} 30 days.      Update Admission H&P: {CHP DME Changes in EVY[place]:988118972}    PHYSICIAN SIGNATURE:  {Esignature:[id number]}

## 2021-09-09 PROBLEM — R26.9 GAIT ABNORMALITY: Status: ACTIVE | Noted: 2021-09-09

## 2021-09-09 PROBLEM — R29.818 DIFFICULTY BALANCING: Status: ACTIVE | Noted: 2021-09-09

## 2021-09-09 PROCEDURE — 6360000002 HC RX W HCPCS: Performed by: INTERNAL MEDICINE

## 2021-09-09 PROCEDURE — 6370000000 HC RX 637 (ALT 250 FOR IP): Performed by: NURSE PRACTITIONER

## 2021-09-09 PROCEDURE — 92523 SPEECH SOUND LANG COMPREHEN: CPT

## 2021-09-09 PROCEDURE — 6370000000 HC RX 637 (ALT 250 FOR IP): Performed by: SPECIALIST

## 2021-09-09 PROCEDURE — 97535 SELF CARE MNGMENT TRAINING: CPT

## 2021-09-09 PROCEDURE — 99222 1ST HOSP IP/OBS MODERATE 55: CPT | Performed by: INTERNAL MEDICINE

## 2021-09-09 PROCEDURE — 6370000000 HC RX 637 (ALT 250 FOR IP): Performed by: INTERNAL MEDICINE

## 2021-09-09 PROCEDURE — 1210000000 HC MED SURG R&B

## 2021-09-09 PROCEDURE — 97116 GAIT TRAINING THERAPY: CPT

## 2021-09-09 PROCEDURE — 2580000003 HC RX 258: Performed by: INTERNAL MEDICINE

## 2021-09-09 PROCEDURE — 92610 EVALUATE SWALLOWING FUNCTION: CPT

## 2021-09-09 PROCEDURE — 99221 1ST HOSP IP/OBS SF/LOW 40: CPT | Performed by: PHYSICAL MEDICINE & REHABILITATION

## 2021-09-09 RX ORDER — ASPIRIN 81 MG/1
81 TABLET ORAL DAILY
Status: DISCONTINUED | OUTPATIENT
Start: 2021-09-09 | End: 2021-09-10 | Stop reason: HOSPADM

## 2021-09-09 RX ADMIN — Medication 1000 UNITS: at 10:30

## 2021-09-09 RX ADMIN — ASPRIN AND EXTENDED-RELEASE DIPYRIDAMOLE 1 CAPSULE: 25; 200 CAPSULE ORAL at 22:25

## 2021-09-09 RX ADMIN — ASPIRIN 81 MG: 81 TABLET, COATED ORAL at 13:30

## 2021-09-09 RX ADMIN — IBUPROFEN 600 MG: 400 TABLET ORAL at 18:54

## 2021-09-09 RX ADMIN — ENOXAPARIN SODIUM 40 MG: 40 INJECTION SUBCUTANEOUS at 10:31

## 2021-09-09 RX ADMIN — BUPROPION HYDROCHLORIDE 300 MG: 300 TABLET, FILM COATED, EXTENDED RELEASE ORAL at 10:30

## 2021-09-09 RX ADMIN — GABAPENTIN 600 MG: 300 CAPSULE ORAL at 10:30

## 2021-09-09 RX ADMIN — ASPRIN AND EXTENDED-RELEASE DIPYRIDAMOLE 1 CAPSULE: 25; 200 CAPSULE ORAL at 10:29

## 2021-09-09 RX ADMIN — GABAPENTIN 600 MG: 300 CAPSULE ORAL at 18:50

## 2021-09-09 RX ADMIN — IBUPROFEN 600 MG: 400 TABLET ORAL at 10:30

## 2021-09-09 RX ADMIN — ACETAMINOPHEN 650 MG: 325 TABLET ORAL at 04:02

## 2021-09-09 RX ADMIN — IBUPROFEN 600 MG: 400 TABLET ORAL at 02:31

## 2021-09-09 RX ADMIN — SODIUM CHLORIDE, PRESERVATIVE FREE 10 ML: 5 INJECTION INTRAVENOUS at 13:35

## 2021-09-09 RX ADMIN — CARVEDILOL 25 MG: 25 TABLET, FILM COATED ORAL at 18:50

## 2021-09-09 RX ADMIN — SODIUM CHLORIDE, PRESERVATIVE FREE 10 ML: 5 INJECTION INTRAVENOUS at 20:59

## 2021-09-09 RX ADMIN — LEVOTHYROXINE SODIUM 50 MCG: 0.05 TABLET ORAL at 06:46

## 2021-09-09 RX ADMIN — GABAPENTIN 600 MG: 300 CAPSULE ORAL at 13:30

## 2021-09-09 RX ADMIN — DULOXETINE HYDROCHLORIDE 30 MG: 30 CAPSULE, DELAYED RELEASE PELLETS ORAL at 18:50

## 2021-09-09 RX ADMIN — ATORVASTATIN CALCIUM 80 MG: 80 TABLET, FILM COATED ORAL at 20:59

## 2021-09-09 RX ADMIN — CARVEDILOL 25 MG: 25 TABLET, FILM COATED ORAL at 10:31

## 2021-09-09 RX ADMIN — Medication 1 CAPSULE: at 10:29

## 2021-09-09 RX ADMIN — ACETAMINOPHEN 500 MG: 500 TABLET ORAL at 08:40

## 2021-09-09 RX ADMIN — ACETAMINOPHEN 500 MG: 500 TABLET ORAL at 20:59

## 2021-09-09 RX ADMIN — IBUPROFEN 600 MG: 400 TABLET ORAL at 13:31

## 2021-09-09 RX ADMIN — GABAPENTIN 600 MG: 300 CAPSULE ORAL at 22:24

## 2021-09-09 ASSESSMENT — PAIN DESCRIPTION - FREQUENCY: FREQUENCY: CONTINUOUS

## 2021-09-09 ASSESSMENT — ENCOUNTER SYMPTOMS
DIARRHEA: 0
BACK PAIN: 1
SORE THROAT: 0
VOICE CHANGE: 0
STRIDOR: 0
COLOR CHANGE: 0
SHORTNESS OF BREATH: 1
EYE REDNESS: 0
WHEEZING: 0
ABDOMINAL DISTENTION: 0
VOMITING: 0
ANAL BLEEDING: 0
COUGH: 0
TROUBLE SWALLOWING: 0
FACIAL SWELLING: 0
PHOTOPHOBIA: 0
NAUSEA: 0
APNEA: 0
CHEST TIGHTNESS: 0
ABDOMINAL PAIN: 0
BLOOD IN STOOL: 0
EYE PAIN: 0
CONSTIPATION: 1
SHORTNESS OF BREATH: 0

## 2021-09-09 ASSESSMENT — PAIN DESCRIPTION - PAIN TYPE: TYPE: CHRONIC PAIN

## 2021-09-09 ASSESSMENT — PAIN SCALES - GENERAL
PAINLEVEL_OUTOF10: 5
PAINLEVEL_OUTOF10: 7
PAINLEVEL_OUTOF10: 1
PAINLEVEL_OUTOF10: 5
PAINLEVEL_OUTOF10: 2
PAINLEVEL_OUTOF10: 0
PAINLEVEL_OUTOF10: 3
PAINLEVEL_OUTOF10: 1

## 2021-09-09 ASSESSMENT — PAIN DESCRIPTION - LOCATION: LOCATION: BACK;NECK

## 2021-09-09 ASSESSMENT — PAIN DESCRIPTION - ORIENTATION: ORIENTATION: LOWER;POSTERIOR

## 2021-09-09 ASSESSMENT — PAIN DESCRIPTION - DESCRIPTORS: DESCRIPTORS: ACHING

## 2021-09-09 NOTE — CARE COORDINATION
Inpatient Rehab referral received. Met with patient and explained [facility] Acute Inpatient Rehab program, all questions answered and Freedom of choice provided. Patient requesting admit to [facility] for strengthening, states she was on Rehab 10 years ago after her BKA and did well. Motivated to return home. PM&R consult pending. Will follow.   Electronically signed by Malcolm Kat RN on 9/9/21 at 11:13 AM EDT

## 2021-09-09 NOTE — CONSULTS
Hematology/Oncology Consult  Encounter Date: 2021 1:39 PM    Ms. Maicol Cedillo is a 64 y.o. female  : 1945  MRN: [id number]  Acct Number: [de-identified]  Requesting Provider: Bela Hardy MD    Reason for request: History of carcinoma of breast on Tamoxifen. Patient with symptoms suggestive of TIA      CONSULTANT: Debra Jeffrey MD    HPI: Mrs. Irina Canela is a 64years old  female who is being seen in consultation at the request of Dr. Jim Baker regarding Adjuvant endocrine therapy in the context of TIA. She was diagnosed to have Invasive Lobular carcinoma of Right breast in  when mammograms showed abnormal findings . There was atypical ductal hyperplasia of left breast . She was treated with Adjuvant hormonal therapy consisting of Anastrozole in the beginning after she recovered from Bilateral mastectomy. She could not tolerate Anastrozole . Hence she was started on Exemestane. She did not experience any side effects but she could not afford it any more. Exemestane was discontinued in . She was started on Tamoxifen in 2017 and she has had no side effects. She presents with ataxia , expressive aphasia and dizziness . Neurology has seen her . TIA is considered a strong possibility . She is being treated with Aspirin and DVT prophylaxis . Tamoxifen has been on hold . There is some improvement of her neurologic symptoms . Her other medical problems include Hx of papillary carcinoma of peritoneum diagnosed in  for which she had debulking surgery  Followed by chemotherapy. She was diagnosed to have CA of Gallbladder  In . There has been no recurrence of both cancers so far. Patient Active Problem List   Diagnosis    Neck pain on right side    Obesity (BMI 30-39. 9)    Obesity    High risk medication use - 18 OARRS PM&R, 18 OARRS PM&R, 18 Urine Drug Screen positive opiates PM&R, 18 Med Contract PM&R    Impaired mobility and activities of daily living  Hx of right BKA ([address]    Family history of coronary artery disease    NSTEMI (non-ST elevated myocardial infarction) (HCC)    Arthritis, neuropathic    Charcot's joint of foot    Closed dislocation of ankle    Closed dislocation of foot    Complete rotator cuff rupture of left shoulder    Clinical depression    Difficulty walking    Benign essential HTN    Acquired flat foot    Closed fracture of tarsal and metatarsal bones    Myogenic ptosis    Disorder of peripheral nervous system    Arthritis of ankle or foot, degenerative    HLD (hyperlipidemia)    Cervical post-laminectomy syndrome    Failed back syndrome of lumbar spine    Low back pain with sciatica    Fibromyalgia muscle pain    Malaise and fatigue    Melancholia    Complete tear of left rotator cuff    Generalized osteoarthritis    Peripheral neuropathy    Osteoporosis    Postlaminectomy syndrome of cervical region    Postlaminectomy syndrome of lumbar region    Tibialis tendinitis    Hereditary and idiopathic peripheral neuropathy ([address]    Hypothyroid    Lobular breast cancer ([address]    Traumatic amputation of one lower extremity below knee with complication ([address]    Acquired hallux valgus    Fatigue due to treatment    Anemia    Cancer ([address]    Cervical spinal cord injury ([address]    Complete traumatic amputation at level between right knee and ankle ([address]    Morbid obesity ([address]    Reactive depression    Sleeping excessive    Chronic low back pain    Left breast mass    Other specified postprocedural states    Primary osteoarthritis involving multiple joints    Hematuria    Hematuria, gross    Noncompliance - No Show inject 07/17/17, No Show F/U 1/11/18    Chronic low back pain with sciatica    Moderate episode of recurrent major depressive disorder (HCC)    Charcot's arthropathy    Below knee amputation status, right    Chronic pain syndrome    Racing heart beat    Cervical fusion syndrome    Current mild episode of major depressive disorder ([address]    Obstructive sleep apnea    Malignant neoplasm of peritoneum, 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[address]    Dizziness    Gait abnormality    Difficulty balancing     Past Medical History:   Diagnosis Date    Arthritis     Blood transfusion     Cancer [facility])     GALLBLADDER 2009, 2011 OMENTUM    Charcot's joint     left foot    Chest pain 7/2/2015    Colitis     DDD (degenerative disc disease), lumbar 2/12/2013    Depression     Disorder of peripheral nervous system 9/1/2010    Dyspnea 7/2/2015    Family history of coronary artery disease 8/29/2014    History of blood transfusion 2011    following chemotherapy    [facility] of [facility]     Hyperlipidemia     Hypertension     Hypothyroid 6/12/2015    Lobular breast cancer ([facility] 10/2015    NSTEMI (non-ST elevated myocardial infarction) ([facility] 8/29/2014    Obesity     Paroxysmal atrial fibrillation ([facility] 8/29/2014    S/P BKA (below knee amputation) ([facility])      Past surgical History :     SURGERYLeft 04/11/2017  71y EXCISION OF CHEST WALL MASS,  APPENDECTOMY APPENDECTOMY   BACK SURGERY BACK  BLEPHAROPLASTY [id number] eyes   BREAST SURGERY    CARDIAC CATHETERIZATION  COLONOSCOPY    CYSTOSCOPY W BIOPSY OF BLADDER 06/26/2017   CYSTOSCOPY BLADDER BIOPSY AND FULGURATION    EYE SURGERY EYE SURGERY Bilateral [id number] - 67y cataract removal with IOL implants   HYSTERECTOMY   LEG AMPUTATION BELOW KNEE Kelsey Phan 02/01/201167yDR LASHONDA due to CHARCOTS    MASTECTOMY Bilateral 11/16/2015 70y    REMOVAL OF BREAST LESION  Left 01/24/2017 71y   CORE NEEDLE BIOPSY OF  LEFT BREAST MASS  SKIN BIOPSY    SPINE SURGERY cervical and lumbar    Family History   Problem Relation Age of Onset    Heart Disease Mother     Arthritis Mother     Heart Disease Father     Arthritis Father     Cancer Sister         Colon    Thyroid Disease Son     Other Son         Down's syndrome     Social History     Socioeconomic History  Marital status:      Spouse name: Not on file    Number of children: 2    Years of education: Not on file    Highest education level: Not on file   Occupational History    Occupation: retired   Tobacco Use    Smoking status: Never Smoker    Smokeless tobacco: Never Used   Vaping Use    Vaping Use: Never used   Substance and Sexual Activity    Alcohol use: Yes     Comment: social    Drug use: No    Sexual activity: Never   Other Topics Concern    Not on file   Social History Narrative    Lives With: Alone, dtr in [address], disabled son with Marybeth Feeling is in a local group home    Type of Home: [address]: One level, Level entry    Bathroom Shower/Tub: Tub/Shower unit, Equipment: Tub transfer bench, Grab bars in shower    Bathroom Accessibility: Wheelchair accessible    Home Equipment: Quad cane, Rolling walker, Wheelchair-electric (has right BK prosthesis and Left AFO)    ADL Assistance: Independent    Homemaking Assistance: Independent (warms meals up  -  receives  MOW and groceries delivered)    [employer] Responsibilities: Yes, Meal Prep Responsibility: Secondary    Laundry Responsibility: Primary, Cleaning Responsibility: Secondary    Ambulation Assistance: Independent (last few days uses walker to get around), Transfer Assistance: Independent    Active : Yes    Additional Comments: received new prosthesis in July and has been falling since - has appointment with prosthesis next week.   Pt reports prior to 8/21 she was ambulating with wheeled walker and since then she has used power w/c    Retired --still works a day a week as a  [address] Strain: Low Risk     Difficulty of Paying Living Expenses: Not hard at [employer] Insecurity: No Food Insecurity    Worried About [address] [facility] in the Last Year: Never true    [address] in the Last Year: Never true Transportation Needs:     Lack of Transportation (Medical):      Lack of Transportation (Non-Medical):    Physical Activity:     Days of Exercise per Week:     Minutes of Exercise per Session:    Stress:     Feeling of Stress :    Social Connections:     Frequency of Communication with Friends and Family:     Frequency of Social Gatherings with Friends and Family:     Attends [employer] Services:     Active Member of Clubs or Organizations:     Attends Club or Organization Meetings:     Marital Status:    Intimate Partner Violence:     Fear of Current or Ex-Partner:     Emotionally Abused:     Physically Abused:     Sexually Abused:           Current Facility-Administered Medications   Medication Dose Route Frequency Provider Last Rate Last Admin    aspirin EC tablet 81 mg  81 mg Oral Daily Althea Kingston MD   81 mg at 09/09/21 1330    acetaminophen (TYLENOL) tablet 650 mg  650 mg Oral Q6H PRN Renae Paulino DO   650 mg at 09/09/21 0402    b complex vitamins capsule 1 capsule  1 capsule Oral Daily Nicomadieo Enn-Roche, APRN - CNP   1 capsule at 09/09/21 1029    buPROPion (WELLBUTRIN XL) extended release tablet 300 mg  300 mg Oral Daily Nicoletto Bolzan-Roche, APRN - CNP   300 mg at 09/09/21 1030    carvedilol (COREG) tablet 25 mg  25 mg Oral BID  Nicoletto Bolzan-Roche, APRN - CNP   25 mg at 09/09/21 1031    DULoxetine (CYMBALTA) extended release capsule 30 mg  30 mg Oral QPM Nicoletto Bolzan-Roche, APRN - CNP   30 mg at 09/08/21 1733    levothyroxine (SYNTHROID) tablet 50 mcg  50 mcg Oral Daily Nicoletto Bolzan-Roche, APRN - CNP   50 mcg at 09/09/21 0646    Vitamin D (CHOLECALCIFEROL) tablet 1,000 Units  1,000 Units Oral Daily Nicoletto Bolzan-Roche, APRN - CNP   1,000 Units at 09/09/21 1030    ibuprofen (ADVIL;MOTRIN) tablet 600 mg  600 mg Oral TID  Nicoletto Bolzan-Roche, APRN - CNP   600 mg at 09/09/21 1331    gabapentin (NEURONTIN) capsule 600 mg  600 mg Oral 4x Daily Hernan Pierce Castillo MD   600 mg at 09/09/21 1330    aspirin-dipyridamole (AGGRENOX)  MG per extended release capsule 1 capsule  1 capsule Oral BID Irma Sanders MD   1 capsule at 09/09/21 1029    acetaminophen (TYLENOL) tablet 500 mg  500 mg Oral BID Irma Sanders MD   500 mg at 09/09/21 0840    hydrALAZINE (APRESOLINE) injection 10 mg  10 mg IntraVENous Q4H PRN Jn Paulino,         sodium chloride flush 0.9 % injection 5-40 mL  5-40 mL IntraVENous 2 times per day Moises Baumgarten, MD   10 mL at 09/09/21 1335    sodium chloride flush 0.9 % injection 5-40 mL  5-40 mL IntraVENous PRN Moises Baumgarten, MD        0.9 % sodium chloride infusion  25 mL IntraVENous PRN Moises Baumgarten, MD        ondansetron (ZOFRAN-ODT) disintegrating tablet 4 mg  4 mg Oral Q8H PRN Moises Baumgarten, MD        Or    ondansetron [facility]) injection 4 mg  4 mg IntraVENous Q6H PRN Moises Baumgarten, MD        polyethylene glycol (GLYCOLAX) packet 17 g  17 g Oral Daily PRN Moises Baumgarten, MD        enoxaparin (LOVENOX) injection 40 mg  40 mg SubCUTAneous Daily Moises Baumgarten, MD   40 mg at 09/09/21 1031    atorvastatin (LIPITOR) tablet 80 mg  80 mg Oral Nightly Moises Baumgarten, MD   80 mg at 09/08/21 2117    labetalol (NORMODYNE;TRANDATE) injection 10 mg  10 mg IntraVENous Q10 Min PRN Moises Baumgarten, MD         [unfilled]  Allergies   Allergen Reactions    Ciprofloxacin Itching    Oxycontin [Oxycodone Hcl] Itching        Review of Systems is negative except for symptoms mentioned in HPI    PHYSICAL EXAMINATION:   VITAL SIGNS: BP (!) 141/89   Pulse 65   Temp 98.6 °F (37 °C) (Oral)   Resp 18   Ht 5' 1\" (1.549 m)   Wt 223 lb (101.2 kg)   SpO2 97%   BMI 42.14 kg/m²     (2) Ambulatory and capable of self care, unable to carry out work activity, up and about > 50% or waking hours    GENERAL: In no acute distress, well- nourished, well- developed,alert and oriented to person place and time.   SKIN: Warm and dry, withoutjaundice, ecchymoses, or petechiae. HEENT: Normocephalic, Conjunctivae pinkish, sclera anicteric, oral mucosa moist without lesion or exudate in the visible oral cavity or oropharynx, tongue mid-line with good mobility and no deviation with extension. NODES: No palpable adenopathy in the neck Levels I-V, bilateral   Supraclavicular fossae, axillary chains, or inguinal regions. LUNGS: Good inspiratory effort, no accessory muscle use, clear bilaterally, no focal wheeze, rales or rhonchi. CARDIAC: Regular rate and rhythm, without murmurs, rubs or gallops. ABDOMINAL: Normal bowel soundspresent, soft, non-tender, no mass or  organomegaly. MUSKL: No muscle wasting . GENITALS: Deferred  RECTAL: Deferred  EXTREMITIES: Below knee amputation of Right leg   NEUROLOGIC: Gait normal. No grossly apparent focal deficits.     LAB RESULTS:  Recent Results (from the past 24 hour(s))   CBC    Collection Time: 09/08/21  4:53 PM   Result Value Ref Range    WBC 7.1 4.8 - 10.8 K/uL    RBC 4.36 4.20 - 5.40 M/uL    Hemoglobin 12.9 12.0 - 16.0 g/dL    Hematocrit 39.0 37.0 - 47.0 %    MCV 89.4 82.0 - 100.0 fL    MCH 29.6 27.0 - 31.3 pg    MCHC 33.2 33.0 - 37.0 %    RDW 13.7 11.5 - 14.5 %    Platelets 191 289 - 824 K/uL   Hemoglobin A1C    Collection Time: 09/08/21  4:53 PM   Result Value Ref Range    Hemoglobin A1C 5.5 4.8 - 5.9 %   Vitamin B12 & Folate    Collection Time: 09/08/21  4:53 PM   Result Value Ref Range    Vitamin B-12 472 232 - 1245 pg/mL    Folate >20.0 7.3 - 26.1 ng/mL   TSH without Reflex    Collection Time: 09/08/21  4:53 PM   Result Value Ref Range    TSH 1.280 0.440 - 3.860 uIU/mL     Recent Labs     09/08/21  0537   GLUCOSE 75      9/8/2021  6:58 AM - Javad, Chpo Incoming Lab Results From Soft    Component Value Ref Range & Units Status Collected Lab   Sodium 142  135 - 144 mEq/L Final 09/08/2021  5:37 AM [address] [facility]   Potassium reflex Magnesium 3.8  3.4 - 4.9 mEq/L Final 09/08/2021  5:37 AM [address] [facility] Chloride 104  95 - 107 mEq/L Final 09/08/2021  5:37 AM [address] [facility] [facility]   CO2 26  20 - 31 mEq/L Final 09/08/2021  5:37 AM  - [facility]   Anion Gap 12  9 - 15 mEq/L Final 09/08/2021  5:37 AM [address] [facility] [facility]   Glucose 75  70 - 99 mg/dL Final 09/08/2021  5:37 AM [address] [facility]   BUN 18  8 - 23 mg/dL Final 09/08/2021  5:37 AM [address] [facility]   CREATININE 0.76  0.50 - 0.90 mg/dL Final 09/08/2021  5:37 AM [facility] - [facility]   GFR Non- >60.0  >60 Final 09/08/2021  5:37 AM [facility] - [facility]   >60 mL/min/1.73m2 EGFR, calc. for ages 25 and older using the   MDRD formula (not corrected for weight), is valid for stable   renal function. GFR  >60.0  >60 Final 09/08/2021  5:37 AM [facility] - [facility]   >60 mL/min/1.73m2 EGFR, calc. for ages 25 and older using the   MDRD formula (not corrected for weight), is valid for stable   renal function.     Calcium 9.5  8.5 - 9.9 mg/dL Final 09/08/2021  5:37 AM [facility] - [facility]   Total Protein 6.1Low   6.3 - 8.0 g/dL Final 09/08/2021  5:37 AM [facility] - [facility]   Albumin 3.9  3.5 - 4.6 g/dL Final 09/08/2021  5:37 AM [facility] - [facility]   Total Bilirubin 0.6  0.2 - 0.7 mg/dL Final 09/08/2021  5:37 AM [facility]   Alkaline Phosphatase 79  40 - 130 U/L Final 09/08/2021  5:37 AM [address] [place] [facility]   ALT 12  0 - 33 U/L Final 09/08/2021  5:37 AM [facility] - [facility]   AST 23  0 - 35 U/L Final 09/08/2021  5:37 AM [facility] - [facility]   Globulin 2.2Low   2.3 - 3.5 g/dL Final 09/08/2021  5:37 AM  [facility]   Testing Performed By      Pathology:     RADIOLOGY RESULTS:  Echocardiogram complete 2D with doppler with color    Result Date: 9/8/2021  Transthoracic Echocardiography Report (TTE)  Demographics   Patient Name     Daniela Christian Gender                 Female   Patient Number   [id number]        Race                    Ethnicity   Visit Number     [id number]       Room Number            W413   Corporate ID                     Date of Study          09/08/2021   Accession Number [id number]      Referring Physician   Date of Birth    1945      Sonographer            Jennett Dubin   Age              64 year(s)      Interpreting Physician Ray Ham MD  Procedure Type of Study   TTE procedure:ECHO COMPLETE 2D W/DOP W/COLOR. Procedure Date Date: 09/08/2021 Start: 10:06 AM Study Location: Portable Technical Quality: Adequate visualization Indications:Stroke. Patient Status: Routine Height: 61 inches Weight: 223 pounds BSA: 1.98 m^2 BMI: 42.14 kg/m^2 BP: 178/81 mmHg Allergies   - Other allergy:(Cipro, Oxycontin). Conclusions   Summary  Normal left ventricle structure and function. Left ventricular ejection fraction is visually estimated at 65%. Moderate concentric LVH with wall thickness 13mm. DUST without LVOT obstruction  Diastolic dysfunction  Normal right ventricle structure and function. Normal right ventricle systolic pressure. No evidence of pericardial effusion. Normal valvular structure and function. No significant regurgitant or stenotic valvular lesions. Signature   ----------------------------------------------------------------  Electronically signed by Brigid Nolan MD(Interpreting  physician) on 09/08/2021 05:01 PM  ----------------------------------------------------------------   Findings  Left Ventricle Normal left ventricle structure and function. Left ventricular ejection fraction is visually estimated at 65%. Moderate concentric LVH with wall thickness 13mm. DUST without LVOT obstruction Diastolic dysfunction Right Ventricle Normal right ventricle structure and function. Normal right ventricle systolic pressure. Left Atrium Normal left atrium. Right Atrium Normal right atrium.  Mitral Valve Normal mitral valve LVOT   Peak Velocity: 0.96 m/s              Mean Velocity: 0.65 m/s  Peak Gradient: 2.86 mmHg             Mean Gradient: 1.82 mmHg  LVOT Diameter: 1.88 cm               LVOT VTI: 22.35 cm  Structures  Left Atrium   LA Volume/Index: 66.8 ml /34 m^2             LA Area: 23.15 cm^2   Left Ventricle   Diastolic Dimension: 4.1 cm          Systolic Dimension: 2.9 cm  Septum Diastolic: 7.21 cm            Septum Systolic: 3.48 cm  PW Diastolic: 4.70 cm                PW Systolic: 1.58 cm                                       FS: 29.3 %  LV EDV/LV EDV Index: 74.39 ml/38 m^2 LV ESV/LV ESV Index: 32.21 ml/16 m^2  EF Calculated: 56.7 %                LV Length: 7.99 cm   LVOT Diameter: 1.88 cm   Right Atrium   RA Systolic Pressure: 5 mmHg   Right Ventricle   Diastolic Dimension: 2.7 cm                                   RV Systolic Pressure: 64.28 mmHg  Aorta/ Miscellaneous Aorta   LVOT Diameter: 1.88 cm      CT Head WO Contrast    Result Date: 9/7/2021  EXAMINATION:  CT HEAD WO CONTRAST HISTORY:  Dizziness. Altered mental status. Confusion. TECHNIQUE:  Serial axial images without IV contrast were obtained from the vertex to the foramen magnum. Sagittal and coronal reconstructions. All CT scans at this facility use dose modulation, iterative reconstruction, and/or weight based dosing when appropriate to reduce radiation dose to as low as reasonably achievable. COMPARISON:  CT head 4/11/2017. RESULT: Acute change:   No evidence of an acute infarct or other acute parenchymal process. Hemorrhage:    No evidence of acute intracranial hemorrhage. Mass Lesion / Mass Effect:   There is no evidence of an intracranial mass or extraaxial fluid collection. No significant mass effect. Chronic change:   Scattered patchy foci of low attenuation are present within supratentorial white matter which is a nonspecific finding but likely represents mild microvascular ischemia. Parenchyma: There is no significant volume loss for age.   The brain parenchyma is otherwise within normal limits for age. Ventricles: The ventricles are within normal limits of size and configuration for age. Paranasal sinuses and skull base: The visualized paranasal sinuses are grossly clear. Mastoid air cells are clear. The skull base is unremarkable. Bilateral lens replacement surgery. Remote nasal bone fracture deformities, also grossly unchanged      No acute intracranial process or significant interval change from prior. MRA HEAD WO CONTRAST    Result Date: 9/8/2021  EXAM: MRI of the brain without contrast MRV of the brain without contrast History: Stroke. Dizziness. Headache. Technique: Multiplanar multisequence MRI of the brain was performed without contrast. Axial 3-D time-of-flight images of the brain were obtained without contrast. 3-D volume rendered images were performed for evaluation of the cerebral vasculature. Comparison:   MRI of the cervical spine June 6, 2019 Findings: MRI brain: Prominence of the sulci and ventricles compatible with mild generalized parenchymal volume loss. Foci of white matter signal abnormality within the supratentorial white matter are nonspecific but most compatible with chronic small vessel ischemic changes  in a patient of this age. No acute hemorrhage, mass, mass effect, midline shift, or abnormal extra-axial fluid collection. The cerebellum is within normal limits. There is no diffusion restriction. No susceptibility artifact is identified on the gradient echo sequence. Cranial nerves 7/8 complexes appear grossly unremarkable. The visualized paranasal sinuses and bilateral mastoid air cells are clear. MRV brain: There is only minimal signal identified within the left transverse sinus and the left sigmoid sinus demonstrates signal but is small in size. There is no associated susceptibility artifact in the region of the left transverse sinus.  Moderate postoperative hip     MRI brain: No acute ischemia or acute intracranial process. Generalized parenchymal volume loss and nonspecific white matter findings most compatible with chronic small vessel ischemic changes in a patient of this age. MRV brain: Minimal signal within the left transverse sinus and small size of the left sigmoid sinus on MRV images are findings most likely to represent hypoplastic left transverse sinus or slow flow, given lack of expected signal abnormality within the transverse sinus on other sequences. MRI BRAIN WO CONTRAST    Result Date: 9/8/2021  EXAM: MRI of the brain without contrast MRV of the brain without contrast History: Stroke. Dizziness. Headache. Technique: Multiplanar multisequence MRI of the brain was performed without contrast. Axial 3-D time-of-flight images of the brain were obtained without contrast. 3-D volume rendered images were performed for evaluation of the cerebral vasculature. Comparison:   MRI of the cervical spine June 6, 2019 Findings: MRI brain: Prominence of the sulci and ventricles compatible with mild generalized parenchymal volume loss. Foci of white matter signal abnormality within the supratentorial white matter are nonspecific but most compatible with chronic small vessel ischemic changes  in a patient of this age. No acute hemorrhage, mass, mass effect, midline shift, or abnormal extra-axial fluid collection. The cerebellum is within normal limits. There is no diffusion restriction. No susceptibility artifact is identified on the gradient echo sequence. Cranial nerves 7/8 complexes appear grossly unremarkable. The visualized paranasal sinuses and bilateral mastoid air cells are clear. MRV brain: There is only minimal signal identified within the left transverse sinus and the left sigmoid sinus demonstrates signal but is small in size. There is no associated susceptibility artifact in the region of the left transverse sinus. Moderate postoperative hip     MRI brain: No acute ischemia or acute intracranial process. Generalized parenchymal volume loss and nonspecific white matter findings most compatible with chronic small vessel ischemic changes in a patient of this age. MRV brain: Minimal signal within the left transverse sinus and small size of the left sigmoid sinus on MRV images are findings most likely to represent hypoplastic left transverse sinus or slow flow, given lack of expected signal abnormality within the transverse sinus on other sequences. US CAROTID ARTERY BILATERAL    Result Date: 9/8/2021  US CAROTID ARTERY BILATERAL: 9/8/2021 CLINICAL HISTORY:  stroke . COMPARISON: None available. Grayscale, color and waveform Doppler analysis of the cervical carotid and vertebral arteries was performed. Validated velocity measurements with angiographic measurements, velocity criteria are extrapolated from diameter data as defined by the Society of Radiologist in [address] [place] [address] Drive Radiology 2003; [id number]. FINDINGS: There is mild atherosclerotic plaquing of the carotid bulbs without significantly elevated velocities, spectral waveform broadening, or incidental findings of concern identified. There is antegrade flow in both vertebral arteries. ARTERIAL BLOOD FLOW VELOCITY RIGHT Peak Systolic                                              Prox CCA:  104 cm/s             Mid CCA:  91 cm/s              Dist CCA:  69 cm/s              Prox ICA:  63 cm/s               Mid ICA:  90 cm/s            Dist ICA:  62 cm/s             Prox ECA:  107 cm/s             Prox VERT:  77 cm/s              ICA/CCA    1.0, which indicates less than 50% narrowing by velocity criteria.  LEFT Peak Systolic Prox CCA:  60 cm/s             Mid CCA:  56 cm/s              Dist CCA:  59 cm/s              Prox ICA:  75 cm/s               Mid ICA:  100 cm/s            Dist ICA:  122 cm/s             Prox ECA:  72 cm/s             Prox VERT:  85 cm/s                 ICA/CCA     2.2, which indicates less than 50% narrowing by maximum velocity criteria. MILD ATHEROSCLEROTIC PLAQUING OF THE CAROTID BULBS WITHOUT EVIDENCE OF A FLOW-LIMITING STENOSIS. ASSESSMENT AND PLAN  1. Probable TIA  2. Hx of Right sided breast cancer . S/P Bilateral mastectomy in 2016 . She has been treated with Adjuvant hormonal therapy for 5 years . There is no evidence of metastases. 3. Remote history of papillary carcinoma of peritoneum S/P Debulking surgery followed by Chemotherapy   4. Hx of carcinoma of Gallbladder , No evidence of recurrence       Recommendations : As patient had TIA and Tamoxifen is associated with thromboembolic events, I shall discontinue Tamoxifen at this time . Patient had 5 years of adjuvant hormonal therapy and It may be adequate. Agree with Aspirin etc as ordered . Thanks for this consultation . I shall follow her as an outpatient .      Electronically signed by Christopher Rodriguez MD on 9/9/2021 at 1:39 PM

## 2021-09-09 NOTE — PROGRESS NOTES
Hospitalist Progress Note      Date of Admission: 9/7/2021  Chief Complaint:    Chief Complaint   Patient presents with    Altered Mental Status     Subjective:  No new complaints. No nausea, vomiting, chest pain, or headache  Still has dizziness, but a little bit better today. Medications:    Infusion Medications    sodium chloride       Scheduled Medications    b complex vitamins  1 capsule Oral Daily    buPROPion  300 mg Oral Daily    carvedilol  25 mg Oral BID WC    DULoxetine  30 mg Oral QPM    levothyroxine  50 mcg Oral Daily    Vitamin D  1,000 Units Oral Daily    ibuprofen  600 mg Oral TID WC    gabapentin  600 mg Oral 4x Daily    aspirin-dipyridamole  1 capsule Oral BID    acetaminophen  500 mg Oral BID    sodium chloride flush  5-40 mL IntraVENous 2 times per day    enoxaparin  40 mg SubCUTAneous Daily    atorvastatin  80 mg Oral Nightly     PRN Meds: acetaminophen, hydrALAZINE, sodium chloride flush, sodium chloride, ondansetron **OR** ondansetron, polyethylene glycol, labetalol  No intake or output data in the 24 hours ending 09/09/21 1030  Exam:  BP (!) 149/97   Pulse 74   Temp 98.6 °F (37 °C) (Oral)   Resp 18   Ht 5' 1\" (1.549 m)   Wt 223 lb (101.2 kg)   SpO2 98%   BMI 42.14 kg/m²   Head: Normocephalic, atraumatic  Sclera clear  Neck JVD flat  Lungs: normal effort of breathing    Labs:   Recent Labs     09/07/21  1500 09/08/21  1653   WBC 7.2 7.1   HGB 12.7 12.9   HCT 37.6 39.0    265     Recent Labs     09/07/21  1500 09/08/21  0537    142   K 4.3 3.8    104   CO2 26 26   BUN 22 18   CREATININE 0.87 0.76   CALCIUM 9.7 9.5   AST 21 23   ALT 11 12   BILITOT 0.3 0.6   ALKPHOS 79 79     Recent Labs     09/07/21  1500   INR 1.1     Recent Labs     09/07/21  1500   TROPONINI <0.010     Radiology:  US CAROTID ARTERY BILATERAL   Final Result      MILD ATHEROSCLEROTIC PLAQUING OF THE CAROTID BULBS WITHOUT EVIDENCE OF A FLOW-LIMITING STENOSIS. MRA HEAD WO CONTRAST   Final Result      MRI brain:      No acute ischemia or acute intracranial process. Generalized parenchymal volume loss and nonspecific white matter findings most compatible with chronic small vessel ischemic changes in a patient of this age. MRV brain:      Minimal signal within the left transverse sinus and small size of the left sigmoid sinus on MRV images are findings most likely to represent hypoplastic left transverse sinus or slow flow, given lack of expected signal abnormality within the transverse    sinus on other sequences. MRI BRAIN WO CONTRAST   Final Result      MRI brain:      No acute ischemia or acute intracranial process. Generalized parenchymal volume loss and nonspecific white matter findings most compatible with chronic small vessel ischemic changes in a patient of this age. MRV brain:      Minimal signal within the left transverse sinus and small size of the left sigmoid sinus on MRV images are findings most likely to represent hypoplastic left transverse sinus or slow flow, given lack of expected signal abnormality within the transverse    sinus on other sequences. CT Head WO Contrast   Final Result      No acute intracranial process or significant interval change from prior. Assessment/Plan:  Dizziness: VB insuff. Appreciate neuro input. Dysgeusia: Metallic taste-most likely secondary to dipyrimidol. New medication added on this admission. Will defer continuation or modification to neurology. Paf: cardiology to give guidance on anticoag. Restart tamoxifen since no cerebral vein thrombosis as per neurology consult note.      35 minutes total care time, >1/2 in unit/floor time and care coordination     Dc plan: anticipate dc today after neurology and cardiology review of above concerns    Stuart Gamboa MD

## 2021-09-09 NOTE — CARE COORDINATION
Patient accepted to Acute Rehab and can transfer to room 234 pending negative covid and medical clearance. Mendy HENRIQUEZ notified via confidential VM.  Electronically signed by Shikha Daigle RN on 9/9/21 at 3:47 PM EDT

## 2021-09-09 NOTE — PROGRESS NOTES
[facility]   Facility/Department: [facility]  Speech Language Pathology  Clinical Bedside Swallow Evaluation    NAME:Alysha Rodriguez  : 1945 (64 y.o.)   MRN: [id number]  ROOM: [place] [address]  ADMISSION DATE: 2021  PATIENT DIAGNOSIS(ES): Dizziness [R42]  Chief Complaint   Patient presents with    Altered Mental Status     Patient Active Problem List    Diagnosis Date Noted    Hematuria 2017    High risk medication use - 18 OARRS PM&R, 18 OARRS PM&R, 18 Urine Drug Screen positive opiates PM&R, 18 Med Contract PM&R 2013    Gait abnormality 2021    Difficulty balancing 2021    Dizziness 2021    Malignant neoplasm of peritoneum, 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[address] 10/27/2020    Obstructive sleep apnea     Current mild episode of major depressive disorder ([address] 2020    Cervical fusion syndrome 2019    Racing heart beat 2018    Chronic pain syndrome 2018    Charcot's arthropathy 2018    Below knee amputation status, right 2018    Moderate episode of recurrent major depressive disorder ([address] 2017    Chronic low back pain with sciatica 2017    Noncompliance - No Show inject 17, No Show F/U 2017    Hematuria, gross     Primary osteoarthritis involving multiple joints 2017    Left breast mass 2016    Anemia 2016    Cancer ([address] 2016    Morbid obesity ([address] 2016    Reactive depression 2016    Sleeping excessive 2016    Cervical spinal cord injury ([address] 06/10/2016    Chronic low back pain 06/10/2016    Other specified postprocedural states 06/10/2016    Fatigue due to treatment 2016    Lobular breast cancer ([address] 10/26/2015    Traumatic amputation of one lower extremity below knee with complication ([address]     Acquired hallux valgus 2015    Complete traumatic amputation at level between right knee and ankle ([facility] 07/13/2015    Hypothyroid 06/12/2015    Melancholia 03/16/2015    Complete tear of left rotator cuff 03/16/2015    Generalized osteoarthritis 03/16/2015    Malaise and fatigue 02/17/2015    Complete rotator cuff rupture of left shoulder 12/17/2014    Clinical depression 12/17/2014    Benign essential HTN 12/17/2014    HLD (hyperlipidemia) 12/17/2014    Low back pain with sciatica 12/17/2014    Fibromyalgia muscle pain 12/17/2014    Family history of coronary artery disease 08/29/2014    NSTEMI (non-ST elevated myocardial infarction) ([facility] 08/29/2014    Hx of right BKA ([facility]     Closed fracture of tarsal and metatarsal bones 06/02/2014    Arthritis, neuropathic 12/02/2013    Impaired mobility and activities of daily living 06/12/2013    Obesity 03/15/2013    Obesity (BMI 30-39.9) 02/12/2013    Neck pain on right side     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    Charcot's joint of foot 09/01/2010    Closed dislocation of ankle 09/01/2010    Closed dislocation of foot 09/01/2010    Disorder of peripheral nervous system 09/01/2010    Peripheral neuropathy 09/01/2010    Cervical post-laminectomy syndrome 10/22/2009    Failed back syndrome of lumbar spine 10/22/2009    Postlaminectomy syndrome of cervical region 10/22/2009    Postlaminectomy syndrome of lumbar region 10/22/2009    Hereditary and idiopathic peripheral neuropathy ([facility] 06/18/2009    Osteoporosis 02/05/2009    Difficulty walking 12/12/2007    Tibialis tendinitis 12/12/2007    Acquired flat foot 07/10/2007    Arthritis of ankle or foot, degenerative 07/10/2007     Past Medical History:   Diagnosis Date    Arthritis     Blood transfusion     Cancer [facility])     GALLBLADDER 2009, 2011 OMENTUM    Charcot's joint     left foot    Chest pain 7/2/2015    Colitis     DDD (degenerative disc disease), lumbar 2/12/2013    Depression     Disorder of peripheral nervous system 9/1/2010    Dyspnea 7/2/2015    Family history of coronary artery disease 8/29/2014    History of blood transfusion 2011    following chemotherapy    Hx of right BKA ([facility]     Hyperlipidemia     Hypertension     Hypothyroid 6/12/2015    Lobular breast cancer ([facility] 10/2015    NSTEMI (non-ST elevated myocardial infarction) ([facility] 8/29/2014    Obesity     Paroxysmal atrial fibrillation ([facility] 8/29/2014    S/P BKA (below knee amputation) [facility])      Past Surgical History:   Procedure Laterality Date    ABDOMEN SURGERY Left 04/11/2017    EXCISION OF CHEST WALL MASS, UPDATE LABS ON ADMIT  [facility] . BLEPHAROPLASTY  02/05/2013    both eyes    BREAST SURGERY      CARDIAC CATHETERIZATION      COLONOSCOPY  01/01/2010    normal    CYSTOSCOPY W BIOPSY OF BLADDER N/A 06/26/2017    CYSTOSCOPY BLADDER BIOPSY AND FULGURATION performed by Giovani Joel MD at [address] [place] [address] Bilateral 2012    cataract removal with IOL implants    HYSTERECTOMY      LEG AMPUTATION BELOW KNEE Right 02/01/2011    DR Elva Bender due to CHARCOTS    MASTECTOMY Bilateral 11/16/2015    Bilateral simple mastectomies with right SNB by DR Analilia Sams    PA REMOVAL OF BREAST LESION Left 01/24/2017    CORE NEEDLE BIOPSY OF  LEFT BREAST MASS performed by Christin Chaidez MD at [address] [place] [address]      cervical and lumbar     Allergies   Allergen Reactions    Ciprofloxacin Itching    Oxycontin [Oxycodone Hcl] Itching       DATE ONSET: 9/7/2021    Date of Evaluation: 9/9/2021   Evaluating Therapist: Sean Lozano, SLP    Recommended Diet and Intervention  Diet Solids Recommendation: Regular  Liquid Consistency Recommendation:  Thin  Recommended Form of Meds: PO  Recommendations: Self feed     Compensatory Swallowing Strategies  Compensatory Swallowing Strategies: Upright as possible for all oral intake;Small bites/sips    Reason for Referral  Mateusz Crocker was referred for a bedside swallow evaluation to assess the efficiency of her swallow function, identify signs and symptoms of aspiration and make recommendations regarding safe dietary consistencies, effective compensatory strategies, and safe eating environment. General  Chart Reviewed: Yes  Subjective  Subjective: Patent reports no difficulty chewing/swallowing, but does avoid \"tough meats\" due to dentures. Behavior/Cognition: Alert; Cooperative;Pleasant mood  Respiratory Status: Room air  Communication Observation: Functional  Follows Directions: Simple  Dentition: Dentures top;Dentures bottom  Patient Positioning: Upright in chair  Baseline Vocal Quality: Normal  Volitional Cough: Strong  Prior Dysphagia History: None  Consistencies Administered: Reg solid; Dysphagia Soft and Bite-Sized (Dysphagia III); Thin - cup    Current Diet level:  Current Diet : Regular  Current Liquid Diet : Thin    Oral Motor Deficits  Oral/Motor  Oral Motor: Within functional limits    Oral Phase Dysfunction  Oral Phase  Oral Phase: WFL  Oral Phase  Oral Phase - Comment: WFL. Adequate mastication and oral clearance of bolus in all opportunities independently. Indicators of Pharyngeal Phase Dysfunction   Pharyngeal Phase  Pharyngeal Phase: WFL  Pharyngeal Phase   Pharyngeal: WFL. Hyolaryngeal movement is present. No overt s/s of aspiration observed per all consistencies tested. Impression  Dysphagia Diagnosis: Swallow function appears grossly intact  Dysphagia Impression : No clinical indicators of oral or pharyngeal dysphagia. Dysphagia Outcome Severity Scale: Level 6: Within functional limits/Modified independence     Treatment Plan  Requires SLP Intervention: No  Duration/Frequency of Treatment: no f/u  D/C Recommendations:  To be determined     Prognosis  Individuals consulted  Consulted and agree with results and recommendations: Patient;RN (RN Emani)    Education  Patient Education: Results of BSE  Patient Education Response: Verbalizes understanding  Safety Devices in place: Yes  Type of devices: Chair alarm in place;Call light within reach    Pain Assessment:  Pre-Treatment  Pain assessment: 0-10  Pain level: 0  Intervention:  Patient denies pain. Post-Treatment  Pain assessment: 0-10  Pain level: 0  Intervention:  Patient denies pain.          Therapy Time  SLP Individual Minutes  Time In: 0825  Time Out: 7480  Minutes: 10       Signature: Electronically signed by SANDHYA Garcia on 9/9/2021 at 10:07 AM

## 2021-09-09 NOTE — PROGRESS NOTES
Patient complaint of metallic taste to mouth, doctors made aware, Neurologist informed this nurse that the patient will continue on aggronox and 81mg of Asprin with 500 mg of Tylenol to be administered a half hour prior to Aggrenox administration.      Will continue to monitor

## 2021-09-09 NOTE — CONSULTS
Consult Note  Patient: Norberto Kingstondmont  Unit/Bed: S437/O922-27  YOB: 1945  MRN: [id number]  Acct: [de-identified]   Admitting Diagnosis: Dizziness [R42]  Date:  9/7/2021  Hospital Day: 1      Chief Complaint:  Dizziness    History of Present Illness:  73-year-old female who has a history of structural heart disease. Underwent ablation at [facility] in 1992. Has been followed by Dr. Marion Marmolejo. Patient used to be on Coumadin that was later discontinued 2015 and 16 because atrial fibrillation was never documented. She had a cardiac catheterization in 2015 which revealed no significant obstructive coronary artery disease. She is known to have normal LV ejection fraction. Patient has a history of lobular carcinoma of the right breast in 2015 she was treated with adjuvant hormonal therapy consisting of anastrozole in the beginning after she recovered from bilateral mastectomy. He was then started on tamoxifen in 2017. She has been seen by neurology currently patient is on aspirin and DVT prophylaxis. He lives in an apartment complex on the first floor. Non-smoker.     Allergies   Allergen Reactions    Ciprofloxacin Itching    Oxycontin [Oxycodone Hcl] Itching       Current Facility-Administered Medications   Medication Dose Route Frequency Provider Last Rate Last Admin    aspirin EC tablet 81 mg  81 mg Oral Daily Trini Shanks MD   81 mg at 09/09/21 1330    acetaminophen (TYLENOL) tablet 650 mg  650 mg Oral Q6H PRN Gael Paulino DO   650 mg at 09/09/21 0402    b complex vitamins capsule 1 capsule  1 capsule Oral Daily YURY Karimi - CNP   1 capsule at 09/09/21 1029    buPROPion (WELLBUTRIN XL) extended release tablet 300 mg  300 mg Oral Daily Tristin Blankenship APRN - CNP   300 mg at 09/09/21 1030    carvedilol (COREG) tablet 25 mg  25 mg Oral BID  Tristin Blankenship APRN - CNP   25 mg at 09/09/21 1031    DULoxetine (CYMBALTA) extended release capsule 30 mg  30 mg Oral QPM Nicoletto Bolzan-Roche, APRN - CNP   30 mg at 09/08/21 1733    levothyroxine (SYNTHROID) tablet 50 mcg  50 mcg Oral Daily Nicoletto Bolzan-Roche, APRN - CNP   50 mcg at 09/09/21 0646    Vitamin D (CHOLECALCIFEROL) tablet 1,000 Units  1,000 Units Oral Daily Nicoletto Bolzan-Roche, APRN - CNP   1,000 Units at 09/09/21 1030    ibuprofen (ADVIL;MOTRIN) tablet 600 mg  600 mg Oral TID WC Nicoletto Bolzan-Roche, APRN - CNP   600 mg at 09/09/21 1331    gabapentin (NEURONTIN) capsule 600 mg  600 mg Oral 4x Daily Rosa M Stevenson MD   600 mg at 09/09/21 1330    aspirin-dipyridamole (AGGRENOX)  MG per extended release capsule 1 capsule  1 capsule Oral BID Rosa M Stevenson MD   1 capsule at 09/09/21 1029    acetaminophen (TYLENOL) tablet 500 mg  500 mg Oral BID Rosa M Stevenson MD   500 mg at 09/09/21 0840    hydrALAZINE (APRESOLINE) injection 10 mg  10 mg IntraVENous Q4H PRN Tiffany Paulino DO        sodium chloride flush 0.9 % injection 5-40 mL  5-40 mL IntraVENous 2 times per day Dragan Mckoy MD   10 mL at 09/09/21 1335    sodium chloride flush 0.9 % injection 5-40 mL  5-40 mL IntraVENous PRN Dragan Mckoy MD        0.9 % sodium chloride infusion  25 mL IntraVENous PRN Dragan Mckoy MD        ondansetron (ZOFRAN-ODT) disintegrating tablet 4 mg  4 mg Oral Q8H PRN Dragan Mckoy MD        Or    ondansetron (ZOFRAN) injection 4 mg  4 mg IntraVENous Q6H PRN Dragan Mckoy MD        polyethylene glycol (GLYCOLAX) packet 17 g  17 g Oral Daily PRN Dragan Mckoy MD        enoxaparin (LOVENOX) injection 40 mg  40 mg SubCUTAneous Daily Dragan Mckoy MD   40 mg at 09/09/21 1031    atorvastatin (LIPITOR) tablet 80 mg  80 mg Oral Nightly Dragan Mckoy MD   80 mg at 09/08/21 2117    labetalol (NORMODYNE;TRANDATE) injection 10 mg  10 mg IntraVENous Q10 Min PRN Dragan Mckoy MD           PMHx:  Past Medical History:   Diagnosis Date    Arthritis     Blood transfusion     [facility] GALLBLADDER 2009, 2011 OMENTUM    Charcot's joint     left foot    Chest pain 7/2/2015    Colitis     DDD (degenerative disc disease), lumbar 2/12/2013    Depression     Disorder of peripheral nervous system 9/1/2010    Dyspnea 7/2/2015    Family history of coronary artery disease 8/29/2014    History of blood transfusion 2011    following chemotherapy    Hx of right BKA ([facility]     Hyperlipidemia     Hypertension     Hypothyroid 6/12/2015    Lobular breast cancer ([facility] 10/2015    NSTEMI (non-ST elevated myocardial infarction) ([facility] 8/29/2014    Obesity     Paroxysmal atrial fibrillation ([facility] 8/29/2014    S/P BKA (below knee amputation) ([facility]        PSHx:  Past Surgical History:   Procedure Laterality Date    ABDOMEN SURGERY Left 04/11/2017    EXCISION OF CHEST WALL MASS, UPDATE LABS ON ADMIT  performed by Darell Early MD at [facility]. BLEPHAROPLASTY  02/05/2013    both eyes    BREAST SURGERY      CARDIAC CATHETERIZATION      COLONOSCOPY  01/01/2010    normal    CYSTOSCOPY W BIOPSY OF BLADDER N/A 06/26/2017    CYSTOSCOPY BLADDER BIOPSY AND FULGURATION performed by Yamini Elam MD at [address] Bilateral 2012    cataract removal with IOL implants    HYSTERECTOMY      LEG AMPUTATION BELOW KNEE Right 02/01/2011    DR Spencer Nathan due to CHARCOTS    MASTECTOMY Bilateral 11/16/2015    Bilateral simple mastectomies with right SNB by DR Robin Prasad    DC REMOVAL OF BREAST LESION Left 01/24/2017    CORE NEEDLE BIOPSY OF  LEFT BREAST MASS performed by Darell Early MD at [address] [place] [address]      cervical and lumbar       Social Hx:  Social History     Socioeconomic History    Marital status:       Spouse name: None    Number of children: 2    Years of education: None    Highest education level: None   Occupational History    Occupation: retired   Tobacco Use    Smoking status: Never Smoker    Smokeless tobacco: Never Used   Vaping Use    Vaping Use: Never used   Substance and Sexual Activity    Alcohol use: Yes     Comment: social    Drug use: No    Sexual activity: Never   Other Topics Concern    None   Social History Narrative    Lives With: Alone, dtr in [place], disabled son with Anibal Brooks is in a local group home    Type of Home: [address]: One level, Level entry    Bathroom Shower/Tub: Tub/Shower unit, Equipment: Tub transfer bench, Grab bars in shower    Bathroom Accessibility: Wheelchair accessible    Home Equipment: Quad cane, Rolling walker, Wheelchair-electric (has right BK prosthesis and Left AFO)    ADL Assistance: Independent    Homemaking Assistance: Independent (warms meals up  -  receives  MOW and groceries delivered)    [employer] Responsibilities: Yes, Meal Prep Responsibility: Secondary    Laundry Responsibility: Primary, Cleaning Responsibility: Secondary    Ambulation Assistance: Independent (last few days uses walker to get around), Transfer Assistance: Independent    Active : Yes    Additional Comments: received new prosthesis in July and has been falling since - has appointment with prosthesis next week. Pt reports prior to 8/21 she was ambulating with wheeled walker and since then she has used power w/c    Retired --still works a day a week as a  [address] [facility] Strain: Low Risk     Difficulty of Paying Living Expenses: Not hard at [employer] Insecurity: No Food Insecurity    Worried About [facility] in the Last Year: Never true   Reinier Mauro in the Last Year: Never true   Transportation Needs:     Lack of Transportation (Medical):      Lack of Transportation (Non-Medical):    Physical Activity:     Days of Exercise per Week:     Minutes of Exercise per Session:    Stress:     Feeling of Stress :    Social Connections:     Frequency of Communication with Friends and Family:     Frequency of Social Gatherings with Friends and Family:     Attends Yaz[employer] Services:     Active Member of Clubs or Organizations:     Attends Club or Organization Meetings:     Marital Status:    Intimate Partner Violence:     Fear of Current or Ex-Partner:     Emotionally Abused:     Physically Abused:     Sexually Abused:        Family Hx:  Family History   Problem Relation Age of Onset    Heart Disease Mother     Arthritis Mother     Heart Disease Father     Arthritis Father     Cancer Sister         Colon    Thyroid Disease Son     Other Son         Down's syndrome       Review of Systems:   Review of Systems   Constitutional: Negative for activity change, appetite change, diaphoresis, fatigue and unexpected weight change. HENT: Negative for facial swelling, nosebleeds, trouble swallowing and voice change. Respiratory: Negative for apnea, chest tightness, shortness of breath and wheezing. Cardiovascular: Negative for chest pain, palpitations and leg swelling. Gastrointestinal: Negative for abdominal distention, anal bleeding, blood in stool, nausea and vomiting. Genitourinary: Negative for decreased urine volume and dysuria. Musculoskeletal: Negative for gait problem and myalgias. Skin: Negative for color change, pallor, rash and wound. Neurological: Negative for dizziness, syncope, facial asymmetry, weakness, light-headedness, numbness and headaches. Hematological: Does not bruise/bleed easily. Psychiatric/Behavioral: Negative for agitation, behavioral problems, confusion, hallucinations and suicidal ideas. The patient is not nervous/anxious. All other systems reviewed and are negative.         Physical Examination:    BP (!) 141/89   Pulse 65   Temp 98.6 °F (37 °C) (Oral)   Resp 18   Ht 5' 1\" (1.549 m)   Wt 223 lb (101.2 kg)   SpO2 97%   BMI 42.14 kg/m²    Physical Exam  Constitutional:       Appearance: She is well-developed. HENT:      Head: Normocephalic and atraumatic. Right Ear: External ear normal.      Left Ear: External ear normal.   Eyes:      Conjunctiva/sclera: Conjunctivae normal.      Pupils: Pupils are equal, round, and reactive to light. Cardiovascular:      Rate and Rhythm: Normal rate and regular rhythm. Heart sounds: Normal heart sounds. Pulmonary:      Effort: Pulmonary effort is normal.      Breath sounds: Normal breath sounds. Abdominal:      General: Bowel sounds are normal.      Palpations: Abdomen is soft. Musculoskeletal:         General: Normal range of motion. Cervical back: Normal range of motion and neck supple. Skin:     General: Skin is warm and dry. Neurological:      Mental Status: She is alert and oriented to person, place, and time. Deep Tendon Reflexes: Reflexes are normal and symmetric. Psychiatric:         Behavior: Behavior normal.         Thought Content:  Thought content normal.         Judgment: Judgment normal.         LABS:  CBC:  Lab Results   Component Value Date    WBC 7.1 09/08/2021    RBC 4.36 09/08/2021    RBC 4.16 11/04/2011    HGB 12.9 09/08/2021    HCT 39.0 09/08/2021    MCV 89.4 09/08/2021    MCH 29.6 09/08/2021    MCHC 33.2 09/08/2021    RDW 13.7 09/08/2021     09/08/2021    MPV 8.4 08/03/2015     CBC with Differential:   Lab Results   Component Value Date    WBC 7.1 09/08/2021    RBC 4.36 09/08/2021    RBC 4.16 11/04/2011    HGB 12.9 09/08/2021    HCT 39.0 09/08/2021     09/08/2021    MCV 89.4 09/08/2021    MCH 29.6 09/08/2021    MCHC 33.2 09/08/2021    RDW 13.7 09/08/2021    LYMPHOPCT 24.0 09/07/2021    MONOPCT 9.3 09/07/2021    EOSPCT 2.5 09/26/2011    BASOPCT 1.0 09/07/2021    MONOSABS 0.7 09/07/2021    LYMPHSABS 1.7 09/07/2021    EOSABS 0.1 09/07/2021    BASOSABS 0.1 09/07/2021     CMP:    Lab Results   Component Value Date     09/08/2021    K 3.8 09/08/2021     09/08/2021    CO2 26 09/08/2021 Oxycontin). Conclusions   Summary  Normal left ventricle structure and function. Left ventricular ejection fraction is visually estimated at 65%. Moderate concentric LVH with wall thickness 13mm. DUST without LVOT obstruction  Diastolic dysfunction  Normal right ventricle structure and function. Normal right ventricle systolic pressure. No evidence of pericardial effusion. Normal valvular structure and function. No significant regurgitant or stenotic valvular lesions. Signature   ----------------------------------------------------------------  Electronically signed by Jeffrey Gary MD(Interpreting  physician) on 09/08/2021 05:01 PM  ----------------------------------------------------------------   Findings  Left Ventricle Normal left ventricle structure and function. Left ventricular ejection fraction is visually estimated at 65%. Moderate concentric LVH with wall thickness 13mm. DUST without LVOT obstruction Diastolic dysfunction Right Ventricle Normal right ventricle structure and function. Normal right ventricle systolic pressure. Left Atrium Normal left atrium. Right Atrium Normal right atrium. Mitral Valve Normal mitral valve structure and function. Tricuspid Valve Normal tricuspid valve structure and function. Mild tricuspid regurgitation. Aortic Valve Normal aortic valve structure and function. Pulmonic Valve Normal pulmonic valve structure and function. Pericardial Effusion No evidence of pericardial effusion. Pleural Effusion No evidence of pleural effusion. Aorta \ Miscellaneous Aortic root dimension within normal limits. M-Mode Measurements (cm)   LVIDd: 4.1 cm                                    LVIDs: 2.9 cm  IVSd: 1.61 cm                                    IVSs: 1.97 cm  LVPWd: 1.08 cm                                   LVPWs: 1.46 cm  Rt. Vent.  Dimension: 2.7 cm                                                   LVOT: 1.88 cm  Doppler Measurements:   AV Velocity:0.02 m/s                    MV Peak E-Wave: 0.42 m/s  AV Peak Gradient: 7.37 mmHg             MV Peak A-Wave: 0.69 m/s  AV Mean Gradient: 3.63 mmHg  AV Area (Continuity):1.79 cm^2  TR Velocity:2.65 m/s                    Estimated RAP:5 mmHg  TR Gradient:28.04 mmHg                  RVSP:33.04 mmHg  Valves  Mitral Valve   Peak E-Wave: 0.42 m/s                 Peak A-Wave: 0.69 m/s                                        E/A Ratio: 0.61                                        Peak Gradient: 0.69 mmHg                                        Deceleration Time: 359.4 msec   Tissue Doppler   E' Septal Velocity: 0.03 m/s  E' Lateral Velocity: 0.05 m/s   Aortic Valve   Peak Velocity: 1.36 m/s                Mean Velocity: 0.9 m/s  Peak Gradient: 7.37 mmHg               Mean Gradient: 3.63 mmHg  Area (continuity): 1.79 cm^2  AV VTI: 34.62 cm   Tricuspid Valve   Estimated RVSP: 33.04 mmHg              Estimated RAP: 5 mmHg  TR Velocity: 2.65 m/s                   TR Gradient: 28.04 mmHg   Pulmonic Valve   Peak Velocity: 0.91 m/s           Peak Gradient: 3.29 mmHg  Mean Velocity: 0.56 m/s           Mean Gradient: 1.5 mmHg                                    Estimated PASP: 33.04 mmHg   LVOT   Peak Velocity: 0.96 m/s              Mean Velocity: 0.65 m/s  Peak Gradient: 2.86 mmHg             Mean Gradient: 1.82 mmHg  LVOT Diameter: 1.88 cm               LVOT VTI: 22.35 cm  Structures  Left Atrium   LA Volume/Index: 66.8 ml /34 m^2             LA Area: 23.15 cm^2   Left Ventricle   Diastolic Dimension: 4.1 cm          Systolic Dimension: 2.9 cm  Septum Diastolic: 9.67 cm            Septum Systolic: 2.75 cm  PW Diastolic: 8.24 cm                PW Systolic: 5.80 cm                                       FS: 29.3 %  LV EDV/LV EDV Index: 74.39 ml/38 m^2 LV ESV/LV ESV Index: 32.21 ml/16 m^2  EF Calculated: 56.7 %                LV Length: 7.99 cm   LVOT Diameter: 1.88 cm   Right Atrium   RA Systolic Pressure: 5 mmHg   Right Ventricle   Diastolic Dimension: 2.7 cm RV Systolic Pressure: 72.68 mmHg  Aorta/ Miscellaneous Aorta   LVOT Diameter: 1.88 cm      MRA HEAD WO CONTRAST    Result Date: 9/8/2021  EXAM: MRI of the brain without contrast MRV of the brain without contrast History: Stroke. Dizziness. Headache. Technique: Multiplanar multisequence MRI of the brain was performed without contrast. Axial 3-D time-of-flight images of the brain were obtained without contrast. 3-D volume rendered images were performed for evaluation of the cerebral vasculature. Comparison:   MRI of the cervical spine June 6, 2019 Findings: MRI brain: Prominence of the sulci and ventricles compatible with mild generalized parenchymal volume loss. Foci of white matter signal abnormality within the supratentorial white matter are nonspecific but most compatible with chronic small vessel ischemic changes  in a patient of this age. No acute hemorrhage, mass, mass effect, midline shift, or abnormal extra-axial fluid collection. The cerebellum is within normal limits. There is no diffusion restriction. No susceptibility artifact is identified on the gradient echo sequence. Cranial nerves 7/8 complexes appear grossly unremarkable. The visualized paranasal sinuses and bilateral mastoid air cells are clear. MRV brain: There is only minimal signal identified within the left transverse sinus and the left sigmoid sinus demonstrates signal but is small in size. There is no associated susceptibility artifact in the region of the left transverse sinus. Moderate postoperative hip     MRI brain: No acute ischemia or acute intracranial process. Generalized parenchymal volume loss and nonspecific white matter findings most compatible with chronic small vessel ischemic changes in a patient of this age.  MRV brain: Minimal signal within the left transverse sinus and small size of the left sigmoid sinus on MRV images are findings most likely to represent hypoplastic left transverse sinus or slow flow, given lack of expected signal abnormality within the transverse sinus on other sequences. MRI BRAIN WO CONTRAST    Result Date: 9/8/2021  EXAM: MRI of the brain without contrast MRV of the brain without contrast History: Stroke. Dizziness. Headache. Technique: Multiplanar multisequence MRI of the brain was performed without contrast. Axial 3-D time-of-flight images of the brain were obtained without contrast. 3-D volume rendered images were performed for evaluation of the cerebral vasculature. Comparison:   MRI of the cervical spine June 6, 2019 Findings: MRI brain: Prominence of the sulci and ventricles compatible with mild generalized parenchymal volume loss. Foci of white matter signal abnormality within the supratentorial white matter are nonspecific but most compatible with chronic small vessel ischemic changes  in a patient of this age. No acute hemorrhage, mass, mass effect, midline shift, or abnormal extra-axial fluid collection. The cerebellum is within normal limits. There is no diffusion restriction. No susceptibility artifact is identified on the gradient echo sequence. Cranial nerves 7/8 complexes appear grossly unremarkable. The visualized paranasal sinuses and bilateral mastoid air cells are clear. MRV brain: There is only minimal signal identified within the left transverse sinus and the left sigmoid sinus demonstrates signal but is small in size. There is no associated susceptibility artifact in the region of the left transverse sinus. Moderate postoperative hip     MRI brain: No acute ischemia or acute intracranial process. Generalized parenchymal volume loss and nonspecific white matter findings most compatible with chronic small vessel ischemic changes in a patient of this age.  MRV brain: Minimal signal within the left transverse sinus and small size of the left sigmoid sinus on MRV images are findings most likely to represent hypoplastic left transverse sinus or slow flow, given lack of expected signal abnormality within the transverse sinus on other sequences. US CAROTID ARTERY BILATERAL    Result Date: 9/8/2021  US CAROTID ARTERY BILATERAL: 9/8/2021 CLINICAL HISTORY:  stroke . COMPARISON: None available. Grayscale, color and waveform Doppler analysis of the cervical carotid and vertebral arteries was performed. Validated velocity measurements with angiographic measurements, velocity criteria are extrapolated from diameter data as defined by the Society of Radiologist in [address] [place] [address] Drive Radiology 2003; [id number]. FINDINGS: There is mild atherosclerotic plaquing of the carotid bulbs without significantly elevated velocities, spectral waveform broadening, or incidental findings of concern identified. There is antegrade flow in both vertebral arteries. ARTERIAL BLOOD FLOW VELOCITY RIGHT Peak Systolic                                              Prox CCA:  104 cm/s             Mid CCA:  91 cm/s              Dist CCA:  69 cm/s              Prox ICA:  63 cm/s               Mid ICA:  90 cm/s            Dist ICA:  62 cm/s             Prox ECA:  107 cm/s             Prox VERT:  77 cm/s              ICA/CCA    1.0, which indicates less than 50% narrowing by velocity criteria. LEFT Peak Systolic Prox CCA:  60 cm/s             Mid CCA:  56 cm/s              Dist CCA:  59 cm/s              Prox ICA:  75 cm/s               Mid ICA:  100 cm/s            Dist ICA:  122 cm/s             Prox ECA:  72 cm/s             Prox VERT:  85 cm/s                 ICA/CCA     2.2, which indicates less than 50% narrowing by maximum velocity criteria. MILD ATHEROSCLEROTIC PLAQUING OF THE CAROTID BULBS WITHOUT EVIDENCE OF A FLOW-LIMITING STENOSIS.         EKG:  Assessment:    Active Hospital Problems    Diagnosis Date Noted    Gait abnormality [R26.9] 09/09/2021    Dizziness [R42] 09/07/2021    Charcot's arthropathy [M14.60] 01/25/2018    Chronic low back pain with sciatica [M54.40, G89.29] 09/13/2017    Complete rotator cuff rupture of left shoulder [M75.122] 12/17/2014    Hx of right BKA ([address] [Z89.511]           Plan:  1. Patient with prior history of ablation in 1992 at [facility]. Has been followed by Dr. Alisha Hickman. In 2015 she underwent a cardiac catheterization which revealed no significant coronary artery disease. LV ejection fraction was normal.  Prior to that she had been on Coumadin which was later [facility] because there was no documentation of atrial fibrillation. She now presents with dizziness. Asked by neurology to to reconsider coagulation in view of previous history of atrial fibrillation which has never been documented. Would defer that to Dr. Alisha Hickman as outpatient to decide that. Patient may need a loop recorder.   If there are neurologic considerations for anticoagulation, will defer initiation of anticoagulation to neurology at this point            Electronically signed by Connie Ivan MD on 9/9/2021 at 3:26 PM

## 2021-09-09 NOTE — PROGRESS NOTES
([facility] 07/13/2015    Hypothyroid 06/12/2015    Melancholia 03/16/2015    Complete tear of left rotator cuff 03/16/2015    Generalized osteoarthritis 03/16/2015    Malaise and fatigue 02/17/2015    Complete rotator cuff rupture of left shoulder 12/17/2014    Clinical depression 12/17/2014    Benign essential HTN 12/17/2014    HLD (hyperlipidemia) 12/17/2014    Low back pain with sciatica 12/17/2014    Fibromyalgia muscle pain 12/17/2014    Family history of coronary artery disease 08/29/2014    NSTEMI (non-ST elevated myocardial infarction) ([facility] 08/29/2014    Hx of right BKA (HCC)     Closed fracture of tarsal and metatarsal bones 06/02/2014    Arthritis, neuropathic 12/02/2013    Impaired mobility and activities of daily living 06/12/2013    Obesity 03/15/2013    Obesity (BMI 30-39.9) 02/12/2013    Neck pain on right side     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    Charcot's joint of foot 09/01/2010    Closed dislocation of ankle 09/01/2010    Closed dislocation of foot 09/01/2010    Disorder of peripheral nervous system 09/01/2010    Peripheral neuropathy 09/01/2010    Cervical post-laminectomy syndrome 10/22/2009    Failed back syndrome of lumbar spine 10/22/2009    Postlaminectomy syndrome of cervical region 10/22/2009    Postlaminectomy syndrome of lumbar region 10/22/2009    Hereditary and idiopathic peripheral neuropathy ([facility] 06/18/2009    Osteoporosis 02/05/2009    Difficulty walking 12/12/2007    Tibialis tendinitis 12/12/2007    Acquired flat foot 07/10/2007    Arthritis of ankle or foot, degenerative 07/10/2007     Past Medical History:   Diagnosis Date    Arthritis     Blood transfusion     Cancer [facility])     GALLBLADDER 2009, 2011 OMENTUM    Charcot's joint     left foot    Chest pain 7/2/2015    Colitis     DDD (degenerative disc disease), lumbar 2/12/2013    Depression     Disorder of peripheral nervous system 9/1/2010    Dyspnea 7/2/2015    Family history of coronary artery disease 8/29/2014    History of blood transfusion 2011    following chemotherapy    Hx of right BKA ([facility]     Hyperlipidemia     Hypertension     Hypothyroid 6/12/2015    Lobular breast cancer ([facility] 10/2015    NSTEMI (non-ST elevated myocardial infarction) ([facility] 8/29/2014    Obesity     Paroxysmal atrial fibrillation ([facility] 8/29/2014    S/P BKA (below knee amputation) [facility])      Past Surgical History:   Procedure Laterality Date    ABDOMEN SURGERY Left 04/11/2017    EXCISION OF CHEST WALL MASS, UPDATE LABS ON ADMIT  performed by Tangela Allen MD at [address] BLEPHAROPLASTY  02/05/2013    both eyes    BREAST SURGERY      CARDIAC CATHETERIZATION      COLONOSCOPY  01/01/2010    normal    CYSTOSCOPY W BIOPSY OF BLADDER N/A 06/26/2017    CYSTOSCOPY BLADDER BIOPSY AND FULGURATION performed by Isacc Stevens MD at [address] Bilateral 2012    cataract removal with IOL implants    HYSTERECTOMY      LEG AMPUTATION BELOW KNEE Right 02/01/2011    DR Pierre Muñoz due to CHARCOTS    MASTECTOMY Bilateral 11/16/2015    Bilateral simple mastectomies with right SNB by DR Zack Soliz    OR REMOVAL OF BREAST LESION Left 01/24/2017    CORE NEEDLE BIOPSY OF  LEFT BREAST MASS performed by Tangela Allen MD at [address] [place] [address]      cervical and lumbar     DATE ONSET: 9/7/2021    Date of Evaluation: 9/9/2021   Evaluating Therapist: SANDHYA Barba    Assessment:  Diagnosis: Mild expressive language impairment characterized by impaired word finding most notably at the conversation level with mildly impaired divergent and responsive naming abilities. No speech dysfluencies observed, however patient reports they occur when she cannot think of the word that she wants to say.     Recommendations:  Requires SLP Intervention: Yes  Duration/Frequency of Treatment: 1-2x/week for LOS or until goals are met  D/C Recommendations: Home independently     Goals:  Short-term Goals  Timeframe for Short-term Goals: 1 week  Goal 1: Pt will be educated on word-finding strategies, and use them in structured and unstructured tasks in 80% of given opportunities with mild verbal cues to help the pt express his/her personal, safety, and medical needs in the presence of language deficits. Goal 2: Pt will name 10/10 items in a concrete category with min verbal cues to promote semantic organization, neuroplasticity, and the efficiency of word finding for expression of the patient's wants, needs, feelings, and ideas. Goal 3: Pt will complete divergent naming tasks with 80% accuracy with min cues to promote semantic organization and the overall effectiveness and efficiency for expression of his/her wants, needs, feelings, and ideas. Long-term Goals  Timeframe for Long-term Goals: 1 week  Goal 1: Patient will demonstrate functional expressive language abilities in all opportunities independently in order to effectively communicate her wants and needs. Patient's goals: \"get better and get home\"    Subjective:   Previous level of function and limitations: patient reports no limitations  General  Chart Reviewed: Yes  Patient assessed for rehabilitation services?: Yes  Family / Caregiver Present: No  Subjective  Subjective: Patient reports word finding difficulties and speech dysfluencies for \"a couple days now. \" Patient reports it has been frustrating as she has always been well spoken. Vision  Vision: Impaired  Vision Exceptions: Wears glasses for reading  Hearing  Hearing: Exceptions to [facility]  Hearing Exceptions: Hard of hearing/hearing concerns; No hearing aid     Objective:  Oral/Motor  Oral Motor:  Within functional limits    Auditory Comprehension  Comprehension: Within Functional Limits    Expression  Primary Mode of Expression: Verbal    Verbal Expression  Verbal Expression: Exceptions to functional limits  Divergent: Mild  Responsive: Mild  Effective Techniques: Provide extra time; Word retrived strategies    Motor Speech  Motor Speech: Within Functional Limits    Pragmatics/Social Functioning  Pragmatics: Within functional limits    Cognition:      Orientation  Overall Orientation Status: Within Normal Limits  Attention  Attention: Within Functional Limits  Memory  Memory: Within Funtional Limits  Problem Solving  Problem Solving: Within Functional Limits  Abstract Reasoning  Abstract Reasoning: Within Functional Limits  Safety/Judgement  Safety/Judgement: Within Functional Limits    Prognosis:  Speech Therapy Prognosis  Prognosis: Excellent  Prognosis Considerations: Participation Level; Potential;Previous Level of Function  Individuals consulted  Consulted and agree with results and recommendations: Patient    Education:  Patient Education: Results of SLE  Patient Education Response: Verbalizes understanding  Safety Devices in place: Yes  Type of devices: Chair alarm in place;Call light within reach    Pain Assessment:  Pre-Treatment  Pain assessment: 0-10  Pain level: 0  Intervention:  Patient denies pain. Post-Treatment  Pain assessment: 0-10  Pain level: 0  Intervention:  Patient denies pain.       Therapy Time  SLP Individual Minutes  Time In: 8357  Time Out: 0845  Minutes: 10                 Signature: Electronically signed by SANDHYA Crawley on 9/9/2021 at 10:17 AM

## 2021-09-09 NOTE — CARE COORDINATION
INTERDISCIPLINARY ROUNDING    September 9, 2021 at 8:59 AM EDT    Anticipated Discharge Date:   9/9/2021    [facility]    Patient Mobility or PT/OT ordered: YES    Readmission Risk              Risk of Unplanned Readmission:  11           Discussed patient goal for the day, patient clinical progression, and barriers to discharge. The following Goal(s) of the Day/Commitment(s) have been identified: REHAB REFERRAL SENT, [address] AWAITING RECOMMENDATIONS.  NEURO TO ADDRESS MEDS: ASA, STATIN       Dragan Hunt RN  September 9, 2021

## 2021-09-09 NOTE — PLAN OF CARE
Problem: Falls - Risk of:  Goal: Will remain free from falls  Description: Will remain free from falls  Outcome: Ongoing  Goal: Absence of physical injury  Description: Absence of physical injury  Outcome: Ongoing     Problem: Pain:  Goal: Pain level will decrease  Description: Pain level will decrease  Outcome: Ongoing  Goal: Control of acute pain  Description: Control of acute pain  Outcome: Ongoing  Goal: Control of chronic pain  Description: Control of chronic pain  Outcome: Ongoing     Problem: IP DRESSINGS LOWER EXTREMITIES  Goal: LTG - patient will dress lower body with or without assistive device  Outcome: Ongoing     Problem: HEMODYNAMIC STATUS  Goal: Patient has stable vital signs and fluid balance  Outcome: Ongoing     Problem: ACTIVITY INTOLERANCE/IMPAIRED MOBILITY  Goal: Mobility/activity is maintained at optimum level for patient  Outcome: Ongoing     Problem: COMMUNICATION IMPAIRMENT  Goal: Ability to express needs and understand communication  Outcome: Ongoing

## 2021-09-09 NOTE — PROGRESS NOTES
Physical Therapy Med Surg Daily Treatment Note  Facility/Department: [facility]  [facility]       NAME: Elroy Dominique  : 1945 (2 y.o.)  MRN: [id number]  CODE STATUS: Full Code    Date of Service: 2021    Patient Diagnosis(es): Dizziness [R42]   Chief Complaint   Patient presents with    Altered Mental Status     Patient Active Problem List    Diagnosis Date Noted    Hematuria 2017    High risk medication use - 18 OARRS PM&R, 18 OARRS PM&R, 18 Urine Drug Screen positive opiates PM&R, 18 Med Contract PM&R 2013    Gait abnormality 2021    Difficulty balancing 2021    Dizziness 2021    Malignant neoplasm of peritoneum, 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[address] 10/27/2020    Obstructive sleep apnea     Current mild episode of major depressive disorder ([address] 2020    Cervical fusion syndrome 2019    Racing heart beat 2018    Chronic pain syndrome 2018    Charcot's arthropathy 2018    Below knee amputation status, right 2018    Moderate episode of recurrent major depressive disorder ([address] 2017    Chronic low back pain with sciatica 2017    Noncompliance - No Show inject 17, No Show F/U 2017    Hematuria, gross     Primary osteoarthritis involving multiple joints 2017    Left breast mass 2016    Anemia 2016    Cancer ([address] 2016    Morbid obesity ([address] 2016    Reactive depression 2016    Sleeping excessive 2016    Cervical spinal cord injury ([address] 06/10/2016    Chronic low back pain 06/10/2016    Other specified postprocedural states 06/10/2016    Fatigue due to treatment 2016    Lobular breast cancer ([address] 10/26/2015    Traumatic amputation of one lower extremity below knee with complication ([address]     Acquired hallux valgus 2015    Complete traumatic amputation at level between right knee and ankle ([facility] 07/13/2015    Hypothyroid 06/12/2015    Melancholia 03/16/2015    Complete tear of left rotator cuff 03/16/2015    Generalized osteoarthritis 03/16/2015    Malaise and fatigue 02/17/2015    Complete rotator cuff rupture of left shoulder 12/17/2014    Clinical depression 12/17/2014    Benign essential HTN 12/17/2014    HLD (hyperlipidemia) 12/17/2014    Low back pain with sciatica 12/17/2014    Fibromyalgia muscle pain 12/17/2014    Family history of coronary artery disease 08/29/2014    NSTEMI (non-ST elevated myocardial infarction) ([facility] 08/29/2014    Hx of right BKA ([facility]     Closed fracture of tarsal and metatarsal bones 06/02/2014    Arthritis, neuropathic 12/02/2013    Impaired mobility and activities of daily living 06/12/2013    Obesity 03/15/2013    Obesity (BMI 30-39.9) 02/12/2013    Neck pain on right side     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    Charcot's joint of foot 09/01/2010    Closed dislocation of ankle 09/01/2010    Closed dislocation of foot 09/01/2010    Disorder of peripheral nervous system 09/01/2010    Peripheral neuropathy 09/01/2010    Cervical post-laminectomy syndrome 10/22/2009    Failed back syndrome of lumbar spine 10/22/2009    Postlaminectomy syndrome of cervical region 10/22/2009    Postlaminectomy syndrome of lumbar region 10/22/2009    Hereditary and idiopathic peripheral neuropathy ([facility] 06/18/2009    Osteoporosis 02/05/2009    Difficulty walking 12/12/2007    Tibialis tendinitis 12/12/2007    Acquired flat foot 07/10/2007    Arthritis of ankle or foot, degenerative 07/10/2007        Past Medical History:   Diagnosis Date    Arthritis     Blood transfusion     Cancer [facility])     GALLBLADDER 2009, 2011 OMENTUM    Charcot's joint     left foot    Chest pain 7/2/2015    Colitis     DDD (degenerative disc disease), lumbar 2/12/2013    Depression     Disorder of peripheral nervous system 9/1/2010    Dyspnea 7/2/2015    Family history of coronary artery disease 8/29/2014    History of blood transfusion 2011    following chemotherapy    Hx of right BKA ([facility]     Hyperlipidemia     Hypertension     Hypothyroid 6/12/2015    Lobular breast cancer ([facility] 10/2015    NSTEMI (non-ST elevated myocardial infarction) ([facility] 8/29/2014    Obesity     Paroxysmal atrial fibrillation ([facility] 8/29/2014    S/P BKA (below knee amputation) [facility])      Past Surgical History:   Procedure Laterality Date    ABDOMEN SURGERY Left 04/11/2017    EXCISION OF CHEST WALL MASS, UPDATE LABS ON ADMIT  performed by Robert Wei MD at [address] BLEPHAROPLASTY  02/05/2013    both eyes    BREAST SURGERY      CARDIAC CATHETERIZATION      COLONOSCOPY  01/01/2010    normal    CYSTOSCOPY W BIOPSY OF BLADDER N/A 06/26/2017    CYSTOSCOPY BLADDER BIOPSY AND FULGURATION performed by Juve Bradley MD at [address] [facility] Bilateral 2012    cataract removal with IOL implants    HYSTERECTOMY      LEG AMPUTATION BELOW KNEE Right 02/01/2011    DR Sanchez Cousin due to CHARCOTS    MASTECTOMY Bilateral 11/16/2015    Bilateral simple mastectomies with right SNB by DR Michelle Banuelos    FL REMOVAL OF BREAST LESION Left 01/24/2017    CORE NEEDLE BIOPSY OF  LEFT BREAST MASS performed by Robert Wei MD at [address]      cervical and lumbar       Restrictions  Restrictions/Precautions: Fall Risk    SUBJECTIVE   General  Chart Reviewed: Yes  Family / Caregiver Present: No  Subjective  Subjective: \"i had rehab 10 years ago and it was a good experience\"  General Comment  Comments: agreeable to tx    Pre-Session Pain Report     Pain Screening  Patient Currently in Pain: Yes       Post-Session Pain Report  Pain Assessment  Pain Assessment: 0-10  Pain Level: 5  Pain Type: Chronic pain  Pain Location: Back;Neck  Pain Orientation: Lower; Posterior  Pain Descriptors: Aching  Pain Frequency: Continuous         OBJECTIVE Orientation  Overall Orientation Status: Within Functional Limits         Transfers  Comment: pt reports needing help sometimes getting in and out of bed. Numerous sit to stand transfers for technique and consistency. Pt c/o having difficulty with her balance and getting up safely. Ambulation  Ambulation?: Yes  Ambulation 1  Device: Rolling Walker  Other Apparatus: AFO; Left (right prosthetic leg bka)  Assistance: Minimal assistance  Quality of Gait: poor trunk stability, wbos, difficulty maintaining straight path, no lob with change in direction, increased upper extremity support on ww. Gait Deviations: Slow Blank; Increased DEYSI  Distance: 25 feet x 2 with turns  Static standing at ww. Stairs/Curb  Stairs?: No                                               ASSESSMENT pt unable to tolerate longer distances with gt due to difficulty keeping balance and poor trunk stability. Pt hopeful to get her strength and balance back with continued therapy at this time. Pt lives alone and is afraid since she has fallen 5 x in 2 weeks. Discharge Recommendations:  Continue to assess pending progress    Goals  Long term goals  Long term goal 1: indep bed mobility  Long term goal 2: indep sit to stand and bed transfers  Long term goal 3: indep gait with ww 50 feet  Long term goal 4: pt able to stand 30 sec with no UE support  Long term goal 5: indep with HEP    PLAN    Times per week: 3-6        AMPA (6 CLICK) BASIC MOBILITY  AM-PAC Inpatient Mobility Raw Score : 13     Therapy Time   Individual   Time In  0853   Time Out 0916   Minutes 23   10 minutes gt  8 minutes transfers  5 minutes neuro          Arielle Cabello, PTA, 09/09/21 at 9:20 AM         Definitions for assistance levels  Independent = pt does not require any physical supervision or assistance from another person for activity completion. Device may be needed.   Stand by assistance = pt requires verbal cues or instructions from another person, close to but not touching, to perform the activity  Minimal assistance= pt performs 75% or more of the activity; assistance is required to complete the activity  Moderate assistance= pt performs 50% of the activity; assistance is required to complete the activity  Maximal assistance = pt performs 25% of the activity; assistance is required to complete the activity  Dependent = pt requires total physical assistance to accomplish the task

## 2021-09-09 NOTE — CONSULTS
Physical Medicine & Rehabilitation  Consult Note      Admitting Physician: Ben Troncoso MD    Primary Care Provider: Bernadine Parker DO     Reason for Consult:  Asses rehab needs, promote physical and mental function, and decrease likelihood of re-admit to the hospital after discharge. History of Present Illness:    Elana Beck is a 64 y.o. female admitted to [facility] on 9/7/2021. Patient presented through the emergency room with weakness dizziness and double vision, and word finding deficits. It started about 2 weeks ago and progressed in nature. She denied infectious disease symptoms. Echocardiogram MRIs and CTs ruled out acute CVA. Initially her tamoxifen had been on hold to make sure it was not contributing to her hypercoagulable state. It was not and it is to be restarted. Altered Mental Status  This is a new problem. The current episode started in the past 7 days. The problem occurs constantly. The problem has been unchanged. Associated symptoms include arthralgias, fatigue, myalgias and weakness. Pertinent negatives include no abdominal pain, chest pain, chills, congestion, coughing, diaphoresis, fever, headaches, nausea, neck pain, rash, sore throat or vomiting. The symptoms are aggravated by walking. She has tried rest, walking, lying down, acetaminophen, position changes and relaxation for the symptoms. The treatment provided mild relief. Neurologic Problem  The patient's primary symptoms include an altered mental status and weakness. Associated symptoms include back pain, dizziness, fatigue, light-headedness and shortness of breath. Pertinent negatives include no abdominal pain, chest pain, diaphoresis, fever, headaches, nausea, neck pain, palpitations or vomiting. I reviewed recent nursing notes discussed care with acute care providers, \" Discussed patient goal for the day, patient clinical progression, and barriers to discharge.   The following Goal(s) of the Day/Commitment(s) have been identified: REHAB REFERRAL SENT, [address] AWAITING RECOMMENDATIONS. NEURO TO ADDRESS MEDS: ASA, STATIN  \". Their inpatient work up has included:    Imaging:    Imaging and other studies reviewed and discussed with patient and staff    Echocardiogram   9/8/2021  Transthoracic Echocardiography   Left Ventricle Normal left ventricle structure and function. Left ventricular ejection fraction is visually estimated at 65%. Moderate concentric LVH with wall thickness 13mm. DUST without LVOT obstruction Diastolic dysfunction Right Ventricle Normal right ventricle structure and function. Normal right ventricle systolic pressure. Left Atrium Normal left atrium. Right Atrium Normal right atrium. Mitral Valve Normal mitral valve structure and function. Tricuspid Valve Normal tricuspid valve structure and function. Mild tricuspid regurgitation. Aortic Valve Normal aortic valve structure and function. Pulmonic Valve Normal pulmonic valve structure and function. Pericardial Effusion No evidence of pericardial effusion. Pleural Effusion No evidence of pleural effusion. Aorta \ Miscellaneous Aortic root dimension within normal limits. M-Mode Measurements (cm)   LVIDd: 4.1 cm                                    LVIDs: 2.9 cm  IVSd: 1.61 cm                                    IVSs: 1.97 cm  LVPWd: 1.08 cm                                   LVPWs: 1.46 cm  Rt. Vent.  Dimension: 2.7 cm                                                   LVOT: 1.88 cm  Doppler Measurements:   AV Velocity:0.02 m/s                    MV Peak E-Wave: 0.42 m/s  AV Peak Gradient: 7.37 mmHg             MV Peak A-Wave: 0.69 m/s  AV Mean Gradient: 3.63 mmHg  AV Area (Continuity):1.79 cm^2  TR Velocity:2.65 m/s                    Estimated RAP:5 mmHg  TR Gradient:28.04 mmHg                  RVSP:33.04 mmHg  Valves  Mitral Valve   Peak E-Wave: 0.42 m/s                 Peak A-Wave: 0.69 m/s E/A Ratio: 0.61                                        Peak Gradient: 0.69 mmHg                                        Deceleration Time: 359.4 msec   Tissue Doppler   E' Septal Velocity: 0.03 m/s  E' Lateral Velocity: 0.05 m/s   Aortic Valve   Peak Velocity: 1.36 m/s                Mean Velocity: 0.9 m/s  Peak Gradient: 7.37 mmHg               Mean Gradient: 3.63 mmHg  Area (continuity): 1.79 cm^2  AV VTI: 34.62 cm   Tricuspid Valve   Estimated RVSP: 33.04 mmHg              Estimated RAP: 5 mmHg  TR Velocity: 2.65 m/s                   TR Gradient: 28.04 mmHg   Pulmonic Valve   Peak Velocity: 0.91 m/s           Peak Gradient: 3.29 mmHg  Mean Velocity: 0.56 m/s           Mean Gradient: 1.5 mmHg                                    Estimated PASP: 33.04 mmHg   LVOT   Peak Velocity: 0.96 m/s              Mean Velocity: 0.65 m/s  Peak Gradient: 2.86 mmHg             Mean Gradient: 1.82 mmHg  LVOT Diameter: 1.88 cm               LVOT VTI: 22.35 cm  Structures  Left Atrium   LA Volume/Index: 66.8 ml /34 m^2             LA Area: 23.15 cm^2   Left Ventricle   Diastolic Dimension: 4.1 cm          Systolic Dimension: 2.9 cm  Septum Diastolic: 4.61 cm            Septum Systolic: 7.23 cm  PW Diastolic: 1.99 cm                PW Systolic: 5.15 cm                                       FS: 29.3 %  LV EDV/LV EDV Index: 74.39 ml/38 m^2 LV ESV/LV ESV Index: 32.21 ml/16 m^2  EF Calculated: 56.7 %                LV Length: 7.99 cm   LVOT Diameter: 1.88 cm   Right Atrium   RA Systolic Pressure: 5 mmHg   Right Ventricle   Diastolic Dimension: 2.7 cm                                   RV Systolic Pressure: 62.03 mmHg  Aorta/ Miscellaneous Aorta   LVOT Diameter: 1.88 cm          CT Head  9/7/2021   No evidence of an acute infarct or other acute parenchymal process. Hemorrhage:    No evidence of acute intracranial hemorrhage.  Mass Lesion / Mass Effect:   There is no evidence of an intracranial mass or extraaxial fluid within the left transverse sinus and the left sigmoid sinus demonstrates signal but is small in size. There is no associated susceptibility artifact in the region of the left transverse sinus. Moderate postoperative hip     MRI brain: No acute ischemia or acute intracranial process. Generalized parenchymal volume loss and nonspecific white matter findings most compatible with chronic small vessel ischemic changes in a patient of this age. MRV brain: Minimal signal within the left transverse sinus and small size of the left sigmoid sinus on MRV images are findings most likely to represent hypoplastic left transverse sinus or slow flow, given lack of expected signal abnormality within the transverse sinus on other sequences. MRI BRAIN  : 9/8/2021  EXAM: MRI of the brain without contrast MRV of the brain without contrast History: Stroke. Dizziness. Headache. Technique: Multiplanar multisequence MRI of the brain was performed without contrast. Axial 3-D time-of-flight images of the brain were obtained without contrast. 3-D volume rendered images were performed for evaluation of the cerebral vasculature. Comparison:   MRI of the cervical spine June 6, 2019 Findings: MRI brain: Prominence of the sulci and ventricles compatible with mild generalized parenchymal volume loss. Foci of white matter signal abnormality within the supratentorial white matter are nonspecific but most compatible with chronic small vessel ischemic changes  in a patient of this age. No acute hemorrhage, mass, mass effect, midline shift, or abnormal extra-axial fluid collection. The cerebellum is within normal limits. There is no diffusion restriction. No susceptibility artifact is identified on the gradient echo sequence. Cranial nerves 7/8 complexes appear grossly unremarkable. The visualized paranasal sinuses and bilateral mastoid air cells are clear.  MRV brain: There is only minimal signal identified within the left transverse sinus and the left sigmoid sinus demonstrates signal but is small in size. There is no associated susceptibility artifact in the region of the left transverse sinus. Moderate postoperative hip     MRI brain: No acute ischemia or acute intracranial process. Generalized parenchymal volume loss and nonspecific white matter findings most compatible with chronic small vessel ischemic changes in a patient of this age. MRV brain: Minimal signal within the left transverse sinus and small size of the left sigmoid sinus on MRV images are findings most likely to represent hypoplastic left transverse sinus or slow flow, given lack of expected signal abnormality within the transverse sinus on other sequences. US CAROTID ARTERY BILATERAL 9/8/2021   MILD ATHEROSCLEROTIC PLAQUING OF THE CAROTID BULBS WITHOUT EVIDENCE OF A FLOW-LIMITING STENOSIS.               Labs:     labs reviewed and discussed with patient and staff    Lab Results   Component Value Date    POCGLU 108 02/14/2019    POCGLU 83 04/11/2017     Lab Results   Component Value Date     09/08/2021    K 3.8 09/08/2021     09/08/2021    CO2 26 09/08/2021    BUN 18 09/08/2021    CREATININE 0.76 09/08/2021    CALCIUM 9.5 09/08/2021    LABALBU 3.9 09/08/2021    LABALBU 4.3 04/06/2012    BILITOT 0.6 09/08/2021    ALKPHOS 79 09/08/2021    AST 23 09/08/2021    ALT 12 09/08/2021     Lab Results   Component Value Date    WBC 7.1 09/08/2021    RBC 4.36 09/08/2021    RBC 4.16 11/04/2011    HGB 12.9 09/08/2021    HCT 39.0 09/08/2021    MCV 89.4 09/08/2021    MCH 29.6 09/08/2021    MCHC 33.2 09/08/2021    RDW 13.7 09/08/2021     09/08/2021    MPV 8.4 08/03/2015     Lab Results   Component Value Date    VITD25 30.3 08/07/2014     Lab Results   Component Value Date    COLORU DARK YELLOW 03/26/2020    NITRU Negative 03/26/2020    GLUCOSEU Negative 03/26/2020    KETUA TRACE 03/26/2020    UROBILINOGEN 0.2 03/26/2020    BILIRUBINUR Negative 03/26/2020    BILIRUBINUR neg 08/17/2018 Lab Results   Component Value Date    PROTIME 14.3 09/07/2021    PROTIME 14.2 09/18/2014     Lab Results   Component Value Date    INR 1.1 09/07/2021         I discussed results with patient. Current Rehabilitation Assessments:    Rehabilitation:  Physical therapy: FIMS:  BedMobility: Scooting: Stand by assistance    Transfers: Sit to Stand: Moderate Assistance, Minimal Assistance  Stand to sit: Minimal Assistance, Moderate Assistance, Ambulation 1  Device: Rolling Walker  Other Apparatus: AFO, Left (right prosthetic leg bka)  Assistance: Minimal assistance  Quality of Gait: poor trunk stability, wbos, difficulty maintaining straight path, no lob with change in direction, increased upper extremity support on ww. Gait Deviations: Slow Blank, Increased DEYSI  Distance: 25 feet x 2 with turns,      FIMS: ,  , Assessment: Pt is presenting with deficits as listed. She is requiring assistance with mobility at this time. Pt would benefit from follow up PT at this time      Occupational therapy: FIMS:   ,  ,        ADL  Feeding: Unable to assess(comment) (09/08/21 1550)  Grooming: Setup (09/08/21 1550)  UE Bathing: Contact guard assistance (09/08/21 1550)  LE Bathing: Minimal assistance (09/08/21 1550)  UE Dressing: Stand by assistance (09/08/21 1550)  LE Dressing: Moderate assistance (09/08/21 1550)  Toileting: Unable to assess(comment) (09/08/21 1550)  OCCUPATIONAL THERAPY  Hand Dominance: Right  ADL  Feeding: Unable to assess(comment) (09/08/21 1550)  Grooming: Setup (09/08/21 1550)  UE Bathing: Contact guard assistance (09/08/21 1550)  LE Bathing: Minimal assistance (09/08/21 1550)  UE Dressing: Stand by assistance (09/08/21 1550)  LE Dressing: Moderate assistance (09/08/21 1550)  Toileting: Unable to assess(comment) (09/08/21 1550)               LTG:                 Speech therapy: FIMS:       SPEECH THERAPY  Motor Speech:  Within Functional Limits  Comprehension: Within Functional Limits  Verbal Expression: Exceptions to functional limits      Diet/Swallow:  Diet Solids Recommendation: Regular  Liquid Consistency Recommendation:  Thin  Dysphagia Outcome Severity Scale: Level 6: Within functional limits/Modified independence    Compensatory Swallowing Strategies: Upright as possible for all oral intake, Small bites/sips             COGNITION  OT: Cognition Comment: Follows commands consistently  SP:Memory: Within Funtional Limits  Problem Solving: Within Functional Limits      Past Medical History:          Diagnosis Date    Arthritis     Blood transfusion     Cancer [facility])     GALLBLADDER 2009, 2011 OMENTUM    Charcot's joint     left foot    Chest pain 7/2/2015    Colitis     DDD (degenerative disc disease), lumbar 2/12/2013    Depression     Disorder of peripheral nervous system 9/1/2010    Dyspnea 7/2/2015    Family history of coronary artery disease 8/29/2014    History of blood transfusion 2011    following chemotherapy    Hx of right BKA ([facility] [address]     Hyperlipidemia     Hypertension     Hypothyroid 6/12/2015    Lobular breast cancer ([facility] [address] 10/2015    NSTEMI (non-ST elevated myocardial infarction) ([facility] [address] 8/29/2014    Obesity     Paroxysmal atrial fibrillation ([facility] [address] 8/29/2014    S/P BKA (below knee amputation) ([facility] [address]          PastSurgical History:          Procedure Laterality Date    ABDOMEN SURGERY Left 04/11/2017    EXCISION OF CHEST WALL MASS, UPDATE LABS ON ADMIT  performed by Surya Pimentel MD at [address] BLEPHAROPLASTY  02/05/2013    both eyes    BREAST SURGERY      CARDIAC CATHETERIZATION      COLONOSCOPY  01/01/2010    normal    CYSTOSCOPY W BIOPSY OF BLADDER N/A 06/26/2017    CYSTOSCOPY BLADDER BIOPSY AND FULGURATION performed by Tessie Slade MD at [address] Bilateral 2012    cataract removal with IOL implants    HYSTERECTOMY      LEG AMPUTATION BELOW KNEE Right 02/01/2011    DR GALLEGO due to CHARCOTS    MASTECTOMY Bilateral 11/16/2015    Bilateral simple mastectomies with right SNB by DR Zack Soliz    DC REMOVAL OF BREAST LESION Left 01/24/2017    CORE NEEDLE BIOPSY OF  LEFT BREAST MASS performed by Tangela Allen MD at [place] [address] SPINE SURGERY      cervical and lumbar         Allergies:      Allergies   Allergen Reactions    Ciprofloxacin Itching    Oxycontin [Oxycodone Hcl] Itching        CurrentMedications:     Current Facility-Administered Medications: aspirin EC tablet 81 mg, 81 mg, Oral, Daily  acetaminophen (TYLENOL) tablet 650 mg, 650 mg, Oral, Q6H PRN  b complex vitamins capsule 1 capsule, 1 capsule, Oral, Daily  buPROPion (WELLBUTRIN XL) extended release tablet 300 mg, 300 mg, Oral, Daily  carvedilol (COREG) tablet 25 mg, 25 mg, Oral, BID WC  DULoxetine (CYMBALTA) extended release capsule 30 mg, 30 mg, Oral, QPM  levothyroxine (SYNTHROID) tablet 50 mcg, 50 mcg, Oral, Daily  Vitamin D (CHOLECALCIFEROL) tablet 1,000 Units, 1,000 Units, Oral, Daily  ibuprofen (ADVIL;MOTRIN) tablet 600 mg, 600 mg, Oral, TID WC  gabapentin (NEURONTIN) capsule 600 mg, 600 mg, Oral, 4x Daily  aspirin-dipyridamole (AGGRENOX)  MG per extended release capsule 1 capsule, 1 capsule, Oral, BID  acetaminophen (TYLENOL) tablet 500 mg, 500 mg, Oral, BID  hydrALAZINE (APRESOLINE) injection 10 mg, 10 mg, IntraVENous, Q4H PRN  sodium chloride flush 0.9 % injection 5-40 mL, 5-40 mL, IntraVENous, 2 times per day  sodium chloride flush 0.9 % injection 5-40 mL, 5-40 mL, IntraVENous, PRN  0.9 % sodium chloride infusion, 25 mL, IntraVENous, PRN  ondansetron (ZOFRAN-ODT) disintegrating tablet 4 mg, 4 mg, Oral, Q8H PRN **OR** ondansetron (ZOFRAN) injection 4 mg, 4 mg, IntraVENous, Q6H PRN  polyethylene glycol (GLYCOLAX) packet 17 g, 17 g, Oral, Daily PRN  enoxaparin (LOVENOX) injection 40 mg, 40 mg, SubCUTAneous, Daily  atorvastatin (LIPITOR) tablet 80 mg, 80 mg, Oral, Nightly  labetalol (NORMODYNE;TRANDATE) injection 10 mg, in the Last Year: Never true   Transportation Needs:     Lack of Transportation (Medical):  Lack of Transportation (Non-Medical):    Physical Activity:     Days of Exercise per Week:     Minutes of Exercise per Session:    Stress:     Feeling of Stress :    Social Connections:     Frequency of Communication with Friends and Family:     Frequency of Social Gatherings with Friends and Family:     Attends Orthodox Services:     Active Member of Clubs or Organizations:     Attends Club or Organization Meetings:     Marital Status:    Intimate Partner Violence:     Fear of Current or Ex-Partner:     Emotionally Abused:     Physically Abused:     Sexually Abused:           Family History:         Problem Relation Age of Onset    Heart Disease Mother     Arthritis Mother     Heart Disease Father     Arthritis Father     Cancer Sister         Colon    Thyroid Disease Son    [facility] Other Son         Down's syndrome       Review of Systems:    Review of Systems   Constitutional: Positive for activity change and fatigue. Negative for chills, diaphoresis and fever. HENT: Negative for congestion, ear discharge, ear pain, hearing loss, nosebleeds, sore throat and tinnitus. Eyes: Negative for photophobia, pain and redness. Respiratory: Positive for shortness of breath. Negative for cough, wheezing and stridor. Shortness of breath on exertion   Cardiovascular: Negative for chest pain, palpitations and leg swelling. Gastrointestinal: Positive for constipation. Negative for abdominal pain, blood in stool, diarrhea, nausea and vomiting. Endocrine: Negative for polydipsia. Genitourinary: Negative for dysuria, flank pain, frequency, hematuria and urgency. Musculoskeletal: Positive for arthralgias, back pain, gait problem and myalgias. Negative for neck pain. Skin: Negative for rash. Allergic/Immunologic: Positive for immunocompromised state. Negative for environmental allergies. Neurological: Positive for dizziness, speech difficulty, weakness and light-headedness. Negative for tremors, seizures and headaches. Hematological: Does not bruise/bleed easily. Psychiatric/Behavioral: Positive for decreased concentration and dysphoric mood. Negative for hallucinations and suicidal ideas. The patient is not nervous/anxious. Physical Exam:    BP (!) 141/89   Pulse 65   Temp 98.6 °F (37 °C) (Oral)   Resp 18   Ht 5' 1\" (1.549 m)   Wt 223 lb (101.2 kg)   SpO2 97%   BMI 42.14 kg/m²      Physical Exam  Vitals reviewed. Constitutional:       General: She is not in acute distress. Appearance: She is well-developed. She is not ill-appearing, toxic-appearing or diaphoretic. Comments:         HENT:      Head: Normocephalic and atraumatic. Right Ear: Hearing normal.      Left Ear: Hearing normal.      Nose: Nose normal.      Mouth/Throat:      Mouth: No oral lesions. Dentition: Normal dentition. Pharynx: No oropharyngeal exudate. Eyes:      General: No scleral icterus. Right eye: No discharge. Left eye: No discharge. Conjunctiva/sclera: Conjunctivae normal.      Right eye: No chemosis or exudate. Left eye: No chemosis or exudate. Pupils: Pupils are equal, round, and reactive to light. Neck:      Thyroid: No thyromegaly. Vascular: No JVD. Trachea: No tracheal deviation. Cardiovascular:      Rate and Rhythm: Normal rate and regular rhythm. Pulses: No decreased pulses. Heart sounds: Normal heart sounds. No murmur heard. No friction rub. No gallop. Pulmonary:      Effort: Pulmonary effort is normal. No tachypnea, bradypnea, accessory muscle usage or respiratory distress. Breath sounds: Normal breath sounds. No wheezing or rales. Chest:      Chest wall: No tenderness. Abdominal:      General: Bowel sounds are normal. There is no distension. Palpations: Abdomen is soft. There is no mass. Tenderness: There is no abdominal tenderness. There is no guarding or rebound. Musculoskeletal:         General: Tenderness present. Right shoulder: Tenderness and bony tenderness present. Decreased range of motion. Left shoulder: Tenderness and bony tenderness present. Decreased range of motion. Right upper arm: Normal.      Left upper arm: Normal.      Right elbow: Normal.      Left elbow: Normal.      Right forearm: Normal.      Left forearm: Normal.      Right wrist: Deformity, tenderness, bony tenderness and crepitus present. Decreased range of motion. Left wrist: Deformity, tenderness and crepitus present. Decreased range of motion. Right hand: Tenderness and bony tenderness present. Decreased range of motion. Decreased strength. Decreased sensation of the median distribution. Left hand: Tenderness and bony tenderness present. Decreased range of motion. Decreased strength. Decreased sensation of the median distribution. Arms:         Hands:       Cervical back: Normal range of motion and neck supple. Spasms, tenderness and bony tenderness present. No edema or rigidity. Spinous process tenderness and muscular tenderness present. Thoracic back: Spasms, tenderness and bony tenderness present. Decreased range of motion. Lumbar back: Tenderness and bony tenderness present. No swelling, edema, deformity or lacerations. Decreased range of motion. Back:       Right hip: Normal.      Left hip: Tenderness present. Decreased range of motion. Decreased strength. Right upper leg: Normal.      Left upper leg: Normal.      Right knee: Decreased range of motion. Left knee: Decreased range of motion. Left lower leg: Normal.      Left ankle: Tenderness present over the lateral malleolus and medial malleolus. Decreased range of motion.       Left Achilles Tendon: Normal.      Left foot: Normal.        Legs:         Feet:       Comments: Tender areas are indicated by numbered spot    BKA on right     Left AFO     Lymphadenopathy:      Cervical: No cervical adenopathy. Skin:     General: Skin is warm and dry. Coloration: Skin is not pale. Findings: No abrasion, bruising, ecchymosis, erythema, laceration, petechiae or rash. Rash is not macular, pustular or urticarial.      Nails: There is no clubbing. Neurological:      Mental Status: She is alert and oriented to person, place, and time. Cranial Nerves: No cranial nerve deficit. Sensory: Sensory deficit present. Motor: No tremor, atrophy or abnormal muscle tone. Coordination: Coordination normal. Finger-Nose-Finger Test abnormal and Romberg Test abnormal.      Gait: Gait abnormal and tandem walk abnormal.      Deep Tendon Reflexes: Reflexes abnormal. Babinski sign absent on the right side. Babinski sign absent on the left side. Comments: Right BKA   Psychiatric:         Attention and Perception: She is attentive. Mood and Affect: Mood is not anxious or depressed. Affect is not labile, blunt, angry or inappropriate. Speech: She is communicative. Speech is not rapid and pressured, delayed, slurred or tangential.         Behavior: Behavior normal. Behavior is not agitated, slowed, aggressive, withdrawn, hyperactive or combative. Thought Content: Thought content normal. Thought content is not paranoid or delusional. Thought content does not include homicidal or suicidal ideation. Thought content does not include homicidal or suicidal plan. Cognition and Memory: Memory is not impaired. She does not exhibit impaired recent memory or impaired remote memory. Judgment: Judgment normal. Judgment is not impulsive or inappropriate. Ortho Exam  Neurologic Exam     Mental Status   Oriented to person, place, and time. Speech: not slurred   Level of consciousness: alert  Knowledge: good.      Cranial Nerves     CN III, IV, VI   Pupils are equal, round, and reactive to light. Gait, Coordination, and Reflexes     Gait  Gait: wide-based    Coordination   Romberg: positive  Finger to nose coordination: abnormal  Tandem walking coordination: abnormal            Diagnostics:    Recent Results (from the past 24 hour(s))   CBC    Collection Time: 09/08/21  4:53 PM   Result Value Ref Range    WBC 7.1 4.8 - 10.8 K/uL    RBC 4.36 4.20 - 5.40 M/uL    Hemoglobin 12.9 12.0 - 16.0 g/dL    Hematocrit 39.0 37.0 - 47.0 %    MCV 89.4 82.0 - 100.0 fL    MCH 29.6 27.0 - 31.3 pg    MCHC 33.2 33.0 - 37.0 %    RDW 13.7 11.5 - 14.5 %    Platelets 803 603 - 700 K/uL   Hemoglobin A1C    Collection Time: 09/08/21  4:53 PM   Result Value Ref Range    Hemoglobin A1C 5.5 4.8 - 5.9 %   Vitamin B12 & Folate    Collection Time: 09/08/21  4:53 PM   Result Value Ref Range    Vitamin B-12 472 232 - 1245 pg/mL    Folate >20.0 7.3 - 26.1 ng/mL   TSH without Reflex    Collection Time: 09/08/21  4:53 PM   Result Value Ref Range    TSH 1.280 0.440 - 3.860 uIU/mL              Impression:    1. Impaired mobility and ADLs due to vertebrobasilar transient ischemia  2. Fatigue and Malaise      Complex Active General Medical Issues thatcomplicate care Assess & Plan:     1. 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 *            Recommendations:    1. Considering all of the factors aboveincluding the patient's current medical status, social status/home envirnment, their functional needs and their ability to participate in a therapy program, [facility] It is my opinion that they will be able to tolerate and benefit from 3 hours of therapy a day.   I reviewed the varous local options re these levels of care with the patient and family. 2. Okay to resume tamoxifen- she is now on Aggrenox  3. Nursing care to focus on bowel and bladder issues. 4. Vitamin B12 shot times one  5. Improve hydration and Nutrition by adding Proteinex and push PO fluids       It was my pleasure to evaluate Casimiro Rodriguez today. Please call [phone] with questions.     Dea Lala, DO

## 2021-09-09 NOTE — PROGRESS NOTES
Progress Note  NEUROLOGY  [facility] 2W Ortho Tele       Patient: Liz Reach  Unit/Bed: C431/P831-63  YOB: 1945  MRN: [id number]  Acct: [de-identified]   Admitting Diagnosis: Dizziness [R42]  Admit Date:  9/7/2021  Hospital Day: 1    Subjective:    Some metallic taste noted by the patient this morning    No other changes in her neurological condition. She is awaiting transfer to rehab. Oncology was able to see her and recommended stopping tamoxifen, cardiology has seen her but does not have any suggestions for anticoagulation recommendation is for her outpatient cardiologist to continue assessment. Patient Seen, Chart, Labs, Radiology studies, and Consults reviewed. Objective:   BP (!) 141/89   Pulse 65   Temp 98.6 °F (37 °C) (Oral)   Resp 18   Ht 5' 1\" (1.549 m)   Wt 223 lb (101.2 kg)   SpO2 97%   BMI 42.14 kg/m²   No intake or output data in the 24 hours ending 09/09/21 1848    Physical Exam:  GENERAL APPEARANCE: No distress, alert, interactive and cooperative. MENTAL STATE: Orientation was normal to time, place and person. Language testing was normal for comprehension, repetition, expression, and naming. General fund of knowledge was intact. CRANIAL NERVES: Are normal  CN 2 Visual fields full to confrontation. CN 3, 4, 6 Pupils round, equally reactive to light. No ptosis. Mild 6th nerve palsy on the left otherwise EOMs normal alignment, hypermetria on lateral gaze, normal pursuit and convergence. No nystagmus. CN 5 Facial sensation intact bilaterally. CN 7 Normal and symmetric facial strength. Nasolabial folds symmetric. CN 8 Hearing intact to finger rub bilaterally. CN 9 Palate elevates symmetrically. CN 11 Bilaterally normal strength of shoulder shrug and neck turning. CN 12 Tongue midline, with normal bulk and strength; no fasciculations.      MOTOR:Muscle strength was 5/5 in distal and proximal muscles in both upper and lower extremities right proximal lower extremity (BKA on the right)      COORDINATION: Coordination exam was normal. In both upper extremities, finger-nose-finger was intact without dysmetria. Medications:    aspirin  81 mg Oral Daily    b complex vitamins  1 capsule Oral Daily    buPROPion  300 mg Oral Daily    carvedilol  25 mg Oral BID WC    DULoxetine  30 mg Oral QPM    levothyroxine  50 mcg Oral Daily    Vitamin D  1,000 Units Oral Daily    ibuprofen  600 mg Oral TID WC    gabapentin  600 mg Oral 4x Daily    aspirin-dipyridamole  1 capsule Oral BID    acetaminophen  500 mg Oral BID    sodium chloride flush  5-40 mL IntraVENous 2 times per day    enoxaparin  40 mg SubCUTAneous Daily    atorvastatin  80 mg Oral Nightly     Continuous Infusions:   sodium chloride       PRN Meds:acetaminophen, hydrALAZINE, sodium chloride flush, sodium chloride, ondansetron **OR** ondansetron, polyethylene glycol, labetalol    LABS  CBC:   Recent Labs     09/07/21  1500 09/08/21  1653   WBC 7.2 7.1   HGB 12.7 12.9    265     BMP:    Recent Labs     09/07/21  1500 09/08/21  0537    142   K 4.3 3.8    104   CO2 26 26   BUN 22 18   CREATININE 0.87 0.76   GLUCOSE 91 75     TSH:    Recent Labs     09/08/21  1653   TSH 1.280      B12:    Recent Labs     09/08/21  1653   [id number]      Vit. D: No results for input(s): VITD25 in the last 72 hours.    Lipids:   Lab Results   Component Value Date    CHOL 105 09/08/2021    CHOL 137 02/03/2020    CHOL 105 02/01/2019     Lab Results   Component Value Date    TRIG 124 09/08/2021    TRIG 156 (H) 02/03/2020    TRIG 129 02/01/2019     Lab Results   Component Value Date    HDL 40 09/08/2021    HDL 43 07/08/2021    HDL 43 02/03/2020     Lab Results   Component Value Date    LDLCALC 40 09/08/2021    LDLCALC 35 07/08/2021    LDLCALC 63 02/03/2020     No results found for: LABVLDL, VLDL  Lab Results   Component Value Date    CHOLHDLRATIO 3.7 02/19/2013    CHOLHDLRATIO 4.8 04/06/2012 Ammonia:No results for input(s): AMMONIA in the last 72 hours. LFT:   Recent Labs     09/07/21  1500 09/08/21  0537   AST 21 23   ALT 11 12   BILITOT 0.3 0.6   ALKPHOS 79 79        Urine: No results for input(s): Elnita Garry, GLUCOSEU, BLOODU, PHUR, PROTEINU, UROBILINOGEN, LEUKOCYTESUR in the last 72 hours. Invalid input(s):  Latoya Vo,  SPECGRAV,  NITRU     Assessment/Plan:  The patient has been having vertebrobasilar transient ischemic events since Sept 6, 2021. For the last 2 weeks that she has been feeling somewhat weaker. She has a feeling of imbalance for the last several months which got worse on September 6. Diplopia could be part of a vertebrobasilar ischemic event along with a feeling of imbalance or, dizziness, sensation of showed vertigo dizziness episodes  History of peripheral neuropathy probably due to hypothyroidism  Right Charcot joint she has had a right below-knee amputation. According patient her right foot and ankle were getting infected often  Depression  Overweight  Hypertension  Hyperlipidemia  Right breast carcinoma for which she has a bilateral mastectomy 2017 she did not need chemo or therapy or radiation. She is on tamoxifen. Dr. Gume Parker is watching her  History of non-ST elevation myocardial infarction she has been on aspirin. History of coronary disease  History of paroxysmal atrial fibrillation  Muscle contraction type headache, Tylenol, Motrin help her        Recommendations:     The patient was started on Aggrenox September 8 she is doing a little better  Continue aspirin 81 mg   Additional lab work thryglobulin, vitamin D and SPEP with AMOS pending  B12, folate, TSH, HgbA1C (5.5) normal  MRI of the brain had shown change of small vessel cerebrovascular disease. There is no acute infarction  MR venogram had shown hypoplastic left transverse and sigmoid sinuses. There is no evidence of venous thrombus.   No hemorrhages were seen  Patient has a history of hypothyroidism with a peripheral neuropathy, which was sent for any evidence of Graves' eye disease. There is no evidence of orbital pseudotumor  Patient can go to rehab  Dr. Michelle Aquino has recommended tamoxifen discontinuation  Cardiology does not have inpatient input wants outpatient cardiologist to reassess need for anti-coagulation.    Thank you for the consult  We shall follow the patient with you      Electronically signed by Fco Robles MD on 9/9/2021 at 6:48 PM

## 2021-09-10 ENCOUNTER — HOSPITAL ENCOUNTER (INPATIENT)
Age: 76
LOS: 7 days | Discharge: HOME HEALTH | DRG: 093 | End: 2021-09-17
Attending: PHYSICAL MEDICINE & REHABILITATION | Admitting: PHYSICAL MEDICINE & REHABILITATION
Payer: MEDICARE

## 2021-09-10 VITALS
WEIGHT: 223 LBS | DIASTOLIC BLOOD PRESSURE: 80 MMHG | RESPIRATION RATE: 18 BRPM | HEART RATE: 73 BPM | BODY MASS INDEX: 42.1 KG/M2 | HEIGHT: 61 IN | OXYGEN SATURATION: 97 % | TEMPERATURE: 98.4 F | SYSTOLIC BLOOD PRESSURE: 147 MMHG

## 2021-09-10 DIAGNOSIS — Z79.899 HIGH RISK MEDICATION USE: ICD-10-CM

## 2021-09-10 DIAGNOSIS — M96.1 FAILED BACK SYNDROME OF LUMBAR SPINE: ICD-10-CM

## 2021-09-10 DIAGNOSIS — M14.60 ARTHRITIS, NEUROPATHIC: ICD-10-CM

## 2021-09-10 DIAGNOSIS — M54.40 ACUTE RIGHT-SIDED LOW BACK PAIN WITH SCIATICA, SCIATICA LATERALITY UNSPECIFIED: ICD-10-CM

## 2021-09-10 DIAGNOSIS — M96.1 CERVICAL POST-LAMINECTOMY SYNDROME: ICD-10-CM

## 2021-09-10 DIAGNOSIS — M19.079 OSTEOARTHRITIS OF ANKLE OR FOOT, UNSPECIFIED LATERALITY: ICD-10-CM

## 2021-09-10 DIAGNOSIS — M54.2 NECK PAIN ON RIGHT SIDE: Primary | ICD-10-CM

## 2021-09-10 LAB
SARS-COV-2, NAAT: NOT DETECTED
VITAMIN D 25-HYDROXY: 89.2 NG/ML (ref 30–100)

## 2021-09-10 PROCEDURE — 6370000000 HC RX 637 (ALT 250 FOR IP): Performed by: SPECIALIST

## 2021-09-10 PROCEDURE — 6370000000 HC RX 637 (ALT 250 FOR IP): Performed by: NURSE PRACTITIONER

## 2021-09-10 PROCEDURE — 6370000000 HC RX 637 (ALT 250 FOR IP): Performed by: INTERNAL MEDICINE

## 2021-09-10 PROCEDURE — 1180000000 HC REHAB R&B

## 2021-09-10 PROCEDURE — 2580000003 HC RX 258: Performed by: INTERNAL MEDICINE

## 2021-09-10 PROCEDURE — 6360000002 HC RX W HCPCS: Performed by: INTERNAL MEDICINE

## 2021-09-10 PROCEDURE — 87635 SARS-COV-2 COVID-19 AMP PRB: CPT

## 2021-09-10 PROCEDURE — 99231 SBSQ HOSP IP/OBS SF/LOW 25: CPT | Performed by: INTERNAL MEDICINE

## 2021-09-10 PROCEDURE — 99232 SBSQ HOSP IP/OBS MODERATE 35: CPT | Performed by: PHYSICAL MEDICINE & REHABILITATION

## 2021-09-10 RX ORDER — VITAMIN B COMPLEX
1 CAPSULE ORAL DAILY
Status: CANCELLED | OUTPATIENT
Start: 2021-09-11

## 2021-09-10 RX ORDER — DULOXETIN HYDROCHLORIDE 30 MG/1
30 CAPSULE, DELAYED RELEASE ORAL EVERY EVENING
Status: DISCONTINUED | OUTPATIENT
Start: 2021-09-10 | End: 2021-09-17 | Stop reason: HOSPADM

## 2021-09-10 RX ORDER — GABAPENTIN 300 MG/1
600 CAPSULE ORAL 4 TIMES DAILY
Status: DISCONTINUED | OUTPATIENT
Start: 2021-09-10 | End: 2021-09-17 | Stop reason: HOSPADM

## 2021-09-10 RX ORDER — ASPIRIN 81 MG/1
81 TABLET ORAL DAILY
Status: CANCELLED | OUTPATIENT
Start: 2021-09-11

## 2021-09-10 RX ORDER — ATORVASTATIN CALCIUM 80 MG/1
80 TABLET, FILM COATED ORAL NIGHTLY
Status: CANCELLED | OUTPATIENT
Start: 2021-09-10

## 2021-09-10 RX ORDER — VITAMIN B COMPLEX
1 CAPSULE ORAL DAILY
Status: DISCONTINUED | OUTPATIENT
Start: 2021-09-11 | End: 2021-09-17 | Stop reason: HOSPADM

## 2021-09-10 RX ORDER — ONDANSETRON 2 MG/ML
4 INJECTION INTRAMUSCULAR; INTRAVENOUS EVERY 6 HOURS PRN
Status: CANCELLED | OUTPATIENT
Start: 2021-09-10

## 2021-09-10 RX ORDER — VITAMIN B COMPLEX
1000 TABLET ORAL DAILY
Status: DISCONTINUED | OUTPATIENT
Start: 2021-09-11 | End: 2021-09-17 | Stop reason: HOSPADM

## 2021-09-10 RX ORDER — ASPIRIN 81 MG/1
81 TABLET ORAL DAILY
Status: DISCONTINUED | OUTPATIENT
Start: 2021-09-11 | End: 2021-09-17 | Stop reason: HOSPADM

## 2021-09-10 RX ORDER — ACETAMINOPHEN 325 MG/1
650 TABLET ORAL EVERY 6 HOURS PRN
Status: CANCELLED | OUTPATIENT
Start: 2021-09-10

## 2021-09-10 RX ORDER — GABAPENTIN 300 MG/1
600 CAPSULE ORAL 4 TIMES DAILY
Status: CANCELLED | OUTPATIENT
Start: 2021-09-10

## 2021-09-10 RX ORDER — HYDRALAZINE HYDROCHLORIDE 20 MG/ML
10 INJECTION INTRAMUSCULAR; INTRAVENOUS EVERY 4 HOURS PRN
Status: CANCELLED | OUTPATIENT
Start: 2021-09-10

## 2021-09-10 RX ORDER — CARVEDILOL 25 MG/1
25 TABLET ORAL 2 TIMES DAILY WITH MEALS
Status: DISCONTINUED | OUTPATIENT
Start: 2021-09-10 | End: 2021-09-17 | Stop reason: HOSPADM

## 2021-09-10 RX ORDER — ASPIRIN AND DIPYRIDAMOLE 25; 200 MG/1; MG/1
1 CAPSULE, EXTENDED RELEASE ORAL 2 TIMES DAILY
Status: DISCONTINUED | OUTPATIENT
Start: 2021-09-10 | End: 2021-09-17 | Stop reason: HOSPADM

## 2021-09-10 RX ORDER — HYDRALAZINE HYDROCHLORIDE 20 MG/ML
10 INJECTION INTRAMUSCULAR; INTRAVENOUS EVERY 4 HOURS PRN
Status: DISCONTINUED | OUTPATIENT
Start: 2021-09-10 | End: 2021-09-17 | Stop reason: HOSPADM

## 2021-09-10 RX ORDER — DULOXETIN HYDROCHLORIDE 30 MG/1
30 CAPSULE, DELAYED RELEASE ORAL EVERY EVENING
Status: CANCELLED | OUTPATIENT
Start: 2021-09-10

## 2021-09-10 RX ORDER — ASPIRIN AND DIPYRIDAMOLE 25; 200 MG/1; MG/1
1 CAPSULE, EXTENDED RELEASE ORAL 2 TIMES DAILY
Status: CANCELLED | OUTPATIENT
Start: 2021-09-10

## 2021-09-10 RX ORDER — BUPROPION HYDROCHLORIDE 300 MG/1
300 TABLET ORAL DAILY
Status: DISCONTINUED | OUTPATIENT
Start: 2021-09-11 | End: 2021-09-17 | Stop reason: HOSPADM

## 2021-09-10 RX ORDER — ONDANSETRON 4 MG/1
4 TABLET, ORALLY DISINTEGRATING ORAL EVERY 8 HOURS PRN
Status: DISCONTINUED | OUTPATIENT
Start: 2021-09-10 | End: 2021-09-17 | Stop reason: HOSPADM

## 2021-09-10 RX ORDER — CARVEDILOL 25 MG/1
25 TABLET ORAL 2 TIMES DAILY WITH MEALS
Status: CANCELLED | OUTPATIENT
Start: 2021-09-10

## 2021-09-10 RX ORDER — ACETAMINOPHEN 325 MG/1
650 TABLET ORAL EVERY 6 HOURS PRN
Status: DISCONTINUED | OUTPATIENT
Start: 2021-09-10 | End: 2021-09-17 | Stop reason: HOSPADM

## 2021-09-10 RX ORDER — LEVOTHYROXINE SODIUM 0.05 MG/1
50 TABLET ORAL DAILY
Status: CANCELLED | OUTPATIENT
Start: 2021-09-11

## 2021-09-10 RX ORDER — ONDANSETRON 2 MG/ML
4 INJECTION INTRAMUSCULAR; INTRAVENOUS EVERY 6 HOURS PRN
Status: DISCONTINUED | OUTPATIENT
Start: 2021-09-10 | End: 2021-09-17 | Stop reason: HOSPADM

## 2021-09-10 RX ORDER — VITAMIN B COMPLEX
1000 TABLET ORAL DAILY
Status: CANCELLED | OUTPATIENT
Start: 2021-09-11

## 2021-09-10 RX ORDER — ONDANSETRON 4 MG/1
4 TABLET, ORALLY DISINTEGRATING ORAL EVERY 8 HOURS PRN
Status: CANCELLED | OUTPATIENT
Start: 2021-09-10

## 2021-09-10 RX ORDER — LEVOTHYROXINE SODIUM 0.03 MG/1
50 TABLET ORAL DAILY
Status: DISCONTINUED | OUTPATIENT
Start: 2021-09-11 | End: 2021-09-17 | Stop reason: HOSPADM

## 2021-09-10 RX ORDER — BUPROPION HYDROCHLORIDE 300 MG/1
300 TABLET ORAL DAILY
Status: CANCELLED | OUTPATIENT
Start: 2021-09-11

## 2021-09-10 RX ORDER — ATORVASTATIN CALCIUM 80 MG/1
80 TABLET, FILM COATED ORAL NIGHTLY
Status: DISCONTINUED | OUTPATIENT
Start: 2021-09-10 | End: 2021-09-17 | Stop reason: HOSPADM

## 2021-09-10 RX ADMIN — ASPIRIN 81 MG: 81 TABLET, COATED ORAL at 08:30

## 2021-09-10 RX ADMIN — GABAPENTIN 600 MG: 300 CAPSULE ORAL at 22:18

## 2021-09-10 RX ADMIN — ENOXAPARIN SODIUM 40 MG: 40 INJECTION SUBCUTANEOUS at 08:36

## 2021-09-10 RX ADMIN — IBUPROFEN 600 MG: 400 TABLET ORAL at 11:59

## 2021-09-10 RX ADMIN — SODIUM CHLORIDE, PRESERVATIVE FREE 10 ML: 5 INJECTION INTRAVENOUS at 12:00

## 2021-09-10 RX ADMIN — BUPROPION HYDROCHLORIDE 300 MG: 300 TABLET, FILM COATED, EXTENDED RELEASE ORAL at 08:30

## 2021-09-10 RX ADMIN — IBUPROFEN 600 MG: 400 TABLET ORAL at 08:31

## 2021-09-10 RX ADMIN — LEVOTHYROXINE SODIUM 50 MCG: 0.05 TABLET ORAL at 05:16

## 2021-09-10 RX ADMIN — CARVEDILOL 25 MG: 25 TABLET, FILM COATED ORAL at 18:32

## 2021-09-10 RX ADMIN — Medication 1 CAPSULE: at 08:30

## 2021-09-10 RX ADMIN — GABAPENTIN 600 MG: 300 CAPSULE ORAL at 12:46

## 2021-09-10 RX ADMIN — CARVEDILOL 25 MG: 25 TABLET, FILM COATED ORAL at 08:30

## 2021-09-10 RX ADMIN — GABAPENTIN 600 MG: 300 CAPSULE ORAL at 18:32

## 2021-09-10 RX ADMIN — SODIUM CHLORIDE, PRESERVATIVE FREE 10 ML: 5 INJECTION INTRAVENOUS at 08:37

## 2021-09-10 RX ADMIN — Medication 1000 UNITS: at 08:30

## 2021-09-10 RX ADMIN — ASPRIN AND EXTENDED-RELEASE DIPYRIDAMOLE 1 CAPSULE: 25; 200 CAPSULE ORAL at 08:30

## 2021-09-10 RX ADMIN — ACETAMINOPHEN 650 MG: 325 TABLET ORAL at 05:15

## 2021-09-10 RX ADMIN — DULOXETINE HYDROCHLORIDE 30 MG: 30 CAPSULE, DELAYED RELEASE ORAL at 18:33

## 2021-09-10 RX ADMIN — GABAPENTIN 600 MG: 300 CAPSULE ORAL at 08:36

## 2021-09-10 RX ADMIN — ASPRIN AND EXTENDED-RELEASE DIPYRIDAMOLE 1 CAPSULE: 25; 200 CAPSULE ORAL at 22:18

## 2021-09-10 RX ADMIN — ACETAMINOPHEN 500 MG: 500 TABLET ORAL at 08:30

## 2021-09-10 RX ADMIN — ATORVASTATIN CALCIUM 80 MG: 80 TABLET, FILM COATED ORAL at 22:19

## 2021-09-10 ASSESSMENT — ENCOUNTER SYMPTOMS
CHEST TIGHTNESS: 0
COLOR CHANGE: 0
SHORTNESS OF BREATH: 0
APNEA: 0

## 2021-09-10 ASSESSMENT — PAIN DESCRIPTION - PAIN TYPE
TYPE: ACUTE PAIN
TYPE: ACUTE PAIN

## 2021-09-10 ASSESSMENT — PAIN DESCRIPTION - ONSET: ONSET: ON-GOING

## 2021-09-10 ASSESSMENT — PAIN SCALES - GENERAL
PAINLEVEL_OUTOF10: 0
PAINLEVEL_OUTOF10: 1
PAINLEVEL_OUTOF10: 1
PAINLEVEL_OUTOF10: 4
PAINLEVEL_OUTOF10: 3
PAINLEVEL_OUTOF10: 1

## 2021-09-10 ASSESSMENT — PAIN DESCRIPTION - FREQUENCY: FREQUENCY: CONTINUOUS

## 2021-09-10 ASSESSMENT — PAIN DESCRIPTION - LOCATION
LOCATION: HEAD

## 2021-09-10 ASSESSMENT — PAIN DESCRIPTION - DESCRIPTORS: DESCRIPTORS: HEADACHE;PRESSURE

## 2021-09-10 NOTE — CARE COORDINATION
INTERDISCIPLINARY ROUNDING    September 10, 2021 at 10:49 AM EDT    Anticipated Discharge Date:   9/09/2021    Anticipated Discharge Disposition:REHAB    Patient Mobility or PT/OT ordered: YES    Readmission Risk              Risk of Unplanned Readmission:  11           Discussed patient goal for the day, patient clinical progression, and barriers to discharge. The following Goal(s) of the Day/Commitment(s) have been identified:  [facility]END[facility].       Chun Rosado RN  September 10, 2021

## 2021-09-10 NOTE — PROGRESS NOTES
Report given to nurse Ange Hassan' at [facility] on pt's current and past medical HX, MOBILITY, FALLS RISK, VITALS, LAB WORK and medication regimen\" Electronically signed by David Giles RN on 9/10/2021 at 2:07 PM

## 2021-09-10 NOTE — PROGRESS NOTES
no clubbing,       cyanosis or inflammatory conditions. Neuro/Psychiatric: Affect: flat-  Alert and oriented to self and     Situation with no needed cues. No significant change in deep tendon reflexes or     Sensation-very poor balance. Lungs:  Diminished, CTA-B. Respiration effort is normal at rest.  KRAFT  Heart:   S1 = S2,   RRR. No loud murmurs. Abdomen:  Obese, Soft, non-tender, no enlargement of liver or spleen. Extremities:  No significant lower extremity edema or tenderness. Wears left AFO F with orthotic shoe and has a right below the knee amputation with    prosthesis. Skin:    BUE bruises dt blood draws, no visualized or palpated problems. Rehabilitation:  Physical therapy: FIMS:  Bed Mobility: Scooting: Stand by assistance    Transfers: Sit to Stand: Moderate Assistance, Minimal Assistance  Stand to sit: Minimal Assistance, Moderate Assistance, Ambulation 1  Device: Rolling Walker  Other Apparatus: AFO, Left (right prosthetic leg bka)  Assistance: Minimal assistance  Quality of Gait: poor trunk stability, wbos, difficulty maintaining straight path, no lob with change in direction, increased upper extremity support on ww. Gait Deviations: Slow Blank, Increased DEYSI  Distance: 25 feet x 2 with turns,      FIMS:  ,  , Assessment: Pt is presenting with deficits as listed. She is requiring assistance with mobility at this time. Pt would benefit from follow up PT at this time    Occupational therapy: FIMS:   ,  ,      Speech therapy: FIMS:      SPEECH THERAPY  Motor Speech: Within Functional Limits  Comprehension: Within Functional Limits  Verbal Expression: Exceptions to functional limits      Diet/Swallow:  Diet Solids Recommendation: Regular  Liquid Consistency Recommendation:  Thin  Dysphagia Outcome Severity Scale: Level 6: Within functional limits/Modified independence    Compensatory Swallowing Strategies: Upright as possible for all oral intake, Small bites/sips             COGNITION  OT: Cognition Comment: Follows commands consistently  SP:Memory: Within Funtional Limits  Problem Solving: Within Functional Limits       Lab/X-ray studies reviewed, analyzed and discussed with patient and staff:   Recent Results (from the past 24 hour(s))   COVID-19, Rapid    Collection Time: 09/10/21  6:12 AM    Specimen: Nasopharyngeal Swab; Nasal   Result Value Ref Range    SARS-CoV-2, NAAT Not Detected Not Detected       Previous extensive, complex labs, notes and diagnostics reviewed and analyzed. ALLERGIES:    Allergies as of 09/07/2021 - Fully Reviewed 09/07/2021   Allergen Reaction Noted    Ciprofloxacin Itching 09/07/2011    Oxycontin [oxycodone hcl] Itching 02/01/2012      (please also verify by checking [facility])     Complex Physical Medicine & Rehab Issues Assess & Plan:   1. Severe abnormality of gait and mobility and impaired self-care and ADL's secondary to progressive gait ataxia on old right below the knee amputation and left Charcot foot with AFO. Functional and medical status reassessed regarding patients ability to participate in therapies and patient found to be able to participate in  acute intensive comprehensive inpatient rehabilitation program including PT/OT to improve balance, ambulation, ADLs, and to improve the P/AROM. It is my opinion that they will be able to tolerate 3 hours of therapy a day and benefit from it at an acute level. 2. Bowel constipation and Bladder dysfunction overactive bladder:  frequent toileting, ambulate to bathroom with assistance, check post void residuals. Check for C.difficile x1 if >2 loose stools in 24 hours, continue bowel & bladder program.  Monitor for UTI symptoms including lethargy and confusion  3.  Severe low back and left ankle neuropathic pain and generalized OA pain: reassess pain every shift and prior to and after each therapy session, give prn  Tylenol, modalities prn in therapy, consider Lidoderm, K-pad prn.   4. Skin breakdown risk: continue pressure relief program.  Daily skin exams and reports from nursing. 5. Severe fatigue due to immobility and nutritional deficits: Add vitamin B12 vitamin D and CoQ10 titrate dosing and add protein supplementation with low carb content. 6. Complex discharge planning:   Await okay from medical for acute rehab. Complex Active General Medical Issues that complicate care Assess & Plan:     1. 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  Yanlei Skaggs D.O., PM&R     Attending    [address]

## 2021-09-10 NOTE — PROGRESS NOTES
Progress Note  NEUROLOGY  [facility] 2W Ortho Tele       Patient: Liz Beugm  Unit/Bed: E313/D256-19  YOB: 1945  MRN: [id number]  Acct: [de-identified]   Admitting Diagnosis: Dizziness [R42]  Admit Date:  9/7/2021  Hospital Day: 2    Subjective:    Some metallic taste noted by the patient this morning. No concern about the metallic taste today  Has mild neck discomfort  Subjectively the vertigo is feeling, dizziness, diplopia, all improving  Dr. Saqib Evans decided to discontinue the tamoxifen, patient has been on a hormone therapy for more than 5 years, no need for any further chemotherapy  Seen by cardiology, continue with Aggrenox for the time being, Dr. Jannet Bower to consider anticoagulation if needed for any cardiac issues, as outpatient    No other changes in her neurological condition. She is awaiting transfer to rehab. Patient Seen, Chart, Labs, Radiology studies, and Consults reviewed. Objective:   BP (!) 143/81   Pulse 66   Temp 98.4 °F (36.9 °C) (Oral)   Resp 19   Ht 5' 1\" (1.549 m)   Wt 223 lb (101.2 kg)   SpO2 95%   BMI 42.14 kg/m²   No intake or output data in the 24 hours ending 09/10/21 0911    Physical Exam:  GENERAL APPEARANCE: No distress, alert, interactive and cooperative. MENTAL STATE: Orientation was normal to time, place and person. Language testing was normal for comprehension, repetition, expression, and naming. General fund of knowledge was intact. CRANIAL NERVES: Are normal  CN 2 Visual fields full to confrontation. CN 3, 4, 6 Pupils round, equally reactive to light. No ptosis. Mild 6th nerve palsy on the left otherwise EOMs normal alignment, hypermetria on lateral gaze, normal pursuit and convergence. No nystagmus. CN 5 Facial sensation intact bilaterally. CN 7 Normal and symmetric facial strength. Nasolabial folds symmetric. CN 8 Hearing intact to finger rub bilaterally. CN 9 Palate elevates symmetrically.    CN 11 Bilaterally normal strength of Date    LDLCALC 40 09/08/2021    LDLCALC 35 07/08/2021    [address] 63 02/03/2020     No results found for: LABVLDL, VLDL  Lab Results   Component Value Date    CHOLHDLRATIO 3.7 02/19/2013    CHOLHDLRATIO 4.8 04/06/2012       Ammonia:No results for input(s): AMMONIA in the last 72 hours. LFT:   Recent Labs     09/07/21  1500 09/08/21  0537   AST 21 23   ALT 11 12   BILITOT 0.3 0.6   ALKPHOS 79 79        Urine: No results for input(s): Bowman Nova, GLUCOSEU, BLOODU, PHUR, PROTEINU, UROBILINOGEN, LEUKOCYTESUR in the last 72 hours. Invalid input(s):  Jayant Pap,  SPECGRAV,  NITRU     Assessment/Plan:  The patient has been having vertebrobasilar transient ischemic events since Sept 6, 2021. For the last 2 weeks that she has been feeling somewhat weaker. She has a feeling of imbalance for the last several months which got worse on September 6. Diplopia could be part of a vertebrobasilar ischemic event along with a feeling of imbalance or, dizziness, sensation of showed vertigo dizziness episodes  History of peripheral neuropathy probably due to hypothyroidism  Right Charcot joint she has had a right below-knee amputation. According patient her right foot and ankle were getting infected often  Depression  Overweight  Hypertension  Hyperlipidemia  Right breast carcinoma for which she has a bilateral mastectomy 2016 she did not need chemo or therapy or radiation. She is on tamoxifen. Dr. Ifeanyi De La Rosa is watching her. Per Dr. Ifeanyi De La Rosa no further need for tamoxifen   history of non-ST elevation myocardial infarction she has been on aspirin.   History of coronary disease  History of paroxysmal atrial fibrillation  Muscle contraction type headache, Tylenol, Motrin help her        Recommendations:     The patient was started on Aggrenox September 8 she is doing  better  Continue aspirin 81 mg   Additional lab work thryglobulin, vitamin D and SPEP with AMOS pending  B12, folate, TSH, HgbA1C (5.5) normal  MRI of the brain had shown change of small vessel cerebrovascular disease. There is no acute infarction  MR venogram had shown hypoplastic left transverse and sigmoid sinuses. There is no evidence of venous thrombus. No hemorrhages were seen  Patient has a history of hypothyroidism with a peripheral neuropathy, we shall watch for any evidence of Graves' eye disease. There is no evidence of orbital pseudotumor  Patient can go to rehab  Dr. Ng Pro has recommended tamoxifen discontinuation  Cardiology does not have inpatient input wants outpatient cardiologist to reassess need for anti-coagulation.    Okay to go to rehab      Electronically signed by Doris Thomas MD on 9/10/2021 at 9:11 AM
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
## 2021-09-10 NOTE — PROGRESS NOTES
Progress Note  Patient: Erica Barcenass  Unit/Bed: L222/T456-20  YOB: 1945  MRN: [id number]  Acct: [de-identified]   Admitting Diagnosis: Dizziness [R42]  Date:  9/7/2021  Hospital Day: 2    Chief Complaint:  Dizziness    Subjective  Patient was admitted for dizziness. Remote ablation. No documented atrial fibrillation in the last many years. Need to consider oral anticoagulation as outpatient by Dr. Roldan Garner. He had discontinued Coumadin 6 years ago    Review of Systems:   Review of Systems   Constitutional: Negative for appetite change, diaphoresis and fatigue. Respiratory: Negative for apnea, chest tightness and shortness of breath. Cardiovascular: Negative for chest pain, palpitations and leg swelling. Skin: Negative for color change, pallor, rash and wound. Neurological: Negative for dizziness, syncope, weakness, light-headedness and headaches. Psychiatric/Behavioral: Negative for agitation, behavioral problems and confusion. The patient is not nervous/anxious and is not hyperactive. Physical Examination:    BP (!) 143/81   Pulse 68   Temp 98.4 °F (36.9 °C) (Oral)   Resp 19   Ht 5' 1\" (1.549 m)   Wt 223 lb (101.2 kg)   SpO2 95%   BMI 42.14 kg/m²    Physical Exam  Constitutional:       Appearance: She is well-developed. Cardiovascular:      Rate and Rhythm: Normal rate and regular rhythm. Heart sounds: Normal heart sounds. Pulmonary:      Effort: Pulmonary effort is normal.      Breath sounds: Normal breath sounds. Musculoskeletal:         General: Normal range of motion. Skin:     General: Skin is warm and dry. Neurological:      Mental Status: She is alert and oriented to person, place, and time. Deep Tendon Reflexes: Reflexes are normal and symmetric. Psychiatric:         Behavior: Behavior normal.         Thought Content:  Thought content normal.         Judgment: Judgment normal.         LABS:  CBC:   Lab Results   Component Value Date    WBC 7.1 09/08/2021    RBC 4.36 09/08/2021    RBC 4.16 11/04/2011    HGB 12.9 09/08/2021    HCT 39.0 09/08/2021    MCV 89.4 09/08/2021    MCH 29.6 09/08/2021    MCHC 33.2 09/08/2021    RDW 13.7 09/08/2021     09/08/2021    MPV 8.4 08/03/2015     CBC with Differential:   Lab Results   Component Value Date    WBC 7.1 09/08/2021    RBC 4.36 09/08/2021    RBC 4.16 11/04/2011    HGB 12.9 09/08/2021    HCT 39.0 09/08/2021     09/08/2021    MCV 89.4 09/08/2021    MCH 29.6 09/08/2021    MCHC 33.2 09/08/2021    RDW 13.7 09/08/2021    LYMPHOPCT 24.0 09/07/2021    MONOPCT 9.3 09/07/2021    EOSPCT 2.5 09/26/2011    BASOPCT 1.0 09/07/2021    MONOSABS 0.7 09/07/2021    LYMPHSABS 1.7 09/07/2021    EOSABS 0.1 09/07/2021    BASOSABS 0.1 09/07/2021     CMP:    Lab Results   Component Value Date     09/08/2021    K 3.8 09/08/2021     09/08/2021    CO2 26 09/08/2021    BUN 18 09/08/2021    CREATININE 0.76 09/08/2021    GFRAA >60.0 09/08/2021    LABGLOM >60.0 09/08/2021    GLUCOSE 75 09/08/2021    GLUCOSE 72 04/06/2012    PROT 6.1 09/08/2021    LABALBU 3.9 09/08/2021    LABALBU 4.3 04/06/2012    CALCIUM 9.5 09/08/2021    BILITOT 0.6 09/08/2021    ALKPHOS 79 09/08/2021    AST 23 09/08/2021    ALT 12 09/08/2021     BMP:    Lab Results   Component Value Date     09/08/2021    K 3.8 09/08/2021     09/08/2021    CO2 26 09/08/2021    BUN 18 09/08/2021    LABALBU 3.9 09/08/2021    LABALBU 4.3 04/06/2012    CREATININE 0.76 09/08/2021    CALCIUM 9.5 09/08/2021    GFRAA >60.0 09/08/2021    LABGLOM >60.0 09/08/2021    GLUCOSE 75 09/08/2021    GLUCOSE 72 04/06/2012     Magnesium:    Lab Results   Component Value Date    MG 1.9 09/07/2021     Troponin:    Lab Results   Component Value Date    TROPONINI <0.010 09/07/2021       Radiology:  No results found.      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  Patient can go to rehab will be followed by cardiology  Electronically signed by Colleen Agosto MD on 9/10/2021 at 12:52 PM

## 2021-09-11 LAB
ALBUMIN SERPL-MCNC: 3.43 G/DL (ref 3.75–5.01)
ALPHA-1-GLOBULIN: 0.37 G/DL (ref 0.19–0.46)
ALPHA-2-GLOBULIN: 0.77 G/DL (ref 0.48–1.05)
BETA GLOBULIN: 0.7 G/DL (ref 0.48–1.1)
GAMMA GLOBULIN: 0.74 G/DL (ref 0.62–1.51)
PROTEIN ELECTROPHORESIS, SERUM: ABNORMAL
SPE/IFE INTERPRETATION: ABNORMAL
TOTAL PROTEIN: 6 G/DL (ref 6.3–8.2)

## 2021-09-11 PROCEDURE — 6360000002 HC RX W HCPCS: Performed by: INTERNAL MEDICINE

## 2021-09-11 PROCEDURE — 1180000000 HC REHAB R&B

## 2021-09-11 PROCEDURE — 6370000000 HC RX 637 (ALT 250 FOR IP): Performed by: INTERNAL MEDICINE

## 2021-09-11 PROCEDURE — 97110 THERAPEUTIC EXERCISES: CPT

## 2021-09-11 PROCEDURE — 97166 OT EVAL MOD COMPLEX 45 MIN: CPT

## 2021-09-11 PROCEDURE — 97162 PT EVAL MOD COMPLEX 30 MIN: CPT

## 2021-09-11 RX ADMIN — ASPIRIN 81 MG: 81 TABLET, COATED ORAL at 11:39

## 2021-09-11 RX ADMIN — ASPRIN AND EXTENDED-RELEASE DIPYRIDAMOLE 1 CAPSULE: 25; 200 CAPSULE ORAL at 11:39

## 2021-09-11 RX ADMIN — Medication 1000 UNITS: at 11:39

## 2021-09-11 RX ADMIN — LEVOTHYROXINE SODIUM 50 MCG: 0.03 TABLET ORAL at 07:03

## 2021-09-11 RX ADMIN — ACETAMINOPHEN 650 MG: 325 TABLET ORAL at 04:32

## 2021-09-11 RX ADMIN — ASPRIN AND EXTENDED-RELEASE DIPYRIDAMOLE 1 CAPSULE: 25; 200 CAPSULE ORAL at 21:04

## 2021-09-11 RX ADMIN — Medication 1 CAPSULE: at 11:39

## 2021-09-11 RX ADMIN — GABAPENTIN 600 MG: 300 CAPSULE ORAL at 11:40

## 2021-09-11 RX ADMIN — GABAPENTIN 600 MG: 300 CAPSULE ORAL at 16:54

## 2021-09-11 RX ADMIN — CARVEDILOL 25 MG: 25 TABLET, FILM COATED ORAL at 11:40

## 2021-09-11 RX ADMIN — ENOXAPARIN SODIUM 40 MG: 40 INJECTION SUBCUTANEOUS at 11:40

## 2021-09-11 RX ADMIN — BUPROPION HYDROCHLORIDE 300 MG: 300 TABLET, FILM COATED, EXTENDED RELEASE ORAL at 11:40

## 2021-09-11 RX ADMIN — CARVEDILOL 25 MG: 25 TABLET, FILM COATED ORAL at 16:55

## 2021-09-11 RX ADMIN — ATORVASTATIN CALCIUM 80 MG: 80 TABLET, FILM COATED ORAL at 21:04

## 2021-09-11 RX ADMIN — DULOXETINE HYDROCHLORIDE 30 MG: 30 CAPSULE, DELAYED RELEASE ORAL at 16:54

## 2021-09-11 ASSESSMENT — PAIN SCALES - GENERAL
PAINLEVEL_OUTOF10: 0
PAINLEVEL_OUTOF10: 0
PAINLEVEL_OUTOF10: 2
PAINLEVEL_OUTOF10: 2
PAINLEVEL_OUTOF10: 0
PAINLEVEL_OUTOF10: 3

## 2021-09-11 ASSESSMENT — PAIN DESCRIPTION - DESCRIPTORS
DESCRIPTORS: SORE
DESCRIPTORS: ACHING;CONSTANT

## 2021-09-11 ASSESSMENT — PAIN DESCRIPTION - PAIN TYPE
TYPE: ACUTE PAIN
TYPE: ACUTE PAIN

## 2021-09-11 ASSESSMENT — PAIN DESCRIPTION - LOCATION
LOCATION: NECK
LOCATION: BACK;HAND;WRIST

## 2021-09-11 NOTE — PROGRESS NOTES
Physical Therapy Rehab Treatment Note  Facility/Department: [facility]  [facility]       NAME: Cindy Romero  : 1945 (1 y.o.)  MRN: [id number]  CODE STATUS: Full Code    Date of Service: 2021  Chart Reviewed: Yes    Restrictions:  Restrictions/Precautions: Fall Risk       SUBJECTIVE: Subjective: Pt stated she has a sore neck possibly due to looking down on her phone. Pain Screening  Patient Currently in Pain: Yes       Post Treatment Pain Screenin  Pain Assessment  Pain Assessment: 0-10  Pain Level: 2  Pain Type: Acute pain  Pain Location: Neck  Pain Descriptors: Sore    OBJECTIVE:                         Transfers  Sit to Stand: Stand by assistance;Contact guard assistance  Stand to sit: Stand by assistance;Contact guard assistance  Comment: pt demo'd improved sequencing upon sitting but still required cueing for hand placement for saefty. Ambulation  Ambulation?: Yes  Ambulation 1  Device: Rolling Walker  Other Apparatus: AFO; Left (R prothestic leg BKA)  Assistance: Contact guard assistance  Quality of Gait: poor trunk stability, WBOS  Gait Deviations: Increased DEYSI  Distance: 50 feet x2                   Exercises  Hamstring Sets: HS curls with YTB x10  Gluteal Sets: x10  Hip Flexion: seated marching x10  Hip Abduction: x10 with YTB and add with ball x10  Knee Long Arc Quad: x10 with 5sec holds     ASSESSMENT/PROGRESS TOWARDS GOALS:  Assessment: Pt jeremiah'd dec in impulsiviness but still requried cueing for hand placement when sitting. No c/o incr in pain throughout tx. Goals:  Long term goals  Long term goal 1: indep bed mobility  Long term goal 2: indep sit to stand and bed transfers  Long term goal 3: indep gait with ww 50 feet  Long term goal 4: pt able to stand 30 sec with no UE support  Long term goal 5: indep with HEP    PLAN OF CARE/Safety:   Plan Comment: use ww, prosthesis and brace for gait  Safety Devices  Type of devices:  All fall risk precautions in place      Therapy Time:   Individual   Time In 1534   Time Out 1200   Minutes 30     Minutes:30      Transfer/Bed mobility trainin      Gait trainin      Neuro re education:0     Therapeutic ex:20      Zita Chavarria PTA, 21 at 2:07 PM

## 2021-09-11 NOTE — CONSULTS
Hospitalist Progress Note  9/11/2021 11:39 AM    Assessment and Plan:     1. Generalized weakness, Gait instability and Decreased, Functional Status: Initially admitted with dizziness. Imaging completed. Neurology following and managing. Vertebrobasilar TIE since 9/9/21. Work-up ongoing. MRI brain showed small vessel disease. MRV shown hypoplastic L transverse and sigmoid sinuses. No evidence of venous thrombus. No hemorrhages. On ASA and aggrenox. Fall precautions. PT OT to evaluate. Maximize nutrition status. Assessing if needs DME at home. SW on board. 2. CAD/HTN: s/p Cath 2015. No significant obstructive CAD. History structural heart disease. Previously underwent ablation 1992. Was on coumadin in 2016 but discontinued as there was no documentation of Afib. Cardiology is managing and deferred anticoagulation to neurology. Will need outpatient f/u with cardiology. Continue statin and BB. 3. Hypothyroidism: On levothyroxine. 4. Peripheral neuropathy: Continue gabapentin and duloxetine. 5. R charcot joint: S/p RBKA  6. Depression: Continue wellbutrin and duloxetine  7. HX lobular carcinoma R breast: s/p bilateral mastectomy. On tamoxifen since 2017 which has since been discontinued. 8. Bowel Regimen and GI PPx: stool softners PRN ordered with hold parameters for loose stools or diarrhea. On antiacid  9. Diet: ADULT DIET; Regular; Low Sodium (2 gm)  10. Advance Directive: Full Code   11. Nutrition status:Supplemental Vitamins ordered. Dietitian assessment  12. Vaccinations: Immunization records reviewed. If has not received appropriate vaccinations, will order to be given prior to discharge. 13. DVT prophylaxis: Chemical prophylaxis SQ lovenox  14. Discharge planning: TAJ on board. Will discharge once medically stable and cleared by all consultants. 15. High Risk Readmission Screening Tool Score Noted. Additionally, the following hospital problems were addressed:   Active Problems:    * No active hospital problems. *  Resolved Problems:    * No resolved hospital problems. *      ** Total time spent reviewing medical records, evaluating patient, speaking with RN's and consultants where I was focused exclusively on this patient:  minutes. This time is excluding time spent performing procedures or significant events occurring earlier or later in the day requiring my attention and focus. Subjective:   Admit Date: 9/10/2021  PCP: Lazara Thompson, DO    No acute events overnight. Afebrile. No new complaints. Pt denies chest pain, SOB, N/V, fevers or chills. Objective:     Vitals:    09/10/21 1527 09/10/21 1938 09/11/21 1001   BP: 138/85 122/84 (!) 161/97   Pulse: 70 68 71   Resp: 18  20   Temp: 98.6 °F (37 °C) 98.4 °F (36.9 °C) 97.8 °F (36.6 °C)   TempSrc: Oral Oral Oral   SpO2: 97% 95% 92%   Weight: 203 lb 14.8 oz (92.5 kg)     Height: 5' 1\" (1.549 m)       General appearance: No acute distress, A + O x 3. No conversational dyspnea noted. Dentition intact. Answers questions appropriately  Lungs: CTAB no exp wheezes, No rales No retractions; No use of accessory muscles  Heart:  S1, S2 normal, RRR, no MRG appreciated  Abdomen: (+) BS, soft, non-tender; non distended no guarding or rigidity. Extremities:  no cyanosis, no edema, R BKA.  Dry skin noted       Medications:      atorvastatin  80 mg Oral Nightly    enoxaparin  40 mg SubCUTAneous Daily    aspirin  81 mg Oral Daily    aspirin-dipyridamole  1 capsule Oral BID    b complex vitamins  1 capsule Oral Daily    buPROPion  300 mg Oral Daily    carvedilol  25 mg Oral BID WC    DULoxetine  30 mg Oral QPM    gabapentin  600 mg Oral 4x Daily    levothyroxine  50 mcg Oral Daily    Vitamin D  1,000 Units Oral Daily       LABS Reviewed    IMAGING Reviewed    YURY Bright - MERY, MD  Rounding Hospitalist

## 2021-09-11 NOTE — PROGRESS NOTES
 Complete tear of left rotator cuff 03/16/2015    Generalized osteoarthritis 03/16/2015    Malaise and fatigue 02/17/2015    Complete rotator cuff rupture of left shoulder 12/17/2014    Clinical depression 12/17/2014    Benign essential HTN 12/17/2014    HLD (hyperlipidemia) 12/17/2014    Low back pain with sciatica 12/17/2014    Fibromyalgia muscle pain 12/17/2014    Family history of coronary artery disease 08/29/2014    NSTEMI (non-ST elevated myocardial infarction) ([facility] 08/29/2014    Hx of right BKA ([address]     Closed fracture of tarsal and metatarsal bones 06/02/2014    Arthritis, neuropathic 12/02/2013    Impaired mobility and activities of daily living 06/12/2013    Obesity 03/15/2013    Obesity (BMI 30-39.9) 02/12/2013    Neck pain on right side     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    Charcot's joint of foot 09/01/2010    Closed dislocation of ankle 09/01/2010    Closed dislocation of foot 09/01/2010    Disorder of peripheral nervous system 09/01/2010    Peripheral neuropathy 09/01/2010    Cervical post-laminectomy syndrome 10/22/2009    Failed back syndrome of lumbar spine 10/22/2009    Postlaminectomy syndrome of cervical region 10/22/2009    Postlaminectomy syndrome of lumbar region 10/22/2009    Hereditary and idiopathic peripheral neuropathy ([address] 06/18/2009    Osteoporosis 02/05/2009    Difficulty walking 12/12/2007    Tibialis tendinitis 12/12/2007    Acquired flat foot 07/10/2007    Arthritis of ankle or foot, degenerative 07/10/2007       Past Medical History:   Diagnosis Date    Arthritis     Blood transfusion     CAD (coronary artery disease)     Cancer ([address]     GALLBLADDER 2009, 2011 OMENTUM    Charcot's joint     left foot    Chest pain 7/2/2015    Colitis     DDD (degenerative disc disease), lumbar 2/12/2013    Depression     Disorder of peripheral nervous system 9/1/2010    Dyspnea 7/2/2015    Family history of coronary artery disease 8/29/2014    History of blood transfusion 2011    following chemotherapy    Hx of right BKA [facility]     Hyperlipidemia     Hypertension     Hypothyroid 6/12/2015    Lobular breast cancer ([facility] 10/2015    NSTEMI (non-ST elevated myocardial infarction) ([facility] 8/29/2014    Obesity     Paroxysmal atrial fibrillation ([facility] 8/29/2014    S/P BKA (below knee amputation) [facility])      Past Surgical History:   Procedure Laterality Date    ABDOMEN SURGERY Left 04/11/2017    EXCISION OF CHEST WALL MASS, UPDATE LABS ON ADMIT  performed by Alejandro Martinez MD at [address] BLEPHAROPLASTY  02/05/2013    both eyes    BREAST SURGERY      CARDIAC CATHETERIZATION      COLONOSCOPY  01/01/2010    normal    CYSTOSCOPY W BIOPSY OF BLADDER N/A 06/26/2017    CYSTOSCOPY BLADDER BIOPSY AND FULGURATION performed by Beto Negrete MD at [address] Bilateral 2012    cataract removal with IOL implants    HYSTERECTOMY      LEG AMPUTATION BELOW KNEE Right 02/01/2011    DR Amairani Michael due to CHARCOTS    MASTECTOMY Bilateral 11/16/2015    Bilateral simple mastectomies with right SNB by DR Lorenzo Pen    NV REMOVAL OF BREAST LESION Left 01/24/2017    CORE NEEDLE BIOPSY OF  LEFT BREAST MASS performed by Alejandro Martinez MD at [address]      cervical and lumbar       Chart Reviewed: Yes  Patient assessed for rehabilitation services?: Yes  Family / Caregiver Present: No  Diagnosis: Impaired mobility and ADLs d/t vertebrobasilar transient ischemia  General Comment  Comments: Pt sitting up in w/c upon arrival    Restrictions:  Restrictions/Precautions: Fall Risk       SUBJECTIVE: Subjective: Pt currently denies pain    Pre Treatment Pain Screening  Pain at present: 0  Scale Used: Numeric Score    Post Treatment Pain Screening:  Pain Assessment  Pain Assessment: 0-10  Pain Level: 0    Prior Level of Function:  Social/Functional History  Lives With: Alone  Type of Home: Apartment  Home Layout: One level  Home Access: Level entry  Bathroom Shower/Tub: Tub/Shower unit  [employer]: Tub transfer bench, Grab bars in shower  Bathroom Accessibility: Wheelchair accessible  Home Equipment: Quad cane, Rolling walker, Wheelchair-electric (has right BK prosthesis and Left AFO)  ADL Assistance: Independent  Homemaking Assistance: Independent (warms meals up  -  receives  MOW and groceries delivered)  [employer] Responsibilities: Yes  Meal Prep Responsibility: Primary  Laundry Responsibility: Primary  Cleaning Responsibility: Primary  Ambulation Assistance: Independent (last few days uses walker to get around)  Transfer Assistance: Independent  Active : Yes  Occupation: Retired  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 Pt reports prior to 8/21 she was ambulating with wheeled walker and since then she has used power w/c    OBJECTIVE:   Vision/Hearing:  Vision: Impaired  Vision Exceptions: Wears glasses for reading  Hearing: Exceptions to [facility]  Hearing Exceptions: Hard of hearing/hearing concerns; No hearing aid    Cognition:  Overall Orientation Status: Within Functional Limits  Orientation Level: Oriented X4  Follows Commands: Within Functional Limits  Observation/Palpation  Posture: Good (mild forward head, rounded shoulders, increased T kyphosis)  Observation: pleasant, cooperative, no acute distress noted, R BKA with prosethesis donned    ROM:  RLE AROM: WFL  RLE General AROM: R knee flexion limited to ~90 degrees flexion when prosthesis donned  LLE AROM : WFL    Strength:  Strength RLE  Comment: hip 3+/5, knee 4-/5  Strength LLE  Comment: hip 3+/5, knee 4/5, ankle NT d/t AFO donned    Neuro:  Sensation  Overall Sensation Status: Impaired (minimal feeling in fingers of her hands -longstanding)     Balance  Sitting - Static: Good  Sitting - Dynamic: Good  Standing - Static: Fair;+ (with prosthesis donned, no UE support for ~10 seconds)  Standing - Dynamic: Fair (requires B 1-2 UE support with functional reach)  Comments: picking up object from floor with 1 UE support and SBA  Motor Control  Gross Motor?: WFL    Bed mobility  Rolling to Left: Independent  Rolling to Right: Independent  Supine to Sit: Independent  Sit to Supine: Independent  Scooting: Independent    Transfers  Sit to Stand: Stand by assistance  Stand to sit: Stand by assistance  Bed to Chair: Stand by assistance  Stand Pivot Transfers: Stand by assistance  Comment: with and without 2ww. x 3 trials    Ambulation  Ambulation?: Yes  Ambulation 1  Surface: level tile  Device: Rolling Walker  Other Apparatus: AFO; Left (R prosthesis)  Assistance: Stand by assistance;Contact guard assistance (varies)  Quality of Gait: uncompensated trendelenberg  Gait Deviations: Slow Blank; Increased DEYSI; Decreased step length  Distance: 24ft  Comments: Pt is indep with donning/doffing prosthesis, decreased safety noted with pt leaving 2ww behind when approaching chair/toilet/sink. Stairs/Curb  Stairs?: No (pt declined. discussed intention of pt's goal)    Wheelchair Activities  Wheelchair Type: Standard  Level of Assistance for pressure relief activities: Independent  Wheelchair Parts Management: Yes  Left Leg Rest Level of Assistance: Stand by assistance  Right Leg Rest Level of Assistance: Stand by assistance  Left Brakes Level of Assistance: Stand by assistance  Right Brakes Level of Assistance: Stand by Assist  Propulsion: Yes 30ft  Propulsion 1  Propulsion: Manual  Method: RUE;LUE;RLE;LLE  Level of Assistance: Supervision  Distance: difficulty maneuvering obstacles/furnature in room, increased time for performance.  Pt limited by strength and UE/hand deficits    Activity Tolerance  Activity Tolerance: Patient Tolerated treatment well      Quality Indicators (IRF-TUSHAR):  Rolling L and R: Independent - 6  Sit>Supine: Independent - 6  Supine>Sit: Independent - 6  Sit>Stand: Supervision or Touching Assistance - 4  Chair/Bed>Chair Transfer: Supervision or Touching Assistance - 4  Car Transfers: Not attempted due to Environmental Limitations (i.e. weather, equipment unavailable) - 10  Walk 10 ft: Supervision or Touching Assistance - 4  Walk 50 ft with two 90 degree turns: Not attempted due to Medical Condition or Safety Concerns (I.e. unsafe or physician orders) - 80  Walk 150 ft in [address]: Not attempted due to Medical Condition or Safety Concerns (I.e. unsafe or physician orders) - 80  Walking 10 ft on Unlevel Surface: Not attempted due to Medical Condition or Safety Concerns (I.e. unsafe or physician orders) - 80  Picking up Objects from Standing Position: Supervision or Touching Assistance - 4  Stairs: No Patient Refused - 7  WC Mobility: Yes    ASSESSMENT:  Body structures, Functions, Activity limitations: Decreased functional mobility ; Decreased safe awareness;Decreased balance;Decreased endurance;Decreased strength;Decreased coordination  Decision Making: Medium Complexity  History: high  Exam: high  Clinical Presentation: med    Prognosis: Good  PT Education: Goals;PT Role;Plan of Care;Transfer Training;General Safety  Barriers to Learning: none    CLINICAL IMPRESSION: Pt demonstrates the above deficits and decline in functional mobility status placing them at increased risk for falls. Pt would benefit from physical therapy to address above deficits and allow for safe return home at highest level of function, decrease risk for falls, and improve QOL.       PLAN OF CARE:  Frequency: 1-2 treatment sessions per day, 5-7 days per week  Plan Comment: use ww, prosthesis and brace for gait; pt to bring in new prosthesis-needs training/ed fit/sock ply  Current Treatment Recommendations: Strengthening, Functional Mobility Training, Neuromuscular Re-education, Home Exercise Program, Safety Education & Training, Gait Training, Transfer Training, Balance Training, Patient/Caregiver Education & Training, Endurance Training    Patient's Goal:  \"get stronger, walk further, training with new prosthesis, improve balance and indep\"    GOALS:  Short term goals  Short term goal 1: Pt will be indep with HEP to improve B LE hip and knee strength to improve quality of gait  Long term goals  Long term goal 1: Pt will demonstrate bed mobility with indep  Long term goal 2: Pt will perform various functional transfers with indep  Long term goal 3: Pt will ambulate 70ft with 2ww and indep with appropriate prosthesis fit  Long term goal 4: Pt will ascend/descend 4 steps with handrails and supervision to navigate in community/shops in [facility] term goal 5: Pt will perform TUG </=15.0 second to demonstrate decreased fall risk    ELOS:   Plan weeks: 1    Therapy Time:    Individual   Time In 1100   Time Out 1130   Minutes Saranya Mesa [address] [place] [address], 09/11/21 at 2:31 PM

## 2021-09-11 NOTE — PROGRESS NOTES
Physical Therapy Rehab Treatment Note  [facility]  [facility]       NAME: Montana Murray  : 1945 (64 y.o.)  MRN: [id number]  CODE STATUS: Full Code    Date of Service: 2021  Chart Reviewed: Yes    Restrictions:  Restrictions/Precautions: Fall Risk       SUBJECTIVE: Subjective: Pt stated no pain more than normal.  Pain Screening  Patient Currently in Pain: Yes       Post Treatment Pain Screening:  Pain Assessment  Pain Assessment: 0-10  Pain Level: 2  Pain Type: Acute pain  Pain Location: Back;Hand;Wrist  Pain Descriptors: Aching;Constant    OBJECTIVE:                         Transfers  Sit to Stand: Stand by assistance;Contact guard assistance  Stand to sit: Stand by assistance;Contact guard assistance  Comment: vc's given for sequencing for safety when sitting    Ambulation  Ambulation?: Yes  Ambulation 1  Device: Rolling Walker  Other Apparatus: AFO; Left (R prothestic leg bka)  Assistance: Contact guard assistance  Quality of Gait: poor trunk stability, WBOS  Gait Deviations: Increased DEYSI  Distance: 75 feet                   Exercises  Hamstring Sets: HS curls with YTB x10  Gluteal Sets: x10  Hip Flexion: seated marching x10  Hip Abduction: x10 with YTB and add with ball x10  Knee Long Arc Quad: x10     ASSESSMENT/PROGRESS TOWARDS GOALS:  Assessment: Pt slightly impulsive with approach to chair and sitting this date with pt swinging into chair. Vc's given for safetyw ith approach and sitting this date. Goals:  Long term goals  Long term goal 1: indep bed mobility  Long term goal 2: indep sit to stand and bed transfers  Long term goal 3: indep gait with ww 50 feet  Long term goal 4: pt able to stand 30 sec with no UE support  Long term goal 5: indep with HEP    PLAN OF CARE/Safety:   Safety Devices  Type of devices:  All fall risk precautions in place      Therapy Time:   Individual   Time In 1130   Time Out 1200   Minutes 30     Minutes:30      Transfer/Bed mobility trainin      Gait training:10      Neuro re education:0     Therapeutic ex:20      Martha Esteves, BLANCA, 21 at 12:02 PM

## 2021-09-12 ENCOUNTER — CARE COORDINATION (OUTPATIENT)
Dept: CASE MANAGEMENT | Age: 76
End: 2021-09-12

## 2021-09-12 PROCEDURE — 6370000000 HC RX 637 (ALT 250 FOR IP): Performed by: INTERNAL MEDICINE

## 2021-09-12 PROCEDURE — 97116 GAIT TRAINING THERAPY: CPT

## 2021-09-12 PROCEDURE — 6360000002 HC RX W HCPCS: Performed by: INTERNAL MEDICINE

## 2021-09-12 PROCEDURE — 97110 THERAPEUTIC EXERCISES: CPT

## 2021-09-12 PROCEDURE — 1180000000 HC REHAB R&B

## 2021-09-12 RX ADMIN — ACETAMINOPHEN 650 MG: 325 TABLET ORAL at 08:24

## 2021-09-12 RX ADMIN — ASPIRIN 81 MG: 81 TABLET, COATED ORAL at 08:16

## 2021-09-12 RX ADMIN — GABAPENTIN 600 MG: 300 CAPSULE ORAL at 16:51

## 2021-09-12 RX ADMIN — LEVOTHYROXINE SODIUM 50 MCG: 0.03 TABLET ORAL at 06:24

## 2021-09-12 RX ADMIN — ENOXAPARIN SODIUM 40 MG: 40 INJECTION SUBCUTANEOUS at 08:17

## 2021-09-12 RX ADMIN — CARVEDILOL 25 MG: 25 TABLET, FILM COATED ORAL at 16:52

## 2021-09-12 RX ADMIN — GABAPENTIN 600 MG: 300 CAPSULE ORAL at 08:16

## 2021-09-12 RX ADMIN — GABAPENTIN 600 MG: 300 CAPSULE ORAL at 00:00

## 2021-09-12 RX ADMIN — CARVEDILOL 25 MG: 25 TABLET, FILM COATED ORAL at 08:16

## 2021-09-12 RX ADMIN — Medication 1000 UNITS: at 08:17

## 2021-09-12 RX ADMIN — GABAPENTIN 600 MG: 300 CAPSULE ORAL at 13:04

## 2021-09-12 RX ADMIN — ASPRIN AND EXTENDED-RELEASE DIPYRIDAMOLE 1 CAPSULE: 25; 200 CAPSULE ORAL at 20:22

## 2021-09-12 RX ADMIN — GABAPENTIN 600 MG: 300 CAPSULE ORAL at 21:59

## 2021-09-12 RX ADMIN — Medication 1 CAPSULE: at 08:22

## 2021-09-12 RX ADMIN — DULOXETINE HYDROCHLORIDE 30 MG: 30 CAPSULE, DELAYED RELEASE ORAL at 16:51

## 2021-09-12 RX ADMIN — BUPROPION HYDROCHLORIDE 300 MG: 300 TABLET, FILM COATED, EXTENDED RELEASE ORAL at 08:21

## 2021-09-12 RX ADMIN — ATORVASTATIN CALCIUM 80 MG: 80 TABLET, FILM COATED ORAL at 20:22

## 2021-09-12 RX ADMIN — ASPRIN AND EXTENDED-RELEASE DIPYRIDAMOLE 1 CAPSULE: 25; 200 CAPSULE ORAL at 08:21

## 2021-09-12 ASSESSMENT — PAIN DESCRIPTION - DESCRIPTORS: DESCRIPTORS: ACHING

## 2021-09-12 ASSESSMENT — PAIN SCALES - GENERAL
PAINLEVEL_OUTOF10: 2
PAINLEVEL_OUTOF10: 3

## 2021-09-12 ASSESSMENT — PAIN DESCRIPTION - ORIENTATION: ORIENTATION: RIGHT

## 2021-09-12 ASSESSMENT — PAIN DESCRIPTION - LOCATION: LOCATION: HEAD;WRIST

## 2021-09-12 NOTE — PROGRESS NOTES
Subjective: The patient complains of severe acute on chronic progressive fatigue and dizziness partially relieved by rest, PT, OT and meds and exacerbated by exertion and recent illness. ROS x10: The patient also complains of severely impaired mobility and activities of daily living. Otherwise no new problems with vision, hearing, nose, mouth, throat, dermal, cardiovascular, GI, , pulmonary, musculoskeletal, psychiatric or neurological. See Rehab consult on Rehab chart . Vital signs:  /63   Pulse 78   Temp 97.9 °F (36.6 °C)   Resp 17   Ht 5' 1\" (1.549 m)   Wt 203 lb 14.8 oz (92.5 kg)   SpO2 98%   BMI 38.53 kg/m²   I/O:   PO/Intake:    fair PO intake,       Bowel/Bladder:   continent,    General:  Patient is well developed, adequately nourished, non-obese and     well kempt. HEENT:    PERRLA, hearing intact to loud voice, external inspection of ear     and nose benign. Inspection of lips, tongue and gums benign  Musculoskeletal: No significant change in strength or tone. All joints stable. Inspection and palpation of digits and nails show no clubbing,       cyanosis or inflammatory conditions. Neuro/Psychiatric: Affect: flat-  Alert and oriented to self and     Situation with no needed cues. No significant change in deep tendon reflexes or     Sensation-very poor balance. Lungs:  Diminished, CTA-B. Respiration effort is normal at rest.  KRAFT  Heart:   S1 = S2,   RRR. No loud murmurs. Abdomen:  Obese, Soft, non-tender, no enlargement of liver or spleen. Extremities:  No significant lower extremity edema or tenderness. Wears left AFO F with orthotic shoe and has a right below the knee amputation with    prosthesis. Skin:    BUE bruises dt blood draws, no visualized or palpated problems.     Rehabilitation:  Physical therapy: FIMS:  Bed Mobility: Scooting: Independent    Transfers: Sit to Stand: Stand by assistance  Stand to sit: Stand by assistance  Bed to Chair: Stand by Lab/X-ray studies reviewed, analyzed and discussed with patient and staff:   No results found for this or any previous visit (from the past 24 hour(s)). Previous extensive, complex labs, notes and diagnostics reviewed and analyzed. ALLERGIES:    Allergies as of 09/10/2021 - Fully Reviewed 09/10/2021   Allergen Reaction Noted    Ciprofloxacin Itching 09/07/2011    Oxycontin [oxycodone hcl] Itching 02/01/2012      (please also verify by checking [facility])     Complex Physical Medicine & Rehab Issues Assess & Plan:   1. Severe abnormality of gait and mobility and impaired self-care and ADL's secondary to progressive gait ataxia on old right below the knee amputation and left Charcot foot with AFO. Functional and medical status reassessed regarding patients ability to participate in therapies and patient found to be able to participate in  acute intensive comprehensive inpatient rehabilitation program including PT/OT to improve balance, ambulation, ADLs, and to improve the P/AROM. It is my opinion that they will be able to tolerate 3 hours of therapy a day and benefit from it at an acute level. 2. Bowel constipation and Bladder dysfunction overactive bladder:  frequent toileting, ambulate to bathroom with assistance, check post void residuals. Check for C.difficile x1 if >2 loose stools in 24 hours, continue bowel & bladder program.  Monitor for UTI symptoms including lethargy and confusion  3. Severe low back and left ankle neuropathic pain and generalized OA pain: reassess pain every shift and prior to and after each therapy session, give prn  Tylenol, modalities prn in therapy, consider Lidoderm, K-pad prn.   4. Skin breakdown risk:  continue pressure relief program.  Daily skin exams and reports from nursing. 5. Severe fatigue due to immobility and nutritional deficits: Add vitamin B12 vitamin D and CoQ10 titrate dosing and add protein supplementation with low carb content.   6. Complex discharge planning:   Await okay from medical for acute rehab. Complex Active General Medical Issues that complicate care Assess & Plan:     1. Active Problems:    * No active hospital problems. *  Resolved Problems:    * No resolved hospital problems. *    Patient Active Problem List   Diagnosis    Neck pain on right side    Obesity (BMI 30-39. 9)    Obesity    High risk medication use - 01/30/18 OARRS PM&R, 03/26/18 OARRS PM&R, 04/03/18 Urine Drug Screen positive opiates PM&R, 01/31/18 Med Contract PM&R    Impaired mobility and activities of daily living    Hx of right BKA ([address]    Family history of coronary artery disease    NSTEMI (non-ST elevated myocardial infarction) (HCC)    Arthritis, neuropathic    Charcot's joint of foot    Closed dislocation of ankle    Closed dislocation of foot    Complete rotator cuff rupture of left shoulder    Clinical depression    Difficulty walking    Benign essential HTN    Acquired flat foot    Closed fracture of tarsal and metatarsal bones    Myogenic ptosis    Disorder of peripheral nervous system    Arthritis of ankle or foot, degenerative    HLD (hyperlipidemia)    Cervical post-laminectomy syndrome    Failed back syndrome of lumbar spine    Low back pain with sciatica    Fibromyalgia muscle pain    Malaise and fatigue    Melancholia    Complete tear of left rotator cuff    Generalized osteoarthritis    Peripheral neuropathy    Osteoporosis    Postlaminectomy syndrome of cervical region    Postlaminectomy syndrome of lumbar region    Tibialis tendinitis    Hereditary and idiopathic peripheral neuropathy ([address]    Hypothyroid    Lobular breast cancer ([address]    Traumatic amputation of one lower extremity below knee with complication ([address]    Acquired hallux valgus    Fatigue due to treatment    Anemia    Cancer ([address]    Cervical spinal cord injury ([address]    Complete traumatic amputation at level between right knee and ankle ([address]    Morbid obesity ([facility]    Reactive depression    Sleeping excessive    Chronic low back pain    Left breast mass    Other specified postprocedural states    Primary osteoarthritis involving multiple joints    Hematuria    Hematuria, gross    Noncompliance - No Show inject 07/17/17, No Show F/U 1/11/18    Chronic low back pain with sciatica    Moderate episode of recurrent major depressive disorder ([facility]    Charcot's arthropathy    Below knee amputation status, right    Chronic pain syndrome    Racing heart beat    Cervical fusion syndrome    Current mild episode of major depressive disorder ([facility]    Obstructive sleep apnea    Malignant neoplasm of peritoneum, 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[facility]    Dizziness    Gait abnormality    Difficulty balancing     Mingo Mota D.O., PM&R     Attending    [address]

## 2021-09-12 NOTE — PROGRESS NOTES
Patient resting comfortably in her chair. Chair alarm is on, call light is within reach. Patient denies pain at this time. Will continue to monitor.

## 2021-09-12 NOTE — H&P
HISTORY & PHYSICAL       DATE OF ADMISSION:  9/10/2021    DATE OF SERVICE:  9/12/21    Subjective: Marisol Hooker, 64 y.o. female presents today with:     CHIEF COMPLAINT:   weakness      HPI    I reviewed recent nursing note, \"  See ntoes\". Left charcots foot      The patient has stabilized medically andis able to participate at acute level rehab but is too medically complex for SNF due to need for therapy at the acute level with at least 15 hours a week of PT OT and cognitive and recreational therapy at an acute level with daily medical monitoring. Imaging:    Imaging and other studies reviewed and discussed with patient and staff    Echocardiogram complete 2D with doppler with color    Result Date: 9/8/2021  Transthoracic Echocardiography Report (TTE)  Demographics   Patient Name     Lindsey Morales Gender                 Female   Patient Number   [id number]        Race                                                       Ethnicity   Visit Number     [id number]       Room Number            F598   Corporate ID                     Date of Study          09/08/2021   Accession Number [id number]      Referring Physician   Date of Birth    1945      Sonographer            Maciej Ying   Age              64 year(s)      Interpreting Physician Braeden Alba MD  Procedure Type of Study   TTE procedure:ECHO COMPLETE 2D W/DOP W/COLOR. Procedure Date Date: 09/08/2021 Start: 10:06 AM Study Location: Portable Technical Quality: Adequate visualization Indications:Stroke. Patient Status: Routine Height: 61 inches Weight: 223 pounds BSA: 1.98 m^2 BMI: 42.14 kg/m^2 BP: 178/81 mmHg Allergies   - Other allergy:(Cipro, Oxycontin). Conclusions   Summary  Normal left ventricle structure and function. Left ventricular ejection fraction is visually estimated at 65%. Moderate concentric LVH with wall thickness 13mm.   DUST without LVOT obstruction  Diastolic dysfunction  Normal right ventricle structure and function. Normal right ventricle systolic pressure. No evidence of pericardial effusion. Normal valvular structure and function. No significant regurgitant or stenotic valvular lesions. Signature   ----------------------------------------------------------------  Electronically signed by Jean Carlos Hwang MD(Interpreting  physician) on 09/08/2021 05:01 PM  ----------------------------------------------------------------   Findings  Left Ventricle Normal left ventricle structure and function. Left ventricular ejection fraction is visually estimated at 65%. Moderate concentric LVH with wall thickness 13mm. DUST without LVOT obstruction Diastolic dysfunction Right Ventricle Normal right ventricle structure and function. Normal right ventricle systolic pressure. Left Atrium Normal left atrium. Right Atrium Normal right atrium. Mitral Valve Normal mitral valve structure and function. Tricuspid Valve Normal tricuspid valve structure and function. Mild tricuspid regurgitation. Aortic Valve Normal aortic valve structure and function. Pulmonic Valve Normal pulmonic valve structure and function. Pericardial Effusion No evidence of pericardial effusion. Pleural Effusion No evidence of pleural effusion. Aorta \ Miscellaneous Aortic root dimension within normal limits. M-Mode Measurements (cm)   LVIDd: 4.1 cm                                    LVIDs: 2.9 cm  IVSd: 1.61 cm                                    IVSs: 1.97 cm  LVPWd: 1.08 cm                                   LVPWs: 1.46 cm  Rt. Vent.  Dimension: 2.7 cm                                                   LVOT: 1.88 cm  Doppler Measurements:   AV Velocity:0.02 m/s                    MV Peak E-Wave: 0.42 m/s  AV Peak Gradient: 7.37 mmHg             MV Peak A-Wave: 0.69 m/s  AV Mean Gradient: 3.63 mmHg  AV Area (Continuity):1.79 cm^2  TR Velocity:2.65 m/s                    Estimated RAP:5 mmHg  TR Gradient:28.04 mmHg                  RVSP:33.04 mmHg  Valves  Mitral Valve   Peak E-Wave: 0.42 m/s                 Peak A-Wave: 0.69 m/s                                        E/A Ratio: 0.61                                        Peak Gradient: 0.69 mmHg                                        Deceleration Time: 359.4 msec   Tissue Doppler   E' Septal Velocity: 0.03 m/s  E' Lateral Velocity: 0.05 m/s   Aortic Valve   Peak Velocity: 1.36 m/s                Mean Velocity: 0.9 m/s  Peak Gradient: 7.37 mmHg               Mean Gradient: 3.63 mmHg  Area (continuity): 1.79 cm^2  AV VTI: 34.62 cm   Tricuspid Valve   Estimated RVSP: 33.04 mmHg              Estimated RAP: 5 mmHg  TR Velocity: 2.65 m/s                   TR Gradient: 28.04 mmHg   Pulmonic Valve   Peak Velocity: 0.91 m/s           Peak Gradient: 3.29 mmHg  Mean Velocity: 0.56 m/s           Mean Gradient: 1.5 mmHg                                    Estimated PASP: 33.04 mmHg   LVOT   Peak Velocity: 0.96 m/s              Mean Velocity: 0.65 m/s  Peak Gradient: 2.86 mmHg             Mean Gradient: 1.82 mmHg  LVOT Diameter: 1.88 cm               LVOT VTI: 22.35 cm  Structures  Left Atrium   LA Volume/Index: 66.8 ml /34 m^2             LA Area: 23.15 cm^2   Left Ventricle   Diastolic Dimension: 4.1 cm          Systolic Dimension: 2.9 cm  Septum Diastolic: 6.48 cm            Septum Systolic: 4.07 cm  PW Diastolic: 1.92 cm                PW Systolic: 6.58 cm                                       FS: 29.3 %  LV EDV/LV EDV Index: 74.39 ml/38 m^2 LV ESV/LV ESV Index: 32.21 ml/16 m^2  EF Calculated: 56.7 %                LV Length: 7.99 cm   LVOT Diameter: 1.88 cm   Right Atrium   RA Systolic Pressure: 5 mmHg   Right Ventricle   Diastolic Dimension: 2.7 cm                                   RV Systolic Pressure: 88.55 mmHg  Aorta/ Miscellaneous Aorta   LVOT Diameter: 1.88 cm      CT Head WO Contrast    Result Date: 9/7/2021  EXAMINATION:  CT HEAD WO CONTRAST HISTORY: compatible with mild generalized parenchymal volume loss. Foci of white matter signal abnormality within the supratentorial white matter are nonspecific but most compatible with chronic small vessel ischemic changes  in a patient of this age. No acute hemorrhage, mass, mass effect, midline shift, or abnormal extra-axial fluid collection. The cerebellum is within normal limits. There is no diffusion restriction. No susceptibility artifact is identified on the gradient echo sequence. Cranial nerves 7/8 complexes appear grossly unremarkable. The visualized paranasal sinuses and bilateral mastoid air cells are clear. MRV brain: There is only minimal signal identified within the left transverse sinus and the left sigmoid sinus demonstrates signal but is small in size. There is no associated susceptibility artifact in the region of the left transverse sinus. Moderate postoperative hip     MRI brain: No acute ischemia or acute intracranial process. Generalized parenchymal volume loss and nonspecific white matter findings most compatible with chronic small vessel ischemic changes in a patient of this age. MRV brain: Minimal signal within the left transverse sinus and small size of the left sigmoid sinus on MRV images are findings most likely to represent hypoplastic left transverse sinus or slow flow, given lack of expected signal abnormality within the transverse sinus on other sequences. MRI BRAIN WO CONTRAST    Result Date: 9/8/2021  EXAM: MRI of the brain without contrast MRV of the brain without contrast History: Stroke. Dizziness. Headache. Technique: Multiplanar multisequence MRI of the brain was performed without contrast. Axial 3-D time-of-flight images of the brain were obtained without contrast. 3-D volume rendered images were performed for evaluation of the cerebral vasculature.  Comparison:   MRI of the cervical spine June 6, 2019 Findings: MRI brain: Prominence of the sulci and ventricles compatible with mild generalized parenchymal volume loss. Foci of white matter signal abnormality within the supratentorial white matter are nonspecific but most compatible with chronic small vessel ischemic changes  in a patient of this age. No acute hemorrhage, mass, mass effect, midline shift, or abnormal extra-axial fluid collection. The cerebellum is within normal limits. There is no diffusion restriction. No susceptibility artifact is identified on the gradient echo sequence. Cranial nerves 7/8 complexes appear grossly unremarkable. The visualized paranasal sinuses and bilateral mastoid air cells are clear. MRV brain: There is only minimal signal identified within the left transverse sinus and the left sigmoid sinus demonstrates signal but is small in size. There is no associated susceptibility artifact in the region of the left transverse sinus. Moderate postoperative hip     MRI brain: No acute ischemia or acute intracranial process. Generalized parenchymal volume loss and nonspecific white matter findings most compatible with chronic small vessel ischemic changes in a patient of this age. MRV brain: Minimal signal within the left transverse sinus and small size of the left sigmoid sinus on MRV images are findings most likely to represent hypoplastic left transverse sinus or slow flow, given lack of expected signal abnormality within the transverse sinus on other sequences. US CAROTID ARTERY BILATERAL    Result Date: 9/8/2021  US CAROTID ARTERY BILATERAL: 9/8/2021 CLINICAL HISTORY:  stroke . COMPARISON: None available. Grayscale, color and waveform Doppler analysis of the cervical carotid and vertebral arteries was performed. Validated velocity measurements with angiographic measurements, velocity criteria are extrapolated from diameter data as defined by the Society of Radiologist in [address] [place] [address] Radiology 2003; 194;[id number].  FINDINGS: There is mild atherosclerotic plaquing of the carotid bulbs without significantly elevated velocities, spectral waveform broadening, or incidental findings of concern identified. There is antegrade flow in both vertebral arteries. ARTERIAL BLOOD FLOW VELOCITY RIGHT Peak Systolic                                              Prox CCA:  104 cm/s             Mid CCA:  91 cm/s              Dist CCA:  69 cm/s              Prox ICA:  63 cm/s               Mid ICA:  90 cm/s            Dist ICA:  62 cm/s             Prox ECA:  107 cm/s             Prox VERT:  77 cm/s              ICA/CCA    1.0, which indicates less than 50% narrowing by velocity criteria. LEFT Peak Systolic Prox CCA:  60 cm/s             Mid CCA:  56 cm/s              Dist CCA:  59 cm/s              Prox ICA:  75 cm/s               Mid ICA:  100 cm/s            Dist ICA:  122 cm/s             Prox ECA:  72 cm/s             Prox VERT:  85 cm/s                 ICA/CCA     2.2, which indicates less than 50% narrowing by maximum velocity criteria. MILD ATHEROSCLEROTIC PLAQUING OF THE CAROTID BULBS WITHOUT EVIDENCE OF A FLOW-LIMITING STENOSIS.               Labs:     labs reviewed and discussed with patient and staff    Lab Results   Component Value Date    POCGLU 108 02/14/2019    POCGLU 83 04/11/2017     Lab Results   Component Value Date     09/08/2021    K 3.8 09/08/2021     09/08/2021    CO2 26 09/08/2021    BUN 18 09/08/2021    CREATININE 0.76 09/08/2021    CALCIUM 9.5 09/08/2021    LABALBU 3.43 09/08/2021    LABALBU 4.3 04/06/2012    BILITOT 0.6 09/08/2021    ALKPHOS 79 09/08/2021    AST 23 09/08/2021    ALT 12 09/08/2021     Lab Results   Component Value Date    WBC 7.1 09/08/2021    RBC 4.36 09/08/2021    RBC 4.16 11/04/2011    HGB 12.9 09/08/2021    HCT 39.0 09/08/2021    MCV 89.4 09/08/2021    MCH 29.6 09/08/2021    MCHC 33.2 09/08/2021    RDW 13.7 09/08/2021     09/08/2021    MPV 8.4 08/03/2015     Lab Results   Component Value Date    VITD25 89.2 09/08/2021     Lab Results Component Value Date    COLORU DARK YELLOW 03/26/2020    NITRU Negative 03/26/2020    GLUCOSEU Negative 03/26/2020    KETUA TRACE 03/26/2020    UROBILINOGEN 0.2 03/26/2020    BILIRUBINUR Negative 03/26/2020    BILIRUBINUR neg 08/17/2018     Lab Results   Component Value Date    PROTIME 14.3 09/07/2021    PROTIME 14.2 09/18/2014     Lab Results   Component Value Date    INR 1.1 09/07/2021         I discussed results with patient. The patient remains highly medically complex and continues to have severe problems with activities of daily living and mobility. The patient was assessed to be able to tolerate intensive rehabilitation and therefore was admitted to Rehabilitation to address these needs. The patient has been found to have severe abnormality of gait and mobility with impaired self care and is admitted to the acute inpatient rehab program.       Prior Function; everyday activities:     Social History     Socioeconomic History    Marital status:       Spouse name: Not on file    Number of children: 2    Years of education: Not on file    Highest education level: Not on file   Occupational History    Occupation: retired   Tobacco Use    Smoking status: Never Smoker    Smokeless tobacco: Never Used   Vaping Use    Vaping Use: Never used   Substance and Sexual Activity    Alcohol use: Yes     Comment: social    Drug use: No    Sexual activity: Never   Other Topics Concern    Not on file   Social History Narrative    Lives With: Alone, dtr in [facility] [place], disabled son with Keagan Barrientos is in a local group home    Type of Home: [address] [place] [address] Ave: One level, Level entry    Bathroom Shower/Tub: Tub/Shower unit, Equipment: Tub transfer bench, Grab bars in shower    Bathroom Accessibility: Wheelchair accessible    Home Equipment: Quad cane, Rolling walker, Wheelchair-electric (has right BK prosthesis and Left AFO)    ADL Assistance: Independent    Homemaking (21)  Grooming: Setup (21)  UE Bathing: Setup (21)  LE Bathing: Minimal assistance (21)  UE Dressing: Setup (21)  LE Dressing: Minimal assistance (21)  Toileting: Unable to assess(comment) (21)  Toilet Transfers  Toilet - Technique: Sit pivot (21)  Equipment Used: Grab bars (21)  Toilet Transfer: Minimal assistance (21)  Toilet Transfers Comments: Act was simulated as pt did not have to use the toilet (21)     Shower Transfers  Shower - Transfer Type: To and From (21)  Shower - Transfer To: Shower seat with back (21)  Shower - Technique: Sit pivot (21)  Shower Transfers: Minimal assistance (21)    Speech therapy: FIMS: Comprehension: 7 - Patient understands complex ideas (math/planning)  Expression: 7 - Patient expresses complex ideas/needs  Social Interaction: 7 - Patient has appropriate behavior/relations 100% of the time  Problem Solvin - Patient independent with complex tasks  Memory: 7 - Patient independent with meds/people/schedule     SPEECH THERAPY               Diet/Swallow:                           COGNITION  OT:    SP:          Prior to admission patient was independent with all ADLs and mobilityand did not require any outside services.        Past Medical History:   Diagnosis Date    Arthritis     Blood transfusion     CAD (coronary artery disease)     Cancer ([facility] [address]     GALLBLADDER ,  OMENTUM    Charcot's joint     left foot    Chest pain 2015    Colitis     DDD (degenerative disc disease), lumbar 2013    Depression     Disorder of peripheral nervous system 2010    Dyspnea 2015    Family history of coronary artery disease 2014    History of blood transfusion     following chemotherapy    Hx of right BKA ([facility] [address]     Hyperlipidemia     Hypertension     Hypothyroid 2015    Lobular breast cancer ([facility] [address] 10/2015    NSTEMI (non-ST elevated myocardial infarction) ([facility] [address] 8/29/2014    Obesity     Paroxysmal atrial fibrillation ([facility] [address] 8/29/2014    S/P BKA (below knee amputation[facility])        Past Surgical History:   Procedure Laterality Date    ABDOMEN SURGERY Left 04/11/2017    EXCISION OF CHEST WALL MASS, UPDATE LABS ON ADMIT  performed by Capri Velarde MD at [address] BLEPHAROPLASTY  02/05/2013    both eyes    BREAST SURGERY      CARDIAC CATHETERIZATION      COLONOSCOPY  01/01/2010    normal    CYSTOSCOPY W BIOPSY OF BLADDER N/A 06/26/2017    CYSTOSCOPY BLADDER BIOPSY AND FULGURATION performed by Mercedez Shannon MD at [address] Bilateral 2012    cataract removal with IOL implants    HYSTERECTOMY      LEG AMPUTATION BELOW KNEE Right 02/01/2011    DR Joel Ellis due to CHARCOTS    MASTECTOMY Bilateral 11/16/2015    Bilateral simple mastectomies with right SNB by DR Maddi Chu    IN REMOVAL OF BREAST LESION Left 01/24/2017    CORE NEEDLE BIOPSY OF  LEFT BREAST MASS performed by Capri Velarde MD at [address]      cervical and lumbar       Current Facility-Administered Medications   Medication Dose Route Frequency Provider Last Rate Last Admin    atorvastatin (LIPITOR) tablet 80 mg  80 mg Oral Nightly Paula Burch MD   80 mg at 09/11/21 2104    enoxaparin (LOVENOX) injection 40 mg  40 mg SubCUTAneous Daily Paula Burch MD   40 mg at 09/12/21 0817    ondansetron (ZOFRAN-ODT) disintegrating tablet 4 mg  4 mg Oral Q8H PRN Paula Burch MD        Or    ondansetron [facility]) injection 4 mg  4 mg IntraVENous Q6H PRN Paula Burch MD        acetaminophen (TYLENOL) tablet 650 mg  650 mg Oral Q6H PRN Paula Burch MD   650 mg at 09/12/21 0824    aspirin EC tablet 81 mg  81 mg Oral Daily Paula Burch MD   81 mg at 09/12/21 0816    aspirin-dipyridamole (AGGRENOX)  MG per extended release capsule 1 capsule  1 capsule Oral BID Anmol Chang MD   1 capsule at 09/12/21 0821    b complex vitamins capsule 1 capsule  1 capsule Oral Daily Anmol Chang MD   1 capsule at 09/12/21 2746    buPROPion (WELLBUTRIN XL) extended release tablet 300 mg  300 mg Oral Daily Anmol Chang MD   300 mg at 09/12/21 0821    carvedilol (COREG) tablet 25 mg  25 mg Oral BID WC Anmol Chang MD   25 mg at 09/12/21 1652    DULoxetine (CYMBALTA) extended release capsule 30 mg  30 mg Oral QPM Anmol Chang MD   30 mg at 09/12/21 1651    gabapentin (NEURONTIN) capsule 600 mg  600 mg Oral 4x Daily Anmol Chang MD   600 mg at 09/12/21 1651    hydrALAZINE (APRESOLINE) injection 10 mg  10 mg IntraVENous Q4H PRN Anmol Chang MD        levothyroxine (SYNTHROID) tablet 50 mcg  50 mcg Oral Daily Anmol Chang MD   50 mcg at 09/12/21 6684    Vitamin D (CHOLECALCIFEROL) tablet 1,000 Units  1,000 Units Oral Daily Anmol Chang MD   1,000 Units at 09/12/21 0817       Allergies   Allergen Reactions    Ciprofloxacin Itching    Oxycontin [Oxycodone Hcl] Itching                      FAMILY HISTORY:  Does not pertain tochief complaint. Review of Systems       Objective  BP (!) 105/52   Pulse 72   Temp 98.8 °F (37.1 °C)   Resp 16   Ht 5' 1\" (1.549 m)   Wt 203 lb 14.8 oz (92.5 kg)   SpO2 96%   BMI 38.53 kg/m² *    Physical Exam  Ortho Exam  Neurologic Exam         ROS x10: The patient also complains of severely impaired mobility and activities of daily living. Otherwise no new problems with vision, hearing, nose, mouth, throat, dermal, cardiovascular, GI, , pulmonary, musculoskeletal, psychiatric or neurological. See Rehab H&P on Rehab chart dated . Vital signs:  BP (!) 105/52   Pulse 72   Temp 98.8 °F (37.1 °C)   Resp 16   Ht 5' 1\" (1.549 m)   Wt 203 lb 14.8 oz (92.5 kg)   SpO2 96%   BMI 38.53 kg/m²   I/O:   PO/Intake:  fair PO intake, no problems observed or reported.     Bowel/Bladder:  continent, no problems noted.  General:  Patient is well developed, adequately nourished, non-obese and     well kempt. HEENT:    PERRLA, hearing intact to loud voice, external inspection of ear     and nose benign. Inspection of lips, tongue and gums benign  Musculoskeletal: No significant change in strength or tone. All joints stable. Inspection and palpation of digits and nails show no clubbing,       cyanosis or inflammatory conditions. Neuro/Psychiatric: Affect: flat but pleasant. Alert and oriented to person, place and     Situation with    cues. No significant change in deep tendon reflexes or     sensation  Lungs:  Diminished, CTA-B. Respiration effort is normal at rest.     Heart:   S1 = S2, RRR. No loud murmurs. Abdomen:  Soft, non-tender, no enlargement of liver or spleen. Extremities:  No significant lower extremity edema or tenderness. Skin:   Intact to general survey, no visualized or palpated problems. Rehabilitation:  Physical therapy:   Bed Mobility: Scooting: Independent    Transfers: Sit to Stand: Stand by assistance  Stand to sit: Stand by assistance  Bed to Chair: Stand by assistance, Ambulation 1  Surface: carpet  Device: Rolling Walker  Other Apparatus: AFO, Left (Rt Prosthesis)  Assistance: Stand by assistance, Contact guard assistance  Quality of Gait: Mild Trendelenburg, slight decreased control of Rt LE in swing phase. Gait Deviations: Slow Blank, Increased DEYSI, Decreased step length  Distance: 80ft  Comments: Pt is indep with donning/doffing prosthesis, decreased safety noted with pt leaving 2ww behind when approaching chair/toilet/sink. , Stairs  # Steps : 12  Stairs Height: 6\"  Rails: Bilateral  Assistance: Contact guard assistance  Comment: Ascends/descends non-reciprocally, one standing RB between steps    FIMS:  ,  , Assessment: Patient able to progress to stairs today. One standing RB between sets however overall good tolerance.  Initiated static standing balance activities to improve stability, increased postural sway with NBOS. Occupational therapy:   Eatin - Feeds self with adaptive equipment/dentures and/or feeds self with modified diet and/or performs own tube feeding  Groomin - Requires setup/cues to do all tasks  Bathin - Able to bathe 8-9 areas  Dressing-Upper: 5 - Requires setup/supervision/cues and/or requires assist with presthesis/brace only  Dressing-Lower: 4 - Requires assist with buttons/zippers/shoelaces and/or assist with shoes only  Toiletin - Did not occur  Toilet Transfer: 4 - Requires steadying assistance only < 25% assist  Shower Transfer: 3 - Moderate assistance, pt. expends 50%-74% effort,  , Assessment: Pt is a 63 y/o female who lives alone, presenting to [facility] with above listed performance deficits which are limiting her ability to return home at an independent level. Pt would benefit from continued occupational therapy services to improve functional abilities for safe and independent return home. Speech therapy: Comprehension: 7 - Patient understands complex ideas (math/planning)  Expression: 7 - Patient expresses complex ideas/needs  Social Interaction: 7 - Patient has appropriate behavior/relations 100% of the time  Problem Solvin - Patient independent with complex tasks  Memory: 7 - Patient independent with meds/people/schedule      Lab/X-ray studies reviewed, analyzed and discussed with patient and staff:   No results found for this or any previous visit (from the past 24 hour(s)). After extensive review of the records and above physical exam, I have formulated the followingdiagnoses and plan:      DIAGNOSES:    1.   The patient was admitted to the acute rehabilitation unit with the primary rehab diagnoses being severe abnormality of gait and mobility and impaired self care and ADL's due to  Left charcot's foot     Compared to Pre-Admission Assessment, patients medical and functional status remain challengingly complex and patient continues to requireintensive therapeutic intervention from multiple therapies, therefore, initiate acute intensive comprehensive inpatient rehabilitation program including PT/OT to improve balance, ambulation, ADLs, and to improve the P/AROM. Functional and medical status reassessed regarding patients ability to participate in therapies and patient found to be able to participate in acute intensivecomprehensive inpatient rehabilitation program.  Therapeutic modifications regarding activities in therapies, place, amount of time per day and intensity of therapy made daily. Enroll in acute course of therapy program to include 1/2 hours per day of PT 5 to 7days per week and 1 1/2 hours per day of OT 5 to 7 days per week,   SLP and Rec T 1/2 hour per day 3-5 days per week. The patient is stable medically and physically on previous exam.  When necessary therapy will be spread out over a 7-day window. This patient present with significant new onset decreased mobility andinability to perform activities of daily living skills independently and is at significant risk for prolonged disability  For this reason they have been admitted to [facility]. Thepatient's current functional and medical status are highly complex but the patient is able to participate in intensive rehabilitation. A comprehensive inpatient rehabilitation program is appropriate. The patient Radu Maria initial evaluation by the rehabilitation team and be discussed at regular treatment team meetings to assess progress, mobility, self care, mood and discharge issues. Physical therapy will be consulted for mobilityand endurance issues and should be performed 1 to 2 times per day, 7 days per week for the length of stay. Occupational therapy will be consulted for activities of daily living and should be performed 1 to 2 times per day,7 days per week for the length of stay.   Their capacity to participate at an acute level, decision to be treated in the gym, room or on the unit, their activity goals for the day and the number of minutes of activeparticipation will be reassessed and re-prescribed daily. Because this patient is medically complex, I will check a CBC, BMP, UA and orthostatic blood pressures. They will be reassessed daily regarding their ability toparticipate in an acute level rehabilitation program.  Recreational Therapy will be consulted for community re-entry and adjustment to disability. Communication, cognitive and emotional issues will also be addressed duringthis rehabilitation stay by rehabilitation psychologist or speech therapist as appropriate. I reviewed the patient's old and current charts and discussed other rehabilitation options with the rehabilitation team including the rehab RN and the admission team as well as the patient. I feel that the patient's functional recovery would be best served at an acute inpatient rehabilitation program because the patient needs intense therapy three hours per day, direct RN supervision and daily monitoring by a physician for medical status. This cannot be sufficiently provided by home health care, a skilled nursing facility or in an outpatient setting. I further feel that the patient has the potential to improve functional abilities in an acute intensive rehabilitation program.    Old records were reviewed and summarized. 2.  Other diagnoses which complicate rehabilitation stay include: Active Problems:    * No active hospital problems. *  Resolved Problems:    * No resolved hospital problems. *    Patient Active Problem List   Diagnosis    Neck pain on right side    Obesity (BMI 30-39. 9)    Obesity    High risk medication use - 01/30/18 OARRS PM&R, 03/26/18 OARRS PM&R, 04/03/18 Urine Drug Screen positive opiates PM&R, 01/31/18 Med Contract PM&R    Impaired mobility and activities of daily living    Hx of right BKA ([facility] [address]  Family history of coronary artery disease    NSTEMI (non-ST elevated myocardial infarction) ([facility])    Arthritis, neuropathic    Charcot's joint of foot    Closed dislocation of ankle    Closed dislocation of foot    Complete rotator cuff rupture of left shoulder    Clinical depression    Difficulty walking    Benign essential HTN    Acquired flat foot    Closed fracture of tarsal and metatarsal bones    Myogenic ptosis    Disorder of peripheral nervous system    Arthritis of ankle or foot, degenerative    HLD (hyperlipidemia)    Cervical post-laminectomy syndrome    Failed back syndrome of lumbar spine    Low back pain with sciatica    Fibromyalgia muscle pain    Malaise and fatigue    Melancholia    Complete tear of left rotator cuff    Generalized osteoarthritis    Peripheral neuropathy    Osteoporosis    Postlaminectomy syndrome of cervical region    Postlaminectomy syndrome of lumbar region    Tibialis tendinitis    Hereditary and idiopathic peripheral neuropathy ([address]    Hypothyroid    Lobular breast cancer ([address]    Traumatic amputation of one lower extremity below knee with complication ([address]    Acquired hallux valgus    Fatigue due to treatment    Anemia    Cancer ([address]    Cervical spinal cord injury ([address]    Complete traumatic amputation at level between right knee and ankle ([facility])    Morbid obesity ([address]    Reactive depression    Sleeping excessive    Chronic low back pain    Left breast mass    Other specified postprocedural states    Primary osteoarthritis involving multiple joints    Hematuria    Hematuria, gross    Noncompliance - No Show inject 07/17/17, No Show F/U 1/11/18    Chronic low back pain with sciatica    Moderate episode of recurrent major depressive disorder (HCC)    Charcot's arthropathy    Below knee amputation status, right    Chronic pain syndrome    Racing heart beat    Cervical fusion syndrome    Current mild episode of major depressive disorder ([facility] [address]    Obstructive sleep apnea    Malignant neoplasm of peritoneum, 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HCC)    Dizziness    Gait abnormality    Difficulty balancing         I am especially concerned about their recent medical complexities. The patient's high risk medication use includes  See mar. The patient is high risk for urinary tract infection, an admission urinalysis has been ordered. I will have the nurses check post void residual bladder volumes and place acatheter if excessive urine is retained in the bladder after voiding. The patient is risk for deep venous thromosis, complex deep venous thrombosis protocol prophylaxis has been ordered  See mar . The patient is high risk for orthostasis and a hydration program and orthostatic blood pressure screening have been ordered. I will attempt to get old records from the patient's previous hospital stay. [facility]. 3.  Current and previous medications were reviewed and summarized and compared to old medication lists from home and from the acute floor. 4.  Complex labs and x-rays were reviewed. I will review patient's old EKG and labs. 5.  Will provide emotional support for this patient regarding adjustment to their disability. Cognition and mood will be screened daily and addressed by rehabilitationpsychology and/or speech therapy as appropriate. I have encouraged the patient to attend the Rehab Adjustment to Disability Support Group and recreational therapy. 6.  Estimated length of stay is 2-3 weeks. Discharge to home with help from family and home health PT, OT, RN, and aide. Patient should be independent at discharge. 7.  The patient's medical and rehab prognosis are good. [address] regarding the patient's back up to general medical needs.     A welcome letter was presented with an explanation of my services, my specialty and what to expect during the rehabilitation process. As well as introducing myself, I also wrote my name on their bedside marker board with their name as well as the names of the other physicians with an explanation of our individual roles in their care, as well as the rehab process.         Megan Kevin Sa, HUGO.O., F.A.A.P.M.&GARRY.

## 2021-09-12 NOTE — PROGRESS NOTES
Subjective: The patient complains of severe acute on chronic progressive fatigue and dizziness partially relieved by rest, PT, OT and meds and exacerbated by exertion and recent illness. ROS x10: The patient also complains of severely impaired mobility and activities of daily living. Otherwise no new problems with vision, hearing, nose, mouth, throat, dermal, cardiovascular, GI, , pulmonary, musculoskeletal, psychiatric or neurological. See Rehab consult on Rehab chart . Vital signs:  /63   Pulse 78   Temp 97.9 °F (36.6 °C)   Resp 17   Ht 5' 1\" (1.549 m)   Wt 203 lb 14.8 oz (92.5 kg)   SpO2 98%   BMI 38.53 kg/m²   I/O:   PO/Intake:    fair PO intake,       Bowel/Bladder:   continent,    General:  Patient is well developed, adequately nourished, non-obese and     well kempt. HEENT:    PERRLA, hearing intact to loud voice, external inspection of ear     and nose benign. Inspection of lips, tongue and gums benign  Musculoskeletal: No significant change in strength or tone. All joints stable. Inspection and palpation of digits and nails show no clubbing,       cyanosis or inflammatory conditions. Neuro/Psychiatric: Affect: flat-  Alert and oriented to self and     Situation with no needed cues. No significant change in deep tendon reflexes or     Sensation-very poor balance. Lungs:  Diminished, CTA-B. Respiration effort is normal at rest.  KRAFT  Heart:   S1 = S2,   RRR. No loud murmurs. Abdomen:  Obese, Soft, non-tender, no enlargement of liver or spleen. Extremities:  No significant lower extremity edema or tenderness. Wears left AFO F with orthotic shoe and has a right below the knee amputation with    prosthesis. Skin:    BUE bruises dt blood draws, no visualized or palpated problems.     Rehabilitation:  Physical therapy: FIMS:  Bed Mobility: Scooting: Independent    Transfers: Sit to Stand: Stand by assistance  Stand to sit: Stand by assistance  Bed to Chair: Stand by assistance, Ambulation 1  Surface: carpet  Device: Rolling Walker  Other Apparatus: AFO, Left (Rt Prosthesis)  Assistance: Stand by assistance, Contact guard assistance  Quality of Gait: Mild Trendelenburg, slight decreased control of Rt LE in swing phase. Gait Deviations: Slow Blank, Increased DEYSI, Decreased step length  Distance: 80ft  Comments: Pt is indep with donning/doffing prosthesis, decreased safety noted with pt leaving 2ww behind when approaching chair/toilet/sink. , Stairs  # Steps : 12  Stairs Height: 6\"  Rails: Bilateral  Assistance: Contact guard assistance  Comment: Ascends/descends non-reciprocally, one standing RB between steps    FIMS:  ,  , Assessment: Patient able to progress to stairs today. One standing RB between sets however overall good tolerance. Initiated static standing balance activities to improve stability, increased postural sway with NBOS. Occupational therapy: FIMS:  Eatin - Feeds self with adaptive equipment/dentures and/or feeds self with modified diet and/or performs own tube feeding  Groomin - Requires setup/cues to do all tasks  Bathin - Able to bathe 8-9 areas  Dressing-Upper: 5 - Requires setup/supervision/cues and/or requires assist with presthesis/brace only  Dressing-Lower: 4 - Requires assist with buttons/zippers/shoelaces and/or assist with shoes only  Toiletin - Did not occur  Toilet Transfer: 4 - Requires steadying assistance only < 25% assist  Shower Transfer: 3 - Moderate assistance, pt. expends 50%-74% effort,  , Assessment: Pt is a 65 y/o female who lives alone, presenting to [facility] with above listed performance deficits which are limiting her ability to return home at an independent level. Pt would benefit from continued occupational therapy services to improve functional abilities for safe and independent return home.     Speech therapy: FIMS:      SPEECH THERAPY               Diet/Swallow:                           COGNITION  OT:    SP: Lab/X-ray studies reviewed, analyzed and discussed with patient and staff:   No results found for this or any previous visit (from the past 24 hour(s)). Previous extensive, complex labs, notes and diagnostics reviewed and analyzed. ALLERGIES:    Allergies as of 09/10/2021 - Fully Reviewed 09/10/2021   Allergen Reaction Noted    Ciprofloxacin Itching 09/07/2011    Oxycontin [oxycodone hcl] Itching 02/01/2012      (please also verify by checking [facility])     Complex Physical Medicine & Rehab Issues Assess & Plan:   1. Severe abnormality of gait and mobility and impaired self-care and ADL's secondary to progressive gait ataxia on old right below the knee amputation and left Charcot foot with AFO. Functional and medical status reassessed regarding patients ability to participate in therapies and patient found to be able to participate in  acute intensive comprehensive inpatient rehabilitation program including PT/OT to improve balance, ambulation, ADLs, and to improve the P/AROM. It is my opinion that they will be able to tolerate 3 hours of therapy a day and benefit from it at an acute level. 2. Bowel constipation and Bladder dysfunction overactive bladder:  frequent toileting, ambulate to bathroom with assistance, check post void residuals. Check for C.difficile x1 if >2 loose stools in 24 hours, continue bowel & bladder program.  Monitor for UTI symptoms including lethargy and confusion  3. Severe low back and left ankle neuropathic pain and generalized OA pain: reassess pain every shift and prior to and after each therapy session, give prn  Tylenol, modalities prn in therapy, consider Lidoderm, K-pad prn.   4. Skin breakdown risk:  continue pressure relief program.  Daily skin exams and reports from nursing. 5. Severe fatigue due to immobility and nutritional deficits: Add vitamin B12 vitamin D and CoQ10 titrate dosing and add protein supplementation with low carb content.   6. Complex discharge planning:   Await okay from medical for acute rehab. Complex Active General Medical Issues that complicate care Assess & Plan:     1. Active Problems:    * No active hospital problems. *  Resolved Problems:    * No resolved hospital problems. *    Patient Active Problem List   Diagnosis    Neck pain on right side    Obesity (BMI 30-39. 9)    Obesity    High risk medication use - 01/30/18 OARRS PM&R, 03/26/18 OARRS PM&R, 04/03/18 Urine Drug Screen positive opiates PM&R, 01/31/18 Med Contract PM&R    Impaired mobility and activities of daily living    Hx of right BKA ([address]    Family history of coronary artery disease    NSTEMI (non-ST elevated myocardial infarction) (HCC)    Arthritis, neuropathic    Charcot's joint of foot    Closed dislocation of ankle    Closed dislocation of foot    Complete rotator cuff rupture of left shoulder    Clinical depression    Difficulty walking    Benign essential HTN    Acquired flat foot    Closed fracture of tarsal and metatarsal bones    Myogenic ptosis    Disorder of peripheral nervous system    Arthritis of ankle or foot, degenerative    HLD (hyperlipidemia)    Cervical post-laminectomy syndrome    Failed back syndrome of lumbar spine    Low back pain with sciatica    Fibromyalgia muscle pain    Malaise and fatigue    Melancholia    Complete tear of left rotator cuff    Generalized osteoarthritis    Peripheral neuropathy    Osteoporosis    Postlaminectomy syndrome of cervical region    Postlaminectomy syndrome of lumbar region    Tibialis tendinitis    Hereditary and idiopathic peripheral neuropathy ([address]    Hypothyroid    Lobular breast cancer ([address]    Traumatic amputation of one lower extremity below knee with complication ([address]    Acquired hallux valgus    Fatigue due to treatment    Anemia    Cancer ([address]    Cervical spinal cord injury ([address]    Complete traumatic amputation at level between right knee and ankle ([address]    Morbid obesity ([facility] [address]    Reactive depression    Sleeping excessive    Chronic low back pain    Left breast mass    Other specified postprocedural states    Primary osteoarthritis involving multiple joints    Hematuria    Hematuria, gross    Noncompliance - No Show inject 07/17/17, No Show F/U 1/11/18    Chronic low back pain with sciatica    Moderate episode of recurrent major depressive disorder ([facility] [address]    Charcot's arthropathy    Below knee amputation status, right    Chronic pain syndrome    Racing heart beat    Cervical fusion syndrome    Current mild episode of major depressive disorder ([facility] [address]    Obstructive sleep apnea    Malignant neoplasm of peritoneum, 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[facility] [address]    Dizziness    Gait abnormality    Difficulty balancing     Levi Rahman D.O., PM&R     Attending    [address]

## 2021-09-12 NOTE — PROGRESS NOTES
Physical Therapy Rehab Treatment Note  Facility/Department: [facility]  Room: R2/R234-01       NAME: Delma Roberto  : 1945 (64 y.o.)  MRN: [id number]  CODE STATUS: Full Code    Date of Service: 2021  Chart Reviewed: Yes    Restrictions:  Restrictions/Precautions: Fall Risk       SUBJECTIVE: Subjective: \"My goal is to walk again with my Quad cane and use my stairs to go up to my loft\"  Pain Screening  Patient Currently in Pain: Yes  Pre Treatment Pain Screening  Intervention List: Patient able to continue with treatment      Pain Assessment  Pain Level: 2  Pain Location: Head;Wrist  Pain Orientation: Right  Pain Descriptors: Aching    Postpain: no change  OBJECTIVE:               Neuromuscular Education  Neuromuscular Comments: Static standing balance with varying DEYSI, increased postural sway requiring close SBA to CGA         Transfers  Sit to Stand: Stand by assistance  Stand to sit: Stand by assistance  Comment: VCs for safe approach to chair    Ambulation  Ambulation?: Yes  Ambulation 1  Surface: carpet  Device: Rolling Walker  Other Apparatus: AFO; Left (Rt Prosthesis)  Assistance: Stand by assistance;Contact guard assistance  Quality of Gait: Mild Trendelenburg, slight decreased control of Rt LE in swing phase. Gait Deviations: Slow Blank; Increased DEYSI; Decreased step length  Distance: 80ft    Stairs/Curb  Stairs?: Yes  Stairs  # Steps : 12  Stairs Height: 6\"  Rails: Bilateral  Assistance: Contact guard assistance  Comment: Ascends/descends non-reciprocally, one standing RB between steps              Exercises  Hip Flexion: Lateral hip flexion over bolster x10 Stephane     ASSESSMENT/PROGRESS TOWARDS GOALS:  Body structures, Functions, Activity limitations: Decreased functional mobility ; Decreased safe awareness;Decreased balance;Decreased endurance;Decreased strength;Decreased coordination  Assessment: Patient able to progress to stairs today.  One standing RB between sets however overall good tolerance. Initiated static standing balance activities to improve stability, increased postural sway with NBOS.   Prognosis: Good  REQUIRES PT FOLLOW UP: Yes    Goals:  Long term goals  Long term goal 1: Pt will demonstrate bed mobility with indep  Long term goal 2: Pt will perform various functional transfers with indep  Long term goal 3: Pt will ambulate 70ft with 2ww and indep with appropriate prosthesis fit  Long term goal 4: Pt will ascend/descend 4 steps with handrails and supervision to navigate in community/shops in [facility] term goal 5: Pt will perform TUG </=15.0 second to demonstrate decreased fall risk    PLAN OF CARE/Safety:          Therapy Time:   Individual   Time In 1308   Time Out 1338   Minutes 30     Minutes:30      Transfer/Bed mobility trainin      Gait training:15      Neuro re education:5     Therapeutic ex:5      Jaxon Traore PTA, 21 at 1:37 PM    Electronically signed by Jaxon Traore PTA on 2021 at 1:37 PM

## 2021-09-13 PROBLEM — Z89.511 HX OF BKA, RIGHT (HCC): Status: ACTIVE | Noted: 2021-09-13

## 2021-09-13 PROCEDURE — 6370000000 HC RX 637 (ALT 250 FOR IP): Performed by: INTERNAL MEDICINE

## 2021-09-13 PROCEDURE — 6370000000 HC RX 637 (ALT 250 FOR IP): Performed by: PHYSICAL MEDICINE & REHABILITATION

## 2021-09-13 PROCEDURE — 99233 SBSQ HOSP IP/OBS HIGH 50: CPT | Performed by: PHYSICAL MEDICINE & REHABILITATION

## 2021-09-13 PROCEDURE — 97116 GAIT TRAINING THERAPY: CPT

## 2021-09-13 PROCEDURE — 97530 THERAPEUTIC ACTIVITIES: CPT

## 2021-09-13 PROCEDURE — 1180000000 HC REHAB R&B

## 2021-09-13 PROCEDURE — 6360000002 HC RX W HCPCS: Performed by: INTERNAL MEDICINE

## 2021-09-13 PROCEDURE — 97110 THERAPEUTIC EXERCISES: CPT

## 2021-09-13 PROCEDURE — 97535 SELF CARE MNGMENT TRAINING: CPT

## 2021-09-13 RX ORDER — HYDROCODONE BITARTRATE AND ACETAMINOPHEN 5; 325 MG/1; MG/1
1 TABLET ORAL EVERY 4 HOURS PRN
Status: DISCONTINUED | OUTPATIENT
Start: 2021-09-13 | End: 2021-09-17 | Stop reason: HOSPADM

## 2021-09-13 RX ADMIN — BUPROPION HYDROCHLORIDE 300 MG: 300 TABLET, FILM COATED, EXTENDED RELEASE ORAL at 06:20

## 2021-09-13 RX ADMIN — HYDROCODONE BITARTRATE AND ACETAMINOPHEN 1 TABLET: 5; 325 TABLET ORAL at 22:34

## 2021-09-13 RX ADMIN — ENOXAPARIN SODIUM 40 MG: 40 INJECTION SUBCUTANEOUS at 08:08

## 2021-09-13 RX ADMIN — LEVOTHYROXINE SODIUM 50 MCG: 0.03 TABLET ORAL at 06:20

## 2021-09-13 RX ADMIN — Medication 1 CAPSULE: at 06:20

## 2021-09-13 RX ADMIN — ASPRIN AND EXTENDED-RELEASE DIPYRIDAMOLE 1 CAPSULE: 25; 200 CAPSULE ORAL at 06:21

## 2021-09-13 RX ADMIN — ATORVASTATIN CALCIUM 80 MG: 80 TABLET, FILM COATED ORAL at 20:07

## 2021-09-13 RX ADMIN — ASPRIN AND EXTENDED-RELEASE DIPYRIDAMOLE 1 CAPSULE: 25; 200 CAPSULE ORAL at 20:07

## 2021-09-13 RX ADMIN — GABAPENTIN 600 MG: 300 CAPSULE ORAL at 16:49

## 2021-09-13 RX ADMIN — GABAPENTIN 600 MG: 300 CAPSULE ORAL at 06:22

## 2021-09-13 RX ADMIN — ASPIRIN 81 MG: 81 TABLET, COATED ORAL at 06:22

## 2021-09-13 RX ADMIN — CARVEDILOL 25 MG: 25 TABLET, FILM COATED ORAL at 06:20

## 2021-09-13 RX ADMIN — DULOXETINE HYDROCHLORIDE 30 MG: 30 CAPSULE, DELAYED RELEASE ORAL at 16:49

## 2021-09-13 RX ADMIN — HYDROCODONE BITARTRATE AND ACETAMINOPHEN 1 TABLET: 5; 325 TABLET ORAL at 16:49

## 2021-09-13 RX ADMIN — Medication 1000 UNITS: at 06:22

## 2021-09-13 RX ADMIN — GABAPENTIN 600 MG: 300 CAPSULE ORAL at 12:58

## 2021-09-13 RX ADMIN — GABAPENTIN 600 MG: 300 CAPSULE ORAL at 22:34

## 2021-09-13 RX ADMIN — CARVEDILOL 25 MG: 25 TABLET, FILM COATED ORAL at 16:49

## 2021-09-13 ASSESSMENT — PAIN DESCRIPTION - LOCATION
LOCATION: HAND
LOCATION: BACK
LOCATION: BACK
LOCATION: WRIST;BACK

## 2021-09-13 ASSESSMENT — PAIN - FUNCTIONAL ASSESSMENT: PAIN_FUNCTIONAL_ASSESSMENT: ACTIVITIES ARE NOT PREVENTED

## 2021-09-13 ASSESSMENT — PAIN DESCRIPTION - FREQUENCY
FREQUENCY: CONTINUOUS

## 2021-09-13 ASSESSMENT — PAIN DESCRIPTION - DESCRIPTORS
DESCRIPTORS: ACHING

## 2021-09-13 ASSESSMENT — PAIN DESCRIPTION - PAIN TYPE
TYPE: ACUTE PAIN
TYPE: ACUTE PAIN
TYPE: CHRONIC PAIN
TYPE: CHRONIC PAIN

## 2021-09-13 ASSESSMENT — PAIN DESCRIPTION - ORIENTATION
ORIENTATION: LOWER
ORIENTATION: RIGHT;LOWER
ORIENTATION: RIGHT

## 2021-09-13 ASSESSMENT — PAIN SCALES - GENERAL
PAINLEVEL_OUTOF10: 0
PAINLEVEL_OUTOF10: 4
PAINLEVEL_OUTOF10: 2
PAINLEVEL_OUTOF10: 4
PAINLEVEL_OUTOF10: 4

## 2021-09-13 ASSESSMENT — PAIN DESCRIPTION - ONSET: ONSET: ON-GOING

## 2021-09-13 ASSESSMENT — PAIN DESCRIPTION - PROGRESSION: CLINICAL_PROGRESSION: NOT CHANGED

## 2021-09-13 NOTE — PROGRESS NOTES
Physical Therapy Rehab Treatment Note  Facility/Department: [facility]  Room: R234/R234-01       NAME: Acosta Jimenez  : 1945 (64 y.o.)  MRN: [id number]  CODE STATUS: Full Code    Date of Service: 2021  Chart Reviewed: Yes  Patient assessed for rehabilitation services?: Yes  Family / Caregiver Present: No  Diagnosis: Impaired mobility and ADLs d/t vertebrobasilar transient ischemia    Restrictions:  Restrictions/Precautions: Fall Risk       SUBJECTIVE: Subjective: I am ready to go  Pain Screening  Patient Currently in Pain: Yes  Pre Treatment Pain Screening  Pain at present: 2  Scale Used: Numeric Score  Intervention List: Patient able to continue with treatment    Post Treatment Pain Screening:  Pain Assessment  Pain Assessment: 0-10  Pain Level: 2  Pain Type: Acute pain  Pain Location: Hand  Pain Orientation: Right  Pain Descriptors: Aching    OBJECTIVE:   Follows Commands: Within Functional Limits    Bed mobility  Rolling to Left: Independent  Rolling to Right: Independent  Sit to Supine: Independent  Scooting: Independent    Transfers  Sit to Stand: Stand by assistance  Stand to sit: Stand by assistance  Bed to Chair: Stand by assistance    Ambulation  Ambulation?: Yes  More Ambulation?: No  Ambulation 1  Surface: carpet  Device: Rolling Walker  Other Apparatus: AFO; Left  Assistance: Stand by assistance  Quality of Gait: Mild Trendelenburg, slight decreased control of Rt LE in swing phase. Distance: 100'    Stairs/Curb  Stairs?: Yes  Stairs  Assistance: Stand by assistance  # Steps : 12  Stairs Height: 6\"  Rails: Bilateral  Comment: non reciprocal pattern    Exercises  Hamstring Sets: HS curls with YTB x15  Gluteal Sets: x 15  Hip Flexion: x 15  Hip Abduction: x 15 YTB/ Ball squeezes  Knee Long Arc Quad: x 15     ASSESSMENT/PROGRESS TOWARDS GOALS:  Body structures, Functions, Activity limitations: Decreased functional mobility ; Decreased safe awareness;Decreased balance;Decreased endurance;Decreased strength;Decreased coordination  Assessment: Patient completes stair negotiation with SBA and 100'  of gait training. Patient continues to be limited by strength and endurance    Goals:  Long term goals  Long term goal 1: Pt will demonstrate bed mobility with indep  Long term goal 2: Pt will perform various functional transfers with indep  Long term goal 3: Pt will ambulate 70ft with 2ww and indep with appropriate prosthesis fit  Long term goal 4: Pt will ascend/descend 4 steps with handrails and supervision to navigate in community/shops in [facility] term goal 5: Pt will perform TUG </=15.0 second to demonstrate decreased fall risk    PLAN OF CARE/Safety:   Safety Devices  Type of devices:  All fall risk precautions in place      Therapy Time:   Individual   Time In 1330   Time Out 1430   Minutes 60     Minutes :60      Transfer/Bed mobility training: 15      Gait trainin     Therapeutic ex: Fei Bray PTA, 21 at 4:43 PM

## 2021-09-13 NOTE — PROGRESS NOTES
Occupational Therapy  Facility/Department: [facility]  Daily Treatment Note  NAME: Randal Arriaga  : 1945  MRN: [id number]    Date of Service: 2021    Discharge Recommendations:  Continue to assess pending progress       Assessment      Activity Tolerance  Activity Tolerance: Patient Tolerated treatment well  Safety Devices  Safety Devices in place: Yes  Type of devices: All fall risk precautions in place         Patient Diagnosis(es): There were no encounter diagnoses. has a past medical history of Arthritis, Blood transfusion, CAD (coronary artery disease), Cancer ([facility] [address] Charcot's joint, Chest pain, Colitis, DDD (degenerative disc disease), lumbar, Depression, Disorder of peripheral nervous system, Dyspnea, Family history of coronary artery disease, History of blood transfusion, Hx of right BKA ([facility] [address] Hyperlipidemia, Hypertension, Hypothyroid, Lobular breast cancer ([facility] [address] NSTEMI (non-ST elevated myocardial infarction) ([facility] [address] Obesity, Paroxysmal atrial fibrillation ([facility] [address] and S/P BKA (below knee amputation) ([facility] [address] has a past surgical history that includes Hysterectomy; Appendectomy; back surgery; Breast surgery; Leg amputation below knee (Right, 2011); Spine surgery; Blepharoplasty (2013); skin biopsy; Cardiac catheterization; Mastectomy (Bilateral, 2015); pr removal of breast lesion (Left, 2017); Abdomen surgery (Left, 2017); eye surgery (Bilateral, ); Colonoscopy (2010); and cystoscopy w biopsy of bladder (N/A, 2017).     Restrictions  Restrictions/Precautions  Restrictions/Precautions: Fall Risk  Subjective   General  Chart Reviewed: Yes  Patient assessed for rehabilitation services?: Yes  Family / Caregiver Present: No  Referring Practitioner: Dr. Matthew Florence  Diagnosis: Impaired mobility and ADL's d/t vertibrobasilar event  Pain Assessment  Pain Assessment: 0-10  Pain Level: 4  Pain Type: Acute pain  Pain Location: Back  Pain Orientation: Lower  Pain Descriptors: Aching  Pain Frequency: Continuous  Pre Treatment Pain Screening  Pain at present: 3  Scale Used: Numeric Score  Intervention List: Patient able to continue with treatment;Patient declined any intervention  Vital Signs  Patient Currently in Pain: Yes   Orientation     Objective     Pt was scheduled for an ADL, however patient was already dressed for the day and did not want to get undressed to shower. Pt will be scheduled for an early shower tomorrow morning. Pt. engaged in B UE strengthening tasks with 1 lb wrist weights on B wrists. Pt reached to  rubber washers and place them on dowels that were two different heights. Pt alternated hands as needed and was able to complete without a rest break. Pt then removed the washers from the dowels and placed back in the tray with fatigue noted with this part of the task. Pt placed golf tees into a board, alternating hands with each row. Pt placed marbles on top of the tees with both hands. Pt then removed the marbles and  the tees, alternating hands, with no difficulty. Pt was able to tolerate the weights for the task for increased strength but had fatigue noted at the end of the session. Pt had weights removed and patient rolled out blue theraputty into a snake like form. Pt was able to  beads and push them into the putty with occasional pain noted in R wrist. Therapist rolled the putty into a ball and then had the patient pinch and manipulate through for increase hand strength to find and remove the beads. Pt was able to squeeze open various resistive clothespins and place them on a gary to promote pinch strength for opening/closing containers and pants management.       Plan   Plan  Times per week: 5-7 times per week for 15-90 minutes  Plan weeks: 2-3 weeks  Current Treatment Recommendations: Strengthening, Balance Training, ROM, Functional Mobility Training, Endurance Training, Wheelchair Mobility Training, Self-Care / ADL, Home Management Training    Goals  Patient Goals   Patient goals : To return home with improved strength, endurance and balance to vacuum and steam clean my carpets.        Therapy Time   Individual Concurrent Group Co-treatment   Time In 1000         Time Out 1100         Minutes 60              Therapeutic activities: 60 minutes      BABAK Gill Electronically signed by BABAK Gill on 9/13/2021 at 10:57 AM

## 2021-09-13 NOTE — PROGRESS NOTES
Occupational Therapy  Facility[facility]Department: [facility]  Daily Treatment Note  NAME: Daryle Greaves  : 1945  MRN: [id number]    Date of Service: 2021    Discharge Recommendations:  Continue to assess pending progress       Assessment      Activity Tolerance  Activity Tolerance: Patient Tolerated treatment well  Safety Devices  Safety Devices in place: Yes  Type of devices: All fall risk precautions in place         Patient Diagnosis(es): There were no encounter diagnoses. has a past medical history of Arthritis, Blood transfusion, CAD (coronary artery disease), Cancer ([facility] [address] Charcot's joint, Chest pain, Colitis, DDD (degenerative disc disease), lumbar, Depression, Disorder of peripheral nervous system, Dyspnea, Family history of coronary artery disease, History of blood transfusion, Hx of right BKA ([facility] [address] Hyperlipidemia, Hypertension, Hypothyroid, Lobular breast cancer ([facility] [address] NSTEMI (non-ST elevated myocardial infarction) [facility] Obesity, Paroxysmal atrial fibrillation ([facility] and S/P BKA (below knee amputation) ([facility] has a past surgical history that includes Hysterectomy; Appendectomy; back surgery; Breast surgery; Leg amputation below knee (Right, 2011); Spine surgery; Blepharoplasty (2013); skin biopsy; Cardiac catheterization; Mastectomy (Bilateral, 2015); pr removal of breast lesion (Left, 2017); Abdomen surgery (Left, 2017); eye surgery (Bilateral, ); Colonoscopy (2010); and cystoscopy w biopsy of bladder (N/A, 2017).     Restrictions  Restrictions/Precautions  Restrictions/Precautions: Fall Risk  Subjective   General  Chart Reviewed: Yes  Patient assessed for rehabilitation services?: Yes  Family / Caregiver Present: No  Referring Practitioner: Dr. Curtis Martin  Diagnosis: Impaired mobility and ADL's d/t vertibrobasilar event  Pain Assessment  Pain Assessment: 0-10  Pain Level: 4  Pain Type: Chronic pain  Pain Location: Wrist;Back  Pain Orientation: Right; Lower  Pain Descriptors: Aching  Pain Frequency: Continuous  Pre Treatment Pain Screening  Pain at present: 3  Scale Used: Numeric Score  Intervention List: Patient able to continue with treatment  Vital Signs  Patient Currently in Pain: Yes   Orientation     Objective     Pt engaged in B UE strengthening and endurance task to promote independence and tolerance for transfers and ADLS. Pt had 1 lb wrist weights on B wrists to assemble a pattern with various blocks, bolts, and wing nuts. Pt was given 2 designs to replicate and was able to tolerate the repetitive reaching with the weights. Pt was able to also disassemble each pattern and had min difficulty with fine motor skills. Plan   Plan  Times per week: 5-7 times per week for 15-90 minutes  Plan weeks: 2-3 weeks  Current Treatment Recommendations: Strengthening, Balance Training, ROM, Functional Mobility Training, Endurance Training, Wheelchair Mobility Training, Self-Care / ADL, Home Management Training    Goals  Patient Goals   Patient goals : To return home with improved strength, endurance and balance to vacuum and steam clean my carpets.        Therapy Time   Individual Concurrent Group Co-treatment   Time In 1300         Time Out 1330         Minutes 30              Therapeutic activities: 30 minutes      Chaim Gilford, OTA Electronically signed by Chaim Gilford, OTA on 9/13/2021 at 1:40 PM

## 2021-09-13 NOTE — DISCHARGE SUMMARY
Department of Internal Medicine     DISCHARGE SUMMARY      PATIENT IDENTIFICATION:  NAME:  Elana Beck   :   1945  MRN:    [id number]     Acct:    [de-identified]   Admit Date:  2021  Discharge date:  9/10/2021  3:03 PM   Attending Provider: No att. providers found                                     DIAGNOSES:  Active Problems:    Hx of right BKA (HCC)    Complete rotator cuff rupture of left shoulder    Chronic low back pain with sciatica    Charcot's arthropathy    Dizziness    Gait abnormality  Resolved Problems:    * No resolved hospital problems. *      HOSPITAL COURSE:  The patient is a 64 y.o. female who presents with dizziness , neurology was consulted   The patient was started on Aggrenox  and asa 8 1mg   Additional lab work thryglobulin, vitamin D and SPEP with AMOS pending  B12, folate, TSH, HgbA1C (5.5) normal  MRI of the brain had shown change of small vessel cerebrovascular disease. Darion Rower is no acute infarction  MR venogram had shown hypoplastic left transverse and sigmoid sinuses.  There is no evidence of venous thrombus.  No hemorrhages were seen  Oncology was consulted , Tamoxifen dced   Cardiology was consulted , deferred decision to start [facility] to neurology , anD Op follow up   Pt was dced to rehab in stable condition     CONSULTS:  cardiology      PATIENT'S DISCHARGE CONDITION:    Stable     Physical Exam  Constitutional:       Appearance: Normal appearance. HENT:      Head: Normocephalic and atraumatic. Mouth/Throat:      Mouth: Mucous membranes are moist.   Cardiovascular:      Rate and Rhythm: Normal rate. Rhythm irregular. Pulses: Normal pulses. Heart sounds: Normal heart sounds. Pulmonary:      Effort: Pulmonary effort is normal.      Breath sounds: Normal breath sounds. Musculoskeletal:      Cervical back: Normal range of motion. Neurological:      General: No focal deficit present. Mental Status: She is alert.             Medication List CHANGE how you take these medications    DULoxetine 30 MG extended release capsule  Commonly known as: CYMBALTA  TAKE 1 CAPSULE BY MOUTH  DAILY  What changed: when to take this     furosemide 20 MG tablet  Commonly known as: Lasix  Take 1 tablet by mouth daily for 5 days  What changed:   · when to take this  · reasons to take this        CONTINUE taking these medications    acyclovir 5 % Crea  Commonly known as: Zovirax  Apply topically as needed     b complex vitamins capsule     Blood Pressure Kit  1 Units by Does not apply route as needed (daily monitoring of blood pressure) One electronic sphygmomanometer and cuff for home monitoring of blood pressure and heart rate. Upper arm type of cuff preferred. Adult regular size. buPROPion 150 MG extended release tablet  Commonly known as: WELLBUTRIN XL  TAKE 2 TABLETS BY MOUTH IN  THE MORNING     carvedilol 25 MG tablet  Commonly known as: COREG  TAKE 1 TABLET BY MOUTH  TWICE DAILY     CENTRUM SILVER 50+WOMEN PO     CPAP Machine Misc  by Does not apply route New CPAP with 8 cm     diclofenac 50 MG EC tablet  Commonly known as: VOLTAREN  TAKE 1 TABLET BY MOUTH  TWICE DAILY     gabapentin 600 MG tablet  Commonly known as: NEURONTIN  TAKE 1 TABLET BY MOUTH IN  THE MORNING, 2 TABS IN THE  AFTERNOON, AND 2 TABS IN  THE EVENING AS TOLERATED     levothyroxine 50 MCG tablet  Commonly known as: SYNTHROID  TAKE 1 TABLET BY MOUTH  DAILY     potassium chloride 10 MEQ extended release tablet  Commonly known as: KLOR-CON M  TAKE 1 TABLET BY MOUTH  TWICE DAILY     * Respiratory Therapy Supplies Tram  New CPAP, full face  mask and chin strap       Fax [phone]     * Respiratory Therapy Supplies Tram  New Full face Mask , please do not sent nasal pillow .  (She want to try full face Mask )    Dx KEI     tamoxifen 20 MG tablet  Commonly known as: NOLVADEX     Vitamin D 1000 units Caps capsule  Commonly known as: CHOLECALCIFEROL         * This list has 2 medication(s) that are the same as other medications prescribed for you. Read the directions carefully, and ask your doctor or other care provider to review them with you. ASK your doctor about these medications    atorvastatin 20 MG tablet  Commonly known as: LIPITOR  Take 1 tablet by mouth daily             ACTIVITY AFTER DISCHARGE:   activity as tolerated    Diet: regular diet    Disposition: rehab       Time Spent on discharge is more than 30 minutes in the examination, evaluation, counseling and review of medications and discharge plan.     Veena Trinh MD   9/12/2021

## 2021-09-13 NOTE — PROGRESS NOTES
with pt leaving 2ww behind when approaching chair/toilet/sink. (09/11/21 1414)  Stairs:  # Steps : 12 (09/12/21 1312)  Rails: Bilateral (09/12/21 1312)  Assistance: Contact guard assistance (09/12/21 1312)  Comment: Ascends/descends non-reciprocally, one standing RB between steps (09/12/21 1312)  W/C mobility:  Level of Assistance: Supervision (09/11/21 1414)  Distance: difficulty maneuvering obstacles/furnature in room, increased time for performance. Pt limited by strength and UE/hand deficits (09/11/21 1414)      OCCUPATIONAL THERAPY     ADL  Feeding: Independent (09/11/21 1120)  Grooming: Setup (09/11/21 1120)  UE Bathing: Setup (09/11/21 1120)  LE Bathing: Minimal assistance (09/11/21 1120)  UE Dressing: Setup (09/11/21 1120)  LE Dressing: Minimal assistance (09/11/21 1120)  Toileting: Unable to assess(comment) (09/11/21 1120)  Toilet Transfers  Toilet - Technique: Sit pivot (09/11/21 1122)  Equipment Used: Grab bars (09/11/21 1122)  Toilet Transfer: Minimal assistance (09/11/21 1122)  Toilet Transfers Comments: Act was simulated as pt did not have to use the toilet (09/11/21 1122)     Shower Transfers  Shower - Transfer Type: To and From (09/11/21 1122)  Shower - Transfer To:  Shower seat with back (09/11/21 1122)  Shower - Technique: Sit pivot (09/11/21 1122)  Shower Transfers: Minimal assistance (09/11/21 1122)      SPEECH THERAPY               Diet/Swallow:                           COGNITION  OT:    SP:            THERAPY, MEDICAL AND NURSING COORDINATION:    []  Pain medication before therapies     []  Check orthostatic BP      [x]  Ambulate to the bathroom in room    [x]  Add scheduled rest beaks     []  In room therapies      Discharge date set for:              9/17/21      Home with:   alone  with help from   friends            And:      Out Pt    [x]  PT    [x]  OT                                   Equipment:  [employer]      At D/C their function is goaled at:   PT:Long term goal 1: Pt will demonstrate bed mobility with indep  Long term goal 2: Pt will perform various functional transfers with indep  Long term goal 3: Pt will ambulate 70ft with 2ww and indep with appropriate prosthesis fit  Long term goal 4: Pt will ascend/descend 4 steps with handrails and supervision to navigate in community/shops in [place]  Long term goal 5: Pt will perform TUG </=15.0 second to demonstrate decreased fall risk  OT:Eating  Assistance Needed: Independent  CARE Score: 6  Discharge Goal: Independent, Oral Hygiene  Assistance Needed: Setup or clean-up assistance  CARE Score: 5  Discharge Goal: Independent, Toileting Hygiene  Assistance Needed:  (Not assessed)  Reason if not Attempted: Not applicable  CARE Score: 9  Discharge Goal: Independent, Shower/Bathe Self  Assistance Needed: Partial/moderate assistance  CARE Score: 3  Discharge Goal: Independent  Upper Body Dressing  Assistance Needed: Setup or clean-up assistance  CARE Score: 5  Discharge Goal: Independent, Lower Body Dressing  Assistance Needed: Partial/moderate assistance  CARE Score: 3  Discharge Goal: Independent, Putting On/Taking Off Footwear  Assistance Needed: Setup or clean-up assistance  CARE Score: 5  Discharge Goal: Independent, Toilet Transfer  Assistance Needed: Supervision or touching assistance  CARE Score: 4  Discharge Goal: Independent  SP:               From a cognitive standpoint they will need:        24 hr supervision  --progress to occasional           Significant problems/ barriers to functional progress include: Pt is at a high risk for functional loss,    [x]  Acute infection/UTI    []  Low BP's     [x]  COPD flare-up   []  Uncontrolled blood sugar     []  Progressive anemia         [x]  Severe pain exacerbation -dose meds before therapy     [x]  Urinary incontinence-scheduled toileting    []  Bowel incontinence           Plan to correct barriers to functional progress:    Add scheduled rest breaks, control pain by using ice Lidoderm rest and massage as well as pain medications prior to therapy. Based on a comprehensive evaluation of the above, the individualized therapy and Discharge plan will be:    -Times stated are an average that will be varied based on the patient's daily need. PT  1 1/2  hrs/day 5-7 days per week           OT  1 1/2hrs per day 5-7 days per week         Estimated LOS 1 -1 1/2 week(s)    - Overall functional prognosis:     [x]  Good    []  Fair    []  Poor -Medical Prognosis:   [x]  Good    []  Fair    []  Poor    This patient was made aware of the discussion of Plan of Care, their projected dicharge date and their projected function at discharge.        Lluvia Colin,

## 2021-09-13 NOTE — CARE COORDINATION
LSW met with patient and provided projected discharge date of 9/17 and discussed [address] vs OP. Patient would like to do [address] as it is easier for her rather than trying to coordinate transportation. Patient also stated that she needs a wheeled walker ordered for her as her walker at home does not have wheels. LSW will order. LSW inquired on calling to update family, patient declined and stated that she would contact her daughter and let her know about the upcoming discharge date of 9/17. Patient stated that her dtr has her HIPAA code and can call if she has questions.  Electronically signed by PAVITHRA Arevalo, MARTINEZ on 9/13/2021 at 4:22 PM

## 2021-09-13 NOTE — CARE COORDINATION
[address] [place] [address] NOTE  [facility]  Admit Date: 9/10/2021       Date: 2021  Patient Name: Lina Stein        MRN: [id number]    : 1945  (64 y.o.)  Gender: female        REHAB DIAGNOSIS:   Diagnosis: Impaired mobility and ADLs d/t vertebrobasilar transient ischemia    CO MORBIDITIES:      Past Medical History:   Diagnosis Date    Arthritis     Blood transfusion     CAD (coronary artery disease)     Cancer ([facility]     GALLBLADDER ,  OMENTUM    Charcot's joint     left foot    Chest pain 2015    Colitis     DDD (degenerative disc disease), lumbar 2013    Depression     Disorder of peripheral nervous system 2010    Dyspnea 2015    Family history of coronary artery disease 2014    History of blood transfusion     following chemotherapy    Hx of right BKA ([facility]     Hyperlipidemia     Hypertension     Hypothyroid 2015    Lobular breast cancer ([facility] 10/2015    NSTEMI (non-ST elevated myocardial infarction) ([facility] 2014    Obesity     Paroxysmal atrial fibrillation ([facility] 2014    S/P BKA (below knee amputation) ([facility]      Past Surgical History:   Procedure Laterality Date    ABDOMEN SURGERY Left 2017    EXCISION OF CHEST WALL MASS, UPDATE LABS ON ADMIT  performed by Nia Kirby MD at [address] [place] [address] Luis. BLEPHAROPLASTY  2013    both eyes    BREAST SURGERY      CARDIAC CATHETERIZATION      COLONOSCOPY  2010    normal    CYSTOSCOPY W BIOPSY OF BLADDER N/A 2017    CYSTOSCOPY BLADDER BIOPSY AND FULGURATION performed by Lambert Loza MD at [address] [place] [address] Bilateral 2012    cataract removal with IOL implants    HYSTERECTOMY      LEG AMPUTATION BELOW KNEE Right 2011    DR Catherine Indianola due to CHARCOTS    MASTECTOMY Bilateral 2015    Bilateral simple mastectomies with right SNB by DR Rhina Farrar    NE REMOVAL Toby French. Norberto Alvarez is an 70 y.o. [place] male who presents with a fracture of the right humerus that happened  last Friday when he fell in his driveway. Past Medical History:   Diagnosis Date    Essential hypertension 2/16/2018    Humerus fracture 2/16/2018    Right      Past Surgical History:   Procedure Laterality Date    RETINAL DETACHMENT SURGERY Bilateral 1995, 2011     Social History     Social History    Marital status:      Spouse name: N/A    Number of children: N/A    Years of education: N/A     Social History Main Topics    Smoking status: Current Some Day Smoker     Types: Cigars    Smokeless tobacco: Never Used      Comment: occasional    Alcohol use No    Drug use: No    Sexual activity: Not Asked     Other Topics Concern    None     Social History Narrative    None       Family History   Problem Relation Age of Onset    High Blood Pressure Father     No Known Problems Daughter     No Known Problems Daughter     No Known Problems Son      Allergies: No Known Allergies    Current Outpatient Prescriptions   Medication Sig Dispense Refill    nebivolol (BYSTOLIC) 2.5 MG tablet Take 2.5 mg by mouth nightly      aspirin 81 MG tablet Take 81 mg by mouth daily      naproxen sodium (ALEVE) 220 MG tablet Take 220 mg by mouth as needed for Pain      ibuprofen-famotidine (DUEXIS) 800-26.6 MG TABS Take by mouth as needed      oxyCODONE-acetaminophen (PERCOCET) 5-325 MG per tablet Take 1 tablet by mouth every 4 hours as needed for Pain. No current facility-administered medications for this encounter. Principal Problem:    Humerus fracture  Resolved Problems:    * No resolved hospital problems. *    Blood pressure (!) 175/87, pulse 52, temperature 97.7 °F (36.5 °C), temperature source Temporal, resp. rate 12, height 5' 7\" (1.702 m), weight 172 lb (78 kg), SpO2 99 %. Review of Systems   Constitutional: Negative. Negative for chills, fever and malaise/fatigue. HENT: Negative. Negative for congestion and sore throat. Eyes: Blurred vision: left eye. Vision loss of left eye, sees light and dark in left eye. Respiratory: Negative. Negative for cough, shortness of breath and wheezing. Cardiovascular: Negative. Negative for chest pain, palpitations and leg swelling. Gastrointestinal: Negative. Negative for constipation, diarrhea, heartburn and nausea. Genitourinary: Negative. Negative for dysuria and frequency. Musculoskeletal: Positive for falls (fractured right humerus). Negative for back pain, myalgias and neck pain. Skin: Negative. Neurological: Negative. Negative for dizziness, seizures, loss of consciousness and headaches. Psychiatric/Behavioral: Negative. Negative for depression. The patient is not nervous/anxious. Physical Exam   Constitutional: He is oriented to person, place, and time. He appears well-developed and well-nourished. HENT:   Head: Normocephalic and atraumatic. Right Ear: External ear normal.   Left Ear: External ear normal.   Nose: Nose normal.   Mouth/Throat: Oropharynx is clear and moist.   Eyes: EOM are normal. Right eye exhibits no discharge. Left eye exhibits no discharge. Vision loss left eye. Neck: Normal range of motion. Neck supple. No thyromegaly present. Cardiovascular: Normal rate, regular rhythm, normal heart sounds and intact distal pulses. No murmur heard. Pulmonary/Chest: Effort normal and breath sounds normal. No respiratory distress. He has no wheezes. He has no rales. Abdominal: Soft. Bowel sounds are normal. He exhibits no distension. There is no tenderness. There is no guarding. Musculoskeletal: He exhibits deformity (fractured right humerus, in sling). He exhibits no edema. Lymphadenopathy:     He has no cervical adenopathy. Neurological: He is alert and oriented to person, place, and time. No cranial nerve deficit. Skin: Skin is warm and dry. No rash noted.  No OF BREAST LESION Left 01/24/2017    CORE NEEDLE BIOPSY OF  LEFT BREAST MASS performed by Jolly Everett MD at [facility]      cervical and lumbar     Chart Reviewed: Yes  Patient assessed for rehabilitation services?: Yes  Family / Caregiver Present: No  Diagnosis: Impaired mobility and ADLs d/t vertebrobasilar transient ischemia  Restrictions  Restrictions/Precautions: Fall Risk  CASE MANAGEMENT    Social/Functional History  Social/Functional History  Lives With: Alone  Type of Home: Apartment  Home Layout: One level  Home Access: Level entry  Bathroom Shower/Tub: Tub/Shower unit  Bathroom Toilet: Handicap height  Bathroom Equipment: Tub transfer bench, Grab bars in shower  Bathroom Accessibility: Wheelchair accessible  Home Equipment: Quad cane, Rolling walker, Wheelchair-electric (has right BK prosthesis and Left AFO)  Receives Help From: Friend(s), Neighbor  ADL Assistance: [address]: Independent (warms meals up  -  receives  [address] and groceries delivered)  [employer] Responsibilities: Yes  Meal Prep Responsibility: Primary  Laundry Responsibility: Primary  Cleaning Responsibility: Primary  Ambulation Assistance: Independent (last few days uses walker to get around)  Transfer Assistance: Independent  Active : Yes  Patient's  Info: neighbor  Mode of Transportation: Car  Occupation: Retired  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   Pt reports prior to 8/21 she was ambulating with wheeled walker and since then she has used power w/c       Pts personal preferences: n/a    Pts assets/resources/support system: neighbors, family    COVERAGE INFORMATION:Payor: MEDICARE / Plan: MEDICARE PART A AND B / Product Type: *No Product type* /       NURSING  Weight: 203 lb 14.8 oz (92.5 kg) / Body mass index is 38.53 erythema. Psychiatric: He has a normal mood and affect. His behavior is normal.   Vitals reviewed.       Assessment:  Humerus fracture right     Plan:  Right humerus: open reduction internal fixation humerus fracture    Fred Llamas NP  2/16/2018 kg/m².    ADULT DIET; Regular; Low Sodium (2 gm)    SpO2: 98 % (09/13/21 0510)  No active isolations    Skin Issues: No    Pain Managed: Yes    Bladder continence: Yes    Bowel continence: Yes      Other:       PHYSICAL THERAPY  Bed mobility:  Supine to Sit: Independent (09/13/21 0920)  Sit to Supine: Independent (09/13/21 0920)  Transfers:  Sit to Stand: Stand by assistance (09/13/21 0933)  Bed to Chair: Stand by assistance (09/13/21 0933)  Gait:   Device: SafeRenthore (09/13/21 2065)  Other Apparatus: AFO; Left (BK prosthetic on R) (09/13/21 0925)  Assistance: Stand by assistance (09/13/21 0925)  Distance: 180' (09/13/21 0925)  Quality of Gait: Mild Trendelenburg, slight decreased control of Rt LE in swing phase. (09/13/21 0925)  Comments: Pt is indep with donning/doffing prosthesis, decreased safety noted with pt leaving 2ww behind when approaching chair/toilet/sink. (09/11/21 1414)  Stairs:  # Steps : 12 (09/12/21 1312)  Rails: Bilateral (09/12/21 1312)  Assistance: Contact guard assistance (09/12/21 1312)  Comment: Ascends/descends non-reciprocally, one standing RB between steps (09/12/21 1312)  W/C mobility:  Level of Assistance: Supervision (09/11/21 1414)  Distance: difficulty maneuvering obstacles/furnature in room, increased time for performance.  Pt limited by strength and UE/hand deficits (09/11/21 1414)  LTG:  Long term goal 1: Pt will demonstrate bed mobility with indep  Long term goal 2: Pt will perform various functional transfers with indep  Long term goal 3: Pt will ambulate 70ft with 2ww and indep with appropriate prosthesis fit  Long term goal 4: Pt will ascend/descend 4 steps with handrails and supervision to navigate in community/shops in [place]  Long term goal 5: Pt will perform TUG </=15.0 second to demonstrate decreased fall risk  PT Treatment Time:  1.5 hrs      OCCUPATIONAL THERAPY     ADL  Feeding: Independent (09/11/21 1120)  Grooming: Setup (09/11/21 1120)  UE Bathing: Setup (09/11/21 1120)  LE Bathing: Minimal assistance (09/11/21 1120)  UE Dressing: Setup (09/11/21 1120)  LE Dressing: Minimal assistance (09/11/21 1120)  Toileting: Unable to assess(comment) (09/11/21 1120)  Toilet Transfers  Toilet - Technique: Sit pivot (09/11/21 1122)  Equipment Used: Grab bars (09/11/21 1122)  Toilet Transfer: Minimal assistance (09/11/21 1122)  Toilet Transfers Comments: Act was simulated as pt did not have to use the toilet (09/11/21 1122)     Shower Transfers  Shower - Transfer Type: To and From (09/11/21 1122)  Shower - Transfer To:  Shower seat with back (09/11/21 1122)  Shower - Technique: Sit pivot (09/11/21 1122)  Shower Transfers: Minimal assistance (09/11/21 1122)  LTG:  Eating  Assistance Needed: Independent  CARE Score: 6  Discharge Goal: Independent, Oral Hygiene  Assistance Needed: Setup or clean-up assistance  CARE Score: 5  Discharge Goal: Independent, Toileting Hygiene  Assistance Needed:  (Not assessed)  Reason if not Attempted: Not applicable  CARE Score: 9  Discharge Goal: Independent, Shower/Bathe Self  Assistance Needed: Partial/moderate assistance  CARE Score: 3  Discharge Goal: Independent  Upper Body Dressing  Assistance Needed: Setup or clean-up assistance  CARE Score: 5  Discharge Goal: Independent, Lower Body Dressing  Assistance Needed: Partial/moderate assistance  CARE Score: 3  Discharge Goal: Independent, Putting On/Taking Off Footwear  Assistance Needed: Setup or clean-up assistance  CARE Score: 5  Discharge Goal: Independent, Toilet Transfer  Assistance Needed: Supervision or touching assistance  CARE Score: 4  Discharge Goal: Independent  OT Treatment Time: 1.5 hrs      SPEECH THERAPY                 Diet/Swallow:                       LTG:                COGNITION  OT:    SP:        RECREATIONAL THERAPY  Attendance to recreational therapy programs:    []  Pet Therapy  [] Music Therapy  [] Art Therapy    [] Recreation Therapy Group [] Support Group           Patient social interaction (mood, participation): good    Patient strengths: was independent prior to admit    Patients goal: to go home    Problems/Barriers: lives alone        1. Safety:          - Intervention / Plan:    [x]  falls protocol     [x]  PT/OT    []  SP        - Results:         2. Potential DME needs:         - Intervention / Plan:  [x]  PT/OT     [x]  Assess equipment needs/access       - Results:         3. Weakness:          - Intervention / Plan:  [x]  PT/OT      []  Other:         - Results:         4. Discharge planning needs:          - Intervention / Plan:  [x]  Weekly team conference      []  family training        - Results:         5.            - Intervention / Plan:          - Results:         6.            - Intervention / Plan:         - Results:         7.            - Intervention / Plan:         - Results:           Discharge Plan   Estimated Length of Stay: 11 days     Tentative Discharge date: 9/17/21      Anticipated Discharge Destination:  Home      Team recommendations:    1. Follow up Therapy :    PT  OT    2. Outpatient    Other:     Equipment needed at Discharge: Other: TBD      Team Members Present at Conference:    Physician: Dr. Bart Gonzalez  : Yuni Elizabeth RN  : Neha Oneal, MSW, LSW  RN: Juventino Palomino RN  Physical Therapist: Graciela Bartlett PT  Occupational [address] [place] [address]  Speech Therapist: Joli Angelucci, SLP  Nurse Manager: Adolfo Diehl RN     . Electronically signed by Jamey Mata RN on 9/13/2021 at 2:15 PM  '

## 2021-09-13 NOTE — PROGRESS NOTES
Physical Therapy Rehab Treatment Note  [facility]Department[facility]  Room: R234/R234-01       NAME: Sharonda Leiws  : 1945 (59 y.o.)  MRN: [id number]  CODE STATUS: Full Code    Date of Service: 2021  Chart Reviewed: Yes  Patient assessed for rehabilitation services?: Yes  Family / Caregiver Present: No  Diagnosis: Impaired mobility and ADLs d/t vertebrobasilar transient ischemia    Restrictions:  Restrictions/Precautions: Fall Risk       SUBJECTIVE: Subjective: When I leave here I want to be able to walk to the therapy gym  Pain Screening  Patient Currently in Pain: Yes  Pre Treatment Pain Screening  Pain at present: 2  Scale Used: Numeric Score  Intervention List: Patient able to continue with treatment    Post Treatment Pain Screening:  Pain Assessment  Pain Assessment: 0-10  Pain Level: 2  Pain Type: Acute pain  Pain Location: Hand  Pain Orientation: Right  Pain Descriptors: Aching    OBJECTIVE:   Follows Commands: Within Functional Limits      Outcomes Measures:  Timed Up and Go: 26.22     Bed mobility  Rolling to Left: Independent  Rolling to Right: Independent  Supine to Sit: Independent  Sit to Supine: Independent  Scooting: Independent    Transfers  Sit to Stand: Stand by assistance  Stand to sit: Stand by assistance  Bed to Chair: Stand by assistance    Ambulation  Ambulation?: Yes  More Ambulation?: No  Ambulation 1  Surface: carpet  Device: Rolling Walker  Other Apparatus: AFO; Left (BK prosthetic on R)  Assistance: Stand by assistance  Quality of Gait: Mild Trendelenburg, slight decreased control of Rt LE in swing phase. Distance: 180'    Stairs/Curb  Stairs?: No (Time restraints)    ASSESSMENT/PROGRESS TOWARDS GOALS:  Body structures, Functions, Activity limitations: Decreased functional mobility ; Decreased safe awareness;Decreased balance;Decreased endurance;Decreased strength;Decreased coordination  Assessment: Patient continues to progress towards goals up to 180' (fatigued post gait). Completes TUG in 26.22 sec    Goals:  Long term goals  Long term goal 1: Pt will demonstrate bed mobility with indep  Long term goal 2: Pt will perform various functional transfers with indep  Long term goal 3: Pt will ambulate 70ft with 2ww and indep with appropriate prosthesis fit  Long term goal 4: Pt will ascend/descend 4 steps with handrails and supervision to navigate in community/shops in [facility] term goal 5: Pt will perform TUG </=15.0 second to demonstrate decreased fall risk    PLAN OF CARE/Safety:   Safety Devices  Type of devices:  All fall risk precautions in place      Therapy Time:   Individual   Time In 0900   Time Out 0930   Minutes 30     Minutes: 30      Transfer/Bed mobility training: 15      Gait trainin [place], PTA, 21 at 11:40 AM

## 2021-09-13 NOTE — DISCHARGE INSTR - COC
Continuity of Care Form    Patient Name: Randal Arriaga   :  1945  MRN:  [id number]    Admit date:  9/10/2021  Discharge date:  2021    Code Status Order: Full Code   Advance Directives:     Admitting Physician:  Herminio Howard DO  PCP: Maldonado Schafer DO    Discharging Nurse: Emerita Bryant RN  [address] Unit/Room#: N355/R865-84  Discharging Unit Phone Number: [phone]    Emergency Contact:   Extended Emergency Contact Information  Primary Emergency Contact: LeonelAfia   [address] Phone: [phone]  Work Phone: [phone]  Mobile Phone: [phone]  Relation: Other  Secondary Emergency Contact: Rick Brandinz [address] Phone: [phone]  Work Phone: [phone]  Mobile Phone: [phone]  Relation: Child    Past Surgical History:  Past Surgical History:   Procedure Laterality Date    ABDOMEN SURGERY Left 2017    EXCISION OF CHEST WALL MASS, UPDATE LABS ON ADMIT  performed by Jones Rodrigues MD at [address] [place] [address]. BLEPHAROPLASTY  2013    both eyes    BREAST SURGERY      CARDIAC CATHETERIZATION      COLONOSCOPY  2010    normal    CYSTOSCOPY W BIOPSY OF BLADDER N/A 2017    CYSTOSCOPY BLADDER BIOPSY AND FULGURATION performed by Shayna Felix MD at [facility] [place] Bilateral     cataract removal with IOL implants    HYSTERECTOMY      LEG AMPUTATION BELOW KNEE Right 2011    DR Looney Riser due to CHARCOTS    MASTECTOMY Bilateral 2015    Bilateral simple mastectomies with right SNB by DR Lenore Van    IN REMOVAL OF BREAST LESION Left 2017    CORE NEEDLE BIOPSY OF  LEFT BREAST MASS performed by Jones Rodrigues MD at [address] [place] [address]      cervical and lumbar       Immunization History:   Immunization History   Administered Date(s) Administered    COVID-19, Moderna, PF, 100mcg/0.5mL 2021, 2021    Hep B Immune Globulin 04/01/2005, 03/01/2006, 07/01/2006    Influenza Virus Vaccine 11/10/2006, 11/28/2007, 11/04/2008, 10/20/2012, 12/26/2013, 09/15/2015    Influenza Whole 01/26/2010    Influenza, High Dose (Fluzone 65 yrs and older) 09/15/2015, 09/28/2017, 11/13/2018    Influenza, Quadv, adjuvanted, 65 yrs +, IM, PF (Fluad) 10/23/2020    Influenza, Triv, inactivated, subunit, adjuvanted, IM (Fluad 65 yrs and older) 11/18/2019    Pneumococcal Conjugate 13-valent ([id number]) 09/15/2015    Pneumococcal Conjugate 7-valent (Prevnar7) 01/01/2008    Pneumococcal Polysaccharide ([id number]) 06/26/2014    Tdap (Boostrix, Adacel) 06/17/2013    Tetanus 09/01/2003       Active Problems:  Patient Active Problem List   Diagnosis Code    Neck pain on right side M54.2    Obesity (BMI 30-39. 9) E66.9    Obesity E66.9    High risk medication use - 01/30/18 OARRS PM&R, 03/26/18 OARRS PM&R, 04/03/18 Urine Drug Screen positive opiates PM&R, 01/31/18 Med Contract PM&R Z79.899    Impaired mobility and activities of daily living Z74.09, Z78.9    Hx of right BKA ([email or web address] Z89.511    Family history of coronary artery disease Z82.49    NSTEMI (non-ST elevated myocardial infarction) ([email or web address] I21.4    Arthritis, neuropathic M14.60    Charcot's joint of foot M14.679    Closed dislocation of ankle S93. 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    Disorder of peripheral nervous system G64    Arthritis of ankle or foot, degenerative M19.079    HLD (hyperlipidemia) E78.5    Cervical post-laminectomy syndrome M96.1    Failed back syndrome of lumbar spine M96.1    Low back pain with sciatica M54.40    Fibromyalgia muscle pain M79.7    Malaise and fatigue R53.81, R53.83    Melancholia F32.9    Complete tear of left rotator cuff M75.122    Generalized osteoarthritis M15.9    Peripheral neuropathy G62.9    Osteoporosis M81.0    Postlaminectomy syndrome of cervical region M96.1    Postlaminectomy syndrome of lumbar region M96.1    Tibialis tendinitis M76.829    Hereditary and idiopathic peripheral neuropathy (HCC) G60.9    Hypothyroid E03.9    Lobular breast cancer ([facility]) C50.919    Acquired hallux valgus M20.10    Fatigue due to treatment R53.83    Anemia D64.9    Cancer ([facility]) C80.1    Cervical spinal cord injury ([facility]) S14.109A    Complete traumatic amputation at level between right knee and ankle ([facility]) W79.860N    Morbid obesity ([facility]) E66.01    Reactive depression F32.9    Sleeping excessive G47.10    Chronic low back pain M54.5, G89.29    Left breast mass N63.20    Other specified postprocedural states Z98.890    Primary osteoarthritis involving multiple joints M89.49    Hematuria R31.9    Hematuria, gross R31.0    Noncompliance - No Show inject 07/17/17, No Show F/U 1/11/18 Z91.19    Chronic low back pain with sciatica M54.40, G89.29    Moderate episode of recurrent major depressive disorder ([facility]) F33.1    Charcot's arthropathy M14.60    Chronic pain syndrome G89.4    Racing heart beat R00.0    Cervical fusion syndrome Q76.1    Current mild episode of major depressive disorder ([facility] F32.0    Obstructive sleep apnea G47.33    Malignant neoplasm of peritoneum, 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[facility]) C48.2    Dizziness R42    Gait abnormality dt cerebrovascular insufficiancy and gait atasxia R26.9    Difficulty balancing R29.818    Hx of BKA, right ([facility]) Z89.511       Isolation/Infection:   Isolation          No Isolation        Patient Infection Status     Infection Onset Added Last Indicated Last Indicated By Review Planned Expiration Resolved Resolved By    None active    Resolved    COVID-19 Rule Out 09/01/20 09/01/20 09/01/20 COVID-19 Ambulatory (Ordered)   09/03/20 Rule-Out Test Resulted          Nurse Assessment:  Last Vital Signs: /86   Pulse 82   Temp 98 °F (36.7 °C)   Resp 18   Ht 5' 1\" (1.549 m)   Wt 203 lb 14.8 oz (92.5 kg)   SpO2 98%   BMI 38.53 kg/m²     Last documented pain score (0-10 scale): Pain Level: 4  Last Weight:   Wt Readings from Last 1 Encounters:   09/10/21 203 lb 14.8 oz (92.5 kg)     Mental Status:  oriented, alert and coherent    IV Access:  - None    Nursing Mobility/ADLs:  Walking   Assisted  Transfer  Assisted  Bathing  Assisted  Dressing  Assisted  Toileting  Assisted  Feeding  Independent  Med Admin  Assisted  Med Delivery   whole    Wound Care Documentation and Therapy:        Elimination:  Continence:   · Bowel: Yes  · Bladder: Yes  Urinary Catheter: None   Colostomy/Ileostomy/Ileal Conduit: No       Date of Last BM: 9/16/2021  No intake or output data in the 24 hours ending 09/13/21 1722  No intake/output data recorded. Safety Concerns: At Risk for Falls    Impairments/Disabilities:      None    Nutrition Therapy:  Current Nutrition Therapy:   - Oral Diet:  General and Low Sodium (2gm)    Routes of Feeding: Oral  Liquids: Thin Liquids  Daily Fluid Restriction: no  Last Modified Barium Swallow with Video (Video Swallowing Test): not done    Treatments at the Time of Hospital Discharge:   Respiratory Treatments: ***  Oxygen Therapy:  is not on home oxygen therapy.   Ventilator:    - No ventilator support    Rehab Therapies: Physical Therapy and Occupational Therapy  Weight Bearing Status/Restrictions: No weight bearing restirctions  Other Medical Equipment (for information only, NOT a DME order):  walker  Other Treatments: ***    Patient's personal belongings (please select all that are sent with patient):  Brittany    RN SIGNATURE:  Electronically signed by Kristian Jeans, RN on 9/17/21 at 12:06 PM EDT    CASE MANAGEMENT/SOCIAL WORK SECTION    Inpatient Status Date: Admitted to inpatient on 9/10/21    Readmission Risk Assessment Score:  Readmission Risk              Risk of Unplanned Readmission:  11           Discharging to Facility/ Agency   · Name: [facility]  · Address:  · Phone: [phone]  · Fax:    Dialysis Facility (if applicable)   · Name:  · Address:  · Dialysis Schedule:  · Phone:  · Fax:    / signature: Electronically signed by PAVITHRA Cardona LSW on 9/13/21 at 5:22 PM EDT    PHYSICIAN SECTION    Prognosis: Good    Condition at Discharge: Stable    Rehab Potential (if transferring to Rehab): Good    Recommended Labs or Other Treatments After Discharge:     Physician Certification: I certify the above information and transfer of Lala Garcia  is necessary for the continuing treatment of the diagnosis listed and that she requires Home Care for  30 days.      Update Admission H&P: No change in H&P    PHYSICIAN SIGNATURE:Dr Raul oLuie DO    Electronically signed by Lara Betancur RN on 9/16/21 at 10:37 AM EDT

## 2021-09-13 NOTE — PROGRESS NOTES
Subjective: The patient complains of  moderate to severe acute     Vacillating dizziness and general difficulty with ambulation due to right BKA and severe peripheral neuropathy partially relieved by rest, PT, OT, medications and exacerbated by recent illness and exertion. I am concerned about patients medical complexities and current active problems including bleeding risk on the Lovenox which she no longer needs because of her increased activity. I discussed current functional, rehabilitation, medical status with other rehabilitation providers including nursing and case management. According to recent nursing note, \" Pt slept throughout most of the night. Pure wick was introduced to her @ around 0330 after several accidents disrupted her comfortable sleeping pattern. [facility] Patient resting comfortably in her chair. Chair alarm is on, call light is within reach. Patient denies pain at this time. Will continue to monitor \". ROS x10: The patient also complains of severely impaired mobility and activities of daily living. Otherwise no new problems with vision, hearing, nose, mouth, throat, dermal, cardiovascular, GI, , pulmonary, musculoskeletal, psychiatric or neurological. See Rehab H&P on Rehab chart dated . Vital signs:  /76   Pulse 77   Temp 98 °F (36.7 °C)   Resp 18   Ht 5' 1\" (1.549 m)   Wt 203 lb 14.8 oz (92.5 kg)   SpO2 98%   BMI 38.53 kg/m²   I/O:   PO/Intake:  fair PO intake, no problems observed or reported. Bowel/Bladder:  continent, constipation and urinary urgency. General:  Patient is well developed, adequately nourished, non-obese and     well kempt. HEENT:    PERRLA, hearing intact to loud voice, external inspection of ear     and nose benign. Inspection of lips, tongue and gums benign  Musculoskeletal: No significant change in strength or tone. All joints stable.       Inspection and palpation of digits and nails show no clubbing,       cyanosis or inflammatory conditions. Neuro/Psychiatric: Affect: flat. Alert and oriented to person, place and     situation. No significant change in deep tendon reflexes or     sensation  Lungs:  Diminished, CTA-B. Respiration effort is normal at rest.     Heart:   S1 = S2, RRR. No loud murmurs. Abdomen:  Soft, non-tender, no enlargement of liver or spleen. Extremities:  No significant lower extremity edema or tenderness. Skin:   Intact to general survey,  Right BKA    Rehabilitation:  Physical therapy: FIMS:  Bed Mobility: Scooting: Independent    Transfers: Sit to Stand: Stand by assistance  Stand to sit: Stand by assistance  Bed to Chair: Stand by assistance, Ambulation 1  Surface: carpet  Device: Rolling Walker  Other Apparatus: AFO, Left (BK prosthetic on R)  Assistance: Stand by assistance  Quality of Gait: Mild Trendelenburg, slight decreased control of Rt LE in swing phase. Gait Deviations: Slow Blank, Increased DEYSI, Decreased step length  Distance: 180'  Comments: Pt is indep with donning/doffing prosthesis, decreased safety noted with pt leaving 2ww behind when approaching chair/toilet/sink. , Stairs  # Steps : 12  Stairs Height: 6\"  Rails: Bilateral  Assistance: Contact guard assistance  Comment: Ascends/descends non-reciprocally, one standing RB between steps    FIMS:  ,  , Assessment: Patient able to progress to stairs today. One standing RB between sets however overall good tolerance. Initiated static standing balance activities to improve stability, increased postural sway with NBOS.     Occupational therapy: FIMS:  Eatin - Feeds self with adaptive equipment/dentures and/or feeds self with modified diet and/or performs own tube feeding  Groomin - Requires setup/cues to do all tasks  Bathin - Able to bathe 8-9 areas  Dressing-Upper: 5 - Requires setup/supervision/cues and/or requires assist with presthesis/brace only  Dressing-Lower: 4 - Requires assist with buttons/zippers/shoelaces and/or assist with shoes only  Toiletin - Did not occur  Toilet Transfer: 4 - Requires steadying assistance only < 25% assist  Shower Transfer: 3 - Moderate assistance, pt. expends 50%-74% effort,  , Assessment: Pt is a 63 y/o female who lives alone, presenting to [facility] with above listed performance deficits which are limiting her ability to return home at an independent level. Pt would benefit from continued occupational therapy services to improve functional abilities for safe and independent return home. Speech therapy: FIMS: Comprehension: 7 - Patient understands complex ideas (math/planning)  Expression: 7 - Patient expresses complex ideas/needs  Social Interaction: 7 - Patient has appropriate behavior/relations 100% of the time  Problem Solvin - Patient independent with complex tasks  Memory: 7 - Patient independent with meds/people/schedule      Lab/X-ray studies reviewed, analyzed and discussed with patient and staff:   No results found for this or any previous visit (from the past 24 hour(s)). Previous extensive, complex labs, notes and diagnostics reviewed and analyzed. ALLERGIES:    Allergies as of 09/10/2021 - Fully Reviewed 09/10/2021   Allergen Reaction Noted    Ciprofloxacin Itching 2011    Oxycontin [oxycodone hcl] Itching 2012      (please also verify by checking MAR)     Today I evaluated this patient for periodic reassessment of medical and functional status. The patient was discussed in detail at the treatment team meeting focusing on current medical issues, progress in therapies, social issues, psychological issues, barriers to progress and strategies to address these barriers, and discharge planning. See the addendum to rehab progress note-as a second progress note in the chart.   The patient continues to be high risk for future disability and their medical and rehabilitation prognosis continue to be good and therefore, we will continue the patient's rehabilitation course as planned. The patient's tentative discharge date was set. Patient and family education was discussed. The patient was made aware of the team discussion regarding their progress. Complex Physical Medicine & Rehab Issues Assess & Plan:   1. Severe abnormality of gait and mobility and impaired self-care and ADL's secondary to progressive weakness dt ataxia with baseline right below the knee amputation and Charcot joint left lower extremity. Functional and medical status reassessed regarding patients ability to participate in therapies and patient found to be able to participate in acute intensive comprehensive inpatient rehabilitation program including PT/OT to improve balance, ambulation, ADLs, and to improve the P/AROM. Therapeutic modifications regarding activities in therapies, place, amount of time per day and intensity of therapy made daily. In bed therapies or bedside therapies prn.   2. Bowel progressive constipation, and Bladder dysfunction monitoring neurogenic bladder:  frequent toileting, ambulate to bathroom with assistance, check post void residuals. Check for C.difficile x1 if >2 loose stools in 24 hours, continue bowel & bladder program.  Monitor bowel and bladder function. Lactinex 2 PO every AC. MOM prn, Brown Bomb prn, Glycerin suppository prn, enema prn.  3. Severe chronic cervical thoracic and low back pain as well as generalized neuropathic and OA pain: reassess pain every shift and prior to and after each therapy session, give prn Tylenol and  resuming Norco and titrating Neurontin, modalities prn in therapy, Lidoderm, K-pad prn.   4. Skin healing and breakdown risk:  continue pressure relief program.  Daily skin exams and reports from nursing. 5. Severe fatigue due to nutritional and hydration deficiency: Add vitamin B12 vitamin D and CoQ10 continue to monitor I&Os, calorie counts prn, dietary consult prn. Add healthy HS snack.  Add B12 and B12 shots spread therapy over 7-day window as needed. 6. Acute episodic insomnia with situational adjustment disorder:  prn Ambien, monitor for day time sedation. Add HS \"Tuck In\"  7. Falls risk elevated:  patient to use call light to get nursing assistance to get up, bed and chair alarm. 8. Elevated DVT risk: progressive activities in PT, continue prophylaxis MERRITT hose, elevation and patient to wean off of her Lovenox. 9. Complex discharge planning:  Weekly team meeting every Monday to assess progress towards goals, discuss and address social, psychological and medical comorbidities and to address difficulties they may be having progressing in therapy. Patient and family education is in progress. The patient is to follow-up with their family physician after discharge. Complex Active General Medical Issues that complicate care Assess & Plan:     1. Principal Problem:    Gait abnormality dt cerebrovascular insufficiancy and gait atasxia  Active Problems:    High risk medication use - 01/30/18 OARRS PM&R, 03/26/18 OARRS PM&R, 04/03/18 Urine Drug Screen positive opiates PM&R, 01/31/18 Med Contract PM&R  1. Clinical depression-emotional support provided daily, vitamin B12, encourage participation in rehabilitation support group and recreational therapy, adjust/add medications (Wellbutrin, Aggrenox, Cymbalta) Lovenox with caution  2. Benign essential HTN, CAD, PVD-continue blood signs every shift focusing on heart rate and blood pressure checks, consult hospitalist for backup medical and adjust/add medications ( aspirin, Lipitor, Coreg, Apresoline )  3. Acquired flat foot/  Charcot's arthropathy  4. Disorder of peripheral nervous system-add Neurontin and vitamin B12  5. Postlaminectomy syndrome of lumbar region, Cervical spinal cord injury, Chronic low back pain with sciatica  6. Hx of BKA, right   7. Vitamin D deficiency-treat vitamin D  8.  Hypothyroidism-monitor vital signs titrate Synthroid           Shantanu Ashley, VERA, PM&R     Attending    [address] [facility]

## 2021-09-14 LAB — THYROGLOBULIN BY LC-MS/MS, SERUM/PLASMA: 13.9 NG/ML (ref 1.3–31.8)

## 2021-09-14 PROCEDURE — 6370000000 HC RX 637 (ALT 250 FOR IP): Performed by: PHYSICAL MEDICINE & REHABILITATION

## 2021-09-14 PROCEDURE — 97110 THERAPEUTIC EXERCISES: CPT

## 2021-09-14 PROCEDURE — 99232 SBSQ HOSP IP/OBS MODERATE 35: CPT | Performed by: PHYSICAL MEDICINE & REHABILITATION

## 2021-09-14 PROCEDURE — 6370000000 HC RX 637 (ALT 250 FOR IP): Performed by: INTERNAL MEDICINE

## 2021-09-14 PROCEDURE — 97535 SELF CARE MNGMENT TRAINING: CPT

## 2021-09-14 PROCEDURE — 97116 GAIT TRAINING THERAPY: CPT

## 2021-09-14 PROCEDURE — 97530 THERAPEUTIC ACTIVITIES: CPT

## 2021-09-14 PROCEDURE — 1180000000 HC REHAB R&B

## 2021-09-14 RX ADMIN — ATORVASTATIN CALCIUM 80 MG: 80 TABLET, FILM COATED ORAL at 21:04

## 2021-09-14 RX ADMIN — CARVEDILOL 25 MG: 25 TABLET, FILM COATED ORAL at 17:01

## 2021-09-14 RX ADMIN — LEVOTHYROXINE SODIUM 50 MCG: 0.03 TABLET ORAL at 06:49

## 2021-09-14 RX ADMIN — HYDROCODONE BITARTRATE AND ACETAMINOPHEN 1 TABLET: 5; 325 TABLET ORAL at 21:06

## 2021-09-14 RX ADMIN — ASPRIN AND EXTENDED-RELEASE DIPYRIDAMOLE 1 CAPSULE: 25; 200 CAPSULE ORAL at 21:04

## 2021-09-14 RX ADMIN — Medication 1000 UNITS: at 08:39

## 2021-09-14 RX ADMIN — BUPROPION HYDROCHLORIDE 300 MG: 300 TABLET, FILM COATED, EXTENDED RELEASE ORAL at 08:39

## 2021-09-14 RX ADMIN — ASPRIN AND EXTENDED-RELEASE DIPYRIDAMOLE 1 CAPSULE: 25; 200 CAPSULE ORAL at 08:39

## 2021-09-14 RX ADMIN — HYDROCODONE BITARTRATE AND ACETAMINOPHEN 1 TABLET: 5; 325 TABLET ORAL at 15:03

## 2021-09-14 RX ADMIN — ASPIRIN 81 MG: 81 TABLET, COATED ORAL at 08:40

## 2021-09-14 RX ADMIN — CARVEDILOL 25 MG: 25 TABLET, FILM COATED ORAL at 08:39

## 2021-09-14 RX ADMIN — Medication 1 CAPSULE: at 08:39

## 2021-09-14 RX ADMIN — GABAPENTIN 600 MG: 300 CAPSULE ORAL at 08:39

## 2021-09-14 RX ADMIN — GABAPENTIN 600 MG: 300 CAPSULE ORAL at 12:30

## 2021-09-14 RX ADMIN — ACETAMINOPHEN 650 MG: 325 TABLET ORAL at 12:32

## 2021-09-14 RX ADMIN — DULOXETINE HYDROCHLORIDE 30 MG: 30 CAPSULE, DELAYED RELEASE ORAL at 17:01

## 2021-09-14 RX ADMIN — GABAPENTIN 600 MG: 300 CAPSULE ORAL at 17:01

## 2021-09-14 ASSESSMENT — PAIN SCALES - GENERAL
PAINLEVEL_OUTOF10: 4
PAINLEVEL_OUTOF10: 2
PAINLEVEL_OUTOF10: 0
PAINLEVEL_OUTOF10: 6
PAINLEVEL_OUTOF10: 0
PAINLEVEL_OUTOF10: 3
PAINLEVEL_OUTOF10: 0

## 2021-09-14 ASSESSMENT — PAIN DESCRIPTION - FREQUENCY
FREQUENCY: CONTINUOUS
FREQUENCY: CONTINUOUS

## 2021-09-14 ASSESSMENT — PAIN DESCRIPTION - ORIENTATION
ORIENTATION: LOWER
ORIENTATION: LOWER

## 2021-09-14 ASSESSMENT — PAIN DESCRIPTION - DESCRIPTORS
DESCRIPTORS: ACHING
DESCRIPTORS: ACHING

## 2021-09-14 ASSESSMENT — PAIN DESCRIPTION - LOCATION
LOCATION: BACK
LOCATION: BACK

## 2021-09-14 ASSESSMENT — PAIN DESCRIPTION - PAIN TYPE
TYPE: CHRONIC PAIN
TYPE: CHRONIC PAIN
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
## 2021-09-14 NOTE — CONSULTS
Hospitalist Progress Note  9/14/2021 11:48 AM    Assessment and Plan:     1. Generalized weakness, Gait instability and Decreased, Functional Status: Initially admitted with dizziness. Imaging completed. Neurology following and managing. Vertebrobasilar TIE since 9/9/21. Work-up ongoing. MRI brain showed small vessel disease. MRV shown hypoplastic L transverse and sigmoid sinuses. No evidence of venous thrombus. No hemorrhages. On ASA and aggrenox. Fall precautions. PT OT to evaluate. Maximize nutrition status. Assessing if needs DME at home. SW on board. 2. CAD/HTN: s/p Cath 2015. No significant obstructive CAD. History structural heart disease. Previously underwent ablation 1992. Was on coumadin in 2016 but discontinued as there was no documentation of Afib. Cardiology is managing and deferred anticoagulation to neurology. Will need outpatient f/u with cardiology. Continue statin and BB. 3. Hypothyroidism: On levothyroxine. 4. Peripheral neuropathy: Continue gabapentin and duloxetine. 5. R charcot joint: S/p RBKA  6. Depression: Continue wellbutrin and duloxetine  7. HX lobular carcinoma R breast: s/p bilateral mastectomy. On tamoxifen since 2017 which has since been discontinued. 8. Bowel Regimen and GI PPx: stool softners PRN ordered with hold parameters for loose stools or diarrhea. On antiacid  9. Diet: ADULT DIET; Regular; Low Sodium (2 gm)  10. Advance Directive: Full Code   11. Nutrition status:Supplemental Vitamins ordered. Dietitian assessment  12. Vaccinations: Immunization records reviewed. If has not received appropriate vaccinations, will order to be given prior to discharge. 13. DVT prophylaxis: Chemical prophylaxis SQ lovenox  14. Discharge planning: TAJ on board. Will discharge once medically stable and cleared by all consultants. 15. High Risk Readmission Screening Tool Score Noted.      Additionally, the following hospital problems were addressed:  Principal Problem:    Gait abnormality dt cerebrovascular insufficiancy and gait atasxia  Active Problems:    High risk medication use - 01/30/18 OARRS PM&R, 03/26/18 OARRS PM&R, 04/03/18 Urine Drug Screen positive opiates PM&R, 01/31/18 Med Contract PM&R    Clinical depression    Benign essential HTN    Acquired flat foot    Disorder of peripheral nervous system    Postlaminectomy syndrome of lumbar region    Cervical spinal cord injury ([facility] [address]    Chronic low back pain with sciatica    Charcot's arthropathy    Hx of BKA, right (HCC)  Resolved Problems:    * No resolved hospital problems. *      ** Total time spent reviewing medical records, evaluating patient, speaking with RN's and consultants where I was focused exclusively on this patient:  minutes. This time is excluding time spent performing procedures or significant events occurring earlier or later in the day requiring my attention and focus. Subjective:   Admit Date: 9/10/2021  PCP: Bhanu Hill DO    Patient is seen and examined on follow-up. Reports dizziness is nearly resolved. Continues with mild residual headache. No chest pain, palpitations, shortness of breath. No abdominal pain or nausea. Eating and drinking well. Still with weakness and gait instability, states she is hopeful to progress to the point where she is able to use a cane. Working well with therapy. Objective:     Vitals:    09/13/21 1623 09/13/21 1839 09/14/21 0430 09/14/21 0744   BP: 122/86 121/64  (!) 148/74   Pulse: 82 65  72   Resp:  17  20   Temp:  98.2 °F (36.8 °C)  98.8 °F (37.1 °C)   TempSrc:    Oral   SpO2:  95%  96%   Weight:   206 lb 9.1 oz (93.7 kg)    Height:         General appearance: No acute distress, A + O x 3. No conversational dyspnea noted. Dentition intact. Answers questions appropriately  Lungs: CTAB no exp wheezes, No rales No retractions;  No use of accessory muscles  Heart:  S1, S2 normal, RRR, no MRG appreciated  Abdomen: (+) BS, soft, non-tender; non distended no guarding or rigidity. Extremities:  no cyanosis, no edema, R BKA.  Dry skin noted       Medications:      atorvastatin  80 mg Oral Nightly    aspirin  81 mg Oral Daily    aspirin-dipyridamole  1 capsule Oral BID    b complex vitamins  1 capsule Oral Daily    buPROPion  300 mg Oral Daily    carvedilol  25 mg Oral BID WC    DULoxetine  30 mg Oral QPM    gabapentin  600 mg Oral 4x Daily    levothyroxine  50 mcg Oral Daily    Vitamin D  1,000 Units Oral Daily       LABS Reviewed    IMAGING Reviewed    YURY Phan - CNP, MD  Rounding Hospitalist

## 2021-09-14 NOTE — PROGRESS NOTES
Occupational Therapy  Facility/Department: [facility]  Daily Treatment Note  NAME: Cindy Romero  : 1945  MRN: [id number]    Date of Service: 2021    Discharge Recommendations:  Continue to assess pending progress       Assessment      Activity Tolerance  Activity Tolerance: Patient Tolerated treatment well  Safety Devices  Safety Devices in place: Yes  Type of devices: All fall risk precautions in place         Patient Diagnosis(es): There were no encounter diagnoses. has a past medical history of Arthritis, Blood transfusion, CAD (coronary artery disease), Cancer ([facility] [address] Charcot's joint, Chest pain, Colitis, DDD (degenerative disc disease), lumbar, Depression, Disorder of peripheral nervous system, Dyspnea, Family history of coronary artery disease, History of blood transfusion, Hx of right BKA ([facility] [address] Hyperlipidemia, Hypertension, Hypothyroid, Lobular breast cancer ([facility] [address] [facility] Obesity, Paroxysmal atrial fibrillation ([facility] [address] and S/P BKA (below knee amputation) ([facility] has a past surgical history that includes Hysterectomy; Appendectomy; back surgery; Breast surgery; Leg amputation below knee (Right, 2011); Spine surgery; Blepharoplasty (2013); skin biopsy; Cardiac catheterization; Mastectomy (Bilateral, 2015); pr removal of breast lesion (Left, 2017); Abdomen surgery (Left, 2017); eye surgery (Bilateral, ); Colonoscopy (2010); and cystoscopy w biopsy of bladder (N/A, 2017).     Restrictions  Restrictions/Precautions  Restrictions/Precautions: Fall Risk  Subjective   General  Chart Reviewed: Yes  Patient assessed for rehabilitation services?: Yes  Family / Caregiver Present: No  Referring Practitioner: Dr. Soren Naylor  Diagnosis: Impaired mobility and ADL's d/t vertibrobasilar event  Pain Assessment  Pain Assessment: 0-10  Pain Level: 0  Pain Type: Chronic pain  Pain Location: Back  Pain Orientation: Lower  Pain Descriptors: Aching  Pain Frequency: Continuous  Pre Treatment Pain Screening  Pain at present: 3  Scale Used: Numeric Score  Intervention List: Patient able to continue with treatment  Vital Signs  Patient Currently in Pain: No   Orientation     Objective     Pt engaged in B UE strengthening task with 2 lb wrist weights on B upper extremities. Pt reached repetitively to  golf tees and place them in the board, one at a time and then alternated hands per row. Pt had min difficulty with placing the marbles on top of the tees. Pt then removed the marbles and the tees with no difficulty. Plan   Plan  Times per week: 5-7 times per week for 15-90 minutes  Plan weeks: 2-3 weeks  Current Treatment Recommendations: Strengthening, Balance Training, ROM, Functional Mobility Training, Endurance Training, Wheelchair Mobility Training, Self-Care / ADL, Home Management Training    Goals  Patient Goals   Patient goals : To return home with improved strength, endurance and balance to vacuum and steam clean my carpets.        Therapy Time   Individual Concurrent Group Co-treatment   Time In 1300         Time Out 1330         Minutes 30              Therapeutic activities: 30 minutes      BABAK Kong Electronically signed by BABAK Kong on 9/14/2021 at 1:28 PM

## 2021-09-14 NOTE — PROGRESS NOTES
Physical Therapy Rehab Treatment Note  [facility]       NAME: Betty Mendoza  : 1945 (64 y.o.)  MRN: [id number]  CODE STATUS: Full Code    Date of Service: 2021  Chart Reviewed: Yes  Patient assessed for rehabilitation services?: Yes  Family / Caregiver Present: No  Diagnosis: Impaired mobility and ADLs d/t vertebrobasilar transient ischemia    Restrictions:  Restrictions/Precautions: Fall Risk       SUBJECTIVE: Subjective: My hip has been bothering me  Response To Previous Treatment: Patient with no complaints from previous session. Pain Screening  Patient Currently in Pain: Denies  Pre Treatment Pain Screening  Pain at present: 5  Scale Used: Numeric Score  Intervention List: Patient able to continue with treatment    Post Treatment Pain Screening:  Pain Assessment  Pain Assessment: 0-10  Pain Level: 2    OBJECTIVE:   Follows Commands: Within Functional Limits    Bed mobility  Rolling to Left: Independent  Rolling to Right: Independent  Supine to Sit: Independent  Sit to Supine: Independent  Scooting: Independent    Transfers  Sit to Stand: Supervision  Stand to sit: Supervision  Bed to Chair: Supervision  Car Transfer: Supervision    Ambulation  Ambulation?: Yes  More Ambulation?: No  Ambulation 1  Surface: level tile;uneven;carpet  Device: [employer]  Other Apparatus: AFO; Left (R BK prosthesis)  Assistance: Stand by assistance  Quality of Gait: Reciprocal pattern steady pace lateral excusrsion.  Improved endurance  Distance: 150'  Comments: Trials of gait with walking to stairs and ascending stairs to simulate gait in community    Stairs/Curb  Stairs?: Yes  Stairs  # Steps : 4  Stairs Height: 6\"  Rails: Right ascending  Device: Yu Wan  Assistance: Stand by assistance  Comment: non reciprocal pattern ascending/ descending    Exercises  Gluteal Sets: x 15  Hip Flexion: x 15  Hip Abduction: x 15 YTB/ Ball squeezes  Knee Long Arc Quad: x 15  Comments: Ice pack applied to L hip during exercises to reduce pain     ASSESSMENT/PROGRESS TOWARDS GOALS:  Body structures, Functions, Activity limitations: Decreased functional mobility ; Decreased safe awareness;Decreased balance;Decreased endurance;Decreased strength;Decreased coordination  Assessment: Patient improves gait tolerance up to 150' with SBQC continuing to require SBA due to fatigue and L hip pain. Progrressing towards stair goal at SBA level to similate community gait and stairs to enter shops    Goals:  Long term goals  Long term goal 1: Pt will demonstrate bed mobility with indep  Long term goal 2: Pt will perform various functional transfers with indep  Long term goal 3: Pt will ambulate 70ft with 2ww and indep with appropriate prosthesis fit  Long term goal 4: Pt will ascend/descend 4 steps with handrails and supervision to navigate in community/shops in [facility] term goal 5: Pt will perform TUG </=15.0 second to demonstrate decreased fall risk    PLAN OF CARE/Safety:   Safety Devices  Type of devices:  All fall risk precautions in place      Therapy Time:   Individual   Time In 1330   Time Out 1430   Minutes 60     Minutes:       Transfer/Bed mobility training: 15      Gait trainin     Therapeutic ex: [id number]én[address] PTA, 21 at 3:10 PM

## 2021-09-14 NOTE — PROGRESS NOTES
Occupational Therapy  Facility/Department: [facility]  Daily Treatment Note  NAME: Deysi Bar  : 1945  MRN: [id number]    Date of Service: 2021    Discharge Recommendations:  Continue to assess pending progress       Assessment      Activity Tolerance  Activity Tolerance: Patient Tolerated treatment well         Patient Diagnosis(es): There were no encounter diagnoses. has a past medical history of Arthritis, Blood transfusion, CAD (coronary artery disease), Cancer ([facility] Charcot's joint, Chest pain, Colitis, DDD (degenerative disc disease), lumbar, Depression, Disorder of peripheral nervous system, Dyspnea, Family history of coronary artery disease, History of blood transfusion, Hx of right BKA ([facility] Hyperlipidemia, Hypertension, Hypothyroid, [facility] NSTEMI (non-ST elevated myocardial infarction) ([facility] Obesity, Paroxysmal atrial fibrillation ([facility] and S/P BKA (below knee amputation) ([facility] has a past surgical history that includes Hysterectomy; Appendectomy; back surgery; Breast surgery; Leg amputation below knee (Right, 2011); Spine surgery; Blepharoplasty (2013); skin biopsy; Cardiac catheterization; Mastectomy (Bilateral, 2015); pr removal of breast lesion (Left, 2017); Abdomen surgery (Left, 2017); eye surgery (Bilateral, ); Colonoscopy (2010); and cystoscopy w biopsy of bladder (N/A, 2017).     Restrictions  Restrictions/Precautions  Restrictions/Precautions: Fall Risk  Subjective   General  Chart Reviewed: Yes  Patient assessed for rehabilitation services?: Yes  Family / Caregiver Present: No  Referring Practitioner: Dr. Jd Mensah  Diagnosis: Impaired mobility and ADL's d/t vertibrobasilar event  Pain Assessment  Pain Assessment: 0-10  Pain Level: 2  Pain Type: Chronic pain  Pain Location: Back  Pain Orientation: Lower  Pain Descriptors: Aching  Pain Frequency: Continuous  Pre Treatment Pain Screening  Pain at present: Self-Care / ADL, Home Management Training       Goals  Patient Goals   Patient goals : To return home with improved strength, endurance and balance to vacuum and steam clean my carpets.        Therapy Time   Individual Concurrent Group Co-treatment   Time In 0800         Time Out 0900         Minutes 60              ADL/IADL trainin minutes  Therapeutic activities: 25 minutes      BABAK You Electronically signed by BABAK You on 2021 at 9:29 AM

## 2021-09-14 NOTE — PROGRESS NOTES
[facility]   Acute  Rehabilitation  MUSIC THERAPY      Date:  9/14/2021        Patient Name: Wilbert Nuñez       MRN: [id number]        YOB: 1945 (72 y.o.)       Gender: female  Diagnosis: Impaired mobility and ADL's d/t vertibrobasilar event  Referring Practitioner: Dr. Brando Patricia    RESTRICTIONS/PRECAUTIONS:  Restrictions/Precautions: Fall Risk  Vision: Impaired  Hearing: Exceptions to [facility]  Hearing Exceptions: Hard of hearing/hearing concerns, No hearing aid      TIME OF SESSION: 4:50pm - 5:50pm     SUBJECTIVE: \"I thought I heard someone singing a little bit ago. \"     OBJECTIVE:        [x] To Improve Mood     [x] To Increase Social Well-Being  [] To Increase Focus   [x] To Increase Emotional Well-Being  [] To Increase Eye Contact    [] To Increase Spiritual Well-Being   [] To Decrease Anxiety   [x] To Increase Relaxation   [x] To Decrease Pain    [] To Increase Communication  [] To Increase Movement to Music     MUSIC INTERVENTION PROVIDED:     [x] Live Music on Voice  [] Recorded Music   [x] Live Music on Guitar  [x] Discussion Related to Music   [] Live Music on Q-chord  [x] Discussion Related to Pt Experience   [] Live Music on Percussion      PARTICIPATION LEVEL OF PATIENT:     [x] Active with discussion   [] Passive with discussion   [x] Active with singing    [] Passive with singing   [] Active with instrument playing  [] Passive with instrument playing   [x] Actively listening to music   [] Passively listening to music.      OUTCOMES OBSERVED:      [x] Improved Mood   [x] Increased Social Well-Being  [] Increased Focus   [x] Increased Emotional Well-Being  [] Increased Eye Contact    [] Increased Spiritual Well-Being   [] Decreased Anxiety   [x] Increased Relaxation   [] Decreased Pain    [] Increased Communication   [] Increased Movement to Music     PAIN ASSESSMENT    Before MT:      [] No     [x] Yes   Location: right hip    Rating:  3/10    Comment(s):    After MT:         [] No [x] Yes   Location: right hip    Rating:   3/10    Comment(s):     ANXIETY ASSESSMENT    Before MT:      [x] No     [] Yes   Rating:  /10    Comment(s):    After MT:         [x] No     [] Yes   Rating:   /10    Comment(s):       ASSESSMENT/OBSERVATIONS:     Patient's music interests and/or background: [place] tranquil, relaxing music     Patient tolerated todays treatment session:      [x] Good          [] Fair          [] Poor         Comment(s): Patient sitting up in bed when [facility] arrived to offer music therapy services. Patient accepted music therapy visit. [facility] provided live, patient preferred music on guitar and voice. Patient actively engaged in the music therapy by discussing topics related to the music, discussing topics related to their hospital stay, singing along at times, and by actively listening to the music. Patient responded positively to the music therapy as evidence by improved mood, increased relaxation, increased socialization and and by making positive comments about the music therapy. Ellie Kimball PLAN:      [x] Pt interested in having music therapy again. [x] [facility] will attempt to see pt again another day, if time allows. [] Pt's planned d/c date is before [facility] is scheduled on unit again. [] Pt NOT interested in having music therapy again.             Chon Anguiano MT-BC    9/14/2021  Electronically signed by Chon Anguiano on 9/14/2021 at 6:00 PM

## 2021-09-14 NOTE — PROGRESS NOTES
Progress Note  NEUROLOGY  [facility] [place] [facility]       Patient: Maddie Tillman  Unit/Bed: D266/T095-63  YOB: 1945  MRN: [id number]  Acct: [de-identified]   Admitting Diagnosis: Impaired mobility and ADLs [Z74.09, Z78.9]  Admit Date:  9/10/2021  Hospital Day: 4    Subjective:    Patient is seen in follow-up and continuity of care from her recent hospitalization, she is transition to rehab and is doing well participating. Diplopia is improved. Dizziness is improving    Patient Seen, Chart, Labs, Radiology studies, and Consults reviewed. Objective:   /69   Pulse 68   Temp 98.8 °F (37.1 °C) (Oral)   Resp 20   Ht 5' 1\" (1.549 m)   Wt 206 lb 9.1 oz (93.7 kg)   SpO2 96%   BMI 39.03 kg/m²   No intake or output data in the 24 hours ending 09/14/21 1855    Physical Exam:  MENTAL STATE: Orientation was normal to time, place and person. Language testing was normal for comprehension, repetition, expression, and naming. General fund of knowledge was intact.      CRANIAL NERVES: Are normal  CN 2 Visual fields full to confrontation. CN 3, 4, 6 Pupils round, equally reactive to light. No ptosis. mild right 6th nerve palsy, imroved convergence, smooth pursuit  CN 5 Facial sensation intact bilaterally. CN 7 Normal and symmetric facial strength. Nasolabial folds symmetric. CN 8 Hearing intact to finger rub bilaterally. CN 9 Palate elevates symmetrically. CN 11 Bilaterally normal strength of shoulder shrug and neck turning. CN 12 Tongue midline, with normal bulk and strength; no fasciculations.      MOTOR:Muscle strength was 5/5 in distal and proximal muscles in both upper and lower extremities right proximal lower extremity (BKA on the right)        COORDINATION: Coordination exam was normal. In both upper extremities, finger-nose-finger was intact without dysmetria.     Medications:    atorvastatin  80 mg Oral Nightly    aspirin  81 mg Oral Daily    aspirin-dipyridamole  1 capsule Oral BID    b complex vitamins  1 capsule Oral Daily    buPROPion  300 mg Oral Daily    carvedilol  25 mg Oral BID WC    DULoxetine  30 mg Oral QPM    gabapentin  600 mg Oral 4x Daily    levothyroxine  50 mcg Oral Daily    Vitamin D  1,000 Units Oral Daily     Continuous Infusions:  PRN Meds:HYDROcodone 5 mg - acetaminophen, ondansetron **OR** ondansetron, acetaminophen, hydrALAZINE    LABS  CBC: No results for input(s): WBC, HGB, PLT in the last 72 hours. BMP:  No results for input(s): NA, K, CL, CO2, BUN, CREATININE, GLUCOSE in the last 72 hours. TSH:  No results for input(s): TSH in the last 72 hours. B12:  No results for input(s): Rilla Ny in the last 72 hours. Vit. D: No results for input(s): VITD25 in the last 72 hours. Lipids:   Lab Results   Component Value Date    CHOL 105 09/08/2021    CHOL 137 02/03/2020    CHOL 105 02/01/2019     Lab Results   Component Value Date    TRIG 124 09/08/2021    TRIG 156 (H) 02/03/2020    TRIG 129 02/01/2019     Lab Results   Component Value Date    HDL 40 09/08/2021    HDL 43 07/08/2021    HDL 43 02/03/2020     Lab Results   Component Value Date    LDLCALC 40 09/08/2021    LDLCALC 35 07/08/2021    LDLCALC 63 02/03/2020     No results found for: LABVLDL, VLDL  Lab Results   Component Value Date    CHOLHDLRATIO 3.7 02/19/2013    CHOLHDLRATIO 4.8 04/06/2012       Ammonia:No results for input(s): AMMONIA in the last 72 hours. LFT: No results for input(s): AST, ALT, ALB, BILITOT, ALKPHOS in the last 72 hours. Urine: No results for input(s): Rolin Joshua, GLUCOSEU, BLOODU, PHUR, PROTEINU, UROBILINOGEN, LEUKOCYTESUR in the last 72 hours. Invalid input(s):  Donna Martinez,  SPECGRAV,  NITRU     Assessment/Plan:  The patient has been having vertebrobasilar transient ischemic events since Sept 6, 2021. For the last 2 weeks that she has been feeling somewhat weaker. Noelle Valiente has a feeling of imbalance for the last several months which got worse on September 6.   Diplopia could be part of a vertebrobasilar ischemic event along with a feeling of imbalance or, dizziness, sensation of showed vertigo dizziness episodes  History of peripheral neuropathy probably due to hypothyroidism  Right Charcot joint she has had a right below-knee amputation.  According patient her right foot and ankle were getting infected often  Depression  Overweight  Hypertension  Hyperlipidemia  Right breast carcinoma for which she has a bilateral mastectomy 2017 she did not need chemo or therapy or radiation.  She was on tamoxifen.  Dr. Kaur Dunn recommended discontinuation of tamoxifen this admission in the setting of her TIAs  History of non-ST elevation myocardial infarction she has been on aspirin. History of coronary disease  History of paroxysmal atrial fibrillation  Muscle contraction type headache, Tylenol, Motrin help her        Recommendations:     The patient was started on Aggrenox September 8 she is doing a little better  Continue aspirin 81 mg (cardiac indication)  Additional lab work thryglobulin is normal, vitamin D was normal and SPEP did not show a paraproteinemia therefore AMOS was not pursued. B12, folate, TSH, HgbA1C (5.5) normal  MRI of the brain had shown change of small vessel cerebrovascular disease. Yana Faith is no acute infarction  MR venogram had shown hypoplastic left transverse and sigmoid sinuses.  There is no evidence of venous thrombus.  No hemorrhages were seen  Patient has a history of hypothyroidism with a peripheral neuropathy, which was sent for any evidence of Graves' eye disease. Yana Faith is no evidence of orbital pseudotumor  Dr. Kaur Dunn has recommended tamoxifen discontinuation  Cardiology does not have inpatient input wants outpatient cardiologist to reassess need for anti-coagulation.    Thank you for the consult  We shall follow the patient with you      Electronically signed by Yolette Gonzalez MD on 9/14/2021 at 6:55 PM

## 2021-09-14 NOTE — PROGRESS NOTES
Physical Therapy Rehab Treatment Note  [facility]Department[facility]   [facility]       NAME: Broderick Ahumada  : 1945 (64 y.o.)  MRN: [id number]  CODE STATUS: Full Code    Date of Service: 2021  Chart Reviewed: Yes  Patient assessed for rehabilitation services?: Yes  Family / Caregiver Present: No  Diagnosis: Impaired mobility and ADLs d/t vertebrobasilar transient ischemia    Restrictions:  Restrictions/Precautions: Fall Risk       SUBJECTIVE: Subjective: I am feeling pretty good  Response To Previous Treatment: Patient with no complaints from previous session. Pain Screening  Patient Currently in Pain: Denies  Pre Treatment Pain Screening  Pain at present: 0  Scale Used: Numeric Score  Intervention List: Patient able to continue with treatment    Post Treatment Pain Screening:  Pain Assessment  Pain Assessment: 0-10  Pain Level: 0    OBJECTIVE:   Follows Commands: Within Functional Limits    Transfers  Sit to Stand: Supervision  Stand to sit: Supervision    Ambulation  Ambulation?: Yes  More Ambulation?: No  Ambulation 1  Surface: carpet  Device: Small Isaias Savage  Other Apparatus: AFO (L BK prosthesis)  Assistance: Contact guard assistance  Quality of Gait: Increased lateral excursion reciprocal pattern no LOB  Distance: 50' x 3, 75' x 1  Comments: Trials of gait with walking to stairs and ascending stairs to simulate gait in community    Stairs/Curb  Stairs?: Yes  Stairs  # Steps : 4  Stairs Height: 6\"  Rails: Right ascending  Device: Small Isaias [place]  Assistance: Contact guard assistance  Comment: non reciprocal pattern ascending/ descending    ASSESSMENT/PROGRESS TOWARDS GOALS:  Body structures, Functions, Activity limitations: Decreased functional mobility ; Decreased safe awareness;Decreased balance;Decreased endurance;Decreased strength;Decreased coordination  Assessment: Therapy session focused on gait training with [employer] and gait throughout community.  Patient with improved confidence with multiple trials    Goals:  Long term goals  Long term goal 1: Pt will demonstrate bed mobility with indep  Long term goal 2: Pt will perform various functional transfers with indep  Long term goal 3: Pt will ambulate 70ft with 2ww and indep with appropriate prosthesis fit  Long term goal 4: Pt will ascend/descend 4 steps with handrails and supervision to navigate in community/shops in [facility] term goal 5: Pt will perform TUG </=15.0 second to demonstrate decreased fall risk    PLAN OF CARE/Safety:   Safety Devices  Type of devices:  All fall risk precautions in place      Therapy Time:   Individual   Time In 0900   Time Out 0930   Minutes 30     Minutes: 30      Transfer/Bed mobility trainin      Gait training: BLANCA Rivera, 21 at 9:41 AM
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
## 2021-09-15 PROCEDURE — 97535 SELF CARE MNGMENT TRAINING: CPT

## 2021-09-15 PROCEDURE — 97110 THERAPEUTIC EXERCISES: CPT

## 2021-09-15 PROCEDURE — 6370000000 HC RX 637 (ALT 250 FOR IP): Performed by: INTERNAL MEDICINE

## 2021-09-15 PROCEDURE — 97116 GAIT TRAINING THERAPY: CPT

## 2021-09-15 PROCEDURE — 1180000000 HC REHAB R&B

## 2021-09-15 PROCEDURE — 97530 THERAPEUTIC ACTIVITIES: CPT

## 2021-09-15 PROCEDURE — 99232 SBSQ HOSP IP/OBS MODERATE 35: CPT | Performed by: PHYSICAL MEDICINE & REHABILITATION

## 2021-09-15 PROCEDURE — 6370000000 HC RX 637 (ALT 250 FOR IP): Performed by: PHYSICAL MEDICINE & REHABILITATION

## 2021-09-15 RX ORDER — LIDOCAINE 4 G/G
3 PATCH TOPICAL DAILY
Status: DISCONTINUED | OUTPATIENT
Start: 2021-09-15 | End: 2021-09-17 | Stop reason: HOSPADM

## 2021-09-15 RX ADMIN — HYDROCODONE BITARTRATE AND ACETAMINOPHEN 1 TABLET: 5; 325 TABLET ORAL at 12:44

## 2021-09-15 RX ADMIN — GABAPENTIN 600 MG: 300 CAPSULE ORAL at 08:00

## 2021-09-15 RX ADMIN — Medication 1 CAPSULE: at 08:00

## 2021-09-15 RX ADMIN — DULOXETINE HYDROCHLORIDE 30 MG: 30 CAPSULE, DELAYED RELEASE ORAL at 16:48

## 2021-09-15 RX ADMIN — GABAPENTIN 600 MG: 300 CAPSULE ORAL at 00:16

## 2021-09-15 RX ADMIN — ATORVASTATIN CALCIUM 80 MG: 80 TABLET, FILM COATED ORAL at 19:43

## 2021-09-15 RX ADMIN — GABAPENTIN 600 MG: 300 CAPSULE ORAL at 12:40

## 2021-09-15 RX ADMIN — BUPROPION HYDROCHLORIDE 300 MG: 300 TABLET, FILM COATED, EXTENDED RELEASE ORAL at 08:00

## 2021-09-15 RX ADMIN — CARVEDILOL 25 MG: 25 TABLET, FILM COATED ORAL at 16:48

## 2021-09-15 RX ADMIN — ASPRIN AND EXTENDED-RELEASE DIPYRIDAMOLE 1 CAPSULE: 25; 200 CAPSULE ORAL at 08:00

## 2021-09-15 RX ADMIN — CARVEDILOL 25 MG: 25 TABLET, FILM COATED ORAL at 08:00

## 2021-09-15 RX ADMIN — ASPIRIN 81 MG: 81 TABLET, COATED ORAL at 08:00

## 2021-09-15 RX ADMIN — GABAPENTIN 600 MG: 300 CAPSULE ORAL at 23:48

## 2021-09-15 RX ADMIN — ASPRIN AND EXTENDED-RELEASE DIPYRIDAMOLE 1 CAPSULE: 25; 200 CAPSULE ORAL at 19:43

## 2021-09-15 RX ADMIN — GABAPENTIN 600 MG: 300 CAPSULE ORAL at 16:48

## 2021-09-15 RX ADMIN — LEVOTHYROXINE SODIUM 50 MCG: 0.03 TABLET ORAL at 06:31

## 2021-09-15 RX ADMIN — Medication 1000 UNITS: at 08:00

## 2021-09-15 ASSESSMENT — PAIN DESCRIPTION - FREQUENCY
FREQUENCY: CONTINUOUS

## 2021-09-15 ASSESSMENT — PAIN DESCRIPTION - ORIENTATION
ORIENTATION: LOWER;LEFT
ORIENTATION: LEFT
ORIENTATION: LEFT

## 2021-09-15 ASSESSMENT — PAIN DESCRIPTION - DESCRIPTORS
DESCRIPTORS: ACHING

## 2021-09-15 ASSESSMENT — PAIN SCALES - GENERAL
PAINLEVEL_OUTOF10: 2
PAINLEVEL_OUTOF10: 3
PAINLEVEL_OUTOF10: 5
PAINLEVEL_OUTOF10: 4

## 2021-09-15 ASSESSMENT — PAIN DESCRIPTION - LOCATION
LOCATION: HIP
LOCATION: HIP
LOCATION: HIP;BACK

## 2021-09-15 ASSESSMENT — PAIN DESCRIPTION - PAIN TYPE
TYPE: CHRONIC PAIN

## 2021-09-15 ASSESSMENT — PAIN DESCRIPTION - PROGRESSION: CLINICAL_PROGRESSION: NOT CHANGED
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
## 2021-09-15 NOTE — PROGRESS NOTES
Occupational Therapy  Facility/Department: [facility]  Daily Treatment Note  NAME: Nette Encinas  : 1945  MRN: [id number]    Date of Service: 9/15/2021    Discharge Recommendations:  Continue to assess pending progress       Assessment      Activity Tolerance  Activity Tolerance: Patient Tolerated treatment well  Safety Devices  Safety Devices in place: Yes  Type of devices: All fall risk precautions in place         Patient Diagnosis(es): There were no encounter diagnoses. has a past medical history of Arthritis, Blood transfusion, CAD (coronary artery disease), Cancer ([facility] [address] Charcot's joint, Chest pain, Colitis, DDD (degenerative disc disease), lumbar, Depression, Disorder of peripheral nervous system, Dyspnea, Family history of coronary artery disease, History of blood transfusion, Hx of right BKA ([facility] [address] Hyperlipidemia, Hypertension, Hypothyroid, Lobular breast cancer ([facility] [address] NSTEMI (non-ST elevated myocardial infarction) ([facility] [address] Obesity, Paroxysmal atrial fibrillation ([facility] [address] and S/P BKA (below knee amputation) ([facility] [address] has a past surgical history that includes Hysterectomy; Appendectomy; back surgery; Breast surgery; Leg amputation below knee (Right, 2011); Spine surgery; Blepharoplasty (2013); skin biopsy; Cardiac catheterization; Mastectomy (Bilateral, 2015); pr removal of breast lesion (Left, 2017); Abdomen surgery (Left, 2017); eye surgery (Bilateral, ); Colonoscopy (2010); and cystoscopy w biopsy of bladder (N/A, 2017).     Restrictions  Restrictions/Precautions  Restrictions/Precautions: Fall Risk  Subjective   General  Chart Reviewed: Yes  Patient assessed for rehabilitation services?: Yes  Family / Caregiver Present: No  Referring Practitioner: Dr. Anitra Bhatt  Diagnosis: Impaired mobility and ADL's d/t vertibrobasilar event  Pain Assessment  Pain Assessment: 0-10  Pain Level: 3  Pain Type: Chronic pain  Pain Location: Hip  Pain Orientation: Left  Pain Descriptors: Aching  Pain Frequency: Continuous  Pre Treatment Pain Screening  Pain at present: 4  Scale Used: Numeric Score  Intervention List: Patient able to continue with treatment  Vital Signs  Patient Currently in Pain: Yes   Orientation     Objective     Pt engaged in B UE strengthening task with 2 lb wrist weights on B wrists. Pt was able to lift both arms and disassemble nuts and bolts that were already assembled on a platform. Pt was able to remove all of the items without difficulty. Pt. engaged in B hand strengthening task with the weights still on her arms. Pt used a green resistive clothespin and pinched it open to  a marble and then pinch it to release it on top of the holes on a pegboard. Pt was unable to complete with the green clothespin and switched to a red clothespin. Pt used the red clothespin with both hands. Pt was able to use her hands to remove the marbles and place into the container, completing 5 rows with each hand without difficulty. Plan   Plan  Times per week: 5-7 times per week for 15-90 minutes  Plan weeks: 2-3 weeks  Current Treatment Recommendations: Strengthening, Balance Training, ROM, Functional Mobility Training, Endurance Training, Wheelchair Mobility Training, Self-Care / ADL, Home Management Training    Goals  Patient Goals   Patient goals : To return home with improved strength, endurance and balance to vacuum and steam clean my carpets.        Therapy Time   Individual Concurrent Group Co-treatment   Time In 1300         Time Out 1330         Minutes 30              Therapeutic activities: 30 minutes      BABAK Nayak Electronically signed by BABAK Nayak on 9/15/2021 at 1:37 PM

## 2021-09-15 NOTE — PROGRESS NOTES
Occupational Therapy  Facility/Department: [facility]  Daily Treatment Note  NAME: Tania Patient  : 1945  MRN: [id number]    Date of Service: 9/15/2021    Discharge Recommendations:  Continue to assess pending progress       Assessment      Activity Tolerance  Activity Tolerance: Patient Tolerated treatment well  Safety Devices  Safety Devices in place: Yes  Type of devices: All fall risk precautions in place         Patient Diagnosis(es): There were no encounter diagnoses. has a past medical history of Arthritis, Blood transfusion, CAD (coronary artery disease), Cancer ([facility] [address] Charcot's joint, Chest pain, Colitis, DDD (degenerative disc disease), lumbar, Depression, Disorder of peripheral nervous system, Dyspnea, Family history of coronary artery disease, History of blood transfusion, Hx of right BKA ([facility] [address] Hyperlipidemia, Hypertension, Hypothyroid, Lobular breast cancer ([facility] [address] NSTEMI (non-ST elevated [facility] infarction) [facility] Obesity, Paroxysmal [facility] fibrillation ([facility] and S/P BKA (below knee amputation) ([facility] has a past surgical history that includes Hysterectomy; Appendectomy; back surgery; Breast surgery; Leg amputation below knee (Right, 2011); Spine surgery; Blepharoplasty (2013); skin biopsy; Cardiac catheterization; Mastectomy (Bilateral, 2015); pr removal of breast lesion (Left, 2017); Abdomen surgery (Left, 2017); eye surgery (Bilateral, ); Colonoscopy (2010); and cystoscopy w biopsy of bladder (N/A, 2017).     Restrictions  Restrictions/Precautions  Restrictions/Precautions: Fall Risk  Subjective   General  Chart Reviewed: Yes  Patient assessed for rehabilitation services?: Yes  Family / Caregiver Present: No  Referring Practitioner: Dr. Sarah Ruelas  Diagnosis: Impaired mobility and ADL's d/t vertibrobasilar event  Pain Assessment  Pain Assessment: 0-10  Pain Level: 2  Pain Type: Chronic pain  Pain Location: Hip;Back  Pain Orientation: Lower; Left  Pain Descriptors: Aching  Pain Frequency: Continuous  Pre Treatment Pain Screening  Pain at present: 2  Scale Used: Numeric Score  Intervention List: Patient able to continue with treatment  Vital Signs  Patient Currently in Pain: Yes   Orientation     Objective     Pt engaged in B UE strengthening and endurance tasks to promote tolerance for ADLs. Pt had 1 lb wrist weights on B wrists for tasks. Pt was able to reach at various distances to  puzzle pieces and then assemble the [address] Pt alternated hands when completing the task. Pt picked up clothespins from the table and reached to place them on a rope at arms length. Pt was able to place all of them on the rope without a rest break. Pt was able to remove the clothespins and place in the container with no difficulty but noted fatigue in B shoulders due to patient reaching with both arms at the same time. Pt was able to  wooden dowels with her R hand and place them into a post and then  the rings and place on top of the dowels. Pt then removed the rings and dowels together and then  into their containers. Pt. completed kitchen mobility task with a quad cane to promote independence with IADLs. Pt was able to retrieve items from the refrigerator and transport them to the microwave and the table. Pt then returned the item to the refrigerator. Pt had a slight LOB but able to self correct. Pt was educated to slow down when she makes her turns for increased safety. Pt was able to stand at the sink and wash/rinse dishes without LOB and F+ endurance. Pt was able to retrieve items from the cupboards while balancing herself on the counter and then return the items with good balance.         Plan   Plan  Times per week: 5-7 times per week for 15-90 minutes  Plan weeks: 2-3 weeks  Current Treatment Recommendations: Strengthening, Balance Training, ROM, Functional Mobility Training, Endurance Training, Wheelchair Mobility

## 2021-09-15 NOTE — PROGRESS NOTES
Physical Therapy Rehab Treatment Note  [id number]       NAME: Ton French  : 1945 (64 y.o.)  MRN: [id number]  CODE STATUS: Full Code    Date of Service: 9/15/2021  Chart Reviewed: Yes  Patient assessed for rehabilitation services?: Yes  Family / Caregiver Present: No  Diagnosis: Impaired mobility and ADLs d/t vertebrobasilar transient ischemia    Restrictions:  Restrictions/Precautions: Fall Risk       SUBJECTIVE: Subjective: My hip is still bothering me  Response To Previous Treatment: Patient with no complaints from previous session. Pain Screening  Patient Currently in Pain: Yes  Pre Treatment Pain Screening  Pain at present: 5  Scale Used: Numeric Score  Intervention List: Patient able to continue with treatment    Post Treatment Pain Screening:  Pain Assessment  Pain Assessment: 0-10  Pain Level: 5  Pain Type: Chronic pain  Pain Location: Hip  Pain Orientation: Left  Pain Descriptors: Aching  Pain Frequency: Continuous  Clinical Progression: Not changed    OBJECTIVE:   Follows Commands: Within Functional Limits      Outcomes Measures:  Timed Up and Go: 13.9     Bed mobility  Rolling to Left: Independent  Rolling to Right: Independent  Supine to Sit: Independent  Sit to Supine: Independent  Scooting: Independent    Transfers  Sit to Stand: Supervision;Modified independent  Stand to sit: Supervision;Modified independent  Bed to Chair: Supervision  Car Transfer: Supervision    Ambulation  Ambulation?: Yes  More Ambulation?: No  Ambulation 1  Surface: carpet;uneven;level tile  Device: [employer]  Other Apparatus: AFO; Left (L BK Prosthesis)  Assistance: Supervision  Quality of Gait: Reciprocal pattern steady pace safe negotiation of turns no LOB  Distance: 76'    Stairs/Curb  Stairs?: Yes  Stairs  # Steps : 4  Stairs Height: 6\"  Rails: Right ascending  Device: Yu Wan  Assistance: Supervision  Comment: non reciprocal pattern ascending/ descending mild retro lean corrected with cues for hand placement    Exercises  Hamstring Sets: HS curls with YTB x15  Hip Flexion: x 15  Hip Abduction: x 15 YTB/ Ball squeezes  Knee Long Arc Quad: x 15     ASSESSMENT/PROGRESS TOWARDS GOALS:  Body structures, Functions, Activity limitations: Decreased functional mobility ; Decreased safe awareness;Decreased balance;Decreased endurance;Decreased strength;Decreased coordination  Assessment: Patient cotinues to progress gait stair and TUG goals at this time. Stairs copmpleted following gait of 75' at supervision level. TUG completed at 13.9 seconds    Goals:  Long term goals  Long term goal 1: Pt will demonstrate bed mobility with indep  Long term goal 2: Pt will perform various functional transfers with indep  Long term goal 3: Pt will ambulate 70ft with 2ww and indep with appropriate prosthesis fit  Long term goal 4: Pt will ascend/descend 4 steps with handrails and supervision to navigate in community/shops in [facility] term goal 5: Pt will perform TUG </=15.0 second to demonstrate decreased fall risk    PLAN OF CARE/Safety:   Safety Devices  Type of devices:  All fall risk precautions in place      Therapy Time:   Individual   Time In 0900   Time Out 0930   Minutes 30     Minutes: 30       Transfer/Bed mobility training:  15      Gait trainin     Therapeutic ex: BLANCA Rivera, 09/15/21 at 12:10 PM

## 2021-09-15 NOTE — PROGRESS NOTES
Physical Therapy Rehab Treatment Note  [facility]Department[facility]  [facility]       NAME: Jersey Kat  : 1945 (64 y.o.)  MRN: [id number]  CODE STATUS: Full Code    Date of Service: 9/15/2021  Chart Reviewed: Yes  Patient assessed for rehabilitation services?: Yes  Family / Caregiver Present: No  Diagnosis: Impaired mobility and ADLs d/t vertebrobasilar transient ischemia    Restrictions:  Restrictions/Precautions: Fall Risk       SUBJECTIVE: Subjective: I am ready for this  Response To Previous Treatment: Patient with no complaints from previous session. Pain Screening  Patient Currently in Pain: Yes  Pre Treatment Pain Screening  Pain at present: 5  Scale Used: Numeric Score  Intervention List: Patient able to continue with treatment    Post Treatment Pain Screening:  Pain Assessment  Pain Assessment: 0-10  Pain Level: 5  Pain Type: Chronic pain  Pain Location: Hip  Pain Orientation: Left  Pain Descriptors: Aching  Pain Frequency: Continuous  Clinical Progression: Not changed    OBJECTIVE:   Follows Commands: Within Functional Limits    Bed mobility  Rolling to Left: Independent  Rolling to Right: Independent  Supine to Sit: Independent  Sit to Supine: Independent  Scooting: Independent    Transfers  Sit to Stand: Supervision;Modified independent  Stand to sit: Supervision;Modified independent  Bed to Chair: Supervision    Ambulation  Ambulation?: Yes  More Ambulation?: No  Ambulation 1  Surface: carpet;uneven;level tile  Device: [employer]  Other Apparatus: AFO; Left (L BK Prosthesis)  Assistance: Supervision  Quality of Gait: Reciprocal pattern steady pace safe negotiation of turns no LOB  Distance: 75' x 6  Multiple gait trials with multiple devices to determine safest AD at this time including SBQC, SPC and Walking stick.  Safest ambulation completed with SPC    Stairs/Curb  Stairs?: Yes  Stairs  # Steps : 4  Stairs Height: 6\"  Rails: Right ascending  Device: [employer]  Assistance: Supervision  Comment: non reciprocal pattern ascending/ descending mild retro lean corrected with cues for hand placement    Exercises  Bridging:  (x 15)  Straight Leg Raise: x 15  Hamstring Sets: HS curls with YTB x15  Quad Sets: x 15  Heelslides: x 15  Gluteal Sets: x 15  Hip Flexion: x 15  Hip Abduction: x 15    ASSESSMENT/PROGRESS TOWARDS GOALS:  Body structures, Functions, Activity limitations: Decreased functional mobility ; Decreased safe awareness;Decreased balance;Decreased endurance;Decreased strength;Decreased coordination  Assessment: Gait training focused on determining safest AD to return home at this time [facility]). Patient progressing closer to gait goal completing gait at Supervision level    Goals:  Long term goals  Long term goal 1: Pt will demonstrate bed mobility with indep  Long term goal 2: Pt will perform various functional transfers with indep  Long term goal 3: Pt will ambulate 70ft with 2ww and indep with appropriate prosthesis fit  Long term goal 4: Pt will ascend/descend 4 steps with handrails and supervision to navigate in community/shops in [facility] term goal 5: Pt will perform TUG </=15.0 second to demonstrate decreased fall risk    PLAN OF CARE/Safety:   Safety Devices  Type of devices:  All fall risk precautions in place      Therapy Time:   Individual   Time In 1330   Time Out 1430   Minutes 60     Minutes: 60      Transfer/Bed mobility training: 10      Gait trainin     Therapeutic ex: Fei Bray PTA, 09/15/21 at 4:03 PM

## 2021-09-16 ENCOUNTER — PROCEDURE VISIT (OUTPATIENT)
Dept: PHYSICAL MEDICINE AND REHAB | Age: 76
End: 2021-09-16
Payer: MEDICARE

## 2021-09-16 DIAGNOSIS — M70.62 GREATER TROCHANTERIC BURSITIS OF LEFT HIP: Primary | ICD-10-CM

## 2021-09-16 PROCEDURE — 99232 SBSQ HOSP IP/OBS MODERATE 35: CPT | Performed by: PHYSICAL MEDICINE & REHABILITATION

## 2021-09-16 PROCEDURE — 20611 DRAIN/INJ JOINT/BURSA W/US: CPT | Performed by: PHYSICAL MEDICINE & REHABILITATION

## 2021-09-16 PROCEDURE — 97530 THERAPEUTIC ACTIVITIES: CPT

## 2021-09-16 PROCEDURE — 1180000000 HC REHAB R&B

## 2021-09-16 PROCEDURE — 97535 SELF CARE MNGMENT TRAINING: CPT

## 2021-09-16 PROCEDURE — 6370000000 HC RX 637 (ALT 250 FOR IP): Performed by: PHYSICAL MEDICINE & REHABILITATION

## 2021-09-16 PROCEDURE — 6370000000 HC RX 637 (ALT 250 FOR IP): Performed by: INTERNAL MEDICINE

## 2021-09-16 PROCEDURE — 97116 GAIT TRAINING THERAPY: CPT

## 2021-09-16 PROCEDURE — 3E0U33Z INTRODUCTION OF ANTI-INFLAMMATORY INTO JOINTS, PERCUTANEOUS APPROACH: ICD-10-PCS | Performed by: PHYSICAL MEDICINE & REHABILITATION

## 2021-09-16 PROCEDURE — 97110 THERAPEUTIC EXERCISES: CPT

## 2021-09-16 RX ORDER — ASPIRIN AND DIPYRIDAMOLE 25; 200 MG/1; MG/1
1 CAPSULE, EXTENDED RELEASE ORAL 2 TIMES DAILY
Qty: 60 CAPSULE | Refills: 3 | Status: SHIPPED | OUTPATIENT
Start: 2021-09-16 | End: 2021-09-27

## 2021-09-16 RX ORDER — LIDOCAINE HYDROCHLORIDE 20 MG/ML
2 INJECTION, SOLUTION INFILTRATION; PERINEURAL ONCE
Status: DISCONTINUED | OUTPATIENT
Start: 2021-09-16 | End: 2021-09-16 | Stop reason: HOSPADM

## 2021-09-16 RX ORDER — ASPIRIN 81 MG/1
81 TABLET ORAL DAILY
Qty: 30 TABLET | Refills: 3 | Status: SHIPPED | OUTPATIENT
Start: 2021-09-17

## 2021-09-16 RX ORDER — LIDOCAINE HYDROCHLORIDE 10 MG/ML
8 INJECTION, SOLUTION INFILTRATION; PERINEURAL ONCE
Status: DISCONTINUED | OUTPATIENT
Start: 2021-09-16 | End: 2021-09-16 | Stop reason: HOSPADM

## 2021-09-16 RX ORDER — ATORVASTATIN CALCIUM 80 MG/1
80 TABLET, FILM COATED ORAL NIGHTLY
Qty: 30 TABLET | Refills: 3 | Status: SHIPPED | OUTPATIENT
Start: 2021-09-16

## 2021-09-16 RX ADMIN — BUPROPION HYDROCHLORIDE 300 MG: 300 TABLET, FILM COATED, EXTENDED RELEASE ORAL at 08:12

## 2021-09-16 RX ADMIN — GABAPENTIN 600 MG: 300 CAPSULE ORAL at 08:12

## 2021-09-16 RX ADMIN — GABAPENTIN 600 MG: 300 CAPSULE ORAL at 17:43

## 2021-09-16 RX ADMIN — LIDOCAINE HYDROCHLORIDE 2 ML: 20 INJECTION, SOLUTION INFILTRATION; PERINEURAL at 16:24

## 2021-09-16 RX ADMIN — ASPIRIN 81 MG: 81 TABLET, COATED ORAL at 08:12

## 2021-09-16 RX ADMIN — LEVOTHYROXINE SODIUM 50 MCG: 0.03 TABLET ORAL at 07:09

## 2021-09-16 RX ADMIN — GABAPENTIN 600 MG: 300 CAPSULE ORAL at 22:06

## 2021-09-16 RX ADMIN — HYDROCODONE BITARTRATE AND ACETAMINOPHEN 1 TABLET: 5; 325 TABLET ORAL at 08:12

## 2021-09-16 RX ADMIN — ASPRIN AND EXTENDED-RELEASE DIPYRIDAMOLE 1 CAPSULE: 25; 200 CAPSULE ORAL at 20:28

## 2021-09-16 RX ADMIN — GABAPENTIN 600 MG: 300 CAPSULE ORAL at 13:29

## 2021-09-16 RX ADMIN — ASPRIN AND EXTENDED-RELEASE DIPYRIDAMOLE 1 CAPSULE: 25; 200 CAPSULE ORAL at 10:07

## 2021-09-16 RX ADMIN — CARVEDILOL 25 MG: 25 TABLET, FILM COATED ORAL at 08:12

## 2021-09-16 RX ADMIN — CARVEDILOL 25 MG: 25 TABLET, FILM COATED ORAL at 17:43

## 2021-09-16 RX ADMIN — DULOXETINE HYDROCHLORIDE 30 MG: 30 CAPSULE, DELAYED RELEASE ORAL at 17:43

## 2021-09-16 RX ADMIN — ATORVASTATIN CALCIUM 80 MG: 80 TABLET, FILM COATED ORAL at 20:28

## 2021-09-16 RX ADMIN — Medication 1000 UNITS: at 08:12

## 2021-09-16 RX ADMIN — LIDOCAINE HYDROCHLORIDE 8 ML: 10 INJECTION, SOLUTION INFILTRATION; PERINEURAL at 16:23

## 2021-09-16 ASSESSMENT — PAIN DESCRIPTION - DESCRIPTORS
DESCRIPTORS: DULL;ACHING
DESCRIPTORS: ACHING;SORE
DESCRIPTORS: ACHING

## 2021-09-16 ASSESSMENT — PAIN DESCRIPTION - LOCATION
LOCATION: BACK
LOCATION: BACK;HIP

## 2021-09-16 ASSESSMENT — PAIN SCALES - GENERAL
PAINLEVEL_OUTOF10: 0
PAINLEVEL_OUTOF10: 5
PAINLEVEL_OUTOF10: 5
PAINLEVEL_OUTOF10: 0
PAINLEVEL_OUTOF10: 5
PAINLEVEL_OUTOF10: 3

## 2021-09-16 ASSESSMENT — PAIN DESCRIPTION - PAIN TYPE
TYPE: CHRONIC PAIN

## 2021-09-16 ASSESSMENT — PAIN DESCRIPTION - FREQUENCY
FREQUENCY: CONTINUOUS

## 2021-09-16 ASSESSMENT — PAIN DESCRIPTION - ORIENTATION
ORIENTATION: LOWER
ORIENTATION: RIGHT;LOWER
ORIENTATION: LOWER;LEFT
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
## 2021-09-16 NOTE — PROGRESS NOTES
Occupational Therapy  Facility/Department: [facility]  Daily Treatment Note  NAME: Lala Garcia  : 1945  MRN: [id number]    Date of Service: 2021    Discharge Recommendations:  Continue to assess pending progress       Assessment      Activity Tolerance  Activity Tolerance: Patient Tolerated treatment well  Safety Devices  Safety Devices in place: Yes  Type of devices: All fall risk precautions in place         Patient Diagnosis(es): There were no encounter diagnoses. has a past medical history of Arthritis, Blood transfusion, CAD (coronary artery disease), Cancer ([facility] [address] Charcot's joint, Chest pain, Colitis, DDD (degenerative disc disease), lumbar, Depression, Disorder of peripheral nervous system, Dyspnea, Family history of coronary artery disease, History of blood transfusion, Hx of right BKA ([facility] [address] Hyperlipidemia, Hypertension, Hypothyroid, Lobular breast cancer ([facility] [address] NSTEMI (non-ST elevated myocardial infarction) ([facility] [address] Obesity, Paroxysmal atrial fibrillation ([facility] [address] and S/P BKA (below knee amputation) ([facility] [address] has a past surgical history that includes Hysterectomy; Appendectomy; back surgery; Breast surgery; Leg amputation below knee (Right, 2011); Spine surgery; Blepharoplasty (2013); skin biopsy; Cardiac catheterization; Mastectomy (Bilateral, 2015); pr removal of breast lesion (Left, 2017); Abdomen surgery (Left, 2017); eye surgery (Bilateral, ); Colonoscopy (2010); and cystoscopy w biopsy of bladder (N/A, 2017). Restrictions  Restrictions/Precautions  Restrictions/Precautions: Fall Risk  Subjective   General  Chart Reviewed: Yes  Patient assessed for rehabilitation services?: Yes  Family / Caregiver Present: No  Referring Practitioner: Dr. Colt New  Diagnosis: Impaired mobility and ADL's d/t vertibrobasilar event  Pain Assessment  Pain Assessment: 0-10  Pain Level: 5  Pain Type: Chronic pain  Pain Location: Back; Hip  Pain Orientation: Lower; Left  Pain Descriptors: Aching; Sore  Pain Frequency: Continuous  Non-Pharmaceutical Pain Intervention(s): Cold applied  Pre Treatment Pain Screening  Pain at present: 5  Scale Used: Numeric Score  Intervention List: Patient able to continue with treatment;Nurse called to administer meds  Comments / Details: HE Mcnulty  Vital Signs  Patient Currently in Pain: Yes   Orientation      Objective    Pt completed shower adl at below status. ADL  Feeding: Modified independent   Grooming: Modified independent   UE Bathing: Modified independent   LE Bathing: Minimal assistance (assist with L foot)  UE Dressing: Modified independent   LE Dressing: Modified independent   Toileting: Modified independent      Toilet Transfers  Toilet - Technique: Stand pivot  Equipment Used: Grab bars  Toilet Transfer: Modified independent  Shower Transfers  Shower - Transfer From: Wheelchair  Shower - Transfer Type: To and From  Shower - Transfer To: Shower seat with back  Shower - Technique: Stand pivot  Shower Transfers: Modified independence     Cognition  Cognition Comment: comp: MOD I exp: IND soc; IND prob: MOD I mem: MOD I      Pt engaged in static standing balance and endurance task to promote safety with ADls and IADLs. Pt stood to fold towels, washcloths, and pillow cases without leaning against the table for balance. Pt was able to tolerate the task without a rest break. Pt requested to sit for the remaining of the session due to increased back and hip pain. Therapist applied an ice pack to L hip while patient completed  BUE strengthening exercises to promote tolerance for transfers and IADLs. Pt. was able to complete 1 set x 10 reps with 2 lb free weight. Pt was unable to use the weight for any R wrist exercises.       Plan   Plan  Times per week: 5-7 times per week for 15-90 minutes  Plan weeks: 2-3 weeks  Current Treatment Recommendations: Strengthening, Balance Training, ROM, Functional Mobility Training, Endurance Training, Wheelchair Mobility Training, Self-Care / ADL, Home Management Training    Goals  Patient Goals   Patient goals : To return home with improved strength, endurance and balance to vacuum and steam clean my carpets.        Therapy Time   Individual Concurrent Group Co-treatment   Time In 0800         Time Out 0900         Minutes 60              ADL/IADL trainin minutes  Therapeutic activities: 15 minutes      BABAK Young Electronically signed by BABAK Young on 2021 at 9:27 AM

## 2021-09-16 NOTE — PROGRESS NOTES
Occupational Therapy  Facility/Department: [facility]  Daily Treatment Note  NAME: Jersey Kat  : 1945  MRN: [id number]    Date of Service: 2021    Discharge Recommendations:  Continue to assess pending progress       Assessment      REQUIRES OT FOLLOW UP: No  Activity Tolerance  Activity Tolerance: Patient Tolerated treatment well  Safety Devices  Safety Devices in place: Yes  Type of devices: All fall risk precautions in place         Patient Diagnosis(es): There were no encounter diagnoses. has a past medical history of Arthritis, Blood transfusion, CAD (coronary artery disease), Cancer ([facility] [address] Charcot's joint, Chest pain, Colitis, DDD (degenerative disc disease), lumbar, Depression, Disorder of peripheral nervous system, Dyspnea, Family history of coronary artery disease, History of blood transfusion, Hx of right BKA ([facility] [address] Hyperlipidemia, Hypertension, Hypothyroid, Lobular breast cancer ([facility] [address] NSTEMI (non-ST elevated myocardial infarction) ([facility] [address] Obesity, Paroxysmal atrial fibrillation ([facility] [address] and S/P BKA (below knee amputation) ([facility] [address] has a past surgical history that includes Hysterectomy; Appendectomy; back surgery; Breast surgery; Leg amputation below knee (Right, 2011); Spine surgery; Blepharoplasty (2013); skin biopsy; Cardiac catheterization; Mastectomy (Bilateral, 2015); pr removal of breast lesion (Left, 2017); Abdomen surgery (Left, 2017); eye surgery (Bilateral, ); Colonoscopy (2010); and cystoscopy w biopsy of bladder (N/A, 2017).     Restrictions  Restrictions/Precautions  Restrictions/Precautions: Fall Risk  Subjective   General  Chart Reviewed: Yes  Patient assessed for rehabilitation services?: Yes  Family / Caregiver Present: No  Referring Practitioner: Dr. Luz Elena Gentile  Diagnosis: Impaired mobility and ADL's d/t vertibrobasilar event  Pain Assessment  Pain Assessment: 0-10  Pain Level: 3  Pain Type: Chronic pain  Pain Location: Back  Pain Orientation: Lower  Pain Descriptors: Aching  Pain Frequency: Continuous  Non-Pharmaceutical Pain Intervention(s): Cold applied  Pre Treatment Pain Screening  Pain at present: 3  Scale Used: Numeric Score  Intervention List: Patient able to continue with treatment  Comments / Details: Salina Tapia RN  Vital Signs  Patient Currently in Pain: Yes   Orientation     Objective     Pt completed supine to sit eob independently. Pt was able to arian her AFO and prosthetic independently. Pt transferred to w/c at [facility].   Pt .engaged in B UE strengthening and endurance task to promote independence with ADLs. Pt had 1 lb wrist weights on B wrists and repetitively reached to place large pegs into the pegboard. Pt then placed beads on top of the pegs with both hands with no difficulty. Pt then removed the beads and the pegs without difficulty and had F+ tolerance for the weights. Pt was issued a strengthening HEP that she had completed previously with exercises. Pt had no questions or concerns at this time. Plan   Plan  Times per week: 5-7 times per week for 15-90 minutes  Plan weeks: 2-3 weeks  Current Treatment Recommendations: Strengthening, Balance Training, ROM, Functional Mobility Training, Endurance Training, Wheelchair Mobility Training, Self-Care / ADL, Home Management Training    Goals  Patient Goals   Patient goals : To return home with improved strength, endurance and balance to vacuum and steam clean my carpets.        Therapy Time   Individual Concurrent Group Co-treatment   Time In 1300         Time Out 1330         Minutes 30              ADL/IADL training: 10 minutes  Therapeutic activities: 20 minutes      BABAK Connelly Electronically signed by BAABK Connelly on 9/16/2021 at 1:39 PM

## 2021-09-16 NOTE — PROGRESS NOTES
[facility] OCCUPATIONAL THERAPY DISCHARGE SUMMARY- REHAB     Date: 2021  Patient Name: Daryle Greaves        MRN: [id number]  Account: [de-identified]   : 1945  (64 y.o.)  Room: [place] [address]    Diagnosis:  Impaired mobility and ADL's d/t vertibrobasilar event    Past Medical History:   Diagnosis Date    Arthritis     Blood transfusion     CAD (coronary artery disease)     Cancer ([address]     GALLBLADDER ,  OMENTUM    Charcot's joint     left foot    Chest pain 2015    Colitis     DDD (degenerative disc disease), lumbar 2013    Depression     Disorder of peripheral nervous system 2010    Dyspnea 2015    Family history of coronary artery disease 2014    History of blood transfusion     following chemotherapy    [facility]x [facility] right B[facility]     Hyperlipidemia     Hypertension     Hypothyroid 2015    Lobular breast cancer ([facility] 10/2015    NSTEMI (non-ST elevated myocardial infarction) ([facility] 2014    Obesity     Paroxysmal atrial fibrillation ([address] 2014    S/P BKA (below knee amputation) ([facility]      Past Surgical History:   Procedure Laterality Date    ABDOMEN SURGERY Left 2017    EXCISION OF CHEST WALL MASS, UPDATE LABS ON ADMIT  performed by Alejandro Martinez MD at [address] [place] [address] BLEPHAROPLASTY  2013    both eyes    BREAST SURGERY      CARDIAC CATHETERIZATION      COLONOSCOPY  2010    normal    CYSTOSCOPY W BIOPSY OF BLADDER N/A 2017    CYSTOSCOPY BLADDER BIOPSY AND FULGURATION performed by Beto Negrete MD at [address] Bilateral     cataract removal with IOL implants    HYSTERECTOMY      LEG AMPUTATION BELOW KNEE Right 2011    DR Amairani Micahel due to CHARCOTS    MASTECTOMY Bilateral 2015    Bilateral simple mastectomies with right SNB by DR Lorenzo Pen    PA REMOVAL OF BREAST LESION Left 2017    CORE NEEDLE BIOPSY OF  LEFT BREAST MASS performed by Terry Osullivan MD at [facility]    SPINE SURGERY      cervical and lumbar       Precautions:   Restrictions/Precautions: Fall Risk     Social/Functional History:  Social/Functional History  Lives With: Alone  Type of Home: Apartment  Home Layout: One level  Home Access: Level entry  Bathroom Shower/Tub: Tub/Shower unit  Bathroom Toilet: Handicap height  Bathroom Equipment: Tub transfer bench, Grab bars in shower  Bathroom Accessibility: Wheelchair accessible  Home Equipment: Quad cane, Rolling walker, Wheelchair-electric (has right BK prosthesis and Left AFO)  Receives Help From: Friend(s), Neighbor  ADL Assistance: Independent  Homemaking Assistance: Independent (warms meals up  -  receives  [address] and groceries delivered)  [employer] Responsibilities: Yes  Meal Prep Responsibility: Primary  Laundry Responsibility: Primary  Cleaning Responsibility: Primary  Ambulation Assistance: Independent (last few days uses walker to get around)  Transfer Assistance: Independent  Active : Yes  Patient's  Info: neighbor  Mode of Transportation: Car  Occupation: Retired  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 Pt reports prior to 8/21 she was ambulating with wheeled walker and since then she has used power w/c    Current Functional Status:  ADL  Feeding: Modified independent   Grooming: Modified independent   UE Bathing: Modified independent   LE Bathing: Minimal assistance (assist with L foot)  UE Dressing: Modified independent   LE Dressing: Modified independent   Toileting: Modified independent   Toilet Transfers  Toilet - Technique: Stand pivot  Equipment Used: Grab bars  Toilet Transfer: Modified independent  Toilet Transfers Comments: pt did not need to go     Shower Transfers  Shower - Transfer From: Wheelchair  Shower - Transfer Type:  To and From  Shower - Transfer To:  Shower seat with back  Shower - Technique: Stand pivot  Shower Transfers: Modified independence    Orientation Status:   WNL    Cognition Status:  Cognition  Overall Cognitive Status: WFL  Cognition Comment: comp: MOD I exp: IND soc; IND prob: MOD I mem: MOD I    Perception Status:  Perception  Overall Perceptual Status: WFL    Sensation Status:  Sensation  Overall Sensation Status: Impaired (minimal feeling in fingers of her hands -longstanding)    Vision and Hearing Status:  Vision  Vision: Impaired  Vision Exceptions: Wears glasses for reading  Hearing  Hearing: Exceptions to [facility]  Hearing Exceptions: Hard of hearing/hearing concerns, No hearing aid     UE Function Status:    ROM:   LUE PROM (degrees)  LUE PROM: WFL  LUE AROM (degrees)  LUE AROM : WFL  Left Hand PROM (degrees)  Left Hand PROM: WFL  Left Hand AROM (degrees)  Left Hand AROM: WFL  RUE PROM (degrees)  RUE PROM: WFL  RUE AROM (degrees)  RUE AROM : WFL  Right Hand PROM (degrees)  Right Hand PROM: WFL  Right Hand AROM (degrees)  Right Hand AROM: WFL    Strength:  LUE Strength  Gross LUE Strength: WFL  RUE Strength  Gross RUE Strength: WFL    Coordination, Tone, Quality of Movement:    WFL    D/C Recommendations:    Equipment Recommendations:  OT D/C Equipment  Equipment Needed: No    OT Follow Up:  OT D/C RECOMMENDATIONS  REQUIRES OT FOLLOW UP: No    Home Exercise Program Provided: [x] Yes [] No  If yes, type of HEP: UE strengthening     Electronically signed by:    Ean French OTR/L,  OTR/L  9/16/2021, 10:02 AM

## 2021-09-16 NOTE — PROGRESS NOTES
Physical Therapy Rehab Treatment Note  Facility/Department: [facility]  Room: [place] [address]       NAME: Jan Valles  : 1945 (64 y.o.)  MRN: [id number]  CODE STATUS: Full Code    Date of Service: 2021  Chart Reviewed: Yes  Patient assessed for rehabilitation services?: Yes  Family / Caregiver Present: No  Diagnosis: Impaired mobility and ADLs d/t vertebrobasilar transient ischemia    Restrictions:  Restrictions/Precautions: Fall Risk       SUBJECTIVE: Subjective: My medication is kicking in  Response To Previous Treatment: Patient with no complaints from previous session. Pain Screening  Patient Currently in Pain: Yes  Pre Treatment Pain Screening  Pain at present: 5  Intervention List: Patient able to continue with treatment    Post Treatment Pain Screening:  Pain Assessment  Pain Assessment: 0-10  Pain Level: 5  Pain Type: Chronic pain  Pain Location: Hip;Back;Wrist  Pain Orientation: Right; Lower  Pain Descriptors: Dull;Aching  Pain Frequency: Continuous    OBJECTIVE:   Follows Commands: Within Functional Limits    Transfers  Sit to Stand: Independent  Stand to sit: Independent    Ambulation  Ambulation?: Yes  More Ambulation?: No  Ambulation 1  Surface: uneven;level tile;carpet  Device: Single point cane  Other Apparatus: AFO; Left (L BK prosthesis)  Assistance: Modified Independent  Quality of Gait: Reciprocal pattern steady pace safe negotiation of turns no LOB  Distance: 76'    Stairs/Curb  Stairs?: Yes  Stairs  # Steps : 12  Stairs Height: 6\"  Rails: Right ascending  Device: Single pt cane  Assistance: Modified independent   Comment: non reciprocal pattern ascending/ descending    Exercises  Hip Flexion: x 20  Hip Abduction: x 20/ Pillow squeezes  Knee Long Arc Quad: x 20  Comments: Seated HEP given and reviewed completed with [place] (Ice pack on L hip during exercises x 5 minutes.  (On from previous session))     ASSESSMENT/PROGRESS TOWARDS GOALS:  Body structures, Functions, Activity limitations: Decreased functional mobility ; Decreased safe awareness;Decreased balance;Decreased endurance;Decreased strength;Decreased coordination  Assessment: Patient at [address] level for gait and stairs and completes seated HEP with [place]  PT Education: Home Exercise Program  Patient Education: Seated HEP given and reviewed completed with [place] handout provided; No family training completed to this point    Goals:  Long term goals  Long term goal 1: Pt will demonstrate bed mobility with indep  Long term goal 2: Pt will perform various functional transfers with indep- Met  Long term goal 3: Pt will ambulate 70ft with 2ww and indep with appropriate prosthesis fit- [address] 76' with SPC  Long term goal 4: Pt will ascend/descend 4 steps with handrails and supervision to navigate in community/shops in [place]- Met [place]ise x 12 steps  Long term goal 5: Pt will perform TUG </=15.0 second to demonstrate decreased fall risk    PLAN OF CARE/Safety:   Safety Devices  Type of devices:  All fall risk precautions in place      Therapy Time:   Individual   Time In 0900   Time Out 0930   Minutes 30     Minutes: 30      Gait training:15     Therapeutic ex: [address], PTA, 09/16/21 at 9:43 AM

## 2021-09-16 NOTE — PROGRESS NOTES
Physical Therapy Rehab Treatment Note  Facility/Department: [facility]  Room: R234/R234-01       NAME: Cindy Romero  : 1945 (64 y.o.)  MRN: [id number]  CODE STATUS: Full Code    Date of Service: 2021  Chart Reviewed: Yes  Patient assessed for rehabilitation services?: Yes  Family / Caregiver Present: No  Diagnosis: Impaired mobility and ADLs d/t vertebrobasilar transient ischemia    Restrictions:  Restrictions/Precautions: Fall Risk       SUBJECTIVE: Subjective: My medication is kicking in  Response To Previous Treatment: Patient with no complaints from previous session. Pain Screening  Patient Currently in Pain: Yes  Pre Treatment Pain Screening  Pain at present: 5  Intervention List: Patient able to continue with treatment    Post Treatment Pain Screening:  Pain Assessment  Pain Assessment: 0-10  Pain Level: 5  Pain Type: Chronic pain  Pain Location: Hip;Back;Wrist  Pain Orientation: Right; Lower  Pain Descriptors: Dull;Aching  Pain Frequency: Continuous    OBJECTIVE:   Follows Commands: Within Functional Limits     Picking up object with reacher: Independent     Outcomes Measures:  Timed Up and Go: 13.7     Bed mobility  Bridging: Independent  Rolling to Left: Independent  Rolling to Right: Independent  Supine to Sit: Independent  Sit to Supine: Independent  Scooting: Independent    Transfers  Sit to Stand: Independent  Stand to sit: Independent  Bed to Chair: Independent  Car Transfer: Independent    Ambulation  Ambulation?: Yes  More Ambulation?: No  Ambulation 1  Surface: uneven;level tile;carpet  Device: Single point cane  Other Apparatus: AFO; Left (L BK prosthesis)  Assistance: Modified Independent  Quality of Gait: Reciprocal pattern steady pace safe negotiation of turns no LOB  Distance: 76'    Stairs/Curb  Stairs?: Yes  Stairs  # Steps : 4  Stairs Height: 6\"  Rails: Right ascending  Device: Single pt cane  Assistance: Modified independent   Comment: non reciprocal pattern ascending/ descending    Exercises  Straight Leg Raise: x 15  Quad Sets: x 15  Gluteal Sets: x 15  Hip Abduction: x 15  Comments: Supine HEP given and reviewed completed with [place]     ASSESSMENT/PROGRESS TOWARDS GOAL:  Body structures, Functions, Activity limitations: Decreased functional mobility ; Decreased safe awareness;Decreased balance;Decreased endurance;Decreased strength;Decreased coordination  Assessment: Patient continues to show improved safety with SPC vs SBQC. Patient shows a good understanding of supine HEP  PT Education: Home Exercise Program  Patient Education: Seated HEP given and reviewed completed with [place] handout provided; No family training completed to this point    Goals:  Long term goals  Long term goal 1: Pt will demonstrate bed mobility with indep - Met  Long term goal 2: Pt will perform various functional transfers with indep- Met  Long term goal 3: Pt will ambulate 70ft with 2ww and indep with appropriate prosthesis fit- [address] 76' with SPC  Long term goal 4: Pt will ascend/descend 4 steps with handrails and supervision to navigate in community/shops in [place]- Met [place]qu[place] x 12 steps  Long term goal 5: Pt will perform TUG </=15.0 second to demonstrate decreased fall risk Met -13.7    PLAN OF CARE/Safety:   Safety Devices  Type of devices:  All fall risk precautions in place      Therapy Time:   Individual   Time In 1330   Time Out 1420   Minutes 50     Therapy Time   Individual Concurrent Group Co-treatment   Time In 1544         Time Out 1554         Minutes 10          Completed missed time due to injections  Minutes: 60      Transfer/Bed mobility training: 15      Gait trainin     Therapeutic ex: [address] PTA, 21 at 4:15 PM

## 2021-09-17 VITALS
SYSTOLIC BLOOD PRESSURE: 132 MMHG | TEMPERATURE: 98.4 F | BODY MASS INDEX: 38.88 KG/M2 | OXYGEN SATURATION: 98 % | DIASTOLIC BLOOD PRESSURE: 83 MMHG | HEIGHT: 61 IN | WEIGHT: 205.91 LBS | HEART RATE: 83 BPM | RESPIRATION RATE: 16 BRPM

## 2021-09-17 PROCEDURE — 97530 THERAPEUTIC ACTIVITIES: CPT

## 2021-09-17 PROCEDURE — 97110 THERAPEUTIC EXERCISES: CPT

## 2021-09-17 PROCEDURE — 6370000000 HC RX 637 (ALT 250 FOR IP): Performed by: INTERNAL MEDICINE

## 2021-09-17 PROCEDURE — 99239 HOSP IP/OBS DSCHRG MGMT >30: CPT | Performed by: PHYSICAL MEDICINE & REHABILITATION

## 2021-09-17 PROCEDURE — 97116 GAIT TRAINING THERAPY: CPT

## 2021-09-17 RX ORDER — HYDROCODONE BITARTRATE AND ACETAMINOPHEN 5; 325 MG/1; MG/1
1 TABLET ORAL EVERY 4 HOURS PRN
Qty: 18 TABLET | Refills: 0 | Status: SHIPPED | OUTPATIENT
Start: 2021-09-17 | End: 2021-09-24

## 2021-09-17 RX ADMIN — ASPIRIN 81 MG: 81 TABLET, COATED ORAL at 07:50

## 2021-09-17 RX ADMIN — ASPRIN AND EXTENDED-RELEASE DIPYRIDAMOLE 1 CAPSULE: 25; 200 CAPSULE ORAL at 07:51

## 2021-09-17 RX ADMIN — BUPROPION HYDROCHLORIDE 300 MG: 300 TABLET, FILM COATED, EXTENDED RELEASE ORAL at 10:50

## 2021-09-17 RX ADMIN — Medication 1 CAPSULE: at 07:51

## 2021-09-17 RX ADMIN — LEVOTHYROXINE SODIUM 50 MCG: 0.03 TABLET ORAL at 07:50

## 2021-09-17 RX ADMIN — CARVEDILOL 25 MG: 25 TABLET, FILM COATED ORAL at 07:51

## 2021-09-17 RX ADMIN — GABAPENTIN 600 MG: 300 CAPSULE ORAL at 13:01

## 2021-09-17 RX ADMIN — Medication 1000 UNITS: at 07:51

## 2021-09-17 RX ADMIN — GABAPENTIN 600 MG: 300 CAPSULE ORAL at 07:51

## 2021-09-17 ASSESSMENT — PAIN SCALES - GENERAL
PAINLEVEL_OUTOF10: 3
PAINLEVEL_OUTOF10: 0
PAINLEVEL_OUTOF10: 0

## 2021-09-17 ASSESSMENT — PAIN DESCRIPTION - ORIENTATION: ORIENTATION: LEFT

## 2021-09-17 ASSESSMENT — PAIN DESCRIPTION - PAIN TYPE: TYPE: CHRONIC PAIN

## 2021-09-17 ASSESSMENT — PAIN DESCRIPTION - LOCATION: LOCATION: HIP

## 2021-09-17 ASSESSMENT — PAIN DESCRIPTION - DESCRIPTORS: DESCRIPTORS: ACHING

## 2021-09-17 NOTE — PROGRESS NOTES
Physical Therapy Rehab Treatment Note  Facility/Department: [facility]  Room: [facility]       NAME: Haley Antonio  : 1945 (64 y.o.)  MRN: [id number]  CODE STATUS: Full Code    Date of Service: 2021  Chart Reviewed: Yes  Patient assessed for rehabilitation services?: Yes  Family / Caregiver Present: No  Diagnosis: Impaired mobility and ADLs d/t vertebrobasilar transient ischemia    Restrictions:  Restrictions/Precautions: Fall Risk       SUBJECTIVE: Subjective: My injection took some of the pain away  Response To Previous Treatment: Patient with no complaints from previous session. Pain Screening  Patient Currently in Pain: Yes  Pre Treatment Pain Screening  Pain at present: 3  Scale Used: Numeric Score  Intervention List: Patient able to continue with treatment    Post Treatment Pain Screening:  Pain Assessment  Pain Assessment: 0-10  Pain Level: 3  Pain Type: Chronic pain  Pain Location: Hip  Pain Orientation: Left  Pain Descriptors: Aching    OBJECTIVE:   Follows Commands: Within Functional Limits    Bed mobility  Bridging: Independent  Rolling to Left: Independent  Rolling to Right: Independent  Supine to Sit: Independent  Sit to Supine: Independent  Scooting: Independent    Transfers  Sit to Stand: Independent  Stand to sit: Independent  Bed to Chair: Independent    Ambulation  Ambulation?: Yes  More Ambulation?: No  Ambulation 1  Surface: level tile;uneven;carpet  Device: Single point cane  Other Apparatus: AFO; Left (R BK prosthesis)  Assistance: Modified Independent  Quality of Gait: Reciprocal pattern steady pace safe negotiation of turns no LOB  Distance: 150'    Stairs/Curb  Stairs?: Yes  Stairs  # Steps : 4  Stairs Height: 6\"  Rails: Right ascending  Device: Single pt cane  Assistance: Modified independent   Comment: non reciprocal pattern ascending/ descending    Exercises  Hip Flexion: x 20  Hip Abduction: x 15  Knee Long Arc Quad: x 20     ASSESSMENT/PROGRESS TOWARDS GOALS:  Body structures, Functions, Activity limitations: Decreased functional mobility ; Decreased safe awareness;Decreased balance;Decreased endurance;Decreased strength;Decreased coordination  Assessment: Patient consistent with gait transfers and Bed mobility reviewed Seated HEP and completes independently    Goals:  Long term goals  Long term goal 1: Pt will demonstrate bed mobility with indep- Met  Long term goal 2: Pt will perform various functional transfers with indep- Met  Long term goal 3: Pt will ambulate 70ft with 2ww and indep with appropriate prosthesis fit- [address] 76' with SPC  Long term goal 4: Pt will ascend/descend 4 steps with handrails and supervision to navigate in community/shops in [place]- Met [place]qu[place] x 12 steps  Long term goal 5: Pt will perform TUG </=15.0 second to demonstrate decreased fall risk Met 13.7 sec    PLAN OF CARE/Safety:   Safety Devices  Type of devices:  All fall risk precautions in place      Therapy Time:   Individual   Time In 0900   Time Out 0930   Minutes 30     Minutes: 30      Transfer/Bed mobility trainin      Gait training:  15     Therapeutic ex: Leah Méndez PTA, 21 at 12:18 PM

## 2021-09-17 NOTE — DISCHARGE SUMMARY
Subjective: The patient complains of  moderate to severe acute     Vacillating dizziness and general difficulty with ambulation due to right BKA and severe peripheral neuropathy partially relieved by rest, PT, OT, medications and exacerbated by recent illness and exertion. I am concerned about patients medical complexities and current active problems including bleeding risk on the Lovenox which she no longer needs because of her increased activity. I have taken her off of the Lovenox. I have added back Norco which she used to take long-term for her chronic osteoarthritic and Charcot joint related pain. She seems to be tolerating it well. [address] Course: The patient was admitted to the Rehabilitation Unit to address ADL and mobility deficits. The patient was enrolled in acute PT, OT program.  Weekly team meetings were held to assess functional progress toward their goals. The patient's medical issues were addressed. The patient progressed in the rehab program and is now ready for discharge. Refer to FIM scores summary report for detailed functional status. Greater than 35 minutes was spent on coordinating patients discharge including follow-up care, medications and patient/family education. Extended time needed because of the potential use of opiate medications are high risk medications and a high risk population individual.  Patient and family were instructed to use lowest effective dose of these medications and slowly titrate off over the next 2 to 4 weeks. They are not to combine opiates with sedatives. I reviewed her [facility] prescription monitoring service data sheets in hopes of eliminating polypharmacy and weaning to the lowest effective dose of pain medications and eliminating the concomitant use of benzodiazepines. I see no medications of concern. I see no habits of combining sedatives and narcotics.         She complains of increased left hip pain   -proving tenderness. Skin:   Intact to general survey,  Right BKA-old and well-healed no ulcerations on the stump. Rehabilitation:  Physical therapy: FIMS:  Bed Mobility: Scooting: Independent    Transfers: Sit to Stand: Independent  Stand to sit: Independent  Bed to Chair: Independent, Ambulation 1  Surface: uneven, level tile, carpet  Device: Single point cane  Other Apparatus: AFO, Left (L BK prosthesis)  Assistance: Modified Independent  Quality of Gait: Reciprocal pattern steady pace safe negotiation of turns no LOB  Gait Deviations: Slow Blank, Increased DEYSI, Decreased step length  Distance: 76'  Comments: Trials of gait with walking to stairs and ascending stairs to simulate gait in community, Stairs  # Steps : 12  Stairs Height: 6\"  Rails: Right ascending  Device: Single pt cane  Assistance: Modified independent   Comment: non reciprocal pattern ascending/ descending    FIMS:  ,  , Assessment: Patient continues to show improved safety with SPC vs SBQC. Patient shows a good understanding of supine HEP    Occupational therapy: FIMS:  Eatin - Feeds self with adaptive equipment/dentures and/or feeds self with modified diet and/or performs own tube feeding  Groomin - Requires setup/cues to do all tasks  Bathin - Able to bathe 8-9 areas  Dressing-Upper: 5 - Requires setup/supervision/cues and/or requires assist with presthesis/brace only  Dressing-Lower: 4 - Requires assist with buttons/zippers/shoelaces and/or assist with shoes only  Toiletin - Did not occur  Toilet Transfer: 4 - Requires steadying assistance only < 25% assist  Shower Transfer: 3 - Moderate assistance, pt. expends 50%-74% effort,  , Assessment: Pt is a 63 y/o female who lives alone, presenting to [facility] with above listed performance deficits which are limiting her ability to return home at an independent level.  Pt would benefit from continued occupational therapy services to improve functional abilities for safe and independent return home.    Speech therapy: FIMS: Comprehension: 7 - Patient understands complex ideas (math/planning)  Expression: 7 - Patient expresses complex ideas/needs  Social Interaction: 7 - Patient has appropriate behavior/relations 100% of the time  Problem Solvin - Patient independent with complex tasks  Memory: 7 - Patient independent with meds/people/schedule      Lab/X-ray studies reviewed, analyzed and discussed with patient and staff:   No results found for this or any previous visit (from the past 24 hour(s)). Previous extensive, complex labs, notes and diagnostics reviewed and analyzed. ALLERGIES:    Allergies as of 09/10/2021 - Fully Reviewed 09/10/2021   Allergen Reaction Noted    Ciprofloxacin Itching 2011    Oxycontin [oxycodone hcl] Itching 2012      (please also verify by checking MAR)      I reviewed her [facility] prescription monitoring service data sheets in hopes of eliminating polypharmacy and weaning to the lowest effective dose of pain medications and eliminating the concomitant use of benzodiazepines. I see no medications of concern. I see no habits of combining sedatives and narcotics. \  Complex Physical Medicine & Rehab Issues Assess & Plan:   1. Severe abnormality of gait and mobility and impaired self-care and ADL's secondary to progressive weakness dt ataxia with baseline right below the knee amputation and Charcot joint left lower extremity. Functional and medical status have improved status post acute rehab at [facility] [place].  2. Bowel progressive constipation, and Bladder dysfunction monitoring neurogenic bladder:  frequent toileting, ambulate to bathroom with assistance, check post void residuals. Check for C.difficile x1 if >2 loose stools in 24 hours, continue bowel & bladder program.  Monitor bowel and bladder function. Lactinex 2 PO every AC.   MOM prn, Brown Bomb prn, Glycerin suppository prn, enema prn.  3. Severe chronic cervical thoracic and low back pain as well as generalized neuropathic and OA pain especially left hip pain: reassess pain every shift and prior to and after each therapy session, give prn Tylenol and   Norco and titrating Neurontin, modalities prn in therapy, Lidoderm, K-pad prn. Patient will need follow-up with myself or pain management of her choice. She is to come to see me but stopped when the pandemic hit. Add BenGay and other topicals as well as massage and rest to use lowest effective dose of the opiate. Add her to the schedule for left hip cortisone injection under ultrasound guidance. 4. Skin healing and breakdown risk:  continue pressure relief program.  Daily skin exams and reports from nursing. 5. Severe fatigue due to nutritional and hydration deficiency: Add vitamin B12 vitamin D and CoQ10 continue to monitor I&Os, calorie counts prn, dietary consult prn. Add healthy HS snack. Add B12 and B12 shots spread therapy over 7-day window as needed. 6. Acute episodic insomnia with situational adjustment disorder:  prn Ambien, monitor for day time sedation. Add HS \"Tuck In\"  7. Falls risk elevated:  patient to use call light to get nursing assistance to get up, bed and chair alarm. 8. Elevated DVT risk: progressive activities in PT, continue prophylaxis MERRITT hose, elevation and patient to wean off of her Lovenox. 9. Complex discharge planning: I will finish her medication simplification education and patient family education as we progressed toward her pending discharge 9/17/2021. She will be home alone with help from her neighbors and home health care. SP  weekly team meeting every Monday to assess progress towards goals, discuss and address social, psychological and medical comorbidities and to address difficulties they may be having progressing in therapy. Patient and family education is in progress. The patient is to follow-up with their family physician after discharge.         Complex Active General Medical Issues that complicated care Assess & Plan:     1. Principal Problem:    Gait abnormality dt cerebrovascular insufficiancy and gait atasxia  Active Problems:    High risk medication use - 01/30/18 OARRS PM&R, 03/26/18 OARRS PM&R, 04/03/18 Urine Drug Screen positive opiates PM&R, 01/31/18 Med Contract PM&R  1. Clinical depression-emotional support provided daily, vitamin B12, encourage participation in rehabilitation support group and recreational therapy, adjust/add medications (Wellbutrin, Aggrenox, Cymbalta) Lovenox with caution  2. Benign essential HTN, CAD, PVD-continue blood signs every shift focusing on heart rate and blood pressure checks, consult hospitalist for backup medical and adjust/add medications ( aspirin, Lipitor, Coreg, Apresoline )  3. Acquired flat foot/  Charcot's arthropathy  4. Disorder of peripheral nervous system-add Neurontin and vitamin B12  5. Postlaminectomy syndrome of lumbar region, Cervical spinal cord injury, Chronic low back pain with sciatica  6. Hx of BKA, right   7. Vitamin D deficiency-treat vitamin D  8.  Hypothyroidism-monitor vital signs titrate Synthroid           Clyde Tucker D.O., PM&R     Attending    [address]

## 2021-09-17 NOTE — PROGRESS NOTES
Pt denied any pain. Walks with a walker SBA. Bilateral Mastectomy- vitals to be taken on forearms. Stress incontinence, continent of bowel. Wears an external catheter at HS. LBM 9/16/21. Call light within reach and bed alarm on. Pt slept well.  Electronically signed by Carina Candelaria RN on 9/17/2021 at 5:23 AM

## 2021-09-17 NOTE — PROGRESS NOTES
 SPINE SURGERY      cervical and lumbar       Restrictions  Restrictions/Precautions  Restrictions/Precautions: Fall Risk    Objective    Bed mobility  Bridging: Independent  Rolling to Left: Independent  Rolling to Right: Independent  Supine to Sit: Independent  Sit to Supine: Independent  Scooting: Independent    Transfers  Sit to Stand: Independent  Stand to sit: Independent  Bed to Chair: Independent  Car Transfer: Independent  Comment: VCs for safe approach to chair    Ambulation  Ambulation?: Yes  More Ambulation?: No  Ambulation 1  Surface: level tile, uneven, carpet  Device: Single point cane  Other Apparatus: AFO, Left (R BK prosthesis)  Assistance: Modified Independent  Quality of Gait: Reciprocal pattern steady pace safe negotiation of turns no LOB  Gait Deviations: Slow Blank, Increased DEYSI, Decreased step length  Distance: 150'  Comments: Trials of gait with walking to stairs and ascending stairs to simulate gait in community    Stairs/Curb  Stairs?: Yes  Stairs  # Steps : 4  Stairs Height: 6\"  Rails: Right ascending  Device: Single pt cane  Assistance: Modified independent   Comment: non reciprocal pattern ascending/ descending    Wheelchair Activities  Wheelchair Type: Standard  Level of Assistance for pressure relief activities: Independent  Wheelchair Parts Management: Yes  Left Leg Rest Level of Assistance: Stand by assistance  Right Leg Rest Level of Assistance: Stand by assistance  Left Brakes Level of Assistance: Stand by assistance  Right Brakes Level of Assistance: Stand by Assist  Propulsion: Yes  Propulsion 1  Propulsion: Manual  Method: RUE, LUE, RLE, LLE  Level of Assistance: Supervision  Distance: difficulty maneuvering obstacles/furnature in room, increased time for performance. Pt limited by strength and UE/hand deficits      Outcomes Measures:  Timed Up and Go: 13.7       Pt/ family education/training: Pt has been educated throughout her stay in safe mobility.  She demonstrated

## 2021-09-17 NOTE — PROGRESS NOTES
Occupational Therapy  Facility/Department: [facility]  Daily Treatment Note  NAME: Ray Cornelius  : 1945  MRN: [id number]    Date of Service: 2021    Discharge Recommendations:  Continue to assess pending progress       Assessment      Activity Tolerance  Activity Tolerance: Patient Tolerated treatment well  Safety Devices  Safety Devices in place: Yes  Type of devices: All fall risk precautions in place  Restraints  Initially in place: No         Patient Diagnosis(es): The primary encounter diagnosis was Neck pain on right side. Diagnoses of High risk medication use - 18 OARRS PM&R, 18 OARRS PM&R, 18 Urine Drug Screen positive opiates PM&R, 18 Med Contract PM&R, Arthritis, neuropathic, Osteoarthritis of ankle or foot, unspecified laterality, Cervical post-laminectomy syndrome, Acute right-sided low back pain with sciatica, sciatica laterality unspecified, and Failed back syndrome of lumbar spine were also pertinent to this visit. has a past medical history of Arthritis, Blood transfusion, CAD (coronary artery disease), Cancer ([address] Charcot's joint, Chest pain, Colitis, DDD (degenerative disc disease), lumbar, Depression, Disorder of peripheral nervous system, Dyspnea, Family history of coronary artery disease, History of blood transfusion, Hx of right BKA ([address] Hyperlipidemia, Hypertension, Hypothyroid, Lobular breast cancer ([address] NSTEMI (non-ST elevated myocardial infarction) ([address] Obesity, Paroxysmal atrial fibrillation ([address] and S/P BKA (below knee amputation) ([address] has a past surgical history that includes Hysterectomy; Appendectomy; back surgery; Breast surgery; Leg amputation below knee (Right, 2011); Spine surgery; Blepharoplasty (2013); skin biopsy; Cardiac catheterization; Mastectomy (Bilateral, 2015); pr removal of breast lesion (Left, 2017); Abdomen surgery (Left, 2017); eye surgery (Bilateral, );  Colonoscopy (2010); and cystoscopy w biopsy of bladder (N/A, 06/26/2017). Restrictions  Restrictions/Precautions  Restrictions/Precautions: Fall Risk  Subjective \"I'm going home this afternoon. \"  Pt reports 0/10 pain during session. General  Chart Reviewed: Yes  Patient assessed for rehabilitation services?: Yes  Family / Caregiver Present: No  Referring Practitioner: Dr. Tani Elizabeth  Diagnosis: Impaired mobility and ADL's d/t vertibrobasilar event      Orientation     Objective  Pt completed acts in standing to increase stand tolerance for acts completion. Pt completed large block design and take down at table top. Pt completed design in standing and required seated during take down secondary to shoulder fatigue. Plan   Plan  Times per week: 5-7 times per week for 15-90 minutes  Plan weeks: 2-3 weeks  Current Treatment Recommendations: Strengthening, Balance Training, ROM, Functional Mobility Training, Endurance Training, Wheelchair Mobility Training, Self-Care / ADL, Home Management Training  G-Code     OutComes Score                                                  AM-PAC Score             Goals  Patient Goals   Patient goals : To return home with improved strength, endurance and balance to vacuum and steam clean my carpets.        Therapy Time   Individual Concurrent Group Co-treatment   Time In 1000         Time Out 1030         Minutes 30              Therapeutic activities: 30 minutes    [address] OTR/L Electronically signed by [address] OTR/L on 7/72/70 at 1:27 PM EDT

## 2021-09-17 NOTE — PROGRESS NOTES
Progress Note  NEUROLOGY  [facility]       Patient: [facility]  Unit/Bed: T147/P420-36  YOB: 1945  MRN: [id number]  Acct: [de-identified]   Admitting Diagnosis: Impaired mobility and ADLs [Z74.09, Z78.9]  Admit Date:  9/10/2021  Hospital Day: 6    Subjective:    Patient is seen in follow-up and continuity of care from her recent hospitalization, she is transition to rehab and is doing well participating. Diplopia has resolved. Dizziness is improving    Patient Seen, Chart, Labs, Radiology studies, and Consults reviewed. Objective:   /83   Pulse 83   Temp 98.4 °F (36.9 °C) (Oral)   Resp 16   Ht 5' 1\" (1.549 m)   Wt 205 lb 14.6 oz (93.4 kg)   SpO2 98%   BMI 38.91 kg/m²   No intake or output data in the 24 hours ending 09/17/21 1139    Physical Exam:  MENTAL STATE: Orientation was normal to time, place and person. Language testing was normal for comprehension, repetition, expression, and naming. General fund of knowledge was intact.      CRANIAL NERVES: Are normal  CN 2 Visual fields full to confrontation. CN 3, 4, 6 Pupils round, equally reactive to light. No ptosis. EOM full range  CN 5 Facial sensation intact bilaterally. CN 7 Normal and symmetric facial strength. Nasolabial folds symmetric. CN 8 Hearing intact to finger rub bilaterally. CN 9 Palate elevates symmetrically. CN 11 Bilaterally normal strength of shoulder shrug and neck turning. CN 12 Tongue midline, with normal bulk and strength; no fasciculations.      MOTOR:Muscle strength was 5/5 in distal and proximal muscles in both upper and lower extremities right proximal lower extremity (BKA on the right)        COORDINATION: Coordination exam was normal. In both upper extremities, finger-nose-finger was intact without dysmetria.     Medications:    lidocaine  3 patch TransDERmal Daily    atorvastatin  80 mg Oral Nightly    aspirin  81 mg Oral Daily    aspirin-dipyridamole  1 capsule Oral BID    b complex vitamins  1 capsule Oral Daily    buPROPion  300 mg Oral Daily    carvedilol  25 mg Oral BID WC    DULoxetine  30 mg Oral QPM    gabapentin  600 mg Oral 4x Daily    levothyroxine  50 mcg Oral Daily    Vitamin D  1,000 Units Oral Daily     Continuous Infusions:  PRN Meds:HYDROcodone 5 mg - acetaminophen, ondansetron **OR** ondansetron, acetaminophen, hydrALAZINE    LABS  CBC: No results for input(s): WBC, HGB, PLT in the last 72 hours. BMP:  No results for input(s): NA, K, CL, CO2, BUN, CREATININE, GLUCOSE in the last 72 hours. TSH:  No results for input(s): TSH in the last 72 hours. B12:  No results for input(s): Rilla Ny in the last 72 hours. Vit. D: No results for input(s): VITD25 in the last 72 hours. Lipids:   Lab Results   Component Value Date    CHOL 105 09/08/2021    CHOL 137 02/03/2020    CHOL 105 02/01/2019     Lab Results   Component Value Date    TRIG 124 09/08/2021    TRIG 156 (H) 02/03/2020    TRIG 129 02/01/2019     Lab Results   Component Value Date    HDL 40 09/08/2021    HDL 43 07/08/2021    HDL 43 02/03/2020     Lab Results   Component Value Date    LDLCALC 40 09/08/2021    LDLCALC 35 07/08/2021    LDLCALC 63 02/03/2020     No results found for: LABVLDL, VLDL  Lab Results   Component Value Date    CHOLHDLRATIO 3.7 02/19/2013    CHOLHDLRATIO 4.8 04/06/2012       Ammonia:No results for input(s): AMMONIA in the last 72 hours. LFT: No results for input(s): AST, ALT, ALB, BILITOT, ALKPHOS in the last 72 hours. Urine: No results for input(s): Rolin Joshua, GLUCOSEU, BLOODU, PHUR, PROTEINU, UROBILINOGEN, LEUKOCYTESUR in the last 72 hours. Invalid input(s):  Donna Martinez,  SPECGRAV,  NITRU     Assessment/Plan:  The patient has been having vertebrobasilar transient ischemic events since Sept 6, 2021. For the last 2 weeks that she has been feeling somewhat weaker. Noelle Valiente has a feeling of imbalance for the last several months which got worse on September 6.   Diplopia could be part of a vertebrobasilar ischemic event along with a feeling of imbalance or, dizziness, sensation of showed vertigo dizziness episodes  History of peripheral neuropathy probably due to hypothyroidism  Right Charcot joint she has had a right below-knee amputation.  According patient her right foot and ankle were getting infected often  Depression  Overweight  Hypertension  Hyperlipidemia  Right breast carcinoma for which she has a bilateral mastectomy 2017 she did not need chemo or therapy or radiation.  She was on tamoxifen.  Dr. Aneta Dakins recommended discontinuation of tamoxifen this admission in the setting of her TIAs  History of non-ST elevation myocardial infarction she has been on aspirin. History of coronary disease  History of paroxysmal atrial fibrillation  Muscle contraction type headache, Tylenol, Motrin help her        Recommendations:     The patient was started on Aggrenox September 8 she is doing a little better  Continue aspirin 81 mg (cardiac indication)  Additional lab work thryglobulin is normal, vitamin D was normal and SPEP did not show a paraproteinemia therefore AMOS was not pursued. B12, folate, TSH, HgbA1C (5.5) normal  MRI of the brain had shown change of small vessel cerebrovascular disease. Curtis Fail is no acute infarction  MR venogram had shown hypoplastic left transverse and sigmoid sinuses.  There is no evidence of venous thrombus.  No hemorrhages were seen  Patient has a history of hypothyroidism with a peripheral neuropathy, which was sent for any evidence of Graves' eye disease. Curtis Angelo is no evidence of orbital pseudotumor  Dr. Aneta Dakins has recommended tamoxifen discontinuation  Cardiology does not have inpatient input wants outpatient cardiologist to reassess need for anti-coagulation. OK for discharge from neurology stand point once rehab team makes their determination.    Thank you for the consult  We shall follow the patient with you      Electronically signed by Milo Xiomara Padilla MD

## 2021-09-20 ENCOUNTER — CARE COORDINATION (OUTPATIENT)
Dept: CARE COORDINATION | Age: 76
End: 2021-09-20

## 2021-09-20 ENCOUNTER — TELEPHONE (OUTPATIENT)
Dept: FAMILY MEDICINE CLINIC | Age: 76
End: 2021-09-20

## 2021-09-20 DIAGNOSIS — R26.9 ABNORMALITY OF GAIT AND MOBILITY: Primary | ICD-10-CM

## 2021-09-20 PROCEDURE — 1111F DSCHRG MED/CURRENT MED MERGE: CPT | Performed by: STUDENT IN AN ORGANIZED HEALTH CARE EDUCATION/TRAINING PROGRAM

## 2021-09-20 NOTE — CARE COORDINATION
Michael 45 Transitions Initial Follow Up Call    Call within 2 business days of discharge: Yes    Patient: Nette Encinas Patient : 1945   MRN: [id number]  Reason for Admission: 9/10-21 Gait Abnormality d/t Cerebrovascular Insufficiency and Gait Ataxia  Discharge Date: 21 RARS: Readmission Risk Score: 16      Last Discharge [facility]       Complaint Diagnosis Description Type Department Provider    9/10/21  Neck pain on right side . .. Admission (Discharged) [address] [facility], DO           Spoke with: Patient    Shelly Alonzo reports Left hip pain, relieved by OTC ibuprofen. Pt has PRN [place] if needed. Pt states that she has had occasional stutter and aphasia which started a couple of weeks ago. Pt states that lightheadedness and weakness are at baseline. Pt ambulates with [employer], has [facility] for PT/OT and [facility] 2hrs weekly. Discussed Falls Precautions. Pt v/u. Pt has VV with Dr Anitra Bhatt 21. PCP f/u scheduled 10/18/21. Medication Review completed, 1111F order placed. Pt stated that she did not fill Aggrenox d/t cost and is asking if there is a more affordable option. CTN will route message to PCP. Pt has [facility] and friends provide transportation. [place] [facility] [place]    Non-face-to-face services provided:  Obtained and reviewed discharge summary and/or continuity of care documents     Activity Instructions:  Up as tolerated with wheeled walker  Diet:  Regular Low Sodium    Transitions of Care Initial Call    Was this an external facility discharge? No Discharge Facility:     Challenges to be reviewed by the provider   Additional needs identified to be addressed with provider: Yes  medications-Aggrenox too costly       Method of communication with provider : chart routing      Advance Care Planning:   Does patient have an Advance Directive: not addressed. Was this a readmission?  No  Patient stated reason for admission: dizziness  Patients top risk factors for readmission: functional physical ability and medical condition-Right BKA, Chronic Back Pain, Left Hip Pain    Care Transition Nurse (CTN) contacted the patient by telephone to perform post hospital discharge assessment. Verified name and  with patient as identifiers. Provided introduction to self, and explanation of the CTN role. CTN reviewed discharge instructions, medical action plan and red flags with patient who verbalized understanding. Patient given an opportunity to ask questions and does not have any further questions or concerns at this time. Were discharge instructions available to patient? Yes. Reviewed appropriate site of care based on symptoms and resources available to patient including: PCP, Specialist, When to call 911 and [address]. The patient agrees to contact the PCP office for questions related to their healthcare. Medication reconciliation was performed with patient, who verbalizes understanding of administration of home medications. Advised obtaining a 90-day supply of all daily and as-needed medications. Covid Risk Education     Educated patient about risk for severe COVID-19 due to risk factors according to CDC guidelines. LPN CC reviewed discharge instructions, medical action plan and red flag symptoms with the patient who verbalized understanding. Discussed COVID vaccination status: Yes. Education provided on COVID-19 vaccination as appropriate. Discussed exposure protocols and quarantine with CDC Guidelines. Patient was given an opportunity to verbalize any questions and concerns and agrees to contact LPN CC or health care provider for questions related to their healthcare. Reviewed and educated patient on any new and changed medications related to discharge diagnosis. Was patient discharged with a pulse oximeter? No Discussed and confirmed pulse oximeter discharge instructions and when to notify provider or seek emergency care. LPN CC provided contact information.  Plan for follow-up call in 5-7 days based on severity of symptoms and risk factors.   Plan for next call: symptom management-pain, weakness, dizziness, aphasia, follow up appointment-PCPShira and medication management-Aggrenox replacement          Care Transitions 24 Hour Call    Do you have any ongoing symptoms?: Yes  Patient-reported symptoms: Weakness, Pain, Other (Comment: dizziness, aphasia, Left hip pain)  Interventions for patient-reported symptoms: Notified PCP/Physician  Do you have a copy of your discharge instructions?: Yes  Do you have all of your prescriptions and are they filled?: No  Have you been contacted by a [address]?: No  Have you scheduled your follow up appointment?: Yes  How are you going to get to your appointment?: Car - family or friend to transport  Were you discharged with any Home Care or Post Acute Services: Yes  Post Acute Services: Home Health (Comment: [address] [place] [address])  Do you feel like you have everything you need to keep you well at home?: Yes  Care Transitions Interventions         Follow Up  Future Appointments   Date Time Provider Saurabh Delvalle   9/23/2021  1:15 PM Rowdy Amaya, [address] [place] [address]   10/18/2021  1:00 PM Edith Shah MD [facility]   12/22/2021 11:00 AM Lisa Villarreal DO [address]

## 2021-09-20 NOTE — TELEPHONE ENCOUNTER
[facility]  Patient admitted to [facility] services over the weekend     FYI

## 2021-09-21 ENCOUNTER — TELEPHONE (OUTPATIENT)
Dept: FAMILY MEDICINE CLINIC | Age: 76
End: 2021-09-21

## 2021-09-21 NOTE — TELEPHONE ENCOUNTER
ovi hernandez [facility]  Ph. [phone]    Expressed difficulty with long term memory    Also Difficulty articulating what she wants to say- dif when she actually speaks    Wanting to know if speech therapy an be ordered? Order can be verbal or sent over.

## 2021-09-23 ENCOUNTER — VIRTUAL VISIT (OUTPATIENT)
Dept: PHYSICAL MEDICINE AND REHAB | Age: 76
End: 2021-09-23
Payer: MEDICARE

## 2021-09-23 DIAGNOSIS — M19.079 OSTEOARTHRITIS OF ANKLE OR FOOT, UNSPECIFIED LATERALITY: ICD-10-CM

## 2021-09-23 DIAGNOSIS — Z79.899 HIGH RISK MEDICATION USE: ICD-10-CM

## 2021-09-23 DIAGNOSIS — M54.2 NECK PAIN ON RIGHT SIDE: ICD-10-CM

## 2021-09-23 DIAGNOSIS — M14.60 ARTHRITIS, NEUROPATHIC: ICD-10-CM

## 2021-09-23 DIAGNOSIS — M96.1 FAILED BACK SYNDROME OF LUMBAR SPINE: ICD-10-CM

## 2021-09-23 DIAGNOSIS — M54.40 ACUTE RIGHT-SIDED LOW BACK PAIN WITH SCIATICA, SCIATICA LATERALITY UNSPECIFIED: ICD-10-CM

## 2021-09-23 DIAGNOSIS — M96.1 CERVICAL POST-LAMINECTOMY SYNDROME: ICD-10-CM

## 2021-09-23 PROCEDURE — 1111F DSCHRG MED/CURRENT MED MERGE: CPT | Performed by: PHYSICAL MEDICINE & REHABILITATION

## 2021-09-23 PROCEDURE — 1090F PRES/ABSN URINE INCON ASSESS: CPT | Performed by: PHYSICAL MEDICINE & REHABILITATION

## 2021-09-23 PROCEDURE — 1036F TOBACCO NON-USER: CPT | Performed by: PHYSICAL MEDICINE & REHABILITATION

## 2021-09-23 PROCEDURE — G8427 DOCREV CUR MEDS BY ELIG CLIN: HCPCS | Performed by: PHYSICAL MEDICINE & REHABILITATION

## 2021-09-23 PROCEDURE — 99214 OFFICE O/P EST MOD 30 MIN: CPT | Performed by: PHYSICAL MEDICINE & REHABILITATION

## 2021-09-23 PROCEDURE — G8399 PT W/DXA RESULTS DOCUMENT: HCPCS | Performed by: PHYSICAL MEDICINE & REHABILITATION

## 2021-09-23 PROCEDURE — G8417 CALC BMI ABV UP PARAM F/U: HCPCS | Performed by: PHYSICAL MEDICINE & REHABILITATION

## 2021-09-23 PROCEDURE — 1123F ACP DISCUSS/DSCN MKR DOCD: CPT | Performed by: PHYSICAL MEDICINE & REHABILITATION

## 2021-09-23 PROCEDURE — 4040F PNEUMOC VAC/ADMIN/RCVD: CPT | Performed by: PHYSICAL MEDICINE & REHABILITATION

## 2021-09-23 RX ORDER — LEVOTHYROXINE SODIUM 0.05 MG/1
50 TABLET ORAL DAILY
Qty: 90 TABLET | Refills: 1 | Status: SHIPPED | OUTPATIENT
Start: 2021-09-23 | End: 2022-03-11

## 2021-09-23 RX ORDER — HYDROCODONE BITARTRATE AND ACETAMINOPHEN 5; 325 MG/1; MG/1
1 TABLET ORAL EVERY 8 HOURS PRN
Qty: 90 TABLET | Refills: 0 | Status: SHIPPED | OUTPATIENT
Start: 2021-09-23 | End: 2021-12-16 | Stop reason: SDUPTHER

## 2021-09-23 ASSESSMENT — ENCOUNTER SYMPTOMS
CONSTIPATION: 1
SORE THROAT: 0
ABDOMINAL PAIN: 0
BACK PAIN: 1
NAUSEA: 0
BLOOD IN STOOL: 0
COUGH: 0
STRIDOR: 0
SHORTNESS OF BREATH: 1
VOMITING: 0
EYE PAIN: 0
DIARRHEA: 0
EYE REDNESS: 0
WHEEZING: 0
PHOTOPHOBIA: 0

## 2021-09-23 NOTE — PROGRESS NOTES
TELEHEALTH EVALUATION -- Audio/Visual (During KLOTG-90 public health emergency)    Due to COVID 19 outbreak, patient's office visit was converted to a virtual visit. Patient was contacted and agreed to proceed with a virtual visit via  [email or web address] me   The risks and benefits of converting to a virtual visit were discussed in light of the current infectious disease epidemic. Patient also understood that insurance coverage and co-pays are up to their individual insurance plans. Subjective       This patient has requested an audio/video evaluation for the following concern(s):    HPI:     Back Pain, Neck Pain--relieved with Norco--no problems with overuse or abuse   Hip Pain (Bilateral. Left hip injection 9/16/21 with 20% reduction in pain.)   Wrist Pain (Bilateral)   Medication Refill (Norco refill & pill count today)   Pain (2- Without medication (Back/Hip)    Recently discharged from the hospital doing well with home health care. She unfortunately is not able to who for her Aggrenox she had several questions about the Aggrenox Dr. Soraya Sosa put her on it to prevent stroke and heart attack she also has a cardiologist Dr. Vu Figueroa I have referred her back to both of them to see if they can get a cheaper form of Aggrenox. And he was mistakenly taking more Voltaren to see if that would help thin her blood. I told her that it will not help thin her blood that it will only cause GI and renal problems at her age and we should really try to look toward getting her down if not off of that medication. She is done very well in the past with Norco using lowest effective dose without any kind of overuse or overmedication. We will go ahead and decrease her to Norco 3 a day and 2 a day on good days. She will try to come off of the NSAIDs to prevent further toxicity. Toxicology screens were unremarkable. Back Pain  This is a chronic problem. The current episode started more than 1 year ago.  The problem occurs constantly. The problem has been gradually worsening since onset. The pain is present in the lumbar spine, sacro-iliac and thoracic spine. The quality of the pain is described as aching and burning. The pain radiates to the right foot. The pain is at a severity of 9/10. The pain is severe. The pain is worse during the day. The symptoms are aggravated by bending. Stiffness is present in the morning. Associated symptoms include leg pain, numbness and weakness. Pertinent negatives include no abdominal pain, chest pain, dysuria, fever or headaches. Risk factors include sedentary lifestyle, poor posture, obesity and menopause. She has tried chiropractic manipulation, heat, analgesics, home exercises, ice, muscle relaxant, NSAIDs and walking for the symptoms. The treatment provided mild relief. Neck Pain   This is a chronic problem. The current episode started more than 1 year ago. The problem occurs constantly. The problem has been waxing and waning. The pain is associated with lifting a heavy object, a remote injury and a twisting injury. The pain is at a severity of 9/10. The pain is severe. The symptoms are aggravated by coughing. Stiffness is present in the morning. Associated symptoms include leg pain, numbness, pain with swallowing and weakness. Pertinent negatives include no chest pain, fever, headaches or photophobia. She has tried bed rest, neck support, NSAIDs, oral narcotics, home exercises, ice, muscle relaxants and heat for the symptoms. The treatment provided mild relief. Hip Pain   Associated symptoms include numbness. Wrist Pain   Associated symptoms include numbness. Pertinent negatives include no fever.              Past Medical History:   Diagnosis Date    Arthritis     Blood transfusion     CAD (coronary artery disease)     [facility]     GALLBLADDER 2009, 2011 OMENTUM    Charcot's joint     left foot    Chest pain 7/2/2015    Colitis     DDD (degenerative disc disease), lumbar 2/12/2013    Depression     Disorder of peripheral nervous system 9/1/2010    Dyspnea 7/2/2015    Family history of coronary artery disease 8/29/2014    History of blood transfusion 2011    following chemotherapy    [facility]     Hyperlipidemia     Hypertension     Hypothyroid 6/12/2015    Lobular breast cancer ([facility] 10/2015    NSTEMI (non-ST elevated myocardial infarction) ([facility] 8/29/2014    Obesity     Paroxysmal atrial fibrillation ([facility] 8/29/2014    S/P BKA (below knee amputation) ([facility]        Past Surgical History:   Procedure Laterality Date    ABDOMEN SURGERY Left 04/11/2017    EXCISION OF CHEST WALL MASS, UPDATE LABS ON ADMIT  performed by Edinson Jara MD at [facility] BLEPHAROPLASTY  02/05/2013    both eyes    BREAST SURGERY      CARDIAC CATHETERIZATION      COLONOSCOPY  01/01/2010    normal    CYSTOSCOPY W BIOPSY OF BLADDER N/A 06/26/2017    CYSTOSCOPY BLADDER BIOPSY AND FULGURATION performed by Michelle Coles MD at [address] Bilateral 2012    cataract removal with IOL implants    HYSTERECTOMY      LEG AMPUTATION BELOW KNEE Right 02/01/2011    DR Valeria Robison due to CHARCOTS    MASTECTOMY Bilateral 11/16/2015    Bilateral simple mastectomies with right SNB by DR Kathy Agosto    LA REMOVAL OF BREAST LESION Left 01/24/2017    CORE NEEDLE BIOPSY OF  LEFT BREAST MASS performed by Edinson Jara MD at [address] [place] [address]      cervical and lumbar       Social History     Socioeconomic History    Marital status:       Spouse name: None    Number of children: 2    Years of education: None    Highest education level: None   Occupational History    Occupation: retired   Tobacco Use    Smoking status: Never Smoker    Smokeless tobacco: Never Used   Vaping Use    Vaping Use: Never used   Substance and Sexual Activity    Alcohol use: Yes     Comment: social    Drug use: No    Sexual activity: Never   Other Topics Concern    None   Social History Narrative    Lives With: Alone, dtr in [place], disabled son with Down's Syndrome is in a local group home    Type of Home: [address] in [place] (she calls it an [employer]-bc they all help each other)    Home Layout: One level, Level entry    Bathroom Shower/Tub: Tub/Shower unit, Equipment: Tub transfer bench, Grab bars in shower    Bathroom Accessibility: Wheelchair accessible    Home Equipment: Quad cane, Rolling walker, Wheelchair-electric (has right BK prosthesis and Left AFO)    ADL Assistance: Independent    Homemaking Assistance: Independent (warms meals up  -  receives  MOW and groceries delivered)    [employer] Responsibilities: Yes, Meal Prep Responsibility: Secondary    Laundry Responsibility: Primary, Cleaning Responsibility: Secondary    Ambulation Assistance: Independent (last few days uses walker to get around), Transfer Assistance: Independent    Active : Yes    Additional Comments: received new prosthesis in July and has been falling since - has appointment with prosthesis next week. Pt reports prior to 8/21 she was ambulating with wheeled walker and since then she has used power w/c    Retired --still works a day a week as a  [address] [facility] Strain: Low Risk     Difficulty of Paying Living Expenses: Not hard at [employer] Insecurity: No Food Insecurity    Worried About [facility] in the Last Year: Never true   [address] [facility] in the Last Year: Never true   Transportation Needs:     Lack of Transportation (Medical):      Lack of Transportation (Non-Medical):    Physical Activity:     Days of Exercise per Week:     Minutes of Exercise per Session:    Stress:     Feeling of Stress :    Social Connections:     Frequency of Communication with Friends and Family:     Frequency of Social Gatherings with Friends and Family:  Attends [employer] Services:     Active Member of Clubs or Organizations:     Attends Club or Organization Meetings:     Marital Status:    Intimate Partner Violence:     Fear of Current or Ex-Partner:     Emotionally Abused:     Physically Abused:     Sexually Abused:        Family History   Problem Relation Age of Onset    Heart Disease Mother     Arthritis Mother     Heart Disease Father     Arthritis Father     Cancer Sister         Colon    Thyroid Disease Son     Other Son         Down's syndrome       Allergies   Allergen Reactions    Ciprofloxacin Itching    Oxycontin [Oxycodone Hcl] Itching       Review of Systems   Constitutional: Positive for activity change and fatigue. Negative for chills, diaphoresis and fever. HENT: Negative for congestion, ear discharge, ear pain, hearing loss, nosebleeds, sore throat and tinnitus. Eyes: Negative for photophobia, pain and redness. Respiratory: Positive for shortness of breath. Negative for cough, wheezing and stridor. Shortness of breath on exertion   Cardiovascular: Negative for chest pain, palpitations and leg swelling. Gastrointestinal: Positive for constipation. Negative for abdominal pain, blood in stool, diarrhea, nausea and vomiting. Endocrine: Negative for polydipsia. Genitourinary: Negative for dysuria, flank pain, frequency, hematuria and urgency. Musculoskeletal: Positive for back pain, gait problem, myalgias and neck pain. Skin: Negative for rash. Allergic/Immunologic: Positive for immunocompromised state. Negative for environmental allergies. Neurological: Positive for weakness and numbness. Negative for dizziness, tremors, seizures and headaches. Hematological: Does not bruise/bleed easily. Psychiatric/Behavioral: Negative for hallucinations and suicidal ideas. The patient is not nervous/anxious. Objective    There were no vitals filed for this visit.     No data recorded            PHYSICAL EXAMINATION:  [ INSTRUCTIONS:  \"[x]\" Indicates a positive item  \"[]\" Indicates a negative item  -- DELETE ALL ITEMS NOT EXAMINED]    [x] Alert  [x] Oriented to person/place/time      [x] No apparent distress  [x] Not toxic appearing    [x] Face complexion normal appearing [x] Sclera clear  [x] Lips are not cyanotic      [x] Breathing appears normal  [] Appears tachypneic      [] Rash on visible skin    [x] Cranial Nerves II-XII grossly intact    [x] Motor grossly intact in visible upper extremities, including stiff but intact range of motion in cervical, upper extremity and thoracic  [x] Motor grossly intact in visible lower extremities, including normal range of motion in the hips and knees for stiffness in the lumbar spine    [x] Normal Mood  [x] Not anxious appearing    [x] Not depressed appearing  [x] Not confused appearing      [x]  Good short term memory  [x]  Good long term memory    [x]  Able to follow 2-3 step commands    Due to this being a TeleHealth encounter, evaluation of the following organ systems is limited: Vitals/Constitutional/EENT/Resp/CV/GI//MS/Neuro/Skin/Heme-Lymph-Imm. ASSESSMENT/PLAN:     After a thorough review and discussion of the previous medical records, patient comprehensive medical, surgical, and family and social history, Review of Systems, their OARRS, their Screener and Opioid Assessment for Patients with Pain (SOAPP®-R), recent diagnostics, and symptomatic results to previous treatment, it is my impression that the patients is suffering with progressive and severe:     Diagnosis Orders   1. Neck pain on right side  HYDROcodone-acetaminophen (NORCO) 5-325 MG per tablet   2. High risk medication use - 01/30/18 OARRS PM&R, 03/26/18 OARRS PM&R, 04/03/18 Urine Drug Screen positive opiates PM&R, 01/31/18 Med Contract PM&R  HYDROcodone-acetaminophen (NORCO) 5-325 MG per tablet    Urine Drug Screen   3.  Arthritis, neuropathic  HYDROcodone-acetaminophen (NORCO) 5-325 MG per tablet 4. Osteoarthritis of ankle or foot, unspecified laterality     5. Cervical post-laminectomy syndrome     6. Acute right-sided low back pain with sciatica, sciatica laterality unspecified     7. Failed back syndrome of lumbar spine         I am also concerned by lifestyle and mood issues including:    Past Medical History:   Diagnosis Date    Arthritis     Blood transfusion     CAD (coronary artery disease)     [facility] ([facility]     GALLBLADDER 2009, 2011 OMENTUM    Charcot's joint     left foot    Chest pain 7/2/2015    Colitis     DDD (degenerative disc disease), lumbar 2/12/2013    Depression     Disorder of peripheral nervous system 9/1/2010    Dyspnea 7/2/2015    Family history of coronary artery disease 8/29/2014    History of blood transfusion 2011    following chemotherapy    Hx of right BKA ([facility]     Hyperlipidemia     Hypertension     Hypothyroid 6/12/2015    Lobular breast cancer ([facility] 10/2015    NSTEMI (non-ST elevated myocardial infarction) ([facility] 8/29/2014    Obesity     Paroxysmal atrial fibrillation ([facility] 8/29/2014    S/P BKA (below knee amputation) ([facility]            Given their medication, chronic pain and lifestyle and medications they are at risk for :    Falls, constipation, addiction, toxicity due to controlled/opiate medications  Loss of livelyhood due to severe pain, debility, weight gain and  vitamin D deficiency    The patient was educated regarding proper diet, fitness routine, and regulatory restrictions concerning pain medications. Previous notes, comprehensive past medical, surgical, family history, and diagnostics were reviewed. Patient education and councelling were provided regarding off label use,treatment options and medication and injection risks. Overall greater than 25 minutes spent in direct and indirect patient care.     Current and old OARRS ([place] Automated Prescription Reporting System) records reviewed, all refills reviewed since last visit, Behavioral agreement/GIRMA regulations   and Toxicology screen was reviewed with patient and is up to date. There are no current red flags. They are making good progress regarding pain relief, they are performing at a functional level regarding activities of daily living, family interactions and psychological functioning, they're not having any adverse effects or side effects from the current medications, and I see no findings of aberrant drug taking or addiction related behaviors. The patient is aware that they have a chronic pain condition and they may require opiates dosing for life. All efforts will be made to wean to the lowest effective dose. Other therapies for pain have not been effective including nonopiate medications. Injections and exercises are only partially effective. A Rx for Narcan was offered to help prevent accidental overdose. RX Monitoring 9/17/2021   Attestation -   Acute Pain Prescriptions Prescription exceeds daily limit for a specific reason. See comments or note. ;Not required given exclusionary diagnoses. ..;Severe pain not adequately treated with lower dose. Periodic Controlled Substance Monitoring Possible medication side effects, risk of tolerance/dependence & alternative treatments discussed. ;No signs of potential drug abuse or diversion identified. ;Assessed functional status. ;Obtaining appropriate analgesic effect of treatment. Chronic Pain > 50 MEDD Re-evaluated the status of the patient's underlying condition causing pain. ;Considered consultation with a specialist.;Obtained or confirmed \"Consent for Opioid Use\" on file. Chronic Pain > 80 MEDD -               Patient is currently taking:       I am having Alysha Rodriguez start on HYDROcodone-acetaminophen.  I am also having her maintain her Vitamin D, tamoxifen, Multiple Vitamins-Minerals (CENTRUM SILVER 50+WOMEN PO), b complex vitamins, acyclovir, CPAP Machine, furosemide, buPROPion, Respiratory Therapy Supplies, Blood Pressure, carvedilol, Respiratory Therapy Supplies, gabapentin, DULoxetine, levothyroxine, aspirin, atorvastatin, aspirin-dipyridamole, and HYDROcodone-acetaminophen. I also recommend the following Medications:    Orders Placed This Encounter   Medications    HYDROcodone-acetaminophen (NORCO) 5-325 MG per tablet     Sig: Take 1 tablet by mouth every 8 hours as needed for Pain (Max 3 per day) for up to 30 days. Intended supply: 30 days     Dispense:  90 tablet     Refill:  0     Reduce doses taken as pain becomes manageable        -which helps with pain and function. Otherwise, continue the current pain medications that I have prescibed. Radiologic:   Old films reviewed,     I discussed results with patients. see Follow up plans below  For any new studies. Care Everywhere Updates:  requested and reviewed. No new issues noted. Electrodiagnostic:  Previous studies requested,     I discussed results with patient. See follow-up plans for new studies. Additional history obtained from independent sourcing including patient's family and caregivers.     Labs:  Previous labs reviewed     Lab Results   Component Value Date     09/08/2021    K 3.8 09/08/2021     09/08/2021    CO2 26 09/08/2021    BUN 18 09/08/2021    CREATININE 0.76 09/08/2021    CALCIUM 9.5 09/08/2021    LABALBU 3.43 09/08/2021    LABALBU 4.3 04/06/2012    BILITOT 0.6 09/08/2021    ALKPHOS 79 09/08/2021    AST 23 09/08/2021    ALT 12 09/08/2021     Lab Results   Component Value Date    WBC 7.1 09/08/2021    RBC 4.36 09/08/2021    RBC 4.16 11/04/2011    HGB 12.9 09/08/2021    HCT 39.0 09/08/2021    MCV 89.4 09/08/2021    MCH 29.6 09/08/2021    MCHC 33.2 09/08/2021    RDW 13.7 09/08/2021     09/08/2021    MPV 8.4 08/03/2015       Lab Results   Component Value Date    LABAMPH Neg 04/03/2018    BARBSCNU Neg 04/03/2018    LABBENZ Neg 04/03/2018    CANSU Neg 04/03/2018    COCAIMETSCRU Neg 04/03/2018    PHENCYCLIDINESCREENURINE Neg 04/03/2018    DSCOMMENT see below 04/03/2018       No results found for: CODEINE, MORPHINE, ACETYLMORPHI, OXYCODONE, NOROXYCODONE, NOROXYMU, HYDRCO, NORHYDU, HYDROMO, BUPREN, NORBUPRNOR, FENTA, NORFENT, MEPERIDINE, TAPENU, TAPOSULFUR, METHADONE, LABPROP, TRAM, AMPH, METHAMP, MDMA, ECMDA    No results found for: METPHEN, PHENTERMINE, BENZOYL, ALPRAZ, ALPHAOHALPRA, CLONAZEPAM, 7AMINOCLONAZ, DIAZEP, MASOOD, OXAZ, TEMAZ, LORAZEPAM, MIDAZOLAM, ZOLPIDEM, JAIME, ETG, MARIJMET, PCP, PAINMGTDRUGP, EERPAINMGTPA, LABCREA      , I discussed results with patient. See follow-up plans for new studies. Therapies:  HEP-gentle stretching and relaxation techniques-demonstrated with patient-they are to do them twice a day. They are also advised to make the following lifestyle changes:  Goals      Exercise 3x per week (30 min per time)      SOAPP-R GOAL LESS THAN 9      11/10/16 SCORE: 16-MODERATE RISK   01/11/17 score: 8-low risk   03/08/17 score: 13-moderate risk   05/11/17 score: 9-low risk  07/13/17 score: 12-moderate risk  09/13/17 score: 12-moderate risk  03/27/18 score: 12-moderate risk  6/14/18 score: 6-low-risk  2/28/19 score: 8- low risk  5/2819 score: 7- low risk       Weight < 200 lb (90.719 kg)           Injections or Epidurals:  Injection options were discussed. Call as needed Monthly trigger point injections add vitamin B12 when able. Patient gave verbal consent to ordered injections. See follow-up plans for planned injections. Supplements:  Vitamin D with increased dosing during the rainy months, add co-Q10 for heart health. Education was given on:   Dietary and Fitness--daily stretches and low carb diet-in chair Yoga when possible      Note controlled medication/opiate drug therapy requires intensive monitoring for toxicity and addiction. Follow up with Primary Care Physician regarding their general medical needs-minimize NSAID overuse.      Stressed the importance of following up with PCP and specialists for his/her chronic diseases, health, CV, and cancer screening and continued care. Will follow disease activity/progression and adjust therapeutic regimen to disease activity and severity. Discussed medication dosage, usage, goals of therapy, and side effects. Available test results were reviewed -Discussed findings, impression and plan with patient. An additional 5 minutes were spent outside of the patient visit to review records. Additional time spent with the patient to discuss their questions. Additional time spent with the patient devoted to discussing treatment strategy, planning, and implementation generalized musculoskeletal health plan. Patient understands above plan; questions asked and answered. Patient agrees to plan as noted above. F/U in 2-3months  At least 50% of the visit was involved in the discussion of the options for treatment. We discussed exercises, medication, interventional therapies and surgery. Healthy life style is essential with patient hard work to achieve the wellness. In addition; discussion with the patient and/or family about any of the diagnostic results, impressions and/or recommended diagnostic studies, prognosis, risks and benefits of treatment options, instructions for treatment and/or follow-up, importance of compliance with chosen treatment options, risk-factor reduction, and patient/family education. They are to follow up in 2-2 1/2 months to review medication, efficacy of injections, pill counts, OARRS check, SOAPPR assessment, review diagnostics, to review previous and future treatment plans and assess appropriateness for continued therapy. New Diagnostics  Orders Placed This Encounter   Procedures    Urine Drug Screen       Follow-up in 2-to 3 months for guarding the efficacy of current plan and future treatment. An  electronic signature was used to authenticate this note.     -Dr Aster Thompson

## 2021-09-27 ENCOUNTER — CARE COORDINATION (OUTPATIENT)
Dept: CARE COORDINATION | Age: 76
End: 2021-09-27

## 2021-09-27 RX ORDER — CLOPIDOGREL BISULFATE 75 MG/1
75 TABLET ORAL DAILY
Qty: 30 TABLET | Refills: 5 | Status: SHIPPED | OUTPATIENT
Start: 2021-09-27 | End: 2021-12-22

## 2021-09-27 NOTE — CARE COORDINATION
Will send in plavix
and medications-Aggrenox cost  Discussed follow-up appointments. If no appointment was previously scheduled, appointment scheduling offered: Yes. Is follow up appointment scheduled within 7 days of discharge? Yes. Advance Care Planning:   Does patient have an Advance Directive: reviewed and current. CTN reviewed discharge instructions, medical action plan and red flags with patient and discussed any barriers to care and/or understanding of plan of care after discharge. Discussed appropriate site of care based on symptoms and resources available to patient including: PCP, Specialist, When to call 911 and [address]. The patient agrees to contact the PCP office for questions related to their healthcare. Patients top risk factors for readmission: functional physical ability  Interventions to address risk factors: Obtained and reviewed discharge summary and/or continuity of care documents      Non-[facility] follow up appointment(s):     CTN provided contact information for future needs. Plan for follow-up call in 3-5 days based on severity of symptoms and risk factors. Plan for next call: symptom management-pain, follow up appointment-PCP and medication management-Aggrenox          Care Transitions Subsequent and Final Call    Subsequent and Final Calls  Are you currently active with any services?: Home Health  Care Transitions Interventions  Other Interventions:            Follow Up  Future Appointments   Date Time Provider Saurabh Delvalle   10/18/2021  1:00 PM Rogelio Marquez MD [facility]   12/16/2021  1:00 PM Jackeline Rice DO [address] [place] [address]   12/22/2021 11:00 AM Guero Crowell DO [address]
Detail Level: Detailed

## 2021-10-04 ENCOUNTER — CARE COORDINATION (OUTPATIENT)
Dept: CASE MANAGEMENT | Age: 76
End: 2021-10-04

## 2021-10-04 NOTE — CARE COORDINATION
[facility] Transitions Follow Up Call    10/4/2021    Patient: Mateusz Crocker  Patient : 1945   MRN: <[id number]>  Reason for Admission: 9/10-21 Gait Abnormality d/t Cerebrovascular Insufficiency and Gait Ataxia  Discharge Date: 21 RARS: Readmission Risk Score: 16    Care Transitions Follow Up Call:    Patient states she has pain in left hip neck, back and hands. Takes tylenol and if no relief takes Norco.  Patient continue to stutter when talking. Gets speech therapy, OT & PT a few times a week. Patient has fair appetite and is drinking adequate fluids. Patient states she gets Meals on Wheels. Normal bladder and bowel elimination patterns. Patient has no needs or concerns for writer at this time. Will continue to follow. Pierre Luu LPN    [phone]  [facility] / [facility] Coordinator    Needs to be reviewed by the provider   Additional needs identified to be addressed with provider No  none             Method of communication with provider : none      Care Transition Nurse (CTN) contacted the patient by telephone to follow up after admission on 2021. Verified name and  with patient as identifiers. Addressed changes since last contact: none  Discussed follow-up appointments. If no appointment was previously scheduled, appointment scheduling offered: Yes   Is follow up appointment scheduled within 7 days of discharge? Yes    Advance Care Planning:   Does patient have an Advance Directive:  reviewed and current. CTN reviewed discharge instructions, medical action plan and red flags with patient and discussed any barriers to care and/or understanding of plan of care after discharge. Discussed appropriate site of care based on symptoms and resources available to patient including: When to call 911. The patient agrees to contact the PCP office for questions related to their healthcare.      Patients top risk factors for readmission: functional cognitive ability, functional physical ability, lack of knowledge about disease, medical condition-9/10-9/17/21 Gait Abnormality d/t Cerebrovascular Insufficiency and Gait Ataxia and medication management  Interventions to address risk factors: Obtained and reviewed discharge summary and/or continuity of care documents    Non-[facility] follow up appointment(s): 9/23/2021     CTN provided contact information for future needs. Plan for follow-up call in 5-7 days based on severity of symptoms and risk factors. Plan for next call: symptom management-9/10-9/17/21 Gait Abnormality d/t Cerebrovascular Insufficiency and Gait Ataxia    Care Transitions Subsequent and Final Call    Subsequent and Final Calls  Do you have any ongoing symptoms?: Yes  Patient-reported symptoms: Other, Pain  Have your medications changed?: No  Do you have any questions related to your medications?: No  Do you currently have any active services?: Yes  Are you currently active with any services?: Home Health  Do you have any needs or concerns that I can assist you with?: No  Identified Barriers: Impairment  Care Transitions Interventions  Other Interventions:            Follow Up  Future Appointments   Date Time Provider Saurabh Delvalle   10/18/2021  1:00 PM Cintia Roberts MD [facility]   12/16/2021  1:00 PM Titi Jones DO [address] [place] [address]   12/22/2021 11:00 AM Tre Viramontes DO [place], [place]

## 2021-10-11 ENCOUNTER — TELEPHONE (OUTPATIENT)
Dept: FAMILY MEDICINE CLINIC | Age: 76
End: 2021-10-11

## 2021-10-11 NOTE — TELEPHONE ENCOUNTER
Who started her on Plavix and why?   It would seem logical to stop it but we need to know a little bit more about why

## 2021-10-11 NOTE — TELEPHONE ENCOUNTER
Monique Nance from [facility] hh calling, pt is breaking out in hives, thinks it is from the plavix, pt just started taking the plavix last week, is taking benadryl. NKA to medications. Please advise.    Pt's phone number is [phone]

## 2021-10-12 ENCOUNTER — CARE COORDINATION (OUTPATIENT)
Dept: CASE MANAGEMENT | Age: 76
End: 2021-10-12

## 2021-10-12 NOTE — TELEPHONE ENCOUNTER
Spoke to pt who states that she will call to set up appt with neurology , Pt states that she may end up just waiting it out. States that the hives are starting to go away. Pt aware to call back if assistance is needed.

## 2021-10-12 NOTE — TELEPHONE ENCOUNTER
Dictation error. When she scheduled to see neurology as a follow-up for her TIA?  They need to comment on her use of Plavix for what she should be switched to if she is having allergic reaction

## 2021-10-12 NOTE — CARE COORDINATION
[facility] Transitions Follow Up Call    10/12/2021    Patient: Josse Andersen  Patient : 1945   MRN: <[id number]>  Reason for Admission: 9/10-21 Gait Abnormality d/t Cerebrovascular Insufficiency and Gait Ataxia  Discharge Date: 21 RARS: Readmission Risk Score: 16    Care Transitions Follow Up Call:    Patient states she is always in pain, especially back pain. Takes Tylenol then if no relief takes Norco. Uses walker to ambulate. Also Wheelchair. Continues to get Meals on wheels - eating well. Developed Hives a week after starting Plavix. Is taking Benadryl with relief. Hives getting smaller. Continues Plavix. Patient has no needs or concerns for writer at this time. Will continue to follow. Juan Funez LPN    [phone]  Torsten Erickson / [facility] Coordinator      Needs to be reviewed by the provider   Additional needs identified to be addressed with provider No  none             Method of communication with provider : none      Care Transition Nurse (CTN) contacted the patient by telephone to follow up after admission on 2021. Verified name and  with patient as identifiers. Addressed changes since last contact: none  Discussed follow-up appointments. If no appointment was previously scheduled, appointment scheduling offered: Yes   Is follow up appointment scheduled within 7 days of discharge? Yes    Advance Care Planning:   Does patient have an Advance Directive:  reviewed and current. CTN reviewed discharge instructions, medical action plan and red flags with patient and discussed any barriers to care and/or understanding of plan of care after discharge. Discussed appropriate site of care based on symptoms and resources available to patient including: When to call 911. The patient agrees to contact the PCP office for questions related to their healthcare.      Patients top risk factors for readmission: lack of knowledge about disease, medical condition-9/10-9/17/21 Gait Abnormality d/t Cerebrovascular Insufficiency and Gait Ataxia and medication management  Interventions to address risk factors: Obtained and reviewed discharge summary and/or continuity of care documents    Non-[facility] follow up appointment(s): 9/23/2021    CTN provided contact information for future needs. Plan for follow-up call in 7-10 days based on severity of symptoms and risk factors. Plan for next call: self management-9/10-9/17/21 Gait Abnormality d/t Cerebrovascular Insufficiency and Gait Ataxia  Care Transitions Subsequent and Final Call    Subsequent and Final Calls  Do you have any ongoing symptoms?: Yes  Patient-reported symptoms: Other, Pain  Have your medications changed?: Yes  Patient Reports: Started Plavix  Do you have any questions related to your medications?: No  Do you currently have any active services?: Yes  Are you currently active with any services?: Home Health  Do you have any needs or concerns that I can assist you with?: No  Identified Barriers: None  Care Transitions Interventions  Other Interventions:            Follow Up  Future Appointments   Date Time Provider Saurabh Delvalle   10/18/2021  1:00 PM Liz Coburn MD [address]   12/16/2021  1:00 PM Yael Gonzalze DO [address] [place] [address]   12/22/2021 11:00 AM Tao Sanchez DO [facility] [place]

## 2021-10-19 DIAGNOSIS — M48.02 CERVICAL STENOSIS OF SPINAL CANAL: ICD-10-CM

## 2021-10-19 DIAGNOSIS — M43.10 SPONDYLOLISTHESIS, UNSPECIFIED SPINAL REGION: ICD-10-CM

## 2021-10-19 RX ORDER — GABAPENTIN 600 MG/1
TABLET ORAL
Qty: 50 TABLET | Refills: 0 | Status: SHIPPED | OUTPATIENT
Start: 2021-10-19 | End: 2021-10-21 | Stop reason: SDUPTHER

## 2021-10-21 ENCOUNTER — OFFICE VISIT (OUTPATIENT)
Dept: FAMILY MEDICINE CLINIC | Age: 76
End: 2021-10-21
Payer: MEDICARE

## 2021-10-21 VITALS
TEMPERATURE: 97.7 F | OXYGEN SATURATION: 96 % | DIASTOLIC BLOOD PRESSURE: 78 MMHG | WEIGHT: 204.4 LBS | SYSTOLIC BLOOD PRESSURE: 136 MMHG | BODY MASS INDEX: 42.9 KG/M2 | HEIGHT: 58 IN | HEART RATE: 87 BPM

## 2021-10-21 DIAGNOSIS — M43.10 SPONDYLOLISTHESIS, UNSPECIFIED SPINAL REGION: ICD-10-CM

## 2021-10-21 DIAGNOSIS — C48.2 MALIGNANT NEOPLASM OF PERITONEUM, UNSPECIFIED (HCC): ICD-10-CM

## 2021-10-21 DIAGNOSIS — M48.02 CERVICAL STENOSIS OF SPINAL CANAL: Primary | ICD-10-CM

## 2021-10-21 DIAGNOSIS — I10 BENIGN ESSENTIAL HTN: ICD-10-CM

## 2021-10-21 DIAGNOSIS — M14.60 ARTHRITIS, NEUROPATHIC: ICD-10-CM

## 2021-10-21 PROCEDURE — G8399 PT W/DXA RESULTS DOCUMENT: HCPCS | Performed by: FAMILY MEDICINE

## 2021-10-21 PROCEDURE — 4040F PNEUMOC VAC/ADMIN/RCVD: CPT | Performed by: FAMILY MEDICINE

## 2021-10-21 PROCEDURE — 1123F ACP DISCUSS/DSCN MKR DOCD: CPT | Performed by: FAMILY MEDICINE

## 2021-10-21 PROCEDURE — 1090F PRES/ABSN URINE INCON ASSESS: CPT | Performed by: FAMILY MEDICINE

## 2021-10-21 PROCEDURE — G8417 CALC BMI ABV UP PARAM F/U: HCPCS | Performed by: FAMILY MEDICINE

## 2021-10-21 PROCEDURE — 99214 OFFICE O/P EST MOD 30 MIN: CPT | Performed by: FAMILY MEDICINE

## 2021-10-21 PROCEDURE — 1036F TOBACCO NON-USER: CPT | Performed by: FAMILY MEDICINE

## 2021-10-21 PROCEDURE — G8484 FLU IMMUNIZE NO ADMIN: HCPCS | Performed by: FAMILY MEDICINE

## 2021-10-21 PROCEDURE — G8427 DOCREV CUR MEDS BY ELIG CLIN: HCPCS | Performed by: FAMILY MEDICINE

## 2021-10-21 RX ORDER — GABAPENTIN 600 MG/1
TABLET ORAL
Qty: 450 TABLET | Refills: 3 | Status: SHIPPED | OUTPATIENT
Start: 2021-10-21 | End: 2022-08-30

## 2021-10-21 ASSESSMENT — ENCOUNTER SYMPTOMS
CHEST TIGHTNESS: 0
SHORTNESS OF BREATH: 0
ABDOMINAL PAIN: 0
ABDOMINAL DISTENTION: 0
PHOTOPHOBIA: 0

## 2021-10-21 NOTE — PATIENT INSTRUCTIONS
Patient is to continue current medications. She will have a CMP done in 3 months to continue to monitor renal and liver functions related to medication. Overall medical condition stable follow-up in 6 months for office appointment.

## 2021-10-21 NOTE — PROGRESS NOTES
Diagnosis Orders   1. Cervical stenosis of spinal canal  gabapentin (NEURONTIN) 600 MG tablet   2. Benign essential HTN  Comprehensive Metabolic Panel   3. Spondylolisthesis, unspecified spinal region  gabapentin (NEURONTIN) 600 MG tablet   4. Arthritis, neuropathic  diclofenac (VOLTAREN) 50 MG EC tablet   5. Malignant neoplasm of peritoneum, 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[facility] [address]       Return in about 6 months (around 4/21/2022) for with PCP. Patient Instructions   Patient is to continue current medications. She will have a CMP done in 3 months to continue to monitor renal and liver functions related to medication. Overall medical condition stable follow-up in 6 months for office appointment. Subjective:      Patient ID: Rustam Pal is a 64 y.o. female who presents for:  Chief Complaint   Patient presents with    Skin Exam    Skin Problem     No skin concerns today     Discuss Medications     pt would like maintanance meds filled due to PCP being out of office        No CV symptoms    Cancer of peritoneum has been without f/u for 10 years due to f/u was complete    States Neurontin definitely controls the pain in her legs in combination with diclofenac. She takes both of them routinely. We reviewed her labs which are normal for liver and renal function. Current Outpatient Medications on File Prior to Visit   Medication Sig Dispense Refill    buPROPion (WELLBUTRIN XL) 150 MG extended release tablet TAKE 2 TABLETS BY MOUTH IN  THE MORNING 180 tablet 0    clopidogrel (PLAVIX) 75 MG tablet Take 1 tablet by mouth daily 30 tablet 5    HYDROcodone-acetaminophen (NORCO) 5-325 MG per tablet Take 1 tablet by mouth every 8 hours as needed for Pain (Max 3 per day) for up to 30 days.  Intended supply: 30 days 90 tablet 0    levothyroxine (SYNTHROID) 50 MCG tablet Take 1 tablet by mouth daily 90 tablet 1    aspirin 81 MG EC tablet Take 1 tablet by mouth daily 30 tablet 3    atorvastatin (LIPITOR) 80 MG tablet Take 1 tablet by mouth nightly 30 tablet 3    DULoxetine (CYMBALTA) 30 MG extended release capsule TAKE 1 CAPSULE BY MOUTH  DAILY (Patient taking differently: Take 30 mg by mouth every evening ) 90 capsule 3    Respiratory Therapy Supplies SHAE New Full face Mask , please do not sent nasal pillow . (She want to try full face Mask )    Dx KEI 1 Device 0    carvedilol (COREG) 25 MG tablet TAKE 1 TABLET BY MOUTH  TWICE DAILY 180 tablet 3    Blood Pressure KIT 1 Units by Does not apply route as needed (daily monitoring of blood pressure) One electronic sphygmomanometer and cuff for home monitoring of blood pressure and heart rate. Upper arm type of cuff preferred. Adult regular size. 1 kit 0    Respiratory Therapy Supplies SHAE New CPAP, full face  mask and chin strap       Fax [phone] Device 0    furosemide (LASIX) 20 MG tablet Take 1 tablet by mouth daily for 5 days (Patient taking differently: Take 20 mg by mouth daily as needed ) 5 tablet 0    CPAP Machine MISC by Does not apply route New CPAP with 8 cm 1 each 0    acyclovir (ZOVIRAX) 5 % CREA Apply topically as needed 5 g 1    b complex vitamins capsule Take 1 capsule by mouth daily      Multiple Vitamins-Minerals (CENTRUM SILVER 50+WOMEN PO) Take by mouth      tamoxifen (NOLVADEX) 20 MG tablet Take 1 tablet by mouth daily       Vitamin D (CHOLECALCIFEROL) 1000 UNITS CAPS capsule Take 1,000 Units by mouth daily. No current facility-administered medications on file prior to visit.      Past Medical History:   Diagnosis Date    Arthritis     Blood transfusion     CAD (coronary artery disease)     Cancer ([facility] [address]     GALLBLADDER 2009, 2011 OMENTUM    Charcot's joint     left foot    Chest pain 7/2/2015    Colitis     DDD (degenerative disc disease), lumbar 2/12/2013    Depression     Disorder of peripheral nervous system 9/1/2010    Dyspnea 7/2/2015    Family history of coronary artery disease 8/29/2014    History of blood transfusion 2011 following chemotherapy    Hx of right BKA ([facility] [address]     Hyperlipidemia     Hypertension     Hypothyroid 6/12/2015    Lobular breast cancer ([facility] [address] 10/2015    NSTEMI (non-ST elevated myocardial infarction) ([facility] [address] 8/29/2014    Obesity     Paroxysmal atrial fibrillation ([facility] [address] 8/29/2014    S/P BKA (below knee amputation) [facility])      Past Surgical History:   Procedure Laterality Date    ABDOMEN SURGERY Left 04/11/2017    EXCISION OF CHEST WALL MASS, UPDATE LABS ON ADMIT  performed by Geronimo Anderson MD at [address] BLEPHAROPLASTY  02/05/2013    both eyes    BREAST SURGERY      CARDIAC CATHETERIZATION      COLONOSCOPY  01/01/2010    normal    CYSTOSCOPY W BIOPSY OF BLADDER N/A 06/26/2017    CYSTOSCOPY BLADDER BIOPSY AND FULGURATION performed by Ale Saavedra MD at [address] Bilateral 2012    cataract removal with IOL implants    HYSTERECTOMY      LEG AMPUTATION BELOW KNEE Right 02/01/2011    DR Lou Suarez due to CHARCOTS    MASTECTOMY Bilateral 11/16/2015    Bilateral simple mastectomies with right SNB by DR Shannon Gregory    OR REMOVAL OF BREAST LESION Left 01/24/2017    CORE NEEDLE BIOPSY OF  LEFT BREAST MASS performed by Geronimo Anderson MD at [address]      cervical and lumbar     Social History     Socioeconomic History    Marital status:       Spouse name: Not on file    Number of children: 2    Years of education: Not on file    Highest education level: Not on file   Occupational History    Occupation: retired   Tobacco Use    Smoking status: Never Smoker    Smokeless tobacco: Never Used   Vaping Use    Vaping Use: Never used   Substance and Sexual Activity    Alcohol use: Yes     Comment: social    Drug use: No    Sexual activity: Never   Other Topics Concern    Not on file   Social History Narrative    Lives With: Alone, dtr in [facility], disabled son with Vidya Luray is in a local group home Type of Home: [address] in [place] (she calls it an [employer]-bc they all help each other)    Home Layout: One level, Level entry    Bathroom Shower/Tub: Tub/Shower unit, Equipment: Tub transfer bench, Grab bars in shower    Bathroom Accessibility: Wheelchair accessible    Home Equipment: Quad cane, Rolling walker, Wheelchair-electric (has right BK prosthesis and Left AFO)    ADL Assistance: Independent    Homemaking Assistance: Independent (warms meals up  -  receives  MOW and groceries delivered)    [employer] Responsibilities: Yes, Meal Prep Responsibility: Secondary    Laundry Responsibility: Primary, Cleaning Responsibility: Secondary    Ambulation Assistance: Independent (last few days uses walker to get around), Transfer Assistance: Independent    Active : Yes    Additional Comments: received new prosthesis in July and has been falling since - has appointment with prosthesis next week. Pt reports prior to 8/21 she was ambulating with wheeled walker and since then she has used power w/c    Retired --still works a day a week as a  [address] [facility] Strain: Low Risk     Difficulty of Paying Living Expenses: Not hard at [facility] Insecurity: No Food Insecurity    Worried About [address] [facility] in the Last Year: Never true   [address] [facility] in the Last Year: Never true   Transportation Needs:     Lack of Transportation (Medical):      Lack of Transportation (Non-Medical):    Physical Activity:     Days of Exercise per Week:     Minutes of Exercise per Session:    Stress:     Feeling of Stress :    Social Connections:     Frequency of Communication with Friends and Family:     Frequency of Social Gatherings with Friends and Family:     Attends [employer] Services:     Active Member of Clubs or Organizations:     Attends Club or Organization Meetings:     Marital Status:    Intimate Partner Violence:     Fear of Current or Ex-Partner:     Emotionally Abused:     Physically Abused:     Sexually Abused:      Family History   Problem Relation Age of Onset    Heart Disease Mother     Arthritis Mother     Heart Disease Father     Arthritis Father     Cancer Sister         Colon    Thyroid Disease Son    [facility] Other Son         Down's syndrome     Allergies:  Ciprofloxacin and Oxycontin [oxycodone hcl]    Review of Systems   Constitutional: Negative for activity change, appetite change, diaphoresis and unexpected weight change. Eyes: Negative for photophobia and visual disturbance. Respiratory: Negative for chest tightness and shortness of breath. No orthopnea   Cardiovascular: Negative for chest pain, palpitations and leg swelling. Gastrointestinal: Negative for abdominal distention and abdominal pain. Genitourinary: Negative for flank pain and frequency. Musculoskeletal: Positive for arthralgias and myalgias. Negative for gait problem and joint swelling. Neurological: Negative for dizziness, weakness, light-headedness and headaches. Psychiatric/Behavioral: Negative for confusion. Objective:   /78 (Site: Left Upper Arm, Position: Sitting, Cuff Size: Large Adult)   Pulse 87   Temp 97.7 °F (36.5 °C)   Ht 4' 9.5\" (1.461 m) Comment: accurate w/o shoes  Wt 204 lb 6.4 oz (92.7 kg)   SpO2 96%   BMI 43.47 kg/m²     Physical Exam  Constitutional:       General: She is not in acute distress. Appearance: She is well-developed. She is not diaphoretic. HENT:      Head: Normocephalic and atraumatic. Right Ear: External ear normal.      Left Ear: External ear normal.      Nose: Nose normal.   Eyes:      General:         Right eye: No discharge. Left eye: No discharge. Conjunctiva/sclera: Conjunctivae normal.      Pupils: Pupils are equal, round, and reactive to light. Neck:      Thyroid: No thyromegaly. Vascular: No carotid bruit.       Trachea: No tracheal deviation. Cardiovascular:      Rate and Rhythm: Normal rate and regular rhythm. Heart sounds: Normal heart sounds. Pulmonary:      Effort: Pulmonary effort is normal. No respiratory distress. Breath sounds: Normal breath sounds. Abdominal:      General: There is no distension. Musculoskeletal:      Cervical back: Neck supple. Skin:     General: Skin is warm and dry. Findings: No bruising or rash. Neurological:      Mental Status: She is alert. Coordination: Coordination normal.   Psychiatric:         Thought Content: Thought content normal.         Judgment: Judgment normal.         No results found for this visit on 10/21/21.     Recent Results (from the past 2016 hour(s))   Comprehensive Metabolic Panel    Collection Time: 09/07/21  3:00 PM   Result Value Ref Range    Sodium 144 135 - 144 mEq/L    Potassium 4.3 3.4 - 4.9 mEq/L    Chloride 107 95 - 107 mEq/L    CO2 26 20 - 31 mEq/L    Anion Gap 11 9 - 15 mEq/L    Glucose 91 70 - 99 mg/dL    BUN 22 8 - 23 mg/dL    CREATININE 0.87 0.50 - 0.90 mg/dL    GFR Non-African American >60.0 >60    GFR  >60.0 >60    Calcium 9.7 8.5 - 9.9 mg/dL    Total Protein 6.4 6.3 - 8.0 g/dL    Albumin 4.2 3.5 - 4.6 g/dL    Total Bilirubin 0.3 0.2 - 0.7 mg/dL    Alkaline Phosphatase 79 40 - 130 U/L    ALT 11 0 - 33 U/L    AST 21 0 - 35 U/L    Globulin 2.2 (L) 2.3 - 3.5 g/dL   Ethanol    Collection Time: 09/07/21  3:00 PM   Result Value Ref Range    Ethanol Lvl <10 mg/dL    Ethanol percent Not indicated G/dL   CBC Auto Differential    Collection Time: 09/07/21  3:00 PM   Result Value Ref Range    WBC 7.2 4.8 - 10.8 K/uL    RBC 4.21 4.20 - 5.40 M/uL    Hemoglobin 12.7 12.0 - 16.0 g/dL    Hematocrit 37.6 37.0 - 47.0 %    MCV 89.2 82.0 - 100.0 fL    MCH 30.3 27.0 - 31.3 pg    MCHC 33.9 33.0 - 37.0 %    RDW 13.9 11.5 - 14.5 %    Platelets 038 718 - 649 K/uL    Neutrophils % 64.5 %    Lymphocytes % 24.0 %    Monocytes % 9.3 % Eosinophils % 1.2 %    Basophils % 1.0 %    Neutrophils Absolute 4.6 1.4 - 6.5 K/uL    Lymphocytes Absolute 1.7 1.0 - 4.8 K/uL    Monocytes Absolute 0.7 0.2 - 0.8 K/uL    Eosinophils Absolute 0.1 0.0 - 0.7 K/uL    Basophils Absolute 0.1 0.0 - 0.2 K/uL   Magnesium    Collection Time: 09/07/21  3:00 PM   Result Value Ref Range    Magnesium 1.9 1.7 - 2.4 mg/dL   Troponin    Collection Time: 09/07/21  3:00 PM   Result Value Ref Range    Troponin <0.010 0.000 - 0.010 ng/mL   Protime-INR    Collection Time: 09/07/21  3:00 PM   Result Value Ref Range    Protime 14.3 12.3 - 14.9 sec    INR 1.1    APTT    Collection Time: 09/07/21  3:00 PM   Result Value Ref Range    aPTT 32.1 24.4 - 36.8 sec   Comprehensive Metabolic Panel w/ Reflex to MG    Collection Time: 09/08/21  5:37 AM   Result Value Ref Range    Sodium 142 135 - 144 mEq/L    Potassium reflex Magnesium 3.8 3.4 - 4.9 mEq/L    Chloride 104 95 - 107 mEq/L    CO2 26 20 - 31 mEq/L    Anion Gap 12 9 - 15 mEq/L    Glucose 75 70 - 99 mg/dL    BUN 18 8 - 23 mg/dL    CREATININE 0.76 0.50 - 0.90 mg/dL    GFR Non-African American >60.0 >60    GFR  >60.0 >60    Calcium 9.5 8.5 - 9.9 mg/dL    Total Protein 6.1 (L) 6.3 - 8.0 g/dL    Albumin 3.9 3.5 - 4.6 g/dL    Total Bilirubin 0.6 0.2 - 0.7 mg/dL    Alkaline Phosphatase 79 40 - 130 U/L    ALT 12 0 - 33 U/L    AST 23 0 - 35 U/L    Globulin 2.2 (L) 2.3 - 3.5 g/dL   Lipid panel - fasting    Collection Time: 09/08/21  5:37 AM   Result Value Ref Range    Cholesterol, Total 105 0 - 199 mg/dL    Triglycerides 124 0 - 150 mg/dL    HDL 40 40 - 59 mg/dL    LDL Calculated 40 0 - 129 mg/dL   SPECIMEN REJECTION    Collection Time: 09/08/21  6:38 AM   Result Value Ref Range    Rejected Test CBCND A1C     Reason for Rejection see below    Echocardiogram complete 2D with doppler with color    Collection Time: 09/08/21 10:06 AM   Result Value Ref Range    Left Ventricular Ejection Fraction 65     LVEF MODALITY ECHO    CBC Collection Time: 09/08/21  4:53 PM   Result Value Ref Range    WBC 7.1 4.8 - 10.8 K/uL    RBC 4.36 4.20 - 5.40 M/uL    Hemoglobin 12.9 12.0 - 16.0 g/dL    Hematocrit 39.0 37.0 - 47.0 %    MCV 89.4 82.0 - 100.0 fL    MCH 29.6 27.0 - 31.3 pg    MCHC 33.2 33.0 - 37.0 %    RDW 13.7 11.5 - 14.5 %    Platelets 082 157 - 504 K/uL   Hemoglobin A1C    Collection Time: 09/08/21  4:53 PM   Result Value Ref Range    Hemoglobin A1C 5.5 4.8 - 5.9 %   Vitamin D 25 Hydroxy    Collection Time: 09/08/21  4:53 PM   Result Value Ref Range    Vit D, 25-Hydroxy 89.2 30.0 - 100.0 ng/mL   Vitamin B12 & Folate    Collection Time: 09/08/21  4:53 PM   Result Value Ref Range    Vitamin B-12 472 232 - 1245 pg/mL    Folate >20.0 7.3 - 26.1 ng/mL   TSH without Reflex    Collection Time: 09/08/21  4:53 PM   Result Value Ref Range    TSH 1.280 0.440 - 3.860 uIU/mL   Electrophoresis Protein, Serum without Reflex to Immunofixation    Collection Time: 09/08/21  4:53 PM   Result Value Ref Range    Total Protein 6.0 (L) 6.3 - 8.2 g/dL    Albumin 3.43 (L) 3.75 - 5.01 g/dL    Alpha-1-Globulin 0.37 0.19 - 0.46 g/dL    Alpha-2-Globulin 0.77 0.48 - 1.05 g/dL    Beta Globulin 0.70 0.48 - 1.10 g/dL    Gamma Globulin 0.74 0.62 - 1.51 g/dL    SPE/AMOS Interpretation See Note     Protein Electrophoresis, Serum See Note    Thyroglobulin    Collection Time: 09/08/21  4:53 PM   Result Value Ref Range    Thyroglobulin by LC-MS/MS, Serum/Plasma 13.9 1.3 - 31.8 ng/mL   COVID-19, Rapid    Collection Time: 09/10/21  6:12 AM    Specimen: Nasopharyngeal Swab; Nasal   Result Value Ref Range    SARS-CoV-2, NAAT Not Detected Not Detected       [] Pt was seen by provider for      Minutes  Counseling and coordination of care was done for all assessment diagnosis listed for today with patient and any family/friend present. More than 50% of this visit was spent coordinating cuurent care, obtaining information for prior records, and counseling for current plan of action. Return in about 6 months (around 4/21/2022) for with PCP. Patient Instructions   Patient is to continue current medications. She will have a CMP done in 3 months to continue to monitor renal and liver functions related to medication. Overall medical condition stable follow-up in 6 months for office appointment. This note was partially created with the assistance of dictation. This may lead to grammatical or spelling errors. Bubba Torres M.D.

## 2021-12-16 ENCOUNTER — VIRTUAL VISIT (OUTPATIENT)
Dept: PHYSICAL MEDICINE AND REHAB | Age: 76
End: 2021-12-16
Payer: MEDICARE

## 2021-12-16 DIAGNOSIS — M96.1 POSTLAMINECTOMY SYNDROME OF CERVICAL REGION: ICD-10-CM

## 2021-12-16 DIAGNOSIS — M14.60 ARTHRITIS, NEUROPATHIC: ICD-10-CM

## 2021-12-16 DIAGNOSIS — M96.1 POSTLAMINECTOMY SYNDROME OF LUMBAR REGION: ICD-10-CM

## 2021-12-16 DIAGNOSIS — Z79.899 HIGH RISK MEDICATION USE: ICD-10-CM

## 2021-12-16 DIAGNOSIS — M21.42 ACQUIRED PES PLANUS OF LEFT FOOT: ICD-10-CM

## 2021-12-16 DIAGNOSIS — M19.079 OSTEOARTHRITIS OF ANKLE OR FOOT, UNSPECIFIED LATERALITY: ICD-10-CM

## 2021-12-16 DIAGNOSIS — M54.2 NECK PAIN ON RIGHT SIDE: Primary | ICD-10-CM

## 2021-12-16 DIAGNOSIS — S93.05XS: ICD-10-CM

## 2021-12-16 PROCEDURE — G8427 DOCREV CUR MEDS BY ELIG CLIN: HCPCS | Performed by: PHYSICAL MEDICINE & REHABILITATION

## 2021-12-16 PROCEDURE — 1123F ACP DISCUSS/DSCN MKR DOCD: CPT | Performed by: PHYSICAL MEDICINE & REHABILITATION

## 2021-12-16 PROCEDURE — G8417 CALC BMI ABV UP PARAM F/U: HCPCS | Performed by: PHYSICAL MEDICINE & REHABILITATION

## 2021-12-16 PROCEDURE — 4040F PNEUMOC VAC/ADMIN/RCVD: CPT | Performed by: PHYSICAL MEDICINE & REHABILITATION

## 2021-12-16 PROCEDURE — 99214 OFFICE O/P EST MOD 30 MIN: CPT | Performed by: PHYSICAL MEDICINE & REHABILITATION

## 2021-12-16 PROCEDURE — G8399 PT W/DXA RESULTS DOCUMENT: HCPCS | Performed by: PHYSICAL MEDICINE & REHABILITATION

## 2021-12-16 PROCEDURE — 1036F TOBACCO NON-USER: CPT | Performed by: PHYSICAL MEDICINE & REHABILITATION

## 2021-12-16 PROCEDURE — G8484 FLU IMMUNIZE NO ADMIN: HCPCS | Performed by: PHYSICAL MEDICINE & REHABILITATION

## 2021-12-16 PROCEDURE — 1090F PRES/ABSN URINE INCON ASSESS: CPT | Performed by: PHYSICAL MEDICINE & REHABILITATION

## 2021-12-16 RX ORDER — HYDROCODONE BITARTRATE AND ACETAMINOPHEN 5; 325 MG/1; MG/1
1 TABLET ORAL EVERY 8 HOURS PRN
Qty: 90 TABLET | Refills: 0 | Status: SHIPPED | OUTPATIENT
Start: 2021-12-16 | End: 2022-01-15

## 2021-12-16 ASSESSMENT — ENCOUNTER SYMPTOMS
STRIDOR: 0
VOMITING: 0
BACK PAIN: 1
NAUSEA: 0
SORE THROAT: 0
ABDOMINAL PAIN: 0
PHOTOPHOBIA: 0
WHEEZING: 0
EYE REDNESS: 0
COUGH: 0
BLOOD IN STOOL: 0
DIARRHEA: 0
EYE PAIN: 0
SHORTNESS OF BREATH: 1
CONSTIPATION: 1

## 2021-12-16 NOTE — PROGRESS NOTES
symptoms are aggravated by coughing. Stiffness is present in the morning. Associated symptoms include leg pain, numbness, pain with swallowing and weakness. Pertinent negatives include no chest pain, fever, headaches or photophobia. She has tried bed rest, neck support, NSAIDs, oral narcotics, home exercises, ice, muscle relaxants and heat for the symptoms. The treatment provided mild relief. Hip Pain   Associated symptoms include numbness. Wrist Pain   Associated symptoms include numbness. Pertinent negatives include no fever.              Past Medical History:   Diagnosis Date    Arthritis     Blood transfusion     CAD (coronary artery disease)     Cancer ([address]     GALLBLADDER 2009, 2011 OMENTUM    Charcot's joint     left foot    Chest pain 7/2/2015    Colitis     DDD (degenerative disc disease), lumbar 2/12/2013    Depression     Disorder of peripheral nervous system 9/1/2010    Dyspnea 7/2/2015    Family history of coronary artery disease 8/29/2014    History of blood transfusion 2011    following chemotherapy    Hx of right BKA ([address]     Hyperlipidemia     Hypertension     Hypothyroid 6/12/2015    Lobular breast cancer [facility] 10/2015    NSTEMI (non-ST elevated myocardial infarction) ([address] 8/29/2014    Obesity     Paroxysmal atrial fibrillation ([address] 8/29/2014    S/P BKA (below knee amputation) ([address]        Past Surgical History:   Procedure Laterality Date    ABDOMEN SURGERY Left 04/11/2017    EXCISION OF CHEST WALL MASS, UPDATE LABS ON ADMIT  performed by Bobby Mckay MD at [address] BLEPHAROPLASTY  02/05/2013    both eyes    BREAST SURGERY      CARDIAC CATHETERIZATION      COLONOSCOPY  01/01/2010    normal    CYSTOSCOPY W BIOPSY OF BLADDER N/A 06/26/2017    CYSTOSCOPY BLADDER BIOPSY AND FULGURATION performed by Rohini [facility]angelo [facility] at [facility] Bilateral 2012    cataract removal with IOL implants    HYSTERECTOMY      LEG AMPUTATION BELOW KNEE Right 02/01/2011    DR Selina Freire due to CHARCOTS    MASTECTOMY Bilateral 11/16/2015    Bilateral simple mastectomies with right SNB by DR Andee Goodell    NC REMOVAL OF BREAST LESION Left 01/24/2017    CORE NEEDLE BIOPSY OF  LEFT BREAST MASS performed by Vinh Lemus MD at [place] [address] [facility]      cervical and lumbar       Social History     Socioeconomic History    Marital status:      Spouse name: None    Number of children: 2    Years of education: None    Highest education level: None   Occupational History    Occupation: retired   Tobacco Use    Smoking status: Never Smoker    Smokeless tobacco: Never Used   Vaping Use    Vaping Use: Never used   Substance and Sexual Activity    Alcohol use: Yes     Comment: social    Drug use: No    Sexual activity: Never   Other Topics Concern    None   Social History Narrative    Lives With: Alone, dtr in [address] [place] [address], disabled son with Titi Cunningham is in a local group home    Type of Home: [address] in [place] (she calls it an [employer]-bc they all help each other)    Home Layout: One level, Level entry    Bathroom Shower/Tub: Tub/Shower unit, Equipment: Tub transfer bench, Grab bars in shower    Bathroom Accessibility: Wheelchair accessible    Home Equipment: Quad cane, Rolling walker, Wheelchair-electric (has right BK prosthesis and Left AFO)    ADL Assistance: Independent    Homemaking Assistance: Independent (warms meals up  -  receives  MOW and groceries delivered)    [employer] Responsibilities: Yes, Meal Prep Responsibility: Secondary    Laundry Responsibility: Primary, Cleaning Responsibility: Secondary    Ambulation Assistance: Independent (last few days uses walker to get around), Transfer Assistance: Independent    Active : Yes    Additional Comments: received new prosthesis in July and has been falling since - has appointment with prosthesis next week.   Pt reports prior to [phone] 8/21 she was ambulating with wheeled walker and since then she has used power w/c    Retired --still works a day a week as a  [employer]         Social Determinants of Health     Financial Resource Strain: Low Risk     Difficulty of Paying Living Expenses: Not hard at [employer] Insecurity: No Food Insecurity    Worried About [address] in the Last Year: Never true   [address] in the Last Year: Never true   Transportation Needs:     Lack of Transportation (Medical): Not on file    Lack of Transportation (Non-Medical): Not on file   Physical Activity:     Days of Exercise per Week: Not on file    Minutes of Exercise per Session: Not on file   Stress:     Feeling of Stress : Not on file   Social Connections:     Frequency of Communication with Friends and Family: Not on file    Frequency of Social Gatherings with Friends and Family: Not on file    Attends Yarsanism Services: Not on file    Active Member of Clubs or Organizations: Not on file    Attends Club or Organization Meetings: Not on file    Marital Status: Not on file   Intimate Partner Violence:     Fear of Current or Ex-Partner: Not on file    Emotionally Abused: Not on file    Physically Abused: Not on file    Sexually Abused: Not on file   Housing Stability:     Unable to Pay for Housing in the Last Year: Not on file    Number of [place] in the Last Year: Not on file    Unstable Housing in the Last Year: Not on file       Family History   Problem Relation Age of Onset    Heart Disease Mother     Arthritis Mother     Heart Disease Father     Arthritis Father     Cancer Sister         Colon    Thyroid Disease Son     Other Son         Down's syndrome       Allergies   Allergen Reactions    Ciprofloxacin Itching    Oxycontin [Oxycodone Hcl] Itching       Review of Systems   Constitutional: Positive for activity change and fatigue. Negative for chills, diaphoresis and fever.    HENT: Negative for congestion, ear discharge, ear pain, hearing loss, nosebleeds, sore throat and tinnitus. Eyes: Negative for photophobia, pain and redness. Respiratory: Positive for shortness of breath. Negative for cough, wheezing and stridor. Shortness of breath on exertion   Cardiovascular: Negative for chest pain, palpitations and leg swelling. Gastrointestinal: Positive for constipation. Negative for abdominal pain, blood in stool, diarrhea, nausea and vomiting. Endocrine: Negative for polydipsia. Genitourinary: Negative for dysuria, flank pain, frequency, hematuria and urgency. Musculoskeletal: Positive for back pain, gait problem, myalgias and neck pain. Skin: Negative for rash. Allergic/Immunologic: Positive for immunocompromised state. Negative for environmental allergies. Neurological: Positive for weakness and numbness. Negative for dizziness, tremors, seizures and headaches. Hematological: Does not bruise/bleed easily. Psychiatric/Behavioral: Negative for hallucinations and suicidal ideas. The patient is not nervous/anxious. Objective    There were no vitals filed for this visit.     No data recorded            PHYSICAL EXAMINATION:  [ INSTRUCTIONS:  \"[x]\" Indicates a positive item  \"[]\" Indicates a negative item  -- DELETE ALL ITEMS NOT EXAMINED]    [x] Alert  [x] Oriented to person/place/time      [x] No apparent distress  [x] Not toxic appearing    [x] Face complexion normal appearing [x] Sclera clear  [x] Lips are not cyanotic      [x] Breathing appears normal  [] Appears tachypneic      [] Rash on visible skin    [x] Cranial Nerves II-XII grossly intact    [x] Motor grossly intact in visible upper extremities, including stiff but intact range of motion in cervical, upper extremity and thoracic  [x] Motor grossly intact in visible lower extremities, including normal range of motion in the hips and knees for stiffness in the lumbar spine    [x] Normal Mood  [x] Not artery disease 8/29/2014    History of blood transfusion 2011    following chemotherapy    [facility]     Hyperlipidemia     Hypertension     Hypothyroid 6/12/2015    Lobular breast cancer ([facility] 10/2015    NSTEMI (non-ST elevated myocardial infarction) ([facility] 8/29/2014    Obesity     Paroxysmal atrial fibrillation ([facility] 8/29/2014    S/P BKA (below knee amputation) ([facility]            Given their medication, chronic pain and lifestyle and medications they are at risk for :    Falls, constipation, addiction, toxicity due to controlled/opiate medications  Loss of livelyhood due to severe pain, debility, weight gain and  vitamin D deficiency    The patient was educated regarding proper diet, fitness routine, and regulatory restrictions concerning pain medications. Previous notes, comprehensive past medical, surgical, family history, and diagnostics were reviewed. Patient education and councelling were provided regarding off label use,treatment options and medication and injection risks. Overall greater than 25 minutes spent in direct and indirect patient care. Current and old OARRS ([place] Automated Prescription Reporting System) records reviewed, all refills reviewed since last visit,  Behavioral agreement/GIRMA regulations   and Toxicology screen was reviewed with patient and is up to date. There are no current red flags. They are making good progress regarding pain relief, they are performing at a functional level regarding activities of daily living, family interactions and psychological functioning, they're not having any adverse effects or side effects from the current medications, and I see no findings of aberrant drug taking or addiction related behaviors. The patient is aware that they have a chronic pain condition and they may require opiates dosing for life. All efforts will be made to wean to the lowest effective dose.   Other therapies for pain have not been effective including nonopiate medications. Injections and exercises are only partially effective. A Rx for Narcan was offered to help prevent accidental overdose. RX Monitoring 12/16/2021   Attestation -   Acute Pain Prescriptions -   Periodic Controlled Substance Monitoring Possible medication side effects, risk of tolerance/dependence & alternative treatments discussed. ;Potential drug abuse or diversion identified, see note documentation.;Obtaining appropriate analgesic effect of treatment. Chronic Pain > 50 MEDD Re-evaluated the status of the patient's underlying condition causing pain. ;Considered consultation with a specialist.   Chronic Pain > 80 MEDD -       Periodic Controlled Substance Monitoring: Possible medication side effects, risk of tolerance/dependence & alternative treatments discussed. , Potential drug abuse or diversion identified, see note documentation., Obtaining appropriate analgesic effect of treatment. (Dedrick Leonard, DO)       Patient is currently taking:       I am having Alysha Rodriguez maintain her Vitamin D, tamoxifen, Multiple Vitamins-Minerals (CENTRUM SILVER 50+WOMEN PO), b complex vitamins, acyclovir, CPAP Machine, furosemide, Respiratory Therapy Supplies, Blood Pressure, Respiratory Therapy Supplies, DULoxetine, aspirin, atorvastatin, levothyroxine, buPROPion, clopidogrel, gabapentin, diclofenac, carvedilol, and HYDROcodone-acetaminophen. I also recommend the following Medications:    Orders Placed This Encounter   Medications    HYDROcodone-acetaminophen (NORCO) 5-325 MG per tablet     Sig: Take 1 tablet by mouth every 8 hours as needed for Pain (Max 3 per day) for up to 30 days. Intended supply: 30 days     Dispense:  90 tablet     Refill:  0     Reduce doses taken as pain becomes manageable        -which helps with pain and function. Otherwise, continue the current pain medications that I have prescibed.       Radiologic:   Old films reviewed,     I discussed results with patients. see Follow up plans below  For any new studies. Care Everywhere Updates:  requested and reviewed. No new issues noted. Electrodiagnostic:  Previous studies requested,     I discussed results with patient. See follow-up plans for new studies. Additional history obtained from independent sourcing including patient's family and caregivers. Labs:  Previous labs reviewed     Lab Results   Component Value Date     09/08/2021    K 3.8 09/08/2021     09/08/2021    CO2 26 09/08/2021    BUN 18 09/08/2021    CREATININE 0.76 09/08/2021    CALCIUM 9.5 09/08/2021    LABALBU 3.43 09/08/2021    LABALBU 4.3 04/06/2012    BILITOT 0.6 09/08/2021    ALKPHOS 79 09/08/2021    AST 23 09/08/2021    ALT 12 09/08/2021     Lab Results   Component Value Date    WBC 7.1 09/08/2021    RBC 4.36 09/08/2021    RBC 4.16 11/04/2011    HGB 12.9 09/08/2021    HCT 39.0 09/08/2021    MCV 89.4 09/08/2021    MCH 29.6 09/08/2021    MCHC 33.2 09/08/2021    RDW 13.7 09/08/2021     09/08/2021    MPV 8.4 08/03/2015       Lab Results   Component Value Date    LABAMPH Neg 04/03/2018    BARBSCNU Neg 04/03/2018    LABBENZ Neg 04/03/2018    CANSU Neg 04/03/2018    COCAIMETSCRU Neg 04/03/2018    PHENCYCLIDINESCREENURINE Neg 04/03/2018    DSCOMMENT see below 04/03/2018       No results found for: CODEINE, MORPHINE, ACETYLMORPHI, OXYCODONE, NOROXYCODONE, NOROXYMU, HYDRCO, NORHYDU, HYDROMO, BUPREN, NORBUPRNOR, FENTA, NORFENT, MEPERIDINE, TAPENU, TAPOSULFUR, METHADONE, LABPROP, TRAM, AMPH, METHAMP, MDMA, ECMDA    No results found for: METPHEN, PHENTERMINE, BENZOYL, ALPRAZ, ALPHAOHALPRA, CLONAZEPAM, 7AMINOCLONAZ, DIAZEP, MASOOD, OXAZ, TEMAZ, LORAZEPAM, MIDAZOLAM, ZOLPIDEM, JAIME, ETG, MARIJMET, PCP, PAINMGTDRUGP, EERPAINMGTPA, LABCREA      , I discussed results with patient. See follow-up plans for new studies.     Therapies:  HEP-gentle stretching and relaxation techniques-demonstrated with understands above plan; questions asked and answered. Patient agrees to plan as noted above. On this date  12/16/21 I have spent  5 minutes reviewing previous notes, test results and face to face (virtual) with the patient discussing the diagnosis and importance of compliance with the treatment plan as well as documenting on the day of the visit. At least 50% of the visit was involved in the discussion of the options for treatment. We discussed exercises, medication, interventional therapies and surgery. Healthy life style is essential with patient hard work to achieve the wellness. In addition; discussion with the patient and/or family about any of the diagnostic results, impressions and/or recommended diagnostic studies, prognosis, risks and benefits of treatment options, instructions for treatment and/or follow-up, importance of compliance with chosen treatment options, risk-factor reduction, and patient/family education. They are to follow up in 2-2 1/2 months to review medication, efficacy of injections, pill counts, OARRS check, SOAPPR assessment, review diagnostics, to review previous and future treatment plans and assess appropriateness for continued therapy,  and regarding the efficacy of current plan and future treatment. New Diagnostics  No orders of the defined types were placed in this encounter. An  electronic signature was used to authenticate this note. -Dr Syed Guzman, DO       With MA assistance from:   Annabelle Melendez MA     19}    Pursuant to the emergency declaration under the [facility] waiver authority and the [employer] Act, this Virtual  Visit was conducted, with patient's consent, to reduce the patient's risk of exposure to COVID-19 and provide continuity of care for an established patient.     Services were provided through a video synchronous discussion virtually to substitute for in-person clinic visit.

## 2021-12-22 ENCOUNTER — VIRTUAL VISIT (OUTPATIENT)
Dept: FAMILY MEDICINE CLINIC | Age: 76
End: 2021-12-22
Payer: MEDICARE

## 2021-12-22 DIAGNOSIS — E78.2 MIXED HYPERLIPIDEMIA: ICD-10-CM

## 2021-12-22 DIAGNOSIS — M48.02 CERVICAL STENOSIS OF SPINAL CANAL: ICD-10-CM

## 2021-12-22 DIAGNOSIS — Z71.85 VACCINE COUNSELING: ICD-10-CM

## 2021-12-22 DIAGNOSIS — E66.01 MORBID OBESITY (HCC): ICD-10-CM

## 2021-12-22 DIAGNOSIS — C48.2 MALIGNANT NEOPLASM OF PERITONEUM, UNSPECIFIED (HCC): ICD-10-CM

## 2021-12-22 DIAGNOSIS — C50.911 LOBULAR CARCINOMA OF RIGHT BREAST (HCC): ICD-10-CM

## 2021-12-22 DIAGNOSIS — Z00.00 ROUTINE GENERAL MEDICAL EXAMINATION AT A HEALTH CARE FACILITY: ICD-10-CM

## 2021-12-22 DIAGNOSIS — I10 ESSENTIAL HYPERTENSION: Primary | ICD-10-CM

## 2021-12-22 DIAGNOSIS — M14.60 ARTHRITIS, NEUROPATHIC: ICD-10-CM

## 2021-12-22 DIAGNOSIS — F32.A DEPRESSION, UNSPECIFIED DEPRESSION TYPE: ICD-10-CM

## 2021-12-22 DIAGNOSIS — G47.33 OBSTRUCTIVE SLEEP APNEA: ICD-10-CM

## 2021-12-22 DIAGNOSIS — E03.9 HYPOTHYROIDISM, UNSPECIFIED TYPE: ICD-10-CM

## 2021-12-22 DIAGNOSIS — M54.40 BILATERAL LOW BACK PAIN WITH SCIATICA, SCIATICA LATERALITY UNSPECIFIED, UNSPECIFIED CHRONICITY: ICD-10-CM

## 2021-12-22 PROCEDURE — G8484 FLU IMMUNIZE NO ADMIN: HCPCS | Performed by: STUDENT IN AN ORGANIZED HEALTH CARE EDUCATION/TRAINING PROGRAM

## 2021-12-22 PROCEDURE — G0439 PPPS, SUBSEQ VISIT: HCPCS | Performed by: STUDENT IN AN ORGANIZED HEALTH CARE EDUCATION/TRAINING PROGRAM

## 2021-12-22 PROCEDURE — 1123F ACP DISCUSS/DSCN MKR DOCD: CPT | Performed by: STUDENT IN AN ORGANIZED HEALTH CARE EDUCATION/TRAINING PROGRAM

## 2021-12-22 PROCEDURE — 4040F PNEUMOC VAC/ADMIN/RCVD: CPT | Performed by: STUDENT IN AN ORGANIZED HEALTH CARE EDUCATION/TRAINING PROGRAM

## 2021-12-22 ASSESSMENT — PATIENT HEALTH QUESTIONNAIRE - PHQ9
1. LITTLE INTEREST OR PLEASURE IN DOING THINGS: 1
SUM OF ALL RESPONSES TO PHQ9 QUESTIONS 1 & 2: 2
2. FEELING DOWN, DEPRESSED OR HOPELESS: 1
SUM OF ALL RESPONSES TO PHQ QUESTIONS 1-9: 2

## 2021-12-22 NOTE — PROGRESS NOTES
Medicare Annual Wellness Visit  Are Name: Shahana Emanuel Date: 2021   MRN: [id number] Sex: Female   Age: 64 y.o. Ethnicity: Non- / Non    : 1945 Race: White (non-)      Myranda Manzo is here for Medicare AWV and Other (Discuss prosthetic device.  )    Screenings for behavioral, psychosocial and functional/safety risks, and cognitive dysfunction are all negative except as indicated below. These results, as well as other patient data from the [address] form, are documented in Flowsheets linked to this Encounter. Allergies   Allergen Reactions    Ciprofloxacin Itching    Oxycontin [Oxycodone Hcl] Itching         Prior to Visit Medications    Medication Sig Taking? Authorizing Provider   diclofenac (VOLTAREN) 50 MG EC tablet TAKE 1 TABLET BY MOUTH  TWICE DAILY Yes Cat Rizzo, DO   buPROPion (WELLBUTRIN XL) 150 MG extended release tablet TAKE 2 TABLETS BY MOUTH IN  THE MORNING Yes Dea Olsen, DO   HYDROcodone-acetaminophen (NORCO) 5-325 MG per tablet Take 1 tablet by mouth every 8 hours as needed for Pain (Max 3 per day) for up to 30 days. Intended supply: 30 days Yes Karyn Silva,    gabapentin (NEURONTIN) 600 MG tablet TAKE 1 TABLET BY MOUTH IN  THE MORNING, 2 TABS IN THE  AFTERNOON, AND 2 TABS IN  THE EVENING AS TOLERATED Yes Mari Leal MD   levothyroxine (SYNTHROID) 50 MCG tablet Take 1 tablet by mouth daily Yes Beto Jennings MD   aspirin 81 MG EC tablet Take 1 tablet by mouth daily Yes YURY Last CNP   atorvastatin (LIPITOR) 80 MG tablet Take 1 tablet by mouth nightly Yes YURY Last - CNP   DULoxetine (CYMBALTA) 30 MG extended release capsule TAKE 1 CAPSULE BY MOUTH  DAILY  Patient taking differently: Take 30 mg by mouth every evening  Yes Dea Olsen, DO   Respiratory Therapy Supplies SHAE New Full face Mask , please do not sent nasal pillow .  (She want to try full face Mask )    Dx KEI Yes Tyler LOPEZ Hayley Thao MD   Blood Pressure KIT 1 Units by Does not apply route as needed (daily monitoring of blood pressure) One electronic sphygmomanometer and cuff for home monitoring of blood pressure and heart rate. Upper arm type of cuff preferred. Adult regular size. Yes Yasir Mendoza MD   Respiratory Therapy Supplies SHAE New CPAP, full face  mask and chin strap       Fax [phone] Yes Saleem Meyers MD   furosemide (LASIX) 20 MG tablet Take 1 tablet by mouth daily for 5 days  Patient taking differently: Take 20 mg by mouth daily as needed  Yes Fidelina Padilla MD   CPAP Machine MISC by Does not apply route New CPAP with 8 cm Yes Saleem Meyers MD   acyclovir (ZOVIRAX) 5 % CREA Apply topically as needed Yes Beto Jennings MD   b complex vitamins capsule Take 1 capsule by mouth daily Yes Historical Provider, MD   Multiple Vitamins-Minerals (CENTRUM SILVER 50+WOMEN PO) Take by mouth Yes Historical Provider, MD   tamoxifen (NOLVADEX) 20 MG tablet Take 1 tablet by mouth daily  Yes Historical Provider, MD   Vitamin D (CHOLECALCIFEROL) 1000 UNITS CAPS capsule Take 1,000 Units by mouth daily.  Yes Historical Provider, MD   carvedilol (COREG) 25 MG tablet TAKE 1 TABLET BY MOUTH  TWICE DAILY  Dea Olsen,          Past Medical History:   Diagnosis Date    Arthritis     Blood transfusion     CAD (coronary artery disease)     Cancer ([facility] [address]     GALLBLADDER 2009, 2011 OMENTUM    Charcot's joint     left foot    Chest pain 7/2/2015    Colitis     DDD (degenerative disc disease), lumbar 2/12/2013    Depression     Disorder of peripheral nervous system 9/1/2010    Dyspnea 7/2/2015    Family history of coronary artery disease 8/29/2014    History of blood transfusion 2011    following chemotherapy    Hx of right BKA ([facility] [address]     Hyperlipidemia     Hypertension     Hypothyroid 6/12/2015    Lobular breast cancer ([address] 10/2015    NSTEMI (non-ST elevated myocardial infarction) ([facility] [address] 8/29/2014    Obesity     Paroxysmal atrial fibrillation ([facility] [address] 8/29/2014    S/P BKA (below knee amputation) [facility])        Past Surgical History:   Procedure Laterality Date    ABDOMEN SURGERY Left 04/11/2017    EXCISION OF CHEST WALL MASS, UPDATE LABS ON ADMIT  performed by Addie To MD at [address] BLEPHAROPLASTY  02/05/2013    both eyes    BREAST SURGERY      CARDIAC CATHETERIZATION      COLONOSCOPY  01/01/2010    normal    CYSTOSCOPY W BIOPSY OF BLADDER N/A 06/26/2017    CYSTOSCOPY BLADDER BIOPSY AND FULGURATION performed by Addison Harris MD at [address] Bilateral 2012    cataract removal with IOL implants    HYSTERECTOMY      LEG AMPUTATION BELOW KNEE Right 02/01/2011    DR Yo Robert due to CHARCOTS    MASTECTOMY Bilateral 11/16/2015    Bilateral simple mastectomies with right SNB by DR Jean Carlos Shaver    ID REMOVAL OF BREAST LESION Left 01/24/2017    CORE NEEDLE BIOPSY OF  LEFT BREAST MASS performed by Addie To MD at [place] [address] SPINE SURGERY      cervical and lumbar         Family History   Problem Relation Age of Onset    Heart Disease Mother     Arthritis Mother     Heart Disease Father     Arthritis Father     Cancer Sister         Colon    Thyroid Disease Son     Other Son         Down's syndrome       CareTeam (Including outside providers/suppliers regularly involved in providing care):   Patient Care Team:  Tamika Jolley DO as PCP - General (Family Medicine)  Tamika Jolley DO as PCP - [facility] Empaneled Provider  Italo Batista MD (Family Medicine)  Italo Batista MD (Family Medicine)  Sukhjinder Reilly DO (Cardiology)  MD Addie Echevarria MD (General Surgery)  Marlene Lam MD as Consulting Physician (Hematology and Oncology)    Wt Readings from Last 3 Encounters:   10/21/21 204 lb 6.4 oz (92.7 kg)   09/16/21 205 lb 14.6 oz (93.4 kg)   09/07/21 223 lb (101.2 kg)      Patient-Reported Vitals 12/22/2021 Patient-Reported Weight -   Patient-Reported Height 4 9.5   Patient-Reported Systolic -   Patient-Reported Diastolic -   Patient-Reported Pulse -   Patient-Reported Temperature -   Patient-Reported SpO2 -   Patient-Reported Peak Flow -      There is no height or weight on file to calculate BMI. Based upon direct observation of the patient, evaluation of cognition reveals recent and remote memory intact. Patient's complete Health Risk Assessment and screening values have been reviewed and are found in Flowsheets. The following problems were reviewed today and where indicated follow up appointments were made and/or referrals ordered. Positive Risk Factor Screenings with Interventions:     Fall Risk:  2 or more falls in past year?: (!) yes  Fall with injury in past year?: no  Fall Risk Interventions:    · Mechanical falls, able to get up after falls           Opioid Risk: (Low risk score <55)  Opioid risk score: 10    Patient is low risk for opioid use disorder or overdose. Click here to Document Controlled Substance Monitoring. Last PDMP Radha Lucas as Reviewed:  Review User Review Instant Review Result   Sheila Hogan 12/5/2021  1:07 PM Reviewed PDMP [1]     Last Controlled Substance Monitoring Documentation      Virtual Visit from 12/16/2021 in [facility]   Periodic Controlled Substance Monitoring Possible medication side effects, risk of tolerance/dependence & alternative treatments discussed. , Potential drug abuse or diversion identified, see note documentation., Obtaining appropriate analgesic effect of treatment. filed at 12/16/2021 1306   Chronic Pain > 50 MEDD Re-evaluated the status of the patient's underlying condition causing pain. , Considered consultation with a specialist. filed at 12/16/2021 [address]  and ACP:  General  In general, how would you say your health is?: Fair  In the past 7 days, have you experienced any of the following? New or Increased Pain, New or Increased Fatigue, Loneliness, Social Isolation, Stress or Anger?: (!) New or Increased Pain  Do you get the social and emotional support that you need?: Yes  Do you have a Living Will?: Yes  Advance Directives     Power of  Living Will ACP-Advance Directive ACP-Power of     Not on File Filed on 09/20/21 Filed Not on File      General Health Risk Interventions:  · Pain issues: patient advised to follow-up in this office for further evaluation and treatment within 3 week(s)    Health Habits/Nutrition:  Health Habits/Nutrition  Do you exercise for at least 20 minutes 2-3 times per week?: (!) No  Have you lost any weight without trying in the past 3 months?: (!) Yes  Do you eat only one meal per day?: No  Have you seen the dentist within the past year?: N/A - wear dentures     Health Habits/Nutrition Interventions:  · Inadequate physical activity:  patient is not ready to increase his/her physical activity level at this time    Hearing/Vision:  No exam data present  Hearing/Vision  Do you or your family notice any trouble with your hearing that hasn't been managed with hearing aids?: (!) Yes  Do you have difficulty driving, watching TV, or doing any of your daily activities because of your eyesight?: No  Have you had an eye exam within the past year?: (!) No  Hearing/Vision Interventions:  · Hearing concerns:  patient declines any further evaluation/treatment for hearing issues  · Vision concerns:  patient encouraged to make appointment with his/her eye specialist     ADL:  ADLs  In the past 7 days, did you need help from others to perform any of the following everyday activities? Eating, dressing, grooming, bathing, toileting, or walking/balance?: None  In the past 7 days, did you need help from others to take care of any of the following?  Laundry, housekeeping, banking/finances, shopping, telephone use, food preparation, transportation, or taking medications?: (!) Housekeeping,Transportation  ADL Interventions:  · Patient declines any further evaluation/treatment for this issue      Personalized Preventive Plan   Current Health Maintenance Status  Immunization History   Administered Date(s) Administered    COVID-19, Kasie Lorenz, Primary or Immunocompromised, PF, 100mcg/0.5mL 03/09/2021, 04/06/2021    Hep B Immune Globulin 04/01/2005, 03/01/2006, 07/01/2006    Influenza Virus Vaccine 11/10/2006, 11/28/2007, 11/04/2008, 10/20/2012, 12/26/2013, 09/15/2015    Influenza Whole 01/26/2010    Influenza, High Dose (Fluzone 65 yrs and older) 09/15/2015, 09/28/2017, 11/13/2018    Influenza, Quadv, adjuvanted, 65 yrs +, IM, PF (Fluad) 10/23/2020    Influenza, Triv, inactivated, subunit, adjuvanted, IM (Fluad 65 yrs and older) 11/18/2019    Pneumococcal Conjugate 13-valent (Bibemrn14) 09/15/2015    Pneumococcal Conjugate 7-valent (Prevnar7) 01/01/2008    Pneumococcal Polysaccharide (Bdvlebqrj22) 06/26/2014    Tdap (Boostrix, Adacel) 06/17/2013    Tetanus 09/01/2003        Health Maintenance   Topic Date Due    Hepatitis C screen  Never done    Shingles Vaccine (1 of 2) Never done   [employer] Visit (AWV)  05/27/2021    Flu vaccine (1) 09/01/2021    COVID-19 Vaccine (3 - Booster for Moderna series) 10/06/2021    Lipid screen  09/08/2022    TSH testing  09/08/2022    Potassium monitoring  09/08/2022    Creatinine monitoring  09/08/2022    DTaP/Tdap/Td vaccine (2 - Td or Tdap) 06/17/2023    DEXA (modify frequency per FRAX score)  Completed    Pneumococcal 65+ years Vaccine  Completed    Hepatitis A vaccine  Aged Out    Hepatitis B vaccine  Aged Out    Hib vaccine  Aged Out    Meningococcal (ACWY) vaccine  Aged Out     Recommendations for Validas Due: see orders and patient instructions/AVS.  .   Recommended screening schedule for the next 5-10 years is provided to the patient in written form: see Patient Salena Morel was seen today for medicare awv and other. Diagnoses and all orders for this visit:    Essential hypertension  - Will get CMP completed to check kindey function, pt planning on getting labs completed in the next week    Mixed hyperlipidemia    Obstructive sleep apnea    Arthritis, neuropathic    Cervical stenosis of spinal canal    Hypothyroidism, unspecified type    Bilateral low back pain with sciatica, sciatica laterality unspecified, unspecified chronicity    Lobular carcinoma of right breast ([facility] [address]    Malignant neoplasm of peritoneum, 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[facility] [address]    Morbid obesity (HCC)    Depression, unspecified depression type    Routine general medical examination at a health care facility    Vaccine counseling  - Pt to get COVID booster and influenza vaccine           Leyda Rasheed is a 64 y.o. female being evaluated by a Virtual Visit (phone) encounter to address concerns as mentioned above. A caregiver was present when appropriate. Due to this being a TeleHealth encounter (During DCATY-78 public health emergency), evaluation of the following organ systems was limited: Vitals/Constitutional/EENT/Resp/CV/GI//MS/Neuro/Skin/Heme-Lymph-Imm. Pursuant to the emergency declaration under the [facility], [address] [place] [address] waiver authority and the [employer] Act, this Virtual Visit was conducted with patient's (and/or legal guardian's) consent, to reduce the patient's risk of exposure to COVID-19 and provide necessary medical care. The patient (and/or legal guardian) has also been advised to contact this office for worsening conditions or problems, and seek emergency medical treatment and/or call 911 if deemed necessary. Patient identification was verified at the start of the visit: Yes    Services were provided through phone to substitute for in-person clinic visit. Patient and provider were located at their individual homes.     --Tao Sanchez,  on 12/22/2021 at 11:19 AM    An electronic signature was used to authenticate this note.

## 2021-12-22 NOTE — PATIENT INSTRUCTIONS
Personalized Preventive Plan for Parvin Richardson - 12/22/2021  Medicare offers a range of preventive health benefits. Some of the tests and screenings are paid in full while other may be subject to a deductible, co-insurance, and/or copay. Some of these benefits include a comprehensive review of your medical history including lifestyle, illnesses that may run in your family, and various assessments and screenings as appropriate. After reviewing your medical record and screening and assessments performed today your provider may have ordered immunizations, labs, imaging, and/or referrals for you. A list of these orders (if applicable) as well as your Preventive Care list are included within your After Visit Summary for your review. Other Preventive Recommendations:    · A preventive eye exam performed by an eye specialist is recommended every 1-2 years to screen for glaucoma; cataracts, macular degeneration, and other eye disorders. · A preventive dental visit is recommended every 6 months. · Try to get at least 150 minutes of exercise per week or 10,000 steps per day on a pedometer . · Order or download the FREE \"Exercise & Physical Activity: Your Everyday Guide\" from [employer]. Call 2-678.691.4425 or search [employer] online. · You need 8133-6812 mg of calcium and 1569-4089 IU of vitamin D per day. It is possible to meet your calcium requirement with diet alone, but a vitamin D supplement is usually necessary to meet this goal.  · When exposed to the sun, use a sunscreen that protects against both UVA and UVB radiation with an SPF of 30 or greater. Reapply every 2 to 3 hours or after sweating, drying off with a towel, or swimming. · Always wear a seat belt when traveling in a car. Always wear a helmet when riding a bicycle or motorcycle.

## 2022-02-03 DIAGNOSIS — F33.1 MODERATE EPISODE OF RECURRENT MAJOR DEPRESSIVE DISORDER (HCC): ICD-10-CM

## 2022-02-04 NOTE — TELEPHONE ENCOUNTER
Requesting medication refill.  Please approve or deny this request.    Rx requested:  Requested Prescriptions     Pending Prescriptions Disp Refills    DULoxetine (CYMBALTA) 30 MG extended release capsule [Pharmacy Med Name: DULoxetine HCl 30 MG Oral Capsule Delayed Release Particles] 90 capsule 3     Sig: TAKE 1 CAPSULE BY MOUTH  DAILY       Last Office Visit:   12/22/2021    Next Visit Date:  Future Appointments   Date Time Provider Saurabh Delvalle   3/10/2022  1:15 PM Rachid Bertrand DO [address] [place] [address]   4/21/2022  1:45 PM Elva Fulton DO [facility] [place]   12/27/2022 12:30 PM Elva Fulton DO [facility] EM[facility] [place] [facility] [place]

## 2022-02-07 RX ORDER — DULOXETIN HYDROCHLORIDE 30 MG/1
30 CAPSULE, DELAYED RELEASE ORAL DAILY
Qty: 90 CAPSULE | Refills: 3 | Status: SHIPPED | OUTPATIENT
Start: 2022-02-07

## 2022-02-28 DIAGNOSIS — Z79.899 HIGH RISK MEDICATION USE: ICD-10-CM

## 2022-02-28 DIAGNOSIS — I10 BENIGN ESSENTIAL HTN: ICD-10-CM

## 2022-02-28 LAB
ALBUMIN SERPL-MCNC: 4.2 G/DL (ref 3.5–4.6)
ALP BLD-CCNC: 92 U/L (ref 40–130)
ALT SERPL-CCNC: 13 U/L (ref 0–33)
AMPHETAMINE SCREEN, URINE: NORMAL
ANION GAP SERPL CALCULATED.3IONS-SCNC: 10 MEQ/L (ref 9–15)
AST SERPL-CCNC: 23 U/L (ref 0–35)
BARBITURATE SCREEN URINE: NORMAL
BENZODIAZEPINE SCREEN, URINE: NORMAL
BILIRUB SERPL-MCNC: 0.6 MG/DL (ref 0.2–0.7)
BUN BLDV-MCNC: 22 MG/DL (ref 8–23)
CALCIUM SERPL-MCNC: 9.8 MG/DL (ref 8.5–9.9)
CANNABINOID SCREEN URINE: NORMAL
CHLORIDE BLD-SCNC: 106 MEQ/L (ref 95–107)
CO2: 28 MEQ/L (ref 20–31)
COCAINE METABOLITE SCREEN URINE: NORMAL
CREAT SERPL-MCNC: 0.98 MG/DL (ref 0.5–0.9)
GFR AFRICAN AMERICAN: >60
GFR NON-AFRICAN AMERICAN: 54.8
GLOBULIN: 2.5 G/DL (ref 2.3–3.5)
GLUCOSE BLD-MCNC: 80 MG/DL (ref 70–99)
Lab: NORMAL
METHADONE SCREEN, URINE: NORMAL
OPIATE SCREEN URINE: NORMAL
OXYCODONE URINE: NORMAL
PHENCYCLIDINE SCREEN URINE: NORMAL
POTASSIUM SERPL-SCNC: 4.7 MEQ/L (ref 3.4–4.9)
PROPOXYPHENE SCREEN: NORMAL
SODIUM BLD-SCNC: 144 MEQ/L (ref 135–144)
TOTAL PROTEIN: 6.7 G/DL (ref 6.3–8)

## 2022-03-01 NOTE — RESULT ENCOUNTER NOTE
Please let patient know her labs showed slight decrease in her kidney function. We will plan to repeat lab work at her follow-up appointment 4/21.   Thank you

## 2022-03-09 NOTE — TELEPHONE ENCOUNTER
4/21/2022 Jos Chan DO [facility] Return in about 6 months (around 4/21/2022) for with PCP.   12/27/2022 [facility] AWV       Past Visits    Date Provider Specialty Visit Type Primary Dx   12/22/2021 Jos Chan DO Family Medicine Virtual Visit Essential hypertensio

## 2022-03-11 DIAGNOSIS — E03.9 HYPOTHYROIDISM, UNSPECIFIED TYPE: Primary | ICD-10-CM

## 2022-03-11 RX ORDER — LEVOTHYROXINE SODIUM 0.05 MG/1
50 TABLET ORAL DAILY
Qty: 90 TABLET | Refills: 0 | Status: SHIPPED | OUTPATIENT
Start: 2022-03-11 | End: 2022-06-01

## 2022-03-11 NOTE — TELEPHONE ENCOUNTER
90-day supply sent to patient's pharmacy. Patient needs thyroid rechecked. Lab work ordered.   Thank you

## 2022-04-21 PROBLEM — G62.0 DRUG-INDUCED POLYNEUROPATHY (HCC): Status: ACTIVE | Noted: 2022-04-21

## 2022-05-12 DIAGNOSIS — E03.9 HYPOTHYROIDISM, UNSPECIFIED TYPE: ICD-10-CM

## 2022-05-12 LAB — TSH SERPL DL<=0.05 MIU/L-ACNC: 0.88 UIU/ML (ref 0.44–3.86)

## 2022-05-16 ENCOUNTER — OFFICE VISIT (OUTPATIENT)
Dept: FAMILY MEDICINE CLINIC | Age: 77
End: 2022-05-16
Payer: MEDICARE

## 2022-05-16 VITALS
TEMPERATURE: 99.2 F | SYSTOLIC BLOOD PRESSURE: 138 MMHG | HEIGHT: 58 IN | BODY MASS INDEX: 43.83 KG/M2 | HEART RATE: 63 BPM | WEIGHT: 208.8 LBS | DIASTOLIC BLOOD PRESSURE: 84 MMHG | OXYGEN SATURATION: 98 %

## 2022-05-16 DIAGNOSIS — E78.2 MIXED HYPERLIPIDEMIA: ICD-10-CM

## 2022-05-16 DIAGNOSIS — Z89.511 HX OF BKA, RIGHT (HCC): ICD-10-CM

## 2022-05-16 DIAGNOSIS — G62.0 DRUG-INDUCED POLYNEUROPATHY (HCC): ICD-10-CM

## 2022-05-16 DIAGNOSIS — E03.9 HYPOTHYROIDISM, UNSPECIFIED TYPE: ICD-10-CM

## 2022-05-16 DIAGNOSIS — Z91.81 AT HIGH RISK FOR FALLS: Primary | ICD-10-CM

## 2022-05-16 DIAGNOSIS — G47.33 OBSTRUCTIVE SLEEP APNEA: ICD-10-CM

## 2022-05-16 DIAGNOSIS — I10 ESSENTIAL HYPERTENSION: ICD-10-CM

## 2022-05-16 DIAGNOSIS — N28.9 ABNORMAL KIDNEY FUNCTION: ICD-10-CM

## 2022-05-16 DIAGNOSIS — F33.1 MODERATE EPISODE OF RECURRENT MAJOR DEPRESSIVE DISORDER (HCC): ICD-10-CM

## 2022-05-16 DIAGNOSIS — C48.2 MALIGNANT NEOPLASM OF PERITONEUM, UNSPECIFIED (HCC): ICD-10-CM

## 2022-05-16 DIAGNOSIS — S14.109S INJURY OF CERVICAL SPINAL CORD, SEQUELA (HCC): ICD-10-CM

## 2022-05-16 PROBLEM — N18.30 CHRONIC RENAL DISEASE, STAGE III (HCC): Status: ACTIVE | Noted: 2022-05-16

## 2022-05-16 LAB
ALBUMIN SERPL-MCNC: 4.2 G/DL (ref 3.5–4.6)
ALP BLD-CCNC: 87 U/L (ref 40–130)
ALT SERPL-CCNC: 10 U/L (ref 0–33)
ANION GAP SERPL CALCULATED.3IONS-SCNC: 13 MEQ/L (ref 9–15)
AST SERPL-CCNC: 22 U/L (ref 0–35)
BILIRUB SERPL-MCNC: 0.6 MG/DL (ref 0.2–0.7)
BUN BLDV-MCNC: 25 MG/DL (ref 8–23)
CALCIUM SERPL-MCNC: 10.3 MG/DL (ref 8.5–9.9)
CHLORIDE BLD-SCNC: 107 MEQ/L (ref 95–107)
CO2: 25 MEQ/L (ref 20–31)
CREAT SERPL-MCNC: 0.82 MG/DL (ref 0.5–0.9)
GFR AFRICAN AMERICAN: >60
GFR NON-AFRICAN AMERICAN: >60
GLOBULIN: 2.4 G/DL (ref 2.3–3.5)
GLUCOSE BLD-MCNC: 77 MG/DL (ref 70–99)
POTASSIUM SERPL-SCNC: 4.4 MEQ/L (ref 3.4–4.9)
SODIUM BLD-SCNC: 145 MEQ/L (ref 135–144)
TOTAL PROTEIN: 6.6 G/DL (ref 6.3–8)

## 2022-05-16 PROCEDURE — 1090F PRES/ABSN URINE INCON ASSESS: CPT | Performed by: STUDENT IN AN ORGANIZED HEALTH CARE EDUCATION/TRAINING PROGRAM

## 2022-05-16 PROCEDURE — G8427 DOCREV CUR MEDS BY ELIG CLIN: HCPCS | Performed by: STUDENT IN AN ORGANIZED HEALTH CARE EDUCATION/TRAINING PROGRAM

## 2022-05-16 PROCEDURE — 1036F TOBACCO NON-USER: CPT | Performed by: STUDENT IN AN ORGANIZED HEALTH CARE EDUCATION/TRAINING PROGRAM

## 2022-05-16 PROCEDURE — 4040F PNEUMOC VAC/ADMIN/RCVD: CPT | Performed by: STUDENT IN AN ORGANIZED HEALTH CARE EDUCATION/TRAINING PROGRAM

## 2022-05-16 PROCEDURE — 99214 OFFICE O/P EST MOD 30 MIN: CPT | Performed by: STUDENT IN AN ORGANIZED HEALTH CARE EDUCATION/TRAINING PROGRAM

## 2022-05-16 PROCEDURE — G8399 PT W/DXA RESULTS DOCUMENT: HCPCS | Performed by: STUDENT IN AN ORGANIZED HEALTH CARE EDUCATION/TRAINING PROGRAM

## 2022-05-16 PROCEDURE — G8417 CALC BMI ABV UP PARAM F/U: HCPCS | Performed by: STUDENT IN AN ORGANIZED HEALTH CARE EDUCATION/TRAINING PROGRAM

## 2022-05-16 PROCEDURE — 1123F ACP DISCUSS/DSCN MKR DOCD: CPT | Performed by: STUDENT IN AN ORGANIZED HEALTH CARE EDUCATION/TRAINING PROGRAM

## 2022-05-16 ASSESSMENT — ENCOUNTER SYMPTOMS
ABDOMINAL PAIN: 0
VOMITING: 0
SHORTNESS OF BREATH: 0
BACK PAIN: 1
SORE THROAT: 0
COUGH: 0
SINUS PRESSURE: 0

## 2022-05-16 ASSESSMENT — PATIENT HEALTH QUESTIONNAIRE - PHQ9
6. FEELING BAD ABOUT YOURSELF - OR THAT YOU ARE A FAILURE OR HAVE LET YOURSELF OR YOUR FAMILY DOWN: 0
1. LITTLE INTEREST OR PLEASURE IN DOING THINGS: 0
10. IF YOU CHECKED OFF ANY PROBLEMS, HOW DIFFICULT HAVE THESE PROBLEMS MADE IT FOR YOU TO DO YOUR WORK, TAKE CARE OF THINGS AT HOME, OR GET ALONG WITH OTHER PEOPLE: 0
4. FEELING TIRED OR HAVING LITTLE ENERGY: 0
7. TROUBLE CONCENTRATING ON THINGS, SUCH AS READING THE NEWSPAPER OR WATCHING TELEVISION: 0
9. THOUGHTS THAT YOU WOULD BE BETTER OFF DEAD, OR OF HURTING YOURSELF: 0
5. POOR APPETITE OR OVEREATING: 0
SUM OF ALL RESPONSES TO PHQ QUESTIONS 1-9: 0
3. TROUBLE FALLING OR STAYING ASLEEP: 0
SUM OF ALL RESPONSES TO PHQ QUESTIONS 1-9: 0
8. MOVING OR SPEAKING SO SLOWLY THAT OTHER PEOPLE COULD HAVE NOTICED. OR THE OPPOSITE, BEING SO FIGETY OR RESTLESS THAT YOU HAVE BEEN MOVING AROUND A LOT MORE THAN USUAL: 0
2. FEELING DOWN, DEPRESSED OR HOPELESS: 0
SUM OF ALL RESPONSES TO PHQ QUESTIONS 1-9: 0
SUM OF ALL RESPONSES TO PHQ QUESTIONS 1-9: 0
SUM OF ALL RESPONSES TO PHQ9 QUESTIONS 1 & 2: 0

## 2022-05-16 NOTE — PATIENT INSTRUCTIONS
Patient Education        Preventing Falls: Care Instructions  Your Care Instructions     Getting around your home safely can be a challenge if you have injuries or health problems that make it easy for you to fall. Loose rugs and furniture in walkways are among the dangers for many older people who have problems walking or who have poor eyesight. People who have conditions such as arthritis,osteoporosis, or dementia also have to be careful not to fall. You can make your home safer with a few simple measures. Follow-up care is a key part of your treatment and safety. Be sure to make and go to all appointments, and call your doctor if you are having problems. It's also a good idea to know your test results and keep alist of the medicines you take. How can you care for yourself at home? Taking care of yourself   Exercise regularly to improve your strength, muscle tone, and balance. Walk if you can. Swimming may be a good choice if you cannot walk easily.  Have your vision and hearing checked each year or any time you notice a change. If you have trouble seeing and hearing, you might not be able to avoid objects and could lose your balance.  Know the side effects of the medicines you take. Ask your doctor or pharmacist whether the medicines you take can affect your balance. Sleeping pills or sedatives can affect your balance.  Limit the amount of alcohol you drink. Alcohol can impair your balance and other senses.  Ask your doctor whether calluses or corns on your feet need to be removed. If you wear loose-fitting shoes because of calluses or corns, you can lose your balance and fall.  Talk to your doctor if you have numbness in your feet.  You may get dizzy if you do not drink enough water. To prevent dehydration, drink plenty of fluids. Choose water and other clear liquids.  If you have kidney, heart, or liver disease and have to limit fluids, talk with your doctor before you increase the amount of fluids you drink. Preventing falls at home   Remove raised doorway thresholds, throw rugs, and clutter. Repair loose carpet or raised areas in the floor. [facility] furniture and electrical cords to keep them out of walking paths.  Use nonskid floor wax, and wipe up spills right away, especially on ceramic tile floors.  If you use a walker or cane, put rubber tips on it. If you use crutches, clean the bottoms of them regularly with an abrasive pad, such as steel wool.  Keep your house well lit, especially [place], and outside walkways. Use night-lights in areas such as hallways and bathrooms. Add extra light switches or use remote switches (such as switches that go on or off when you clap your hands) to make it easier to turn lights on if you have to get up during the night.  Install sturdy handrails on stairways.  Move items in your cabinets so that the things you use a lot are on the lower shelves (about waist level).  Keep a cordless phone and a flashlight with new batteries by your bed. If possible, put a phone in each of the main rooms of your house, or carry a cell phone in case you fall and cannot reach a phone. Or, you can wear a device around your neck or wrist. You push a button that sends a signal for help.  Wear low-heeled shoes that fit well and give your feet good support. Use footwear with nonskid soles. Check the heels and soles of your shoes for wear. Repair or replace worn heels or soles.  Do not wear socks without shoes on wood floors.  Walk on the grass when the sidewalks are slippery. If you live in an area that gets snow and ice in the winter, sprinkle salt on slippery steps and sidewalks. Or ask a family member or friend to do this for you. Preventing falls in the bath   Install grab bars and nonskid mats inside and outside your shower or tub and near the toilet and sinks.  Use shower chairs and bath benches.    Use a hand-held shower head that will allow you to sit while showering.  Get into a tub or shower by putting the weaker leg in first. Get out of a tub or shower with your strong side first.   Repair loose toilet seats and consider installing a raised toilet seat to make getting on and off the toilet easier.  Keep your bathroom door unlocked while you are in the shower. Where can you learn more? Go to [email or web address] and sign in to your [employer] account. Enter [phone] in the [facility] box to learn more about \"Preventing Falls: Care Instructions. \"     If you do not have an account, please click on the \"Sign Up Now\" link. Current as of: September 8, 2021               Content Version: 13.2  © 3390-8669 [employer]. Care instructions adapted under license by [facility] ([facility]). If you have questions about a medical condition or this instruction, always ask your healthcare professional. [place] [address] any warranty or liability for your use of this information.

## 2022-05-16 NOTE — PROGRESS NOTES
MD Akilah   aspirin 81 MG EC tablet Take 1 tablet by mouth daily Yes Antoinette Ji APRN - CNP   atorvastatin (LIPITOR) 80 MG tablet Take 1 tablet by mouth nightly Yes Antoinette Patiño APRN - CNP   Respiratory Therapy Supplies SHAE New Full face Mask , please do not sent nasal pillow . (She want to try full face Mask )    Dx KEI Yes Oumou Mccauley MD   Blood Pressure KIT 1 Units by Does not apply route as needed (daily monitoring of blood pressure) One electronic sphygmomanometer and cuff for home monitoring of blood pressure and heart rate. Upper arm type of cuff preferred. Adult regular size. Yes Oscar Kenny MD   Respiratory Therapy Supplies SHAE New CPAP, full face  mask and chin strap       Fax [phone] Yes Oumou Mccauley MD   CPAP Machine MISC by Does not apply route New CPAP with 8 cm Yes Oumou Mccauley MD   acyclovir (ZOVIRAX) 5 % CREA Apply topically as needed Yes Olegario Perez MD   b complex vitamins capsule Take 1 capsule by mouth daily Yes Historical Provider, MD   Multiple Vitamins-Minerals (CENTRUM SILVER 50+WOMEN PO) Take by mouth Yes Historical Provider, MD   Vitamin D (CHOLECALCIFEROL) 1000 UNITS CAPS capsule Take 1,000 Units by mouth daily.  Yes Historical Provider, MD   furosemide (LASIX) 20 MG tablet Take 1 tablet by mouth daily for 5 days  Patient taking differently: Take 20 mg by mouth daily as needed   Alejandra Lynn MD        Medications Discontinued During This Encounter   Medication Reason    tamoxifen (NOLVADEX) 20 MG tablet Patient Choice       Allergies   Allergen Reactions    Ciprofloxacin Itching    Oxycontin [Oxycodone Hcl] Itching       Past Medical History:   Diagnosis Date    Arthritis     Blood transfusion     CAD (coronary artery disease)     Cancer ([facility] [address]     GALLBLADDER 2009, 2011 OMENTUM    Charcot's joint     left foot    Chest pain 7/2/2015    Colitis     DDD (degenerative disc disease), lumbar 2/12/2013    Depression     Disorder of peripheral nervous system 9/1/2010    Dyspnea 7/2/2015    Family history of coronary artery disease 8/29/2014    History of blood transfusion 2011    following chemotherapy    Hx of right BKA ([facility] [address]     Hyperlipidemia     Hypertension     Hypothyroid 6/12/2015    Lobular breast cancer ([facility] [address] 10/2015    NSTEMI (non-ST elevated myocardial infarction) ([facility] [address] 8/29/2014    Obesity     Paroxysmal atrial fibrillation ([facility] [address] 8/29/2014    S/P BKA (below knee amputation) [facility])        Past Surgical History:   Procedure Laterality Date    ABDOMEN SURGERY Left 04/11/2017    EXCISION OF CHEST WALL MASS, UPDATE LABS ON ADMIT  performed by Clyde Ochoa MD at [address] BLEPHAROPLASTY  02/05/2013    both eyes    CARDIAC CATHETERIZATION      COLONOSCOPY  01/01/2010    normal    CYSTOSCOPY W BIOPSY OF BLADDER N/A 06/26/2017    CYSTOSCOPY BLADDER BIOPSY AND FULGURATION performed by Tammie Rolle MD at [address] Bilateral 2012    cataract removal with IOL implants    HYSTERECTOMY      LEG AMPUTATION BELOW KNEE Right 02/01/2011    DR Celi Allen due to CHARCOTS    MASTECTOMY Bilateral 11/16/2015    Bilateral simple mastectomies with right SNB by DR Jose Alberto Doan    [place] REMOVAL OF BREAST LESION Left 01/24/2017    CORE NEEDLE BIOPSY OF  LEFT BREAST MASS performed by Clyde Ochoa MD at [address] [place] [address]      cervical and lumbar       Social History     Socioeconomic History    Marital status:       Spouse name: Not on file    Number of children: 2    Years of education: Not on file    Highest education level: Not on file   Occupational History    Occupation: retired   Tobacco Use    Smoking status: Never Smoker    Smokeless tobacco: Never Used   Vaping Use    Vaping Use: Never used   Substance and Sexual Activity    Alcohol use: Yes     Comment: social    Drug use: No    Sexual activity: Never   Other Topics Concern    Not on file   Social History Narrative    Lives With: Alone, dtr in [place], disabled son with Down's Syndrome is in a local group home    Type of Home: [address] in [place] (she calls it an [employer]-bc they all help each other)    Home Layout: One level, Level entry    Bathroom Shower/Tub: Tub/Shower unit, Equipment: Tub transfer bench, Grab bars in shower    Bathroom Accessibility: Wheelchair accessible    Home Equipment: Quad cane, Rolling walker, Wheelchair-electric (has right BK prosthesis and Left AFO)    ADL Assistance: Independent    Homemaking Assistance: Independent (warms meals up  -  receives  MOW and groceries delivered)    [employer] Responsibilities: Yes, Meal Prep Responsibility: Secondary    Laundry Responsibility: Primary, Cleaning Responsibility: Secondary    Ambulation Assistance: Independent (last few days uses walker to get around), Transfer Assistance: Independent    Active : Yes    Additional Comments: received new prosthesis in July and has been falling since - has appointment with prosthesis next week. Pt reports prior to 8/21 she was ambulating with wheeled walker and since then she has used power w/c    Retired --still works a day a week as a  [address] [facility] Strain: Low Risk     Difficulty of Paying Living Expenses: Not hard at [facility] Insecurity: No Food Insecurity    Worried About [facility] in the Last Year: Never true   Reinier Mauro in the Last Year: Never true   Transportation Needs:     Lack of Transportation (Medical): Not on file    Lack of Transportation (Non-Medical):  Not on file   Physical Activity:     Days of Exercise per Week: Not on file    Minutes of Exercise per Session: Not on file   Stress:     Feeling of Stress : Not on file   Social Connections:     Frequency of Communication with Friends and Family: Not on file    Frequency of Social Gatherings with Friends and Family: Not on file    Attends Gnosticism Services: Not on file    Active Member of Clubs or Organizations: Not on file    Attends Club or Organization Meetings: Not on file    Marital Status: Not on file   Intimate Partner Violence:     Fear of Current or Ex-Partner: Not on file    Emotionally Abused: Not on file    Physically Abused: Not on file    Sexually Abused: Not on file   Housing Stability:     Unable to Pay for Housing in the Last Year: Not on file    Number of [place] in the Last Year: Not on file    Unstable Housing in the Last Year: Not on file        Family History   Problem Relation Age of Onset    Heart Disease Mother     Arthritis Mother     Heart Disease Father     Arthritis Father     Cancer Sister         Colon    Thyroid Disease Son     Other Son         Down's syndrome       Vitals:    05/16/22 1332   BP: 138/84   Pulse: 63   Temp: 99.2 °F (37.3 °C)   SpO2: 98%   Weight: 208 lb 12.8 oz (94.7 kg)   Height: 4' 10\" (1.473 m)       Estimated body mass index is 43.64 kg/m² as calculated from the following:    Height as of this encounter: 4' 10\" (1.473 m). Weight as of this encounter: 208 lb 12.8 oz (94.7 kg). No results for input(s): WBC, RBC, HGB, HCT, MCV, MCH, MCHC, RDW, PLT, MPV in the last 72 hours. No results for input(s): NA, K, CL, CO2, BUN, CREATININE, GLUCOSE, CALCIUM, PROT, LABALBU, BILITOT, ALKPHOS, AST, ALT in the last 72 hours. Lab Results   Component Value Date    LABA1C 5.5 09/08/2021       No results found. Physical Exam  Constitutional:       Appearance: Normal appearance. She is normal weight. HENT:      Head: Normocephalic and atraumatic. Eyes:      Extraocular Movements: Extraocular movements intact. Conjunctiva/sclera: Conjunctivae normal.   Cardiovascular:      Rate and Rhythm: Normal rate and regular rhythm. Pulses: Normal pulses. Heart sounds: Normal heart sounds.    Pulmonary:      Effort: electronic signature was used to authenticate this note. --Hany Sanchez, DO on 5/16/2022 at 2:07 PM    On the basis of positive falls risk screening, assessment and plan is as follows: home safety tips provided.

## 2022-06-01 RX ORDER — LEVOTHYROXINE SODIUM 0.05 MG/1
50 TABLET ORAL DAILY
Qty: 90 TABLET | Refills: 3 | Status: SHIPPED | OUTPATIENT
Start: 2022-06-01

## 2022-08-21 ENCOUNTER — PATIENT MESSAGE (OUTPATIENT)
Dept: BEHAVIORAL/MENTAL HEALTH CLINIC | Age: 77
End: 2022-08-21

## 2022-08-22 NOTE — TELEPHONE ENCOUNTER
From: Pratibha De La Fuente  To: Dr. Lenna Crigler: 8/21/2022 10:14 AM EDT  Subject: What's wrong? In the back of my mind I know I need help. I just can't seem to do anything to help myself. I look around and see what others would see as nd know it isn't right. The bags of trash and garbage sitting around, the dirty dishes in the sink , the fact that I haven't showered in over a week, that if I didn't have meals on wheels I probably wouldn't eat any \"real\" food at all. And I don't even want to eat. I only eat when I get shaky and gat a headache. Physically I feel as if I have a great weighted blanket over me that doesn't let me move. I know that it takes months to get an appointment. I would like to be on a list for any cancellations you might have.     Divya Edmonds  [phone]  [birth year]

## 2022-08-29 DIAGNOSIS — M43.10 SPONDYLOLISTHESIS, UNSPECIFIED SPINAL REGION: ICD-10-CM

## 2022-08-29 DIAGNOSIS — M48.02 CERVICAL STENOSIS OF SPINAL CANAL: ICD-10-CM

## 2022-08-30 RX ORDER — GABAPENTIN 600 MG/1
TABLET ORAL
Qty: 450 TABLET | Refills: 2 | Status: SHIPPED | OUTPATIENT
Start: 2022-08-30 | End: 2023-05-27

## 2022-08-30 NOTE — TELEPHONE ENCOUNTER
Future Appointments    Encounter Information    Provider Department Appt Notes   11/17/2022 [facility] Return in about 6 months (around 11/16/2022) for f/u chronic conditions.    12/27/2022 Gisele Agrawal DO [facility]     Past Visits    Date Provider Specialty Visit Type Primary Dx   05/16/2022 Gisele Agrawal DO Family Medicine Office Visit At high risk for falls   12/22/2021 Gisele Agrawal DO Family Medicine Virtual Visit Essential hypertension

## 2022-09-08 ENCOUNTER — TELEMEDICINE (OUTPATIENT)
Dept: BEHAVIORAL/MENTAL HEALTH CLINIC | Age: 77
End: 2022-09-08
Payer: MEDICARE

## 2022-09-08 DIAGNOSIS — F33.1 MODERATE EPISODE OF RECURRENT MAJOR DEPRESSIVE DISORDER (HCC): ICD-10-CM

## 2022-09-08 PROCEDURE — 1090F PRES/ABSN URINE INCON ASSESS: CPT | Performed by: PSYCHIATRY & NEUROLOGY

## 2022-09-08 PROCEDURE — G8399 PT W/DXA RESULTS DOCUMENT: HCPCS | Performed by: PSYCHIATRY & NEUROLOGY

## 2022-09-08 PROCEDURE — 99215 OFFICE O/P EST HI 40 MIN: CPT | Performed by: PSYCHIATRY & NEUROLOGY

## 2022-09-08 PROCEDURE — G8428 CUR MEDS NOT DOCUMENT: HCPCS | Performed by: PSYCHIATRY & NEUROLOGY

## 2022-09-08 PROCEDURE — 1124F ACP DISCUSS-NO DSCNMKR DOCD: CPT | Performed by: PSYCHIATRY & NEUROLOGY

## 2022-09-08 RX ORDER — BUPROPION HYDROCHLORIDE 150 MG/1
150 TABLET ORAL EVERY MORNING
Qty: 90 TABLET | Refills: 1 | Status: SHIPPED | OUTPATIENT
Start: 2022-09-08 | End: 2022-10-06

## 2022-09-08 NOTE — PROGRESS NOTES
9/8/2022    40 mins total for this encounter =     30 minutes with direct communication with patient for encounter     10 mins (in addition) spent on chart review, and in the ordering of all necessary labs and/or medications      The risks and benefits of converting to a virtual visit were discussed in light of the current infectious disease epidemic. Patient also understood that insurance coverage and co-pays are up to their individual insurance plans. Patient Location:        Patient's home address  Provider Location ([facility]):        [facility]  Chief complaint No chief complaint on file. TELEHEALTH PSYCHIATRY FOLLOWUP (OUTPATIENT)   Audio/Visual (During [id number] public health emergency)          Psychiatric Diagnoses:  1. Moderate episode of recurrent major depressive disorder (HCC)        Assessment/Plan:   START vortioxetine (Trintellix) 5 mg ok to continue duloxetine (Cymbalta) for now, will likely discontinue in the future    Bupropion XL (Wellbutrin XL) 150 mg daily was taking 300 mg daily but will decrease due to concomitant vortioxetine (Trintellix)       Patient denies thoughts of harm to self or others. No acute safety concerns voiced at this appointment. MOOD: Patient reports mood has been stable. Patient has been able to attend to activities of daily living, has good energy, good mood, and good motivation. SLEEP: Patient reports sleep has been at least 8 hours a night and wakes feeling rested in the morning. No concerns related to sleep today    ATTENTION AND FOCUS: Patient denies any concerns related to attention and focus, and no concerns related to memory. Occasionally going for walks. ANXIETY: Patient denies any concerns related to anxiety today. BIPOLAR ARNOLD: No concerns. No manic symptoms endorsed today and no concern for arnold. SUBSTANCE USE: No concerns for ongoing substance use.  Patient denies any recent substance use    ASSESSMENT/PLAN: Medication changes per plan below. Discussed with patient the risks and benefits of their psychiatric medication and patient was amenable to plan as outlined below    COUNSELING or THERAPY:   Continue to encourage therapy as part of ongoing treatment of their mental health concerns. Continue to encourage physical activity and adherence to medication regimen. DATE and changes made  HPI            Medical Diagnoses:  Patient Active Problem List   Diagnosis    Neck pain on right side    Obesity (BMI 30-39. 9)    Obesity    High risk medication use - 01/30/18 OARRS PM&R, 03/26/18 OARRS PM&R, 04/03/18 Urine Drug Screen positive opiates PM&R, 01/31/18 Med Contract PM&R    Impaired mobility and activities of daily living    Hx of right BKA (HCC)    Family history of coronary artery disease    NSTEMI (non-ST elevated myocardial infarction) ([address]    Arthritis, neuropathic    Charcot's joint of foot    Closed dislocation of ankle    Closed dislocation of foot    Complete rotator cuff rupture of left shoulder    Clinical depression    Benign essential HTN    Acquired flat foot    Closed fracture of tarsal and metatarsal bones    Myogenic ptosis    Disorder of peripheral nervous system    Arthritis of ankle or foot, degenerative    HLD (hyperlipidemia)    Cervical post-laminectomy syndrome    Failed back syndrome of lumbar spine    Low back pain with sciatica    Fibromyalgia muscle pain    Malaise and fatigue    Melancholia    Complete tear of left rotator cuff    Generalized osteoarthritis    Peripheral neuropathy    Osteoporosis    Postlaminectomy syndrome of cervical region    Postlaminectomy syndrome of lumbar region    Tibialis tendinitis    Hereditary and idiopathic peripheral neuropathy (HCC)    Hypothyroid    Lobular breast cancer (HCC)    Acquired hallux valgus    Fatigue due to treatment    Anemia    Cancer ([address]    Cervical spinal cord injury ([address]    Complete traumatic amputation at level between right knee and ankle ([address] Morbid obesity (HCC)    Reactive depression    Sleeping excessive    Chronic low back pain    Left breast mass    Other specified postprocedural states    Primary osteoarthritis involving multiple joints    Hematuria    Hematuria, gross    Noncompliance - No Show inject 07/17/17, No Show F/U 1/11/18    Chronic low back pain with sciatica    Moderate episode of recurrent major depressive disorder (HCC)    Charcot's arthropathy    Chronic pain syndrome    Racing heart beat    Cervical fusion syndrome    Current mild episode of major depressive disorder (HCC)    Obstructive sleep apnea    Malignant neoplasm of peritoneum, 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HCC)    Dizziness    Gait abnormality dt cerebrovascular insufficiancy and gait atasxia    Difficulty balancing    Hx of BKA, right (HCC)    Abnormality of gait and mobility    Drug-induced polyneuropathy [address]    Chronic renal disease, stage III ([address] [[id number]]         AT TODAY'S VISIT     Crisis plan reviewed and patient verbally contracts for safety. Go to ED with emergent symptoms or safety concerns. Risks, benefits, side effects of medications, including any / all black box warnings, discussed with patient, who verbalizes their understanding. Pt is jodie for safety and denies thoughts of SI/HI. Amenable to plan. No acute concerns to address regarding medications. Subjective:      Patient denies medication side effects apart from those mentioned in the assessment and plan. Patient reports they have been compliant with current medication regimen and have not missed a dose. At today's visit, patient denies thoughts of harm to self or others since last appointment, and denies auditory or visual hallucinations. ROS:  [x] All negative/unchanged except if checked.  Explain positive(checked items) below:       Denies any new or changed physical symptoms other than those noted in the subjective portion of this note   [] Constitutional  [] Eyes  [] Ear/Nose/Mouth/Throat  [] Respiratory  [] CV  [] GI  []   [] Musculoskeletal  [] Skin/Breast  [] Neurological  [] Endocrine  [] Heme/Lymph  [] Allergic/Immunologic    OBJECTIVE:   Recent Results (from the past 1008 hour(s))   Comprehensive Metabolic Panel    Collection Time: 09/13/22  6:00 PM   Result Value Ref Range    Sodium 139 135 - 144 mEq/L    Potassium 4.3 3.4 - 4.9 mEq/L    Chloride 102 95 - 107 mEq/L    CO2 26 20 - 31 mEq/L    Anion Gap 11 9 - 15 mEq/L    Glucose 93 70 - 99 mg/dL    BUN 22 8 - 23 mg/dL    Creatinine 0.85 0.50 - 0.90 mg/dL    GFR Non-African American >60.0 >60    GFR  >60.0 >60    Calcium 9.9 8.5 - 9.9 mg/dL    Total Protein 6.7 6.3 - 8.0 g/dL    Albumin 4.3 3.5 - 4.6 g/dL    Total Bilirubin 0.3 0.2 - 0.7 mg/dL    Alkaline Phosphatase 94 40 - 130 U/L    ALT 20 0 - 33 U/L    AST 30 0 - 35 U/L    Globulin 2.4 2.3 - 3.5 g/dL   Magnesium    Collection Time: 09/13/22  6:00 PM   Result Value Ref Range    Magnesium 1.8 1.7 - 2.4 mg/dL   CBC with Auto Differential    Collection Time: 09/13/22  6:00 PM   Result Value Ref Range    WBC 6.2 4.8 - 10.8 K/uL    RBC 4.27 4.20 - 5.40 M/uL    Hemoglobin 12.8 12.0 - 16.0 g/dL    Hematocrit 38.9 37.0 - 47.0 %    MCV 91.0 82.0 - 100.0 fL    MCH 30.0 27.0 - 31.3 pg    MCHC 33.0 33.0 - 37.0 %    RDW 14.4 11.5 - 14.5 %    Platelets 325 302 - 272 K/uL    Neutrophils % 61.1 %    Lymphocytes % 26.5 %    Monocytes % 9.2 %    Eosinophils % 2.3 %    Basophils % 0.9 %    Neutrophils Absolute 3.8 1.4 - 6.5 K/uL    Lymphocytes Absolute 1.6 1.0 - 4.8 K/uL    Monocytes Absolute 0.6 0.2 - 0.8 K/uL    Eosinophils Absolute 0.1 0.0 - 0.7 K/uL    Basophils Absolute 0.1 0.0 - 0.2 K/uL   Urinalysis with Reflex to Culture    Collection Time: 09/13/22  6:00 PM    Specimen: Urine   Result Value Ref Range    Color, UA Yellow Straw/Yellow    Clarity, UA Clear Clear    Glucose, Ur Negative Negative mg/dL    Bilirubin Urine Negative Negative    Ketones, Urine Negative Negative mg/dL    Specific Gravity, UA 1.012 1.005 - 1.030    Blood, Urine Negative Negative    pH, UA 6.0 5.0 - 9.0    Protein, UA Negative Negative mg/dL    Urobilinogen, Urine 0.2 <2.0 E.U./dL    Nitrite, Urine POSITIVE (A) Negative    Leukocyte Esterase, Urine MODERATE (A) Negative    Urine Reflex to Culture Yes    Microscopic Urinalysis    Collection Time: 09/13/22  6:00 PM   Result Value Ref Range    Bacteria, UA MANY (A) Negative /HPF    Hyaline Casts, UA 0-1 0 - 5 /HPF    WBC, UA 20-50 (A) 0 - 5 /HPF    RBC, UA 0-2 0 - 5 /HPF    Epithelial Cells, UA 0-2 0 - 5 /HPF   Culture, Urine    Collection Time: 09/13/22  9:30 PM    Specimen: Urine, clean catch   Result Value Ref Range    Urine Culture, Routine (A)      Performed at [address] [place] [address] [id number] ([phone]      Organism Escherichia coli (A)     Urine Culture, Routine >[id number] CFU/ML        Susceptibility    Escherichia coli - BACTERIAL SUSCEPTIBILITY PANEL BY KENDRA     ampicillin 4 Sensitive mcg/mL     aztreonam <=1 Sensitive mcg/mL     ceFAZolin* <=4 Sensitive mcg/mL      * Cefazolin sensitivity results can be used to predict the  effectiveness of oral cephalosporins (eg.  Cephalexin) in  uncomplicated Urinary Tract Infections due to E. coli, K. pneumoniae,  and P. mirabilis       cefTRIAXone <=1 Sensitive mcg/mL     ciprofloxacin <=0.25 Sensitive mcg/mL     Confirmatory Extended Spectrum Beta-Lactamase NEGATIVE Sensitive mcg/mL     gentamicin <=1 Sensitive mcg/mL     nitrofurantoin <=16 Sensitive mcg/mL     piperacillin-tazobactam <=4 Sensitive mcg/mL     trimethoprim-sulfamethoxazole <=20 Sensitive mcg/mL       MEDICATIONS:    Current Outpatient Medications:     VORTIoxetine (TRINTELLIX) 5 MG tablet, Take 1 tablet by mouth daily, Disp: 30 tablet, Rfl: 5    buPROPion (WELLBUTRIN XL) 150 MG extended release tablet, Take 1 tablet by mouth every morning, Disp: 90 tablet, Rfl: 1    sulfamethoxazole-trimethoprim (BACTRIM DS) 800-160 MG per tablet, Take 1 tablet by mouth 2 times daily for 10 days, Disp: 20 tablet, Rfl: 0    gabapentin (NEURONTIN) 600 MG tablet, TAKE 1 TABLET BY MOUTH IN  THE MORNING 2 TABLETS BY  MOUTH IN THE AFTERNOON AND  2 TABLETS BY MOUTH IN THE  EVENING AS TOLERATED, Disp: 450 tablet, Rfl: 2    levothyroxine (SYNTHROID) 50 MCG tablet, TAKE 1 TABLET BY MOUTH  DAILY, Disp: 90 tablet, Rfl: 3    DULoxetine (CYMBALTA) 30 MG extended release capsule, TAKE 1 CAPSULE BY MOUTH  DAILY, Disp: 90 capsule, Rfl: 3    diclofenac (VOLTAREN) 50 MG EC tablet, TAKE 1 TABLET BY MOUTH  TWICE DAILY, Disp: 180 tablet, Rfl: 3    carvedilol (COREG) 25 MG tablet, TAKE 1 TABLET BY MOUTH  TWICE DAILY, Disp: 180 tablet, Rfl: 3    aspirin 81 MG EC tablet, Take 1 tablet by mouth daily, Disp: 30 tablet, Rfl: 3    atorvastatin (LIPITOR) 80 MG tablet, Take 1 tablet by mouth nightly, Disp: 30 tablet, Rfl: 3    Respiratory Therapy Supplies SHAE, New Full face Mask , please do not sent nasal pillow . (She want to try full face Mask )  Dx KEI (Patient not taking: Reported on 9/15/2022), Disp: 1 Device, Rfl: 0    Blood Pressure KIT, 1 Units by Does not apply route as needed (daily monitoring of blood pressure) One electronic sphygmomanometer and cuff for home monitoring of blood pressure and heart rate. Upper arm type of cuff preferred. Adult regular size.  (Patient not taking: Reported on 9/15/2022), Disp: 1 kit, Rfl: 0    Respiratory Therapy Supplies SHAE, New CPAP, full face  mask and chin strap    Fax [phone] (Patient not taking: Reported on 9/15/2022), Disp: 1 Device, Rfl: 0    furosemide (LASIX) 20 MG tablet, Take 1 tablet by mouth daily for 5 days (Patient taking differently: Take 20 mg by mouth daily as needed ), Disp: 5 tablet, Rfl: 0    CPAP Machine MISC, by Does not apply route New CPAP with 8 cm (Patient not taking: Reported on 9/15/2022), Disp: 1 each, Rfl: 0    acyclovir (ZOVIRAX) 5 % CREA, Apply topically as needed (Patient not taking: Reported on 9/15/2022), Disp: 5 g, Rfl: 1    b complex vitamins capsule, Take 1 capsule by mouth daily, Disp: , Rfl:     Multiple Vitamins-Minerals (CENTRUM SILVER 50+WOMEN PO), Take by mouth, Disp: , Rfl:     Vitamin D (CHOLECALCIFEROL) 1000 UNITS CAPS capsule, Take 1,000 Units by mouth daily. , Disp: , Rfl:     Examination:    Vitals: not taken in person, most recent vitals in chart reviewed  There were no vitals filed for this visit. Wt Readings from Last 3 Encounters:   09/13/22 215 lb (97.5 kg)   05/16/22 208 lb 12.8 oz (94.7 kg)   10/21/21 204 lb 6.4 oz (92.7 kg)       BP Readings from Last 3 Encounters:   09/13/22 (!) 157/75   05/16/22 138/84   10/21/21 136/78         Mental Status Examination:    Level of consciousness:  alert and oriented to person, place, and situation  Appearance:  well-appearing good grooming and good hygiene  Behavior/Motor:  no abnormalities noted  Attitude toward examiner: friendly, pleasant and cooperative, attentive and good eye contact  Speech:  spontaneous, normal rate and normal volume   Mood: \"good\"  Affect:  mood congruent  Thought processes:  linear and logical  Thought content:  Denies suicidal or homicidal ideation, denies auditory or visual hallucinations  Cognition:  no deficits in attention, concentration notable, recent memory grossly intact  Concentration intact  Memory intact  Insight good   Judgement fair   Fund of Knowledge adequate    PSYCHOTHERAPY/COUNSELING:  Encourage patient to attend outpatient appointments and therapy. [x] Therapeutic interview  [] Supportive  [x] CBT  [x] Ongoing  [] Other    No follow-ups on file. patient informed to call for follow-up or to reschedule appointment     Please note this report has been partially produced using speech recognition software  And may cause contain errors related to that system including grammar, punctuation and spelling as well as words and phrases that may seem inappropriate.  If there are questions or concerns please feel free to contact me to clarify. Jesse Wahl MD  Electronically signed by Jesse Wahl MD on 9/16/2022 at 2:11 PM  9/16/2022 2:11 PM    Psychiatry   Due to this being a TeleHealth encounter, evaluation of the following organ systems is limited: Vitals/Constitutional/EENT/Resp/CV/GI//MS/Neuro/Skin/Heme-Lymph-Imm. An  electronic signature was used to authenticate this note. --Jesse Wahl MD on 9/16/2022 at 2:11 PM    Pursuant to the emergency declaration under the [facility], [facility] waiver authority and the [employer] Act, this Virtual  Visit was conducted, with patient's consent, to reduce the patient's risk of exposure to COVID-19 and provide continuity of care for an established patient Services were provided through a video synchronous discussion virtually to substitute for in-person clinic visit. Treatment Plan:  Reviewed current Medications with the patient. Education provided on the compliance with treatment. Reviewed OARRs, no concerns identified     The anticipated benefits and side effects of the medications, including the anticipated results of not receiving the medication, and of alternatives to the medications were explained to the patient and their informed consent was obtained for starting medications as well as adjusting the doses (titration or tapering) as indicated. The above information was given by physician in verbal form and sufficient understanding was in evidence. The patient participated in discussion of the information and question and/or concerns were addressed before the medication was given. Due to COVID 19 outbreak, patient's office visit was converted to a virtual visit.   Patient was contacted and agreed to proceed with a virtual visit via DOXY

## 2022-09-12 ENCOUNTER — TELEPHONE (OUTPATIENT)
Dept: CARDIOLOGY CLINIC | Age: 77
End: 2022-09-12

## 2022-09-12 NOTE — TELEPHONE ENCOUNTER
Ruby Rodriguez  Patient Appointment Cancel Request Pool 3 weeks ago     Appointment canceled for Pratibha De La Fuente ([id number])  Visit Type: NEW PATIENT  Date        Time      Length    Provider                  Department  8/19/2022   11:00 AM  30 mins. DO [address]     Reason for Cancellation: Lack of Transportation       Cheli, DO Annika Neal Azalee Ao 1 month ago     This is a reminder for your upcoming appointment. Location of Appointment: [facility] [address]  Provider: Naga Wahl DO  Date: 8/19/22  Time: 11:00 AM     Please arrive 15 minutes prior to scheduled appointment time to complete paper work, bring photo ID and insurance cards. There is 1 questionnaire available for your appointment. All additional questionnaires will be available on August 16, 2022     [email or web address][View your available questionnaires]      Please click [email or web address]] to view more details about your appointment. This [employer]am[employer] message has not been read. Batch Job User Blank  Alysha Rodriguez 2 months ago     Fab Rutherford [place] L 2 months ago     PM  Appointment Information:      Visit Type: New Patient Appointment          Date: 8/19/2022                  Dept: [facility]                  Provider: Naga Wahl                  Time: 11:00 AM                  Length: 30 min     Appt Status: Scheduled        Appt Instructions:      Please arrive 15 minutes prior to scheduled appointment time to complete paper   work, bring photo ID and insurance cards. This [employer] message has not been read.

## 2022-09-13 ENCOUNTER — HOSPITAL ENCOUNTER (EMERGENCY)
Age: 77
Discharge: HOME | End: 2022-09-13
Payer: MEDICARE

## 2022-09-13 VITALS
HEIGHT: 58 IN | RESPIRATION RATE: 20 BRPM | DIASTOLIC BLOOD PRESSURE: 75 MMHG | WEIGHT: 215 LBS | HEART RATE: 69 BPM | BODY MASS INDEX: 45.13 KG/M2 | SYSTOLIC BLOOD PRESSURE: 157 MMHG | TEMPERATURE: 97.6 F | OXYGEN SATURATION: 96 %

## 2022-09-13 DIAGNOSIS — N39.0 URINARY TRACT INFECTION WITHOUT HEMATURIA, SITE UNSPECIFIED: ICD-10-CM

## 2022-09-13 DIAGNOSIS — M79.604 RIGHT LEG PAIN: Primary | ICD-10-CM

## 2022-09-13 LAB
ALBUMIN SERPL-MCNC: 4.3 G/DL (ref 3.5–4.6)
ALP BLD-CCNC: 94 U/L (ref 40–130)
ALT SERPL-CCNC: 20 U/L (ref 0–33)
ANION GAP SERPL CALCULATED.3IONS-SCNC: 11 MEQ/L (ref 9–15)
AST SERPL-CCNC: 30 U/L (ref 0–35)
BACTERIA: ABNORMAL /HPF
BASOPHILS ABSOLUTE: 0.1 K/UL (ref 0–0.2)
BASOPHILS RELATIVE PERCENT: 0.9 %
BILIRUB SERPL-MCNC: 0.3 MG/DL (ref 0.2–0.7)
BILIRUBIN URINE: NEGATIVE
BLOOD, URINE: NEGATIVE
BUN BLDV-MCNC: 22 MG/DL (ref 8–23)
CALCIUM SERPL-MCNC: 9.9 MG/DL (ref 8.5–9.9)
CHLORIDE BLD-SCNC: 102 MEQ/L (ref 95–107)
CLARITY: CLEAR
CO2: 26 MEQ/L (ref 20–31)
COLOR: YELLOW
CREAT SERPL-MCNC: 0.85 MG/DL (ref 0.5–0.9)
EOSINOPHILS ABSOLUTE: 0.1 K/UL (ref 0–0.7)
EOSINOPHILS RELATIVE PERCENT: 2.3 %
EPITHELIAL CELLS, UA: ABNORMAL /HPF (ref 0–5)
GFR AFRICAN AMERICAN: >60
GFR NON-AFRICAN AMERICAN: >60
GLOBULIN: 2.4 G/DL (ref 2.3–3.5)
GLUCOSE BLD-MCNC: 93 MG/DL (ref 70–99)
GLUCOSE URINE: NEGATIVE MG/DL
HCT VFR BLD CALC: 38.9 % (ref 37–47)
HEMOGLOBIN: 12.8 G/DL (ref 12–16)
HYALINE CASTS: ABNORMAL /HPF (ref 0–5)
KETONES, URINE: NEGATIVE MG/DL
LEUKOCYTE ESTERASE, URINE: ABNORMAL
LYMPHOCYTES ABSOLUTE: 1.6 K/UL (ref 1–4.8)
LYMPHOCYTES RELATIVE PERCENT: 26.5 %
MAGNESIUM: 1.8 MG/DL (ref 1.7–2.4)
MCH RBC QN AUTO: 30 PG (ref 27–31.3)
MCHC RBC AUTO-ENTMCNC: 33 % (ref 33–37)
MCV RBC AUTO: 91 FL (ref 82–100)
MONOCYTES ABSOLUTE: 0.6 K/UL (ref 0.2–0.8)
MONOCYTES RELATIVE PERCENT: 9.2 %
NEUTROPHILS ABSOLUTE: 3.8 K/UL (ref 1.4–6.5)
NEUTROPHILS RELATIVE PERCENT: 61.1 %
NITRITE, URINE: POSITIVE
PDW BLD-RTO: 14.4 % (ref 11.5–14.5)
PH UA: 6 (ref 5–9)
PLATELET # BLD: 237 K/UL (ref 130–400)
POTASSIUM SERPL-SCNC: 4.3 MEQ/L (ref 3.4–4.9)
PROTEIN UA: NEGATIVE MG/DL
RBC # BLD: 4.27 M/UL (ref 4.2–5.4)
RBC UA: ABNORMAL /HPF (ref 0–5)
SODIUM BLD-SCNC: 139 MEQ/L (ref 135–144)
SPECIFIC GRAVITY UA: 1.01 (ref 1–1.03)
TOTAL PROTEIN: 6.7 G/DL (ref 6.3–8)
URINE REFLEX TO CULTURE: YES
UROBILINOGEN, URINE: 0.2 E.U./DL
WBC # BLD: 6.2 K/UL (ref 4.8–10.8)
WBC UA: ABNORMAL /HPF (ref 0–5)

## 2022-09-13 PROCEDURE — 81001 URINALYSIS AUTO W/SCOPE: CPT

## 2022-09-13 PROCEDURE — 83735 ASSAY OF MAGNESIUM: CPT

## 2022-09-13 PROCEDURE — 6370000000 HC RX 637 (ALT 250 FOR IP): Performed by: PHYSICIAN ASSISTANT

## 2022-09-13 PROCEDURE — 36415 COLL VENOUS BLD VENIPUNCTURE: CPT

## 2022-09-13 PROCEDURE — 6360000002 HC RX W HCPCS: Performed by: PHYSICIAN ASSISTANT

## 2022-09-13 PROCEDURE — 87186 SC STD MICRODIL/AGAR DIL: CPT

## 2022-09-13 PROCEDURE — 87088 URINE BACTERIA CULTURE: CPT

## 2022-09-13 PROCEDURE — 96375 TX/PRO/DX INJ NEW DRUG ADDON: CPT

## 2022-09-13 PROCEDURE — 80053 COMPREHEN METABOLIC PANEL: CPT

## 2022-09-13 PROCEDURE — 99284 EMERGENCY DEPT VISIT MOD MDM: CPT

## 2022-09-13 PROCEDURE — 96374 THER/PROPH/DIAG INJ IV PUSH: CPT

## 2022-09-13 PROCEDURE — 87086 URINE CULTURE/COLONY COUNT: CPT

## 2022-09-13 PROCEDURE — 85025 COMPLETE CBC W/AUTO DIFF WBC: CPT

## 2022-09-13 RX ORDER — SULFAMETHOXAZOLE AND TRIMETHOPRIM 800; 160 MG/1; MG/1
1 TABLET ORAL ONCE
Status: COMPLETED | OUTPATIENT
Start: 2022-09-13 | End: 2022-09-13

## 2022-09-13 RX ORDER — ONDANSETRON 2 MG/ML
4 INJECTION INTRAMUSCULAR; INTRAVENOUS ONCE
Status: COMPLETED | OUTPATIENT
Start: 2022-09-13 | End: 2022-09-13

## 2022-09-13 RX ORDER — SULFAMETHOXAZOLE AND TRIMETHOPRIM 800; 160 MG/1; MG/1
1 TABLET ORAL 2 TIMES DAILY
Qty: 20 TABLET | Refills: 0 | Status: SHIPPED | OUTPATIENT
Start: 2022-09-13 | End: 2022-09-23

## 2022-09-13 RX ORDER — MORPHINE SULFATE 2 MG/ML
4 INJECTION, SOLUTION INTRAMUSCULAR; INTRAVENOUS ONCE
Status: COMPLETED | OUTPATIENT
Start: 2022-09-13 | End: 2022-09-13

## 2022-09-13 RX ADMIN — MORPHINE SULFATE 4 MG: 2 INJECTION, SOLUTION INTRAMUSCULAR; INTRAVENOUS at 18:08

## 2022-09-13 RX ADMIN — ONDANSETRON 4 MG: 2 INJECTION INTRAMUSCULAR; INTRAVENOUS at 18:08

## 2022-09-13 RX ADMIN — SULFAMETHOXAZOLE AND TRIMETHOPRIM 1 TABLET: 800; 160 TABLET ORAL at 20:37

## 2022-09-13 ASSESSMENT — PAIN DESCRIPTION - DESCRIPTORS
DESCRIPTORS: SHARP;STABBING
DESCRIPTORS: SPASM;SQUEEZING

## 2022-09-13 ASSESSMENT — PAIN DESCRIPTION - LOCATION
LOCATION: LEG
LOCATION: LEG

## 2022-09-13 ASSESSMENT — PAIN SCALES - GENERAL
PAINLEVEL_OUTOF10: 10
PAINLEVEL_OUTOF10: 10

## 2022-09-13 ASSESSMENT — PAIN DESCRIPTION - ORIENTATION: ORIENTATION: RIGHT

## 2022-09-13 ASSESSMENT — ENCOUNTER SYMPTOMS
NAUSEA: 0
VOMITING: 0
ABDOMINAL DISTENTION: 0
VOICE CHANGE: 0
ANAL BLEEDING: 0
EYE DISCHARGE: 0

## 2022-09-13 ASSESSMENT — PAIN DESCRIPTION - PAIN TYPE: TYPE: ACUTE PAIN

## 2022-09-13 ASSESSMENT — PAIN - FUNCTIONAL ASSESSMENT
PAIN_FUNCTIONAL_ASSESSMENT: 0-10
PAIN_FUNCTIONAL_ASSESSMENT: NONE - DENIES PAIN

## 2022-09-13 NOTE — ED PROVIDER NOTES
[facility] ED  eMERGENCY dEPARTMENT eNCOUnter      Pt Name: Dee Dee Izquierdo  MRN: [id number]  [place] 1945  Date of evaluation: 9/13/2022  Provider: Emili Stark PA-C    CHIEF COMPLAINT       Chief Complaint   Patient presents with    Leg Pain     Phantom leg pain in R leg. 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 HISTORY OF PRESENT ILLNESS   (Location/Symptom, Timing/Onset,Context/Setting, Quality, Duration, Modifying Factors, Severity)  Note limiting factors. Dee Dee Izquierdo is a 76 y.o. female who presents to the emergency department notes that she has leg pain phantom right leg pain she does have a BKA in place. States this has not happened like this in 12 years. She has had back surgery in the past multiple times. Denies any history of electrolyte abnormality pain or burning with urination she is chronically incontinent. Denies loss of bowel control. Patient has BKA noted right side and brace on left leg. Denies fever chills shortness of breath nausea vomiting    HPI    NursingNotes were reviewed. REVIEW OF SYSTEMS    (2-9 systems for level 4, 10 or more for level 5)     Review of Systems   Constitutional:  Negative for activity change, appetite change and unexpected weight change. HENT:  Negative for ear discharge, nosebleeds and voice change. Eyes:  Negative for discharge. Cardiovascular:  Negative for chest pain. Gastrointestinal:  Negative for abdominal distention, anal bleeding, nausea and vomiting. Genitourinary:  Positive for difficulty urinating. Negative for hematuria. Musculoskeletal:  Positive for myalgias. Negative for joint swelling. Skin:  Negative for pallor. Neurological:  Negative for seizures and facial asymmetry. Hematological:  Does not bruise/bleed easily. Psychiatric/Behavioral:  Negative for behavioral problems, self-injury and sleep disturbance. All other systems reviewed and are negative.     Except as noted above the remainder of the review of systems was reviewed and negative.        PAST MEDICAL HISTORY     Past Medical History:   Diagnosis Date    Arthritis     Blood transfusion     CAD (coronary artery disease)     Cancer ([facility]     GALLBLADDER 2009, 2011 OMENTUM    Charcot's joint     left foot    Chest pain 7/2/2015    Colitis     DDD (degenerative disc disease), lumbar 2/12/2013    Depression     Disorder of peripheral nervous system 9/1/2010    Dyspnea 7/2/2015    Family history of coronary artery disease 8/29/2014    History of blood transfusion 2011    following chemotherapy    Hx of right BKA ([facility]     Hyperlipidemia     Hypertension     Hypothyroid 6/12/2015    Lobular breast cancer ([facility] 10/2015    NSTEMI (non-ST elevated myocardial infarction) ([facility] 8/29/2014    Obesity     Paroxysmal atrial fibrillation ([facility] 8/29/2014    S/P BKA (below knee amputation) ([facility]          SURGICALHISTORY       Past Surgical History:   Procedure Laterality Date    ABDOMEN SURGERY Left 04/11/2017    EXCISION OF CHEST WALL MASS, UPDATE LABS ON ADMIT  performed by Mika Elizabeth MD at [address] BLEPHAROPLASTY  02/05/2013    both eyes    CARDIAC CATHETERIZATION      COLONOSCOPY  01/01/2010    normal    CYSTOSCOPY W BIOPSY OF BLADDER N/A 06/26/2017    CYSTOSCOPY BLADDER BIOPSY AND FULGURATION performed by Angel Schofield MD at [address] Bilateral 2012    cataract removal with IOL implants    HYSTERECTOMY (CERVIX STATUS UNKNOWN)      LEG AMPUTATION BELOW KNEE Right 02/01/2011    DR Josh Madrigal due to CHARCOTS    MASTECTOMY Bilateral 11/16/2015    Bilateral simple mastectomies with right SNB by DR Mary Menjivar    MA REMOVAL OF BREAST LESION Left 01/24/2017    CORE NEEDLE BIOPSY OF  LEFT BREAST MASS performed by Mika Elizabeth MD at [address]      cervical and lumbar         CURRENT MEDICATIONS       Previous Medications    ACYCLOVIR (ZOVIRAX) 5 % CREA    Apply topically as needed    ASPIRIN 81 MG EC TABLET    Take 1 tablet by mouth daily    ATORVASTATIN (LIPITOR) 80 MG TABLET    Take 1 tablet by mouth nightly    B COMPLEX VITAMINS CAPSULE    Take 1 capsule by mouth daily    BLOOD PRESSURE KIT    1 Units by Does not apply route as needed (daily monitoring of blood pressure) One electronic sphygmomanometer and cuff for home monitoring of blood pressure and heart rate. Upper arm type of cuff preferred. Adult regular size. BUPROPION (WELLBUTRIN XL) 150 MG EXTENDED RELEASE TABLET    Take 1 tablet by mouth every morning    CARVEDILOL (COREG) 25 MG TABLET    TAKE 1 TABLET BY MOUTH  TWICE DAILY    CPAP MACHINE MISC    by Does not apply route New CPAP with 8 cm    DICLOFENAC (VOLTAREN) 50 MG EC TABLET    TAKE 1 TABLET BY MOUTH  TWICE DAILY    DULOXETINE (CYMBALTA) 30 MG EXTENDED RELEASE CAPSULE    TAKE 1 CAPSULE BY MOUTH  DAILY    FUROSEMIDE (LASIX) 20 MG TABLET    Take 1 tablet by mouth daily for 5 days    GABAPENTIN (NEURONTIN) 600 MG TABLET    TAKE 1 TABLET BY MOUTH IN  THE MORNING 2 TABLETS BY  MOUTH IN THE AFTERNOON AND  2 TABLETS BY MOUTH IN THE  EVENING AS TOLERATED    LEVOTHYROXINE (SYNTHROID) 50 MCG TABLET    TAKE 1 TABLET BY MOUTH  DAILY    MULTIPLE VITAMINS-MINERALS (CENTRUM SILVER 50+WOMEN PO)    Take by mouth    RESPIRATORY THERAPY SUPPLIES SHAE    New CPAP, full face  mask and chin strap       Fax [phone]    RESPIRATORY THERAPY SUPPLIES SHAE    New Full face Mask , please do not sent nasal pillow . (She want to try full face Mask )    Dx KEI    VITAMIN D (CHOLECALCIFEROL) 1000 UNITS CAPS CAPSULE    Take 1,000 Units by mouth daily.     VORTIOXETINE (TRINTELLIX) 5 MG TABLET    Take 1 tablet by mouth daily            Ciprofloxacin and Oxycontin [oxycodone hcl]    FAMILY HISTORY       Family History   Problem Relation Age of Onset    Heart Disease Mother     Arthritis Mother     Heart Disease Father     Arthritis Father    Rae [place] Cancer Sister         Colon    Thyroid Disease Son    Patrick Ba Other Son         Down's syndrome          SOCIAL HISTORY       Social History     Socioeconomic History    Marital status:      Spouse name: None    Number of children: 2    Years of education: None    Highest education level: None   Occupational History    Occupation: retired   Tobacco Use    Smoking status: Never    Smokeless tobacco: Never   Vaping Use    Vaping Use: Never used   Substance and Sexual Activity    Alcohol use: Yes     Comment: social    Drug use: No    Sexual activity: Never   Social History Narrative    Lives With: Alone, dtr in [address] [place] [address], disabled son with Down's Syndrome is in a local group home    Type of Home: [address] in [place] (she calls it an [employer]-bc they all help each other)    Home Layout: One level, Level entry    Bathroom Shower/Tub: Tub/Shower unit, Equipment: Tub transfer bench, Grab bars in shower    Bathroom Accessibility: Wheelchair accessible    Home Equipment: Quad cane, Rolling walker, Wheelchair-electric (has right BK prosthesis and Left AFO)    ADL Assistance: Independent    Homemaking Assistance: Independent (warms meals up  -  receives  MOW and groceries delivered)    [employer] Responsibilities: Yes, Meal Prep Responsibility: Secondary    Laundry Responsibility: Primary, Cleaning Responsibility: Secondary    Ambulation Assistance: Independent (last few days uses walker to get around), Transfer Assistance: Independent    Active : Yes    Additional Comments: received new prosthesis in July and has been falling since - has appointment with prosthesis next week.   Pt reports prior to 8/21 she was ambulating with wheeled walker and since then she has used power w/c    Retired --still works a day a week as a  [address] Opening: Spontaneous  Best Verbal Response: Oriented  Best Motor Response: Obeys commands  Kent Coma Scale Score: 15  Ebola Virus Disease (EVD) Screening   Temp: 97.6 °F (36.4 °C)  CIWA Assessment  BP: (!) 157/75  Heart Rate: 69    PHYSICAL EXAM    (up to 7 for level 4, 8 or more for level 5)     ED Triage Vitals [09/13/22 1701]   BP Temp Temp Source Heart Rate Resp SpO2 Height Weight   (!) 157/75 97.6 °F (36.4 °C) Temporal 69 20 96 % 4' 10\" (1.473 m) 215 lb (97.5 kg)       Physical Exam  Vitals and nursing note reviewed. Constitutional:       General: She is not in acute distress. Appearance: She is well-developed. HENT:      Head: Normocephalic and atraumatic. Right Ear: External ear normal.      Left Ear: External ear normal.      Nose: Nose normal.      Mouth/Throat:      Mouth: Mucous membranes are moist.   Eyes:      General:         Right eye: No discharge. Left eye: No discharge. Pupils: Pupils are equal, round, and reactive to light. Cardiovascular:      Rate and Rhythm: Normal rate and regular rhythm. Pulses: Normal pulses. Heart sounds: Normal heart sounds. Pulmonary:      Effort: Pulmonary effort is normal. No respiratory distress. Breath sounds: Normal breath sounds. No stridor. Abdominal:      General: Bowel sounds are normal. There is no distension. Palpations: Abdomen is soft. Musculoskeletal:         General: Normal range of motion. Cervical back: Normal range of motion and neck supple. Right lower leg: Edema present. Comments: Right BKA noted   Skin:     General: Skin is warm. Findings: No erythema. Neurological:      Mental Status: She is alert and oriented to person, place, and time.    Psychiatric:         Mood and Affect: Mood normal.       RESULTS     EKG: All EKG's are interpreted by the Emergency Department Physician who either signs or Co-signsthis chart in the absence of a cardiologist.         RADIOLOGY:   Non-plain filmimages such as CT, Ultrasound and MRI are read by the radiologist. Tammie Guardado radiographic images are visualized and preliminarily interpreted by the emergency physician with the below findings:         Interpretation per the Radiologist below, if available at the time ofthis note:    No orders to display         ED BEDSIDE ULTRASOUND:   Performed by ED Physician - none    LABS:  Labs Reviewed   URINALYSIS WITH REFLEX TO CULTURE - Abnormal; Notable for the following components:       Result Value    Nitrite, Urine POSITIVE (*)     Leukocyte Esterase, Urine MODERATE (*)     All other components within normal limits   MICROSCOPIC URINALYSIS - Abnormal; Notable for the following components:    Bacteria, UA MANY (*)     WBC, UA 20-50 (*)     All other components within normal limits   CULTURE, URINE   COMPREHENSIVE METABOLIC PANEL   MAGNESIUM   CBC WITH AUTO DIFFERENTIAL       All other labs were within normal range or not returned as of this dictation. EMERGENCY DEPARTMENT COURSE and DIFFERENTIAL DIAGNOSIS/MDM:   Vitals:    Vitals:    09/13/22 1701   BP: (!) 157/75   Pulse: 69   Resp: 20   Temp: 97.6 °F (36.4 °C)   TempSrc: Temporal   SpO2: 96%   Weight: 215 lb (97.5 kg)   Height: 4' 10\" (1.473 m)            MDM  Number of Diagnoses or Management Options  Right leg pain  Urinary tract infection without hematuria, site unspecified  Diagnosis management comments: Patient notes that she was to have seen a specialist for her back which has had prior surgeries Dr. Jackeline Davidson  referred her to a doctor in [facility] for eval of her spineshe has not gotten the MRI or symptoms start yet notes that she does have back pain from this. Amount and/or Complexity of Data Reviewed  Clinical lab tests: reviewed and ordered  Discuss the patient with other providers: yes        CRITICAL CARE TIME        CONSULTS:  None    PROCEDURES:  Unless otherwise noted below, none     Procedures    FINAL IMPRESSION      1. Right leg pain    2.  Urinary tract infection without hematuria, site unspecified DISPOSITION/PLAN   DISPOSITION Decision To Discharge 09/13/2022 08:19:35 PM      PATIENT REFERRED TO:  Dipesh Buchanan, [address] [facility] [address] [facility]  [phone]    Call in 1 day      [facility]) ED  [address] [facility] [address] [phone]  Go to   If symptoms worsen    Your orthopedic surgeon    Go to   If symptoms worsen    DISCHARGE MEDICATIONS:  New Prescriptions    No medications on file          (Please note that portions of this note were completed with a voice recognition program.  Efforts were made to edit the dictations but occasionally words are mis-transcribed.)    Osman Barreto PA-C (electronically signed)  Attending Emergency Physician        Osman Barreto PA-C  09/13/22 2029

## 2022-09-14 NOTE — DISCHARGE INSTRUCTIONS
Follow up with pmd and your orthopedic surgeon.   Return if symptoms worsen or new symptoms develop  Continue Voltaren and gabapentin as directed

## 2022-09-15 ENCOUNTER — CARE COORDINATION (OUTPATIENT)
Dept: CARE COORDINATION | Age: 77
End: 2022-09-15

## 2022-09-15 LAB
ORGANISM: ABNORMAL
URINE CULTURE, ROUTINE: ABNORMAL
URINE CULTURE, ROUTINE: ABNORMAL

## 2022-09-15 ASSESSMENT — PATIENT HEALTH QUESTIONNAIRE - PHQ9
SUM OF ALL RESPONSES TO PHQ QUESTIONS 1-9: 10

## 2022-09-15 NOTE — CARE COORDINATION
Ambulatory Care Coordination Note  9/15/2022    ACC: Glory Hair, RN    Summary Note:    I called to complete an ER follow up and to discuss care coordination. Edison Herndon feels that she is doing much better since her ER visit  She was not aware of the UTI results and I have discussed this with her. I discussed my role and the process of care coordination and she is interested in this program.  She is a BKA and she is wheelchair bound  she does have a lot of issues with her back and has had several surgeries. She tells me that she is only able to stand for 10 minutes at the very most.  She is asking about having additional help with housekeeping duties  I will reach out to our social workers to try to get her additional help at home. She spoke about her depression and Dr Haylie Holcomb is trying to get her on trintellix   Edison Herndon tells me that her co pay is $200.00 per month and she cannot afford this. I will reach out to our specialist to see if she able to find additional financial resources. I have reviewed allergies, current medical history, falls screening, initiated CC protocol, depression screening, full medication review, travel screening, and ACP healthcare decision maker. Edison Herndon states that she does have several falls but she has \"learned how to fall to prevent injury. \"  PHQ-9 = 10 = moderate depression. Edison Herndon states that she has been depressed since about age 19. She has had the initial COVID vaccines but no boosters. She needs to update her ACP documents but tells me that she will take care of this aspect of care   I have provided her with my contact information and I have asked her to call me with any questions or concerns. Lab Results       None            Care Coordination Interventions    Referral from Primary Care Provider: No  Suggested Interventions and Community Resources          Goals Addressed    None         Prior to Admission medications    Medication Sig Start Date End Date Taking? Authorizing Provider   sulfamethoxazole-trimethoprim (BACTRIM DS) 800-160 MG per tablet Take 1 tablet by mouth 2 times daily for 10 days 9/13/22 9/23/22 Yes Armen Terrazas PA-C   VORTIoxetine (TRINTELLIX) 5 MG tablet Take 1 tablet by mouth daily 9/8/22  Yes Darlin Fong MD   buPROPion (WELLBUTRIN XL) 150 MG extended release tablet Take 1 tablet by mouth every morning 9/8/22 3/7/23 Yes Darlin Fong MD   gabapentin (NEURONTIN) 600 MG tablet TAKE 1 TABLET BY MOUTH IN  THE MORNING 2 TABLETS BY  MOUTH IN THE AFTERNOON AND  2 TABLETS BY MOUTH IN THE  EVENING AS TOLERATED 8/30/22 5/27/23 Yes [facility],    levothyroxine (SYNTHROID) 50 MCG tablet TAKE 1 TABLET BY MOUTH  DAILY 6/1/22  Yes [facility],    DULoxetine (CYMBALTA) 30 MG extended release capsule TAKE 1 CAPSULE BY MOUTH  DAILY 2/7/22  Yes [facility], DO   diclofenac (VOLTAREN) 50 MG EC tablet TAKE 1 TABLET BY MOUTH  TWICE DAILY 12/22/21  Yes [facility], DO   carvedilol (COREG) 25 MG tablet TAKE 1 TABLET BY MOUTH  TWICE DAILY 12/9/21  Yes [facility], DO   aspirin 81 MG EC tablet Take 1 tablet by mouth daily 9/17/21  Yes Antoinette Cantor APRN - CNP   atorvastatin (LIPITOR) 80 MG tablet Take 1 tablet by mouth nightly 9/16/21  Yes Antoinette Ji APRN - CNP   b complex vitamins capsule Take 1 capsule by mouth daily   Yes Historical Provider, MD   Multiple Vitamins-Minerals (CENTRUM SILVER 50+WOMEN PO) Take by mouth   Yes Historical Provider, MD   Vitamin D (CHOLECALCIFEROL) 1000 UNITS CAPS capsule Take 1,000 Units by mouth daily. Yes Historical Provider, MD   Respiratory Therapy Supplies SHAE New Full face Mask , please do not sent nasal pillow .  (She want to try full face Mask )    Dx KEI  Patient not taking: Reported on 9/15/2022 1/28/21   Lee Petersen MD   Blood Pressure KIT 1 Units by Does not apply route as needed (daily monitoring of blood pressure) One electronic sphygmomanometer and cuff for home monitoring of blood pressure and heart rate. Upper arm type of cuff preferred. Adult regular size.   Patient not taking: Reported on 9/15/2022 12/17/20   Nohemi Peabody, MD   Respiratory Therapy Supplies SHAE New CPAP, full face  mask and chin strap       Fax [phone]  Patient not taking: Reported on 9/15/2022 10/27/20   Miley Cornell MD   furosemide (LASIX) 20 MG tablet Take 1 tablet by mouth daily for 5 days  Patient taking differently: Take 20 mg by mouth daily as needed  7/10/20 12/22/21  Janny Parish MD   CPAP Machine MISC by Does not apply route New CPAP with 8 cm  Patient not taking: Reported on 9/15/2022 6/8/20   Miley Cornell MD   acyclovir (ZOVIRAX) 5 % CREA Apply topically as needed  Patient not taking: Reported on 9/15/2022 5/1/19   Judy Alcantara MD       Future Appointments   Date Time Provider Saurabh Keithi   10/6/2022 10:00 AM Nohemi Peabody, MD [facility]  [place]   11/17/2022  1:30 PM Dilma Andersen DO [facility] [place]   12/27/2022 12:30 PM Dilma Andersen DO [facility] [place]

## 2022-09-19 ENCOUNTER — CARE COORDINATION (OUTPATIENT)
Dept: CARE COORDINATION | Age: 77
End: 2022-09-19

## 2022-09-19 NOTE — CARE COORDINATION
Received referral from [facility], RN to assist patient with resources with help in home with cleaning. Called patient to discuss referral. Patient states that she needs help in the home with vacuuming, taking trash out and cleaning in kitchen. Patient reports that she has made contact with [employer], however they have a wait list due to no volunteers at this time. Patient reports that she has not made contact with [facility]. Informed patient that I will call [facility] for further information. Also discussed private pay options that patient may be interested in. Patient agreed to have these options mailed to her as she is willing to pay out of pocket. Will send to patient and check in next week.

## 2022-09-22 ENCOUNTER — CARE COORDINATION (OUTPATIENT)
Dept: CARE COORDINATION | Age: 77
End: 2022-09-22

## 2022-09-29 ENCOUNTER — CARE COORDINATION (OUTPATIENT)
Dept: CARE COORDINATION | Age: 77
End: 2022-09-29

## 2022-09-29 SDOH — ECONOMIC STABILITY: TRANSPORTATION INSECURITY
IN THE PAST 12 MONTHS, HAS LACK OF TRANSPORTATION KEPT YOU FROM MEETINGS, WORK, OR FROM GETTING THINGS NEEDED FOR DAILY LIVING?: YES

## 2022-09-29 SDOH — HEALTH STABILITY: PHYSICAL HEALTH: ON AVERAGE, HOW MANY DAYS PER WEEK DO YOU ENGAGE IN MODERATE TO STRENUOUS EXERCISE (LIKE A BRISK WALK)?: 0 DAYS

## 2022-09-29 SDOH — ECONOMIC STABILITY: INCOME INSECURITY: IN THE LAST 12 MONTHS, WAS THERE A TIME WHEN YOU WERE NOT ABLE TO PAY THE MORTGAGE OR RENT ON TIME?: NO

## 2022-09-29 SDOH — ECONOMIC STABILITY: FOOD INSECURITY: WITHIN THE PAST 12 MONTHS, THE FOOD YOU BOUGHT JUST DIDN'T LAST AND YOU DIDN'T HAVE MONEY TO GET MORE.: SOMETIMES TRUE

## 2022-09-29 SDOH — ECONOMIC STABILITY: HOUSING INSECURITY
IN THE LAST 12 MONTHS, WAS THERE A TIME WHEN YOU DID NOT HAVE A STEADY PLACE TO SLEEP OR SLEPT IN A SHELTER (INCLUDING NOW)?: NO

## 2022-09-29 SDOH — ECONOMIC STABILITY: TRANSPORTATION INSECURITY
IN THE PAST 12 MONTHS, HAS THE LACK OF TRANSPORTATION KEPT YOU FROM MEDICAL APPOINTMENTS OR FROM GETTING MEDICATIONS?: YES

## 2022-09-29 SDOH — ECONOMIC STABILITY: FOOD INSECURITY: WITHIN THE PAST 12 MONTHS, YOU WORRIED THAT YOUR FOOD WOULD RUN OUT BEFORE YOU GOT MONEY TO BUY MORE.: SOMETIMES TRUE

## 2022-09-29 SDOH — HEALTH STABILITY: PHYSICAL HEALTH: ON AVERAGE, HOW MANY MINUTES DO YOU ENGAGE IN EXERCISE AT THIS LEVEL?: 0 MIN

## 2022-09-29 ASSESSMENT — LIFESTYLE VARIABLES
HOW MANY STANDARD DRINKS CONTAINING ALCOHOL DO YOU HAVE ON A TYPICAL DAY: 1 OR 2
HOW OFTEN DO YOU HAVE A DRINK CONTAINING ALCOHOL: MONTHLY OR LESS

## 2022-09-29 ASSESSMENT — SOCIAL DETERMINANTS OF HEALTH (SDOH)
DO YOU BELONG TO ANY CLUBS OR ORGANIZATIONS SUCH AS CHURCH GROUPS UNIONS, FRATERNAL OR ATHLETIC GROUPS, OR SCHOOL GROUPS?: NO
IN A TYPICAL WEEK, HOW MANY TIMES DO YOU TALK ON THE PHONE WITH FAMILY, FRIENDS, OR NEIGHBORS?: MORE THAN THREE TIMES A WEEK
HOW OFTEN DO YOU GET TOGETHER WITH FRIENDS OR RELATIVES?: ONCE A WEEK
HOW OFTEN DO YOU ATTENT MEETINGS OF THE CLUB OR ORGANIZATION YOU BELONG TO?: NEVER
HOW OFTEN DO YOU ATTEND CHURCH OR RELIGIOUS SERVICES?: NEVER

## 2022-09-29 NOTE — CARE COORDINATION
Sidney Mario. to inquire about resources and if they offer assistance with help in home- cleaning. Staff report no cleaning services are offered, mentions that they offer activities for seniors ( games and meals).

## 2022-09-29 NOTE — CARE COORDINATION
Phone call to patient for follow up. Patient states that she received mail with information on help in home, however patient states that she did not open the mail to review. Patient reports that she will review the information and let me know what she thinks. I also informed patient that [facility] does not offer any help in home. Will check in next week for follow up.

## 2022-09-29 NOTE — CARE COORDINATION
Ambulatory Care Coordination Note  9/29/2022    ACC: Reema Cardoso, RN    Breezy Tran tells me that she is doing well   She is worried about her son who is 61 with Down's syndrome with increasing dementia  She is not sure if he will be able to go back to the group home or if he will need to be placed in LTC. She is working with her psychiatrist   We have completed SDOH, education assessment and established goals  She does have some barriers with transportation and I spoke with her about calling me to get her set up for transport if needed to avoid missing appointments. She also has barriers with cost related to her medications  I have made her aware that our specialist has tried to reach out to her to discuss options. I have provided her with [email or web address] phone number   She also struggles with home care as she is not able to stand for any longer than 10 minutes at time and she does fall frequently. Our  and health  Ethelle Kocher is working to find workable solutions for [facility]:  Breezy Tran will follow up with Eugenia Hemphill regarding medication costs  Breezy Tran will use her walker during ambulation  She will be cognizant of changes in strength and balance so that she does not fall. Offered patient enrollment in the Remote Patient Monitoring (RPM) program for in-home monitoring: NA. Ambulatory Care Coordination Assessment    Care Coordination Protocol  Referral from Primary Care Provider: No  Week 1 - Initial Assessment     Do you have all of your prescriptions and are they filled?: Yes  Barriers to medication adherence: None  Are you able to afford your medications?: With Assistance  How often do you have trouble taking your medications the way you have been told to take them?: I always take them as prescribed. Do you have Home O2 Therapy?: No      Ability to seek help/take action for Emergent Urgent situations i.e. fire, crime, inclement weather or health crisis. : Independent  Ability to ambulate to restroom: Independent  Ability handle personal hygeine needs (bathing/dressing/grooming): Independent  Ability to manage Medications: Independent  Ability to prepare Food Preparation: Needs Assistance  Ability to maintain home (clean home, laundry): Needs Assistance  Ability to drive and/or has transportation: Dependent  Ability to do shopping: Dependent  Ability to manage finances: Independent  Is patient able to live independently?: Yes                    Thinking about your patient's physical health needs, are there any symptoms or problems (risk indicators) you are unsure about that require further investigation?: Moderate to severe symptoms or problems that impact on daily life   Are the patients physical health problems impacting on their mental well-being?: Mild impact on mental well-being e.g. \"\"feeling fed-up\"\", \"\"reduced enjoyment\"\"   Are there any problems with your patients lifestyle behaviors (alcohol, drugs, diet, exercise) that are impacting on physical or mental well-being?: No identified areas of concern   Do you have any other concerns about your patients mental well-being? How would you rate their severity and impact on the patient?: Mild problems - don't interfere with function   How would you rate their home environment in terms of safety and stability (including domestic violence, insecure housing, neighbor harassment)?: Safety/stability questionable   How do daily activities impact on the patient's well-being? (include current or anticipated unemployment, work, caregiving, access to transportation or other):  Contributes to low mood or stress at times   How would you rate their social network (family, work, friends)?: Adequate participation with social networks   How would you rate their financial resources (including ability to afford all required medical care)?: Financially secure, some resource challenges   How wells does the patient now understand their health and well-being (symptoms, signs or risk factors) and what they need to do to manage their health?: Reasonable to good understanding and already engages in managing health or is willing to undertake better management   How well do you think your patient can engage in healthcare discussions? (Barriers include language, deafness, aphasia, alcohol or drug problems, learning difficulties, concentration): Adequate communication, with or without minor barriers   Do other services need to be involved to help this patient?: Other care/services not in place and required   Are current services involved with this patient well-coordinated? (Include coordination with other services you are now recommendation): Required care/services missing and/or fragmented   Suggested Interventions and Community Resources  Behavioral Health: In Process      Fall Risk Prevention: In Process Other Services or Interventions: Walk in Clinic hours and usage discussed, social service referral initated, Pharmacy specialist referral initated to help with medication cost   Pharmacist: In Process   Social Work: In Process         Schedule an appointment with the patient's PCP, Set up/Review Goals, Set up/Review an Education Plan              Prior to Admission medications    Medication Sig Start Date End Date Taking?  Authorizing Provider   VORTIoxetine (TRINTELLIX) 5 MG tablet Take 1 tablet by mouth daily 9/8/22   Joy Gonzales MD   buPROPion (WELLBUTRIN XL) 150 MG extended release tablet Take 1 tablet by mouth every morning 9/8/22 3/7/23  Joy Gonzales MD   gabapentin (NEURONTIN) 600 MG tablet TAKE 1 TABLET BY MOUTH IN  THE MORNING 2 TABLETS BY  MOUTH IN THE AFTERNOON AND  2 TABLETS BY MOUTH IN THE  EVENING AS TOLERATED 8/30/22 5/27/23  Ar Pritchett,    levothyroxine (SYNTHROID) 50 MCG tablet TAKE 1 TABLET BY MOUTH  DAILY 6/1/22   Ar Pritchett DO   DULoxetine (CYMBALTA) 30 MG extended release capsule TAKE 1 CAPSULE BY MOUTH  DAILY 2/7/22   Cordelia Aguilera Matthias, DO   diclofenac (VOLTAREN) 50 MG EC tablet TAKE 1 TABLET BY MOUTH  TWICE DAILY 12/22/21   Kennedi Power, DO   carvedilol (COREG) 25 MG tablet TAKE 1 TABLET BY MOUTH  TWICE DAILY 12/9/21   Kennedi Power, DO   aspirin 81 MG EC tablet Take 1 tablet by mouth daily 9/17/21   Antoinette Ji, APRN - CNP   atorvastatin (LIPITOR) 80 MG tablet Take 1 tablet by mouth nightly 9/16/21   Antoinette Chandler, APRN - CNP   Respiratory Therapy Supplies SHAE New Full face Mask , please do not sent nasal pillow . (She want to try full face Mask )    Dx KEI  Patient not taking: Reported on 9/15/2022 1/28/21   Chiki Marin MD   Blood Pressure KIT 1 Units by Does not apply route as needed (daily monitoring of blood pressure) One electronic sphygmomanometer and cuff for home monitoring of blood pressure and heart rate. Upper arm type of cuff preferred. Adult regular size. Patient not taking: Reported on 9/15/2022 12/17/20   Avelina Edge MD   Respiratory Therapy Supplies SHAE New CPAP, full face  mask and chin strap       Fax [phone]  Patient not taking: Reported on 9/15/2022 10/27/20   Chiki Marin MD   furosemide (LASIX) 20 MG tablet Take 1 tablet by mouth daily for 5 days  Patient taking differently: Take 20 mg by mouth daily as needed  7/10/20 12/22/21  Ebony Khan MD   CPAP Machine MISC by Does not apply route New CPAP with 8 cm  Patient not taking: Reported on 9/15/2022 6/8/20   Chiki Marin MD   acyclovir (ZOVIRAX) 5 % CREA Apply topically as needed  Patient not taking: Reported on 9/15/2022 5/1/19   Adan Gaytan MD   b complex vitamins capsule Take 1 capsule by mouth daily    Historical Provider, MD   Multiple Vitamins-Minerals (CENTRUM SILVER 50+WOMEN PO) Take by mouth    Historical Provider, MD   Vitamin D (CHOLECALCIFEROL) 1000 UNITS CAPS capsule Take 1,000 Units by mouth daily.     Historical Provider, MD       Future Appointments   Date Time Provider Saurabh Delvalle   10/6/2022 10:00 AM Avelina Edge MD [facility]  [place]   11/17/2022  1:30 PM Kennedi Early DO [facility] [place]   12/27/2022 12:30 PM Kennedi Early DO [facility] [place]

## 2022-10-06 ENCOUNTER — TELEMEDICINE (OUTPATIENT)
Dept: BEHAVIORAL/MENTAL HEALTH CLINIC | Age: 77
End: 2022-10-06
Payer: MEDICARE

## 2022-10-06 ENCOUNTER — CARE COORDINATION (OUTPATIENT)
Dept: CARE COORDINATION | Age: 77
End: 2022-10-06

## 2022-10-06 DIAGNOSIS — F33.1 MODERATE EPISODE OF RECURRENT MAJOR DEPRESSIVE DISORDER (HCC): Primary | ICD-10-CM

## 2022-10-06 PROCEDURE — G8428 CUR MEDS NOT DOCUMENT: HCPCS | Performed by: PSYCHIATRY & NEUROLOGY

## 2022-10-06 PROCEDURE — 1124F ACP DISCUSS-NO DSCNMKR DOCD: CPT | Performed by: PSYCHIATRY & NEUROLOGY

## 2022-10-06 PROCEDURE — 99215 OFFICE O/P EST HI 40 MIN: CPT | Performed by: PSYCHIATRY & NEUROLOGY

## 2022-10-06 PROCEDURE — 1090F PRES/ABSN URINE INCON ASSESS: CPT | Performed by: PSYCHIATRY & NEUROLOGY

## 2022-10-06 PROCEDURE — G8399 PT W/DXA RESULTS DOCUMENT: HCPCS | Performed by: PSYCHIATRY & NEUROLOGY

## 2022-10-06 NOTE — PROGRESS NOTES
10/6/2022    40 mins total for this encounter =     30 minutes with direct communication with patient for encounter     10 mins (in addition) spent on chart review, and in the ordering of all necessary labs and/or medications      The risks and benefits of converting to a virtual visit were discussed in light of the current infectious disease epidemic. Patient also understood that insurance coverage and co-pays are up to their individual insurance plans. Patient Location:        Patient's home address  Provider Location ([facility]):        [facility]  Chief complaint No chief complaint on file. TELEHEALTH PSYCHIATRY FOLLOWUP (OUTPATIENT)   Audio/Visual (During [id number] public health emergency)          Psychiatric Diagnoses:  1. Moderate episode of recurrent major depressive disorder [facility])        Assessment/Plan:     Patient denies thoughts of harm to self or others. No acute safety concerns voiced at this appointment. MOOD: Patient reports mood has been stable. Patient has been able to attend to activities of daily living, has good energy, good mood, and good motivation. SLEEP: Patient reports sleep has been at least 8 hours a night and wakes feeling rested in the morning. No concerns related to sleep today    ATTENTION AND FOCUS: Patient denies any concerns related to attention and focus, and no concerns related to memory. Occasionally going for walks. ANXIETY: Patient denies any concerns related to anxiety today. BIPOLAR ARNOLD: No concerns. No manic symptoms endorsed today and no concern for arnold. SUBSTANCE USE: No concerns for ongoing substance use. Patient denies any recent substance use    ASSESSMENT/PLAN: Medication changes per plan below.  Discussed with patient the risks and benefits of their psychiatric medication and patient was amenable to plan as outlined below    COUNSELING or THERAPY:   Continue to encourage therapy as part of ongoing treatment of their mental health concerns. Continue to encourage physical activity and adherence to medication regimen. DATE and changes made                       10/6/22  -STOP bupropion XL (Wellbutrin XL)   -vortioxetine (Trintellix) helping, continue 5 mg dose   -continue duloxetine (Cymbalta) 30 mg for now, will likely stop this, will give                   Medical Diagnoses:  Patient Active Problem List   Diagnosis    Neck pain on right side    Obesity (BMI 30-39. 9)    Obesity    High risk medication use - 01/30/18 OARRS PM&R, 03/26/18 OARRS PM&R, 04/03/18 Urine Drug Screen positive opiates PM&R, 01/31/18 Med Contract PM&R    Impaired mobility and activities of daily living    Hx of right BKA (HCC)    Family history of coronary artery disease    NSTEMI (non-ST elevated myocardial infarction) ([address]    Arthritis, neuropathic    Charcot's joint of foot    Closed dislocation of ankle    Closed dislocation of foot    Complete rotator cuff rupture of left shoulder    Clinical depression    Benign essential HTN    Acquired flat foot    Closed fracture of tarsal and metatarsal bones    Myogenic ptosis    Disorder of peripheral nervous system    Arthritis of ankle or foot, degenerative    HLD (hyperlipidemia)    Cervical post-laminectomy syndrome    Failed back syndrome of lumbar spine    Low back pain with sciatica    Fibromyalgia muscle pain    Malaise and fatigue    Melancholia    Complete tear of left rotator cuff    Generalized osteoarthritis    Peripheral neuropathy    Osteoporosis    Postlaminectomy syndrome of cervical region    Postlaminectomy syndrome of lumbar region    Tibialis tendinitis    Hereditary and idiopathic peripheral neuropathy (HCC)    Hypothyroid    Lobular breast cancer (HCC)    Acquired hallux valgus    Fatigue due to treatment    Anemia    Cancer ([address]    Cervical spinal cord injury ([address]    Complete traumatic amputation at level between right knee and ankle ([address]    Morbid obesity ([address]    Reactive depression Sleeping excessive    Chronic low back pain    Left breast mass    Other specified postprocedural states    Primary osteoarthritis involving multiple joints    Hematuria    Hematuria, gross    Noncompliance - No Show inject 07/17/17, No Show F/U 1/11/18    Chronic low back pain with sciatica    Moderate episode of recurrent major depressive disorder (HCC)    Charcot's arthropathy    Chronic pain syndrome    Racing heart beat    Cervical fusion syndrome    Current mild episode of major depressive disorder (HCC)    Obstructive sleep apnea    Malignant neoplasm of peritoneum, 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HCC)    Dizziness    Gait abnormality dt cerebrovascular insufficiancy and gait atasxia    Difficulty balancing    Hx of BKA, right (HCC)    Abnormality of gait and mobility    Drug-induced polyneuropathy ([facility]    Chronic renal disease, stage III ([address] [719551]         AT TODAY'S VISIT     Crisis plan reviewed and patient verbally contracts for safety. Go to ED with emergent symptoms or safety concerns. Risks, benefits, side effects of medications, including any / all black box warnings, discussed with patient, who verbalizes their understanding. Pt is jodie for safety and denies thoughts of SI/HI. Amenable to plan. No acute concerns to address regarding medications. Subjective:      Patient denies medication side effects apart from those mentioned in the assessment and plan. Patient reports they have been compliant with current medication regimen and have not missed a dose. At today's visit, patient denies thoughts of harm to self or others since last appointment, and denies auditory or visual hallucinations. ROS:  [x] All negative/unchanged except if checked.  Explain positive(checked items) below:       Denies any new or changed physical symptoms other than those noted in the subjective portion of this note   [] Constitutional  [] Eyes  [] Ear/Nose/Mouth/Throat  [] Respiratory  [] CV  [] GI  []   [] Musculoskeletal  [] Skin/Breast  [] Neurological  [] Endocrine  [] Heme/Lymph  [] Allergic/Immunologic    OBJECTIVE:   Recent Results (from the past 1008 hour(s))   Comprehensive Metabolic Panel    Collection Time: 09/13/22  6:00 PM   Result Value Ref Range    Sodium 139 135 - 144 mEq/L    Potassium 4.3 3.4 - 4.9 mEq/L    Chloride 102 95 - 107 mEq/L    CO2 26 20 - 31 mEq/L    Anion Gap 11 9 - 15 mEq/L    Glucose 93 70 - 99 mg/dL    BUN 22 8 - 23 mg/dL    Creatinine 0.85 0.50 - 0.90 mg/dL    GFR Non-African American >60.0 >60    GFR  >60.0 >60    Calcium 9.9 8.5 - 9.9 mg/dL    Total Protein 6.7 6.3 - 8.0 g/dL    Albumin 4.3 3.5 - 4.6 g/dL    Total Bilirubin 0.3 0.2 - 0.7 mg/dL    Alkaline Phosphatase 94 40 - 130 U/L    ALT 20 0 - 33 U/L    AST 30 0 - 35 U/L    Globulin 2.4 2.3 - 3.5 g/dL   Magnesium    Collection Time: 09/13/22  6:00 PM   Result Value Ref Range    Magnesium 1.8 1.7 - 2.4 mg/dL   CBC with Auto Differential    Collection Time: 09/13/22  6:00 PM   Result Value Ref Range    WBC 6.2 4.8 - 10.8 K/uL    RBC 4.27 4.20 - 5.40 M/uL    Hemoglobin 12.8 12.0 - 16.0 g/dL    Hematocrit 38.9 37.0 - 47.0 %    MCV 91.0 82.0 - 100.0 fL    MCH 30.0 27.0 - 31.3 pg    MCHC 33.0 33.0 - 37.0 %    RDW 14.4 11.5 - 14.5 %    Platelets 655 931 - 257 K/uL    Neutrophils % 61.1 %    Lymphocytes % 26.5 %    Monocytes % 9.2 %    Eosinophils % 2.3 %    Basophils % 0.9 %    Neutrophils Absolute 3.8 1.4 - 6.5 K/uL    Lymphocytes Absolute 1.6 1.0 - 4.8 K/uL    Monocytes Absolute 0.6 0.2 - 0.8 K/uL    Eosinophils Absolute 0.1 0.0 - 0.7 K/uL    Basophils Absolute 0.1 0.0 - 0.2 K/uL   Urinalysis with Reflex to Culture    Collection Time: 09/13/22  6:00 PM    Specimen: Urine   Result Value Ref Range    Color, UA Yellow Straw/Yellow    Clarity, UA Clear Clear    Glucose, Ur Negative Negative mg/dL    Bilirubin Urine Negative Negative    Ketones, Urine Negative Negative mg/dL    Specific Gravity, UA 1.012 1.005 - 1.030 Blood, Urine Negative Negative    pH, UA 6.0 5.0 - 9.0    Protein, UA Negative Negative mg/dL    Urobilinogen, Urine 0.2 <2.0 E.U./dL    Nitrite, Urine POSITIVE (A) Negative    Leukocyte Esterase, Urine MODERATE (A) Negative    Urine Reflex to Culture Yes    Microscopic Urinalysis    Collection Time: 09/13/22  6:00 PM   Result Value Ref Range    Bacteria, UA MANY (A) Negative /HPF    Hyaline Casts, UA 0-1 0 - 5 /HPF    WBC, UA 20-50 (A) 0 - 5 /HPF    RBC, UA 0-2 0 - 5 /HPF    Epithelial Cells, UA 0-2 0 - 5 /HPF   Culture, Urine    Collection Time: 09/13/22  9:30 PM    Specimen: Urine, clean catch   Result Value Ref Range    Urine Culture, Routine (A)      Performed at [address] [place] [id number] ([phone]      Organism Escherichia coli (A)     Urine Culture, Routine >[id number] CFU/ML        Susceptibility    Escherichia coli - BACTERIAL SUSCEPTIBILITY PANEL BY KENDRA     ampicillin 4 Sensitive mcg/mL     aztreonam <=1 Sensitive mcg/mL     ceFAZolin* <=4 Sensitive mcg/mL      * Cefazolin sensitivity results can be used to predict the  effectiveness of oral cephalosporins (eg.  Cephalexin) in  uncomplicated Urinary Tract Infections due to E. coli, K. pneumoniae,  and P. mirabilis       cefTRIAXone <=1 Sensitive mcg/mL     ciprofloxacin <=0.25 Sensitive mcg/mL     Confirmatory Extended Spectrum Beta-Lactamase NEGATIVE Sensitive mcg/mL     gentamicin <=1 Sensitive mcg/mL     nitrofurantoin <=16 Sensitive mcg/mL     piperacillin-tazobactam <=4 Sensitive mcg/mL     trimethoprim-sulfamethoxazole <=20 Sensitive mcg/mL       MEDICATIONS:    Current Outpatient Medications:     VORTIoxetine (TRINTELLIX) 5 MG tablet, Take 1 tablet by mouth daily, Disp: 30 tablet, Rfl: 5    gabapentin (NEURONTIN) 600 MG tablet, TAKE 1 TABLET BY MOUTH IN  THE MORNING 2 TABLETS BY  MOUTH IN THE AFTERNOON AND  2 TABLETS BY MOUTH IN THE  EVENING AS TOLERATED, Disp: 450 tablet, Rfl: 2    levothyroxine (SYNTHROID) 50 MCG tablet, TAKE 1 TABLET BY MOUTH  DAILY, Disp: 90 tablet, Rfl: 3    DULoxetine (CYMBALTA) 30 MG extended release capsule, TAKE 1 CAPSULE BY MOUTH  DAILY, Disp: 90 capsule, Rfl: 3    diclofenac (VOLTAREN) 50 MG EC tablet, TAKE 1 TABLET BY MOUTH  TWICE DAILY, Disp: 180 tablet, Rfl: 3    carvedilol (COREG) 25 MG tablet, TAKE 1 TABLET BY MOUTH  TWICE DAILY, Disp: 180 tablet, Rfl: 3    aspirin 81 MG EC tablet, Take 1 tablet by mouth daily, Disp: 30 tablet, Rfl: 3    atorvastatin (LIPITOR) 80 MG tablet, Take 1 tablet by mouth nightly, Disp: 30 tablet, Rfl: 3    Respiratory Therapy Supplies SHAE, New Full face Mask , please do not sent nasal pillow . (She want to try full face Mask )  Dx KEI (Patient not taking: Reported on 9/15/2022), Disp: 1 Device, Rfl: 0    Blood Pressure KIT, 1 Units by Does not apply route as needed (daily monitoring of blood pressure) One electronic sphygmomanometer and cuff for home monitoring of blood pressure and heart rate. Upper arm type of cuff preferred. Adult regular size. (Patient not taking: Reported on 9/15/2022), Disp: 1 kit, Rfl: 0    Respiratory Therapy Supplies SHAE, New CPAP, full face  mask and chin strap    Fax [phone] (Patient not taking: Reported on 9/15/2022), Disp: 1 Device, Rfl: 0    furosemide (LASIX) 20 MG tablet, Take 1 tablet by mouth daily for 5 days (Patient taking differently: Take 20 mg by mouth daily as needed ), Disp: 5 tablet, Rfl: 0    CPAP Machine MISC, by Does not apply route New CPAP with 8 cm (Patient not taking: Reported on 9/15/2022), Disp: 1 each, Rfl: 0    acyclovir (ZOVIRAX) 5 % CREA, Apply topically as needed (Patient not taking: Reported on 9/15/2022), Disp: 5 g, Rfl: 1    b complex vitamins capsule, Take 1 capsule by mouth daily, Disp: , Rfl:     Multiple Vitamins-Minerals (CENTRUM SILVER 50+WOMEN PO), Take by mouth, Disp: , Rfl:     Vitamin D (CHOLECALCIFEROL) 1000 UNITS CAPS capsule, Take 1,000 Units by mouth daily. , Disp: , Rfl: Examination:    Vitals: not taken in person, most recent vitals in chart reviewed  There were no vitals filed for this visit. Wt Readings from Last 3 Encounters:   09/13/22 215 lb (97.5 kg)   05/16/22 208 lb 12.8 oz (94.7 kg)   10/21/21 204 lb 6.4 oz (92.7 kg)       BP Readings from Last 3 Encounters:   09/13/22 (!) 157/75   05/16/22 138/84   10/21/21 136/78         Mental Status Examination:    Level of consciousness:  alert and oriented to person, place, and situation  Appearance:  well-appearing good grooming and good hygiene  Behavior/Motor:  no abnormalities noted  Attitude toward examiner: friendly, pleasant and cooperative, attentive and good eye contact  Speech:  spontaneous, normal rate and normal volume   Mood: \"good\"  Affect:  mood congruent  Thought processes:  linear and logical  Thought content:  Denies suicidal or homicidal ideation, denies auditory or visual hallucinations  Cognition:  no deficits in attention, concentration notable, recent memory grossly intact  Concentration intact  Memory intact  Insight good   Judgement fair   Fund of Knowledge adequate    PSYCHOTHERAPY/COUNSELING:  Encourage patient to attend outpatient appointments and therapy. [x] Therapeutic interview  [] Supportive  [x] CBT  [x] Ongoing  [] Other    No follow-ups on file. patient informed to call for follow-up or to reschedule appointment     Please note this report has been partially produced using speech recognition software  And may cause contain errors related to that system including grammar, punctuation and spelling as well as words and phrases that may seem inappropriate. If there are questions or concerns please feel free to contact me to clarify.     Halle Zheng MD  Electronically signed by Halle Zheng MD on 10/19/2022 at 10:58 AM  10/19/2022 10:58 AM    Psychiatry   Due to this being a TeleHealth encounter, evaluation of the following organ systems is limited: Vitals/Constitutional/EENT/Resp/CV/GI//MS/Neuro/Skin/Heme-Lymph-Imm. An  electronic signature was used to authenticate this note. --Mirtha Rosario MD on 10/19/2022 at 10:58 AM    Pursuant to the emergency declaration under the [address] [place] [address] authority and the [employer] Act, this Virtual  Visit was conducted, with patient's consent, to reduce the patient's risk of exposure to COVID-19 and provide continuity of care for an established patient Services were provided through a video synchronous discussion virtually to substitute for in-person clinic visit. Treatment Plan:  Reviewed current Medications with the patient. Education provided on the compliance with treatment. Reviewed OARRs, no concerns identified     The anticipated benefits and side effects of the medications, including the anticipated results of not receiving the medication, and of alternatives to the medications were explained to the patient and their informed consent was obtained for starting medications as well as adjusting the doses (titration or tapering) as indicated. The above information was given by physician in verbal form and sufficient understanding was in evidence. The patient participated in discussion of the information and question and/or concerns were addressed before the medication was given. Due to COVID 19 outbreak, patient's office visit was converted to a virtual visit.   Patient was contacted and agreed to proceed with a virtual visit via DOXY

## 2022-10-06 NOTE — CARE COORDINATION
Ambulatory Care Coordination Note  10/6/2022    ACC: Eyad Martinez, RN    I called to check in on Little Cedillo  She tells me that she had a very good virtual visit with Dr Maryan Bernardo. She adds that she did make the follow up call with Talia Cates regarding the cost of her trintellex   She is hopeful that Talia Cates will be able to help with this issue. She adds that she did receive the packet of information from [place]  She is going to review this information  She adds that she will reach out to her neighborhood for additional help  She tells me that her BP has been good and not any higher than 910 mmHg systolic which she states is good for her  She has not had any falls this week which she says is really good for her. PLAN:  Little Cedillo will complete all scheduled appointments and procedures  Little Cedillo will take all medications as prescribed. Little Cedillo will call me with any changes or worsening of sympotms     Offered patient enrollment in the Remote Patient Monitoring (RPM) program for in-home monitoring: NA. Lab Results       None            Care Coordination Interventions    Referral from Primary Care Provider: No  Suggested Interventions and Community Resources  Behavorial Health: In Process  Fall Risk Prevention: In Process  Pharmacist: In Process  Social Work: In Process  Other Services or Interventions: Walk in Clinic hours and usage discussed, social service referral initated, Pharmacy specialist referral initated to help with medication cost          Goals Addressed    None         Prior to Admission medications    Medication Sig Start Date End Date Taking?  Authorizing Provider   VORTIoxetine (TRINTELLIX) 5 MG tablet Take 1 tablet by mouth daily 9/8/22   Rupert Ho MD   gabapentin (NEURONTIN) 600 MG tablet TAKE 1 TABLET BY MOUTH IN  THE MORNING 2 TABLETS BY  MOUTH IN THE AFTERNOON AND  2 TABLETS BY MOUTH IN THE  EVENING AS TOLERATED 8/30/22 5/27/23  Kamlesh Trevino DO   levothyroxine (SYNTHROID) 50 MCG tablet TAKE 1 TABLET BY MOUTH  DAILY 6/1/22   Margretpushpa Rossion, DO   DULoxetine (CYMBALTA) 30 MG extended release capsule TAKE 1 CAPSULE BY MOUTH  DAILY 2/7/22   Margretta Dragon, DO   diclofenac (VOLTAREN) 50 MG EC tablet TAKE 1 TABLET BY MOUTH  TWICE DAILY 12/22/21   Margretta Dragon, DO   carvedilol (COREG) 25 MG tablet TAKE 1 TABLET BY MOUTH  TWICE DAILY 12/9/21   Margretta Floon, DO   aspirin 81 MG EC tablet Take 1 tablet by mouth daily 9/17/21   Antoinette Ji APRN - CNP   atorvastatin (LIPITOR) 80 MG tablet Take 1 tablet by mouth nightly 9/16/21   Antoinette Davidson, APRN - CNP   Respiratory Therapy Supplies SHAE New Full face Mask , please do not sent nasal pillow . (She want to try full face Mask )    Dx KEI  Patient not taking: Reported on 9/15/2022 1/28/21   Froilan Jones MD   Blood Pressure KIT 1 Units by Does not apply route as needed (daily monitoring of blood pressure) One electronic sphygmomanometer and cuff for home monitoring of blood pressure and heart rate. Upper arm type of cuff preferred. Adult regular size.   Patient not taking: Reported on 9/15/2022 12/17/20   Oswaldo Carrington MD   Respiratory Therapy Supplies SHAE New CPAP, full face  mask and chin strap       Fax [phone]  Patient not taking: Reported on 9/15/2022 10/27/20   Froilan Jones MD   furosemide (LASIX) 20 MG tablet Take 1 tablet by mouth daily for 5 days  Patient taking differently: Take 20 mg by mouth daily as needed  7/10/20 12/22/21  Maria D Vazquez MD   CPAP Machine MISC by Does not apply route New CPAP with 8 cm  Patient not taking: Reported on 9/15/2022 6/8/20   Froilan Jones MD   acyclovir (ZOVIRAX) 5 % CREA Apply topically as needed  Patient not taking: Reported on 9/15/2022 5/1/19   Zachery Mix MD   b complex vitamins capsule Take 1 capsule by mouth daily    Historical Provider, MD   Multiple Vitamins-Minerals (CENTRUM SILVER 50+WOMEN PO) Take by mouth    Historical Provider, MD   Vitamin D (CHOLECALCIFEROL) 1000 UNITS CAPS capsule Take 1,000 Units by mouth daily.     Historical Provider, MD       Future Appointments   Date Time Provider Saurabh Delvalle   11/1/2022 12:00 PM Minnie De Souza MD [facility]  [place]   11/17/2022  1:30 PM Ever Leal DO [facility] EMERGENCY [facility] AT [place]   12/27/2022 12:30 PM Ever Leal DO [facility] EMERGENCY MEDICAL [place] AT [place]

## 2022-10-07 ENCOUNTER — CARE COORDINATION (OUTPATIENT)
Dept: CARE COORDINATION | Age: 77
End: 2022-10-07

## 2022-10-07 NOTE — CARE COORDINATION
Called patient for updates with options on private hire for additional help in home. Patient states that she reviewed the options I sent in mail, however feels that she will not be able to afford them. Patient reports that she has a neighbor who knows someone that may be able to help her once a week. Patient reports she will find out more over the weekends. I will call back next week for further updates.

## 2022-10-13 ENCOUNTER — CARE COORDINATION (OUTPATIENT)
Dept: CARE COORDINATION | Age: 77
End: 2022-10-13

## 2022-10-19 ENCOUNTER — CARE COORDINATION (OUTPATIENT)
Dept: CARE COORDINATION | Age: 77
End: 2022-10-19

## 2022-10-27 ENCOUNTER — CARE COORDINATION (OUTPATIENT)
Dept: CARE COORDINATION | Age: 77
End: 2022-10-27

## 2022-11-01 ENCOUNTER — TELEMEDICINE (OUTPATIENT)
Dept: BEHAVIORAL/MENTAL HEALTH CLINIC | Age: 77
End: 2022-11-01
Payer: MEDICARE

## 2022-11-01 DIAGNOSIS — F33.1 MODERATE EPISODE OF RECURRENT MAJOR DEPRESSIVE DISORDER (HCC): Primary | ICD-10-CM

## 2022-11-01 DIAGNOSIS — F41.1 GAD (GENERALIZED ANXIETY DISORDER): ICD-10-CM

## 2022-11-01 PROCEDURE — G8399 PT W/DXA RESULTS DOCUMENT: HCPCS | Performed by: PSYCHIATRY & NEUROLOGY

## 2022-11-01 PROCEDURE — 99215 OFFICE O/P EST HI 40 MIN: CPT | Performed by: PSYCHIATRY & NEUROLOGY

## 2022-11-01 PROCEDURE — 1124F ACP DISCUSS-NO DSCNMKR DOCD: CPT | Performed by: PSYCHIATRY & NEUROLOGY

## 2022-11-01 PROCEDURE — 1090F PRES/ABSN URINE INCON ASSESS: CPT | Performed by: PSYCHIATRY & NEUROLOGY

## 2022-11-01 PROCEDURE — G8428 CUR MEDS NOT DOCUMENT: HCPCS | Performed by: PSYCHIATRY & NEUROLOGY

## 2022-11-01 NOTE — PROGRESS NOTES
11/1/2022    40 mins total for this encounter =     30 minutes with direct communication with patient for encounter     10 mins (in addition) spent on chart review, and in the ordering of all necessary labs and/or medications      The risks and benefits of converting to a virtual visit were discussed in light of the current infectious disease epidemic. Patient also understood that insurance coverage and co-pays are up to their individual insurance plans. Patient Location:        Patient's home address  [facility] Location [facility]  Chief complaint No chief complaint on file. TELEHEALTH PSYCHIATRY FOLLOWUP (OUTPATIENT)   Audio/Visual (During Pending sale to [facility]-14 public health emergency)          Psychiatric Diagnoses:  1. Moderate episode of recurrent major depressive disorder ([facility] [address] [address]    2. MADHU (generalized anxiety disorder)        Assessment/Plan:       Patient denies thoughts of harm to self or others. No acute safety concerns voiced at this appointment. MOOD: LOW MOOD: Recently, patient has been experiencing symptoms of low mood , anhedonia (difficulty finding enjoyment in things) , and anergia (low energy) . SLEEP: Patient reports sleep has been at least 8 hours a night and wakes feeling rested in the morning. No concerns related to sleep today    ATTENTION AND FOCUS: Patient denies any concerns related to attention and focus, and no concerns related to memory. Occasionally going for walks. ANXIETY: Patient denies any concerns related to anxiety today. BIPOLAR ZELDA: No concerns. No manic symptoms endorsed today and no concern for zelda. SUBSTANCE USE: No concerns for ongoing substance use. Patient denies any recent substance use    ASSESSMENT/PLAN: Medication changes per plan below.  Discussed with patient the risks and benefits of their psychiatric medication and patient was amenable to plan as outlined below    COUNSELING or THERAPY:   Continue to encourage therapy as part of ongoing treatment of their mental health concerns. Continue to encourage physical activity and adherence to medication regimen. DATE and changes made  HPI    INCREASE vortioxetine (Trintellix) to 10 mg daily for some low mood         Medical Diagnoses:  Patient Active Problem List   Diagnosis    Neck pain on right side    Obesity (BMI 30-39. 9)    Obesity    High risk medication use - 01/30/18 OARRS PM&R, 03/26/18 OARRS PM&R, 04/03/18 Urine Drug Screen positive opiates PM&R, 01/31/18 Med Contract PM&R    Impaired mobility and activities of daily living    Hx of right BKA (HCC)    Family history of coronary artery disease    NSTEMI (non-ST elevated myocardial infarction) ([address]    Arthritis, neuropathic    Charcot's joint of foot    Closed dislocation of ankle    Closed dislocation of foot    Complete rotator cuff rupture of left shoulder    Clinical depression    Benign essential HTN    Acquired flat foot    Closed fracture of tarsal and metatarsal bones    Myogenic ptosis    Disorder of peripheral nervous system    Arthritis of ankle or foot, degenerative    HLD (hyperlipidemia)    Cervical post-laminectomy syndrome    Failed back syndrome of lumbar spine    Low back pain with sciatica    Fibromyalgia muscle pain    Malaise and fatigue    Melancholia    Complete tear of left rotator cuff    Generalized osteoarthritis    Peripheral neuropathy    Osteoporosis    Postlaminectomy syndrome of cervical region    Postlaminectomy syndrome of lumbar region    Tibialis tendinitis    Hereditary and idiopathic peripheral neuropathy (HCC)    Hypothyroid    Lobular breast cancer (HCC)    Acquired hallux valgus    Fatigue due to treatment    Anemia    Cancer ([address]    Cervical spinal cord injury ([address]    Complete traumatic amputation at level between right knee and ankle (HCC)    Morbid obesity (HCC)    Reactive depression    Sleeping excessive    Chronic low back pain    Left breast mass    Other specified postprocedural states    Primary osteoarthritis involving multiple joints    Hematuria    Hematuria, gross    Noncompliance - No Show inject 07/17/17, No Show F/U 1/11/18    Chronic low back pain with sciatica    Moderate episode of recurrent major depressive disorder (HCC)    Charcot's arthropathy    Chronic pain syndrome    Racing heart beat    Cervical fusion syndrome    Current mild episode of major depressive disorder (HCC)    Obstructive sleep apnea    Malignant neoplasm of peritoneum, 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HCC)    Dizziness    Gait abnormality dt cerebrovascular insufficiancy and gait atasxia    Difficulty balancing    Hx of BKA, right (HCC)    Abnormality of gait and mobility    Drug-induced polyneuropathy ([facility] [address]    Chronic renal disease, stage III ([facility] [address] [198725]         AT TODAY'S VISIT     Crisis plan reviewed and patient verbally contracts for safety. Go to ED with emergent symptoms or safety concerns. Risks, benefits, side effects of medications, including any / all black box warnings, discussed with patient, who verbalizes their understanding. Pt is jodie for safety and denies thoughts of SI/HI. Amenable to plan. No acute concerns to address regarding medications. Subjective:      Patient denies medication side effects apart from those mentioned in the assessment and plan. Patient reports they have been compliant with current medication regimen and have not missed a dose. At today's visit, patient denies thoughts of harm to self or others since last appointment, and denies auditory or visual hallucinations. ROS:  [x] All negative/unchanged except if checked.  Explain positive(checked items) below:       Denies any new or changed physical symptoms other than those noted in the subjective portion of this note   [] Constitutional  [] Eyes  [] Ear/Nose/Mouth/Throat  [] Respiratory  [] CV  [] GI  []   [] Musculoskeletal  [] Skin/Breast  [] Neurological  [] Endocrine  [] Heme/Lymph  [] Allergic/Immunologic    OBJECTIVE:   No results found for this or any previous visit (from the past 1008 hour(s)). MEDICATIONS:    Current Outpatient Medications:     VORTIoxetine (TRINTELLIX) 5 MG tablet, Take 1 tablet by mouth daily [id number]   Exp:4/25, Disp: 28 tablet, Rfl: 0    VORTIoxetine (TRINTELLIX) 10 MG TABS tablet, Take 1 tablet by mouth daily for 28 days Expiration date April 2025 lot #[id number], Disp: 28 tablet, Rfl: 0    carvedilol (COREG) 25 MG tablet, TAKE 1 TABLET BY MOUTH  TWICE DAILY, Disp: 180 tablet, Rfl: 3    VORTIoxetine (TRINTELLIX) 5 MG tablet, Take 1 tablet by mouth daily Lot: [id number]  Exp: 9/24, Disp: 28 tablet, Rfl: 0    VORTIoxetine (TRINTELLIX) 5 MG tablet, Take 1 tablet by mouth daily Lot# [id number]   Exp: 4/25, Disp: 28 tablet, Rfl: 0    gabapentin (NEURONTIN) 600 MG tablet, TAKE 1 TABLET BY MOUTH IN  THE MORNING 2 TABLETS BY  MOUTH IN THE AFTERNOON AND  2 TABLETS BY MOUTH IN THE  EVENING AS TOLERATED, Disp: 450 tablet, Rfl: 2    levothyroxine (SYNTHROID) 50 MCG tablet, TAKE 1 TABLET BY MOUTH  DAILY, Disp: 90 tablet, Rfl: 3    DULoxetine (CYMBALTA) 30 MG extended release capsule, TAKE 1 CAPSULE BY MOUTH  DAILY, Disp: 90 capsule, Rfl: 3    diclofenac (VOLTAREN) 50 MG EC tablet, TAKE 1 TABLET BY MOUTH  TWICE DAILY, Disp: 180 tablet, Rfl: 3    aspirin 81 MG EC tablet, Take 1 tablet by mouth daily, Disp: 30 tablet, Rfl: 3    atorvastatin (LIPITOR) 80 MG tablet, Take 1 tablet by mouth nightly, Disp: 30 tablet, Rfl: 3    Respiratory Therapy Supplies SHAE, New Full face Mask , please do not sent nasal pillow . (She want to try full face Mask )  Dx KEI (Patient not taking: Reported on 9/15/2022), Disp: 1 Device, Rfl: 0    Blood Pressure KIT, 1 Units by Does not apply route as needed (daily monitoring of blood pressure) One electronic sphygmomanometer and cuff for home monitoring of blood pressure and heart rate. Upper arm type of cuff preferred. Adult regular size.  (Patient not taking: Reported on 9/15/2022), Disp: 1 kit, Rfl: 0    Respiratory Therapy Supplies SHAE, New CPAP, full face  mask and chin strap    Fax [phone] (Patient not taking: Reported on 9/15/2022), Disp: 1 Device, Rfl: 0    furosemide (LASIX) 20 MG tablet, Take 1 tablet by mouth daily for 5 days (Patient taking differently: Take 20 mg by mouth daily as needed ), Disp: 5 tablet, Rfl: 0    CPAP Machine MISC, by Does not apply route New CPAP with 8 cm (Patient not taking: Reported on 9/15/2022), Disp: 1 each, Rfl: 0    acyclovir (ZOVIRAX) 5 % CREA, Apply topically as needed (Patient not taking: Reported on 9/15/2022), Disp: 5 g, Rfl: 1    b complex vitamins capsule, Take 1 capsule by mouth daily, Disp: , Rfl:     Multiple Vitamins-Minerals (CENTRUM SILVER 50+WOMEN PO), Take by mouth, Disp: , Rfl:     Vitamin D (CHOLECALCIFEROL) 1000 UNITS CAPS capsule, Take 1,000 Units by mouth daily. , Disp: , Rfl:     Examination:    Vitals: not taken in person, most recent vitals in chart reviewed  There were no vitals filed for this visit.     Wt Readings from Last 3 Encounters:   09/13/22 215 lb (97.5 kg)   05/16/22 208 lb 12.8 oz (94.7 kg)   10/21/21 204 lb 6.4 oz (92.7 kg)       BP Readings from Last 3 Encounters:   09/13/22 (!) 157/75   05/16/22 138/84   10/21/21 136/78         Mental Status Examination:    Level of consciousness:  alert and oriented to person, place, and situation  Appearance:  well-appearing good grooming and good hygiene  Behavior/Motor:  no abnormalities noted  Attitude toward examiner: friendly, pleasant and cooperative, attentive and good eye contact  Speech:  spontaneous, normal rate and normal volume   Mood: \"good\"  Affect:  mood congruent  Thought processes:  linear and logical  Thought content:  Denies suicidal or homicidal ideation, denies auditory or visual hallucinations  Cognition:  no deficits in attention, concentration notable, recent memory grossly intact  Concentration intact  Memory intact  Insight good   Judgement fair   Fund of Knowledge adequate    PSYCHOTHERAPY/COUNSELING:  Encourage patient to attend outpatient appointments and therapy. [x] Therapeutic interview  [] Supportive  [x] CBT  [x] Ongoing  [] Other    No follow-ups on file. patient informed to call for follow-up or to reschedule appointment     Please note this report has been partially produced using speech recognition software  And may cause contain errors related to that system including grammar, punctuation and spelling as well as words and phrases that may seem inappropriate. If there are questions or concerns please feel free to contact me to clarify. Leonora Shelton MD  Electronically signed by Leonora Shelton MD on 11/14/2022 at 9:35 AM  11/14/2022 9:35 AM    Psychiatry   Due to this being a TeleHealth encounter, evaluation of the following organ systems is limited: Vitals/Constitutional/EENT/Resp/CV/GI//MS/Neuro/Skin/Heme-Lymph-Imm. An  electronic signature was used to authenticate this note. --Leonora Shelton MD on 11/14/2022 at 9:35 AM    Pursuant to the emergency declaration under the [facility], [facility] waiver authority and the [place] and [employer] Act, this Virtual  Visit was conducted, with patient's consent, to reduce the patient's risk of exposure to COVID-19 and provide continuity of care for an established patient Services were provided through a video synchronous discussion virtually to substitute for in-person clinic visit. Treatment Plan:  Reviewed current Medications with the patient. Education provided on the compliance with treatment.    Reviewed OARRs, no concerns identified     The anticipated benefits and side effects of the medications, including the anticipated results of not receiving the medication, and of alternatives to the medications were explained to the patient and their informed consent was obtained for starting medications as well as adjusting the doses (titration or tapering) as indicated. The above information was given by physician in verbal form and sufficient understanding was in evidence. The patient participated in discussion of the information and question and/or concerns were addressed before the medication was given. Due to COVID 19 outbreak, patient's office visit was converted to a virtual visit.   Patient was contacted and agreed to proceed with a virtual visit via DOXY

## 2022-11-03 ENCOUNTER — CARE COORDINATION (OUTPATIENT)
Dept: CARE COORDINATION | Age: 77
End: 2022-11-03

## 2022-11-03 NOTE — CARE COORDINATION
I called to discuss care coordination with David Esquivel  She says that she is \"in a bit of an emergency and really cannot talk\"  She asks that I call back next week. I have asked her to call me with any issues or concerns.

## 2022-11-10 ENCOUNTER — CARE COORDINATION (OUTPATIENT)
Dept: CARE COORDINATION | Age: 77
End: 2022-11-10

## 2022-11-10 DIAGNOSIS — I10 ESSENTIAL HYPERTENSION: ICD-10-CM

## 2022-11-10 NOTE — CARE COORDINATION
Ambulatory Care Coordination Note  11/10/2022    ACC: Alicia Rojas, RN    Kalyn James tells me that she is doing well  She has been busy moving her son to a dementia unit at [facility]   She is struggling as her son does not know who she is. She states that she is eating well   She continues to use MOW as her main meal for the day  She adds that she is sleeping well and not having any issues with this  She has not do any work on finding someone to help her in the house   She adds that she has not had the time to fill out the application for the patient assistance for her trintellix  She states that Dr Syd Weber did increase her trintellix to 10 mg     PLAN:  Kalyn James will take all medications as prescribed  She will take the time to work on resource needs for herself  Kalyn James will complete all scheduled appointments and procedures. Offered patient enrollment in the Remote Patient Monitoring (RPM) program for in-home monitoring: NA. Lab Results       None            Care Coordination Interventions    Referral from Primary Care Provider: No  Suggested Interventions and Community Resources  Behavorial Health: In Process  Fall Risk Prevention: In Process  Pharmacist: In Process  Social Work: In Process  Other Services or Interventions: Walk in Clinic hours and usage discussed, social service referral initated, Pharmacy specialist referral initated to help with medication cost          Goals Addressed    None         Prior to Admission medications    Medication Sig Start Date End Date Taking?  Authorizing Provider   VORTIoxetine (TRINTELLIX) 5 MG tablet Take 1 tablet by mouth daily [id number]   Exp:4/25 11/3/22   Stephanie Elias MD   [facility] 5 MG tablet Take 1 tablet by mouth daily Lot: [id number]  Exp: 9/24 11/3/22   Stephanie Elias MD   VORTIoxetine [facility]) 5 MG tablet Take 1 tablet by mouth daily Lot# [id number]   Exp: 4/25 11/3/22   Stephanie Elias MD   [facility]) 10 MG TABS tablet Take 1 tablet by mouth daily for 28 days Expiration date April 2025 lot #[id number] 11/3/22 12/1/22  Christine Guerrero MD   gabapentin (NEURONTIN) 600 MG tablet TAKE 1 TABLET BY MOUTH IN  THE MORNING 2 TABLETS BY  MOUTH IN THE AFTERNOON AND  2 TABLETS BY MOUTH IN THE  EVENING AS TOLERATED 8/30/22 5/27/23  Lesley Drummond, DO   levothyroxine (SYNTHROID) 50 MCG tablet TAKE 1 TABLET BY MOUTH  DAILY 6/1/22   Lesley Drummond, DO   DULoxetine (CYMBALTA) 30 MG extended release capsule TAKE 1 CAPSULE BY MOUTH  DAILY 2/7/22   Lesley Drummond, DO   diclofenac (VOLTAREN) 50 MG EC tablet TAKE 1 TABLET BY MOUTH  TWICE DAILY 12/22/21   Lesley Drummond, DO   carvedilol (COREG) 25 MG tablet TAKE 1 TABLET BY MOUTH  TWICE DAILY 12/9/21   Lesley Drummond, DO   aspirin 81 MG EC tablet Take 1 tablet by mouth daily 9/17/21   Antoinette Ji, APRN - CNP   atorvastatin (LIPITOR) 80 MG tablet Take 1 tablet by mouth nightly 9/16/21   Antoinette Hunter, APRN - CNP   Respiratory Therapy Supplies SHAE New Full face Mask , please do not sent nasal pillow . (She want to try full face Mask )    Dx KEI  Patient not taking: Reported on 9/15/2022 1/28/21   Tasha Rao MD   Blood Pressure KIT 1 Units by Does not apply route as needed (daily monitoring of blood pressure) One electronic sphygmomanometer and cuff for home monitoring of blood pressure and heart rate. Upper arm type of cuff preferred. Adult regular size.   Patient not taking: Reported on 9/15/2022 12/17/20   Christine Guerrero MD   Respiratory Therapy Supplies SHAE New CPAP, full face  mask and chin strap       Fax [phone]  Patient not taking: Reported on 9/15/2022 10/27/20   Tasha Rao MD   furosemide (LASIX) 20 MG tablet Take 1 tablet by mouth daily for 5 days  Patient taking differently: Take 20 mg by mouth daily as needed  7/10/20 12/22/21  Lilliana Mercado MD   CPAP Machine MISC by Does not apply route New CPAP with 8 cm  Patient not taking: Reported on 9/15/2022 6/8/20   Ganesh Pearson MD   acyclovir (ZOVIRAX) 5 % CREA Apply topically as needed  Patient not taking: Reported on 9/15/2022 5/1/19   Shayy Sainz MD   b complex vitamins capsule Take 1 capsule by mouth daily    Historical Provider, MD   Multiple Vitamins-Minerals (CENTRUM SILVER 50+WOMEN PO) Take by mouth    Historical Provider, MD   Vitamin D (CHOLECALCIFEROL) 1000 UNITS CAPS capsule Take 1,000 Units by mouth daily.     Historical Provider, MD       Future Appointments   Date Time Provider Saurabh Delvalle   11/17/2022  1:30 PM Gumaro Pa DO [facility] EMERGENCY [facility] AT [place]   12/1/2022  2:00 PM Leatha Monreal MD [facility]  [place]   12/27/2022 12:30 PM Gumaro Pa DO [facility]ENCY [facility] AT [place]

## 2022-11-11 NOTE — TELEPHONE ENCOUNTER
Comments: This request is coming from the pharmacy. Last Office Visit (last PCP visit):   5/16/2022    Next Visit Date:  Future Appointments   Date Time Provider Saurabh Delvalle   11/17/2022  1:30 PM Azalea Hammond DO [facility] EMERGENCY [facility] AT [place]   12/1/2022  2:00 PM Rashida Hein MD [facility]  [place]   12/27/2022 12:30 PM Azalea Hammond DO [facility] EMERGENCY [facility] AT [place]       If hasn't been seen in over a year OR hasn't followed up according to last diabetes/ADHD visit, make appointment for patient before sending refill to provider.     Rx requested:  Requested Prescriptions     Pending Prescriptions Disp Refills    carvedilol (COREG) 25 MG tablet [Pharmacy Med Name: Carvedilol 25 MG Oral Tablet] 180 tablet 3     Sig: TAKE 1 TABLET BY MOUTH  TWICE DAILY

## 2022-11-14 ENCOUNTER — HOSPITAL ENCOUNTER (EMERGENCY)
Age: 77
Discharge: HOME | End: 2022-11-14
Payer: MEDICARE

## 2022-11-14 VITALS
TEMPERATURE: 98.4 F | OXYGEN SATURATION: 92 % | HEART RATE: 98 BPM | HEIGHT: 60 IN | WEIGHT: 215 LBS | RESPIRATION RATE: 18 BRPM | BODY MASS INDEX: 42.21 KG/M2 | SYSTOLIC BLOOD PRESSURE: 164 MMHG | DIASTOLIC BLOOD PRESSURE: 81 MMHG

## 2022-11-14 DIAGNOSIS — G54.6 PHANTOM LIMB PAIN (HCC): Primary | ICD-10-CM

## 2022-11-14 LAB
ALBUMIN SERPL-MCNC: 4.1 G/DL (ref 3.5–4.6)
ALP BLD-CCNC: 101 U/L (ref 40–130)
ALT SERPL-CCNC: 10 U/L (ref 0–33)
ANION GAP SERPL CALCULATED.3IONS-SCNC: 8 MEQ/L (ref 9–15)
AST SERPL-CCNC: 23 U/L (ref 0–35)
BASOPHILS ABSOLUTE: 0.1 K/UL (ref 0–0.2)
BASOPHILS RELATIVE PERCENT: 0.9 %
BILIRUB SERPL-MCNC: 0.3 MG/DL (ref 0.2–0.7)
BUN BLDV-MCNC: 25 MG/DL (ref 8–23)
CALCIUM SERPL-MCNC: 9.7 MG/DL (ref 8.5–9.9)
CHLORIDE BLD-SCNC: 103 MEQ/L (ref 95–107)
CO2: 30 MEQ/L (ref 20–31)
CREAT SERPL-MCNC: 0.88 MG/DL (ref 0.5–0.9)
EOSINOPHILS ABSOLUTE: 0.1 K/UL (ref 0–0.7)
EOSINOPHILS RELATIVE PERCENT: 1.6 %
GFR SERPL CREATININE-BSD FRML MDRD: >60 ML/MIN/{1.73_M2}
GLOBULIN: 2.9 G/DL (ref 2.3–3.5)
GLUCOSE BLD-MCNC: 102 MG/DL (ref 70–99)
HCT VFR BLD CALC: 37.4 % (ref 37–47)
HEMOGLOBIN: 12 G/DL (ref 12–16)
LYMPHOCYTES ABSOLUTE: 1.4 K/UL (ref 1–4.8)
LYMPHOCYTES RELATIVE PERCENT: 16.6 %
MAGNESIUM: 2 MG/DL (ref 1.7–2.4)
MCH RBC QN AUTO: 29.1 PG (ref 27–31.3)
MCHC RBC AUTO-ENTMCNC: 32.1 % (ref 33–37)
MCV RBC AUTO: 90.7 FL (ref 79.4–94.8)
MONOCYTES ABSOLUTE: 0.6 K/UL (ref 0.2–0.8)
MONOCYTES RELATIVE PERCENT: 7.4 %
NEUTROPHILS ABSOLUTE: 6 K/UL (ref 1.4–6.5)
NEUTROPHILS RELATIVE PERCENT: 73.5 %
PDW BLD-RTO: 15.1 % (ref 11.5–14.5)
PLATELET # BLD: 343 K/UL (ref 130–400)
POTASSIUM SERPL-SCNC: 4.7 MEQ/L (ref 3.4–4.9)
RBC # BLD: 4.13 M/UL (ref 4.2–5.4)
SODIUM BLD-SCNC: 141 MEQ/L (ref 135–144)
TOTAL PROTEIN: 7 G/DL (ref 6.3–8)
WBC # BLD: 8.2 K/UL (ref 4.8–10.8)

## 2022-11-14 PROCEDURE — 36415 COLL VENOUS BLD VENIPUNCTURE: CPT

## 2022-11-14 PROCEDURE — 2580000003 HC RX 258: Performed by: PHYSICIAN ASSISTANT

## 2022-11-14 PROCEDURE — 83735 ASSAY OF MAGNESIUM: CPT

## 2022-11-14 PROCEDURE — 96375 TX/PRO/DX INJ NEW DRUG ADDON: CPT

## 2022-11-14 PROCEDURE — 6360000002 HC RX W HCPCS: Performed by: PHYSICIAN ASSISTANT

## 2022-11-14 PROCEDURE — 99284 EMERGENCY DEPT VISIT MOD MDM: CPT | Performed by: EMERGENCY MEDICINE

## 2022-11-14 PROCEDURE — 96374 THER/PROPH/DIAG INJ IV PUSH: CPT

## 2022-11-14 PROCEDURE — 80053 COMPREHEN METABOLIC PANEL: CPT

## 2022-11-14 PROCEDURE — 96376 TX/PRO/DX INJ SAME DRUG ADON: CPT

## 2022-11-14 PROCEDURE — 85025 COMPLETE CBC W/AUTO DIFF WBC: CPT

## 2022-11-14 RX ORDER — MORPHINE SULFATE 2 MG/ML
4 INJECTION, SOLUTION INTRAMUSCULAR; INTRAVENOUS ONCE
Status: COMPLETED | OUTPATIENT
Start: 2022-11-14 | End: 2022-11-14

## 2022-11-14 RX ORDER — CARVEDILOL 25 MG/1
TABLET ORAL
Qty: 180 TABLET | Refills: 3 | Status: SHIPPED | OUTPATIENT
Start: 2022-11-14

## 2022-11-14 RX ORDER — 0.9 % SODIUM CHLORIDE 0.9 %
1000 INTRAVENOUS SOLUTION INTRAVENOUS ONCE
Status: COMPLETED | OUTPATIENT
Start: 2022-11-14 | End: 2022-11-14

## 2022-11-14 RX ORDER — ONDANSETRON 2 MG/ML
4 INJECTION INTRAMUSCULAR; INTRAVENOUS ONCE
Status: COMPLETED | OUTPATIENT
Start: 2022-11-14 | End: 2022-11-14

## 2022-11-14 RX ADMIN — SODIUM CHLORIDE 1000 ML: 9 INJECTION, SOLUTION INTRAVENOUS at 17:07

## 2022-11-14 RX ADMIN — MORPHINE SULFATE 4 MG: 2 INJECTION, SOLUTION INTRAMUSCULAR; INTRAVENOUS at 17:07

## 2022-11-14 RX ADMIN — ONDANSETRON 4 MG: 2 INJECTION INTRAMUSCULAR; INTRAVENOUS at 17:08

## 2022-11-14 RX ADMIN — MORPHINE SULFATE 4 MG: 2 INJECTION, SOLUTION INTRAMUSCULAR; INTRAVENOUS at 18:29

## 2022-11-14 ASSESSMENT — PAIN DESCRIPTION - DESCRIPTORS
DESCRIPTORS: STABBING
DESCRIPTORS: STABBING

## 2022-11-14 ASSESSMENT — PAIN DESCRIPTION - ONSET
ONSET: ON-GOING
ONSET: ON-GOING

## 2022-11-14 ASSESSMENT — PAIN SCALES - GENERAL
PAINLEVEL_OUTOF10: 8
PAINLEVEL_OUTOF10: 8
PAINLEVEL_OUTOF10: 7
PAINLEVEL_OUTOF10: 7

## 2022-11-14 ASSESSMENT — PAIN DESCRIPTION - LOCATION
LOCATION: LEG

## 2022-11-14 ASSESSMENT — PAIN DESCRIPTION - ORIENTATION
ORIENTATION: RIGHT

## 2022-11-14 ASSESSMENT — PAIN - FUNCTIONAL ASSESSMENT
PAIN_FUNCTIONAL_ASSESSMENT: PREVENTS OR INTERFERES SOME ACTIVE ACTIVITIES AND ADLS
PAIN_FUNCTIONAL_ASSESSMENT: 0-10
PAIN_FUNCTIONAL_ASSESSMENT: PREVENTS OR INTERFERES SOME ACTIVE ACTIVITIES AND ADLS

## 2022-11-14 ASSESSMENT — ENCOUNTER SYMPTOMS
APNEA: 0
EYE PAIN: 0
SHORTNESS OF BREATH: 0
ALLERGIC/IMMUNOLOGIC NEGATIVE: 1
COLOR CHANGE: 0
TROUBLE SWALLOWING: 0
ABDOMINAL PAIN: 0

## 2022-11-14 ASSESSMENT — PAIN DESCRIPTION - PAIN TYPE
TYPE: ACUTE PAIN
TYPE: ACUTE PAIN

## 2022-11-14 ASSESSMENT — PAIN DESCRIPTION - FREQUENCY
FREQUENCY: CONTINUOUS
FREQUENCY: CONTINUOUS

## 2022-11-14 NOTE — ED TRIAGE NOTES
Pt c/o RLE phantom pain, denies trauma, Hx of BKA, Pt is A&OX3, clam, afebrile, breathes are equal and unlabored,

## 2022-11-14 NOTE — ED PROVIDER NOTES
[address] [facility] ED  eMERGENCYdEPARTMENT eNCOUnter      Pt Name: Melida Tilley  MRN: [id number]  [place] 1945  Date of evaluation: 11/14/2022  Provider:Nando Murdock PA-C    CHIEF COMPLAINT       Chief Complaint   Patient presents with    Leg Pain     Pt c/o RLE phantom pain, Hx of RLE BKA, denies trauma         HISTORY OF PRESENT ILLNESS  (Location/Symptom, Timing/Onset, Context/Setting, Quality, Duration, Modifying Factors, Severity.)   Melida Tilley is a 76 y.o. female who presents to the emergency department with an acute exacerbation of phantom limb pain to the right lower extremity. Patient states amputation greater than 10 years ago and has had occasional exacerbations with the most recent exacerbation warranting an ER visit a few months ago. Patient denies any saddle paresthesias or loss of bowel or bladder control. Patient tried taking gabapentin with transient improvement only. Patient denies any new or changing back or abdominal pain. HPI    Nursing Notes were reviewed and I agree. REVIEW OF SYSTEMS    (2-9 systems for level 4, 10 or more for level 5)     Review of Systems   Constitutional:  Negative for diaphoresis and fever. HENT:  Negative for hearing loss and trouble swallowing. Eyes:  Negative for pain. Respiratory:  Negative for apnea and shortness of breath. Cardiovascular:  Negative for chest pain. Gastrointestinal:  Negative for abdominal pain. Endocrine: Negative. Genitourinary:  Negative for hematuria. Musculoskeletal:  Negative for neck pain and neck stiffness. Skin:  Negative for color change. Allergic/Immunologic: Negative. Neurological:  Negative for dizziness and numbness. Hematological: Negative. Psychiatric/Behavioral: Negative. All other systems reviewed and are negative. Except as noted above the remainder of the review of systems was reviewed and negative.        PAST MEDICAL HISTORY     Past Medical History:   Diagnosis Date    Arthritis     Blood transfusion     CAD (coronary artery disease)     Cancer ([address]     GALLBLADDER 2009, 2011 OMENTUM    Charcot's joint     left foot    Chest pain 7/2/2015    Colitis     DDD (degenerative disc disease), lumbar 2/12/2013    Depression     Disorder of peripheral nervous system 9/1/2010    Dyspnea 7/2/2015    Family history of coronary artery disease 8/29/2014    History of blood transfusion 2011    following chemotherapy    Hx of right BKA ([address]     Hyperlipidemia     Hypertension     Hypothyroid 6/12/2015    Lobular breast cancer ([address] 10/2015    NSTEMI (non-ST elevated myocardial infarction) ([address] 8/29/2014    Obesity     Paroxysmal atrial fibrillation ([address] 8/29/2014    S/P BKA (below knee amputation) ([address]          SURGICAL HISTORY       Past Surgical History:   Procedure Laterality Date    ABDOMEN SURGERY Left 04/11/2017    EXCISION OF CHEST WALL MASS, UPDATE LABS ON ADMIT  performed by Deedee Ngo MD at [address] [place] [address]  02/05/2013    both eyes    CARDIAC CATHETERIZATION      COLONOSCOPY  01/01/2010    normal    CYSTOSCOPY W BIOPSY OF BLADDER N/A 06/26/2017    CYSTOSCOPY BLADDER BIOPSY AND FULGURATION performed by Kinsey Emanuel MD at [facility] Bilateral 2012    cataract removal with IOL implants    HYSTERECTOMY (CERVIX STATUS UNKNOWN)      LEG AMPUTATION BELOW KNEE Right 02/01/2011    DR Bridgett Hannah due to CHARCOTS    MASTECTOMY Bilateral 11/16/2015    Bilateral simple mastectomies with right SNB by DR Rishabh Aranda    WI REMOVAL OF BREAST LESION Left 01/24/2017    CORE NEEDLE BIOPSY OF  LEFT BREAST MASS performed by Deedee Ngo MD at [address] [place] [address]      cervical and lumbar         CURRENT MEDICATIONS       Previous Medications    ACYCLOVIR (ZOVIRAX) 5 % CREA    Apply topically as needed    ASPIRIN 81 MG EC TABLET    Take 1 tablet by mouth daily    ATORVASTATIN (LIPITOR) 80 MG TABLET Take 1 tablet by mouth nightly    B COMPLEX VITAMINS CAPSULE    Take 1 capsule by mouth daily    BLOOD PRESSURE KIT    1 Units by Does not apply route as needed (daily monitoring of blood pressure) One electronic sphygmomanometer and cuff for home monitoring of blood pressure and heart rate. Upper arm type of cuff preferred. Adult regular size. CARVEDILOL (COREG) 25 MG TABLET    TAKE 1 TABLET BY MOUTH  TWICE DAILY    CPAP MACHINE MISC    by Does not apply route New CPAP with 8 cm    DICLOFENAC (VOLTAREN) 50 MG EC TABLET    TAKE 1 TABLET BY MOUTH  TWICE DAILY    DULOXETINE (CYMBALTA) 30 MG EXTENDED RELEASE CAPSULE    TAKE 1 CAPSULE BY MOUTH  DAILY    FUROSEMIDE (LASIX) 20 MG TABLET    Take 1 tablet by mouth daily for 5 days    GABAPENTIN (NEURONTIN) 600 MG TABLET    TAKE 1 TABLET BY MOUTH IN  THE MORNING 2 TABLETS BY  MOUTH IN THE AFTERNOON AND  2 TABLETS BY MOUTH IN THE  EVENING AS TOLERATED    LEVOTHYROXINE (SYNTHROID) 50 MCG TABLET    TAKE 1 TABLET BY MOUTH  DAILY    MULTIPLE VITAMINS-MINERALS (CENTRUM SILVER 50+WOMEN PO)    Take by mouth    RESPIRATORY THERAPY SUPPLIES SHAE    New CPAP, full face  mask and chin strap       Fax [phone]    RESPIRATORY THERAPY SUPPLIES SHAE    New Full face Mask , please do not sent nasal pillow . (She want to try full face Mask )    Dx KEI    VITAMIN D (CHOLECALCIFEROL) 1000 UNITS CAPS CAPSULE    Take 1,000 Units by mouth daily.     VORTIOXETINE (TRINTELLIX) 10 MG TABS TABLET    Take 1 tablet by mouth daily for 28 days Expiration date April 2025 lot #[id number]    VORTIOXETINE (TRINTELLIX) 5 MG TABLET    Take 1 tablet by mouth daily Lot: [id number]  Exp: 9/24    VORTIOXETINE (TRINTELLIX) 5 MG TABLET    Take 1 tablet by mouth daily [id number]   Exp:4/25    VORTIOXETINE (TRINTELLIX) 5 MG TABLET    Take 1 tablet by mouth daily Lot# [id number]   Exp: 4/25       ALLERGIES     Ciprofloxacin and Oxycontin [oxycodone hcl]    FAMILY HISTORY       Family History   Problem Relation Age of Onset    Heart Disease Mother     Arthritis Mother     Heart Disease Father     Arthritis Father     Cancer Sister         Colon    Thyroid Disease Son     Other Son         Down's syndrome          SOCIAL HISTORY       Social History     Socioeconomic History    Marital status:      Spouse name: None    Number of children: 2    Years of education: None    Highest education level: None   Occupational History    Occupation: retired   Tobacco Use    Smoking status: Never    Smokeless tobacco: Never   Vaping Use    Vaping Use: Never used   Substance and Sexual Activity    Alcohol use: Yes     Comment: social    Drug use: No    Sexual activity: Never   Social History Narrative    Lives With: Alone, dtr in [place], disabled son with Steve Dexter is in a local group home    Type of Home: [address] (she calls it an [employer]-bc they all help each other)    Home Layout: One level, Level entry    Bathroom Shower/Tub: Tub/Shower unit, Equipment: Tub transfer bench, Grab bars in shower    Bathroom Accessibility: Wheelchair accessible    Home Equipment: Quad cane, Rolling walker, Wheelchair-electric (has right BK prosthesis and Left AFO)    ADL Assistance: Independent    Homemaking Assistance: Independent (warms meals up  -  receives  MOW and groceries delivered)    [employer] Responsibilities: Yes, Meal Prep Responsibility: Secondary    Laundry Responsibility: Primary, Cleaning Responsibility: Secondary    Ambulation Assistance: Independent (last few days uses walker to get around), Transfer Assistance: Independent    Active : Yes    Additional Comments: received new prosthesis in July and has been falling since - has appointment with prosthesis next week.   Pt reports prior to 8/21 she was ambulating with wheeled walker and since then she has used power w/c    Retired --still works a day a week as a  [employer] Insecurity: Food Insecurity Present    Worried About Running Out of Food in the Last Year: Sometimes true    Ran Out of Food in the Last Year: Sometimes true   Transportation Needs: Unmet Transportation Needs    Lack of Transportation (Medical): Yes    Lack of Transportation (Non-Medical): Yes   Physical Activity: Inactive    Days of Exercise per Week: 0 days    Minutes of Exercise per Session: 0 min   Social Connections: Socially Isolated    Frequency of Communication with Friends and Family: More than three times a week    Frequency of Social Gatherings with Friends and Family: Once a week    Attends Sikh Services: Never    Active Member of Clubs or Organizations: No    Attends Club or Organization Meetings: Never    Marital Status:    Housing Stability: Unknown    Unable to Pay for Housing in the Last Year: No    Unstable Housing in the Last Year: No       SCREENINGS    [place] Coma Scale  Eye Opening: Spontaneous  Best Verbal Response: Oriented  Best Motor Response: Obeys commands  Damian Coma Scale Score: 15      PHYSICAL EXAM    (up to 7 forlevel 4, 8 or more for level 5)     ED Triage Vitals [11/14/22 1620]   BP Temp Temp Source Heart Rate Resp SpO2 Height Weight   139/69 98.4 °F (36.9 °C) Oral 78 16 -- 5' (1.524 m) 215 lb (97.5 kg)       Physical Exam  Vitals and nursing note reviewed. Constitutional:       General: She is not in acute distress. Appearance: She is well-developed. She is not diaphoretic. HENT:      Head: Normocephalic and atraumatic. Mouth/Throat:      Mouth: Mucous membranes are moist.      Pharynx: No oropharyngeal exudate. Eyes:      General: No scleral icterus. Conjunctiva/sclera: Conjunctivae normal.      Pupils: Pupils are equal, round, and reactive to light. Neck:      Trachea: No tracheal deviation. Cardiovascular:      Rate and Rhythm: Normal rate. Heart sounds: Normal heart sounds.    Pulmonary:      Effort: Pulmonary effort is normal. No respiratory distress. Breath sounds: Normal breath sounds. Abdominal:      General: Bowel sounds are normal. There is no distension. Palpations: Abdomen is soft. Musculoskeletal:         General: Normal range of motion. Cervical back: Normal range of motion and neck supple. No rigidity or tenderness. Legs:    Skin:     General: Skin is warm and dry. Findings: No erythema or rash. Neurological:      Mental Status: She is alert and oriented to person, place, and time. Cranial Nerves: No cranial nerve deficit. Motor: No abnormal muscle tone. Psychiatric:         Behavior: Behavior normal.         Thought Content: Thought content normal.         Judgment: Judgment normal.         DIAGNOSTIC RESULTS     RADIOLOGY:   Non-plain film images such as CT, Ultrasound and MRI are read by the radiologist. Morteza Espinoza radiographic images are visualized and preliminarilyinterpreted by Joey Holder PA-C with the below findings:      Interpretation per the Radiologist below, if available at the time of this note:    No orders to display       LABS:  Labs Reviewed   CBC WITH AUTO DIFFERENTIAL - Abnormal; Notable for the following components:       Result Value    RBC 4.13 (*)     MCHC 32.1 (*)     RDW 15.1 (*)     All other components within normal limits   COMPREHENSIVE METABOLIC PANEL - Abnormal; Notable for the following components:    Anion Gap 8 (*)     Glucose 102 (*)     BUN 25 (*)     All other components within normal limits   MAGNESIUM       All other labs were within normal range or not returnedas of this dictation.     EMERGENCYDEPARTMENT COURSE and DIFFERENTIAL DIAGNOSIS/MDM:   Vitals:    Vitals:    11/14/22 1620   BP: 139/69   Pulse: 78   Resp: 16   Temp: 98.4 °F (36.9 °C)   TempSrc: Oral   Weight: 215 lb (97.5 kg)   Height: 5' (1.524 m)       REASSESSMENT        Patient presents emergency department with pain to right lower extremity that she describes as pain from the \"knee down to the foot\" consistent with phantom limb pain. Patient had no saddle paresthesia or loss of bowel or bladder control. No signs of infection to the lower extremity and normal electrolytes. Patient has hydrocodone and gabapentin at home and I advised her to continue using these and follow-up with her family doctor    RICKY      PROCEDURES:    Procedures      FINAL IMPRESSION      1.  Phantom limb pain [facility])          DISPOSITION/PLAN   DISPOSITION Discharge - Pending Orders Complete 11/14/2022 06:43:37 PM      PATIENT REFERRED TO:  Joey Ramesh, [address] [place] [address] [address] [place] [address]  [phone]    Call in 1 day      DISCHARGE MEDICATIONS:  New Prescriptions    No medications on file       (Please note that portions of this note were completed with a voice recognition program.  Efforts were made to edit the dictations but occasionally words are mis-transcribed.)    GIBSON Elizabeth PA-C  11/14/22 5820

## 2022-11-17 ENCOUNTER — OFFICE VISIT (OUTPATIENT)
Dept: FAMILY MEDICINE CLINIC | Age: 77
End: 2022-11-17
Payer: MEDICARE

## 2022-11-17 VITALS
RESPIRATION RATE: 16 BRPM | WEIGHT: 219 LBS | OXYGEN SATURATION: 98 % | TEMPERATURE: 97.5 F | BODY MASS INDEX: 43 KG/M2 | HEART RATE: 93 BPM | DIASTOLIC BLOOD PRESSURE: 90 MMHG | SYSTOLIC BLOOD PRESSURE: 148 MMHG | HEIGHT: 60 IN

## 2022-11-17 DIAGNOSIS — G47.33 OBSTRUCTIVE SLEEP APNEA: ICD-10-CM

## 2022-11-17 DIAGNOSIS — N18.30 STAGE 3 CHRONIC KIDNEY DISEASE, UNSPECIFIED WHETHER STAGE 3A OR 3B CKD (HCC): ICD-10-CM

## 2022-11-17 DIAGNOSIS — E66.01 OBESITY, CLASS III, BMI 40-49.9 (MORBID OBESITY) (HCC): ICD-10-CM

## 2022-11-17 DIAGNOSIS — E03.9 HYPOTHYROIDISM, UNSPECIFIED TYPE: ICD-10-CM

## 2022-11-17 DIAGNOSIS — I10 ESSENTIAL HYPERTENSION: Primary | ICD-10-CM

## 2022-11-17 DIAGNOSIS — E78.2 MIXED HYPERLIPIDEMIA: ICD-10-CM

## 2022-11-17 DIAGNOSIS — G54.6 PHANTOM LIMB PAIN (HCC): ICD-10-CM

## 2022-11-17 DIAGNOSIS — Z23 NEED FOR INFLUENZA VACCINATION: ICD-10-CM

## 2022-11-17 PROCEDURE — 3074F SYST BP LT 130 MM HG: CPT | Performed by: STUDENT IN AN ORGANIZED HEALTH CARE EDUCATION/TRAINING PROGRAM

## 2022-11-17 PROCEDURE — 90694 VACC AIIV4 NO PRSRV 0.5ML IM: CPT | Performed by: STUDENT IN AN ORGANIZED HEALTH CARE EDUCATION/TRAINING PROGRAM

## 2022-11-17 PROCEDURE — 1036F TOBACCO NON-USER: CPT | Performed by: STUDENT IN AN ORGANIZED HEALTH CARE EDUCATION/TRAINING PROGRAM

## 2022-11-17 PROCEDURE — G0008 ADMIN INFLUENZA VIRUS VAC: HCPCS | Performed by: STUDENT IN AN ORGANIZED HEALTH CARE EDUCATION/TRAINING PROGRAM

## 2022-11-17 PROCEDURE — 3078F DIAST BP <80 MM HG: CPT | Performed by: STUDENT IN AN ORGANIZED HEALTH CARE EDUCATION/TRAINING PROGRAM

## 2022-11-17 PROCEDURE — 99214 OFFICE O/P EST MOD 30 MIN: CPT | Performed by: STUDENT IN AN ORGANIZED HEALTH CARE EDUCATION/TRAINING PROGRAM

## 2022-11-17 PROCEDURE — G8484 FLU IMMUNIZE NO ADMIN: HCPCS | Performed by: STUDENT IN AN ORGANIZED HEALTH CARE EDUCATION/TRAINING PROGRAM

## 2022-11-17 PROCEDURE — G8399 PT W/DXA RESULTS DOCUMENT: HCPCS | Performed by: STUDENT IN AN ORGANIZED HEALTH CARE EDUCATION/TRAINING PROGRAM

## 2022-11-17 PROCEDURE — G8427 DOCREV CUR MEDS BY ELIG CLIN: HCPCS | Performed by: STUDENT IN AN ORGANIZED HEALTH CARE EDUCATION/TRAINING PROGRAM

## 2022-11-17 PROCEDURE — 1124F ACP DISCUSS-NO DSCNMKR DOCD: CPT | Performed by: STUDENT IN AN ORGANIZED HEALTH CARE EDUCATION/TRAINING PROGRAM

## 2022-11-17 PROCEDURE — 1090F PRES/ABSN URINE INCON ASSESS: CPT | Performed by: STUDENT IN AN ORGANIZED HEALTH CARE EDUCATION/TRAINING PROGRAM

## 2022-11-17 PROCEDURE — G8417 CALC BMI ABV UP PARAM F/U: HCPCS | Performed by: STUDENT IN AN ORGANIZED HEALTH CARE EDUCATION/TRAINING PROGRAM

## 2022-11-17 ASSESSMENT — ENCOUNTER SYMPTOMS
VOMITING: 0
SHORTNESS OF BREATH: 0
SORE THROAT: 0
COUGH: 0
ABDOMINAL PAIN: 0
SINUS PRESSURE: 0

## 2022-11-17 NOTE — PROGRESS NOTES
2022    Lara Rodriguez (:  1945) is a 76 y.o. female, here for evaluation of the following medical concerns:  Chief Complaint   Patient presents with    Follow-up     Patient is following up on chronic disease check on her Htn, Hyperlipidemia,and lower back pain. HPI  Essential hypertension  Has not been checking blood pressure frequently at home  Does typically get readings around 140s over 90s  Taking Coreg on a daily basis  Has never been on other blood pressure medications in the past  To diet increase in sodium in diet    Phantom limb  Seen in the ER few days ago  Basic blood work completed which was normal  Patient has found that if she takes a muscle relaxer, Vicodin and goes to sleep her symptoms improve when she wakes up    Review of Systems   Constitutional:  Negative for chills and fever. HENT:  Negative for congestion, sinus pressure and sore throat. Respiratory:  Negative for cough and shortness of breath. Cardiovascular:  Negative for chest pain and palpitations. Gastrointestinal:  Negative for abdominal pain and vomiting. Musculoskeletal:  Negative for arthralgias and myalgias. Skin:  Negative for rash and wound. Neurological:  Negative for speech difficulty and light-headedness. Psychiatric/Behavioral:  Negative for suicidal ideas. The patient is not nervous/anxious. Prior to Visit Medications    Medication Sig Taking?  Authorizing Provider   carvedilol (COREG) 25 MG tablet TAKE 1 TABLET BY MOUTH  TWICE DAILY Yes Brandon Brooks DO   VORTIoxetine (TRINTELLIX) 10 MG TABS tablet Take 1 tablet by mouth daily for 28 days Expiration date 2025 lot #[id number] Yes Parvin Lala MD   gabapentin (NEURONTIN) 600 MG tablet TAKE 1 TABLET BY MOUTH IN  THE MORNING 2 TABLETS BY  MOUTH IN THE AFTERNOON AND  2 TABLETS BY MOUTH IN THE  EVENING AS TOLERATED Yes Brandon Brooks DO   levothyroxine (SYNTHROID) 50 MCG tablet TAKE 1 TABLET BY MOUTH  DAILY Yes Jolynn Kang DO Matthias   DULoxetine (CYMBALTA) 30 MG extended release capsule TAKE 1 CAPSULE BY MOUTH  DAILY Yes Je Dallas DO   diclofenac (VOLTAREN) 50 MG EC tablet TAKE 1 TABLET BY MOUTH  TWICE DAILY Yes Je Dallas DO   aspirin 81 MG EC tablet Take 1 tablet by mouth daily Yes Antoinette Ji APRN - CNP   atorvastatin (LIPITOR) 80 MG tablet Take 1 tablet by mouth nightly Yes Antoinette Ko, APRN - CNP   Respiratory Therapy Supplies SHAE New Full face Mask , please do not sent nasal pillow . (She want to try full face Mask )    Dx KEI Yes Angel London MD   Blood Pressure KIT 1 Units by Does not apply route as needed (daily monitoring of blood pressure) One electronic sphygmomanometer and cuff for home monitoring of blood pressure and heart rate. Upper arm type of cuff preferred. Adult regular size. Yes Shannon Calixto MD   Respiratory Therapy Supplies SHAE New CPAP, full face  mask and chin strap       Fax 8-  Patient taking differently: New CPAP, full face  mask and chin strap       Fax 3- Yes Angel London MD   CPAP Machine MISC by Does not apply route New CPAP with 8 cm Yes Angel London MD   acyclovir (ZOVIRAX) 5 % CREA Apply topically as needed Yes Shirlene Price MD   b complex vitamins capsule Take 1 capsule by mouth daily Yes Historical Provider, MD   Multiple Vitamins-Minerals (CENTRUM SILVER 50+WOMEN PO) Take by mouth Yes Historical Provider, MD   Vitamin D (CHOLECALCIFEROL) 1000 UNITS CAPS capsule Take 1,000 Units by mouth daily.  Yes Historical Provider, MD   furosemide (LASIX) 20 MG tablet Take 1 tablet by mouth daily for 5 days  Patient taking differently: Take 20 mg by mouth daily as needed   Lazaro May MD        Medications Discontinued During This Encounter   Medication Reason    VORTIoxetine (TRINTELLIX) 5 MG tablet Therapy completed    VORTIoxetine (TRINTELLIX) 5 MG tablet Therapy completed    VORTIoxetine (TRINTELLIX) 5 MG tablet Therapy completed Allergies   Allergen Reactions    Ciprofloxacin Itching    Oxycontin [Oxycodone Hcl] Itching       Past Medical History:   Diagnosis Date    Arthritis     Blood transfusion     CAD (coronary artery disease)     Cancer ([facility] [address]     GALLBLADDER 2009, 2011 OMENTUM    Charcot's joint     left foot    Chest pain 7/2/2015    Colitis     DDD (degenerative disc disease), lumbar 2/12/2013    Depression     Disorder of peripheral nervous system 9/1/2010    Dyspnea 7/2/2015    Family history of coronary artery disease 8/29/2014    History of blood transfusion 2011    following chemotherapy    Hx of right BKA ([facility] [address]     Hyperlipidemia     Hypertension     Hypothyroid 6/12/2015    Lobular breast cancer ([facility] [address] 10/2015    NSTEMI (non-ST elevated myocardial infarction) ([facility] [address] 8/29/2014    Obesity     Paroxysmal atrial fibrillation ([facility] [address] 8/29/2014    S/P BKA (below knee amputation) [facility])        Past Surgical History:   Procedure Laterality Date    ABDOMEN SURGERY Left 04/11/2017    EXCISION OF CHEST WALL MASS, UPDATE LABS ON ADMIT  performed by Anushka Pathak MD at [facility]  02/05/2013    both eyes    CARDIAC CATHETERIZATION      COLONOSCOPY  01/01/2010    normal    CYSTOSCOPY W BIOPSY OF BLADDER N/A 06/26/2017    CYSTOSCOPY BLADDER BIOPSY AND FULGURATION performed by Pancho Joel MD at [facility] Bilateral 2012    cataract removal with IOL implants    HYSTERECTOMY (CERVIX STATUS UNKNOWN)      LEG AMPUTATION BELOW KNEE Right 02/01/2011    DR Dave Haywood due to CHARCOTS    MASTECTOMY Bilateral 11/16/2015    Bilateral simple mastectomies with right SNB by DR Marilou Montgomery    AR REMOVAL OF BREAST LESION Left 01/24/2017    CORE NEEDLE BIOPSY OF  LEFT BREAST MASS performed by Anushka Pathak MD at [facility]      cervical and lumbar       Social History     Socioeconomic History    Marital status:       Spouse name: Not on file Number of children: 2    Years of education: Not on file    Highest education level: Not on file   Occupational History    Occupation: retired   Tobacco Use    Smoking status: Never    Smokeless tobacco: Never   Vaping Use    Vaping Use: Never used   Substance and Sexual Activity    Alcohol use: Yes     Comment: social    Drug use: No    Sexual activity: Never   Other Topics Concern    Not on file   Social History Narrative    Lives With: Alone, dtr in [place], disabled son with Dorothy Arora is in a local group home    Type of Home: [address] in [place] (she calls it an [address] [place] [address] they all help each other)    Home Layout: One level, Level entry    Bathroom Shower/Tub: Tub/Shower unit, Equipment: Tub transfer bench, Grab bars in shower    Bathroom Accessibility: Wheelchair accessible    Home Equipment: Quad cane, Rolling walker, Wheelchair-electric (has right BK prosthesis and Left AFO)    ADL Assistance: Independent    Homemaking Assistance: Independent (warms meals up  -  receives  MOW and groceries delivered)    [employer] Responsibilities: Yes, Meal Prep Responsibility: Secondary    Laundry Responsibility: Primary, Cleaning Responsibility: Secondary    Ambulation Assistance: Independent (last few days uses walker to get around), Transfer Assistance: Independent    Active : Yes    Additional Comments: received new prosthesis in July and has been falling since - has appointment with prosthesis next week.   Pt reports prior to 8/21 she was ambulating with wheeled walker and since then she has used power w/c    Retired --still works a day a week as a  [address] Strain: Not on file   Food Insecurity: Food Insecurity Present    Worried About [facility] in the Last Year: Sometimes true    [facility] in the Last Year: Sometimes true   Transportation Needs: Unmet Transportation Needs    Lack of Transportation (Medical): Yes    Lack of Transportation (Non-Medical): Yes   Physical Activity: Inactive    Days of Exercise per Week: 0 days    Minutes of Exercise per Session: 0 min   Stress: Not on file   Social Connections: Socially Isolated    Frequency of Communication with Friends and Family: More than three times a week    Frequency of Social Gatherings with Friends and Family: Once a week    Attends Denominational Services: Never    Active Member of Clubs or Organizations: No    Attends Club or Organization Meetings: Never    Marital Status:    Intimate Partner Violence: Not on file   Housing Stability: Unknown    Unable to Pay for Housing in the Last Year: No    Number of Places Lived in the Last Year: Not on file    Unstable Housing in the Last Year: No        Family History   Problem Relation Age of Onset    Heart Disease Mother     Arthritis Mother     Heart Disease Father     Arthritis Father     Cancer Sister         Colon    Thyroid Disease Son     Other Son         Down's syndrome       Vitals:    11/17/22 1333 11/17/22 1342   BP: (!) 150/100 (!) 148/90   Site: Right Upper Arm Left Upper Arm   Position: Sitting Sitting   Cuff Size: Large Adult Large Adult   Pulse: 93    Resp: 16    Temp: 97.5 °F (36.4 °C)    TempSrc: Temporal    SpO2: 98%    Weight: 219 lb (99.3 kg)    Height: 5' (1.524 m)        Estimated body mass index is 42.77 kg/m² as calculated from the following:    Height as of this encounter: 5' (1.524 m). Weight as of this encounter: 219 lb (99.3 kg). Recent Labs     11/14/22  1645   WBC 8.2   RBC 4.13*   HGB 12.0   HCT 37.4   MCV 90.7   MCH 29.1   MCHC 32.1*   RDW 15.1*          Recent Labs     11/14/22  1645      K 4.7      CO2 30   BUN 25*   CREATININE 0.88   GLUCOSE 102*   CALCIUM 9.7   PROT 7.0   LABALBU 4.1   BILITOT 0.3   ALKPHOS 101   AST 23   ALT 10       Lab Results   Component Value Date/Time    LABA1C 5.5 09/08/2021 04:53 PM       No results found. Physical Exam  Constitutional:       General: She is not in acute distress. Appearance: Normal appearance. She is obese. HENT:      Head: Normocephalic and atraumatic. Eyes:      Extraocular Movements: Extraocular movements intact. Conjunctiva/sclera: Conjunctivae normal.   Musculoskeletal:         General: No deformity. Normal range of motion. Skin:     Findings: No lesion or rash. Neurological:      General: No focal deficit present. Mental Status: She is alert. Mental status is at baseline. Psychiatric:         Mood and Affect: Mood normal.         Behavior: Behavior normal.         Thought Content: Thought content normal.       ASSESSMENT/PLAN:  1. Essential hypertension  Pressure slightly elevated on exam  Patient has not been checking her blood pressure on regular basis at home  Will monitor for now, patient has appointment 5 weeks for Medicare annual wellness visit  If blood pressure still elevated will start medication    2. Mixed hyperlipidemia  Patient would like to hold off on lipid panel at this time  Will order fasting lipids at next visit    3. Obstructive sleep apnea      4. Hypothyroidism, unspecified type  Due to have thyroid blood draw, will order at next visit    5. Phantom limb pain (HCC)  Continue muscle relaxer and Vicodin as needed    6.  Stage 3 chronic kidney disease, unspecified whether stage 3a or 3b CKD (HCC)      7. Obesity, Class III, BMI 40-49.9 (morbid obesity) ([facility] [address]        Medications Discontinued During This Encounter   Medication Reason    VORTIoxetine (TRINTELLIX) 5 MG tablet Therapy completed    VORTIoxetine (TRINTELLIX) 5 MG tablet Therapy completed    VORTIoxetine (TRINTELLIX) 5 MG tablet Therapy completed       ---------------------------------------------------------------------  Side effects, adverse effects of the medication prescribed today, as well as treatment plan/ rationale and result expectations have been discussed with the patient who expresses understanding and desires to proceed. Close follow up to evaluate treatment results and for coordination of care. I have reviewed the patient's medical history in detail and updated the computerized patient record. As always, patient is advised that if symptoms worsen in any way they will proceed to the nearest emergency room. --------------------------------------------------------------------    Return in about 6 weeks (around 12/29/2022) for as scheduled. An  electronic signature was used to authenticate this note.     --Louie Mcdonald,  on 11/17/2022 at 2:06 PM

## 2022-11-20 DIAGNOSIS — M14.60 ARTHRITIS, NEUROPATHIC: ICD-10-CM

## 2022-11-21 NOTE — TELEPHONE ENCOUNTER
Encounter Information    Provider Department Appt Notes   12/27/2022 [facility]     Past Visits    Date Provider Specialty Visit Type Primary Dx   11/17/2022 Gumaro Pa DO Family Medicine Office Visit Essential hypertension   11/01/2022 Leatha Monreal MD Behavioral Health Telemedicine Moderate episode of recurrent major depressive disorder [facility])   10/06/2022 Leatha Monreal MD Behavioral Health Telemedicine Moderate episode of recurrent major depressive disorder [facility])   09/08/2022 Leatha Monreal MD Behavioral Health Telemedicine Moderate episode of recurrent major depressive disorder [facility])   05/16/2022 Gumaro Pa DO Family Medicine Office Visit At high risk for falls

## 2022-11-25 ENCOUNTER — CARE COORDINATION (OUTPATIENT)
Dept: CARE COORDINATION | Age: 77
End: 2022-11-25

## 2022-11-25 NOTE — CARE COORDINATION
Phone call to patient for follow up. Patient reports that she received new ramp onto threshold at apartment and also received a mailbox  at her door so that she does not have to leave her apartment to check mail. Patient reports that she still has support from her  friend who has been taking her to see her son. Patient shared that she also has a friends bother that has been coming over on Monday's to take trash out. Reports that she is still receiving meals on wheels. Patient reports no further concerns. I will follow up in one month.

## 2022-12-01 ENCOUNTER — OFFICE VISIT (OUTPATIENT)
Dept: BEHAVIORAL/MENTAL HEALTH CLINIC | Age: 77
End: 2022-12-01
Payer: MEDICARE

## 2022-12-01 VITALS
BODY MASS INDEX: 42.58 KG/M2 | DIASTOLIC BLOOD PRESSURE: 66 MMHG | WEIGHT: 218 LBS | HEART RATE: 98 BPM | SYSTOLIC BLOOD PRESSURE: 129 MMHG

## 2022-12-01 DIAGNOSIS — F33.1 MODERATE EPISODE OF RECURRENT MAJOR DEPRESSIVE DISORDER (HCC): Primary | ICD-10-CM

## 2022-12-01 DIAGNOSIS — F41.1 GAD (GENERALIZED ANXIETY DISORDER): ICD-10-CM

## 2022-12-01 PROCEDURE — G8399 PT W/DXA RESULTS DOCUMENT: HCPCS | Performed by: PSYCHIATRY & NEUROLOGY

## 2022-12-01 PROCEDURE — 1124F ACP DISCUSS-NO DSCNMKR DOCD: CPT | Performed by: PSYCHIATRY & NEUROLOGY

## 2022-12-01 PROCEDURE — 1036F TOBACCO NON-USER: CPT | Performed by: PSYCHIATRY & NEUROLOGY

## 2022-12-01 PROCEDURE — 99214 OFFICE O/P EST MOD 30 MIN: CPT | Performed by: PSYCHIATRY & NEUROLOGY

## 2022-12-01 PROCEDURE — G8428 CUR MEDS NOT DOCUMENT: HCPCS | Performed by: PSYCHIATRY & NEUROLOGY

## 2022-12-01 PROCEDURE — 1090F PRES/ABSN URINE INCON ASSESS: CPT | Performed by: PSYCHIATRY & NEUROLOGY

## 2022-12-01 PROCEDURE — G8417 CALC BMI ABV UP PARAM F/U: HCPCS | Performed by: PSYCHIATRY & NEUROLOGY

## 2022-12-01 PROCEDURE — 3078F DIAST BP <80 MM HG: CPT | Performed by: PSYCHIATRY & NEUROLOGY

## 2022-12-01 PROCEDURE — G8484 FLU IMMUNIZE NO ADMIN: HCPCS | Performed by: PSYCHIATRY & NEUROLOGY

## 2022-12-01 PROCEDURE — 3074F SYST BP LT 130 MM HG: CPT | Performed by: PSYCHIATRY & NEUROLOGY

## 2022-12-01 NOTE — PROGRESS NOTES
12/1/2022    IN PERSON Appointment PSYCHIATRY FOLLOWUP FOR MANAGEMENT OF   Chief Complaint   Patient presents with    Follow-up    Medication Check     Medication Follow up     Psychiatric Diagnoses:  1. Moderate episode of recurrent major depressive disorder ([address]    2. MADHU (generalized anxiety disorder)        30 mins total for this encounter = time in minutes with direct communication with patient face to face for appointment at [facility] office location     Patient and Provider location: [address] [place] [address]     Assessment/Plan:     Patient denies thoughts of harm to self or others. No acute safety concerns voiced at this appointment. MOOD: Patient reports mood has been stable. Patient has been able to attend to activities of daily living, has good energy, good mood, and good motivation. SLEEP: Patient reports sleep has been at least 8 hours a night and wakes feeling rested in the morning. No concerns related to sleep today  Occasionally going for walks. ATTENTION AND FOCUS: Patient denies any concerns related to attention and focus, and no concerns related to memory. ANXIETY: Patient denies any concerns related to anxiety today. BIPOLAR ARNOLD: No concerns. No manic symptoms endorsed today and no concern for arnold. SUBSTANCE USE: No concerns for ongoing substance use. Patient denies any recent substance use  ASSESSMENT/PLAN: Medication changes per plan below. Discussed with patient the risks and benefits of their psychiatric medication and patient was amenable to plan as outlined below    COUNSELING or THERAPY:   Continue to encourage therapy as part of ongoing treatment of their mental health concerns. Continue to encourage physical activity and adherence to medication regimen.      DATE and changes made  HPI  Continue vortioxetine (Trintellix)   Continue gabapentin (Neurontin)     Medical Diagnoses:  Patient Active Problem List   Diagnosis    Neck pain on right side    Obesity (BMI 30-39. 9)    Obesity    High risk medication use - 01/30/18 OARRS PM&R, 03/26/18 OARRS PM&R, 04/03/18 Urine Drug Screen positive opiates PM&R, 01/31/18 Med Contract PM&R    Impaired mobility and activities of daily living    Hx of right BKA ([facility])    Family history of coronary artery disease    NSTEMI (non-ST elevated myocardial infarction) ([facility])    Arthritis, neuropathic    Charcot's joint of foot    Closed dislocation of ankle    Closed dislocation of foot    Complete rotator cuff rupture of left shoulder    Clinical depression    Benign essential HTN    Acquired flat foot    Closed fracture of tarsal and metatarsal bones    Myogenic ptosis    Disorder of peripheral nervous system    Arthritis of ankle or foot, degenerative    HLD (hyperlipidemia)    Cervical post-laminectomy syndrome    Failed back syndrome of lumbar spine    Low back pain with sciatica    Fibromyalgia muscle pain    Malaise and fatigue    Melancholia    Complete tear of left rotator cuff    Generalized osteoarthritis    Peripheral neuropathy    Osteoporosis    Postlaminectomy syndrome of cervical region    Postlaminectomy syndrome of lumbar region    Tibialis tendinitis    Hereditary and idiopathic peripheral neuropathy ([facility])    Hypothyroid    Lobular breast cancer ([facility])    Acquired hallux valgus    Fatigue due to treatment    Anemia    Cancer ([address]    Cervical spinal cord injury ([facility]    Complete traumatic amputation at level between right knee and ankle ([facility])    Morbid obesity ([facility])    Reactive depression    Sleeping excessive    Chronic low back pain    Left breast mass    Other specified postprocedural states    Primary osteoarthritis involving multiple joints    Hematuria    Hematuria, gross    Noncompliance - No Show inject 07/17/17, No Show F/U 1/11/18    Chronic low back pain with sciatica    Moderate episode of recurrent major depressive disorder ([facility])    Charcot's arthropathy    Chronic pain syndrome    Racing heart beat    Cervical fusion syndrome    Current mild episode of major depressive disorder (HCC)    Obstructive sleep apnea    Malignant neoplasm of peritoneum, 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HCC)    Dizziness    Gait abnormality dt cerebrovascular insufficiancy and gait atasxia    Difficulty balancing    Hx of BKA, right (HCC)    Abnormality of gait and mobility    Drug-induced polyneuropathy ([facility]    Chronic renal disease, stage III ([facility] [486273]       AT TODAY'S VISIT     No Labs ordered today  Crisis plan reviewed and patient verbally contracts for safety. Go to ED with emergent symptoms or safety concerns. Risks, benefits, side effects of medications, including any / all black box warnings, discussed with patient, who verbalizes their understanding. Pt is jodie for safety and denies thoughts of SI/HI. Amenable to plan. No acute concerns to address regarding medications. Subjective:      Patient denies medication side effects apart from those mentioned in the assessment and plan. Patient reports they have been compliant with current medication regimen and have not missed a dose. Aggression:  [] yes  [x] no    Patient is [x] Able to contract for safety  [] unable to CONTRACT FOR SAFETY     ROS:  [x] All negative/unchanged except if checked.  Explain positive(checked items) below:     Denies any new or changed physical symptoms other than those noted in the subjective portion of this note   [] Constitutional  [] Eyes  [] Ear/Nose/Mouth/Throat  [] Respiratory  [] CV  [] GI  []   [] Musculoskeletal  [] Skin/Breast  [] Neurological  [] Endocrine  [] Heme/Lymph  [] Allergic/Immunologic    MEDICATIONS:    Current Outpatient Medications:     diclofenac (VOLTAREN) 50 MG EC tablet, TAKE 1 TABLET BY MOUTH  TWICE DAILY, Disp: 180 tablet, Rfl: 3    carvedilol (COREG) 25 MG tablet, TAKE 1 TABLET BY MOUTH  TWICE DAILY, Disp: 180 tablet, Rfl: 3    gabapentin (NEURONTIN) 600 MG tablet, TAKE 1 TABLET BY MOUTH IN  THE MORNING 2 TABLETS BY MOUTH IN THE AFTERNOON AND  2 TABLETS BY MOUTH IN THE  EVENING AS TOLERATED, Disp: 450 tablet, Rfl: 2    levothyroxine (SYNTHROID) 50 MCG tablet, TAKE 1 TABLET BY MOUTH  DAILY, Disp: 90 tablet, Rfl: 3    aspirin 81 MG EC tablet, Take 1 tablet by mouth daily, Disp: 30 tablet, Rfl: 3    atorvastatin (LIPITOR) 80 MG tablet, Take 1 tablet by mouth nightly, Disp: 30 tablet, Rfl: 3    Respiratory Therapy Supplies SHAE, New Full face Mask , please do not sent nasal pillow . (She want to try full face Mask )  Dx KEI, Disp: 1 Device, Rfl: 0    Blood Pressure KIT, 1 Units by Does not apply route as needed (daily monitoring of blood pressure) One electronic sphygmomanometer and cuff for home monitoring of blood pressure and heart rate. Upper arm type of cuff preferred. Adult regular size. , Disp: 1 kit, Rfl: 0    Respiratory Therapy Supplies SHAE, New CPAP, full face  mask and chin strap    Fax [phone] (Patient taking differently: New CPAP, full face  mask and chin strap    Fax [phone]), Disp: 1 Device, Rfl: 0    furosemide (LASIX) 20 MG tablet, Take 1 tablet by mouth daily for 5 days (Patient taking differently: Take 20 mg by mouth daily as needed ), Disp: 5 tablet, Rfl: 0    CPAP Machine MISC, by Does not apply route New CPAP with 8 cm, Disp: 1 each, Rfl: 0    acyclovir (ZOVIRAX) 5 % CREA, Apply topically as needed, Disp: 5 g, Rfl: 1    b complex vitamins capsule, Take 1 capsule by mouth daily, Disp: , Rfl:     Multiple Vitamins-Minerals (CENTRUM SILVER 50+WOMEN PO), Take by mouth, Disp: , Rfl:     Vitamin D (CHOLECALCIFEROL) 1000 UNITS CAPS capsule, Take 1,000 Units by mouth daily. , Disp: , Rfl:     Examination:    Vitals: not taken in person, most recent vitals in chart reviewed  Vitals:    12/01/22 1402   BP: 129/66   Pulse: 98       Wt Readings from Last 3 Encounters:   12/01/22 218 lb (98.9 kg)   11/17/22 219 lb (99.3 kg)   11/14/22 215 lb (97.5 kg)       BP Readings from Last 3 Encounters:   12/01/22 129/66   11/17/22 (!) 148/90   11/14/22 (!) 164/81           Labs:   no recent labs    Mental Status Examination:    Level of consciousness:  alert and oriented to person, place, and situation  Appearance:  well-appearing good grooming and good hygiene  Behavior/Motor:  no abnormalities noted  Attitude toward examiner: friendly, pleasant and cooperative, attentive and good eye contact  Speech:  spontaneous, normal rate and normal volume   Mood: \"good\"  Affect:  mood congruent  Thought processes:  linear and logical  Thought content:  Denies suicidal or homicidal ideation, denies auditory or visual hallucinations  Cognition:  no deficits in attention, concentration notable, recent memory grossly intact  Concentration intact  Memory intact  Insight good   Judgement fair   Fund of Knowledge adequate    Treatment Plan:  Reviewed current Medications with the patient. Education provided on the compliance with treatment. Reviewed OARRs, no concerns identified     The anticipated benefits and side effects of the medications, including the anticipated results of not receiving the medication, and of alternatives to the medications were explained to the patient and their informed consent was obtained for starting medications as well as adjusting the doses (titration or tapering) as indicated. The above information was given by physician in verbal form and sufficient understanding was in evidence. The patient participated in discussion of the information and question and/or concerns were addressed before the medication was given. PSYCHOTHERAPY/COUNSELING:  Encourage patient to attend outpatient appointments and therapy. [x] Therapeutic interview  [] Supportive  [x] CBT  [x] Ongoing  [] Other    No follow-ups on file.  patient informed to call for follow-up or to reschedule appointment     Please note this report has been partially produced using speech recognition software  And may cause contain errors related to that system including grammar, punctuation and spelling as well as words and phrases that may seem inappropriate. If there are questions or concerns please feel free to contact me to clarify. Rachel Crooks MD  Electronically signed by Rachel Crooks MD on 12/13/2022 at 5:42 PM  12/13/2022 5:42 PM    Psychiatry     An  electronic signature was used to authenticate this note.   --Rachel Crooks MD on 12/13/2022 at 5:42 PM

## 2022-12-12 ENCOUNTER — CARE COORDINATION (OUTPATIENT)
Dept: CARE COORDINATION | Age: 77
End: 2022-12-12

## 2022-12-12 NOTE — CARE COORDINATION
Ambulatory Care Coordination Note  12/12/2022    ACC: Calderon Amaya, RN    Austin Silver says that she is doing okay  She is having mixed emotions about her son and his worsening dementia. She states that he is now in hospice care  She is working on being able to be with him on Dami Day  She does not drive so she is working on plans to get to the nursing home. She adds that Dr Ximena Russell did increase her trintelix and it seems to be working better  She is receiving help from some of the neighbors for help with her care and transportation  She is taking all of her medications as prescribed  She says that if she is unable to get a ride to her doctor's office she will use her wheelchair as the office is close  She denies any issues with her appetite or with sleep. I have asked her to call me with any questions or concerns. Offered patient enrollment in the Remote Patient Monitoring (RPM) program for in-home monitoring: NA. Lab Results       None            Care Coordination Interventions    Referral from Primary Care Provider: No  Suggested Interventions and Community Resources  [facility] In Process  Fall Risk Prevention: In Process  Pharmacist: In Process  Social Work: In Process  Other Services or Interventions: Walk in Clinic hours and usage discussed, social service referral initated, Pharmacy specialist referral initated to help with medication cost          Goals Addressed    None         Prior to Admission medications    Medication Sig Start Date End Date Taking?  Authorizing Provider   diclofenac (VOLTAREN) 50 MG EC tablet TAKE 1 TABLET BY MOUTH  TWICE DAILY 11/21/22   Niko Baker, DO   carvedilol (COREG) 25 MG tablet TAKE 1 TABLET BY MOUTH  TWICE DAILY 11/14/22   Niko Baker, DO   gabapentin (NEURONTIN) 600 MG tablet TAKE 1 TABLET BY MOUTH IN  THE MORNING 2 TABLETS BY  MOUTH IN THE AFTERNOON AND  2 TABLETS BY MOUTH IN THE  EVENING AS TOLERATED 8/30/22 5/27/23  Niko Baker, DO levothyroxine (SYNTHROID) 50 MCG tablet TAKE 1 TABLET BY MOUTH  DAILY 6/1/22   Janice Kaur DO   aspirin 81 MG EC tablet Take 1 tablet by mouth daily 9/17/21   Antoinette Ji APRN - CNP   atorvastatin (LIPITOR) 80 MG tablet Take 1 tablet by mouth nightly 9/16/21   Antoinette Dickinson APRN - CNP   Respiratory Therapy Supplies SHAE New Full face Mask , please do not sent nasal pillow . (She want to try full face Mask )    Dx KEI 1/28/21   Maddy Pond MD   Blood Pressure KIT 1 Units by Does not apply route as needed (daily monitoring of blood pressure) One electronic sphygmomanometer and cuff for home monitoring of blood pressure and heart rate. Upper arm type of cuff preferred. Adult regular size. 12/17/20   Adria Chou MD   Respiratory Therapy Supplies SHAE New CPAP, full face  mask and chin strap       Fax [phone]  Patient taking differently: New CPAP, full face  mask and chin strap       Fax [phone] 10/27/20   Maddy Pond MD   furosemide (LASIX) 20 MG tablet Take 1 tablet by mouth daily for 5 days  Patient taking differently: Take 20 mg by mouth daily as needed  7/10/20 12/22/21  Rae Berkowitz MD   CPAP Machine MISC by Does not apply route New CPAP with 8 cm 6/8/20   Maddy Pond MD   acyclovir (ZOVIRAX) 5 % CREA Apply topically as needed 5/1/19   Duncan Kidd MD   b complex vitamins capsule Take 1 capsule by mouth daily    Historical Provider, MD   Multiple Vitamins-Minerals (CENTRUM SILVER 50+WOMEN PO) Take by mouth    Historical Provider, MD   Vitamin D (CHOLECALCIFEROL) 1000 UNITS CAPS capsule Take 1,000 Units by mouth daily.     Historical Provider, MD       Future Appointments   Date Time Provider Saurabh Delvalle   12/27/2022 12:30 PM Janice Kaur DO [facility] [place]   1/26/2023  2:00 PM Adria Chou MD [facility]

## 2022-12-21 ENCOUNTER — CARE COORDINATION (OUTPATIENT)
Dept: CARE COORDINATION | Age: 77
End: 2022-12-21

## 2022-12-21 NOTE — CARE COORDINATION
Called patient for follow up and check in. Our initial referral was to find some assistance in home with taking out trash and light cleaning around home. Patient has found a neighborhood family friend to assist her in the home. Patient reports that she has focused on her son Lobo Amaya who is not in hospice, his dementia has advanced. Patient reports that she still has support from friend who takes her to visit Lobo Amaya. Patient report no further needs at this time. I have encouraged patient to call if she has any future needs. I will no longer follow patient.

## 2022-12-29 ENCOUNTER — TELEMEDICINE (OUTPATIENT)
Dept: FAMILY MEDICINE CLINIC | Age: 77
End: 2022-12-29

## 2022-12-29 DIAGNOSIS — G47.33 OBSTRUCTIVE SLEEP APNEA: ICD-10-CM

## 2022-12-29 DIAGNOSIS — E03.9 HYPOTHYROIDISM, UNSPECIFIED TYPE: ICD-10-CM

## 2022-12-29 DIAGNOSIS — E66.01 OBESITY, CLASS III, BMI 40-49.9 (MORBID OBESITY) (HCC): ICD-10-CM

## 2022-12-29 DIAGNOSIS — Z13.220 SCREENING FOR CHOLESTEROL LEVEL: ICD-10-CM

## 2022-12-29 DIAGNOSIS — N18.30 STAGE 3 CHRONIC KIDNEY DISEASE, UNSPECIFIED WHETHER STAGE 3A OR 3B CKD (HCC): ICD-10-CM

## 2022-12-29 DIAGNOSIS — Z00.00 MEDICARE ANNUAL WELLNESS VISIT, SUBSEQUENT: Primary | ICD-10-CM

## 2022-12-29 DIAGNOSIS — R73.01 ELEVATED FASTING GLUCOSE: ICD-10-CM

## 2022-12-29 DIAGNOSIS — E78.2 MIXED HYPERLIPIDEMIA: ICD-10-CM

## 2022-12-29 DIAGNOSIS — I10 ESSENTIAL HYPERTENSION: ICD-10-CM

## 2022-12-29 ASSESSMENT — SOCIAL DETERMINANTS OF HEALTH (SDOH): HOW HARD IS IT FOR YOU TO PAY FOR THE VERY BASICS LIKE FOOD, HOUSING, MEDICAL CARE, AND HEATING?: NOT HARD AT ALL

## 2022-12-29 ASSESSMENT — LIFESTYLE VARIABLES
HOW MANY STANDARD DRINKS CONTAINING ALCOHOL DO YOU HAVE ON A TYPICAL DAY: 3 OR 4
HOW OFTEN DO YOU HAVE A DRINK CONTAINING ALCOHOL: MONTHLY OR LESS

## 2022-12-29 ASSESSMENT — PATIENT HEALTH QUESTIONNAIRE - PHQ9
SUM OF ALL RESPONSES TO PHQ QUESTIONS 1-9: 2
4. FEELING TIRED OR HAVING LITTLE ENERGY: 0
8. MOVING OR SPEAKING SO SLOWLY THAT OTHER PEOPLE COULD HAVE NOTICED. OR THE OPPOSITE, BEING SO FIGETY OR RESTLESS THAT YOU HAVE BEEN MOVING AROUND A LOT MORE THAN USUAL: 1
10. IF YOU CHECKED OFF ANY PROBLEMS, HOW DIFFICULT HAVE THESE PROBLEMS MADE IT FOR YOU TO DO YOUR WORK, TAKE CARE OF THINGS AT HOME, OR GET ALONG WITH OTHER PEOPLE: 0
6. FEELING BAD ABOUT YOURSELF - OR THAT YOU ARE A FAILURE OR HAVE LET YOURSELF OR YOUR FAMILY DOWN: 0
2. FEELING DOWN, DEPRESSED OR HOPELESS: 0
SUM OF ALL RESPONSES TO PHQ QUESTIONS 1-9: 2
1. LITTLE INTEREST OR PLEASURE IN DOING THINGS: 0
SUM OF ALL RESPONSES TO PHQ9 QUESTIONS 1 & 2: 0
SUM OF ALL RESPONSES TO PHQ QUESTIONS 1-9: 2
SUM OF ALL RESPONSES TO PHQ QUESTIONS 1-9: 2
7. TROUBLE CONCENTRATING ON THINGS, SUCH AS READING THE NEWSPAPER OR WATCHING TELEVISION: 1
3. TROUBLE FALLING OR STAYING ASLEEP: 0
5. POOR APPETITE OR OVEREATING: 0
9. THOUGHTS THAT YOU WOULD BE BETTER OFF DEAD, OR OF HURTING YOURSELF: 0

## 2022-12-29 NOTE — PROGRESS NOTES
Medicare Annual Wellness Visit    Mark Juan is here for [facility] AWV  Chief Complaint   Patient presents with    Medicare AWV     Assessment & Plan   Medicare annual wellness visit, subsequent      Recommendations for Preventive Services Due: see orders and patient instructions/AVS.  Recommended screening schedule for the next 5-10 years is provided to the patient in written form: see Patient Instructions/AVS.     Return for Medicare Annual Wellness Visit in 1 year. Subjective       Patient's complete Health Risk Assessment and screening values have been reviewed and are found in Flowsheets. The following problems were reviewed today and where indicated follow up appointments were made and/or referrals ordered.     Positive Risk Factor Screenings with Interventions:    Fall Risk:  Do you feel unsteady or are you worried about falling? : (!) yes  2 or more falls in past year?: (!) yes  Fall with injury in past year?: no     Interventions:    Patient comments: due to pain in back or hip, poor balance; has tried PT doesn't help              Weight and Activity:  Physical Activity: Inactive    Days of Exercise per Week: 0 days    Minutes of Exercise per Session: 0 min     On average, how many days per week do you engage in moderate to strenuous exercise (like a brisk walk)?: 0 days (wheelchair)  Have you lost any weight without trying in the past 3 months?: No       Inactivity Interventions:  Patient comments: unable to exercise due to pain  Obesity Interventions:  Patient declines any further evaluation or treatment          Dentist Screen:  Have you seen the dentist within the past year?: (!) No    Intervention:  Not applicable - dentures    Hearing Screen:  Do you or your family notice any trouble with your hearing that hasn't been managed with hearing aids?: (!) Yes    Interventions:  Patient declines any further evaluation or treatment    Vision Screen:  Do you have difficulty driving, watching TV, or doing any of your daily activities because of your eyesight?: No  Have you had an eye exam within the past year?: (!) No  No results found. Interventions:   Patient encouraged to make appointment with their eye specialist    Safety:  Do you have any tripping hazards - loose or unsecured carpets or rugs?: (!) Yes (rug in front of couch with rubber backing)  Interventions:  Patient advised to follow up in the office for further evaluation and treatment                 Objective      Patient-Reported Vitals  Patient-Reported Systolic (Top): 0 mmHg (NA)  Patient-Reported Pulse: -- (NA)  Patient-Reported Temperature: -- (na)  Patient-Reported Weight: 213 lb  Patient-Reported Height: 4 11             Allergies   Allergen Reactions    Ciprofloxacin Itching    Oxycontin [Oxycodone Hcl] Itching     Prior to Visit Medications    Medication Sig Taking?  Authorizing Provider   VORTIoxetine (TRINTELLIX) 10 MG TABS tablet Take 10 mg by mouth daily Yes Historical Provider, MD   diclofenac (VOLTAREN) 50 MG EC tablet TAKE 1 TABLET BY MOUTH  TWICE DAILY Yes Cat Rizzo DO   carvedilol (COREG) 25 MG tablet TAKE 1 TABLET BY MOUTH  TWICE DAILY Yes Cat Rizzo DO   gabapentin (NEURONTIN) 600 MG tablet TAKE 1 TABLET BY MOUTH IN  THE MORNING 2 TABLETS BY  MOUTH IN THE AFTERNOON AND  2 TABLETS BY MOUTH IN THE  EVENING AS TOLERATED Yes Shimon Daigle DO   levothyroxine (SYNTHROID) 50 MCG tablet TAKE 1 TABLET BY MOUTH  DAILY Yes Shimon Daigle DO   aspirin 81 MG EC tablet Take 1 tablet by mouth daily Yes YURY Last - CNP   atorvastatin (LIPITOR) 80 MG tablet Take 1 tablet by mouth nightly Yes Antoinette Avila APRN - CNP   Respiratory Therapy Supplies SHAE New CPAP, full face  mask and chin strap       Fax [phone]  Patient taking differently: New CPAP, full face  mask and chin strap       Fax [phone] Yes Girma Vazquez MD   furosemide (LASIX) 20 MG tablet Take 1 tablet by mouth daily for 5 days  Patient taking differently: Take 20 mg by mouth daily as needed Yes Ninfa Robles MD   CPAP Machine MISC by Does not apply route New CPAP with 8 cm Yes Evgeny Doan MD   acyclovir (ZOVIRAX) 5 % CREA Apply topically as needed Yes Donnie Lang MD   b complex vitamins capsule Take 1 capsule by mouth daily Yes Historical Provider, MD   Multiple Vitamins-Minerals (CENTRUM SILVER 50+WOMEN PO) Take by mouth Yes Historical Provider, MD   Vitamin D (CHOLECALCIFEROL) 1000 UNITS CAPS capsule Take 1,000 Units by mouth daily. Yes Historical Provider, MD   Blood Pressure KIT 1 Units by Does not apply route as needed (daily monitoring of blood pressure) One electronic sphygmomanometer and cuff for home monitoring of blood pressure and heart rate. Upper arm type of cuff preferred. Adult regular size. Patient not taking: Reported on 12/29/2022  Matilda Aguero MD       CareTeam (Including outside providers/suppliers regularly involved in providing care):   Patient Care Team:  Ayesha Cantor DO as PCP - General (Family Medicine)  Ayesha Cantor DO as PCP - [facility]  Nita Anna MD (Family Medicine)  Nita Anna MD (Family Medicine)  Merle Quezada DO (Cardiology)  MD Andres Angel MD (General Surgery)  Brina Wells MD as Consulting Physician (Hematology and Oncology)  Kyung Kerns RN as Ambulatory Care Manager     Reviewed and updated this visit:  Allergies  Meds              Araratpercy Topete, was evaluated through a synchronous (real-time) audio-video encounter. The patient (or guardian if applicable) is aware that this is a billable service, which includes applicable co-pays. This Virtual Visit was conducted with patient's (and/or legal guardian's) consent.  The visit was conducted pursuant to the emergency declaration under the [facility], [address] [place] [address] authority and the [facility] Act.  Patient identification was verified, and a caregiver was present when appropriate. The patient was located at Home: [address]   [place] [id number]. Provider was located at [facility]): [address] [place] [address], [address] [place] [address], [address] [place] [address],[address]  [address].

## 2022-12-29 NOTE — PATIENT INSTRUCTIONS
Preventing Falls: Care Instructions  Overview     Getting around your home safely can be a challenge if you have injuries or health problems that make it easy for you to fall. Loose rugs and furniture in walkways are among the dangers for many older people who have problems walking or who have poor eyesight. People who have conditions such as arthritis, osteoporosis, or dementia also have to be careful not to fall. You can make your home safer with a few simple measures. Follow-up care is a key part of your treatment and safety. Be sure to make and go to all appointments, and call your doctor if you are having problems. It's also a good idea to know your test results and keep a list of the medicines you take. How can you care for yourself at home? Taking care of yourself  Exercise regularly to improve your strength, muscle tone, and balance. Walk if you can. Swimming may be a good choice if you cannot walk easily. Have your vision and hearing checked each year or any time you notice a change. If you have trouble seeing and hearing, you might not be able to avoid objects and could lose your balance. Know the side effects of the medicines you take. Ask your doctor or pharmacist whether the medicines you take can affect your balance. Sleeping pills or sedatives can affect your balance. Limit the amount of alcohol you drink. Alcohol can impair your balance and other senses. Ask your doctor whether calluses or corns on your feet need to be removed. If you wear loose-fitting shoes because of calluses or corns, you can lose your balance and fall. Talk to your doctor if you have numbness in your feet. You may get dizzy if you do not drink enough water. To prevent dehydration, drink plenty of fluids. Choose water and other clear liquids. If you have kidney, heart, or liver disease and have to limit fluids, talk with your doctor before you increase the amount of fluids you drink.   Preventing falls at home  Remove raised doorway thresholds, throw rugs, and clutter. Repair loose carpet or raised areas in the floor. Move furniture and electrical cords to keep them out of walking paths. Use nonskid floor wax, and wipe up spills right away, especially on ceramic tile floors. If you use a walker or cane, put rubber tips on it. If you use crutches, clean the bottoms of them regularly with an abrasive pad, such as steel wool. Keep your house well lit, especially stairways, porches, and outside walkways. Use night-lights in areas such as hallways and bathrooms. Add extra light switches or use remote switches (such as switches that go on or off when you clap your hands) to make it easier to turn lights on if you have to get up during the night. Install sturdy handrails on stairways. Move items in your cabinets so that the things you use a lot are on the lower shelves (about waist level). Keep a cordless phone and a flashlight with new batteries by your bed. If possible, put a phone in each of the main rooms of your house, or carry a cell phone in case you fall and cannot reach a phone. Or, you can wear a device around your neck or wrist. You push a button that sends a signal for help. Wear low-heeled shoes that fit well and give your feet good support. Use footwear with nonskid soles. Check the heels and soles of your shoes for wear. Repair or replace worn heels or soles. Do not wear socks without shoes on smooth floors, such as wood. Walk on the grass when the sidewalks are slippery. If you live in an area that gets snow and ice in the winter, sprinkle salt on slippery steps and sidewalks. Or ask a family member or friend to do this for you. Preventing falls in the bath  Install grab bars and nonskid mats inside and outside your shower or tub and near the toilet and sinks. Use shower chairs and bath benches. Use a hand-held shower head that will allow you to sit while showering.   Get into a tub or shower by putting the weaker leg in first. Get out of a tub or shower with your strong side first.  Repair loose toilet seats and consider installing a raised toilet seat to make getting on and off the toilet easier. Keep your bathroom door unlocked while you are in the shower. Where can you learn more? Go to http://www.lundberg.com/ and enter G117 to learn more about \"Preventing Falls: Care Instructions. \"  Current as of: May 4, 2022               Content Version: 13.5  © 9661-3674 Healthwise, [employer]. Care instructions adapted under license by [facility] ([facility]). If you have questions about a medical condition or this instruction, always ask your healthcare professional. [address] any warranty or liability for your use of this information. Learning About Being Active as an Older Adult  Why is being active important as you get older? Being active is one of the best things you can do for your health. And it's never too late to start. Being active--or getting active, if you aren't already--has definite benefits. It can:  Give you more energy,  Keep your mind sharp. Improve balance to reduce your risk of falls. Help you manage chronic illness with fewer medicines. No matter how old you are, how fit you are, or what health problems you have, there is a form of activity that will work for you. And the more physical activity you can do, the better your overall health will be. What kinds of activity can help you stay healthy? Being more active will make your daily activities easier. Physical activity includes planned exercise and things you do in daily life. There are four types of activity:  Aerobic. Doing aerobic activity makes your heart and lungs strong. Includes walking, dancing, and gardening. Aim for at least 2½ hours spread throughout the week. It improves your energy and can help you sleep better. Muscle-strengthening.   This type of activity can help maintain muscle and strengthen bones. Includes climbing stairs, using resistance bands, and lifting or carrying heavy loads. Aim for at least twice a week. It can help protect the knees and other joints. Stretching. Stretching gives you better range of motion in joints and muscles. Includes upper arm stretches, calf stretches, and gentle yoga. Aim for at least twice a week, preferably after your muscles are warmed up from other activities. It can help you function better in daily life. Balancing. This helps you stay coordinated and have good posture. Includes heel-to-toe walking, grant chi, and certain types of yoga. Aim for at least 3 days a week. It can reduce your risk of falling. Even if you have a hard time meeting the recommendations, it's better to be more active than less active. All activity done in each category counts toward your weekly total. You'd be surprised how daily things like carrying groceries, keeping up with grandchildren, and taking the stairs can add up. What keeps you from being active? If you've had a hard time being more active, you're not alone. Maybe you remember being able to do more. Or maybe you've never thought of yourself as being active. It's frustrating when you can't do the things you want. Being more active can help. What's holding you back? Getting started. Have a goal, but break it into easy tasks. Small steps build into big accomplishments. Staying motivated. If you feel like skipping your activity, remember your goal. Maybe you want to move better and stay independent. Every activity gets you one step closer. Not feeling your best.  Start with 5 minutes of an activity you enjoy. Prove to yourself you can do it. As you get comfortable, increase your time. You may not be where you want to be. But you're in the process of getting there. Everyone starts somewhere. How can you find safe ways to stay active?   Talk with your doctor about any physical challenges you're facing. Make a plan with your doctor if you have a health problem or aren't sure how to get started with activity. If you're already active, ask your doctor if there is anything you should change to stay safe as your body and health change. If you tend to feel dizzy after you take medicine, avoid activity at that time. Try being active before you take your medicine. This will reduce your risk of falls. If you plan to be active at home, make sure to clear your space before you get started. Remove things like TV cords, coffee tables, and throw rugs. It's safest to have plenty of space to move freely. The key to getting more active is to take it slow and steady. Try to improve only a little bit at a time. Pick just one area to improve on at first. And if an activity hurts, stop and talk to your doctor. Where can you learn more? Go to http://www.lundberg.com/ and enter P600 to learn more about \"Learning About Being Active as an Older Adult. \"  Current as of: October 10, 2022               Content Version: 13.5  © 1742-4169 Healthwise, [employer]. Care instructions adapted under license by [facility] ([facility]). If you have questions about a medical condition or this instruction, always ask your healthcare professional. [place] [address] any warranty or liability for your use of this information. Learning About Dental Care for Older Adults  Dental care for older adults: Overview  Dental care for older people is much the same as for younger adults. But older adults do have concerns that younger adults do not. Older adults may have problems with gum disease and decay on the roots of their teeth. They may need missing teeth replaced or broken fillings fixed. Or they may have dentures that need to be cared for. Some older adults may have trouble holding a toothbrush. You can help remind the person you are caring for to brush and floss their teeth or to clean their dentures.  In some cases, you may need to do the brushing and other dental care tasks. People who have trouble using their hands or who have dementia may need this extra help. How can you help with dental care? Normal dental care  To keep the teeth and gums healthy:  Brush the teeth with fluoride toothpaste twice a day--in the morning and at night--and floss at least once a day. Plaque can quickly build up on the teeth of older adults. Watch for the signs of gum disease. These signs include gums that bleed after brushing or after eating hard foods, such as apples. See a dentist regularly. Many experts recommend checkups every 6 months. Keep the dentist up to date on any new medications the person is taking. Encourage a balanced diet that includes whole grains, vegetables, and fruits, and that is low in saturated fat and sodium. Encourage the person you're caring for not to use tobacco products. They can affect dental and general health. Many older adults have a fixed income and feel that they can't afford dental care. But most [facility] and [facility] have programs in which dentists help older adults by lowering fees. Contact your area's public health offices or  for information about dental care in your area. Using a toothbrush  Older adults with arthritis sometimes have trouble brushing their teeth because they can't easily hold the toothbrush. Their hands and fingers may be stiff, painful, or weak. If this is the case, you can: Offer an electric toothbrush. Enlarge the handle of a non-electric toothbrush by wrapping a sponge, an elastic bandage, or adhesive tape around it. Push the toothbrush handle through a ball made of rubber or soft foam.  Make the handle longer and thicker by taping Popsicle sticks or tongue depressors to it. You may also be able to buy special toothbrushes, toothpaste dispensers, and floss holders.   Your doctor may recommend a soft-bristle toothbrush if the person you care for bleeds easily. Bleeding can happen because of a health problem or from certain medicines. A toothpaste for sensitive teeth may help if the person you care for has sensitive teeth. How do you brush and floss someone's teeth? If the person you are caring for has a hard time cleaning their teeth on their own, you may need to brush and floss their teeth for them. It may be easiest to have the person sit and face away from you, and to sit or stand behind them. That way you can steady their head against your arm as you reach around to floss and brush their teeth. Choose a place that has good lighting and is comfortable for both of you. Before you begin, gather your supplies. You will need gloves, floss, a toothbrush, and a container to hold water if you are not near a sink. Wash and dry your hands well and put on gloves. Start by flossing:  Gently work a piece of floss between each of the teeth toward the gums. A plastic flossing tool may make this easier, and they are available at most [facility]. Curve the floss around each tooth into a U-shape and gently slide it under the gum line. Move the floss firmly up and down several times to scrape off the plaque. After you've finished flossing, throw away the used floss and begin brushing:  Wet the brush and apply toothpaste. Place the brush at a 45-degree angle where the teeth meet the gums. Press firmly, and move the brush in small circles over the surface of the teeth. Be careful not to brush too hard. Vigorous brushing can make the gums pull away from the teeth and can scratch the tooth enamel. Brush all surfaces of the teeth, on the tongue side and on the cheek side. Pay special attention to the front teeth and all surfaces of the back teeth. Brush chewing surfaces with short back-and-forth strokes. After you've finished, help the person rinse the remaining toothpaste from their mouth. Where can you learn more?   Go to http://www.365Scores.com/ and enter F944 to learn more about \"Learning About Dental Care for Older Adults. \"  Current as of: June 16, 2022               Content Version: 13.5  © 2006-2022 Healthwise, [employer]. Care instructions adapted under license by [facility] ([facility]). If you have questions about a medical condition or this instruction, always ask your healthcare professional. [address] any warranty or liability for your use of this information. Hearing Loss: Care Instructions  Overview     Hearing loss is a sudden or slow decrease in how well you hear. It can range from mild to severe. Permanent hearing loss can occur with aging. It also can happen when you are exposed long-term to loud noise. Examples include listening to loud music, riding motorcycles, or being around other loud machines. Hearing loss can affect your work and home life. It can make you feel lonely or depressed. You may feel that you have lost your independence. But hearing aids and other devices can help you hear better and feel connected to others. Follow-up care is a key part of your treatment and safety. Be sure to make and go to all appointments, and call your doctor if you are having problems. It's also a good idea to know your test results and keep a list of the medicines you take. How can you care for yourself at home? Avoid loud noises whenever possible. This helps keep your hearing from getting worse. Always wear hearing protection around loud noises. Wear a hearing aid as directed. See a professional who can help you pick a hearing aid that fits you. Have hearing tests as your doctor suggests. They can show whether your hearing has changed. Your hearing aid may need to be adjusted. Use other devices as needed. These may include:  Telephone amplifiers and hearing aids that can connect to a television, stereo, radio, or microphone. Devices that use lights or vibrations.  These alert you to the doorbell, a ringing telephone, or a baby monitor. Television closed-captioning. This shows the words at the bottom of the screen. Most new TVs can do this. TTY (text telephone). This lets you type messages back and forth on the telephone instead of talking or listening. These devices are also called TDD. When messages are typed on the keyboard, they are sent over the phone line to a receiving TTY. The message is shown on a monitor. Use text messaging, social media, and email if it is hard for you to communicate by telephone. Try to learn a listening technique called speechreading. It is not lipreading. You pay attention to people's gestures, expressions, posture, and tone of voice. These clues can help you understand what a person is saying. Face the person you are talking to, and have them face you. Make sure the lighting is good. You need to see the other person's face clearly. Think about counseling if you need help to adjust to your hearing loss. When should you call for help? Watch closely for changes in your health, and be sure to contact your doctor if:    You think your hearing is getting worse.     You have new symptoms, such as dizziness or nausea. Where can you learn more? Go to http://www.lundberg.com/ and enter R798 to learn more about \"Hearing Loss: Care Instructions. \"  Current as of: May 4, 2022               Content Version: 13.5  © 2006-2022 Healthwise, Incorporated. Care instructions adapted under license by [facility] ([facility]). If you have questions about a medical condition or this instruction, always ask your healthcare professional. [place] [address] any warranty or liability for your use of this information. Learning About Vision Tests  What are vision tests? The four most common vision tests are visual acuity tests, refraction, visual field tests, and color vision tests. Visual acuity (sharpness) tests  These tests are used: To see if you need glasses or contact lenses.   To monitor an eye problem. To check an eye injury. Visual acuity tests are done as part of routine exams. You may also have this test when you get your 's license or apply for some types of jobs. Visual field tests  These tests are used: To check for vision loss in any area of your range of vision. To screen for certain eye diseases. To look for nerve damage after a stroke, head injury, or other problem that could reduce blood flow to the brain. Refraction and color tests  A refraction test is done to find the right prescription for glasses and contact lenses. A color vision test is done to check for color blindness. Color vision is often tested as part of a routine exam. You may also have this test when you apply for a job where recognizing different colors is important, such as , electronics, or the [employer]. How are vision tests done? Visual acuity test   You cover one eye at a time. You read aloud from a wall chart across the room. You read aloud from a small card that you hold in your hand. Refraction   You look into a special device. The device puts lenses of different strengths in front of each eye to see how strong your glasses or contact lenses need to be. Visual field tests   Your doctor may have you look through special machines. Or your doctor may simply have you stare straight ahead while they move a finger into and out of your field of vision. Color vision test   You look at pieces of printed test patterns in various colors. You say what number or symbol you see. Your doctor may have you trace the number or symbol using a pointer. How do these tests feel? There is very little chance of having a problem from this test. If dilating drops are used for a vision test, they may make the eyes sting and cause a medicine taste in the mouth. Follow-up care is a key part of your treatment and safety.  Be sure to make and go to all appointments, and call your doctor if you are having problems. It's also a good idea to know your test results and keep a list of the medicines you take. Where can you learn more? Go to http://www.lundberg.com/ and enter G551 to learn more about \"Learning About Vision Tests. \"  Current as of: October 12, 2022               Content Version: 13.5  © 2006-2022 [employer]. Care instructions adapted under license by [facility] ([facility]). If you have questions about a medical condition or this instruction, always ask your healthcare professional. [place] [address] any warranty or liability for your use of this information. Advance Directives: Care Instructions  Overview  An advance directive is a legal way to state your wishes at the end of your life. It tells your family and your doctor what to do if you can't say what you want. There are two main types of advance directives. You can change them any time your wishes change. Living will. This form tells your family and your doctor your wishes about life support and other treatment. The form is also called a declaration. Medical power of . This form lets you name a person to make treatment decisions for you when you can't speak for yourself. This person is called a health care agent (health care proxy, health care surrogate). The form is also called a durable power of  for health care. If you do not have an advance directive, decisions about your medical care may be made by a family member, or by a doctor or a  who doesn't know you. It may help to think of an advance directive as a gift to the people who care for you. If you have one, they won't have to make tough decisions by themselves. For more information, including forms for your state, see the [address] website (www.caringinfo.org/planning/advance-directives/). Follow-up care is a key part of your treatment and safety.  Be sure to make and go to all appointments, and call your doctor if you are having problems. It's also a good idea to know your test results and keep a list of the medicines you take. What should you include in an advance directive? Many states have a unique advance directive form. (It may ask you to address specific issues.) Or you might use a universal form that's approved by many states. If your form doesn't tell you what to address, it may be hard to know what to include in your advance directive. Use the questions below to help you get started. Who do you want to make decisions about your medical care if you are not able to? What life-support measures do you want if you have a serious illness that gets worse over time or can't be cured? What are you most afraid of that might happen? (Maybe you're afraid of having pain, losing your independence, or being kept alive by machines.)  Where would you prefer to die? (Your home? A hospital? A nursing home?)  Do you want to donate your organs when you die? Do you want certain Zoroastrian practices performed before you die? When should you call for help? Be sure to contact your doctor if you have any questions. Where can you learn more? Go to http://www.lundberg.com/ and enter R264 to learn more about \"Advance Directives: Care Instructions. \"  Current as of: June 16, 2022               Content Version: 13.5  © 8284-7862 Healthwise, Incorporated. Care instructions adapted under license by [facility] ([facility]). If you have questions about a medical condition or this instruction, always ask your healthcare professional. [place] [address] any warranty or liability for your use of this information. [facility]  Medicare offers a range of preventive health benefits. Some of the tests and screenings are paid in full while other may be subject to a deductible, co-insurance, and/or copay.     Some of these benefits include a comprehensive review of your medical history including lifestyle, illnesses that may run in your family, and various assessments and screenings as appropriate. After reviewing your medical record and screening and assessments performed today your provider may have ordered immunizations, labs, imaging, and/or referrals for you. A list of these orders (if applicable) as well as your Preventive Care list are included within your After Visit Summary for your review. Other Preventive Recommendations:    A preventive eye exam performed by an eye specialist is recommended every 1-2 years to screen for glaucoma; cataracts, macular degeneration, and other eye disorders. A preventive dental visit is recommended every 6 months. Try to get at least 150 minutes of exercise per week or 10,000 steps per day on a pedometer . Order or download the FREE \"Exercise & Physical Activity: Your Everyday Guide\" from [employer]. Call 6-658.577.6942 or search [employer] online. You need 9609-6250 mg of calcium and 9723-7018 IU of vitamin D per day. It is possible to meet your calcium requirement with diet alone, but a vitamin D supplement is usually necessary to meet this goal.  When exposed to the sun, use a sunscreen that protects against both UVA and UVB radiation with an SPF of 30 or greater. Reapply every 2 to 3 hours or after sweating, drying off with a towel, or swimming. Always wear a seat belt when traveling in a car. Always wear a helmet when riding a bicycle or motorcycle.

## 2023-01-06 DIAGNOSIS — F33.1 MODERATE EPISODE OF RECURRENT MAJOR DEPRESSIVE DISORDER (HCC): ICD-10-CM

## 2023-01-10 RX ORDER — DULOXETIN HYDROCHLORIDE 30 MG/1
30 CAPSULE, DELAYED RELEASE ORAL DAILY
Qty: 90 CAPSULE | Refills: 3 | Status: SHIPPED | OUTPATIENT
Start: 2023-01-10

## 2023-01-12 ENCOUNTER — CARE COORDINATION (OUTPATIENT)
Dept: CARE COORDINATION | Age: 78
End: 2023-01-12

## 2023-01-12 NOTE — CARE COORDINATION
Ambulatory Care Coordination Note  1/12/2023    ACC: Eyad Martinez, RN    Little Cedillo says that the holidays were very quite and lonely   She adds that she is feeling very isolated and this does add to her depression. She states that her son Ap Varela is in hospice for his end stage dementia and she is able to see him from time to time when she can find a ride. She is able to stand for about 10-15 minutes at a time but then her back gives out  She is wheelchair bound most of the time. She does follow with Dr Maryan Benrardo for her depression and she is taking her trintellex as ordered. She does utilize her friends and neighbors to help her with light housekeeping needs  She says that she does not have enough funds to hire someone to do these tasks  She feels that the remainder of her health is good   I will discharge Little Cedillo from care coordination at this time as all needs have been met     Offered patient enrollment in the Remote Patient Monitoring (RPM) program for in-home monitoring: NA. Lab Results       None            Care Coordination Interventions    Referral from Primary Care Provider: No  Suggested Interventions and Community Resources  [facility]  Fall Risk Prevention: In Process  Pharmacist: In Process  Social Work: In Process  Other Services or Interventions: Walk in Clinic hours and usage discussed, social service referral initated, Pharmacy specialist referral initated to help with medication cost          Goals Addressed    None         Prior to Admission medications    Medication Sig Start Date End Date Taking?  Authorizing Provider   DULoxetine (CYMBALTA) 30 MG extended release capsule TAKE 1 CAPSULE BY MOUTH  DAILY 1/10/23   Kamlesh Trevino DO   VORTIoxetine (TRINTELLIX) 10 MG TABS tablet Take 10 mg by mouth daily    Historical Provider, MD   diclofenac (VOLTAREN) 50 MG EC tablet TAKE 1 TABLET BY MOUTH  TWICE DAILY 11/21/22   Kamlesh Trevino DO   carvedilol (COREG) 25 MG tablet TAKE 1 TABLET BY MOUTH  TWICE DAILY 11/14/22   Nannette Stern DO   gabapentin (NEURONTIN) 600 MG tablet TAKE 1 TABLET BY MOUTH IN  THE MORNING 2 TABLETS BY  MOUTH IN THE AFTERNOON AND  2 TABLETS BY MOUTH IN THE  EVENING AS TOLERATED 8/30/22 5/27/23  Nannette Stern DO   levothyroxine (SYNTHROID) 50 MCG tablet TAKE 1 TABLET BY MOUTH  DAILY 6/1/22   Nannette Stern DO   aspirin 81 MG EC tablet Take 1 tablet by mouth daily 9/17/21   YURY Last CNP   atorvastatin (LIPITOR) 80 MG tablet Take 1 tablet by mouth nightly 9/16/21   YURY Last - CNP   Blood Pressure KIT 1 Units by Does not apply route as needed (daily monitoring of blood pressure) One electronic sphygmomanometer and cuff for home monitoring of blood pressure and heart rate. Upper arm type of cuff preferred. Adult regular size. Patient not taking: Reported on 12/29/2022 12/17/20   Jacque Barakat MD   Respiratory Therapy Supplies [facility] New CPAP, full face  mask and chin strap       Fax [phone]  Patient taking differently: New CPAP, full face  mask and chin strap       Fax 5- 10/27/20   Italo Salomon MD   furosemide (LASIX) 20 MG tablet Take 1 tablet by mouth daily for 5 days  Patient taking differently: Take 20 mg by mouth daily as needed 7/10/20 12/29/22  Kei Sinclair MD   CPAP Machine MISC by Does not apply route New CPAP with 8 cm 6/8/20   Italo Salomon MD   acyclovir (ZOVIRAX) 5 % CREA Apply topically as needed 5/1/19   Yane Campos MD   b complex vitamins capsule Take 1 capsule by mouth daily    Historical Provider, MD   Multiple Vitamins-Minerals (CENTRUM SILVER 50+WOMEN PO) Take by mouth    Historical Provider, MD   Vitamin D (CHOLECALCIFEROL) 1000 UNITS CAPS capsule Take 1,000 Units by mouth daily.     Historical Provider, MD       Future Appointments   Date Time Provider Saurabh Delvalle   1/26/2023  2:00 PM Jacque Barakat MD [place]

## 2023-01-23 DIAGNOSIS — F33.1 MODERATE EPISODE OF RECURRENT MAJOR DEPRESSIVE DISORDER (HCC): ICD-10-CM

## 2023-01-24 RX ORDER — BUPROPION HYDROCHLORIDE 150 MG/1
TABLET ORAL
Qty: 90 TABLET | Refills: 3 | OUTPATIENT
Start: 2023-01-24

## 2023-01-26 ENCOUNTER — TELEMEDICINE (OUTPATIENT)
Dept: BEHAVIORAL/MENTAL HEALTH CLINIC | Age: 78
End: 2023-01-26
Payer: MEDICARE

## 2023-01-26 DIAGNOSIS — F33.1 MODERATE EPISODE OF RECURRENT MAJOR DEPRESSIVE DISORDER (HCC): Primary | ICD-10-CM

## 2023-01-26 DIAGNOSIS — E66.01 OBESITY, CLASS III, BMI 40-49.9 (MORBID OBESITY) (HCC): ICD-10-CM

## 2023-01-26 PROCEDURE — 99215 OFFICE O/P EST HI 40 MIN: CPT | Performed by: PSYCHIATRY & NEUROLOGY

## 2023-01-26 PROCEDURE — G8417 CALC BMI ABV UP PARAM F/U: HCPCS | Performed by: PSYCHIATRY & NEUROLOGY

## 2023-01-26 PROCEDURE — G8428 CUR MEDS NOT DOCUMENT: HCPCS | Performed by: PSYCHIATRY & NEUROLOGY

## 2023-01-26 PROCEDURE — 1124F ACP DISCUSS-NO DSCNMKR DOCD: CPT | Performed by: PSYCHIATRY & NEUROLOGY

## 2023-01-26 PROCEDURE — G8484 FLU IMMUNIZE NO ADMIN: HCPCS | Performed by: PSYCHIATRY & NEUROLOGY

## 2023-01-26 PROCEDURE — 1090F PRES/ABSN URINE INCON ASSESS: CPT | Performed by: PSYCHIATRY & NEUROLOGY

## 2023-01-26 PROCEDURE — 1036F TOBACCO NON-USER: CPT | Performed by: PSYCHIATRY & NEUROLOGY

## 2023-01-26 PROCEDURE — G8399 PT W/DXA RESULTS DOCUMENT: HCPCS | Performed by: PSYCHIATRY & NEUROLOGY

## 2023-01-26 NOTE — PROGRESS NOTES
1/26/2023    40 mins total for this encounter =     30 minutes with direct communication with patient for encounter     10 mins (in addition) spent on chart review, and in the ordering of all necessary labs and/or medications      The risks and benefits of converting to a virtual visit were discussed in light of the current infectious disease epidemic. Patient also understood that insurance coverage and co-pays are up to their individual insurance plans. Patient Location:        Patient's home address  Provider Location ([facility]):        [facility]  Chief complaint No chief complaint on file. TELEHEALTH PSYCHIATRY FOLLOWUP (OUTPATIENT)   Audio/Visual (During KBLBP-11 public health emergency)          Psychiatric Diagnoses:  1. Moderate episode of recurrent major depressive disorder (HCC)    2. Obesity, Class III, BMI 40-49.9 (morbid obesity) ([facility] [address]        Assessment/Plan:   Worries about her son     Patient denies thoughts of harm to self or others. No acute safety concerns voiced at this appointment. MOOD: Patient reports mood has been stable. Patient has been able to attend to activities of daily living, has good energy, good mood, and good motivation. SLEEP: Patient reports sleep has been at least 8 hours a night and wakes feeling rested in the morning. No concerns related to sleep today    ATTENTION AND FOCUS: Patient denies any concerns related to attention and focus, and no concerns related to memory. Occasionally going for walks. ANXIETY: Patient denies any concerns related to anxiety today. BIPOLAR ARNOLD: No concerns. No manic symptoms endorsed today and no concern for arnold. SUBSTANCE USE: No concerns for ongoing substance use. Patient denies any recent substance use    ASSESSMENT/PLAN: Medication changes per plan below.  Discussed with patient the risks and benefits of their psychiatric medication and patient was amenable to plan as outlined below    COUNSELING or THERAPY: Continue to encourage therapy as part of ongoing treatment of their mental health concerns. Continue to encourage physical activity and adherence to medication regimen. DATE and changes made  HPI            Medical Diagnoses:  Patient Active Problem List   Diagnosis    Neck pain on right side    Obesity (BMI 30-39. 9)    Obesity    High risk medication use - 01/30/18 OARRS PM&R, 03/26/18 OARRS PM&R, 04/03/18 Urine Drug Screen positive opiates PM&R, 01/31/18 Med Contract PM&R    Impaired mobility and activities of daily living    Hx of right BKA (HCC)    Family history of coronary artery disease    NSTEMI (non-ST elevated myocardial infarction) ([address]    Arthritis, neuropathic    Charcot's joint of foot    Closed dislocation of ankle    Closed dislocation of foot    Complete rotator cuff rupture of left shoulder    Clinical depression    Benign essential HTN    Acquired flat foot    Closed fracture of tarsal and metatarsal bones    Myogenic ptosis    Disorder of peripheral nervous system    Arthritis of ankle or foot, degenerative    HLD (hyperlipidemia)    Cervical post-laminectomy syndrome    Failed back syndrome of lumbar spine    Low back pain with sciatica    Fibromyalgia muscle pain    Malaise and fatigue    Melancholia    Complete tear of left rotator cuff    Generalized osteoarthritis    Peripheral neuropathy    Osteoporosis    Postlaminectomy syndrome of cervical region    Postlaminectomy syndrome of lumbar region    Tibialis tendinitis    Hereditary and idiopathic peripheral neuropathy (HCC)    Hypothyroid    Lobular breast cancer (HCC)    Acquired hallux valgus    Fatigue due to treatment    Anemia    Cancer ([address]    Cervical spinal cord injury ([address]    Complete traumatic amputation at level between right knee and ankle (HCC)    Morbid obesity (HCC)    Reactive depression    Sleeping excessive    Chronic low back pain    Left breast mass    Other specified postprocedural states    Primary osteoarthritis involving multiple joints    Hematuria    Hematuria, gross    Noncompliance - No Show inject 07/17/17, No Show F/U 1/11/18    Chronic low back pain with sciatica    Moderate episode of recurrent major depressive disorder (HCC)    Charcot's arthropathy    Chronic pain syndrome    Racing heart beat    Cervical fusion syndrome    Current mild episode of major depressive disorder (HCC)    Obstructive sleep apnea    Malignant neoplasm of peritoneum, 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HCC)    Dizziness    Gait abnormality dt cerebrovascular insufficiancy and gait atasxia    Difficulty balancing    Hx of BKA, right (HCC)    Abnormality of gait and mobility    Drug-induced polyneuropathy ([facility] [address]    Chronic renal disease, stage III ([facility] [address] [[id number]]         AT TODAY'S VISIT     Crisis plan reviewed and patient verbally contracts for safety. Go to ED with emergent symptoms or safety concerns. Risks, benefits, side effects of medications, including any / all black box warnings, discussed with patient, who verbalizes their understanding. Pt is jodie for safety and denies thoughts of SI/HI. Amenable to plan. No acute concerns to address regarding medications. Subjective:      Patient denies medication side effects apart from those mentioned in the assessment and plan. Patient reports they have been compliant with current medication regimen and have not missed a dose. At today's visit, patient denies thoughts of harm to self or others since last appointment, and denies auditory or visual hallucinations. ROS:  [x] All negative/unchanged except if checked.  Explain positive(checked items) below:       Denies any new or changed physical symptoms other than those noted in the subjective portion of this note   [] Constitutional  [] Eyes  [] Ear/Nose/Mouth/Throat  [] Respiratory  [] CV  [] GI  []   [] Musculoskeletal  [] Skin/Breast  [] Neurological  [] Endocrine  [] Heme/Lymph  [] Allergic/Immunologic    OBJECTIVE:   No results found for this or any previous visit (from the past 1008 hour(s)). MEDICATIONS:    Current Outpatient Medications:     DULoxetine (CYMBALTA) 30 MG extended release capsule, TAKE 1 CAPSULE BY MOUTH  DAILY, Disp: 90 capsule, Rfl: 3    VORTIoxetine (TRINTELLIX) 10 MG TABS tablet, Take 10 mg by mouth daily, Disp: , Rfl:     diclofenac (VOLTAREN) 50 MG EC tablet, TAKE 1 TABLET BY MOUTH  TWICE DAILY, Disp: 180 tablet, Rfl: 3    carvedilol (COREG) 25 MG tablet, TAKE 1 TABLET BY MOUTH  TWICE DAILY, Disp: 180 tablet, Rfl: 3    gabapentin (NEURONTIN) 600 MG tablet, TAKE 1 TABLET BY MOUTH IN  THE MORNING 2 TABLETS BY  MOUTH IN THE AFTERNOON AND  2 TABLETS BY MOUTH IN THE  EVENING AS TOLERATED, Disp: 450 tablet, Rfl: 2    levothyroxine (SYNTHROID) 50 MCG tablet, TAKE 1 TABLET BY MOUTH  DAILY, Disp: 90 tablet, Rfl: 3    aspirin 81 MG EC tablet, Take 1 tablet by mouth daily, Disp: 30 tablet, Rfl: 3    atorvastatin (LIPITOR) 80 MG tablet, Take 1 tablet by mouth nightly, Disp: 30 tablet, Rfl: 3    Blood Pressure KIT, 1 Units by Does not apply route as needed (daily monitoring of blood pressure) One electronic sphygmomanometer and cuff for home monitoring of blood pressure and heart rate. Upper arm type of cuff preferred. Adult regular size.  (Patient not taking: Reported on 12/29/2022), Disp: 1 kit, Rfl: 0    Respiratory Therapy Supplies [facility], New CPAP, full face  mask and chin strap    Fax [phone] (Patient taking differently: New CPAP, full face  mask and chin strap    Fax [phone]), Disp: 1 Device, Rfl: 0    furosemide (LASIX) 20 MG tablet, Take 1 tablet by mouth daily for 5 days (Patient taking differently: Take 20 mg by mouth daily as needed), Disp: 5 tablet, Rfl: 0    CPAP Machine MISC, by Does not apply route New CPAP with 8 cm, Disp: 1 each, Rfl: 0    acyclovir (ZOVIRAX) 5 % CREA, Apply topically as needed, Disp: 5 g, Rfl: 1    b complex vitamins capsule, Take 1 capsule by mouth daily, Disp: , Rfl: Multiple Vitamins-Minerals (CENTRUM SILVER 50+WOMEN PO), Take by mouth, Disp: , Rfl:     Vitamin D (CHOLECALCIFEROL) 1000 UNITS CAPS capsule, Take 1,000 Units by mouth daily. , Disp: , Rfl:     Examination:    Vitals: not taken in person, most recent vitals in chart reviewed  There were no vitals filed for this visit. Wt Readings from Last 3 Encounters:   12/01/22 218 lb (98.9 kg)   11/17/22 219 lb (99.3 kg)   11/14/22 215 lb (97.5 kg)       BP Readings from Last 3 Encounters:   12/01/22 129/66   11/17/22 (!) 148/90   11/14/22 (!) 164/81         Mental Status Examination:    Level of consciousness:  alert and oriented to person, place, and situation  Appearance:  well-appearing good grooming and good hygiene  Behavior/Motor:  no abnormalities noted  Attitude toward examiner: friendly, pleasant and cooperative, attentive and good eye contact  Speech:  spontaneous, normal rate and normal volume   Mood: \"good\"  Affect:  mood congruent  Thought processes:  linear and logical  Thought content:  Denies suicidal or homicidal ideation, denies auditory or visual hallucinations  Cognition:  no deficits in attention, concentration notable, recent memory grossly intact  Concentration intact  Memory intact  Insight good   Judgement fair   Fund of Knowledge adequate    PSYCHOTHERAPY/COUNSELING:  Encourage patient to attend outpatient appointments and therapy. [x] Therapeutic interview  [] Supportive  [x] CBT  [x] Ongoing  [] Other    No follow-ups on file. patient informed to call for follow-up or to reschedule appointment     Please note this report has been partially produced using speech recognition software  And may cause contain errors related to that system including grammar, punctuation and spelling as well as words and phrases that may seem inappropriate. If there are questions or concerns please feel free to contact me to clarify.     Sharri Tovar MD  Electronically signed by Sharri Tovar MD on 2/3/2023 at 10:21 AM  2/3/2023 10:21 AM    Psychiatry   Due to this being a TeleHealth encounter, evaluation of the following organ systems is limited: Vitals/Constitutional/EENT/Resp/CV/GI//MS/Neuro/Skin/Heme-Lymph-Imm. An  electronic signature was used to authenticate this note. --Isaura Mills MD on 2/3/2023 at 10:21 AM    Pursuant to the emergency declaration under the [address] [place] [address] waiver authority and the [employer] Act, this Virtual  Visit was conducted, with patient's consent, to reduce the patient's risk of exposure to COVID-19 and provide continuity of care for an established patient Services were provided through a video synchronous discussion virtually to substitute for in-person clinic visit. Treatment Plan:  Reviewed current Medications with the patient. Education provided on the compliance with treatment. Reviewed OARRs, no concerns identified     The anticipated benefits and side effects of the medications, including the anticipated results of not receiving the medication, and of alternatives to the medications were explained to the patient and their informed consent was obtained for starting medications as well as adjusting the doses (titration or tapering) as indicated. The above information was given by physician in verbal form and sufficient understanding was in evidence. The patient participated in discussion of the information and question and/or concerns were addressed before the medication was given. Due to COVID 19 outbreak, patient's office visit was converted to a virtual visit.   Patient was contacted and agreed to proceed with a virtual visit via [employer]

## 2023-03-02 ENCOUNTER — OFFICE VISIT (OUTPATIENT)
Dept: FAMILY MEDICINE CLINIC | Age: 78
End: 2023-03-02
Payer: MEDICARE

## 2023-03-02 VITALS
BODY MASS INDEX: 42.8 KG/M2 | DIASTOLIC BLOOD PRESSURE: 84 MMHG | HEART RATE: 83 BPM | OXYGEN SATURATION: 97 % | WEIGHT: 218 LBS | SYSTOLIC BLOOD PRESSURE: 134 MMHG | HEIGHT: 60 IN | RESPIRATION RATE: 16 BRPM | TEMPERATURE: 98.1 F

## 2023-03-02 DIAGNOSIS — T14.8XXA WOUND INFECTION: Primary | ICD-10-CM

## 2023-03-02 DIAGNOSIS — L08.9 WOUND INFECTION: Primary | ICD-10-CM

## 2023-03-02 PROCEDURE — G8417 CALC BMI ABV UP PARAM F/U: HCPCS | Performed by: NURSE PRACTITIONER

## 2023-03-02 PROCEDURE — G8484 FLU IMMUNIZE NO ADMIN: HCPCS | Performed by: NURSE PRACTITIONER

## 2023-03-02 PROCEDURE — 1090F PRES/ABSN URINE INCON ASSESS: CPT | Performed by: NURSE PRACTITIONER

## 2023-03-02 PROCEDURE — G8399 PT W/DXA RESULTS DOCUMENT: HCPCS | Performed by: NURSE PRACTITIONER

## 2023-03-02 PROCEDURE — 99213 OFFICE O/P EST LOW 20 MIN: CPT | Performed by: NURSE PRACTITIONER

## 2023-03-02 PROCEDURE — 3078F DIAST BP <80 MM HG: CPT | Performed by: NURSE PRACTITIONER

## 2023-03-02 PROCEDURE — 1124F ACP DISCUSS-NO DSCNMKR DOCD: CPT | Performed by: NURSE PRACTITIONER

## 2023-03-02 PROCEDURE — 1036F TOBACCO NON-USER: CPT | Performed by: NURSE PRACTITIONER

## 2023-03-02 PROCEDURE — 3074F SYST BP LT 130 MM HG: CPT | Performed by: NURSE PRACTITIONER

## 2023-03-02 PROCEDURE — G8427 DOCREV CUR MEDS BY ELIG CLIN: HCPCS | Performed by: NURSE PRACTITIONER

## 2023-03-02 RX ORDER — SULFAMETHOXAZOLE AND TRIMETHOPRIM 800; 160 MG/1; MG/1
1 TABLET ORAL 2 TIMES DAILY
Qty: 20 TABLET | Refills: 0 | Status: SHIPPED | OUTPATIENT
Start: 2023-03-02 | End: 2023-03-12

## 2023-03-02 ASSESSMENT — ENCOUNTER SYMPTOMS
DIARRHEA: 0
SHORTNESS OF BREATH: 0
SORE THROAT: 0
EYE PAIN: 0

## 2023-03-02 NOTE — PROGRESS NOTES
Subjective:      Patient ID: Norberto Yap is a 76 y.o. female who presents today for:  Chief Complaint   Patient presents with    Wound Infection     On R limb from prosthetic- took off prosthetic and it is seeping and has open wounds- noticed it 3 days ago   Pt here with her sister and reports recently losing two family members. Pt reports \"no time to deal with this but her sister made her come in today to get the wound checked out\". Skin Problem  This is a new problem. The current episode started in the past 7 days. The problem has been gradually worsening since onset. Location: amputee and at her knee. Pertinent negatives include no anorexia, congestion, diarrhea, eye pain, fatigue, fever, joint pain, shortness of breath, sore throat or vomiting. Pt here with her daughter after pt reports noticing a red spot three days ago under knee of her prosthetic . Pt and daughter report the wound increased in size in the crevice of where her knee was amputated. Pt reports wearing her prosthetic daily but she noticed it was not \"fitting properly and she reports she has neuropathy and did not see any wound until her daughter looked at it\". Pt reports she has tried to keep it clean and has applied neosporin to the area. Pt declines any pain noted as \"she reports no feeling\". Pt and daughter declines a referral to wound clinic today and I advised to keep the prosthetic off until healing takes place and to keep it clean along with applying the ATB cream and taking the oral ATB as prescribed.  Pt and daughter aware to follow up with her pcp this upcoming week for further eval.     Past Medical History:   Diagnosis Date    Arthritis     Blood transfusion     CAD (coronary artery disease)     Cancer ([facility] [address]     GALLBLADDER 2009, 2011 OMENTUM    Charcot's joint     left foot    Chest pain 7/2/2015    Colitis     DDD (degenerative disc disease), lumbar 2/12/2013    Depression     Disorder of peripheral nervous system 9/1/2010    Dyspnea 7/2/2015    Family history of coronary artery disease 8/29/2014    History of blood transfusion 2011    following chemotherapy    Hx of right BKA ([facility] [address]     Hyperlipidemia     Hypertension     Hypothyroid 6/12/2015    Lobular breast cancer ([facility] [address] 10/2015    NSTEMI (non-ST elevated myocardial infarction) ([facility] [address] 8/29/2014    Obesity     Paroxysmal atrial fibrillation ([facility] [address] 8/29/2014    S/P BKA (below knee amputation) [facility])      Past Surgical History:   Procedure Laterality Date    ABDOMEN SURGERY Left 04/11/2017    EXCISION OF CHEST WALL MASS, UPDATE LABS ON ADMIT  performed by Dmitry Johnson MD at [facility]  02/05/2013    both eyes    CARDIAC CATHETERIZATION      COLONOSCOPY  01/01/2010    normal    CYSTOSCOPY W BIOPSY OF BLADDER N/A 06/26/2017    CYSTOSCOPY BLADDER BIOPSY AND FULGURATION performed by Yuridia Abbott MD at [address] [place] Bilateral 2012    cataract removal with IOL implants    HYSTERECTOMY (CERVIX STATUS UNKNOWN)      LEG AMPUTATION BELOW KNEE Right 02/01/2011    DR Noble Mclaughlin due to CHARCOTS    MASTECTOMY Bilateral 11/16/2015    Bilateral simple mastectomies with right SNB by DR Dat Bear    VA EXC CYST/ABERRANT BREAST TISSUE OPEN 1/> LESION Left 01/24/2017    CORE NEEDLE BIOPSY OF  LEFT BREAST MASS performed by Dmitry Johnson MD at [address] [place] [address]      cervical and lumbar     Social History     Socioeconomic History    Marital status:       Spouse name: Not on file    Number of children: 2    Years of education: Not on file    Highest education level: Not on file   Occupational History    Occupation: retired   Tobacco Use    Smoking status: Never    Smokeless tobacco: Never   Vaping Use    Vaping Use: Never used   Substance and Sexual Activity    Alcohol use: Yes     Comment: social    Drug use: No    Sexual activity: Never   Other Topics Concern    Not on file   Social History Narrative    Lives With: Alone, d[facility], disabled son with Down's Syndrome is in a local group home    Type of Home: [address] in [place] (she calls it an [employer]-bc they all help each other)    Home Layout: One level, Level entry    Bathroom Shower/Tub: Tub/Shower unit, Equipment: Tub transfer bench, Grab bars in shower    Bathroom Accessibility: Wheelchair accessible    Home Equipment: Quad cane, Rolling walker, Wheelchair-electric (has right BK prosthesis and Left AFO)    ADL Assistance: Independent    Homemaking Assistance: Independent (warms meals up  -  receives  MOW and groceries delivered)    [employer] Responsibilities: Yes, Meal Prep Responsibility: Secondary    Laundry Responsibility: Primary, Cleaning Responsibility: Secondary    Ambulation Assistance: Independent (last few days uses walker to get around), Transfer Assistance: Independent    Active : Yes    Additional Comments: received new prosthesis in July and has been falling since - has appointment with prosthesis next week.   Pt reports prior to 8/21 she was ambulating with wheeled walker and since then she has used power w/c    Retired --still works a day a week as a  [address] [facility] Strain: Low Risk     Difficulty of Paying Living Expenses: Not hard at all   Food Insecurity: Food Insecurity Present    Worried About [facility] in the Last Year: Sometimes true    Ran Out of Food in the Last Year: Sometimes true   Transportation Needs: Unmet Transportation Needs    Lack of Transportation (Medical): Yes    Lack of Transportation (Non-Medical): Yes   Physical Activity: Inactive    Days of Exercise per Week: 0 days    Minutes of Exercise per Session: 0 min   Stress: Not on file   Social Connections: Socially Isolated    Frequency of Communication with Friends and Family: More than three times a week    Frequency of Social Gatherings with Friends and Family: Once a week    Attends Sikhism Services: Never    Active Member of Clubs or Organizations: No    Attends Club or Organization Meetings: Never    Marital Status:    Intimate Partner Violence: Not on file   Housing Stability: Unknown    Unable to Pay for Housing in the Last Year: No    Number of Places Lived in the Last Year: Not on file    Unstable Housing in the Last Year: No     Family History   Problem Relation Age of Onset    Heart Disease Mother     Arthritis Mother     Heart Disease Father     Arthritis Father     Cancer Sister         Colon    Thyroid Disease Son     Other Son         Down's syndrome     Allergies   Allergen Reactions    Ciprofloxacin Itching    Oxycontin [Oxycodone Hcl] Itching         Review of Systems   Constitutional:  Negative for activity change, appetite change, chills, fatigue and fever. HENT:  Negative for congestion, drooling, ear pain, hearing loss, postnasal drip, sinus pain, sore throat and trouble swallowing. Eyes:  Negative for pain, redness, itching and visual disturbance. Respiratory:  Negative for apnea, chest tightness, shortness of breath and wheezing. Cardiovascular:  Negative for chest pain and palpitations. Gastrointestinal:  Negative for abdominal distention, anorexia, constipation, diarrhea, nausea and vomiting. Endocrine: Negative for heat intolerance. Genitourinary:  Negative for difficulty urinating, dysuria, flank pain, genital sores and urgency. Musculoskeletal:  Negative for arthralgias, gait problem, joint pain, myalgias and neck stiffness. Skin:  Negative for rash. Neurological:  Negative for tremors, seizures, facial asymmetry and headaches. Hematological:  Negative for adenopathy. Psychiatric/Behavioral:  Negative for behavioral problems, confusion and suicidal ideas. The patient is not hyperactive. All other systems reviewed and are negative.     Objective:   /84 (Site: Left Upper Arm, Position: Sitting, Cuff Size: Medium Adult)   Pulse 83   Temp 98.1 °F (36.7 °C) (Temporal)   Resp 16   Ht 5' (1.524 m)   Wt 218 lb (98.9 kg)   SpO2 97%   BMI 42.58 kg/m²     Physical Exam  Vitals and nursing note reviewed. Constitutional:       General: She is awake. She is not in acute distress. Appearance: Normal appearance. She is well-developed, well-groomed and normal weight. She is not ill-appearing, toxic-appearing or diaphoretic. HENT:      Head: Normocephalic and atraumatic. Right Ear: Tympanic membrane normal.      Left Ear: Tympanic membrane normal.      Nose: Nose normal.      Mouth/Throat:      Mouth: Mucous membranes are moist.      Pharynx: Oropharynx is clear. No oropharyngeal exudate. Eyes:      Extraocular Movements: Extraocular movements intact. Conjunctiva/sclera: Conjunctivae normal.      Pupils: Pupils are equal, round, and reactive to light. Cardiovascular:      Rate and Rhythm: Normal rate and regular rhythm. Pulses: Normal pulses. Heart sounds: Normal heart sounds. No murmur heard. Pulmonary:      Effort: Pulmonary effort is normal. No tachypnea, bradypnea, accessory muscle usage or respiratory distress. Breath sounds: Normal breath sounds and air entry. No stridor or transmitted upper airway sounds. No decreased breath sounds, wheezing or rhonchi. Comments: Lungs are clear on exam.   Chest:      Chest wall: No tenderness. Abdominal:      General: Abdomen is flat. Bowel sounds are normal.      Palpations: Abdomen is soft. Tenderness: There is no abdominal tenderness. There is no left CVA tenderness, guarding or rebound. Musculoskeletal:         General: No deformity. Normal range of motion. Cervical back: Normal range of motion and neck supple. Legs:    Lymphadenopathy:      Cervical: No cervical adenopathy. Right cervical: No superficial cervical adenopathy. Left cervical: No superficial cervical adenopathy.    Skin: General: Skin is warm and dry. Capillary Refill: Capillary refill takes less than 2 seconds. Findings: Erythema and wound present. No abrasion, bruising, burn or rash. Neurological:      General: No focal deficit present. Mental Status: She is alert and oriented to person, place, and time. Mental status is at baseline. Motor: No weakness. Coordination: Coordination normal.   Psychiatric:         Mood and Affect: Mood normal.         Behavior: Behavior normal. Behavior is cooperative. Thought Content: Thought content normal.         Judgment: Judgment normal.       Assessment:       Diagnosis Orders   1. Wound infection  mupirocin (BACTROBAN) 2 % ointment    sulfamethoxazole-trimethoprim (BACTRIM DS) 800-160 MG per tablet            Plan:      No orders of the defined types were placed in this encounter. Orders Placed This Encounter   Medications    mupirocin (BACTROBAN) 2 % ointment     Sig: Apply topically 3 times daily. Dispense:  1 each     Refill:  0    sulfamethoxazole-trimethoprim (BACTRIM DS) 800-160 MG per tablet     Sig: Take 1 tablet by mouth 2 times daily for 10 days     Dispense:  20 tablet     Refill:  0     Pt here with her sister and pt declines wound clinic referral at this time and will start the oral ATB and atb cream to the wound but will follow up with her PCP for further eval as pt scheduled an appt. Pt advised to keep the wound site clean and to apply the ointment and take the ATB. Pt also advised to not wear her prosthetic as it has been rubbing and not fitting properly causing this issue with the wounds noted. Pt and sister aware. Pt and her sister left the [facility] today in stable condition. Pt left the [facility] today in stable condition. Antibiotic Instructions: Complete the full course of antibiotics as ordered. Take each dose with a small snack or meal to lessen potential GI upset.  To prevent antibiotic resistance, please take medication as ordered and for the full duration even if you start to feel better. Consider intake of yogurt or probiotic during antibiotic use and for a few days after to help reduce the risk of developing a secondary infection. Separate the yogurt and antibiotic by at least 1 hour. Avoid alcohol while taking antibiotics. Discussed signs and symptoms which require immediate follow-up in ED/call to 911. Patient verbalized understanding. Return if symptoms worsen or fail to improve. Reviewed with the patient: current clinical status, medications, activities and diet. Side effects, adverse effects of the medication prescribed today, as well as treatment plan and result expectations have been discussed with the patient who expresses understanding and desires to proceed. Close follow up to evaluate treatment results and for coordination of care. I have reviewed the patient's medical history in detail and updated the computerized patient record.       YURY Vela - CNP

## 2023-03-06 ENCOUNTER — OFFICE VISIT (OUTPATIENT)
Dept: FAMILY MEDICINE CLINIC | Age: 78
End: 2023-03-06
Payer: MEDICARE

## 2023-03-06 VITALS
WEIGHT: 215 LBS | HEART RATE: 90 BPM | DIASTOLIC BLOOD PRESSURE: 88 MMHG | HEIGHT: 59 IN | TEMPERATURE: 97.5 F | OXYGEN SATURATION: 97 % | SYSTOLIC BLOOD PRESSURE: 130 MMHG | BODY MASS INDEX: 43.34 KG/M2

## 2023-03-06 DIAGNOSIS — C48.2 MALIGNANT NEOPLASM OF PERITONEUM, UNSPECIFIED (HCC): ICD-10-CM

## 2023-03-06 DIAGNOSIS — L08.9 WOUND INFECTION: Primary | ICD-10-CM

## 2023-03-06 DIAGNOSIS — S14.109S INJURY OF CERVICAL SPINAL CORD, SEQUELA (HCC): ICD-10-CM

## 2023-03-06 DIAGNOSIS — G54.6 PHANTOM LIMB PAIN (HCC): ICD-10-CM

## 2023-03-06 DIAGNOSIS — G62.0 DRUG-INDUCED POLYNEUROPATHY (HCC): ICD-10-CM

## 2023-03-06 DIAGNOSIS — N18.30 STAGE 3 CHRONIC KIDNEY DISEASE, UNSPECIFIED WHETHER STAGE 3A OR 3B CKD (HCC): ICD-10-CM

## 2023-03-06 DIAGNOSIS — T14.8XXA WOUND INFECTION: Primary | ICD-10-CM

## 2023-03-06 PROCEDURE — 1124F ACP DISCUSS-NO DSCNMKR DOCD: CPT | Performed by: STUDENT IN AN ORGANIZED HEALTH CARE EDUCATION/TRAINING PROGRAM

## 2023-03-06 PROCEDURE — 99213 OFFICE O/P EST LOW 20 MIN: CPT | Performed by: STUDENT IN AN ORGANIZED HEALTH CARE EDUCATION/TRAINING PROGRAM

## 2023-03-06 PROCEDURE — 3075F SYST BP GE 130 - 139MM HG: CPT | Performed by: STUDENT IN AN ORGANIZED HEALTH CARE EDUCATION/TRAINING PROGRAM

## 2023-03-06 PROCEDURE — G8399 PT W/DXA RESULTS DOCUMENT: HCPCS | Performed by: STUDENT IN AN ORGANIZED HEALTH CARE EDUCATION/TRAINING PROGRAM

## 2023-03-06 PROCEDURE — G8427 DOCREV CUR MEDS BY ELIG CLIN: HCPCS | Performed by: STUDENT IN AN ORGANIZED HEALTH CARE EDUCATION/TRAINING PROGRAM

## 2023-03-06 PROCEDURE — G8484 FLU IMMUNIZE NO ADMIN: HCPCS | Performed by: STUDENT IN AN ORGANIZED HEALTH CARE EDUCATION/TRAINING PROGRAM

## 2023-03-06 PROCEDURE — G8417 CALC BMI ABV UP PARAM F/U: HCPCS | Performed by: STUDENT IN AN ORGANIZED HEALTH CARE EDUCATION/TRAINING PROGRAM

## 2023-03-06 PROCEDURE — 3079F DIAST BP 80-89 MM HG: CPT | Performed by: STUDENT IN AN ORGANIZED HEALTH CARE EDUCATION/TRAINING PROGRAM

## 2023-03-06 PROCEDURE — 1036F TOBACCO NON-USER: CPT | Performed by: STUDENT IN AN ORGANIZED HEALTH CARE EDUCATION/TRAINING PROGRAM

## 2023-03-06 PROCEDURE — 1090F PRES/ABSN URINE INCON ASSESS: CPT | Performed by: STUDENT IN AN ORGANIZED HEALTH CARE EDUCATION/TRAINING PROGRAM

## 2023-03-06 ASSESSMENT — PATIENT HEALTH QUESTIONNAIRE - PHQ9
SUM OF ALL RESPONSES TO PHQ QUESTIONS 1-9: 13
SUM OF ALL RESPONSES TO PHQ QUESTIONS 1-9: 13
7. TROUBLE CONCENTRATING ON THINGS, SUCH AS READING THE NEWSPAPER OR WATCHING TELEVISION: 2
10. IF YOU CHECKED OFF ANY PROBLEMS, HOW DIFFICULT HAVE THESE PROBLEMS MADE IT FOR YOU TO DO YOUR WORK, TAKE CARE OF THINGS AT HOME, OR GET ALONG WITH OTHER PEOPLE: 1
SUM OF ALL RESPONSES TO PHQ QUESTIONS 1-9: 13
4. FEELING TIRED OR HAVING LITTLE ENERGY: 2
1. LITTLE INTEREST OR PLEASURE IN DOING THINGS: 3
8. MOVING OR SPEAKING SO SLOWLY THAT OTHER PEOPLE COULD HAVE NOTICED. OR THE OPPOSITE, BEING SO FIGETY OR RESTLESS THAT YOU HAVE BEEN MOVING AROUND A LOT MORE THAN USUAL: 2
3. TROUBLE FALLING OR STAYING ASLEEP: 2
SUM OF ALL RESPONSES TO PHQ9 QUESTIONS 1 & 2: 5
9. THOUGHTS THAT YOU WOULD BE BETTER OFF DEAD, OR OF HURTING YOURSELF: 0
6. FEELING BAD ABOUT YOURSELF - OR THAT YOU ARE A FAILURE OR HAVE LET YOURSELF OR YOUR FAMILY DOWN: 0
5. POOR APPETITE OR OVEREATING: 0
SUM OF ALL RESPONSES TO PHQ QUESTIONS 1-9: 13
2. FEELING DOWN, DEPRESSED OR HOPELESS: 2

## 2023-03-06 ASSESSMENT — ENCOUNTER SYMPTOMS
ROS SKIN COMMENTS: RIGHT LEG WOUND
WHEEZING: 0
SHORTNESS OF BREATH: 0
APNEA: 0
SORE THROAT: 0
CHEST TIGHTNESS: 0
SINUS PAIN: 0
EYE ITCHING: 0
SINUS PRESSURE: 0
VOMITING: 0
ABDOMINAL DISTENTION: 0
TROUBLE SWALLOWING: 0
EYE REDNESS: 0
ABDOMINAL PAIN: 0
VOMITING: 0
NAUSEA: 0
COUGH: 0
CONSTIPATION: 0

## 2023-03-06 NOTE — PROGRESS NOTES
3/6/2023    Lesa Rodriguez (:  1945) is a 76 y.o. female, here for evaluation of the following medical concerns:  Chief Complaint   Patient presents with    Follow-up    Blister     Walk in on Friday      HPI  Right leg wound  Patient seen in urgent care 3/2  Seeping wound right limb from prosthetic  Patient with neuropathy and denied pain from the area  Started on Bactroban and Bactrim  Recommended patient not wear prosthetic until wound heals  Recommend referral for wound clinic however patient declined    Today  Patient states wound has already improved  No longer seeping any fluid  She has been taking daily pictures and noticed improvements in the size of the wound  She has not been wearing her prosthetic since she saw the walk-in  States she thinks wound occurred because she was recently in [place], normally she puts deodorant antiperspirant on her amputation however did not wear any antiperspirant while she was in [place]  Otherwise has never had any issues with her prosthetic    Review of Systems   Constitutional:  Negative for chills and fever. HENT:  Negative for congestion, sinus pressure and sore throat. Respiratory:  Negative for cough and shortness of breath. Cardiovascular:  Negative for chest pain and palpitations. Gastrointestinal:  Negative for abdominal pain and vomiting. Musculoskeletal:  Negative for arthralgias and myalgias. Skin:  Negative for rash and wound. Right leg wound   Neurological:  Negative for speech difficulty and light-headedness. Psychiatric/Behavioral:  Negative for suicidal ideas. The patient is not nervous/anxious. Prior to Visit Medications    Medication Sig Taking? Authorizing Provider   mupirocin (BACTROBAN) 2 % ointment Apply topically 3 times daily.  Yes YURY Matos CNP   sulfamethoxazole-trimethoprim (BACTRIM DS) 800-160 MG per tablet Take 1 tablet by mouth 2 times daily for 10 days Yes YURY Matos CNP DULoxetine (CYMBALTA) 30 MG extended release capsule TAKE 1 CAPSULE BY MOUTH  DAILY Yes Leodan Negrete DO   VORTIoxetine (TRINTELLIX) 10 MG TABS tablet Take 10 mg by mouth daily Yes Historical Provider, MD   diclofenac (VOLTAREN) 50 MG EC tablet TAKE 1 TABLET BY MOUTH  TWICE DAILY Yes Cat Rizzo DO   carvedilol (COREG) 25 MG tablet TAKE 1 TABLET BY MOUTH  TWICE DAILY Yes Cat Rizzo DO   gabapentin (NEURONTIN) 600 MG tablet TAKE 1 TABLET BY MOUTH IN  THE MORNING 2 TABLETS BY  MOUTH IN THE AFTERNOON AND  2 TABLETS BY MOUTH IN THE  EVENING AS TOLERATED Yes Leodan Negrete DO   levothyroxine (SYNTHROID) 50 MCG tablet TAKE 1 TABLET BY MOUTH  DAILY Yes Leodan Negrete DO   aspirin 81 MG EC tablet Take 1 tablet by mouth daily Yes YURY Last CNP   atorvastatin (LIPITOR) 80 MG tablet Take 1 tablet by mouth nightly Yes YURY Last CNP   Blood Pressure KIT 1 Units by Does not apply route as needed (daily monitoring of blood pressure) One electronic sphygmomanometer and cuff for home monitoring of blood pressure and heart rate. Upper arm type of cuff preferred. Adult regular size. Yes Laura Gary MD   Respiratory Therapy Supplies SHAE New CPAP, full face  mask and chin strap       Fax 3-  Patient taking differently: New CPAP, full face  mask and chin strap       Fax 5- Yes Devika Kimball MD   CPAP Machine MISC by Does not apply route New CPAP with 8 cm Yes Devika Kimball MD   acyclovir (ZOVIRAX) 5 % CREA Apply topically as needed Yes Ann Ovalles MD   b complex vitamins capsule Take 1 capsule by mouth daily Yes Historical Provider, MD   Multiple Vitamins-Minerals (CENTRUM SILVER 50+WOMEN PO) Take by mouth Yes Historical Provider, MD   Vitamin D (CHOLECALCIFEROL) 1000 UNITS CAPS capsule Take 1,000 Units by mouth daily.  Yes Historical Provider, MD   furosemide (LASIX) 20 MG tablet Take 1 tablet by mouth daily for 5 days  Patient taking differently: Take 20 mg by mouth daily as needed  Dane Freed MD        There are no discontinued medications.     Allergies   Allergen Reactions    Ciprofloxacin Itching    Oxycontin [Oxycodone Hcl] Itching       Past Medical History:   Diagnosis Date    Arthritis     Blood transfusion     CAD (coronary artery disease)     Cancer ([facility] [address]     GALLBLADDER 2009, 2011 OMENTUM    Charcot's joint     left foot    Chest pain 7/2/2015    Colitis     DDD (degenerative disc disease), lumbar 2/12/2013    Depression     Disorder of peripheral nervous system 9/1/2010    Dyspnea 7/2/2015    Family history of coronary artery disease 8/29/2014    History of blood transfusion 2011    following chemotherapy    Hx of right BKA ([facility] [address]     Hyperlipidemia     Hypertension     Hypothyroid 6/12/2015    Lobular breast cancer ([facility] [address] 10/2015    NSTEMI (non-ST elevated myocardial infarction) ([facility] [address] 8/29/2014    Obesity     Paroxysmal atrial fibrillation ([facility] [address] 8/29/2014    S/P BKA (below knee amputation) [facility])        Past Surgical History:   Procedure Laterality Date    ABDOMEN SURGERY Left 04/11/2017    EXCISION OF CHEST WALL MASS, UPDATE LABS ON ADMIT  performed by Michaela Funez MD at [facility]  02/05/2013    both eyes    CARDIAC CATHETERIZATION      COLONOSCOPY  01/01/2010    normal    CYSTOSCOPY W BIOPSY OF BLADDER N/A 06/26/2017    CYSTOSCOPY BLADDER BIOPSY AND FULGURATION performed by Kaya Cunningham MD at [facility] Bilateral 2012    cataract removal with IOL implants    HYSTERECTOMY (CERVIX STATUS UNKNOWN)      LEG AMPUTATION BELOW KNEE Right 02/01/2011    DR Comfort Cool due to CHARCOTS    MASTECTOMY Bilateral 11/16/2015    Bilateral simple mastectomies with right SNB by DR Sunshine Gonsales    GA EXC CYST/ABERRANT BREAST TISSUE OPEN 1/> LESION Left 01/24/2017    CORE NEEDLE BIOPSY OF  LEFT BREAST MASS performed by Michaela Funez MD at [address] [facility] [place]      cervical and lumbar       Social History     Socioeconomic History    Marital status:      Spouse name: Not on file    Number of children: 2    Years of education: Not on file    Highest education level: Not on file   Occupational History    Occupation: retired   Tobacco Use    Smoking status: Never    Smokeless tobacco: Never   Vaping Use    Vaping Use: Never used   Substance and Sexual Activity    Alcohol use: Yes     Comment: social    Drug use: No    Sexual activity: Never   Other Topics Concern    Not on file   Social History Narrative    Lives With: Alone, dtr in [place], disabled son with Jayashree Villarreal is in a local group home    Type of Home: [address] in [place] ([facility] [address] they all help each other)    Home Layout: One level, Level entry    Bathroom Shower/Tub: Tub/Shower unit, Equipment: Tub transfer bench, Grab bars in shower    Bathroom Accessibility: Wheelchair accessible    Home Equipment: Quad cane, Rolling walker, Wheelchair-electric (has right BK prosthesis and Left AFO)    ADL Assistance: Independent    Homemaking Assistance: Independent (warms meals up  -  receives  MOW and groceries delivered)    [employer] Responsibilities: Yes, Meal Prep Responsibility: Secondary    Laundry Responsibility: Primary, Cleaning Responsibility: Secondary    Ambulation Assistance: Independent (last few days uses walker to get around), Transfer Assistance: Independent    Active : Yes    Additional Comments: received new prosthesis in July and has been falling since - has appointment with prosthesis next week.   Pt reports prior to 8/21 she was ambulating with wheeled walker and since then she has used power w/c    Retired --still works a day a week as a  [address] Strain: Low Risk     Difficulty of Paying Living Expenses: Not hard at all   Food Insecurity: Food Insecurity Present    Worried About Running Out of Food in the Last Year: Sometimes true    Ran Out of Food in the Last Year: Sometimes true   Transportation Needs: Unmet Transportation Needs    Lack of Transportation (Medical): Yes    Lack of Transportation (Non-Medical): Yes   Physical Activity: Inactive    Days of Exercise per Week: 0 days    Minutes of Exercise per Session: 0 min   Stress: Not on file   Social Connections: Socially Isolated    Frequency of Communication with Friends and Family: More than three times a week    Frequency of Social Gatherings with Friends and Family: Once a week    Attends [employer] Services: Never    Active Member of Clubs or Organizations: No    Attends Club or Organization Meetings: Never    Marital Status:    Intimate Partner Violence: Not on file   Housing Stability: Unknown    Unable to Pay for Housing in the Last Year: No    Number of Places Lived in the Last Year: Not on file    Unstable Housing in the Last Year: No        Family History   Problem Relation Age of Onset    Heart Disease Mother     Arthritis Mother     Heart Disease Father     Arthritis Father     Cancer Sister         Colon    Thyroid Disease Son     Other Son         Down's syndrome       Vitals:    03/06/23 1427   BP: 130/88   Pulse: 90   Temp: 97.5 °F (36.4 °C)   SpO2: 97%   Weight: 215 lb (97.5 kg)   Height: 4' 11\" (1.499 m)       Estimated body mass index is 43.42 kg/m² as calculated from the following:    Height as of this encounter: 4' 11\" (1.499 m). Weight as of this encounter: 215 lb (97.5 kg). No results for input(s): WBC, RBC, HGB, HCT, MCV, MCH, MCHC, RDW, PLT, MPV in the last 72 hours. No results for input(s): NA, K, CL, CO2, BUN, CREATININE, GLUCOSE, CALCIUM, PROT, LABALBU, BILITOT, ALKPHOS, AST, ALT in the last 72 hours. Lab Results   Component Value Date/Time    LABA1C 5.5 09/08/2021 04:53 PM       No results found. Physical Exam  Constitutional:       General: She is not in acute distress. Appearance: Normal appearance. HENT:      Head: Normocephalic and atraumatic. Eyes:      Extraocular Movements: Extraocular movements intact. Conjunctiva/sclera: Conjunctivae normal.   Musculoskeletal:         General: No deformity. Normal range of motion. Skin:     Findings: No lesion or rash. Comments: Distal aspect right below the knee wound amputation: Superficial breakdown of the skin without any purulent drainage or surrounding erythema   Neurological:      General: No focal deficit present. Mental Status: She is alert. Mental status is at baseline. Psychiatric:         Mood and Affect: Mood normal.         Behavior: Behavior normal.         Thought Content: Thought content normal.       ASSESSMENT/PLAN:  1. Wound infection  Patient states wound has been improving since she started Bactrim and topical Bactroban  She has not been wearing her prosthetic since she was seen at the walk-in  Wound overall does appear superficial  At this time we will continue with oral and topical antibiotics  If wound worsens or is persistent will have patient see the wound care clinic  Continue to not wear the prosthetic until the wound is completely healed  Patient states she thinks wound was created when she was in [place], stopped using her antiperspirant deodorant on her amputation for several days, discussed that perspiration could have caused breakdown of skin barrier leading to wounds    2. Malignant neoplasm of peritoneum, 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[address]      3. Phantom limb pain ([address]      4. Injury of cervical spinal cord, sequela ([address]      5.  Drug-induced polyneuropathy (HCC)      6. Stage 3 chronic kidney disease, unspecified whether stage 3a or 3b CKD (HCC)      There are no discontinued medications.    ---------------------------------------------------------------------  Side effects, adverse effects of the medication prescribed today, as well as treatment plan/ rationale and result expectations have been discussed with the patient who expresses understanding and desires to proceed. Close follow up to evaluate treatment results and for coordination of care. I have reviewed the patient's medical history in detail and updated the computerized patient record. As always, patient is advised that if symptoms worsen in any way they will proceed to the nearest emergency room. --------------------------------------------------------------------    Return in 6 months (on 9/6/2023), or if symptoms worsen or fail to improve, for f/u chronic conditions. An  electronic signature was used to authenticate this note.     --Lindsay Jovel, DO on 3/6/2023 at 2:56 PM

## 2023-03-10 ENCOUNTER — TELEMEDICINE (OUTPATIENT)
Dept: BEHAVIORAL/MENTAL HEALTH CLINIC | Age: 78
End: 2023-03-10

## 2023-03-10 DIAGNOSIS — F33.0 MILD EPISODE OF RECURRENT MAJOR DEPRESSIVE DISORDER (HCC): Primary | ICD-10-CM

## 2023-03-10 DIAGNOSIS — F41.1 GAD (GENERALIZED ANXIETY DISORDER): ICD-10-CM

## 2023-03-10 RX ORDER — TRAZODONE HYDROCHLORIDE 50 MG/1
50 TABLET ORAL NIGHTLY PRN
Qty: 90 TABLET | Refills: 1 | Status: SHIPPED | OUTPATIENT
Start: 2023-03-10

## 2023-03-10 NOTE — PROGRESS NOTES
3/10/2023    40 mins total for this encounter =     30 minutes with direct communication with patient for encounter     10 mins (in addition) spent on chart review, and in the ordering of all necessary labs and/or medications      The risks and benefits of converting to a virtual visit were discussed in light of the current infectious disease epidemic. Patient also understood that insurance coverage and co-pays are up to their individual insurance plans. Patient Location:        Patient's home address  Provider Location ([facility]):        [facility]  Chief complaint No chief complaint on file. TELEHEALTH PSYCHIATRY FOLLOWUP (OUTPATIENT)   Audio/Visual (During RTFNM-85 public health emergency)        Psychiatric Diagnoses:  1. Mild episode of recurrent major depressive disorder ([facility] [address]    2. MADHU (generalized anxiety disorder)        Assessment/Plan:   Two of her children passed away last month. Her son had been in hospice, her daughter passing away was more unexpected. She is grieving, encouraged to reach out to hospice therapists which she says she will do. Patient denies thoughts of harm to self or others. No acute safety concerns voiced at this appointment. MOOD: Patient reports mood has been stable. Patient has been able to attend to activities of daily living, has good energy, good mood, and good motivation. SLEEP: Patient reports sleep has been at least 8 hours a night and wakes feeling rested in the morning. No concerns related to sleep today    ATTENTION AND FOCUS: Patient denies any concerns related to attention and focus, and no concerns related to memory. Occasionally going for walks. ANXIETY: Patient denies any concerns related to anxiety today. BIPOLAR ZELDA: No concerns. No manic symptoms endorsed today and no concern for zelda. SUBSTANCE USE: No concerns for ongoing substance use. Patient denies any recent substance use    ASSESSMENT/PLAN: Medication changes per plan below. Discussed with patient the risks and benefits of their psychiatric medication and patient was amenable to plan as outlined below    COUNSELING or THERAPY:   Continue to encourage therapy as part of ongoing treatment of their mental health concerns. Continue to encourage physical activity and adherence to medication regimen. DATE and changes made                       3/19/23  -continue duloxetine (Cymbalta) for depression, discussed change to this but patient is grieving so will defer that for now as medication has been helping with grieving   -continue trazodone (Desyrel) for insomnia                   Medical Diagnoses:  Patient Active Problem List   Diagnosis    Neck pain on right side    Obesity (BMI 30-39. 9)    Obesity    High risk medication use - 01/30/18 OARRS PM&R, 03/26/18 OARRS PM&R, 04/03/18 Urine Drug Screen positive opiates PM&R, 01/31/18 Med Contract PM&R    Impaired mobility and activities of daily living    Hx of right BKA ([facility])    Family history of coronary artery disease    NSTEMI (non-ST elevated myocardial infarction) ([facility])    Arthritis, neuropathic    Charcot's joint of foot    Closed dislocation of ankle    Closed dislocation of foot    Complete rotator cuff rupture of left shoulder    Clinical depression    Benign essential HTN    Acquired flat foot    Closed fracture of tarsal and metatarsal bones    Myogenic ptosis    Disorder of peripheral nervous system    Arthritis of ankle or foot, degenerative    HLD (hyperlipidemia)    Cervical post-laminectomy syndrome    Failed back syndrome of lumbar spine    Low back pain with sciatica    Fibromyalgia muscle pain    Malaise and fatigue    Melancholia    Complete tear of left rotator cuff    Generalized osteoarthritis    Peripheral neuropathy    Osteoporosis    Postlaminectomy syndrome of cervical region    Postlaminectomy syndrome of lumbar region    Tibialis tendinitis    Hereditary and idiopathic peripheral neuropathy ([facility] [address]    Hypothyroid Lobular breast cancer (HCC)    Acquired hallux valgus    Fatigue due to treatment    Anemia    Cancer (HCC)    Cervical spinal cord injury ([facility] [address]    Complete traumatic amputation at level between right knee and ankle (HCC)    Morbid obesity (HCC)    Reactive depression    Sleeping excessive    Chronic low back pain    Left breast mass    Other specified postprocedural states    Primary osteoarthritis involving multiple joints    Hematuria    Hematuria, gross    Noncompliance - No Show inject 07/17/17, No Show F/U 1/11/18    Chronic low back pain with sciatica    Moderate episode of recurrent major depressive disorder (HCC)    Charcot's arthropathy    Chronic pain syndrome    Racing heart beat    Cervical fusion syndrome    Current mild episode of major depressive disorder ([facility])    Obstructive sleep apnea    Malignant neoplasm of peritoneum, 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[facility])    Dizziness    Gait abnormality dt cerebrovascular insufficiancy and gait atasxia    Difficulty balancing    Hx of BKA, right ([facility])    Abnormality of gait and mobility    Drug-induced polyneuropathy ([facility] [address]    Chronic renal disease, stage III ([facility] [address] [730160]         AT TODAY'S VISIT     Crisis plan reviewed and patient verbally contracts for safety. Go to ED with emergent symptoms or safety concerns. Risks, benefits, side effects of medications, including any / all black box warnings, discussed with patient, who verbalizes their understanding. Pt is jodie for safety and denies thoughts of SI/HI. Amenable to plan. No acute concerns to address regarding medications. Subjective:      Patient denies medication side effects apart from those mentioned in the assessment and plan. Patient reports they have been compliant with current medication regimen and have not missed a dose. At today's visit, patient denies thoughts of harm to self or others since last appointment, and denies auditory or visual hallucinations.      ROS:  [x] All negative/unchanged except if checked. Explain positive(checked items) below:       Denies any new or changed physical symptoms other than those noted in the subjective portion of this note   [] Constitutional  [] Eyes  [] Ear/Nose/Mouth/Throat  [] Respiratory  [] CV  [] GI  []   [] Musculoskeletal  [] Skin/Breast  [] Neurological  [] Endocrine  [] Heme/Lymph  [] Allergic/Immunologic    OBJECTIVE:   No results found for this or any previous visit (from the past 1008 hour(s)). MEDICATIONS:    Current Outpatient Medications:     traZODone (DESYREL) 50 MG tablet, Take 1 tablet by mouth nightly as needed for Sleep, Disp: 90 tablet, Rfl: 1    mupirocin (BACTROBAN) 2 % ointment, Apply topically 3 times daily. , Disp: 1 each, Rfl: 0    DULoxetine (CYMBALTA) 30 MG extended release capsule, TAKE 1 CAPSULE BY MOUTH  DAILY, Disp: 90 capsule, Rfl: 3    VORTIoxetine (TRINTELLIX) 10 MG TABS tablet, Take 10 mg by mouth daily, Disp: , Rfl:     diclofenac (VOLTAREN) 50 MG EC tablet, TAKE 1 TABLET BY MOUTH  TWICE DAILY, Disp: 180 tablet, Rfl: 3    carvedilol (COREG) 25 MG tablet, TAKE 1 TABLET BY MOUTH  TWICE DAILY, Disp: 180 tablet, Rfl: 3    gabapentin (NEURONTIN) 600 MG tablet, TAKE 1 TABLET BY MOUTH IN  THE MORNING 2 TABLETS BY  MOUTH IN THE AFTERNOON AND  2 TABLETS BY MOUTH IN THE  EVENING AS TOLERATED, Disp: 450 tablet, Rfl: 2    levothyroxine (SYNTHROID) 50 MCG tablet, TAKE 1 TABLET BY MOUTH  DAILY, Disp: 90 tablet, Rfl: 3    aspirin 81 MG EC tablet, Take 1 tablet by mouth daily, Disp: 30 tablet, Rfl: 3    atorvastatin (LIPITOR) 80 MG tablet, Take 1 tablet by mouth nightly, Disp: 30 tablet, Rfl: 3    Blood Pressure KIT, 1 Units by Does not apply route as needed (daily monitoring of blood pressure) One electronic sphygmomanometer and cuff for home monitoring of blood pressure and heart rate. Upper arm type of cuff preferred. Adult regular size. , Disp: 1 kit, Rfl: 0    Respiratory Therapy Supplies SHAE, New CPAP, full face  mask and chin strap Fax [phone] (Patient taking differently: New CPAP, full face  mask and chin strap    Fax [phone]), Disp: 1 Device, Rfl: 0    furosemide (LASIX) 20 MG tablet, Take 1 tablet by mouth daily for 5 days (Patient taking differently: Take 20 mg by mouth daily as needed), Disp: 5 tablet, Rfl: 0    CPAP Machine MISC, by Does not apply route New CPAP with 8 cm, Disp: 1 each, Rfl: 0    acyclovir (ZOVIRAX) 5 % CREA, Apply topically as needed, Disp: 5 g, Rfl: 1    b complex vitamins capsule, Take 1 capsule by mouth daily, Disp: , Rfl:     Multiple Vitamins-Minerals (CENTRUM SILVER 50+WOMEN PO), Take by mouth, Disp: , Rfl:     Vitamin D (CHOLECALCIFEROL) 1000 UNITS CAPS capsule, Take 1,000 Units by mouth daily. , Disp: , Rfl:     Examination:    Vitals: not taken in person, most recent vitals in chart reviewed  There were no vitals filed for this visit. Wt Readings from Last 3 Encounters:   03/06/23 215 lb (97.5 kg)   03/02/23 218 lb (98.9 kg)   12/01/22 218 lb (98.9 kg)       BP Readings from Last 3 Encounters:   03/06/23 130/88   03/02/23 134/84   12/01/22 129/66         Mental Status Examination:    Level of consciousness:  alert and oriented to person, place, and situation  Appearance:  well-appearing good grooming and good hygiene  Behavior/Motor:  no abnormalities noted  Attitude toward examiner: friendly, pleasant and cooperative, attentive and good eye contact  Speech:  spontaneous, normal rate and normal volume   Mood: \"good\"  Affect:  mood congruent  Thought processes:  linear and logical  Thought content:  Denies suicidal or homicidal ideation, denies auditory or visual hallucinations  Cognition:  no deficits in attention, concentration notable, recent memory grossly intact  Concentration intact  Memory intact  Insight good   Judgement fair   Fund of Knowledge adequate    PSYCHOTHERAPY/COUNSELING:  Encourage patient to attend outpatient appointments and therapy.     [x] Therapeutic interview  [] Supportive  [x] CBT  [x] Ongoing  [] Other    No follow-ups on file. patient informed to call for follow-up or to reschedule appointment     Please note this report has been partially produced using speech recognition software  And may cause contain errors related to that system including grammar, punctuation and spelling as well as words and phrases that may seem inappropriate. If there are questions or concerns please feel free to contact me to clarify. Sharri Tovar MD  Electronically signed by Sharri Tovar MD on 3/19/2023 at 11:30 PM  3/19/2023 11:30 PM    Psychiatry   Due to this being a TeleHealth encounter, evaluation of the following organ systems is limited: Vitals/Constitutional/EENT/Resp/CV/GI//MS/Neuro/Skin/Heme-Lymph-Imm. An  electronic signature was used to authenticate this note. --Sharri Tovar MD on 3/19/2023 at 11:30 PM    Pursuant to the emergency declaration under the [facility], [facility] waiver authority and the [employer] Act, this Virtual  Visit was conducted, with patient's consent, to reduce the patient's risk of exposure to COVID-19 and provide continuity of care for an established patient Services were provided through a video synchronous discussion virtually to substitute for in-person clinic visit. Treatment Plan:  Reviewed current Medications with the patient. Education provided on the compliance with treatment. Reviewed OARRs, no concerns identified     The anticipated benefits and side effects of the medications, including the anticipated results of not receiving the medication, and of alternatives to the medications were explained to the patient and their informed consent was obtained for starting medications as well as adjusting the doses (titration or tapering) as indicated.  The above information was given by physician in verbal form and sufficient understanding was in evidence. The patient participated in discussion of the information and question and/or concerns were addressed before the medication was given. Due to COVID 19 outbreak, patient's office visit was converted to a virtual visit.   Patient was contacted and agreed to proceed with a virtual visit via DOXY

## 2023-03-21 ENCOUNTER — HOSPITAL ENCOUNTER (OUTPATIENT)
Dept: ULTRASOUND IMAGING | Age: 78
Discharge: HOME | End: 2023-03-23
Payer: MEDICARE

## 2023-03-21 ENCOUNTER — OFFICE VISIT (OUTPATIENT)
Dept: FAMILY MEDICINE CLINIC | Age: 78
End: 2023-03-21

## 2023-03-21 VITALS
DIASTOLIC BLOOD PRESSURE: 98 MMHG | BODY MASS INDEX: 43.95 KG/M2 | TEMPERATURE: 97.5 F | OXYGEN SATURATION: 97 % | SYSTOLIC BLOOD PRESSURE: 148 MMHG | HEART RATE: 90 BPM | WEIGHT: 218 LBS | HEIGHT: 59 IN

## 2023-03-21 DIAGNOSIS — M79.89 LEFT LEG SWELLING: ICD-10-CM

## 2023-03-21 DIAGNOSIS — I13.0 HYPERTENSIVE HEART AND CHRONIC KIDNEY DISEASE WITH HEART FAILURE AND STAGE 1 THROUGH STAGE 4 CHRONIC KIDNEY DISEASE, OR UNSPECIFIED CHRONIC KIDNEY DISEASE (HCC): ICD-10-CM

## 2023-03-21 DIAGNOSIS — M79.89 LEFT LEG SWELLING: Primary | ICD-10-CM

## 2023-03-21 DIAGNOSIS — R60.0 LEG EDEMA: ICD-10-CM

## 2023-03-21 LAB
ALBUMIN SERPL-MCNC: 4.3 G/DL (ref 3.5–4.6)
ALP SERPL-CCNC: 108 U/L (ref 40–130)
ALT SERPL-CCNC: 16 U/L (ref 0–33)
ANION GAP SERPL CALCULATED.3IONS-SCNC: 10 MEQ/L (ref 9–15)
AST SERPL-CCNC: 27 U/L (ref 0–35)
BILIRUB SERPL-MCNC: 0.5 MG/DL (ref 0.2–0.7)
BNP BLD-MCNC: 2186 PG/ML
BUN SERPL-MCNC: 24 MG/DL (ref 8–23)
CALCIUM SERPL-MCNC: 9.6 MG/DL (ref 8.5–9.9)
CHLORIDE SERPL-SCNC: 102 MEQ/L (ref 95–107)
CO2 SERPL-SCNC: 30 MEQ/L (ref 20–31)
CREAT SERPL-MCNC: 0.81 MG/DL (ref 0.5–0.9)
GLOBULIN SER CALC-MCNC: 2.3 G/DL (ref 2.3–3.5)
GLUCOSE SERPL-MCNC: 85 MG/DL (ref 70–99)
POTASSIUM SERPL-SCNC: 4.3 MEQ/L (ref 3.4–4.9)
PROT SERPL-MCNC: 6.6 G/DL (ref 6.3–8)
SODIUM SERPL-SCNC: 142 MEQ/L (ref 135–144)

## 2023-03-21 PROCEDURE — 93971 EXTREMITY STUDY: CPT

## 2023-03-21 ASSESSMENT — ENCOUNTER SYMPTOMS
SORE THROAT: 0
SINUS PRESSURE: 0
ABDOMINAL PAIN: 0
VOMITING: 0
COUGH: 0
SHORTNESS OF BREATH: 1
WHEEZING: 0

## 2023-03-21 NOTE — PROGRESS NOTES
understanding and desires to proceed. Close follow up to evaluate treatment results and for coordination of care. I have reviewed the patient's medical history in detail and updated the computerized patient record. As always, patient is advised that if symptoms worsen in any way they will proceed to the nearest emergency room. --------------------------------------------------------------------    Return in about 1 week (around 3/28/2023) for f/u edema. An  electronic signature was used to authenticate this note.     --Fredis Dawson DO on 3/21/2023 at 1:25 PM

## 2023-03-30 ENCOUNTER — OFFICE VISIT (OUTPATIENT)
Dept: FAMILY MEDICINE CLINIC | Age: 78
End: 2023-03-30

## 2023-03-30 VITALS
SYSTOLIC BLOOD PRESSURE: 118 MMHG | OXYGEN SATURATION: 93 % | HEIGHT: 59 IN | DIASTOLIC BLOOD PRESSURE: 68 MMHG | WEIGHT: 218 LBS | TEMPERATURE: 97.5 F | BODY MASS INDEX: 43.95 KG/M2 | HEART RATE: 75 BPM

## 2023-03-30 DIAGNOSIS — R42 VERTIGO: ICD-10-CM

## 2023-03-30 DIAGNOSIS — I50.22 CHRONIC SYSTOLIC (CONGESTIVE) HEART FAILURE (HCC): ICD-10-CM

## 2023-03-30 DIAGNOSIS — R60.0 LEG EDEMA: Primary | ICD-10-CM

## 2023-03-30 ASSESSMENT — ENCOUNTER SYMPTOMS
WHEEZING: 0
SORE THROAT: 0
VOMITING: 0
SHORTNESS OF BREATH: 1
SINUS PRESSURE: 0
COUGH: 0
ABDOMINAL PAIN: 0

## 2023-03-30 NOTE — PROGRESS NOTES
3/30/2023    Yue Rodriguez (:  1945) is a 76 y.o. female, here for evaluation of the following medical concerns:  Chief Complaint   Patient presents with    Edema     Left leg     Dizziness     X 2 days      HPI  Edema  Started 2-3 weeks ago  Bilateral lower extremities  Has been taking lasix 20mg prn - took 20mg BID x 2 days and then 10mg for a few days  Does noticed some shortness of breath  Has been sleeping in a recliner, normally sleeps flat  No hx CHF    3/21  Started Lasix 20 mg daily x6 days  CMP within normal limits, BNP 2186  Echo ordered    Today  Taking lasix 20mg daily  Breathing has significantly improved, has noticed improvement in the edema  Was better the first part of the week however she does admit to increased sodium in her diet the last few days which is worse in the swelling    Vertigo  Occasionally gets it off-and-on  Has been on meclizine in the past which does not help  Usually resolves in a few days    Review of Systems   Constitutional:  Negative for chills and fever. HENT:  Negative for congestion, sinus pressure and sore throat. Respiratory:  Positive for shortness of breath. Negative for cough and wheezing. Cardiovascular:  Positive for leg swelling. Negative for chest pain and palpitations. Gastrointestinal:  Negative for abdominal pain and vomiting. Musculoskeletal:  Negative for arthralgias and myalgias. Skin:  Negative for rash and wound. Neurological:  Positive for dizziness. Negative for speech difficulty and light-headedness. Psychiatric/Behavioral:  Negative for suicidal ideas. The patient is not nervous/anxious. Prior to Visit Medications    Medication Sig Taking? Authorizing Provider   traZODone (DESYREL) 50 MG tablet Take 1 tablet by mouth nightly as needed for Sleep Yes Anjali Phan MD   mupirocin (BACTROBAN) 2 % ointment Apply topically 3 times daily.  Yes Kendra Lama, APRN - CNP   DULoxetine (CYMBALTA) 30 MG extended release

## 2023-04-07 ENCOUNTER — TELEPHONE (OUTPATIENT)
Dept: FAMILY MEDICINE CLINIC | Age: 78
End: 2023-04-07

## 2023-04-07 NOTE — TELEPHONE ENCOUNTER
[facility] is calling in regards of referral to see if they can get an extension till 4/11/23 for physical therapy and nursing     [phone] maria fernanda at [facility]

## 2023-04-19 ENCOUNTER — TELEPHONE (OUTPATIENT)
Dept: FAMILY MEDICINE CLINIC | Age: 78
End: 2023-04-19

## 2023-04-19 NOTE — TELEPHONE ENCOUNTER
Eliezer Rodriguez from [facility] calling back advised of message, and her bp is 115/67 hr is 87. Asking if the medication carvedilol can be cut in half due to pt is on lasix for chf. Please advise. Eliezer Rodriguez phone number [phone]  Eliezer Rodriguez is asking for us to call the pt to advise of message.

## 2023-04-19 NOTE — TELEPHONE ENCOUNTER
[facility] is calling to state that they took patients vitals and her blood pressure was low 80/56 pt is also complaining of lightheadedness.  They would like to know what the provider or office suggest.       Home health said call pt [phone]

## 2023-04-20 NOTE — TELEPHONE ENCOUNTER
Spoke to roni, aware to have pt cut Carvedilol. Eugene Rapp asked that I reach out to pt as well.  LMTCB

## 2023-04-21 ENCOUNTER — OFFICE VISIT (OUTPATIENT)
Dept: FAMILY MEDICINE CLINIC | Age: 78
End: 2023-04-21

## 2023-04-21 VITALS
BODY MASS INDEX: 43.95 KG/M2 | HEIGHT: 59 IN | TEMPERATURE: 97.4 F | HEART RATE: 90 BPM | OXYGEN SATURATION: 98 % | DIASTOLIC BLOOD PRESSURE: 70 MMHG | WEIGHT: 218 LBS | SYSTOLIC BLOOD PRESSURE: 106 MMHG

## 2023-04-21 DIAGNOSIS — M43.10 SPONDYLOLISTHESIS, UNSPECIFIED SPINAL REGION: ICD-10-CM

## 2023-04-21 DIAGNOSIS — E66.01 MORBID OBESITY (HCC): ICD-10-CM

## 2023-04-21 DIAGNOSIS — Z89.511 HX OF BKA, RIGHT (HCC): ICD-10-CM

## 2023-04-21 DIAGNOSIS — R60.0 LEG EDEMA: ICD-10-CM

## 2023-04-21 DIAGNOSIS — I50.22 CHRONIC SYSTOLIC (CONGESTIVE) HEART FAILURE (HCC): Primary | ICD-10-CM

## 2023-04-21 DIAGNOSIS — M54.40 BILATERAL LOW BACK PAIN WITH SCIATICA, SCIATICA LATERALITY UNSPECIFIED, UNSPECIFIED CHRONICITY: ICD-10-CM

## 2023-04-21 DIAGNOSIS — I13.0 HYPERTENSIVE HEART AND CHRONIC KIDNEY DISEASE WITH HEART FAILURE AND STAGE 1 THROUGH STAGE 4 CHRONIC KIDNEY DISEASE, OR UNSPECIFIED CHRONIC KIDNEY DISEASE (HCC): ICD-10-CM

## 2023-04-21 DIAGNOSIS — G47.00 INSOMNIA, UNSPECIFIED TYPE: ICD-10-CM

## 2023-04-21 DIAGNOSIS — M79.89 LEFT LEG SWELLING: ICD-10-CM

## 2023-04-21 DIAGNOSIS — G62.0 DRUG-INDUCED POLYNEUROPATHY (HCC): ICD-10-CM

## 2023-04-21 RX ORDER — TRAZODONE HYDROCHLORIDE 50 MG/1
50 TABLET ORAL NIGHTLY PRN
Qty: 90 TABLET | Refills: 1 | Status: SHIPPED | OUTPATIENT
Start: 2023-04-21

## 2023-04-21 NOTE — PROGRESS NOTES
- nausea, vomiting, chills, fatigue, fever, malaise, weight gain or weight loss  Respiratory ROS: no cough, shortness of breath, or wheezing  Cardiovascular ROS: no chest pain or dyspnea on exertion  Gastrointestinal ROS: no abdominal pain, change in bowel habits, or black or bloody stools  Genito-Urinary ROS: no dysuria, trouble voiding, or hematuria  Musculoskeletal ROS: chronic back pain s/p right BKA  Neurological ROS: hand weakness     In general patient otherwise reports feeling well. In general patient otherwise reports feeling well. Physical Exam:  /70 (Site: Left Upper Arm)   Pulse 90   Temp 97.4 °F (36.3 °C)   Ht 4' 11\" (1.499 m)   Wt 218 lb (98.9 kg)   SpO2 98%   BMI 44.03 kg/m²     Gen: Well, NAD, Alert, Oriented x 3   HEENT: EOMI, eyes clear, MMM,   Skin: no rash or jaundice  Neuro: nonfocal  Psych: euthymic   Heart s1s2 RRR  Lungs CTAB  Ext: hands with chronic arthritic changes and 3/5     Lab Results   Component Value Date    WBC 8.2 11/14/2022    HGB 12.0 11/14/2022    HCT 37.4 11/14/2022     11/14/2022    CHOL 105 09/08/2021    TRIG 124 09/08/2021    HDL 40 09/08/2021    ALT 16 03/21/2023    AST 27 03/21/2023     03/21/2023    K 4.3 03/21/2023     03/21/2023    CREATININE 0.81 03/21/2023    BUN 24 (H) 03/21/2023    CO2 30 03/21/2023    TSH 0.880 05/12/2022    INR 1.1 09/07/2021    LABA1C 5.5 09/08/2021         A&P   Diagnosis Orders   1. Chronic systolic (congestive) heart failure ([address]        2. Left leg swelling        3. Leg edema        4. Hypertensive heart and chronic kidney disease with heart failure and stage 1 through stage 4 chronic kidney disease, or unspecified chronic kidney disease ([address]        5. Drug-induced polyneuropathy (HCC)        6. Spondylolisthesis, unspecified spinal region        7. Hx of BKA, right ([address]        8. Bilateral low back pain with sciatica, sciatica laterality unspecified, unspecified chronicity        9.  Morbid

## 2023-05-09 DIAGNOSIS — M48.02 CERVICAL STENOSIS OF SPINAL CANAL: ICD-10-CM

## 2023-05-09 DIAGNOSIS — M43.10 SPONDYLOLISTHESIS, UNSPECIFIED SPINAL REGION: ICD-10-CM

## 2023-05-09 RX ORDER — LEVOTHYROXINE SODIUM 0.05 MG/1
50 TABLET ORAL DAILY
Qty: 90 TABLET | Refills: 3 | Status: SHIPPED | OUTPATIENT
Start: 2023-05-09

## 2023-05-09 RX ORDER — GABAPENTIN 600 MG/1
TABLET ORAL
Qty: 450 TABLET | Refills: 3 | Status: SHIPPED | OUTPATIENT
Start: 2023-05-09 | End: 2024-05-09

## 2023-05-09 NOTE — TELEPHONE ENCOUNTER
Comments:     Last Office Visit (last PCP visit):   3/30/2023    Next Visit Date:  Future Appointments   Date Time Provider Saurabh Delvalle   9/7/2023  1:30 PM Ted Quintero DO [facility]       **If hasn't been seen in over a year OR hasn't followed up according to last diabetes/ADHD visit, make appointment for patient before sending refill to provider.     Rx requested:  Requested Prescriptions     Pending Prescriptions Disp Refills    levothyroxine (SYNTHROID) 50 MCG tablet [Pharmacy Med Name: Levothyroxine Sodium 50 MCG Oral Tablet] 90 tablet 3     Sig: TAKE 1 TABLET BY MOUTH  DAILY    gabapentin (NEURONTIN) 600 MG tablet [Pharmacy Med Name: Gabapentin 600 MG Oral Tablet] 450 tablet 3     Sig: TAKE 1 TABLET BY MOUTH IN  THE MORNING, 2 TABLETS IN  THE AFTERNOON, AND 2  TABLETS IN THE EVENING AS  TOLERATED

## 2023-08-10 ENCOUNTER — OFFICE VISIT (OUTPATIENT)
Dept: FAMILY MEDICINE CLINIC | Age: 78
End: 2023-08-10
Payer: MEDICARE

## 2023-08-10 VITALS
HEART RATE: 85 BPM | OXYGEN SATURATION: 95 % | WEIGHT: 214 LBS | DIASTOLIC BLOOD PRESSURE: 84 MMHG | BODY MASS INDEX: 43.14 KG/M2 | HEIGHT: 59 IN | SYSTOLIC BLOOD PRESSURE: 124 MMHG

## 2023-08-10 DIAGNOSIS — N30.00 ACUTE CYSTITIS WITHOUT HEMATURIA: ICD-10-CM

## 2023-08-10 DIAGNOSIS — N30.00 ACUTE CYSTITIS WITHOUT HEMATURIA: Primary | ICD-10-CM

## 2023-08-10 LAB
BILIRUBIN, POC: NORMAL
BLOOD URINE, POC: NORMAL
CLARITY, POC: CLEAR
COLOR, POC: NORMAL
GLUCOSE URINE, POC: NORMAL
KETONES, POC: NORMAL
LEUKOCYTE EST, POC: NORMAL
NITRITE, POC: NORMAL
PH, POC: 5.5
PROTEIN, POC: NORMAL
SPECIFIC GRAVITY, POC: 1.02
UROBILINOGEN, POC: NORMAL

## 2023-08-10 PROCEDURE — G8417 CALC BMI ABV UP PARAM F/U: HCPCS | Performed by: NURSE PRACTITIONER

## 2023-08-10 PROCEDURE — 3079F DIAST BP 80-89 MM HG: CPT | Performed by: NURSE PRACTITIONER

## 2023-08-10 PROCEDURE — G8399 PT W/DXA RESULTS DOCUMENT: HCPCS | Performed by: NURSE PRACTITIONER

## 2023-08-10 PROCEDURE — 1036F TOBACCO NON-USER: CPT | Performed by: NURSE PRACTITIONER

## 2023-08-10 PROCEDURE — 81002 URINALYSIS NONAUTO W/O SCOPE: CPT | Performed by: NURSE PRACTITIONER

## 2023-08-10 PROCEDURE — 99213 OFFICE O/P EST LOW 20 MIN: CPT | Performed by: NURSE PRACTITIONER

## 2023-08-10 PROCEDURE — 1124F ACP DISCUSS-NO DSCNMKR DOCD: CPT | Performed by: NURSE PRACTITIONER

## 2023-08-10 PROCEDURE — G8427 DOCREV CUR MEDS BY ELIG CLIN: HCPCS | Performed by: NURSE PRACTITIONER

## 2023-08-10 PROCEDURE — 1090F PRES/ABSN URINE INCON ASSESS: CPT | Performed by: NURSE PRACTITIONER

## 2023-08-10 PROCEDURE — 3074F SYST BP LT 130 MM HG: CPT | Performed by: NURSE PRACTITIONER

## 2023-08-10 RX ORDER — BUPROPION HYDROCHLORIDE 75 MG/1
75 TABLET ORAL 2 TIMES DAILY
COMMUNITY

## 2023-08-10 RX ORDER — SULFAMETHOXAZOLE AND TRIMETHOPRIM 800; 160 MG/1; MG/1
1 TABLET ORAL 2 TIMES DAILY
Qty: 14 TABLET | Refills: 0 | Status: SHIPPED | OUTPATIENT
Start: 2023-08-10 | End: 2023-08-17

## 2023-08-10 ASSESSMENT — ENCOUNTER SYMPTOMS
NAUSEA: 0
CONSTIPATION: 0
CHEST TIGHTNESS: 0
COLOR CHANGE: 0
BACK PAIN: 0
ABDOMINAL PAIN: 0
TROUBLE SWALLOWING: 0
DIARRHEA: 0
COUGH: 0
SHORTNESS OF BREATH: 0
EYE PAIN: 0

## 2023-08-10 NOTE — PROGRESS NOTES
Subjective  Chief Complaint   Patient presents with    Urinary Tract Infection     States that she has burning when she urinates and also states that she sees traces of blood. Urinary Pain   This is a new problem. The current episode started in the past 7 days (1 week ago). The problem occurs every urination. The problem has been gradually worsening. The quality of the pain is described as burning. The pain is mild. There has been no fever. She is Not sexually active. There is No history of pyelonephritis. Associated symptoms include frequency, hematuria and urgency. Pertinent negatives include no chills, discharge, flank pain, nausea, possible pregnancy or sweats. She has tried nothing for the symptoms. The treatment provided no relief. There is no history of kidney stones, recurrent UTIs or a urological procedure.        Past Medical History:   Diagnosis Date    Arthritis     Blood transfusion     CAD (coronary artery disease)     Cancer ([address])     GALLBLADDER 2009, 2011 OMENTUM    Charcot's joint     left foot    Chest pain 7/2/2015    Colitis     DDD (degenerative disc disease), lumbar 2/12/2013    Depression     Disorder of peripheral nervous system 9/1/2010    Dyspnea 7/2/2015    Family history of coronary artery disease 8/29/2014    History of blood transfusion 2011    following chemotherapy    Hx of right BKA ([address])     Hyperlipidemia     Hypertension     Hypothyroid 6/12/2015    Lobular breast cancer ([address]) 10/2015    NSTEMI (non-ST elevated myocardial infarction) ([address]) 8/29/2014    Obesity     Paroxysmal atrial fibrillation ([address]) 8/29/2014    S/P BKA (below knee amputation) [facility])      Patient Active Problem List    Diagnosis Date Noted    Hematuria 06/20/2017    High risk medication use - 01/30/18 OARRS PM&R, 03/26/18 OARRS PM&R, 04/03/18 Urine Drug Screen positive opiates PM&R, 01/31/18 Med Contract PM&R 06/11/2013    Chronic renal disease, stage III ([address]) [076813] 05/16/2022    Drug-induced

## 2023-08-13 LAB
BACTERIA UR CULT: ABNORMAL
BACTERIA UR CULT: ABNORMAL
ORGANISM: ABNORMAL

## 2023-09-04 ENCOUNTER — HOSPITAL ENCOUNTER (EMERGENCY)
Age: 78
Discharge: HOME | End: 2023-09-05
Payer: MEDICARE

## 2023-09-04 DIAGNOSIS — G89.29 ACUTE EXACERBATION OF CHRONIC LOW BACK PAIN: Primary | ICD-10-CM

## 2023-09-04 DIAGNOSIS — M54.50 ACUTE EXACERBATION OF CHRONIC LOW BACK PAIN: Primary | ICD-10-CM

## 2023-09-04 LAB
ALBUMIN SERPL-MCNC: 4.3 G/DL (ref 3.5–4.6)
ALP SERPL-CCNC: 153 U/L (ref 40–130)
ALT SERPL-CCNC: 69 U/L (ref 0–33)
ANION GAP SERPL CALCULATED.3IONS-SCNC: 14 MEQ/L (ref 9–15)
AST SERPL-CCNC: 93 U/L (ref 0–35)
BILIRUB SERPL-MCNC: 0.8 MG/DL (ref 0.2–0.7)
BUN SERPL-MCNC: 25 MG/DL (ref 8–23)
CALCIUM SERPL-MCNC: 9.8 MG/DL (ref 8.5–9.9)
CHLORIDE SERPL-SCNC: 99 MEQ/L (ref 95–107)
CO2 SERPL-SCNC: 26 MEQ/L (ref 20–31)
CREAT SERPL-MCNC: 0.83 MG/DL (ref 0.5–0.9)
GLOBULIN SER CALC-MCNC: 2.7 G/DL (ref 2.3–3.5)
GLUCOSE SERPL-MCNC: 84 MG/DL (ref 70–99)
POTASSIUM SERPL-SCNC: 5.6 MEQ/L (ref 3.4–4.9)
PROT SERPL-MCNC: 7 G/DL (ref 6.3–8)
SODIUM SERPL-SCNC: 139 MEQ/L (ref 135–144)

## 2023-09-04 PROCEDURE — 85025 COMPLETE CBC W/AUTO DIFF WBC: CPT

## 2023-09-04 PROCEDURE — 36415 COLL VENOUS BLD VENIPUNCTURE: CPT

## 2023-09-04 PROCEDURE — 6360000002 HC RX W HCPCS

## 2023-09-04 PROCEDURE — 96376 TX/PRO/DX INJ SAME DRUG ADON: CPT

## 2023-09-04 PROCEDURE — 99284 EMERGENCY DEPT VISIT MOD MDM: CPT

## 2023-09-04 PROCEDURE — 80053 COMPREHEN METABOLIC PANEL: CPT

## 2023-09-04 PROCEDURE — 96374 THER/PROPH/DIAG INJ IV PUSH: CPT

## 2023-09-04 RX ORDER — MORPHINE SULFATE 4 MG/ML
4 INJECTION, SOLUTION INTRAMUSCULAR; INTRAVENOUS ONCE
Status: COMPLETED | OUTPATIENT
Start: 2023-09-04 | End: 2023-09-04

## 2023-09-04 RX ADMIN — MORPHINE SULFATE 4 MG: 4 INJECTION, SOLUTION INTRAMUSCULAR; INTRAVENOUS at 23:10

## 2023-09-04 ASSESSMENT — ENCOUNTER SYMPTOMS
VOMITING: 0
PHOTOPHOBIA: 0
SHORTNESS OF BREATH: 0
NAUSEA: 0
ABDOMINAL PAIN: 0
BACK PAIN: 1
COUGH: 0
DIARRHEA: 0

## 2023-09-05 ENCOUNTER — APPOINTMENT (OUTPATIENT)
Dept: CT IMAGING | Age: 78
End: 2023-09-05
Payer: MEDICARE

## 2023-09-05 VITALS
HEART RATE: 75 BPM | TEMPERATURE: 98 F | OXYGEN SATURATION: 94 % | SYSTOLIC BLOOD PRESSURE: 132 MMHG | RESPIRATION RATE: 17 BRPM | DIASTOLIC BLOOD PRESSURE: 92 MMHG

## 2023-09-05 LAB
BASOPHILS # BLD: 0.1 K/UL (ref 0–0.2)
BASOPHILS NFR BLD: 0.9 %
EOSINOPHIL # BLD: 0.2 K/UL (ref 0–0.7)
EOSINOPHIL NFR BLD: 2.1 %
ERYTHROCYTE [DISTWIDTH] IN BLOOD BY AUTOMATED COUNT: 16.1 % (ref 11.5–14.5)
HCT VFR BLD AUTO: 41.5 % (ref 37–47)
HGB BLD-MCNC: 13.8 G/DL (ref 12–16)
LYMPHOCYTES # BLD: 1.8 K/UL (ref 1–4.8)
LYMPHOCYTES NFR BLD: 24.3 %
MCH RBC QN AUTO: 30.7 PG (ref 27–31.3)
MCHC RBC AUTO-ENTMCNC: 33.3 % (ref 33–37)
MCV RBC AUTO: 92.3 FL (ref 79.4–94.8)
MONOCYTES # BLD: 0.6 K/UL (ref 0.2–0.8)
MONOCYTES NFR BLD: 7.6 %
NEUTROPHILS # BLD: 4.9 K/UL (ref 1.4–6.5)
NEUTS SEG NFR BLD: 65.1 %
PLATELET # BLD AUTO: 260 K/UL (ref 130–400)
PLATELET BLD QL SMEAR: ADEQUATE
RBC # BLD AUTO: 4.5 M/UL (ref 4.2–5.4)
SLIDE REVIEW: ABNORMAL
WBC # BLD AUTO: 7.5 K/UL (ref 4.8–10.8)

## 2023-09-05 PROCEDURE — 96376 TX/PRO/DX INJ SAME DRUG ADON: CPT

## 2023-09-05 PROCEDURE — 6360000002 HC RX W HCPCS

## 2023-09-05 PROCEDURE — 72131 CT LUMBAR SPINE W/O DYE: CPT

## 2023-09-05 RX ORDER — MORPHINE SULFATE 4 MG/ML
4 INJECTION, SOLUTION INTRAMUSCULAR; INTRAVENOUS ONCE
Status: COMPLETED | OUTPATIENT
Start: 2023-09-05 | End: 2023-09-05

## 2023-09-05 RX ADMIN — MORPHINE SULFATE 4 MG: 4 INJECTION, SOLUTION INTRAMUSCULAR; INTRAVENOUS at 01:29

## 2023-09-05 NOTE — ED PROVIDER NOTES
LASHONDA due to CHARCOTS    MASTECTOMY Bilateral 11/16/2015    Bilateral simple mastectomies with right SNB by DR Errol Tapia    AZ EXC CYST/ABERRANT BREAST TISSUE OPEN 1/> LESION Left 01/24/2017    CORE NEEDLE BIOPSY OF  LEFT BREAST MASS performed by Kennedy Perez MD at [address]      cervical and lumbar         CURRENT MEDICATIONS       Previous Medications    ACYCLOVIR (ZOVIRAX) 5 % CREA    Apply topically as needed    ASPIRIN 81 MG EC TABLET    Take 1 tablet by mouth daily    ATORVASTATIN (LIPITOR) 80 MG TABLET    Take 1 tablet by mouth nightly    B COMPLEX VITAMINS CAPSULE    Take 1 capsule by mouth daily    BLOOD PRESSURE KIT    1 Units by Does not apply route as needed (daily monitoring of blood pressure) One electronic sphygmomanometer and cuff for home monitoring of blood pressure and heart rate. Upper arm type of cuff preferred. Adult regular size.     BUPROPION (WELLBUTRIN) 75 MG TABLET    Take 1 tablet by mouth 2 times daily    CARVEDILOL (COREG) 25 MG TABLET    TAKE 1 TABLET BY MOUTH  TWICE DAILY    DICLOFENAC (VOLTAREN) 50 MG EC TABLET    TAKE 1 TABLET BY MOUTH  TWICE DAILY    DULOXETINE (CYMBALTA) 30 MG EXTENDED RELEASE CAPSULE    TAKE 1 CAPSULE BY MOUTH  DAILY    FUROSEMIDE PO    Take 20 mg by mouth 2 times daily    GABAPENTIN (NEURONTIN) 600 MG TABLET    TAKE 1 TABLET BY MOUTH IN  THE MORNING, 2 TABLETS IN  THE AFTERNOON, AND 2  TABLETS IN THE EVENING AS  TOLERATED    LEVOTHYROXINE (SYNTHROID) 50 MCG TABLET    TAKE 1 TABLET BY MOUTH  DAILY    MULTIPLE VITAMINS-MINERALS (CENTRUM SILVER 50+WOMEN PO)    Take by mouth    TRAZODONE (DESYREL) 50 MG TABLET    Take 1 tablet by mouth nightly as needed for Sleep    VITAMIN D (CHOLECALCIFEROL) 1000 UNITS CAPS CAPSULE    Take 1 capsule by mouth daily       ALLERGIES     Ciprofloxacin and Oxycontin [oxycodone hcl]    FAMILY HISTORY       Family History   Problem Relation Age of Onset    Heart Disease Mother     Arthritis

## 2023-09-07 ENCOUNTER — OFFICE VISIT (OUTPATIENT)
Dept: FAMILY MEDICINE CLINIC | Age: 78
End: 2023-09-07

## 2023-09-07 VITALS
HEIGHT: 59 IN | SYSTOLIC BLOOD PRESSURE: 132 MMHG | OXYGEN SATURATION: 94 % | HEART RATE: 68 BPM | DIASTOLIC BLOOD PRESSURE: 80 MMHG | BODY MASS INDEX: 43.22 KG/M2

## 2023-09-07 DIAGNOSIS — R74.8 ELEVATED LIVER ENZYMES: Primary | ICD-10-CM

## 2023-09-07 DIAGNOSIS — M54.40 BILATERAL LOW BACK PAIN WITH SCIATICA, SCIATICA LATERALITY UNSPECIFIED, UNSPECIFIED CHRONICITY: ICD-10-CM

## 2023-09-07 DIAGNOSIS — E87.5 HYPERKALEMIA: ICD-10-CM

## 2023-09-07 NOTE — PROGRESS NOTES
2023    Olimpia Rodriguez (:  1945) is a 78 y.o. female, here for evaluation of the following medical concerns:  Chief Complaint   Patient presents with    6 Month Follow-Up    Lower Back Pain     Lower back. X1 week. Pt has been taking Vicodin. HPI  6-month follow-up    Seen at [facility] ER  due to low back pain  CT lumbar spine nonacute  Patient given IV morphine with improvement in pain  Potassium 5.6, alkaline phosphatase 153, ALT 69, AST 93    Pt does follow up with pain management, Dr. Rosario Luu  Significant worsening of pain  Does see neurosurgery - was told they don't want to do any more back surgeries at this time  Was told to get MRI spine and follow up with [facility] spine center    Pt has been taking tylenol 2 in the am and 2 in the pm   Taking vicodin 4x daily    Review of Systems   Constitutional:  Negative for chills and fever. HENT:  Negative for congestion, sinus pressure and sore throat. Respiratory:  Negative for cough and shortness of breath. Cardiovascular:  Negative for chest pain and palpitations. Gastrointestinal:  Negative for abdominal pain and vomiting. Musculoskeletal:  Positive for back pain. Negative for arthralgias and myalgias. Skin:  Negative for rash and wound. Neurological:  Negative for speech difficulty and light-headedness. Psychiatric/Behavioral:  Negative for suicidal ideas. The patient is not nervous/anxious. Prior to Visit Medications    Medication Sig Taking?  Authorizing Provider   FUROSEMIDE PO Take 20 mg by mouth 2 times daily Yes Historical Provider, MD   buPROPion (WELLBUTRIN) 75 MG tablet Take 1 tablet by mouth 2 times daily Yes Historical Provider, MD   levothyroxine (SYNTHROID) 50 MCG tablet TAKE 1 TABLET BY MOUTH  DAILY Yes YURY Kirby - CNP   gabapentin (NEURONTIN) 600 MG tablet TAKE 1 TABLET BY MOUTH IN  THE MORNING, 2 TABLETS IN  THE AFTERNOON, AND 2  TABLETS IN THE EVENING AS  TOLERATED Yes YURY Kirby -

## 2023-09-08 ASSESSMENT — ENCOUNTER SYMPTOMS
SINUS PRESSURE: 0
COUGH: 0
SHORTNESS OF BREATH: 0
VOMITING: 0
ABDOMINAL PAIN: 0
BACK PAIN: 1
SORE THROAT: 0

## 2023-09-22 ENCOUNTER — OFFICE VISIT (OUTPATIENT)
Dept: FAMILY MEDICINE CLINIC | Age: 78
End: 2023-09-22

## 2023-09-22 VITALS
OXYGEN SATURATION: 96 % | HEIGHT: 59 IN | SYSTOLIC BLOOD PRESSURE: 114 MMHG | HEART RATE: 92 BPM | BODY MASS INDEX: 42.94 KG/M2 | WEIGHT: 213 LBS | DIASTOLIC BLOOD PRESSURE: 72 MMHG

## 2023-09-22 DIAGNOSIS — H61.23 IMPACTED CERUMEN, BILATERAL: Primary | ICD-10-CM

## 2023-09-22 DIAGNOSIS — Z23 FLU VACCINE NEED: ICD-10-CM

## 2023-09-22 ASSESSMENT — ENCOUNTER SYMPTOMS
SINUS PRESSURE: 0
DIARRHEA: 0
CONSTIPATION: 0
NAUSEA: 0
TROUBLE SWALLOWING: 0
SHORTNESS OF BREATH: 0
CHEST TIGHTNESS: 0
COUGH: 1
RHINORRHEA: 1
VOMITING: 0
ABDOMINAL PAIN: 0
SORE THROAT: 1

## 2023-09-22 NOTE — PROGRESS NOTES
Subjective  Meetraza Hameed, 78 y.o. female presents today with:  Chief Complaint   Patient presents with    Ear Fullness     Onset- 2 days LT worse   Fever- N   Sinus congestion- N  Sneezing- N  Ear pain- Y bilat on and off   Ear itching/ popping/ fullness/ ringing/ muffled hearing- Y  Ear drainage- N  Sore throat- Y  Cough- Y  Ear pain worse with chewing- N  Difficulty hearing- Y  Treatments- None        I reviewed staff HPI/chief complaint and do agree with above    Patient presents with daughter for concerns of upper respiratory symptoms, hearing loss on left side as noted above. Symptoms have been present for approximately 2 days. States she can hear items out of left ear when they are significantly close however has no ability here outside of this on left side. She denies any fever/chills, otorrhea, sinus congestion/pain, nausea/vomiting/diarrhea symptoms. Denies use of any over-the-counter medications or treatments at this time          Review of Systems   Constitutional:  Negative for chills, fatigue and fever. HENT:  Positive for ear pain, hearing loss, rhinorrhea and sore throat. Negative for congestion, sinus pressure and trouble swallowing. Respiratory:  Positive for cough. Negative for chest tightness and shortness of breath. Cardiovascular:  Negative for chest pain and palpitations. Gastrointestinal:  Negative for abdominal pain, constipation, diarrhea, nausea and vomiting. Musculoskeletal:  Negative for arthralgias and myalgias. Skin:  Negative for rash. Neurological:  Negative for weakness, numbness and headaches. Psychiatric/Behavioral:  Negative for sleep disturbance.         Past Medical History:   Diagnosis Date    Arthritis     Blood transfusion     CAD (coronary artery disease)     Cancer ([address])     GALLBLADDER 2009, 2011 OMENTUM    Charcot's joint     left foot    Chest pain 7/2/2015    Colitis     DDD (degenerative disc disease), lumbar 2/12/2013    Depression

## 2023-09-22 NOTE — PROGRESS NOTES
After obtaining consent, and per orders of Joel Bob CNP injection of FLU vaccine given in Left deltoid by Amina Srivastava MA. Patient instructed to remain in clinic for 20 minutes afterwards, and to report any adverse reaction to me immediately.  Tolerated well

## 2023-09-26 ENCOUNTER — CARE COORDINATION (OUTPATIENT)
Dept: CARE COORDINATION | Age: 78
End: 2023-09-26

## 2023-09-30 PROBLEM — M54.50 LOW BACK PAIN: Status: ACTIVE | Noted: 2023-09-30

## 2023-09-30 PROBLEM — M54.16 LUMBAR RADICULITIS: Status: ACTIVE | Noted: 2023-09-30

## 2023-09-30 PROBLEM — M25.531 ARTHRALGIA OF RIGHT WRIST: Status: ACTIVE | Noted: 2023-09-30

## 2023-09-30 PROBLEM — M19.019 LOCALIZED OSTEOARTHRITIS OF SHOULDER: Status: ACTIVE | Noted: 2023-09-30

## 2023-09-30 PROBLEM — M70.60 TROCHANTERIC BURSITIS: Status: ACTIVE | Noted: 2023-09-30

## 2023-09-30 PROBLEM — I73.9 PERIPHERAL VASCULAR DISEASE (CMS-HCC): Status: ACTIVE | Noted: 2023-09-30

## 2023-09-30 PROBLEM — M25.552 HIP PAIN, LEFT: Status: ACTIVE | Noted: 2023-09-30

## 2023-09-30 PROBLEM — M25.579 ANKLE PAIN: Status: ACTIVE | Noted: 2023-09-30

## 2023-09-30 PROBLEM — S82.839A FRACTURE, FIBULA, PROXIMAL: Status: ACTIVE | Noted: 2023-09-30

## 2023-09-30 PROBLEM — M79.672 LEFT FOOT PAIN: Status: ACTIVE | Noted: 2023-09-30

## 2023-09-30 PROBLEM — G56.00 CTS (CARPAL TUNNEL SYNDROME): Status: ACTIVE | Noted: 2023-09-30

## 2023-09-30 PROBLEM — M79.643 PAIN OF HAND: Status: ACTIVE | Noted: 2023-09-30

## 2023-09-30 RX ORDER — LEVOTHYROXINE SODIUM 50 UG/1
TABLET ORAL
COMMUNITY
Start: 2023-03-14

## 2023-09-30 RX ORDER — TAMOXIFEN CITRATE 20 MG/1
TABLET ORAL
COMMUNITY

## 2023-09-30 RX ORDER — CARVEDILOL 12.5 MG/1
TABLET, FILM COATED ORAL
COMMUNITY

## 2023-09-30 RX ORDER — SULFAMETHOXAZOLE AND TRIMETHOPRIM 800; 160 MG/1; MG/1
TABLET ORAL
COMMUNITY
Start: 2023-03-02

## 2023-09-30 RX ORDER — DULOXETINE HYDROCHLORIDE 30 MG/1
CAPSULE, DELAYED RELEASE ORAL
COMMUNITY

## 2023-09-30 RX ORDER — GABAPENTIN 600 MG/1
TABLET ORAL
COMMUNITY
Start: 2023-03-14

## 2023-09-30 RX ORDER — DICLOFENAC SODIUM 50 MG/1
TABLET, DELAYED RELEASE ORAL
COMMUNITY
Start: 2023-03-04

## 2023-09-30 RX ORDER — GABAPENTIN 400 MG/1
CAPSULE ORAL
COMMUNITY

## 2023-09-30 RX ORDER — BUPROPION HYDROCHLORIDE 150 MG/1
TABLET ORAL
COMMUNITY

## 2023-09-30 RX ORDER — MUPIROCIN 20 MG/G
OINTMENT TOPICAL
COMMUNITY
Start: 2023-03-02

## 2023-09-30 RX ORDER — ATORVASTATIN CALCIUM 20 MG/1
TABLET, FILM COATED ORAL
COMMUNITY

## 2023-09-30 RX ORDER — PREDNISONE 50 MG/1
1 TABLET ORAL DAILY
COMMUNITY
Start: 2022-01-06

## 2023-10-02 ENCOUNTER — CARE COORDINATION (OUTPATIENT)
Dept: CARE COORDINATION | Age: 78
End: 2023-10-02

## 2023-10-02 NOTE — CARE COORDINATION
Ambulatory Care Coordination Note  10/2/2023    Patient Current Location:  Home: [address] [place] [address]     ACM contacted the patient by telephone. Verified name and  with patient as identifiers. Provided introduction to self, and explanation of the ACM role. Challenges to be reviewed by the provider   Additional needs identified to be addressed with provider: No  none               Method of communication with provider: none. ACM: Abi Marcelo, RN    spoke with patient for care coordination enrollment for CHF, low back pain, HTN, CAD, and education  Patient agreed to care coordination and follow up calls. ACM reviewed medications, SDOH, CC protocols, goals, and education. Patient in ED on  for back pain. Follows with pain management Dr. Adolfo Mullins. Takes vicodin and tylenol. States not taking vicodin at this time due to pain is less. Discussed taking vicodin when pain is worsening or activity. Does report is leaving for 12 hour car ride this week. ACM encouraged patient to sit in front seat, stretch leg, stop and get out of car often, and use pillow for comfort. Patient voiced understanding. CHF at baseline. States has SOB w/ exertion but normal for patient. Denies left leg swelling. Right leg amputation. Denies cough. ACM will mail CHF zone tool. Discussed low sodium diet. Right leg amputation. Uses electric wheelchair or rollator walker. Has prosthetic leg. Denies swelling or wounds on stump. Denies recent falls. Reviewed ambulation safety. Removed rugs. Using grab bars in bathroom. Has shower chair. Does have PCP appt on 10/5. Will be out of town. ACM will cancel. Patient will re-schedule when returns from vacation. Denies any other needs at this time. Patient will be gone for approx 2 weeks. ACM will follow up when patient returns.     PLAN:  Continue with care coordination follow up  Reinforce early symptom monitoring  Mail and review zone

## 2023-10-03 ENCOUNTER — APPOINTMENT (OUTPATIENT)
Dept: ORTHOPEDIC SURGERY | Facility: CLINIC | Age: 78
End: 2023-10-03
Payer: MEDICARE

## 2023-10-16 ENCOUNTER — CARE COORDINATION (OUTPATIENT)
Dept: CARE COORDINATION | Age: 78
End: 2023-10-16

## 2023-10-23 ENCOUNTER — CARE COORDINATION (OUTPATIENT)
Dept: CARE COORDINATION | Age: 78
End: 2023-10-23

## 2023-10-23 NOTE — CARE COORDINATION
Ambulatory Care Coordination Note  10/23/2023    Patient Current Location:  Home: [address] [place] [address]     ACM contacted the patient by telephone. Verified name and  with patient as identifiers. Provided introduction to self, and explanation of the ACM role. Challenges to be reviewed by the provider   Additional needs identified to be addressed with provider: No  none               Method of communication with provider: none. ACM: Kailyn Starr, RN    spoke with patient for care coordination follow up for CHF, HTN, OA, and right leg amputation  Patient states she is doing ok for this call. Returned from [address] visiting daughter and granddaughter. Patient states she visiting assisted living community while there and is thinking of moving. Discussed pros and cons. Patient wants to discuss with  and see what resources available. ACM will referral to [facility].. Advised she will call next week when returns from vacation. Voiced understanding  Patient reports she is having more difficulty with daily living due to OA and right leg amputation. States hand weakness and joint decline has made grasping and holding objects more difficult. Using electric wheelchair for mobility. Denies recent falls. CHF at baseline. Denies cough. SOB w/ exertion stable and normal for patient. Left LL swelling noted sometimes and right stump swelling. States takes extra dose of lasix if swelling worsens. Unable to weigh self due to balance. Reviewed zone tool  Reports is having ringing in ears and dizziness. ACM will schedule appt with PCP office. Had in past.  ACM encouraged slow position changes due to dizziness. ACM encouraged low sodium, low fat diet.       PLAN:  Continue with CC follow up  Reinforce early symptom management  Review zone tool  ACM to schedule appt  Encourage low sodium low fat diet  Review ambulation safety      Offered patient enrollment in the Remote Patient

## 2023-10-27 ENCOUNTER — APPOINTMENT (OUTPATIENT)
Dept: ORTHOPEDIC SURGERY | Facility: CLINIC | Age: 78
End: 2023-10-27
Payer: MEDICARE

## 2023-10-31 ENCOUNTER — CARE COORDINATION (OUTPATIENT)
Dept: CARE COORDINATION | Age: 78
End: 2023-10-31

## 2023-10-31 NOTE — CARE COORDINATION
[facility] placed an initial outreach call to [facility] for referral regarding questions and resources related to correction out of state. There was no answer today. Voice Message was left with [facility] information and request for a return call.      [facility] will follow up accordingly

## 2023-11-03 ENCOUNTER — CARE COORDINATION (OUTPATIENT)
Dept: CARE COORDINATION | Age: 78
End: 2023-11-03

## 2023-11-03 NOTE — CARE COORDINATION
[facility] placed an outreach call to [facility] for referral received from [employer]. There was no answer. A voice message was left with [facility] information and request for a return call. [facility] placed initial call on 10/31 with no answer and voice message left. There has been no return call as of yet.        [facility] will continue to follow with another attempt to contact and open [facility] referral.

## 2023-11-06 ENCOUNTER — CARE COORDINATION (OUTPATIENT)
Dept: CARE COORDINATION | Age: 78
End: 2023-11-06

## 2023-11-06 NOTE — CARE COORDINATION
Heart Failure Education outreach Date/Time: 2023 12:01 PM    Ambulatory Care Manager (ACM) contacted the patient by telephone to perform Ambulatory Care Coordination. Verified name and  with patient as identifiers. Provided introduction to self, and explanation of the Ambulatory Care Manager's role. ACM reviewed that a Health Healthy tips for the Holiday packet has been sent to [facility] [employer]. ACM reviewed CHF zones, daily weights, fluid restriction, the importance of low sodium diet, and healthy tips packet with the patient. Instructed patient to call their PCP if they have a weight gain of 3 lbs in 2 days or 5 lbs in a week. Patient reminded that there is a physician on call 24 hours a day / 7 days a week should the patient have questions or concerns. The patient verbalized understanding. Ambulatory Care Coordination Note  2023    Patient Current Location:  Home: [address] [place] [address]     ACM contacted the patient by telephone. Verified name and  with patient as identifiers. Provided introduction to self, and explanation of the ACM role. Challenges to be reviewed by the provider   Additional needs identified to be addressed with provider: No  none               Method of communication with provider: none. ACM: Dat Melton, RN    spoke with patient for care coordination follow up for CHF, HTN, CAD, and right leg amputation  Patient reports is doing well for this call. Patient states she is still thinking of moving to [address] [place] [address] to be closer to family. ACM encouraged patient to write down pros and cons and discuss with SW.  Patient agrees and will call Ayla Kamara back to discuss resources and to assist with finances  CHF at baseline. Denies cough or congestion. Does have edema in Left leg and edema in right stump. Discussed low sodium diet and to elevate leg  SOB w/ exertion stable and normal.  Zone tool for reference  Does report ringing in ears.   States had injury

## 2023-11-14 ENCOUNTER — CARE COORDINATION (OUTPATIENT)
Dept: CARE COORDINATION | Age: 78
End: 2023-11-14

## 2023-11-14 NOTE — CARE COORDINATION
[facility] placed another outreach call to [facility] for attempts to contact and open referral.   There was no answer. Voice message was left with [facility] information and a request for a return call.

## 2023-11-15 ENCOUNTER — CARE COORDINATION (OUTPATIENT)
Dept: CARE COORDINATION | Age: 78
End: 2023-11-15

## 2023-11-15 NOTE — CARE COORDINATION
[facility] placed a fourth and final outreach call to Tianna Jones today. There was no answer. [facility] did leave a voice message indicating this was the final outreach attempt. If [address] services are needed in the future a new referral can be placed by the ACM or the PCP.       This [address] will sign off today and close the referral.

## 2023-11-20 ENCOUNTER — CARE COORDINATION (OUTPATIENT)
Dept: CARE COORDINATION | Age: 78
End: 2023-11-20

## 2023-12-04 ENCOUNTER — CARE COORDINATION (OUTPATIENT)
Dept: CARE COORDINATION | Age: 78
End: 2023-12-04

## 2023-12-12 ENCOUNTER — CARE COORDINATION (OUTPATIENT)
Dept: CARE COORDINATION | Age: 78
End: 2023-12-12

## 2023-12-12 NOTE — CARE COORDINATION
Ambulatory Care Coordination Note  2023    Patient Current Location:  Home: [address] [place] [address]     ACM contacted the patient by telephone. Verified name and  with patient as identifiers. Provided introduction to self, and explanation of the ACM role. Challenges to be reviewed by the provider   Additional needs identified to be addressed with provider: No  none               Method of communication with provider: none. ACM: Lorena Yañez, RN    spoke with patient for care coordination follow up for CHF, HTN, CAD, and right leg amputation  Patient states she is feeling ok for this call. Denies any health care needs at this time  CHF stable and at baseline. States slight left leg edema. States right stump is swollen but is normal.  Denies cough or congestion. SOB w/ exertion only. Advised to report any changes. Voiced understanding. Reviewed ambulation safety. Patient uses electric wheelchair. Denies recent falls. Using grab bars. Denies any resource needs at this time. Discussed in detail need to move to [place] to be closer to family. Patient states her lease for her apartment in up in May 2024. ACM encouraged patient to reach out to the intake person (patient has her card and #) at the assisted living place in [address] [place] [address] that she visited and is interested in. Discussed Medicaid and financial options. Advised patient that she'll have to apply for [address] [place] [address] and that she needs an agent or SW to assist.  Patient voiced understanding and will reach out to facility  for assistance. Encouraged patient to reach out to PCP or ACM with new or worsening symptoms or question.     PLAN:  Continue with CC  Encourage low sodium, low fat diet  Review ambulation safety  Call assisted living facility  in [facility] for new or worsening symptoms      Offered patient enrollment in the Remote Patient Monitoring (RPM)

## 2024-01-09 ENCOUNTER — CARE COORDINATION (OUTPATIENT)
Dept: CARE COORDINATION | Age: 79
End: 2024-01-09

## 2024-01-09 NOTE — CARE COORDINATION
Ambulatory Care Coordination Note  2024    Patient Current Location:  Home: [address] [place] [address] [place] [address]     ACM contacted the patient by telephone. Verified name and  with patient as identifiers. Provided introduction to self, and explanation of the ACM role.     Challenges to be reviewed by the provider   Additional needs identified to be addressed with provider: No  none               Method of communication with provider: none.    ACM: Tana Lyon, RN    spoke with patient for care coordination follow up for CHF, CAD, HTN, and RLL amputation  Patient states she is doing well for this call.  Denies any needs  CHF at baseline.  Denies cough.  Sob w/ exertion stable and normal.  States slight edema in left lower leg.  Occas edema at stump of right leg.  Denies worsening.  CHF zone tool for reference.    Reviewed heart healthy diet  Denies CP, headaches, palpitations, or chest tightness  Reviewed ambulation safety.  Using electric wheelchair.  Also has stand up recliner.  Denies recent falls.    Reports did speak with  in [facility] assisted living regarding medicaid, qualifying for assisted living, and availability.  Waiting to hear back on resources and needs.  Will discuss on next call.    Patient aware to report any new or worsening symptoms like sob, cough, congestion, CP, headaches, or alarming symptoms.      Offered patient enrollment in the Remote Patient Monitoring (RPM) program for in-home monitoring: Patient declined.    Lab Results       None            Care Coordination Interventions    Referral from Primary Care Provider: Yes  Suggested Interventions and Community Resources  Fall Risk Prevention: Completed (Comment: 10/2/23)  Social Work: Not Started (Comment: 10/2/23 - had in past)  Transportation Support: Completed (Comment: 10/2/23)  Zone Management Tools: Completed (Comment: mailed 10/2/23)  Other Services or Interventions: urgent care/ walk in care          Goals

## 2024-01-30 ENCOUNTER — CARE COORDINATION (OUTPATIENT)
Dept: CARE COORDINATION | Age: 79
End: 2024-01-30

## 2024-02-06 ENCOUNTER — CARE COORDINATION (OUTPATIENT)
Dept: CARE COORDINATION | Age: 79
End: 2024-02-06

## 2024-02-06 NOTE — CARE COORDINATION
Ambulatory Care Coordination Note  2024    Patient Current Location:  Home: [address] [place] [address] [place] [address]     ACM contacted the patient by telephone. Verified name and  with patient as identifiers. Provided introduction to self, and explanation of the ACM role.     Challenges to be reviewed by the provider   Additional needs identified to be addressed with provider: No  none               Method of communication with provider: none.    ACM: Tana Lyon, RN    spoke with patient for care coordination follow up for right wrist pain, low back pain, CHF, and right leg amputation  Patient denies recent falls.  Uses electric wheelchair.    CHF at baseline.  Denies worsening sob.  States left leg edema but stable.  Denies cough.  Unable to balance for weight management.  States weight seems stable.    C/o right wrist pain and is worsening.  Seeing Dr. Maher on .  At last visit discussed fusing wrist when unable to tolerate.  Patient states she is unable to tolerate pain and deformity due to needing use of wrist for wheelchair and prosthetic use.  ACM encouraged patient to write down question regarding fusion and recovery.  Also discussed possibility of needing in patient rehab due to disability and living alone.    C/o low back pain.  Using vicodin when pain is severe.  Will schedule follow up with Dr. Melgar.  Also discussed needing follow up with pain management.  Voiced understanding  Discussed [place].  Encouraged patient to reach out to  at [facility] she is interested in and the possibility of rehab there.      Offered patient enrollment in the Remote Patient Monitoring (RPM) program for in-home monitoring: Patient declined.    Lab Results       None            Care Coordination Interventions    Referral from Primary Care Provider: Yes  Suggested Interventions and Community Resources  Fall Risk Prevention: Completed

## 2024-02-12 ENCOUNTER — CLINICAL SUPPORT (OUTPATIENT)
Dept: ORTHOPEDIC SURGERY | Facility: CLINIC | Age: 79
End: 2024-02-12
Payer: MEDICARE

## 2024-02-12 ENCOUNTER — OFFICE VISIT (OUTPATIENT)
Dept: ORTHOPEDIC SURGERY | Facility: CLINIC | Age: 79
End: 2024-02-12
Payer: MEDICARE

## 2024-02-12 DIAGNOSIS — M19.049 HAND ARTHRITIS: ICD-10-CM

## 2024-02-12 DIAGNOSIS — M19.039 WRIST ARTHRITIS: Primary | ICD-10-CM

## 2024-02-12 PROCEDURE — 99214 OFFICE O/P EST MOD 30 MIN: CPT | Performed by: ORTHOPAEDIC SURGERY

## 2024-02-12 PROCEDURE — 20605 DRAIN/INJ JOINT/BURSA W/O US: CPT | Performed by: ORTHOPAEDIC SURGERY

## 2024-02-12 PROCEDURE — 73130 X-RAY EXAM OF HAND: CPT | Mod: BILATERAL PROCEDURE | Performed by: ORTHOPAEDIC SURGERY

## 2024-02-12 PROCEDURE — 1159F MED LIST DOCD IN RCRD: CPT | Performed by: ORTHOPAEDIC SURGERY

## 2024-02-12 RX ORDER — LIDOCAINE HYDROCHLORIDE 10 MG/ML
1 INJECTION INFILTRATION; PERINEURAL
Status: COMPLETED | OUTPATIENT
Start: 2024-02-12 | End: 2024-02-12

## 2024-02-12 RX ADMIN — LIDOCAINE HYDROCHLORIDE 1 ML: 10 INJECTION INFILTRATION; PERINEURAL at 13:16

## 2024-02-12 NOTE — PROGRESS NOTES
History present illness: Patient presents today for evaluation of severe wrist pain bilaterally.  She is worse on the right as compared left.        Physical examination:  General: Alert and oriented to person, place, and time.  No acute distress and breathing comfortably: Pleasant and cooperative with examination.  Extremities: Evaluation of the bilateral upper extremities finds the patient had palpable radial artery at the wrists with brisk capillary refill to all digits.  Patient has intact sensation to axillary radial median and ulnar nerves.  There are no open wounds.  There are no signs of infection.  There is no evidence of lymphedema or lymphatic streaking.  The patient has supple compartments to bilateral arm forearm and hand.  Gross deformity swelling and tenderness to bilateral hands and wrist.      Diagnostic studies:      Assessment: Pronounced arthritic change affecting bilateral hands and wrist      Plan: Treatment options were discussed.  We talked about operative and nonoperative strategies.  We talked about complete wrist fusion as an option.  She is reliant on her hands to navigate her wheelchair and elects to forego operative options at this time.  She elects for bilateral radiocarpal joint injection and 3-month follow-up.  No x-rays necessary upon return.      Procedure:        Procedure:            M Inj/Asp: R radiocarpal on 2/12/2024 1:16 PM  Indications: pain  Details: 25 G needle, dorsal approach  Medications: 1 mL lidocaine 10 mg/mL (1 %); 10 mg triamcinolone acetonide 10 mg/mL  Outcome: tolerated well, no immediate complications    Right Radiocarpal Joint Injection: It was explained to the patient that the risks of a steroid injection include but are not limited to infection, local skin irritation, skin atrophy, calcification, continued pain and discomfort, elevation of blood sugar, burning, failure to relieve pain, and possible late infection. The patient verbalized good insight and  verbalized consent for the injection. It was further explained that post injection discomfort can be alleviated with additional medications, ice, elevation, and rest over the first 24 hours, and these modalities are recommended.     Using aseptic technique, a solution containing 10 mg of Kenalog and 1 mL of 1% lidocaine without epinephrine was injected into the patient´s right radiocarpal joint. This was done by digitally palpating Mckayla´s tubercle. We then moved 1 cm distal to this bony landmark and palpated the soft spot in the location of the typical 3-4 arthroscopic portal. We then prepped the skin and advanced a 25-gauge needle into the joint. The solution was then administered and the patient tolerated this well. A band-aid was placed. It should be noted that ethyl chloride spray was used to make the injection delivery more comfortable for the patient.     Procedure, treatment alternatives, risks and benefits explained, specific risks discussed. Consent was given by the patient. Immediately prior to procedure a time out was called to verify the correct patient, procedure, equipment, support staff and site/side marked as required. Patient was prepped and draped in the usual sterile fashion.       M Inj/Asp: L radiocarpal on 2/12/2024 1:16 PM  Indications: pain  Details: 25 G needle, dorsal approach  Medications: 1 mL lidocaine 10 mg/mL (1 %); 10 mg triamcinolone acetonide 10 mg/mL  Outcome: tolerated well, no immediate complications    Left Radiocarpal Joint Injection: It was explained to the patient that the risks of a steroid injection include but are not limited to infection, local skin irritation, skin atrophy, calcification, continued pain and discomfort, elevation of blood sugar, burning, failure to relieve pain, and possible late infection. The patient verbalized good insight and verbalized consent for the injection. It was further explained that post injection discomfort can be alleviated with  additional medications, ice, elevation, and rest over the first 24 hours, and these modalities are recommended.     Using aseptic technique, a solution containing 10 mg of Kenalog and 1 mL of 1% lidocaine without epinephrine was injected into the patient´s left radiocarpal joint. This was done by digitally palpating Mckayla´s tubercle. We then moved 1 cm distal to this bony landmark and palpated the soft spot in the location of the typical 3-4 arthroscopic portal. We then prepped the skin and advanced a 25-gauge needle into the joint. The solution was then administered and the patient tolerated this well. A band-aid was placed. It should be noted that ethyl chloride spray was used to make the injection delivery more comfortable for the patient.  Procedure, treatment alternatives, risks and benefits explained, specific risks discussed. Consent was given by the patient. Immediately prior to procedure a time out was called to verify the correct patient, procedure, equipment, support staff and site/side marked as required. Patient was prepped and draped in the usual sterile fashion.

## 2024-02-16 ENCOUNTER — CARE COORDINATION (OUTPATIENT)
Dept: CARE COORDINATION | Age: 79
End: 2024-02-16

## 2024-02-16 NOTE — CARE COORDINATION
Ambulatory Care Coordination Note  2024    Patient Current Location:  Home: [address] [place] [address] [place] [address]     ACM contacted the patient by telephone. Verified name and  with patient as identifiers. Provided introduction to self, and explanation of the ACM role.     Challenges to be reviewed by the provider   Additional needs identified to be addressed with provider: No  none               Method of communication with provider: none.    ACM: Tana Lyon, RN    spoke with patient for care coordination follow up for CHF, HTN, right leg amputation, and right wrist pain  Patient seen by Dr. Maher on .  Injected cortisone into both wrists.  Left wrist is improved but right wrist/hand is getting more pain and tenderness.  Patient and Dr. Maher discussed poss right wrist fusion is cortisone doesn't help.  ACM advised patient to give it one more week or 2.  If still having worsening pain to report to Dr. Maher.  Voiced understanding  Patient reports has soft lump on top of hand in middle of wrist.  ACM encouraged heat compress and to monitor for infection like warm to touch, redness, drainage, or fever.  Patient voiced understanding.    CHF at baseline.  Sob w/ exertion stable and normal.  Denies cough or congestion.  Left leg edema is stable.  Takes Lasix.  ACM advised to keep leg elevated while sitting or resting.    Denies recent falls.  Electric wheelchair.  Does report issues with transporting self due to right wrist pain.    Discussed need for SNF/long term care if has wrist fusion due to right leg amputation and need skilled care.  Patient aware of need and doesn't think moving to [place] [facility] is good idea anymore.  Will start looking in SNF in this area.      Offered patient enrollment in the Remote Patient Monitoring (RPM) program for in-home monitoring: Patient declined.    Lab Results       None            Care Coordination Interventions    Referral from Primary Care Provider:

## 2024-03-01 ENCOUNTER — CARE COORDINATION (OUTPATIENT)
Dept: CARE COORDINATION | Age: 79
End: 2024-03-01

## 2024-03-01 NOTE — CARE COORDINATION
Ambulatory Care Coordination Note  3/1/2024    Patient Current Location:  Home: [address] [place] [address] [address] [place] [address]     ACM contacted the patient by telephone. Verified name and  with patient as identifiers. Provided introduction to self, and explanation of the ACM role.     Challenges to be reviewed by the provider   Additional needs identified to be addressed with provider: No  none               Method of communication with provider: none.    ACM: Tana Lyon, RN    spoke with patient for care coordination follow up for CHF, HTN, and right wrist pain  Patient is ready for graduation.  All education provided.  Advised to reach out with future needs/questions.  Patient still having right wrist pain.  Cortisone injection did not help at all.  Follow up with DR. Maher in May.  Will decide at that time if surgery for fusion of wrist is needed.    Discussed at great length future living arrangements.  Patient has decided that [place] to [place] not an option.  Will decide if SNF/assisted living in this area is a good choice.  Understands will need inpatient rehab if has wrist fusion due to disability of right leg amputation and needing wrist/hands for wheelchair transfer.  ACM provided listening ear and support.  Patient has positive mental attitude and preparedness regarding her future living habits and needs.    CHF at baseline.  Unable to weigh self due to balance issue (right leg amputation).  States left leg edema but stable.  Denies cough or congestion.  Sob w/ exertion stable.  Takes Lasix.  Understands low sodium diet to assist with edema.  CHF zone tool for reference  Denies headaches, CP, or palpitations  Occas dizziness and ringing in ears.  Ongoing problem.  Tolerated on own  Discussed future resource needs with living arrangement and/or other needs.  Strongly encouraged to reach out to this ACM.  Patient voiced understanding    PLAN:  Graduate  Reach out with future needs  5/?

## 2024-03-19 ASSESSMENT — PATIENT HEALTH QUESTIONNAIRE - PHQ9
2. FEELING DOWN, DEPRESSED OR HOPELESS: SEVERAL DAYS
9. THOUGHTS THAT YOU WOULD BE BETTER OFF DEAD, OR OF HURTING YOURSELF: NOT AT ALL
4. FEELING TIRED OR HAVING LITTLE ENERGY: NEARLY EVERY DAY
SUM OF ALL RESPONSES TO PHQ QUESTIONS 1-9: 12
3. TROUBLE FALLING OR STAYING ASLEEP: NOT AT ALL
SUM OF ALL RESPONSES TO PHQ QUESTIONS 1-9: 12
10. IF YOU CHECKED OFF ANY PROBLEMS, HOW DIFFICULT HAVE THESE PROBLEMS MADE IT FOR YOU TO DO YOUR WORK, TAKE CARE OF THINGS AT HOME, OR GET ALONG WITH OTHER PEOPLE: VERY DIFFICULT
5. POOR APPETITE OR OVEREATING: NEARLY EVERY DAY
3. TROUBLE FALLING OR STAYING ASLEEP: NOT AT ALL
10. IF YOU CHECKED OFF ANY PROBLEMS, HOW DIFFICULT HAVE THESE PROBLEMS MADE IT FOR YOU TO DO YOUR WORK, TAKE CARE OF THINGS AT HOME, OR GET ALONG WITH OTHER PEOPLE: VERY DIFFICULT
7. TROUBLE CONCENTRATING ON THINGS, SUCH AS READING THE NEWSPAPER OR WATCHING TELEVISION: MORE THAN HALF THE DAYS
1. LITTLE INTEREST OR PLEASURE IN DOING THINGS: MORE THAN HALF THE DAYS
6. FEELING BAD ABOUT YOURSELF - OR THAT YOU ARE A FAILURE OR HAVE LET YOURSELF OR YOUR FAMILY DOWN: SEVERAL DAYS
6. FEELING BAD ABOUT YOURSELF - OR THAT YOU ARE A FAILURE OR HAVE LET YOURSELF OR YOUR FAMILY DOWN: SEVERAL DAYS
8. MOVING OR SPEAKING SO SLOWLY THAT OTHER PEOPLE COULD HAVE NOTICED. OR THE OPPOSITE, BEING SO FIGETY OR RESTLESS THAT YOU HAVE BEEN MOVING AROUND A LOT MORE THAN USUAL: NOT AT ALL
2. FEELING DOWN, DEPRESSED OR HOPELESS: SEVERAL DAYS
7. TROUBLE CONCENTRATING ON THINGS, SUCH AS READING THE NEWSPAPER OR WATCHING TELEVISION: MORE THAN HALF THE DAYS
SUM OF ALL RESPONSES TO PHQ QUESTIONS 1-9: 12
4. FEELING TIRED OR HAVING LITTLE ENERGY: NEARLY EVERY DAY
SUM OF ALL RESPONSES TO PHQ QUESTIONS 1-9: 12
9. THOUGHTS THAT YOU WOULD BE BETTER OFF DEAD, OR OF HURTING YOURSELF: NOT AT ALL
5. POOR APPETITE OR OVEREATING: NEARLY EVERY DAY
SUM OF ALL RESPONSES TO PHQ9 QUESTIONS 1 & 2: 3
SUM OF ALL RESPONSES TO PHQ QUESTIONS 1-9: 12
1. LITTLE INTEREST OR PLEASURE IN DOING THINGS: MORE THAN HALF THE DAYS
8. MOVING OR SPEAKING SO SLOWLY THAT OTHER PEOPLE COULD HAVE NOTICED. OR THE OPPOSITE - BEING SO FIDGETY OR RESTLESS THAT YOU HAVE BEEN MOVING AROUND A LOT MORE THAN USUAL: NOT AT ALL

## 2024-03-20 ENCOUNTER — OFFICE VISIT (OUTPATIENT)
Dept: FAMILY MEDICINE CLINIC | Age: 79
End: 2024-03-20

## 2024-03-20 ENCOUNTER — CARE COORDINATION (OUTPATIENT)
Dept: CARE COORDINATION | Age: 79
End: 2024-03-20

## 2024-03-20 VITALS
SYSTOLIC BLOOD PRESSURE: 122 MMHG | HEART RATE: 84 BPM | DIASTOLIC BLOOD PRESSURE: 84 MMHG | WEIGHT: 211 LBS | HEIGHT: 59 IN | OXYGEN SATURATION: 97 % | BODY MASS INDEX: 42.54 KG/M2

## 2024-03-20 DIAGNOSIS — I50.22 CHRONIC SYSTOLIC (CONGESTIVE) HEART FAILURE (HCC): ICD-10-CM

## 2024-03-20 DIAGNOSIS — S14.109S INJURY OF CERVICAL SPINAL CORD, SEQUELA (HCC): ICD-10-CM

## 2024-03-20 DIAGNOSIS — K64.9 HEMORRHOIDS, UNSPECIFIED HEMORRHOID TYPE: ICD-10-CM

## 2024-03-20 DIAGNOSIS — F33.1 MODERATE EPISODE OF RECURRENT MAJOR DEPRESSIVE DISORDER (HCC): ICD-10-CM

## 2024-03-20 DIAGNOSIS — I10 ESSENTIAL HYPERTENSION: ICD-10-CM

## 2024-03-20 DIAGNOSIS — Z00.00 MEDICARE ANNUAL WELLNESS VISIT, SUBSEQUENT: Primary | ICD-10-CM

## 2024-03-20 DIAGNOSIS — N18.30 STAGE 3 CHRONIC KIDNEY DISEASE, UNSPECIFIED WHETHER STAGE 3A OR 3B CKD (HCC): ICD-10-CM

## 2024-03-20 DIAGNOSIS — G62.0 DRUG-INDUCED POLYNEUROPATHY (HCC): ICD-10-CM

## 2024-03-20 DIAGNOSIS — C48.2 MALIGNANT NEOPLASM OF PERITONEUM, UNSPECIFIED (HCC): ICD-10-CM

## 2024-03-20 DIAGNOSIS — Z89.511 HX OF BKA, RIGHT (HCC): ICD-10-CM

## 2024-03-20 DIAGNOSIS — R79.9 ABNORMAL FINDING OF BLOOD CHEMISTRY, UNSPECIFIED: ICD-10-CM

## 2024-03-20 DIAGNOSIS — E66.01 OBESITY, CLASS III, BMI 40-49.9 (MORBID OBESITY) (HCC): ICD-10-CM

## 2024-03-20 NOTE — CARE COORDINATION
Case referred to this writer by Dr. Rizzo to assist patient with assisted living information.  Telephone call to patient.  Left voicemail of purpose of call with request for return phone call.  Call back number was provided.

## 2024-03-20 NOTE — PROGRESS NOTES
Medicare Annual Wellness Visit    Alysha Rodriguez is here for Medicare AWV, Personal Problem (States she has a difficult time taking care of herself and has interest in discussing criteria for nursing homes), and Hemorrhoids (States she has concerns over hemorrhoids and states she is concerned because she has 3 generations of colon cancer in the family.)    Assessment & Plan   Medicare annual wellness visit, subsequent  -     CBC; Future  -     Comprehensive Metabolic Panel; Future  -     Lipid Panel; Future  -     Hemoglobin A1C; Future  -     TSH; Future  Essential hypertension  -     CBC; Future  -     Comprehensive Metabolic Panel; Future  -     Lipid Panel; Future  -     Hemoglobin A1C; Future  -     TSH; Future  Malignant neoplasm of peritoneum, 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HCC)  Hx of BKA, right (HCC)  Injury of cervical spinal cord, sequela (HCC)  Drug-induced polyneuropathy (HCC)  Chronic systolic (congestive) heart failure (HCC)  Moderate episode of recurrent major depressive disorder (HCC)  Stage 3 chronic kidney disease, unspecified whether stage 3a or 3b CKD (HCC)  -     CBC; Future  -     Comprehensive Metabolic Panel; Future  -     Lipid Panel; Future  -     Hemoglobin A1C; Future  -     TSH; Future  Injury of cervical spinal cord, sequela (HCC)  Obesity, Class III, BMI 40-49.9 (morbid obesity) (HCC)  Abnormal finding of blood chemistry, unspecified  -     Hemoglobin A1C; Future  Hemorrhoids, unspecified hemorrhoid type  Last colonoscopy done 2010, normal  Pt would like to monitor for now    Reached out to coordinated care to discuss assisted living with patient    Recommendations for Preventive Services Due: see orders and patient instructions/AVS.  Recommended screening schedule for the next 5-10 years is provided to the patient in written form: see Patient Instructions/AVS.     Return in 6 months (on 9/20/2024) for f/u chronic conditions.     Subjective   The following acute and/or chronic problems were also

## 2024-03-20 NOTE — PATIENT INSTRUCTIONS
Call [phone] or search [employer] online.  You need 4618-0485 mg of calcium and 4947-9680 IU of vitamin D per day. It is possible to meet your calcium requirement with diet alone, but a vitamin D supplement is usually necessary to meet this goal.  When exposed to the sun, use a sunscreen that protects against both UVA and UVB radiation with an SPF of 30 or greater. Reapply every 2 to 3 hours or after sweating, drying off with a towel, or swimming.  Always wear a seat belt when traveling in a car. Always wear a helmet when riding a bicycle or motorcycle.

## 2024-03-21 ENCOUNTER — CARE COORDINATION (OUTPATIENT)
Dept: CARE COORDINATION | Age: 79
End: 2024-03-21

## 2024-03-21 NOTE — CARE COORDINATION
Telephone call to patient.  Left message of reason for call and request for return phone call. Call back number was provided.

## 2024-03-22 ENCOUNTER — CARE COORDINATION (OUTPATIENT)
Dept: CARE COORDINATION | Age: 79
End: 2024-03-22

## 2024-03-22 NOTE — CARE COORDINATION
Initial Contact Social Work Note - Ambulatory  3/22/2024      Date of referral: 3/20/2024  Referral received from: Dr. Rizzo  Reason for referral: Assisted Living    Previous SW referral: No  If yes, brief summary of outcome:     Two Identifiers Verified: Yes    Insurance Provider: Medicare    Support System:  Daughter lives in [place].    [place] Status:  Not a     Community Providers: Meals on Wheels.  Has medical alert in place.      ADL Assistance Needed:  Difficulty performing ADL'S.  In power chair, spends most of the day.   Amputee on right.    Housing/Living Concerns or Home Modification Needs: Apartment since 2009..  Does not go up to loft.       Transportation Concern: Friends provide transportation to medical appointments.    Medication Cost Concern:      Medication Adherence Concern: She owes [employer] a balance and not sure how will get medication.  She is going to call [employer] to see if can set up payment plan.  Financial Concern(s):     Income (only if applicable): 2.400 dollars a month    Ability to Read/Write: Yes    Advance Care Plan:  None    Other: Patient expressed that she is in need of help at home with difficulty managing.  She looked into assisted living in [place] but had a six month residency requirement for Medicaid.  She will need Medicaid to pay for assisted living.  She would like to stay in [place] or [facility].     Identified Needs:  Assisted Living        Social Work Plan:  Assisted Living.    Next Steps: Contact Area Office on Aging of [facility].    Method of Communication With Provider (if appropriate): None       Goals Addressed    None

## 2024-03-22 NOTE — CARE COORDINATION
Telephone call with Penny/[facility] Office on Aging/[facility].  Provided patient contact information and they will be contacting her to schedule an assessment.

## 2024-03-22 NOTE — CARE COORDINATION
Telephone call to [facility] Office on Aging/[facility].  Left voicemail of purpose of call with request for return phone call.  Call back number was provided.

## 2024-03-28 RX ORDER — ACYCLOVIR 50 MG/G
CREAM TOPICAL
Qty: 5 G | Refills: 1 | Status: SHIPPED | OUTPATIENT
Start: 2024-03-28

## 2024-03-28 NOTE — TELEPHONE ENCOUNTER
Comments: Please review, thanks    Last Office Visit (last PCP visit):   3/28/2024    Next Visit Date:  No future appointments.    **If hasn't been seen in over a year OR hasn't followed up according to last diabetes/ADHD visit, make appointment for patient before sending refill to provider.    Rx requested:  Requested Prescriptions     Pending Prescriptions Disp Refills    acyclovir (ZOVIRAX) 5 % CREA 5 g 1     Sig: Apply topically as needed           .

## 2024-03-29 ENCOUNTER — TELEPHONE (OUTPATIENT)
Dept: FAMILY MEDICINE CLINIC | Age: 79
End: 2024-03-29

## 2024-03-29 NOTE — TELEPHONE ENCOUNTER
[employer] rx allows for one tube of    acyclovir (ZOVIRAX) 5 % CREA     per 30 days, they need to know if that is sufficient for the pt.  Please advise [employer],    Case number - [id number]  Please advise [employer]:  [phone]

## 2024-04-01 ENCOUNTER — CARE COORDINATION (OUTPATIENT)
Dept: CARE COORDINATION | Age: 79
End: 2024-04-01

## 2024-04-01 NOTE — CARE COORDINATION
Telephone call to Office on Aging/[facility].Left voicemail calling to check on status of assisted living waiver.  Requested return phone call.  Call back number was provided.

## 2024-04-01 NOTE — CARE COORDINATION
Telephone call with patient .  She has not heard from Office on [facility].  Discussed plan to reach out to Office on Aging /[facility].

## 2024-04-02 ENCOUNTER — CARE COORDINATION (OUTPATIENT)
Dept: CARE COORDINATION | Age: 79
End: 2024-04-02

## 2024-04-02 NOTE — CARE COORDINATION
Received voicemail from Niki/[facility] Office on Aging.  She indicated that patient has an assessment scheduled for 4/3/2024 at noon with Flor.

## 2024-04-02 NOTE — CARE COORDINATION
Telephone call with patient.  She indicated that she got a call today about assessment scheduled for tomorrow at noon.

## 2024-04-04 DIAGNOSIS — R79.9 ABNORMAL FINDING OF BLOOD CHEMISTRY, UNSPECIFIED: ICD-10-CM

## 2024-04-04 DIAGNOSIS — Z00.00 MEDICARE ANNUAL WELLNESS VISIT, SUBSEQUENT: ICD-10-CM

## 2024-04-04 DIAGNOSIS — N18.30 STAGE 3 CHRONIC KIDNEY DISEASE, UNSPECIFIED WHETHER STAGE 3A OR 3B CKD (HCC): ICD-10-CM

## 2024-04-04 DIAGNOSIS — I10 ESSENTIAL HYPERTENSION: ICD-10-CM

## 2024-04-04 LAB
ALBUMIN SERPL-MCNC: 4.2 G/DL (ref 3.5–4.6)
ALP SERPL-CCNC: 93 U/L (ref 40–130)
ALT SERPL-CCNC: 6 U/L (ref 0–33)
ANION GAP SERPL CALCULATED.3IONS-SCNC: 12 MEQ/L (ref 9–15)
AST SERPL-CCNC: 21 U/L (ref 0–35)
BILIRUB SERPL-MCNC: 0.8 MG/DL (ref 0.2–0.7)
BUN SERPL-MCNC: 20 MG/DL (ref 8–23)
CALCIUM SERPL-MCNC: 10.3 MG/DL (ref 8.5–9.9)
CHLORIDE SERPL-SCNC: 102 MEQ/L (ref 95–107)
CHOLEST SERPL-MCNC: 146 MG/DL (ref 0–199)
CO2 SERPL-SCNC: 27 MEQ/L (ref 20–31)
CREAT SERPL-MCNC: 0.85 MG/DL (ref 0.5–0.9)
ERYTHROCYTE [DISTWIDTH] IN BLOOD BY AUTOMATED COUNT: 14.8 % (ref 11.5–14.5)
GLOBULIN SER CALC-MCNC: 2.7 G/DL (ref 2.3–3.5)
GLUCOSE SERPL-MCNC: 98 MG/DL (ref 70–99)
HCT VFR BLD AUTO: 47.7 % (ref 37–47)
HDLC SERPL-MCNC: 53 MG/DL (ref 40–59)
HGB BLD-MCNC: 15.5 G/DL (ref 12–16)
LDLC SERPL CALC-MCNC: 70 MG/DL (ref 0–129)
MCH RBC QN AUTO: 30.1 PG (ref 27–31.3)
MCHC RBC AUTO-ENTMCNC: 32.5 % (ref 33–37)
MCV RBC AUTO: 92.6 FL (ref 79.4–94.8)
PLATELET # BLD AUTO: 226 K/UL (ref 130–400)
POTASSIUM SERPL-SCNC: 3.8 MEQ/L (ref 3.4–4.9)
PROT SERPL-MCNC: 6.9 G/DL (ref 6.3–8)
RBC # BLD AUTO: 5.15 M/UL (ref 4.2–5.4)
SODIUM SERPL-SCNC: 141 MEQ/L (ref 135–144)
TRIGL SERPL-MCNC: 114 MG/DL (ref 0–150)
TSH SERPL-MCNC: 1.74 UIU/ML (ref 0.44–3.86)
WBC # BLD AUTO: 6.5 K/UL (ref 4.8–10.8)

## 2024-04-05 LAB
ESTIMATED AVERAGE GLUCOSE: 105 MG/DL
HBA1C MFR BLD: 5.3 % (ref 4–6)

## 2024-04-05 NOTE — RESULT ENCOUNTER NOTE
Pt aware of results. States that she does not take a calcium supplement. Was asking if her bilirubin level was too high.

## 2024-04-05 NOTE — RESULT ENCOUNTER NOTE
Please let patient know her lab work was stable, her calcium was slightly elevated.  Did she take her calcium supplement prior to her lab work?  Thank you

## 2024-04-08 ENCOUNTER — HOSPITAL ENCOUNTER (INPATIENT)
Age: 79
LOS: 1 days | Discharge: HOME HEALTH | DRG: 309 | End: 2024-04-09
Attending: EMERGENCY MEDICINE | Admitting: INTERNAL MEDICINE
Payer: MEDICARE

## 2024-04-08 ENCOUNTER — APPOINTMENT (OUTPATIENT)
Age: 79
DRG: 309 | End: 2024-04-08
Payer: MEDICARE

## 2024-04-08 ENCOUNTER — APPOINTMENT (OUTPATIENT)
Dept: CT IMAGING | Age: 79
DRG: 309 | End: 2024-04-08
Payer: MEDICARE

## 2024-04-08 DIAGNOSIS — M14.60 ARTHRITIS, NEUROPATHIC: ICD-10-CM

## 2024-04-08 DIAGNOSIS — R94.31 ABNORMAL ECG: ICD-10-CM

## 2024-04-08 DIAGNOSIS — I10 UNCONTROLLED HYPERTENSION: ICD-10-CM

## 2024-04-08 DIAGNOSIS — I48.91 ATRIAL FIBRILLATION WITH RAPID VENTRICULAR RESPONSE (HCC): Primary | ICD-10-CM

## 2024-04-08 LAB
ALBUMIN SERPL-MCNC: 4 G/DL (ref 3.5–4.6)
ALP SERPL-CCNC: 99 U/L (ref 40–130)
ALT SERPL-CCNC: 10 U/L (ref 0–33)
ANION GAP SERPL CALCULATED.3IONS-SCNC: 13 MEQ/L (ref 9–15)
AST SERPL-CCNC: 23 U/L (ref 0–35)
BASOPHILS # BLD: 0.1 K/UL (ref 0–0.2)
BASOPHILS NFR BLD: 0.8 %
BILIRUB SERPL-MCNC: 0.8 MG/DL (ref 0.2–0.7)
BUN SERPL-MCNC: 20 MG/DL (ref 8–23)
CALCIUM SERPL-MCNC: 9.9 MG/DL (ref 8.5–9.9)
CHLORIDE SERPL-SCNC: 101 MEQ/L (ref 95–107)
CO2 SERPL-SCNC: 27 MEQ/L (ref 20–31)
CREAT SERPL-MCNC: 0.81 MG/DL (ref 0.5–0.9)
ECHO AO ROOT DIAM: 2.9 CM
ECHO AO ROOT INDEX: 1.55 CM/M2
ECHO AV AREA PEAK VELOCITY: 2.1 CM2
ECHO AV AREA VTI: 2 CM2
ECHO AV AREA/BSA PEAK VELOCITY: 1.1 CM2/M2
ECHO AV AREA/BSA VTI: 1.1 CM2/M2
ECHO AV CUSP MM: 1.5 CM
ECHO AV MEAN GRADIENT: 3 MMHG
ECHO AV MEAN VELOCITY: 0.9 M/S
ECHO AV PEAK GRADIENT: 5 MMHG
ECHO AV PEAK VELOCITY: 1.1 M/S
ECHO AV VELOCITY RATIO: 0.73
ECHO AV VTI: 16.6 CM
ECHO BSA: 1.97 M2
ECHO LA DIAMETER INDEX: 1.5 CM/M2
ECHO LA DIAMETER: 2.8 CM
ECHO LA TO AORTIC ROOT RATIO: 0.97
ECHO LA VOL A-L A2C: 68 ML (ref 22–52)
ECHO LA VOL A-L A4C: 57 ML (ref 22–52)
ECHO LA VOL MOD A2C: 67 ML (ref 22–52)
ECHO LA VOL MOD A4C: 55 ML (ref 22–52)
ECHO LA VOLUME AREA LENGTH: 65 ML
ECHO LA VOLUME INDEX A-L A2C: 36 ML/M2 (ref 16–34)
ECHO LA VOLUME INDEX A-L A4C: 30 ML/M2 (ref 16–34)
ECHO LA VOLUME INDEX AREA LENGTH: 35 ML/M2 (ref 16–34)
ECHO LA VOLUME INDEX MOD A2C: 36 ML/M2 (ref 16–34)
ECHO LA VOLUME INDEX MOD A4C: 29 ML/M2 (ref 16–34)
ECHO LV E' LATERAL VELOCITY: 12 CM/S
ECHO LV E' SEPTAL VELOCITY: 10 CM/S
ECHO LV EDV A2C: 52 ML
ECHO LV EDV A4C: 48 ML
ECHO LV EDV BP: 51 ML (ref 56–104)
ECHO LV EDV INDEX A4C: 26 ML/M2
ECHO LV EDV INDEX BP: 27 ML/M2
ECHO LV EDV NDEX A2C: 28 ML/M2
ECHO LV EJECTION FRACTION A2C: 69 %
ECHO LV EJECTION FRACTION A4C: 60 %
ECHO LV EJECTION FRACTION BIPLANE: 65 % (ref 55–100)
ECHO LV ESV A2C: 16 ML
ECHO LV ESV A4C: 19 ML
ECHO LV ESV BP: 18 ML (ref 19–49)
ECHO LV ESV INDEX A2C: 9 ML/M2
ECHO LV ESV INDEX A4C: 10 ML/M2
ECHO LV ESV INDEX BP: 10 ML/M2
ECHO LV FRACTIONAL SHORTENING: 31 % (ref 28–44)
ECHO LV INTERNAL DIMENSION DIASTOLE INDEX: 1.93 CM/M2
ECHO LV INTERNAL DIMENSION DIASTOLIC: 3.6 CM (ref 3.9–5.3)
ECHO LV INTERNAL DIMENSION SYSTOLIC INDEX: 1.34 CM/M2
ECHO LV INTERNAL DIMENSION SYSTOLIC: 2.5 CM
ECHO LV IVSD: 1.3 CM (ref 0.6–0.9)
ECHO LV IVSS: 1.6 CM
ECHO LV MASS 2D: 150.6 G (ref 67–162)
ECHO LV MASS INDEX 2D: 80.6 G/M2 (ref 43–95)
ECHO LV POSTERIOR WALL DIASTOLIC: 1.2 CM (ref 0.6–0.9)
ECHO LV POSTERIOR WALL SYSTOLIC: 1.7 CM
ECHO LV RELATIVE WALL THICKNESS RATIO: 0.67
ECHO LVOT AREA: 2.8 CM2
ECHO LVOT AV VTI INDEX: 0.72
ECHO LVOT DIAM: 1.9 CM
ECHO LVOT MEAN GRADIENT: 1 MMHG
ECHO LVOT PEAK GRADIENT: 2 MMHG
ECHO LVOT PEAK VELOCITY: 0.8 M/S
ECHO LVOT STROKE VOLUME INDEX: 18 ML/M2
ECHO LVOT SV: 33.7 ML
ECHO LVOT VTI: 11.9 CM
ECHO MV E VELOCITY: 0.73 M/S
ECHO MV E/E' LATERAL: 6.08
ECHO MV E/E' RATIO (AVERAGED): 6.69
ECHO MV REGURGITANT PEAK GRADIENT: 144 MMHG
ECHO MV REGURGITANT PEAK VELOCITY: 6 M/S
ECHO RV INTERNAL DIMENSION: 2.1 CM
ECHO RV TAPSE: 1.9 CM (ref 1.7–?)
ECHO TV REGURGITANT MAX VELOCITY: 3.03 M/S
ECHO TV REGURGITANT PEAK GRADIENT: 37 MMHG
EOSINOPHIL # BLD: 0.1 K/UL (ref 0–0.7)
EOSINOPHIL NFR BLD: 1.8 %
ERYTHROCYTE [DISTWIDTH] IN BLOOD BY AUTOMATED COUNT: 14.6 % (ref 11.5–14.5)
GLOBULIN SER CALC-MCNC: 2.8 G/DL (ref 2.3–3.5)
GLUCOSE SERPL-MCNC: 97 MG/DL (ref 70–99)
HCT VFR BLD AUTO: 46.6 % (ref 37–47)
HGB BLD-MCNC: 15.5 G/DL (ref 12–16)
LYMPHOCYTES # BLD: 2.2 K/UL (ref 1–4.8)
LYMPHOCYTES NFR BLD: 27.8 %
MAGNESIUM SERPL-MCNC: 1.8 MG/DL (ref 1.7–2.4)
MCH RBC QN AUTO: 30.7 PG (ref 27–31.3)
MCHC RBC AUTO-ENTMCNC: 33.3 % (ref 33–37)
MCV RBC AUTO: 92.3 FL (ref 79.4–94.8)
MONOCYTES # BLD: 0.8 K/UL (ref 0.2–0.8)
MONOCYTES NFR BLD: 10.1 %
NEUTROPHILS # BLD: 4.7 K/UL (ref 1.4–6.5)
NEUTS SEG NFR BLD: 59.1 %
PLATELET # BLD AUTO: 244 K/UL (ref 130–400)
POTASSIUM SERPL-SCNC: 3.4 MEQ/L (ref 3.4–4.9)
PROT SERPL-MCNC: 6.8 G/DL (ref 6.3–8)
RBC # BLD AUTO: 5.05 M/UL (ref 4.2–5.4)
SODIUM SERPL-SCNC: 141 MEQ/L (ref 135–144)
TROPONIN, HIGH SENSITIVITY: 17 NG/L (ref 0–19)
TROPONIN, HIGH SENSITIVITY: 18 NG/L (ref 0–19)
WBC # BLD AUTO: 7.9 K/UL (ref 4.8–10.8)

## 2024-04-08 PROCEDURE — 70450 CT HEAD/BRAIN W/O DYE: CPT

## 2024-04-08 PROCEDURE — 6370000000 HC RX 637 (ALT 250 FOR IP): Performed by: EMERGENCY MEDICINE

## 2024-04-08 PROCEDURE — 84484 ASSAY OF TROPONIN QUANT: CPT

## 2024-04-08 PROCEDURE — 2060000000 HC ICU INTERMEDIATE R&B

## 2024-04-08 PROCEDURE — 93306 TTE W/DOPPLER COMPLETE: CPT

## 2024-04-08 PROCEDURE — 80053 COMPREHEN METABOLIC PANEL: CPT

## 2024-04-08 PROCEDURE — 99285 EMERGENCY DEPT VISIT HI MDM: CPT

## 2024-04-08 PROCEDURE — 93005 ELECTROCARDIOGRAM TRACING: CPT | Performed by: EMERGENCY MEDICINE

## 2024-04-08 PROCEDURE — APPSS45 APP SPLIT SHARED TIME 31-45 MINUTES

## 2024-04-08 PROCEDURE — 93306 TTE W/DOPPLER COMPLETE: CPT | Performed by: INTERNAL MEDICINE

## 2024-04-08 PROCEDURE — 6370000000 HC RX 637 (ALT 250 FOR IP): Performed by: INTERNAL MEDICINE

## 2024-04-08 PROCEDURE — 2500000003 HC RX 250 WO HCPCS: Performed by: EMERGENCY MEDICINE

## 2024-04-08 PROCEDURE — 96376 TX/PRO/DX INJ SAME DRUG ADON: CPT

## 2024-04-08 PROCEDURE — 96374 THER/PROPH/DIAG INJ IV PUSH: CPT

## 2024-04-08 PROCEDURE — 36415 COLL VENOUS BLD VENIPUNCTURE: CPT

## 2024-04-08 PROCEDURE — 85025 COMPLETE CBC W/AUTO DIFF WBC: CPT

## 2024-04-08 PROCEDURE — 83735 ASSAY OF MAGNESIUM: CPT

## 2024-04-08 PROCEDURE — 2580000003 HC RX 258: Performed by: INTERNAL MEDICINE

## 2024-04-08 PROCEDURE — 6370000000 HC RX 637 (ALT 250 FOR IP)

## 2024-04-08 RX ORDER — SODIUM CHLORIDE 0.9 % (FLUSH) 0.9 %
5-40 SYRINGE (ML) INJECTION EVERY 12 HOURS SCHEDULED
Status: DISCONTINUED | OUTPATIENT
Start: 2024-04-08 | End: 2024-04-09 | Stop reason: HOSPADM

## 2024-04-08 RX ORDER — DILTIAZEM HYDROCHLORIDE 5 MG/ML
20 INJECTION INTRAVENOUS ONCE
Status: COMPLETED | OUTPATIENT
Start: 2024-04-08 | End: 2024-04-08

## 2024-04-08 RX ORDER — ASPIRIN 81 MG/1
81 TABLET ORAL DAILY
Status: DISCONTINUED | OUTPATIENT
Start: 2024-04-08 | End: 2024-04-09 | Stop reason: HOSPADM

## 2024-04-08 RX ORDER — ENOXAPARIN SODIUM 100 MG/ML
1 INJECTION SUBCUTANEOUS DAILY
Status: DISCONTINUED | OUTPATIENT
Start: 2024-04-08 | End: 2024-04-08

## 2024-04-08 RX ORDER — ACETAMINOPHEN 325 MG/1
650 TABLET ORAL EVERY 4 HOURS PRN
Status: DISCONTINUED | OUTPATIENT
Start: 2024-04-08 | End: 2024-04-09 | Stop reason: HOSPADM

## 2024-04-08 RX ORDER — SODIUM CHLORIDE 9 MG/ML
INJECTION, SOLUTION INTRAVENOUS PRN
Status: DISCONTINUED | OUTPATIENT
Start: 2024-04-08 | End: 2024-04-09 | Stop reason: HOSPADM

## 2024-04-08 RX ORDER — DILTIAZEM HYDROCHLORIDE 5 MG/ML
25 INJECTION INTRAVENOUS ONCE
Status: COMPLETED | OUTPATIENT
Start: 2024-04-08 | End: 2024-04-08

## 2024-04-08 RX ORDER — ACETAMINOPHEN 500 MG
1000 TABLET ORAL ONCE
Status: COMPLETED | OUTPATIENT
Start: 2024-04-08 | End: 2024-04-08

## 2024-04-08 RX ORDER — ONDANSETRON 4 MG/1
4 TABLET, ORALLY DISINTEGRATING ORAL EVERY 8 HOURS PRN
Status: DISCONTINUED | OUTPATIENT
Start: 2024-04-08 | End: 2024-04-09 | Stop reason: HOSPADM

## 2024-04-08 RX ORDER — ONDANSETRON 2 MG/ML
4 INJECTION INTRAMUSCULAR; INTRAVENOUS EVERY 6 HOURS PRN
Status: DISCONTINUED | OUTPATIENT
Start: 2024-04-08 | End: 2024-04-09 | Stop reason: HOSPADM

## 2024-04-08 RX ORDER — ATORVASTATIN CALCIUM 80 MG/1
80 TABLET, FILM COATED ORAL NIGHTLY
Status: DISCONTINUED | OUTPATIENT
Start: 2024-04-08 | End: 2024-04-09 | Stop reason: HOSPADM

## 2024-04-08 RX ORDER — SODIUM CHLORIDE 0.9 % (FLUSH) 0.9 %
5-40 SYRINGE (ML) INJECTION PRN
Status: DISCONTINUED | OUTPATIENT
Start: 2024-04-08 | End: 2024-04-09 | Stop reason: HOSPADM

## 2024-04-08 RX ORDER — CARVEDILOL 25 MG/1
25 TABLET ORAL 2 TIMES DAILY
Status: DISCONTINUED | OUTPATIENT
Start: 2024-04-08 | End: 2024-04-09 | Stop reason: HOSPADM

## 2024-04-08 RX ADMIN — ACETAMINOPHEN 325MG 650 MG: 325 TABLET ORAL at 11:47

## 2024-04-08 RX ADMIN — ACETAMINOPHEN 1000 MG: 500 TABLET ORAL at 02:14

## 2024-04-08 RX ADMIN — CARVEDILOL 25 MG: 25 TABLET, FILM COATED ORAL at 11:47

## 2024-04-08 RX ADMIN — DILTIAZEM HYDROCHLORIDE 25 MG: 5 INJECTION, SOLUTION INTRAVENOUS at 03:34

## 2024-04-08 RX ADMIN — DILTIAZEM HYDROCHLORIDE 30 MG: 30 TABLET, FILM COATED ORAL at 22:20

## 2024-04-08 RX ADMIN — APIXABAN 5 MG: 5 TABLET, FILM COATED ORAL at 11:47

## 2024-04-08 RX ADMIN — ACETAMINOPHEN 325MG 650 MG: 325 TABLET ORAL at 20:33

## 2024-04-08 RX ADMIN — SODIUM CHLORIDE, PRESERVATIVE FREE 10 ML: 5 INJECTION INTRAVENOUS at 20:33

## 2024-04-08 RX ADMIN — DILTIAZEM HYDROCHLORIDE 20 MG: 5 INJECTION, SOLUTION INTRAVENOUS at 02:13

## 2024-04-08 RX ADMIN — DILTIAZEM HYDROCHLORIDE 30 MG: 30 TABLET, FILM COATED ORAL at 16:31

## 2024-04-08 RX ADMIN — APIXABAN 5 MG: 5 TABLET, FILM COATED ORAL at 20:33

## 2024-04-08 RX ADMIN — ATORVASTATIN CALCIUM 80 MG: 80 TABLET, FILM COATED ORAL at 20:33

## 2024-04-08 RX ADMIN — ASPIRIN 81 MG: 81 TABLET, COATED ORAL at 11:47

## 2024-04-08 RX ADMIN — ACETAMINOPHEN 325MG 650 MG: 325 TABLET ORAL at 16:30

## 2024-04-08 RX ADMIN — SODIUM CHLORIDE, PRESERVATIVE FREE 10 ML: 5 INJECTION INTRAVENOUS at 08:51

## 2024-04-08 RX ADMIN — ONDANSETRON 4 MG: 4 TABLET, ORALLY DISINTEGRATING ORAL at 22:42

## 2024-04-08 RX ADMIN — CARVEDILOL 25 MG: 25 TABLET, FILM COATED ORAL at 20:33

## 2024-04-08 ASSESSMENT — ENCOUNTER SYMPTOMS
CHEST TIGHTNESS: 0
WHEEZING: 0
PHOTOPHOBIA: 0
SORE THROAT: 0
SHORTNESS OF BREATH: 0
ABDOMINAL DISTENTION: 0
EYE DISCHARGE: 0
COUGH: 0
NAUSEA: 0
SHORTNESS OF BREATH: 1
ABDOMINAL PAIN: 0
CHEST TIGHTNESS: 1
VOMITING: 0

## 2024-04-08 ASSESSMENT — PAIN DESCRIPTION - FREQUENCY: FREQUENCY: INTERMITTENT

## 2024-04-08 ASSESSMENT — LIFESTYLE VARIABLES
HOW MANY STANDARD DRINKS CONTAINING ALCOHOL DO YOU HAVE ON A TYPICAL DAY: PATIENT DOES NOT DRINK
HOW OFTEN DO YOU HAVE A DRINK CONTAINING ALCOHOL: NEVER

## 2024-04-08 ASSESSMENT — PAIN SCALES - GENERAL
PAINLEVEL_OUTOF10: 4
PAINLEVEL_OUTOF10: 5

## 2024-04-08 ASSESSMENT — PAIN - FUNCTIONAL ASSESSMENT: PAIN_FUNCTIONAL_ASSESSMENT: ACTIVITIES ARE NOT PREVENTED

## 2024-04-08 ASSESSMENT — PAIN DESCRIPTION - LOCATION: LOCATION: GENERALIZED

## 2024-04-08 ASSESSMENT — PAIN DESCRIPTION - PAIN TYPE: TYPE: CHRONIC PAIN

## 2024-04-08 ASSESSMENT — PAIN DESCRIPTION - DESCRIPTORS: DESCRIPTORS: ACHING

## 2024-04-08 ASSESSMENT — PAIN DESCRIPTION - ONSET: ONSET: ON-GOING

## 2024-04-08 NOTE — H&P
Exception - unselect if not a suspected or confirmed emergency medical condition->Emergency Medical Condition (MA) What reading provider will be dictating this exam?->CRC FINDINGS: BRAIN: Gray-white differentiation is intact.  Moderate white matter hypoattenuation which can be seen with chronic small vessel ischemic change. CSF SPACES: No hydrocephalus. Diffuse cerebral volume loss. HEMORRHAGE: No acute intracranial hemorrhage is identified. MASS EFFECT: No midline shift. SINUS/MASTOIDS: Clear. SCALP: Grossly unremarkable. CALVARIUM: Intact.     No acute intracranial abnormality.          EKG: atrial fibrillation      Assessment:      A-fib with rapid ventricular response status post RF ablation in 1992 (currently not on anticoagulation)  Normal coronaries per [facility] in 2015  HTN  HLD      Plan:  Continue to monitor on telemetry  Maximize cardiac medications-continue Coreg 25 p.o. twice daily, aspirin 81 mg p.o. daily, Lipitor 80 mg p.o. daily  Start Cardizem 30 mg every 8 hours-- not a candidate for cardioversion  Start Eliquis  Check 2D echocardiogram  Maintain potassium greater than 4 and magnesium greater than 2  GI/DVT prophylaxis  Patient to follow up with Dr. Bower at discharge  Further recommendations per Dr. Siegel        Electronically signed by YURY Vazquez CNP on 4/8/2024 at 9:24 AM      Attending Supervising Physician's Attestation Statement  The patient is a 78 y.o. female. I have performed a history and physical examination of the patient. I discussed the case with the nurse practitioner.    I reviewed the patient's Past Medical History, Past Surgical History, Medications, and Allergies.     Physical Exam:  Vitals:    04/08/24 0635 04/08/24 0656 04/08/24 0815 04/08/24 0829   BP:  (!) 162/105 (!) 138/94    Pulse: 94 98 (!) 106    Resp: 19 21 18    Temp:   97.7 °F (36.5 °C)    TempSrc:   Oral    SpO2: 93% 95% 97%    Weight:    93.9 kg (207 lb)   Height:    1.499 m (4' 11\")       Review of

## 2024-04-08 NOTE — ED PROVIDER NOTES
[facility] ED  eMERGENCY dEPARTMENT eNCOUnter      Pt Name: Alysha Rodriguez  MRN: [id number]  Birthdate 1945  Date of evaluation: 4/8/2024  Provider: Stephen Girard MD    CHIEF COMPLAINT       Chief Complaint   Patient presents with    Hypertension     HTN and fast heart rate          HISTORY OF PRESENT ILLNESS   (Location/Symptom, Timing/Onset,Context/Setting, Quality, Duration, Modifying Factors, Severity)  Note limiting factors.   Alysha Rodriguez is a 78 y.o. female who presents to the emergency department for eval ration of rapid/irregular heart rate, frontal headache and elevated blood pressure.  Patient states that she typically gets a headache in the mornings but this was worse than normal.  Symptoms continued throughout the day and before bed when she had development of palpitations or feeling her heart was beating fast and irregular.  She has a remote history of atrial fibrillation receiving a radioablation in 1992 that was successful and she is on no rate controlling meds since.  She denies associated chest pain or lightheadedness.  No nausea or vomiting.  No blurred vision.  No focal neurodeficits.    HPI    NursingNotes were reviewed.    REVIEW OF SYSTEMS    (2-9 systems for level 4, 10 or more for level 5)     Review of Systems   Constitutional:  Negative for chills and diaphoresis.   HENT:  Negative for congestion, ear pain, mouth sores and sore throat.    Eyes:  Negative for photophobia and discharge.   Respiratory:  Negative for cough, chest tightness, shortness of breath and wheezing.    Cardiovascular:  Positive for palpitations. Negative for chest pain.   Gastrointestinal:  Negative for abdominal distention, abdominal pain, nausea and vomiting.   Endocrine: Negative for cold intolerance.   Genitourinary:  Negative for difficulty urinating.   Musculoskeletal:  Negative for arthralgias.   Skin:  Negative for pallor and rash.   Allergic/Immunologic: Negative for immunocompromised

## 2024-04-08 NOTE — ACP (ADVANCE CARE PLANNING)
Advance Care Planning   Healthcare Decision Maker:    Primary Decision Maker: Maximino,Tania - Child - [phone]    Secondary Decision Maker: Afia Castaneda - Other - [phone]    Click here to complete Healthcare Decision Makers including selection of the Healthcare Decision Maker Relationship (ie \"Primary\").  Today we documented Decision Maker(s) consistent with Legal Next of Kin hierarchy.

## 2024-04-09 ENCOUNTER — CARE COORDINATION (OUTPATIENT)
Dept: CARE COORDINATION | Age: 79
End: 2024-04-09

## 2024-04-09 VITALS
WEIGHT: 205.69 LBS | HEIGHT: 59 IN | BODY MASS INDEX: 41.47 KG/M2 | OXYGEN SATURATION: 94 % | RESPIRATION RATE: 16 BRPM | TEMPERATURE: 97.4 F | SYSTOLIC BLOOD PRESSURE: 154 MMHG | DIASTOLIC BLOOD PRESSURE: 102 MMHG | HEART RATE: 106 BPM

## 2024-04-09 DIAGNOSIS — I20.89 ANGINA AT REST (HCC): Primary | ICD-10-CM

## 2024-04-09 LAB — TROPONIN, HIGH SENSITIVITY: 17 NG/L (ref 0–19)

## 2024-04-09 PROCEDURE — 84484 ASSAY OF TROPONIN QUANT: CPT

## 2024-04-09 PROCEDURE — 99239 HOSP IP/OBS DSCHRG MGMT >30: CPT | Performed by: INTERNAL MEDICINE

## 2024-04-09 PROCEDURE — 36415 COLL VENOUS BLD VENIPUNCTURE: CPT

## 2024-04-09 PROCEDURE — 6370000000 HC RX 637 (ALT 250 FOR IP): Performed by: INTERNAL MEDICINE

## 2024-04-09 PROCEDURE — 2580000003 HC RX 258: Performed by: INTERNAL MEDICINE

## 2024-04-09 PROCEDURE — 6370000000 HC RX 637 (ALT 250 FOR IP)

## 2024-04-09 PROCEDURE — APPSS60 APP SPLIT SHARED TIME 46-60 MINUTES

## 2024-04-09 RX ORDER — DILTIAZEM HYDROCHLORIDE 120 MG/1
120 CAPSULE, COATED, EXTENDED RELEASE ORAL DAILY
Qty: 30 CAPSULE | Refills: 3 | Status: SHIPPED | OUTPATIENT
Start: 2024-04-09 | End: 2024-04-11 | Stop reason: SDUPTHER

## 2024-04-09 RX ADMIN — ACETAMINOPHEN 325MG 650 MG: 325 TABLET ORAL at 10:32

## 2024-04-09 RX ADMIN — CARVEDILOL 25 MG: 25 TABLET, FILM COATED ORAL at 08:51

## 2024-04-09 RX ADMIN — ASPIRIN 81 MG: 81 TABLET, COATED ORAL at 08:51

## 2024-04-09 RX ADMIN — DILTIAZEM HYDROCHLORIDE 30 MG: 30 TABLET, FILM COATED ORAL at 06:35

## 2024-04-09 RX ADMIN — SODIUM CHLORIDE, PRESERVATIVE FREE 10 ML: 5 INJECTION INTRAVENOUS at 08:51

## 2024-04-09 RX ADMIN — DILTIAZEM HYDROCHLORIDE 30 MG: 30 TABLET, FILM COATED ORAL at 14:03

## 2024-04-09 RX ADMIN — APIXABAN 5 MG: 5 TABLET, FILM COATED ORAL at 08:51

## 2024-04-09 RX ADMIN — ONDANSETRON 4 MG: 4 TABLET, ORALLY DISINTEGRATING ORAL at 08:51

## 2024-04-09 RX ADMIN — ACETAMINOPHEN 325MG 650 MG: 325 TABLET ORAL at 06:35

## 2024-04-09 ASSESSMENT — ENCOUNTER SYMPTOMS
CHEST TIGHTNESS: 1
CONSTIPATION: 0
SHORTNESS OF BREATH: 1
NAUSEA: 1
DIARRHEA: 0
VOMITING: 0

## 2024-04-09 ASSESSMENT — PAIN - FUNCTIONAL ASSESSMENT
PAIN_FUNCTIONAL_ASSESSMENT: ACTIVITIES ARE NOT PREVENTED
PAIN_FUNCTIONAL_ASSESSMENT: PREVENTS OR INTERFERES SOME ACTIVE ACTIVITIES AND ADLS

## 2024-04-09 ASSESSMENT — PAIN DESCRIPTION - LOCATION
LOCATION: BACK

## 2024-04-09 ASSESSMENT — PAIN DESCRIPTION - DESCRIPTORS
DESCRIPTORS: ACHING
DESCRIPTORS: ACHING
DESCRIPTORS: SORE
DESCRIPTORS: ACHING

## 2024-04-09 ASSESSMENT — PAIN DESCRIPTION - DIRECTION
RADIATING_TOWARDS: LOWER BACK
RADIATING_TOWARDS: LOWER BACK

## 2024-04-09 ASSESSMENT — PAIN DESCRIPTION - ORIENTATION
ORIENTATION: LOWER

## 2024-04-09 ASSESSMENT — PAIN DESCRIPTION - ONSET
ONSET: ON-GOING
ONSET: PROGRESSIVE
ONSET: ON-GOING

## 2024-04-09 ASSESSMENT — PAIN DESCRIPTION - PAIN TYPE
TYPE: CHRONIC PAIN

## 2024-04-09 ASSESSMENT — PAIN SCALES - GENERAL
PAINLEVEL_OUTOF10: 7
PAINLEVEL_OUTOF10: 5
PAINLEVEL_OUTOF10: 0
PAINLEVEL_OUTOF10: 5
PAINLEVEL_OUTOF10: 3
PAINLEVEL_OUTOF10: 0

## 2024-04-09 ASSESSMENT — PAIN DESCRIPTION - FREQUENCY
FREQUENCY: INTERMITTENT

## 2024-04-09 NOTE — DISCHARGE INSTR - COC
Continuity of Care Form    Patient Name: Alysha Rodriguez   :  1945  MRN:  [id number]    Admit date:  2024  Discharge date:  ***    Code Status Order: Full Code   Advance Directives:     Admitting Physician:  Ramu Bower DO  PCP: Cat Rizzo DO    Discharging Nurse: ***  Discharging Hospital Unit/Room#: W168/W168-01  Discharging Unit Phone Number: ***    Emergency Contact:   Extended Emergency Contact Information  Primary Emergency Contact: Afia Castaneda   [facility]  Home Phone: [phone]  Work Phone: [phone]  Mobile Phone: [phone]  Relation: Other  Secondary Emergency Contact: Tania Stnaton   [facility]  Home Phone: [phone]  Work Phone: [phone]  Mobile Phone: [phone]  Relation: Child    Past Surgical History:  Past Surgical History:   Procedure Laterality Date    ABDOMEN SURGERY Left 2017    EXCISION OF CHEST WALL MASS, UPDATE LABS ON ADMIT  performed by Dominguez Wynn MD at [facility] OR    APPENDECTOMY      BACK SURGERY      BLEPHAROPLASTY  2013    both eyes    CARDIAC CATHETERIZATION      COLONOSCOPY  2010    normal    CYSTOSCOPY W BIOPSY OF BLADDER N/A 2017    CYSTOSCOPY BLADDER BIOPSY AND FULGURATION performed by Alan Izquierdo MD at [facility] OR    EYE SURGERY Bilateral 2012    cataract removal with IOL implants    HYSTERECTOMY (CERVIX STATUS UNKNOWN)      JOINT REPLACEMENT  , 2005, 2009    LEG AMPUTATION BELOW KNEE Right 2011    DR GALLEGO due to CHARCOTS    MASTECTOMY Bilateral 2015    Bilateral simple mastectomies with right SNB by DR WYNN    MASTECTOMY, BILATERAL  16    PARTIAL HYSTERECTOMY (CERVIX NOT REMOVED)      OH EXC CYST/ABERRANT BREAST TISSUE OPEN 1/> LESION Left 2017    CORE NEEDLE BIOPSY OF  LEFT BREAST MASS performed by Dominguez Wynn MD at [facility] OR    SKIN BIOPSY      EAR    SPINE SURGERY      cervical and lumbar    TONSILLECTOMY         Immunization History:

## 2024-04-09 NOTE — DISCHARGE SUMMARY
04/09/2024  Value: 17          Ref range: 0 - 19 ng/L        Status: Final                Comment: High Sensitivity Troponin values cannot be compared with  other Troponin methodologies.    ------------    Radiology last 7 days:  CT HEAD WO CONTRAST    Result Date: 4/8/2024  No acute intracranial abnormality.        [unfilled]    Discharge Medications    Current Discharge Medication List    START taking these medications    apixaban (ELIQUIS) 5 MG TABS tablet  Take 1 tablet by mouth 2 times daily  Qty: 60 tablet Refills: 5    dilTIAZem (CARDIZEM) 30 MG tablet  Take 1 tablet by mouth every 8 hours  Qty: 120 tablet Refills: 3          Current Discharge Medication List        Current Discharge Medication List    CONTINUE these medications which have NOT CHANGED    acyclovir (ZOVIRAX) 5 % CREA  Apply topically as needed  Qty: 5 g Refills: 1    FUROSEMIDE PO  Take 20 mg by mouth 2 times daily    buPROPion (WELLBUTRIN) 75 MG tablet  Take 1 tablet by mouth 2 times daily    levothyroxine (SYNTHROID) 50 MCG tablet  TAKE 1 TABLET BY MOUTH  DAILY  Qty: 90 tablet Refills: 3  Comments: Requesting 1 year supply    gabapentin (NEURONTIN) 600 MG tablet  TAKE 1 TABLET BY MOUTH IN  THE MORNING, 2 TABLETS IN  THE AFTERNOON, AND 2  TABLETS IN THE EVENING AS  TOLERATED  Qty: 450 tablet Refills: 3  Comments: Requesting 1 year supply  Associated Diagnoses:Cervical stenosis of spinal canal; Spondylolisthesis, unspecified spinal region    traZODone (DESYREL) 50 MG tablet  Take 1 tablet by mouth nightly as needed for Sleep  Qty: 90 tablet Refills: 1  Associated Diagnoses:Insomnia, unspecified type    DULoxetine (CYMBALTA) 30 MG extended release capsule  TAKE 1 CAPSULE BY MOUTH  DAILY  Qty: 90 capsule Refills: 3  Comments: Requesting 1 year supply  Associated Diagnoses:Moderate episode of recurrent major depressive disorder (HCC)    diclofenac (VOLTAREN) 50 MG EC tablet  TAKE 1 TABLET BY MOUTH  TWICE DAILY  Qty: 180 tablet Refills:

## 2024-04-09 NOTE — PROGRESS NOTES
0800: assessment completed. Patient sitting up in bed playing on cell phone. Patient has complaint of nausea that started when she opened her breakfast container and smelt the jon. Will be medicated with PRN anti-nausea medication. Patient also requesting something for pain, but made aware it is not time due to having tylenol at 0630 am. Will continue to monitor.     1030: Patient complaint of uncomfortable in bed. Recliner chair obtained and assisted patient into recliner. Will reassess comfort.     1425: Patient given jello per request due assist with nausea.     1555: patient unable to find a ride home.  made aware to attempt physicians ems.     1733: Reviewed discharge instructions with patient. Patient verbalized understanding of discharge instructions. IVs removed. Dressing applied. No complications noted. Tele removed. Patient assisted getting dressed. Awaiting physician ambulance arrival to take patient home.   
symptoms.  Blood pressure 149/109, heart rate 92  EKG shows atrial fibrillation 83 bpm  Telemetry shows A-fib with rate controlled at 87    CMP 4/8/2024: Electrolytes within normal limits, creatinine 0.  8 1, GFR 73  CBC 4/8/2024: WBC 7.9, hemoglobin 15.5, hematocrit 46.6, platelet 244  Echo from 4/8/2024 showed EF of 65 to 70%.  Atrial fibrillation with mild RVR noted throughout the study, heart rate 110s         No Known Allergies    Current Facility-Administered Medications   Medication Dose Route Frequency Provider Last Rate Last Admin    sodium chloride flush 0.9 % injection 5-40 mL  5-40 mL IntraVENous 2 times per day Holiday, Ramu MARIN, DO   10 mL at 04/09/24 0851    sodium chloride flush 0.9 % injection 5-40 mL  5-40 mL IntraVENous PRN Holiday, Ramu MARIN, DO        0.9 % sodium chloride infusion   IntraVENous PRN Holiday, Ramu MARIN, DO        acetaminophen (TYLENOL) tablet 650 mg  650 mg Oral Q4H PRN Holiday, Ramu MARIN, DO   650 mg at 04/09/24 0635    ondansetron (ZOFRAN-ODT) disintegrating tablet 4 mg  4 mg Oral Q8H PRN Holiday, Ramu MARIN, DO   4 mg at 04/09/24 0851    Or    ondansetron (ZOFRAN) injection 4 mg  4 mg IntraVENous Q6H PRN Holiday, Ramu MARIN, DO        apixaban (ELIQUIS) tablet 5 mg  5 mg Oral BID Sid Gilliam APRN - CNP   5 mg at 04/09/24 0851    dilTIAZem (CARDIZEM) tablet 30 mg  30 mg Oral 3 times per day Sid Gilliam APRN - CNP   30 mg at 04/09/24 0635    carvedilol (COREG) tablet 25 mg  25 mg Oral BID Sid Gilliam APRN - CNP   25 mg at 04/09/24 0851    aspirin EC tablet 81 mg  81 mg Oral Daily Sid Gilliam APRN - CNP   81 mg at 04/09/24 0851    atorvastatin (LIPITOR) tablet 80 mg  80 mg Oral Nightly Sid Gilliam APRN - CNP   80 mg at 04/08/24 2033       PMHx:  Past Medical History:   Diagnosis Date    Arthritis     Blood transfusion     CAD (coronary artery disease)     Cancer (HCC)     GALLBLADDER 2009, 2011 [facility]    Charcot's joint     left foot    Chest pain 7/2/2015    Colitis     DDD

## 2024-04-09 NOTE — PLAN OF CARE
Problem: Discharge Planning  Goal: Discharge to home or other facility with appropriate resources  4/9/2024 0346 by Caitlin Gaspar RN  Outcome: Progressing  Flowsheets (Taken 4/8/2024 2027)  Discharge to home or other facility with appropriate resources:   Identify barriers to discharge with patient and caregiver   Arrange for needed discharge resources and transportation as appropriate   Identify discharge learning needs (meds, wound care, etc)  4/8/2024 1459 by Kianna Mcknight RN  Outcome: Progressing     Problem: Skin/Tissue Integrity  Goal: Absence of new skin breakdown  Description: 1.  Monitor for areas of redness and/or skin breakdown  2.  Assess vascular access sites hourly  3.  Every 4-6 hours minimum:  Change oxygen saturation probe site  4.  Every 4-6 hours:  If on nasal continuous positive airway pressure, respiratory therapy assess nares and determine need for appliance change or resting period.  4/9/2024 0346 by Caitlin Gaspar RN  Outcome: Progressing  4/8/2024 1459 by Kianna Mcknight RN  Outcome: Progressing     Problem: Safety - Adult  Goal: Free from fall injury  4/9/2024 0346 by Caitlin Gaspar RN  Outcome: Progressing  4/8/2024 1459 by Kianna Mcknight RN  Outcome: Progressing     Problem: Pain  Goal: Verbalizes/displays adequate comfort level or baseline comfort level  4/9/2024 0346 by Caitlin Gaspar RN  Outcome: Progressing  Flowsheets (Taken 4/8/2024 2027)  Verbalizes/displays adequate comfort level or baseline comfort level:   Encourage patient to monitor pain and request assistance   Assess pain using appropriate pain scale   Administer analgesics based on type and severity of pain and evaluate response  4/8/2024 1459 by Kianna Mcknight RN  Outcome: Progressing     Problem: Chronic Conditions and Co-morbidities  Goal: Patient's chronic conditions and co-morbidity symptoms are monitored and maintained or improved  4/9/2024 0346 by Caitlin Gaspar RN  Outcome: Progressing  Flowsheets (Taken 4/8/2024

## 2024-04-09 NOTE — CARE COORDINATION
Review of chart indicates patient is an inpatient at [facility].  Will continue to follow .

## 2024-04-09 NOTE — CARE COORDINATION
SPOKE WITH [facility], HAS ACCEPTED SURESH ORTIZ ON CHART.    
with basic dialogue that supports the patient's individualized plan of care/goals and shares the quality data associated with the providers was provided to:     Patient Representative Name:       The Patient and/or Patient Representative Agree with the Discharge Plan?      Alisha Mendoza RN  Case Management Department

## 2024-04-09 NOTE — CONSULTS
Spiritual Care Services     Summary of Visit:   visited patient for a spiritual care consult. Patient is feeling anxious about decisions her health might cause her to make in the near future. Patient is also struggling with issues of grief over the the recent deaths of two of her children. Patient relies on friends for support since her living family lives in [place]. Patient is a member of a local [employer] but being homebound limits her connection.  provided active listening and prayer to help patient feel more connected and to process her feelings.    Encounter Summary  Encounter Overview/Reason : Initial Encounter, Spiritual/Emotional Needs, Grief, Loss, and Adjustments  Service Provided For:: Patient  Referral/Consult From:: Nurse  Support System: Children, Family members, Judaism/divya community, Friends/neighbors  Complexity of Encounter: Moderate  Begin Time: 1500  End Time : 1530  Total Time Calculated: 30 min        Spiritual/Emotional needs  Type: Spiritual Support     Grief, Loss, and Adjustments  Type: Grief and loss                Spiritual Assessment/Intervention/Outcomes:    Assessment: Anxious, Decisional conflict, Sad, Powerlessness    Intervention: Active listening, Discussed meaning/purpose, Discussed illness injury and it’s impact, Explored Coping Skills/Resources, Prayer (assurance of)/Hamm[place]    Outcome: Comfort, Concerns relieved, Less anxious, Less agitated, Receptive      Care Plan:    Plan and Referrals  Plan/Referrals: Continue Support (comment)          Spiritual Care Services   Electronically signed by Chaplain Belén on 4/9/2024 at 3:48 PM.    To reach a  for emotional and spiritual support, place an EPIC consult request.   If a  is needed immediately, dial “0” and ask to page the on-call .

## 2024-04-10 ENCOUNTER — CARE COORDINATION (OUTPATIENT)
Dept: CARE COORDINATION | Age: 79
End: 2024-04-10

## 2024-04-10 ENCOUNTER — TELEPHONE (OUTPATIENT)
Dept: CARDIOLOGY CLINIC | Age: 79
End: 2024-04-10

## 2024-04-10 DIAGNOSIS — I48.91 ATRIAL FIBRILLATION WITH RAPID VENTRICULAR RESPONSE (HCC): Primary | ICD-10-CM

## 2024-04-10 PROCEDURE — 1111F DSCHRG MED/CURRENT MED MERGE: CPT | Performed by: STUDENT IN AN ORGANIZED HEALTH CARE EDUCATION/TRAINING PROGRAM

## 2024-04-10 NOTE — TELEPHONE ENCOUNTER
Received a call from [facility] in [place] for medication clarification for patient's diltiazem. Diltiazem ordered as 120 ER daily as per medication list.     Also notified that Eliquis is not covered by patient's insurance. Per pharmacist, patient's insurance will cover Xarelto. Message sent to Dr. Bower and Sid.

## 2024-04-10 NOTE — CARE COORDINATION
Care Transitions Initial Follow Up Call    Call within 2 business days of discharge: Yes    Patient Current Location:  Home: [address] [place] [address] Dr  [place] [address]    Care Transition Nurse contacted the patient by telephone to perform post hospital discharge assessment. Verified name and  with patient as identifiers. Provided introduction to self, and explanation of the Care Transition Nurse role.     Patient: Alysha Rodriguez Patient : 1945   MRN: <[id number]>  Reason for Admission: 2024 - 2024 [facility] IP. AF w/ RVR.  Discharge Date: 24 RARS: Readmission Risk Score: 14.1  NR  CT    [facility]    Hosp FU 4/15 2:00    START taking:  apixaban (ELIQUIS)  dilTIAZem (Cardizem CD)    Last Discharge Facility       Date Complaint Diagnosis Description Type Department Provider    24 Hypertension Atrial fibrillation with rapid ventricular response (HCC) ... ED to Hosp-Admission (Discharged) (ADMITTED) [id number] Ramu Bower, DO; Roberto Girard...            Was this an external facility discharge? No Discharge Facility: [place]    Challenges to be reviewed by the provider   Additional needs identified to be addressed with provider: No  none               Method of communication with provider: none.    CTN spoke w/ Alysha. SW/PV, MSW routed to follow for support w/ home hosp bed and special lift shoe and/or other eligible support services.     Pt reports she has positional fleeting dizziness w/ turns and rising/lying. States she has occasional chest pressure when lying down and states this is d/t her HF. Has difficulty lying on back or left side d/t chronic back pain. Sleeps on right side. Says back pain flare d/t hospital bed. She currently rests on Tempurpedic mattress at home. Pt has R BKA and wears prosthesis. States she has some LE edema to stump and left leg to mid calf. States she gets hand edema when she gains wt. Has trouble standing long periods d/t back pain. Wears left leg brace.

## 2024-04-10 NOTE — CARE COORDINATION
Telephone call with patient.  She indicated that assessment was done for assisted living and she was told she was not eligible.  They indicated patient would qualify for PASSPORT program but she would have to pay 178 dollars a month.  She doesn't feel she cannot afford this.  Her biggest concern at this time is affording Eliquis  .  Discussed patient assistance program and did investigation and Wanda Stout does have a program.  Discussed  plan to print off application and mail to her. She is also going to check on another medication that she may need assistance with obtaining.

## 2024-04-10 NOTE — CARE COORDINATION
Telephone call with patient.  Discussed reviewed patient assistance program and she would need to have spent 3% of annual household income on out of pocket expenses for prescriptions  to qualify for program.  Patient indicated that she believes that she has only spent 258.00 out of pocket and would not qualify for program.    Discussed plan to continue to mail out application for future reference.

## 2024-04-11 ENCOUNTER — CARE COORDINATION (OUTPATIENT)
Dept: CARE COORDINATION | Age: 79
End: 2024-04-11

## 2024-04-11 ENCOUNTER — TELEPHONE (OUTPATIENT)
Dept: CARDIOLOGY CLINIC | Age: 79
End: 2024-04-11

## 2024-04-11 RX ORDER — DILTIAZEM HYDROCHLORIDE 120 MG/1
120 CAPSULE, COATED, EXTENDED RELEASE ORAL DAILY
Qty: 90 CAPSULE | Refills: 3 | Status: SHIPPED | OUTPATIENT
Start: 2024-04-11

## 2024-04-11 NOTE — TELEPHONE ENCOUNTER
Called patient to notify her that Eliquis was not covered by her insurance, but Xarelto was. Prescription yesterday changed to Xarelto per Sid. Patient verbalized understanding. New prescription sent to [facility] [place] for Cardizem. Patient able to get a coupon code via [employer] to use on medication to make it a more reasonable price. Patient also provided with information on [place] pharmacies to look into on prices for medication. Patient will either call back to office or send a My Chart message to notify us which pharmacy she would like to use for Xarelto.

## 2024-04-11 NOTE — CARE COORDINATION
Telephone call to [employer].  Representative indicated that do have a medication assistance program.  Their is a cap of 800 dollars that they can help with for medications.  Patient would need to call and ask for Kandice Sánchez.

## 2024-04-11 NOTE — TELEPHONE ENCOUNTER
New prescription sent to [facility].     Requesting medication refill. Please approve or deny this request.    Rx requested:  Requested Prescriptions     Pending Prescriptions Disp Refills    dilTIAZem (CARDIZEM CD) 120 MG extended release capsule 30 capsule 3     Sig: Take 1 capsule by mouth daily         Last Office Visit:   Visit date not found      Next Visit Date:  Future Appointments   Date Time Provider Department Center   4/15/2024  8:30 AM [facility] N[facility] MED INJECTION ROOM 2 [facility] Fac RAD   4/15/2024  9:30 AM [facility] NUCLEAR MEDICINE ROOM 1 [facility] NUC MED [facility] Fac RAD   4/15/2024 10:00 AM [facility] STRESS LAB 1 [facility]  [facility] Fac RAD   4/15/2024 11:30 AM [facility] NUCLEAR MEDICINE ROOM 2 [facility] NUC MED [facility] Fac RAD   4/15/2024  2:00 PM Cat Rizzo DO [facility]   5/2/2024  3:00 PM Sid Gilliam, APRN - CNP [place] [facility]

## 2024-04-11 NOTE — CARE COORDINATION
Telephone call with patient.  She feels she has her medication situation straightened out.  She can get her Eliquis from [facility] three months for 53 dollars.  .  Cardizem is zero co-pay for three months for [facility].  Provided patient with contact information for [employer]/Kandice Sánchez for medication assistance.  She has someone who she pays to does housekeeping. She feels that her daughter maybe able to help with paying someone to help her out with personal care. Discussed that [employer] has transportation to out of town health appointments. Her friends provide transportation to local appointments.  She also has a private taxi company that she can use for transportation too.  Patient stated she is familiar with [employer] as they do provide her meals-on-wheels.

## 2024-04-12 LAB
EKG ATRIAL RATE: 115 BPM
EKG ATRIAL RATE: 119 BPM
EKG Q-T INTERVAL: 338 MS
EKG Q-T INTERVAL: 406 MS
EKG QRS DURATION: 74 MS
EKG QRS DURATION: 82 MS
EKG QTC CALCULATION (BAZETT): 477 MS
EKG QTC CALCULATION (BAZETT): 485 MS
EKG R AXIS: 11 DEGREES
EKG R AXIS: 14 DEGREES
EKG T AXIS: 21 DEGREES
EKG T AXIS: 7 DEGREES
EKG VENTRICULAR RATE: 124 BPM
EKG VENTRICULAR RATE: 83 BPM

## 2024-04-15 ENCOUNTER — HOSPITAL ENCOUNTER (OUTPATIENT)
Dept: NUCLEAR MEDICINE | Age: 79
Discharge: HOME | End: 2024-04-17

## 2024-04-15 ENCOUNTER — OFFICE VISIT (OUTPATIENT)
Dept: FAMILY MEDICINE CLINIC | Age: 79
End: 2024-04-15
Payer: MEDICARE

## 2024-04-15 ENCOUNTER — HOSPITAL ENCOUNTER (OUTPATIENT)
Dept: NUCLEAR MEDICINE | Age: 79
Discharge: HOME | End: 2024-04-17
Payer: MEDICARE

## 2024-04-15 ENCOUNTER — HOSPITAL ENCOUNTER (OUTPATIENT)
Age: 79
Discharge: HOME | End: 2024-04-17

## 2024-04-15 VITALS
HEART RATE: 102 BPM | BODY MASS INDEX: 42.94 KG/M2 | WEIGHT: 213 LBS | OXYGEN SATURATION: 96 % | DIASTOLIC BLOOD PRESSURE: 92 MMHG | HEIGHT: 59 IN | SYSTOLIC BLOOD PRESSURE: 140 MMHG

## 2024-04-15 DIAGNOSIS — D68.69 SECONDARY HYPERCOAGULABLE STATE (HCC): ICD-10-CM

## 2024-04-15 DIAGNOSIS — I48.91 ATRIAL FIBRILLATION WITH RAPID VENTRICULAR RESPONSE (HCC): ICD-10-CM

## 2024-04-15 DIAGNOSIS — S14.109S INJURY OF CERVICAL SPINAL CORD, SEQUELA (HCC): ICD-10-CM

## 2024-04-15 DIAGNOSIS — I20.89 ANGINA AT REST (HCC): ICD-10-CM

## 2024-04-15 DIAGNOSIS — I10 BENIGN ESSENTIAL HTN: ICD-10-CM

## 2024-04-15 DIAGNOSIS — Z09 HOSPITAL DISCHARGE FOLLOW-UP: Primary | ICD-10-CM

## 2024-04-15 PROCEDURE — G8399 PT W/DXA RESULTS DOCUMENT: HCPCS | Performed by: STUDENT IN AN ORGANIZED HEALTH CARE EDUCATION/TRAINING PROGRAM

## 2024-04-15 PROCEDURE — 1123F ACP DISCUSS/DSCN MKR DOCD: CPT | Performed by: STUDENT IN AN ORGANIZED HEALTH CARE EDUCATION/TRAINING PROGRAM

## 2024-04-15 PROCEDURE — 99214 OFFICE O/P EST MOD 30 MIN: CPT | Performed by: STUDENT IN AN ORGANIZED HEALTH CARE EDUCATION/TRAINING PROGRAM

## 2024-04-15 PROCEDURE — 1111F DSCHRG MED/CURRENT MED MERGE: CPT | Performed by: STUDENT IN AN ORGANIZED HEALTH CARE EDUCATION/TRAINING PROGRAM

## 2024-04-15 PROCEDURE — G8427 DOCREV CUR MEDS BY ELIG CLIN: HCPCS | Performed by: STUDENT IN AN ORGANIZED HEALTH CARE EDUCATION/TRAINING PROGRAM

## 2024-04-15 PROCEDURE — G8417 CALC BMI ABV UP PARAM F/U: HCPCS | Performed by: STUDENT IN AN ORGANIZED HEALTH CARE EDUCATION/TRAINING PROGRAM

## 2024-04-15 PROCEDURE — A9502 TC99M TETROFOSMIN: HCPCS

## 2024-04-15 PROCEDURE — 3430000000 HC RX DIAGNOSTIC RADIOPHARMACEUTICAL

## 2024-04-15 PROCEDURE — 78452 HT MUSCLE IMAGE SPECT MULT: CPT

## 2024-04-15 PROCEDURE — 1090F PRES/ABSN URINE INCON ASSESS: CPT | Performed by: STUDENT IN AN ORGANIZED HEALTH CARE EDUCATION/TRAINING PROGRAM

## 2024-04-15 PROCEDURE — 6360000002 HC RX W HCPCS

## 2024-04-15 PROCEDURE — 3075F SYST BP GE 130 - 139MM HG: CPT | Performed by: STUDENT IN AN ORGANIZED HEALTH CARE EDUCATION/TRAINING PROGRAM

## 2024-04-15 PROCEDURE — 3080F DIAST BP >= 90 MM HG: CPT | Performed by: STUDENT IN AN ORGANIZED HEALTH CARE EDUCATION/TRAINING PROGRAM

## 2024-04-15 PROCEDURE — 2580000003 HC RX 258

## 2024-04-15 PROCEDURE — 93017 CV STRESS TEST TRACING ONLY: CPT

## 2024-04-15 PROCEDURE — 1036F TOBACCO NON-USER: CPT | Performed by: STUDENT IN AN ORGANIZED HEALTH CARE EDUCATION/TRAINING PROGRAM

## 2024-04-15 RX ORDER — REGADENOSON 0.08 MG/ML
0.4 INJECTION, SOLUTION INTRAVENOUS
Status: COMPLETED | OUTPATIENT
Start: 2024-04-15 | End: 2024-04-15

## 2024-04-15 RX ORDER — SODIUM CHLORIDE 0.9 % (FLUSH) 0.9 %
10 SYRINGE (ML) INJECTION PRN
Status: ACTIVE | OUTPATIENT
Start: 2024-04-15 | End: 2024-04-15

## 2024-04-15 RX ORDER — HYDROCODONE BITARTRATE AND ACETAMINOPHEN 5; 325 MG/1; MG/1
1 TABLET ORAL EVERY 8 HOURS PRN
COMMUNITY

## 2024-04-15 RX ADMIN — TETROFOSMIN 12 MILLICURIE: 1.38 INJECTION, POWDER, LYOPHILIZED, FOR SOLUTION INTRAVENOUS at 09:14

## 2024-04-15 RX ADMIN — Medication 10 ML: at 10:27

## 2024-04-15 RX ADMIN — TETROFOSMIN 35.3 MILLICURIE: 1.38 INJECTION, POWDER, LYOPHILIZED, FOR SOLUTION INTRAVENOUS at 10:27

## 2024-04-15 RX ADMIN — REGADENOSON 0.4 MG: 0.08 INJECTION, SOLUTION INTRAVENOUS at 10:27

## 2024-04-15 RX ADMIN — Medication 10 ML: at 09:14

## 2024-04-15 RX ADMIN — Medication 10 ML: at 10:26

## 2024-04-16 LAB
NUC STRESS EJECTION FRACTION: 76 %
STRESS BASELINE DIAS BP: 105 MMHG
STRESS BASELINE HR: 97 BPM
STRESS BASELINE ST DEPRESSION: 0 MM
STRESS BASELINE SYS BP: 180 MMHG
STRESS ESTIMATED WORKLOAD: 1 METS
STRESS PEAK DIAS BP: 105 MMHG
STRESS PEAK SYS BP: 180 MMHG
STRESS PERCENT HR ACHIEVED: 77 %
STRESS POST PEAK HR: 108 BPM
STRESS RATE PRESSURE PRODUCT: NORMAL BPM*MMHG
STRESS ST DEPRESSION: 0 MM
STRESS TARGET HR: 140 BPM
TID: 1.01

## 2024-04-16 PROCEDURE — 93018 CV STRESS TEST I&R ONLY: CPT | Performed by: INTERNAL MEDICINE

## 2024-04-17 ENCOUNTER — CARE COORDINATION (OUTPATIENT)
Dept: CARE COORDINATION | Age: 79
End: 2024-04-17

## 2024-04-17 NOTE — CARE COORDINATION
Care Transitions Follow Up Call    Patient Current Location:  Home: [address] [place] [address] Dr  [place] [address]    Care Transition Nurse contacted the patient by telephone to follow up after admission.  Verified name and  with patient as identifiers.    Patient: Alysha Rordiguez  Patient : 1945   MRN: [id number]  Reason for Admission:  2024 - 2024 [facility] IP. AF w/ RVR.   Discharge Date: 24 RARS: Readmission Risk Score: 14.1  NR  CT  Send to ACM Penny Philippe RN, per MM CC Mgr.     [facility]     Hosp FU 4/15 2:00. Attended.     START taking:  apixaban (ELIQUIS)  dilTIAZem (Cardizem CD)    Needs to be reviewed by the provider   Additional needs identified to be addressed with provider: Yes  Dr Bower/clinical pool routed: Pt needs Eliquis samples until she can get it from pharmacy in 1.5 wks (using [facility]). Please advise. Thank you.               Method of communication with provider: chart route    CTN spoke w/ Alysha. She has decided she does not want a hospital bed. See yellow box. Has cardio appt  3:00. States she completed nuclear stress test and normal. No chest pain/pressure, palpitation, SOB, edema, or activity tolerance concerns. She continues to have vague dizziness w/ repositions. Uses her prosthesis for ambulation. Does not perform daily wts and does not have a home BP/HR Monitor. Pt states her HHC took BP yesterday and was 144/94 and stable HR readings. Reviewed AF and risks if not on blood thinner, v/u.     Addressed changes since last contact:  none    Follow Up  Future Appointments   Date Time Provider Department Center   2024  3:00 PM Sid Gilliam, APRN - CNP [facility]       Care Transition Nurse reviewed red flags with patient and discussed any barriers to care and/or understanding of plan of care after discharge. Discussed appropriate site of care based on symptoms and resources available to patient including: Condition related

## 2024-04-18 ENCOUNTER — CARE COORDINATION (OUTPATIENT)
Dept: CARE COORDINATION | Age: 79
End: 2024-04-18

## 2024-04-18 NOTE — CARE COORDINATION
Telephone call to patient. She is getting her Eliquis from pharmacy from [facility].  She has samples coming from doctor for Eliquis. She spoke of using her prosthesis to get around.  She has an electric wheelchair.  She has someone coming for next few weeks to help her with bathing and occupational therapy..  She receives meals-on-wheels from [facility] which are free.  Transportation she has a friend who helps with transportation and uses a [employer] company.  She uses [employer] for out of town transportation.

## 2024-04-23 ENCOUNTER — CARE COORDINATION (OUTPATIENT)
Dept: CARE COORDINATION | Age: 79
End: 2024-04-23

## 2024-04-23 NOTE — CARE COORDINATION
Care Transitions Follow Up Call    Care Transition Nurse attempted subsequent CT outreach to review RPM leaving HIPAA VM, purpose of call, my contact info. CTN did outreach pts dtr/[place] who lives [place]. We discussed purpose of RPM and advised that PCP has ordered it. Dtr feels pt would benefit and will discuss further w/ pt as she did decline it on 4/10/24. PCP order placed 4/15. Dtr feels pt is able to handle RPM tasks independently. CTN to try call again.     Patient: Alysha Rodriguez  Patient : 1945   MRN: [id number]  Reason for Admission: 2024 - 2024 [facility] IP. AF w/ RVR.   Discharge Date: 24 RARS: Readmission Risk Score: 14.1  NR  CT  Send to ACM Penny Philippe RN, for RPM if pt receptive.     [facility]     Hosp FU 4/15 2:00. Attended.    Dalia Forrest RN

## 2024-04-24 ENCOUNTER — TELEPHONE (OUTPATIENT)
Dept: CARDIOLOGY CLINIC | Age: 79
End: 2024-04-24

## 2024-04-24 DIAGNOSIS — M14.60 ARTHRITIS, NEUROPATHIC: ICD-10-CM

## 2024-04-24 DIAGNOSIS — F33.1 MODERATE EPISODE OF RECURRENT MAJOR DEPRESSIVE DISORDER (HCC): ICD-10-CM

## 2024-04-24 DIAGNOSIS — M43.10 SPONDYLOLISTHESIS, UNSPECIFIED SPINAL REGION: ICD-10-CM

## 2024-04-24 DIAGNOSIS — I10 ESSENTIAL HYPERTENSION: ICD-10-CM

## 2024-04-24 DIAGNOSIS — M48.02 CERVICAL STENOSIS OF SPINAL CANAL: ICD-10-CM

## 2024-04-24 NOTE — TELEPHONE ENCOUNTER
LM on VM to let pt know that she can have someone  the forms and drop them off for her as well. Or that we can mail her the forms.     Asked for her to call us back to let us know that she received this message.

## 2024-04-24 NOTE — TELEPHONE ENCOUNTER
Comments: Please review, thanks    Last Office Visit (last PCP visit):   4/15/2024    Next Visit Date:  Future Appointments   Date Time Provider Department Center   5/2/2024  3:00 PM Avramovic, Maja, APRN - CNP [place] [facility]       **If hasn't been seen in over a year OR hasn't followed up according to last diabetes/ADHD visit, make appointment for patient before sending refill to provider.    Rx requested:  Requested Prescriptions     Pending Prescriptions Disp Refills    atorvastatin (LIPITOR) 80 MG tablet 30 tablet 3     Sig: Take 1 tablet by mouth nightly    buPROPion (WELLBUTRIN) 75 MG tablet 60 tablet      Sig: Take 1 tablet by mouth 2 times daily    carvedilol (COREG) 25 MG tablet 180 tablet 3     Sig: Take 1 tablet by mouth 2 times daily    diclofenac (VOLTAREN) 50 MG EC tablet 180 tablet 3     Sig: Take 1 tablet by mouth 2 times daily    DULoxetine (CYMBALTA) 30 MG extended release capsule 90 capsule 3     Sig: Take 1 capsule by mouth daily    gabapentin (NEURONTIN) 600 MG tablet 450 tablet 3     Sig: TAKE 1 TABLET BY MOUTH IN  THE MORNING, 2 TABLETS IN  THE AFTERNOON, AND 2  TABLETS IN THE EVENING AS  TOLERATED

## 2024-04-24 NOTE — TELEPHONE ENCOUNTER
Pt calling regarding Eliquis 5 MG to be faxed to the [facility].    States that she will have someone bring her, she does not have much battery left on her wheelchair.    States that \"In the meantime she will not be taking her Eliquis\".     And hung up.

## 2024-04-25 ENCOUNTER — CARE COORDINATION (OUTPATIENT)
Dept: CARE COORDINATION | Age: 79
End: 2024-04-25

## 2024-04-25 RX ORDER — GABAPENTIN 600 MG/1
TABLET ORAL
Qty: 450 TABLET | Refills: 3 | Status: SHIPPED | OUTPATIENT
Start: 2024-04-25 | End: 2025-04-25

## 2024-04-25 RX ORDER — DULOXETIN HYDROCHLORIDE 30 MG/1
30 CAPSULE, DELAYED RELEASE ORAL DAILY
Qty: 90 CAPSULE | Refills: 3 | Status: SHIPPED | OUTPATIENT
Start: 2024-04-25

## 2024-04-25 RX ORDER — ATORVASTATIN CALCIUM 80 MG/1
80 TABLET, FILM COATED ORAL NIGHTLY
Qty: 90 TABLET | Refills: 3 | Status: SHIPPED | OUTPATIENT
Start: 2024-04-25

## 2024-04-25 RX ORDER — BUPROPION HYDROCHLORIDE 75 MG/1
75 TABLET ORAL 2 TIMES DAILY
Qty: 180 TABLET | Refills: 3 | Status: SHIPPED | OUTPATIENT
Start: 2024-04-25

## 2024-04-25 RX ORDER — CARVEDILOL 25 MG/1
25 TABLET ORAL 2 TIMES DAILY
Qty: 180 TABLET | Refills: 3 | Status: SHIPPED | OUTPATIENT
Start: 2024-04-25

## 2024-04-25 NOTE — CARE COORDINATION
Telephone call with patient.  She is getting home health from [facility]--nurse, OT and HHA for next month.  She had all of her prescriptions from [facility] and they will sync her medications.  She in process of getting Eliquis from [facility].  She has samples of Eliquis at this time.

## 2024-04-25 NOTE — CARE COORDINATION
Care Transitions Follow Up Call    Patient Current Location:  Home: [address] [place] [address] Dr  [place] [address]    Care Transition Nurse contacted the patient by telephone to follow up after admission.  Verified name and  with patient as identifiers.    Patient: Alysha Rodriguez  Patient : 1945   MRN: [id number]  Reason for Admission: 2024 - 2024 [facility] IP. AF w/ RVR.   Discharge Date: 24 RARS: Readmission Risk Score: 14.1  NR  CT  RPM for HTN, CHF.  Pending ACM Penny Philippe RN.     [facility]     Hosp FU 4/15 2:00. Attended.  Cardio/Mane  3:00    Needs to be reviewed by the provider   Additional needs identified to be addressed with provider: No  none             Method of communication with provider: none.    CTN spoke w/ Alysha. RPM Eligible BPA Referral. Pt is agreeable to RPM. She was advised to weigh self first AM after urination wearing prosthesis each time to keep accurate wts, v/u. She has some hand weakness and may have trouble using provided arm cuff. She shares her new wrist cuff is arriving tomorrow and she will use it if unable to manage the other. Shares HR today was 91. Noting her last PCP office wt was 213 lbs and /92 and . She states she has occasional fleeting heart palpitations. Denies any acute SOB or dizziness. [facility] saw pt today. Reports all her scripts were transferred to [facility] and picks up new refills 5/3/24.     Addressed changes since last contact:  none    Follow Up  Future Appointments   Date Time Provider Department Center   2024  3:00 PM Sid Gilliam, APRN - CNP [facility]     Care Transition Nurse reviewed red flags with patient and discussed any barriers to care and/or understanding of plan of care after discharge. Discussed appropriate site of care based on symptoms and resources available to patient including: Condition related references. The patient agrees to contact the PCP office for questions related to their

## 2024-04-26 ENCOUNTER — CARE COORDINATION (OUTPATIENT)
Dept: PRIMARY CARE CLINIC | Age: 79
End: 2024-04-26

## 2024-04-26 ENCOUNTER — TELEPHONE (OUTPATIENT)
Dept: FAMILY MEDICINE CLINIC | Age: 79
End: 2024-04-26

## 2024-04-26 DIAGNOSIS — I50.22 CHRONIC SYSTOLIC (CONGESTIVE) HEART FAILURE (HCC): ICD-10-CM

## 2024-04-26 DIAGNOSIS — I10 BENIGN ESSENTIAL HTN: Primary | ICD-10-CM

## 2024-04-26 NOTE — PROGRESS NOTES
Remote Patient Monitoring Treatment Plan    Received request from ACM/Dalia Jerry RN   to order remote patient monitoring for in home monitoring of CHF; Condition managed by YURY Daley CNP ([facility]).  HTN; Condition managed by PCP.  and order completed.     Patient will be monitoring blood pressure   weight  disease specific education modules .      Patient will engage in Remote Patient Monitoring each day to develop the skills necessary for self management.       RPM Care Team Responsibilities:   Alerts will be reviewed daily and addressed within 2-4 hours during operational hours (Monday -Friday 9 am-4 pm)  Alert response and intervention documented in patient medical record  Alert response escalated to PCP per protocol and documented in patient medical record  Patient monitored over approximately  days  Discharge from program based on self-management readiness    See care coordination encounters for additional details.

## 2024-04-26 NOTE — TELEPHONE ENCOUNTER
Home health calling to let  Know that pt stopped taking levothyroxine a month ago and pt says that she does not feel any different.  He nurse did explain that it was important for her to take it.

## 2024-04-26 NOTE — CARE COORDINATION
Remote Patient Kit Ordering Note      Date/Time:  4/26/2024 9:59 AM      CCSS placed phone call to patient/family today to notify of RPM kit order; patient/family was unavailable; Left HIPAA compliant voicemail regarding RPM kit.    [x[facility]  [x] Patient will receive package over the next 1-3 business days. Someone 21 years or older must be present to sign for UPS delivery.  [x] HRS will contact patient within 24 hours, an HRS  will call the patient directly: If the patient does not answer, HRS will follow up with the clinical team notifying them about the unsuccessful attempt to contact the patient. HRS will make three call attempts to the patient.Provide patient with [facility] Virtual install number is: [phone].  [x] CTN will contact patient once equipment is active to welcome them to the program.                                                         [x] Hours of RPM monitoring - Monday-Friday 7275-8626; encourage patient to get vitals entered by Noon each day to have the alert addressed same day.  [x][facility] mailed RPM Patient flyer to patient.                      CTN made aware the RPM kit has been ordered.

## 2024-04-26 NOTE — TELEPHONE ENCOUNTER
FYI-  Pt aware that we have forms for her that could be mailed to her.  Pt states that she will just wait until 5/2/24 appt with Sid.   Pt states that she has not been taking Eliquis and wont be until 5/2/24 appt.

## 2024-04-29 DIAGNOSIS — G47.00 INSOMNIA, UNSPECIFIED TYPE: ICD-10-CM

## 2024-04-29 RX ORDER — TRAZODONE HYDROCHLORIDE 50 MG/1
50 TABLET ORAL NIGHTLY PRN
Qty: 90 TABLET | Refills: 1 | Status: SHIPPED | OUTPATIENT
Start: 2024-04-29

## 2024-04-29 NOTE — TELEPHONE ENCOUNTER
Comments: pt will make appt [facility] [employer]     Last Office Visit (last PCP visit):   4/15/2024    Next Visit Date:  Future Appointments   Date Time Provider Department Center   5/2/2024  3:00 PM Avramovic, Maja, APRN - CNP [place] [facility]       **If hasn't been seen in over a year OR hasn't followed up according to last diabetes/ADHD visit, make appointment for patient before sending refill to provider.    Rx requested:  Requested Prescriptions     Pending Prescriptions Disp Refills    traZODone (DESYREL) 50 MG tablet 90 tablet 1     Sig: Take 1 tablet by mouth nightly as needed for Sleep

## 2024-05-01 ENCOUNTER — HOSPITAL ENCOUNTER (EMERGENCY)
Age: 79
Discharge: HOME | End: 2024-05-01
Attending: EMERGENCY MEDICINE
Payer: MEDICARE

## 2024-05-01 ENCOUNTER — TELEPHONE (OUTPATIENT)
Dept: FAMILY MEDICINE CLINIC | Age: 79
End: 2024-05-01

## 2024-05-01 VITALS
HEIGHT: 59 IN | SYSTOLIC BLOOD PRESSURE: 165 MMHG | DIASTOLIC BLOOD PRESSURE: 110 MMHG | OXYGEN SATURATION: 96 % | HEART RATE: 99 BPM | BODY MASS INDEX: 43.34 KG/M2 | WEIGHT: 215 LBS | RESPIRATION RATE: 14 BRPM | TEMPERATURE: 98.7 F

## 2024-05-01 DIAGNOSIS — I10 HYPERTENSION, UNSPECIFIED TYPE: Primary | ICD-10-CM

## 2024-05-01 PROCEDURE — 6370000000 HC RX 637 (ALT 250 FOR IP): Performed by: EMERGENCY MEDICINE

## 2024-05-01 PROCEDURE — 99283 EMERGENCY DEPT VISIT LOW MDM: CPT

## 2024-05-01 RX ORDER — ONDANSETRON 4 MG/1
4 TABLET, ORALLY DISINTEGRATING ORAL ONCE
Status: COMPLETED | OUTPATIENT
Start: 2024-05-01 | End: 2024-05-01

## 2024-05-01 RX ORDER — CARVEDILOL 12.5 MG/1
25 TABLET ORAL ONCE
Status: COMPLETED | OUTPATIENT
Start: 2024-05-01 | End: 2024-05-01

## 2024-05-01 RX ADMIN — CARVEDILOL 25 MG: 12.5 TABLET, FILM COATED ORAL at 13:05

## 2024-05-01 RX ADMIN — ONDANSETRON 4 MG: 4 TABLET, ORALLY DISINTEGRATING ORAL at 13:05

## 2024-05-01 ASSESSMENT — PAIN - FUNCTIONAL ASSESSMENT: PAIN_FUNCTIONAL_ASSESSMENT: NONE - DENIES PAIN

## 2024-05-01 NOTE — ED PROVIDER NOTES
102 97 98   Resp:  13 22 10   Temp:       TempSrc:       SpO2: 95% 98% 96% 99%   Weight:       Height:           MDM  Number of Diagnoses or Management Options  Hypertension, unspecified type  Diagnosis management comments: BP improved with carvedilol.  She remains asymptomatic throughout the ED course.  Patient will be discharged home in good condition.  Patient has been hemodynamically stable throughout ED course and is appropriate for outpatient follow up.  Patient should follow up with cardiology tomorrow as scheduled or return to ED immediately for any new or worsening symptoms.  Patient is well appearing on discharge and agreeable with plan of care.            Procedures    CRITICAL CARE TIME   Total Critical Care time was 0 minutes, excluding separately reportable procedures.  There was a high probability of clinically significant/life threatening deterioration in the patient's condition which required my urgent intervention.       FINAL IMPRESSION      1. Hypertension, unspecified type          DISPOSITION/PLAN   DISPOSITION Decision To Discharge 05/01/2024 02:06:43 PM      (Please note that portions of this note were completed with a voice recognition program.  Efforts were made to edit the dictations but occasionally words are mis-transcribed.)    Nando Robins MD (electronically signed)  Attending Emergency Physician       Nando Robins MD  05/01/24 [id number]

## 2024-05-01 NOTE — TELEPHONE ENCOUNTER
Received call from Home Health. They are in the home with pt and BP is elevated, 172/114. Pt complaining of nausea, sweating and hot. Notes that she forgot med yesterday. Out of coreg last 2 days! Other meds taken @ 8 AM this morning.     Per Dr Sorensen pt needs to  script or go to ED. BP too high and may continue to elevated without medication! Per Home Health, pt was reluctant at first but the more we spoke to her, she finally agreed to allow squad to be called. Home Health will remain with pt until they arrive.       TIFFANIE

## 2024-05-01 NOTE — ED NOTES
I left a message with the pt's emergency contact asking if either she or someone she knows will be able to pick the pt up.

## 2024-05-01 NOTE — ED TRIAGE NOTES
Pt arrived via EMS from home d/t hypertension, she ran out of Coreg 2 days ago and her PCP wanted her to go to the ER.  Pt denies any other symptoms, states that she is having intermittent nausea.  Pt doesn't want to be here, states she was forced to come here by family.

## 2024-05-02 ENCOUNTER — CARE COORDINATION (OUTPATIENT)
Dept: CARE COORDINATION | Age: 79
End: 2024-05-02

## 2024-05-02 NOTE — CARE COORDINATION
Telephone call to [facility].  Representative Neurotin is not a medication they help with.patient assistance program.

## 2024-05-02 NOTE — CARE COORDINATION
Telephone call to patient.  Relayed results of phone call to patient.  Patient will look for email from [employer]..

## 2024-05-02 NOTE — CARE COORDINATION
Telephone call to [facility].  They are a pharmacy that offers discounted medications.  Patient's Neurontin would be 47 dollars, up to 270 pills for a ninety day supply.  Script from PCP would need to be faxed to [phone].  They also recommended calling the following [facility] [phone], [facility] [phone], [facility] [phone] and [facility] [phone].

## 2024-05-02 NOTE — CARE COORDINATION
Telephone call to [facility].Representative indicated that they do not help out with neurontin since 1/1/2024 and referred this wrier to [employer] [facility] [phone]

## 2024-05-02 NOTE — CARE COORDINATION
Telephone call to [facility],  They are not a discount pharmacy. They did provided the following resource that maybe helpful [facility]  [phone]

## 2024-05-02 NOTE — CARE COORDINATION
Telephone call to [facility]. Their program is medication specific for certain diagnosis.  Representative is showing is a neurontin is not a covered drug under their program.

## 2024-05-02 NOTE — CARE COORDINATION
Telephone call to [facility].Representative indicated they do not help out with neurontin.   Recommended calling [phone]

## 2024-05-02 NOTE — CARE COORDINATION
Telephone call to [facility].  Representative indicated that neurontin   [facility] has a coupon for 12.74 for a 90 day supply.  They will email patient a coupon

## 2024-05-02 NOTE — CARE COORDINATION
Telephone call to patient.  She indicated that she has been without some of her medications as she could not afford until her check comes in. This does not happen every month.  Discussed that [facility] has a program for medication and she looked into it but her income was too high.  She has been denied Medicaid in the past. Her monthly income 2,400 dollars.  Discussed that she is over income -1,903 for Social Security Extra Help Program. She has to get a form  signed for Dr. Bower for [facility]..  Her biggest concern is  neurontin at this time.  Discussed plan to investigate a patient assistance program for neurontin.

## 2024-05-08 ENCOUNTER — CARE COORDINATION (OUTPATIENT)
Dept: CARE COORDINATION | Age: 79
End: 2024-05-08

## 2024-05-08 VITALS
BODY MASS INDEX: 42.33 KG/M2 | HEART RATE: 68 BPM | DIASTOLIC BLOOD PRESSURE: 77 MMHG | WEIGHT: 209.6 LBS | SYSTOLIC BLOOD PRESSURE: 108 MMHG

## 2024-05-08 NOTE — CARE COORDINATION
Care Transitions Follow Up Call    Patient Current Location:  Home: [address] [place] [address] [address]  [place] [address]    Care Transition Nurse contacted the patient by telephone to follow up after admission.  Verified name and  with patient as identifiers.    Patient: Alysha Rodriguez  Patient : 1945   MRN: [id number]  Reason for Admission: 2024 - 2024 [facility] IP. AF w/ RVR. 2024 [facility] ED. HTN, Ran out of Coreg for 2 days.   Discharge Date: 24 RARS: Readmission Risk Score: 14.1  NR  CT  RPM disenrollment 24     [facility]     Hosp FU 4/15 2:00. Attended.  Cardio/[facility]  3:00. Canceled.  Cardio/Holiday  10:30    Needs to be reviewed by the provider   Additional needs identified to be addressed with provider: Yes  Dr Bower routed Pt can not afford Eliquis and is not taking at this time. States she ran out. Her cost is $393 and out of price affordability. She uses [facility] for you to call something else into. Please advise. Thank you.            Method of communication with provider: staff message.    CTN spoke w/ Alysha. Attempted RPM Welcome Call but pt is no longer interested and wants to return equipment. She has arthritic pain in hands and finds the task of taking vitals too overwhelming. States she returned her self bought wrist BP cuss, too. Disenrollment order routed to RPM.     Pt does not have any Eliquis at home and is only taking her ASA. See yellow box. She has/taking her Coreg. Reviewed the importance of following her medication regimen and alerting her physician's when she can't afford her medications, v/u. Advised to alert her PCP need for refills 7-10 days before she runs out, v/u. Discussed risk for MI and Stroke for poorly managed BP, v/u. Pt states she has chronic back and arthritic pain and has chronic vertigo. No acute chest pain/pressure or palpitations. ACM referral for medication compliance and HTN education.    Follow

## 2024-05-09 ENCOUNTER — CARE COORDINATION (OUTPATIENT)
Dept: PRIMARY CARE CLINIC | Age: 79
End: 2024-05-09

## 2024-05-09 ENCOUNTER — CARE COORDINATION (OUTPATIENT)
Dept: CARE COORDINATION | Age: 79
End: 2024-05-09

## 2024-05-09 DIAGNOSIS — I50.22 CHRONIC SYSTOLIC (CONGESTIVE) HEART FAILURE (HCC): ICD-10-CM

## 2024-05-09 DIAGNOSIS — I10 BENIGN ESSENTIAL HTN: Primary | ICD-10-CM

## 2024-05-09 NOTE — CARE COORDINATION
Patient Alysha Rodriguez  05/09/24     Care Coordination  placed call to patient to arrange RPM kit  through [employer]. Left HIPAA Compliant Message     provided return and how to pack equipment in original packing via the patients voicemail if available and provided call back number should patient have questions.    Patient made aware [employer] will  equipment in 2-4 days.

## 2024-05-09 NOTE — CARE COORDINATION
Telephone call with patient. She has all of her medications at this time.  She still has to get a paper signed by Dr. Bower for her Eliquis to get this medication from [facility].  She got her Neurontin from [facility]. She is getting a wrist blood pressure cuff from [facility].  Her home health care has stopped.  Discussed bathing and she has a shower bench and grab bars.  She feels she is able to shower herself and keeps medical alert system on which is waterproof.  She feels doesn't need someone to help her with showering. She continues with meals-on-wheels.  No new needs or concerns were expressed.

## 2024-05-09 NOTE — PROGRESS NOTES
Remote Patient Order Discontinued    Received request from Dalia Forrest RN   to discontinue order for remote patient monitoring of CHF and HTN and order completed.

## 2024-05-13 ENCOUNTER — OFFICE VISIT (OUTPATIENT)
Dept: ORTHOPEDIC SURGERY | Facility: CLINIC | Age: 79
End: 2024-05-13
Payer: MEDICARE

## 2024-05-13 ENCOUNTER — TELEPHONE (OUTPATIENT)
Dept: FAMILY MEDICINE CLINIC | Age: 79
End: 2024-05-13

## 2024-05-13 ENCOUNTER — CARE COORDINATION (OUTPATIENT)
Dept: CARE COORDINATION | Age: 79
End: 2024-05-13

## 2024-05-13 DIAGNOSIS — M19.039 WRIST ARTHRITIS: Primary | ICD-10-CM

## 2024-05-13 DIAGNOSIS — R42 VERTIGO: ICD-10-CM

## 2024-05-13 DIAGNOSIS — M19.049 HAND ARTHRITIS: ICD-10-CM

## 2024-05-13 PROCEDURE — 20605 DRAIN/INJ JOINT/BURSA W/O US: CPT | Performed by: ORTHOPAEDIC SURGERY

## 2024-05-13 PROCEDURE — 1036F TOBACCO NON-USER: CPT | Performed by: ORTHOPAEDIC SURGERY

## 2024-05-13 PROCEDURE — 1159F MED LIST DOCD IN RCRD: CPT | Performed by: ORTHOPAEDIC SURGERY

## 2024-05-13 PROCEDURE — 99214 OFFICE O/P EST MOD 30 MIN: CPT | Performed by: ORTHOPAEDIC SURGERY

## 2024-05-13 PROCEDURE — 1125F AMNT PAIN NOTED PAIN PRSNT: CPT | Performed by: ORTHOPAEDIC SURGERY

## 2024-05-13 RX ORDER — LIDOCAINE HYDROCHLORIDE 10 MG/ML
1 INJECTION INFILTRATION; PERINEURAL
Status: COMPLETED | OUTPATIENT
Start: 2024-05-13 | End: 2024-05-13

## 2024-05-13 RX ADMIN — LIDOCAINE HYDROCHLORIDE 1 ML: 10 INJECTION INFILTRATION; PERINEURAL at 13:44

## 2024-05-13 RX ADMIN — LIDOCAINE HYDROCHLORIDE 1 ML: 10 INJECTION INFILTRATION; PERINEURAL at 13:45

## 2024-05-13 ASSESSMENT — PAIN - FUNCTIONAL ASSESSMENT: PAIN_FUNCTIONAL_ASSESSMENT: 0-10

## 2024-05-13 ASSESSMENT — PAIN SCALES - GENERAL: PAINLEVEL_OUTOF10: 6

## 2024-05-13 NOTE — CARE COORDINATION
Heart Failure Education outreach Date/Time: 2024 4:49 PM    Ambulatory Care Manager (ACM) contacted the patient by telephone to perform Ambulatory Care Coordination. Verified name and  with patient as identifiers. Provided introduction to self, and explanation of the Ambulatory Care Manager's role.     ACM reviewed that a Heart Healthy tips for the Summer packet has been sent to [employer]. ACM reviewed CHF zones, daily weights, fluid restriction, the importance of low sodium diet, and healthy tips packet with the patient. Instructed patient to call their Cardiologist if they have a weight gain of 3 lbs in 2 days or 5 lbs in a week.     Patient reminded that there is a physician on call 24 hours a day / 7 days a week should the patient have questions or concerns. The patient verbalized understanding.

## 2024-05-13 NOTE — CARE COORDINATION
Ambulatory Care Coordination Note  2024    Patient Current Location:  Home: [address] [place] [address] [place] [address]    ACM contacted the patient by telephone. Verified name and  with patient as identifiers. Provided introduction to self, and explanation of the ACM role.     ACM: Tana Lyon RN    Challenges to be reviewed by the provider   Additional needs identified to be addressed with provider: No  none               Method of communication with provider: none.      Spoke with patient for care coordination enrollment for CHF, Afib, HTN, CKD, and CAD  Patient agreed to follow up calls and CC.  Patient was enrolled in CC few months ago  ACM reviewed medications and updated CC protocol, goals, and education  Patient has been in hospital and ED for AFib, CHF, and HTN over last month.  Patient states has call to Dr Bower due to can't afford Eliquis.  ACM advised patient if doesn't hear anything by tomorrow, to call back to office again.  Voiced understanding  Denies CP or palpitations.  /94, 112/66.  Pulse 86,71, 76.  Pox 92-96%.    CHF at baseline.  States leg edema is stable.  Slightly worse than normal.  Right leg stump edema.  Patient states has standing.  Reports generalized weakness and fatigue.  Encouraged exercises and lifting 8oz water bottles.  Finished PT/OT, didin't feel helped much.  Denies worsening sob.  Sob w/ exertion stable.  Denies cough or congestion.  Still has zone tool for reference.  Weight 216.2#  down 1lbs in 1 day.  Advised to keep left leg elevated while resting/sitting.  Does usually sit in electric wheelchair.    Reports dizziness.  States feels like on a twirl a whirl, worse with activity.  ACM will message PCP for meclizine.    Denies recent falls.  Uses electric wheelchair, walker/rollator, shower tub bench, grab bars.  Denies needing any ambulation safety devices.    States can do ADLs on own.  Reports going from sitting to standing is difficult.  Feels weak in leg,

## 2024-05-13 NOTE — PROGRESS NOTES
5/13/2024    Chief Complaint   Patient presents with    Left Hand - Pain     Erosive arthritis     Right Hand - Pain     Erosive arthritis  4th dorsal compartment tenosynovitis s/p inj 2/12/24       History of Present Illness:  Patient Martha Jacobs , 78 y.o. female, presents today, 5/13/2024, for evaluation of bilateral wrist pain and swelling.  She underwent radiocarpal injections about 3 months ago and got little overall in the way of relief.  She relies heavily on her hands to navigate her wheelchair.  She is considering surgery, but states that would be very difficult to manage.  She wears a brace or uses an Ace bandage intermittently for more support.  She endorses pain more over the ulnar aspect of the wrist today.       Review of Systems:   GENERAL: Negative  GI: Negative  MUSCULOSKELETAL: See HPI  SKIN: Negative  NEURO:  Negative     Physical Exam:  GENERAL:  Alert and oriented to person, place, and time.  No acute distress and breathing comfortably; pleasant and cooperative with the examination.  HEENT:  Head is normocephalic and atraumatic.  NECK:  Supple, no visible swelling.  CARDIOVASCULAR:  No palpable tachycardia.  LUNGS:  No audible wheezing or labored breathing.  ABDOMEN:  Nondistended.  Extremities: Evaluation of bilateral upper extremities finds the patient to have a palpable radial artery at the wrist with brisk capillary refill to all digits. The patient has intact sensorium to axillary, radial, median and ulnar nerves. There are no open wounds. There are no signs of infection. There is no evidence of lymphedema or lymphatic streaking. The patient has supple compartments of the bilateral arms, forearms and hands.  Diffuse gross arthritic and synovitic change to the wrist noted.  She is very tender over the distal radial ulnar joints bilaterally.  More mildly tender over the radiocarpal joints today.     Imaging/Test Results:  None today, previous radiographs show severe diffuse arthritic  change to the hands and wrist.     Assessment:  Severe bilateral wrist arthritis more tender over the DRUJ on today's exam bilaterally.     Plan:  Operative and nonoperative treatment strategies were again reviewed.  Patient has continued preference for nonoperative management.  We recommend continued intermittent bracing and splinting as needed for comfort.  We offered her bilateral radiocarpal/DRUJ injections today she elects to proceed forward with DRUJ injection.  This was performed Dr. Obrien and tolerated well by patient.  Follow-up with our office again in 3 months for repeat clinical exam.  No x-rays necessary upon return.  All questions answered at today's visit.      M Inj/Asp: R distal radioulnar on 5/13/2024 1:44 PM  Indications: pain  Details: 25 G needle, dorsal approach  Medications: 1 mL lidocaine 10 mg/mL (1 %); 10 mg triamcinolone acetonide 10 mg/mL  Outcome: tolerated well, no immediate complications    Right Wrist Distal Radioulnar Joint Injection: It was explained to the patient that the risks of a steroid injection include but are not limited to infection, local skin irritation, skin atrophy, calcification, continued pain and discomfort, elevated blood sugar, burning, failure to relieve pain, and possible late infection. The patient verbalized good insight and verbalized consent for the injection. It was further explained that the post-injection discomfort can be alleviated with additional medications, ice, elevation, and rest over the first 24 hours, and that these modalities are recommended.    Using aseptic technique, a solution containing 1 cc of 10 mg of Kenalog and 1.0 mL of 1% lidocaine without epinephrine was injected intra-articularly to the right wrist distal radioulnar joint. Digital palpation was used to localize the DRUJ articulation about the dorsal aspect of the joint. A 25-gauge needle was advanced through the skin, subcutaneous tissue and capsule. The injection was then  administered and the patient tolerated the injection well. A band-aid was then placed. It should be noted that ethyl chloride spray was used to make the injection delivery more comfortable for the patient.    Procedure, treatment alternatives, risks and benefits explained, specific risks discussed. Consent was given by the patient. Immediately prior to procedure a time out was called to verify the correct patient, procedure, equipment, support staff and site/side marked as required. Patient was prepped and draped in the usual sterile fashion.       M Inj/Asp: L distal radioulnar on 5/13/2024 1:45 PM  Indications: pain  Details: 25 G needle, dorsal approach  Medications: 1 mL lidocaine 10 mg/mL (1 %); 10 mg triamcinolone acetonide 10 mg/mL  Outcome: tolerated well, no immediate complications    Left Wrist Distal Radioulnar Joint Injection: It was explained to the patient that the risks of a steroid injection include but are not limited to infection, local skin irritation, skin atrophy, calcification, continued pain and discomfort, elevated blood sugar, burning, failure to relieve pain, and possible late infection. The patient verbalized good insight and verbalized consent for the injection. It was further explained that the post-injection discomfort can be alleviated with additional medications, ice, elevation, and rest over the first 24 hours, and that these modalities are recommended.    Using aseptic technique, a solution containing 1 cc of 10 mg of Kenalog and 1.0 mL of 1% lidocaine without epinephrine was injected intra-articularly to the left wrist distal radioulnar joint. Digital palpation was used to localize the DRUJ articulation about the dorsal aspect of the joint. A 25-gauge needle was advanced through the skin, subcutaneous tissue and capsule. The injection was then administered and the patient tolerated the injection well. A band-aid was then placed. It should be noted that ethyl chloride spray was used  to make the injection delivery more comfortable for the patient.  Procedure, treatment alternatives, risks and benefits explained, specific risks discussed. Consent was given by the patient. Immediately prior to procedure a time out was called to verify the correct patient, procedure, equipment, support staff and site/side marked as required. Patient was prepped and draped in the usual sterile fashion.           In a face to face encounter, I performed a history and physical examination, discussed pertinent diagnostic studies if indicated, and discussed diagnosis and management strategies with both the patient and the mid-level provider. I reviewed the mid-level's note and agree with the documented findings and plan of care.  Patient presents today for evaluation of profound arthritic change to the bilateral wrist.  Previously she underwent steroid injection to radiocarpal joints.  She localizes pain slightly more ulnar.  Previous injections did not afford much in the way of relief.  She is definitely tender over bilateral DRUJ's.  She understands that at this point the spaces may be confluence but ultimately elects for trial of steroid injection to bilateral wrist at DRUJ and follow-up with me in the office in 3 months.  We did talk about surgical solutions to include complete wrist fusion with resection of distal ulna.  She is weighing that option.

## 2024-05-13 NOTE — TELEPHONE ENCOUNTER
Spoke with patient for care coordination.    Still having dizziness.  States feels like on a twirl a whirl ride.  /94, 112/66  Pulse 86, 71  Can she have script for Meclizine?  Send to [facility] please  Thanks

## 2024-05-14 RX ORDER — MECLIZINE HYDROCHLORIDE 25 MG/1
25 TABLET ORAL 3 TIMES DAILY PRN
Qty: 30 TABLET | Refills: 0 | Status: SHIPPED | OUTPATIENT
Start: 2024-05-14 | End: 2024-05-24

## 2024-05-14 NOTE — TELEPHONE ENCOUNTER
Spoke with pt, states she is willing to try meclizine again. In march pt told pcp that meclizine did not help but would like to try again if there is nothing else for vertigo, [facility]. Thanks.

## 2024-05-16 ENCOUNTER — CARE COORDINATION (OUTPATIENT)
Dept: CARE COORDINATION | Age: 79
End: 2024-05-16

## 2024-05-16 NOTE — CARE COORDINATION
Telephone call to patient. She is still working on getting her Eliquis from [facility] or getting another medication from Dr. Bower. She went to social outing at place where she lives.  She feels that home health care nurse is coming today to discharge her. No new needs or concerns were expressed at this time..

## 2024-05-17 ENCOUNTER — TELEPHONE (OUTPATIENT)
Dept: CARDIOLOGY CLINIC | Age: 79
End: 2024-05-17

## 2024-05-17 NOTE — TELEPHONE ENCOUNTER
PATIENT IS GOING TO USE [employer] [facility]. SHE IS AWARE IT WAS FAXED TO THEM AND SAMPLES GIVEN TO PATIENT. ORIGINAL FORMS AND SCRIPT MAILED TO PATIENT SO SHE CAN SIGN AND FAX ORIGINAL COPIES TO THEM.

## 2024-05-17 NOTE — TELEPHONE ENCOUNTER
Attempted to call patient to have her check with her insurance company on whether they would cover the Xarelto or Pradaxa. No answer from patient. Voicemail left for patient to call back to the office.       ----- Message from Ramu Bower DO sent at 5/17/2024  5:57 AM EDT -----  Regarding: FW: Eliquis    Please see if Xarelto, Pradaxa is cheaper for her. If not cheaper, then she will need to be started on Coumadin 2mg daily and referral to coumadin clinic.     Also needs follow up with me post hospital visit for afib, and mitral regurg. She needs a AMELIA most likely    Electronically signed by Ramu Bower DO on 5/17/2024 at 5:58 AM        ----- Message -----  From: Dalia Forrest RN  Sent: 5/8/2024   3:38 PM EDT  To: Ramu Bower DO  Subject: Eliquis                                          Dr Bower, Pt can not afford Eliquis and is not taking at this time. States she ran out. Her cost is $393 and out of price affordability. She uses [facility] for you to call something else into. Please advise. Thank you.  //HE AGUAYO CTN

## 2024-05-20 ENCOUNTER — CARE COORDINATION (OUTPATIENT)
Dept: CARE COORDINATION | Age: 79
End: 2024-05-20

## 2024-05-20 NOTE — CARE COORDINATION
condition.    Barriers: impairment:  physical: right leg amputation  Plan for overcoming my barriers: will work with ACM towards goals and ACM will provide education and resource needs  Confidence: 9/10  Anticipated Goal Completion Date: 9/13/24         Reduce Falls    On track     I will reduce my risk of falls by the following: Remove rugs or use non slip rugs  Install grab bars in bathroom  Use walking aids like cane or walker    Barriers: impairment:  physical: right leg amputation  Plan for overcoming my barriers: will work with ACM towards goals and ACM will provide ambulation safety education  Confidence: 8/10  Anticipated Goal Completion Date: 9/13/24              Future Appointments   Date Time Provider Department Center   5/31/2024 10:30 AM Ramu Bower DO [facility]   6/5/2024  2:00 PM Leroy Sorensen MD [place] [address] [address]   ,   Congestive Heart Failure Assessment    Are you currently restricting fluids?: No Restriction  Do you understand a low sodium diet?: Yes  Do you understand how to read food labels?: Yes  How many restaurant meals do you eat per week?: 0  Do you salt your food before tasting it?: No     Other symptoms causing concern, Swelling (worse than baseline) in hands, feet/legs or around abdomen      Symptoms:  CHF associated dyspnea on exertion: Pos, CHF associated leg swelling: Pos      Symptom course: stable  Patient-reported weight (lb): 216  Salt intake watch compared to last visit: stable     , No linked episodes, and This patient was permanently screened out of Care Coordination on 5/20/2024 for the following reason:

## 2024-05-23 ENCOUNTER — CARE COORDINATION (OUTPATIENT)
Dept: CARE COORDINATION | Age: 79
End: 2024-05-23

## 2024-05-23 NOTE — CARE COORDINATION
Telephone call with patient. She spoke of her prosthesis with [employer].. Her Eliquis is on it's way from [facility].  She has samples for the next two weeks. She uses [facility] and they sync her medications and deliver them. She continues with meals-on-wheels. She has someone who comes in once a week to help with cleaning and transportation.  Patient does her own bathing and has a shower bench with grab bars and non slip rugs.  .

## 2024-05-30 ENCOUNTER — CARE COORDINATION (OUTPATIENT)
Dept: CARE COORDINATION | Age: 79
End: 2024-05-30

## 2024-05-30 NOTE — CARE COORDINATION
Telephone call with patient.  She is still waiting on Eliquis from [facility]. She can only communicate by email with [facility]. She spoke of her fond memories of her grandmother.  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

## 2024-05-31 ENCOUNTER — OFFICE VISIT (OUTPATIENT)
Dept: CARDIOLOGY CLINIC | Age: 79
End: 2024-05-31
Payer: MEDICARE

## 2024-05-31 VITALS
SYSTOLIC BLOOD PRESSURE: 120 MMHG | RESPIRATION RATE: 15 BRPM | HEART RATE: 80 BPM | DIASTOLIC BLOOD PRESSURE: 62 MMHG | OXYGEN SATURATION: 94 %

## 2024-05-31 DIAGNOSIS — G47.33 OBSTRUCTIVE SLEEP APNEA: ICD-10-CM

## 2024-05-31 DIAGNOSIS — R42 DIZZINESS: Primary | ICD-10-CM

## 2024-05-31 DIAGNOSIS — I50.22 CHRONIC SYSTOLIC (CONGESTIVE) HEART FAILURE (HCC): ICD-10-CM

## 2024-05-31 DIAGNOSIS — I10 BENIGN ESSENTIAL HTN: ICD-10-CM

## 2024-05-31 DIAGNOSIS — E66.01 CLASS 3 SEVERE OBESITY WITH SERIOUS COMORBIDITY IN ADULT, UNSPECIFIED BMI, UNSPECIFIED OBESITY TYPE (HCC): ICD-10-CM

## 2024-05-31 DIAGNOSIS — E78.2 MIXED HYPERLIPIDEMIA: ICD-10-CM

## 2024-05-31 DIAGNOSIS — E66.01 MORBID OBESITY (HCC): ICD-10-CM

## 2024-05-31 DIAGNOSIS — E66.9 OBESITY (BMI 30-39.9): ICD-10-CM

## 2024-05-31 DIAGNOSIS — I48.91 ATRIAL FIBRILLATION, UNSPECIFIED TYPE (HCC): ICD-10-CM

## 2024-05-31 PROBLEM — D68.69 SECONDARY HYPERCOAGULABLE STATE (HCC): Status: RESOLVED | Noted: 2024-04-15 | Resolved: 2024-05-31

## 2024-05-31 PROBLEM — R00.0 RACING HEART BEAT: Status: RESOLVED | Noted: 2018-06-08 | Resolved: 2024-05-31

## 2024-05-31 PROCEDURE — 1090F PRES/ABSN URINE INCON ASSESS: CPT | Performed by: INTERNAL MEDICINE

## 2024-05-31 PROCEDURE — 3078F DIAST BP <80 MM HG: CPT | Performed by: INTERNAL MEDICINE

## 2024-05-31 PROCEDURE — 1123F ACP DISCUSS/DSCN MKR DOCD: CPT | Performed by: INTERNAL MEDICINE

## 2024-05-31 PROCEDURE — G8399 PT W/DXA RESULTS DOCUMENT: HCPCS | Performed by: INTERNAL MEDICINE

## 2024-05-31 PROCEDURE — 1036F TOBACCO NON-USER: CPT | Performed by: INTERNAL MEDICINE

## 2024-05-31 PROCEDURE — 3074F SYST BP LT 130 MM HG: CPT | Performed by: INTERNAL MEDICINE

## 2024-05-31 PROCEDURE — G8417 CALC BMI ABV UP PARAM F/U: HCPCS | Performed by: INTERNAL MEDICINE

## 2024-05-31 PROCEDURE — G8427 DOCREV CUR MEDS BY ELIG CLIN: HCPCS | Performed by: INTERNAL MEDICINE

## 2024-05-31 PROCEDURE — 99214 OFFICE O/P EST MOD 30 MIN: CPT | Performed by: INTERNAL MEDICINE

## 2024-05-31 ASSESSMENT — ENCOUNTER SYMPTOMS
EYES NEGATIVE: 1
SHORTNESS OF BREATH: 0
GASTROINTESTINAL NEGATIVE: 1
WHEEZING: 0
NAUSEA: 0
VOMITING: 0
ABDOMINAL PAIN: 0
ALLERGIC/IMMUNOLOGIC NEGATIVE: 1

## 2024-05-31 NOTE — PROGRESS NOTES
5/31/2024      HPI:    8-29-14: Patient presents for initial medical evaluation. Patient is followed on a regular basis by Dr. Michael. S/p hospitalization for ARF/Dehydration/ urosepsis, NSTEMI with mildly elevated troponin. S/p normal nuclear stress test with normal LVF. Echo with normal LVF, mild TR, grade I DD. 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  No hx of LE procedures. No hx of LHC. Pt denies chest pain, dyspnea, dyspnea on exertion, change in exercise capacity, fatigue,  nausea, vomiting, diarrhea, constipation, motor weakness, insomnia, weight loss, syncope, dizziness, lightheadedness, palpitations, PND, orthopnea. On Eliquis due to PAF on presentation to ED, currently in NSR. Hx of ablation at [facility], ? For svt.      9-25-24: as above, s/p holter monitor with no signs of atrial fibb. Feeling good overall. Will start to go to cardiac rehab. Pt denies chest pain, dyspnea, dyspnea on exertion, change in exercise capacity, fatigue,  nausea, vomiting, diarrhea, constipation, motor weakness, insomnia, weight loss, syncope, dizziness, lightheadedness, palpitations, PND, orthopnea, or claudication. No bleedign issues on coumadin.      12-19-14: as above, doing well overall. Enjoying cardiac rehab. EKG is in NSR today. Pt denies chest pain, dyspnea, dyspnea on exertion, change in exercise capacity, fatigue,  nausea, vomiting, diarrhea, constipation, motor weakness, insomnia, weight loss, syncope, dizziness, lightheadedness, palpitations, PND, orthopnea, or claudication.     3-19-15: as above, doing well overall. We had stopped coumadin a while back, she would like take Volatren for arthiritis issues.      6-26-15: as above, states she's been very SOB with very minimal exertion, started about a month ago and progressively getting worse. In January she states she was doing 2 hours of exercise and now cant even do 5 minutes.  States her legs have also been swollen and her lasix dose was

## 2024-06-03 ENCOUNTER — CARE COORDINATION (OUTPATIENT)
Dept: CARE COORDINATION | Age: 79
End: 2024-06-03

## 2024-06-03 NOTE — CARE COORDINATION
Resources          Goals Addressed                   This Visit's Progress     Conditions and Symptoms   On track     I will schedule office visits, as directed by my provider.  I will keep my appointment or reschedule if I have to cancel.  I will notify my provider of any barriers to my plan of care.  I will follow my Zone Management tool to seek urgent or emergent care.  I will notify my provider of any symptoms that indicate a worsening of my condition.    Barriers: impairment:  physical: right leg amputation  Plan for overcoming my barriers: will work with ACM towards goals and ACM will provide education and resource needs  Confidence: 9/10  Anticipated Goal Completion Date: 9/13/24         Reduce Falls    On track     I will reduce my risk of falls by the following: Remove rugs or use non slip rugs  Install grab bars in bathroom  Use walking aids like cane or walker    Barriers: impairment:  physical: right leg amputation  Plan for overcoming my barriers: will work with ACM towards goals and ACM will provide ambulation safety education  Confidence: 8/10  Anticipated Goal Completion Date: 9/13/24              Future Appointments   Date Time Provider Department Center   6/5/2024  2:00 PM Leroy Sorensen MD [place] [address] [facility]   12/6/2024 11:30 AM Ramu Bower DO [facility]   ,   Congestive Heart Failure Assessment    Are you currently restricting fluids?: No Restriction  Do you understand a low sodium diet?: Yes  Do you understand how to read food labels?: Yes  How many restaurant meals do you eat per week?: 0  Do you salt your food before tasting it?: No     Other symptoms causing concern      Symptoms:  CHF associated dyspnea on exertion: Pos, CHF associated leg swelling: Pos      Symptom course: stable  Patient-reported weight (lb):  (Comment: unable to balance for accurate weight due to right leg amputation)     ,   General Assessment    Do you have any symptoms that are causing

## 2024-06-05 ENCOUNTER — OFFICE VISIT (OUTPATIENT)
Dept: FAMILY MEDICINE CLINIC | Age: 79
End: 2024-06-05
Payer: MEDICARE

## 2024-06-05 VITALS
OXYGEN SATURATION: 94 % | BODY MASS INDEX: 42.13 KG/M2 | WEIGHT: 209 LBS | HEART RATE: 101 BPM | HEIGHT: 59 IN | SYSTOLIC BLOOD PRESSURE: 118 MMHG | DIASTOLIC BLOOD PRESSURE: 78 MMHG

## 2024-06-05 DIAGNOSIS — Z76.89 ENCOUNTER TO ESTABLISH CARE: Primary | ICD-10-CM

## 2024-06-05 DIAGNOSIS — S14.109D INJURY OF CERVICAL SPINAL CORD, SUBSEQUENT ENCOUNTER (HCC): ICD-10-CM

## 2024-06-05 DIAGNOSIS — E78.00 HYPERCHOLESTEROLEMIA: ICD-10-CM

## 2024-06-05 DIAGNOSIS — K64.9 HEMORRHOIDS, UNSPECIFIED HEMORRHOID TYPE: ICD-10-CM

## 2024-06-05 DIAGNOSIS — I50.22 CHRONIC SYSTOLIC (CONGESTIVE) HEART FAILURE (HCC): ICD-10-CM

## 2024-06-05 DIAGNOSIS — R13.10 DYSPHAGIA, UNSPECIFIED TYPE: ICD-10-CM

## 2024-06-05 PROBLEM — M54.16 LUMBAR RADICULITIS: Status: ACTIVE | Noted: 2023-09-30

## 2024-06-05 PROCEDURE — 1036F TOBACCO NON-USER: CPT | Performed by: FAMILY MEDICINE

## 2024-06-05 PROCEDURE — 1090F PRES/ABSN URINE INCON ASSESS: CPT | Performed by: FAMILY MEDICINE

## 2024-06-05 PROCEDURE — 99214 OFFICE O/P EST MOD 30 MIN: CPT | Performed by: FAMILY MEDICINE

## 2024-06-05 PROCEDURE — G8399 PT W/DXA RESULTS DOCUMENT: HCPCS | Performed by: FAMILY MEDICINE

## 2024-06-05 PROCEDURE — 1123F ACP DISCUSS/DSCN MKR DOCD: CPT | Performed by: FAMILY MEDICINE

## 2024-06-05 PROCEDURE — 3074F SYST BP LT 130 MM HG: CPT | Performed by: FAMILY MEDICINE

## 2024-06-05 PROCEDURE — G8427 DOCREV CUR MEDS BY ELIG CLIN: HCPCS | Performed by: FAMILY MEDICINE

## 2024-06-05 PROCEDURE — 3078F DIAST BP <80 MM HG: CPT | Performed by: FAMILY MEDICINE

## 2024-06-05 PROCEDURE — G8417 CALC BMI ABV UP PARAM F/U: HCPCS | Performed by: FAMILY MEDICINE

## 2024-06-05 ASSESSMENT — ENCOUNTER SYMPTOMS
ABDOMINAL PAIN: 0
COUGH: 0
ANAL BLEEDING: 1
EYE PAIN: 0
VOMITING: 0
SORE THROAT: 0
SHORTNESS OF BREATH: 0
TROUBLE SWALLOWING: 1
DIARRHEA: 0
RHINORRHEA: 0

## 2024-06-05 NOTE — PROGRESS NOTES
[facility] SPECIALTY/PRIMARY CARE  [address], [address]  [facility] [address]  Dept: [phone]  Dept Fax: [phone]  Loc: [phone]     6/5/2024    Visit type: Follow up    Reason for Visit: Established New Doctor (Pt states she feels like there is tumor growing inside of her throat, intermittent difficulty of swallowing.  Denies any pain), Hemorrhoids (Pt states intermittent bleeding.  Denies any pain. ), and Spasms (Pt states intermittent muscle spasms for bilateral upper and lower extremities)         Patient: Alysha Rodriguez is a 78 y.o. female     HPI: 78-year-old female presents to establish care, also concerned about some difficulty swallowing and hemorrhoids.  Patient states she has no other concerns at this time.    ASSESSMENT/PLAN   Encounter to establish care  Dysphagia, unspecified type  Hemorrhoids, unspecified hemorrhoid type  Injury of cervical spinal cord, subsequent encounter (HCC)  Chronic systolic (congestive) heart failure  Hypercholesterolemia     -All recent labs and cardiac testing was reviewed for encounter.  Recent cardiology note was reviewed for encounter.    -Discussed difficulty with swallowing, patient states she has no issues with fluids, only has issues with solid food from time to time.  Patient reports this is more often due to taking large bites or not chewing thoroughly.  Nonetheless she does note difficulty with swallowing at the midesophagus level.  Discussed options today and patient would prefer to monitor, she will let me know if there is any worsening and I will order endoscopy at that time.    -Patient reports trace blood when she wipes due to hemorrhoids.  She states this is intermittent.  Discussed topical options, bulking agents, stool softeners, surgical options.  Patient will try over-the-counter topical options and let me know if anything worsens or she changes her mind.  Patient denies any black stool.    -CHF

## 2024-06-06 ENCOUNTER — CARE COORDINATION (OUTPATIENT)
Dept: CARE COORDINATION | Age: 79
End: 2024-06-06

## 2024-06-06 NOTE — CARE COORDINATION
Telephone call with patient.  She did receive Eliquis from [facility].  She has all of her medications .  Her daughter helps her out with medication costs if needed. She feels things are going well at this time. She is working on purging items on her household.  She feels can bath self safely at this time.  She has someone who comes weekly to help her with household chores.  She reads books with her [employer]..

## 2024-06-07 DIAGNOSIS — I10 ESSENTIAL HYPERTENSION: ICD-10-CM

## 2024-06-19 ENCOUNTER — CARE COORDINATION (OUTPATIENT)
Dept: CARE COORDINATION | Age: 79
End: 2024-06-19

## 2024-06-19 NOTE — CARE COORDINATION
Ambulatory Care Coordination Note  2024    Patient Current Location:  Home: [address] [place] [address] [address] [place] [address]     ACM contacted the patient by telephone. Verified name and  with patient as identifiers. Provided introduction to self, and explanation of the ACM role.     Challenges to be reviewed by the provider   Additional needs identified to be addressed with provider: No  none               Method of communication with provider: none.    ACM: Tana Lyon, RN    spoke with patient for care coordination follow up for CHF, HTN, and hx right leg amputation  Patient states she is feeling good and even went to coffee hour this morning at her complex.  States felt good to get around people and socialize.  Encouraged patient to participate in activities at [facility] for mood uplifting and exercise.  Voiced understanding  CHF at baseline.  Denies CP or palpitations.  Left leg swelling is present but stable.  Denies worsening cough or congestion.  Sob w/ exertion stable.  Unable to weigh self.  Takes lasix.  Takes eliquis.  Denies bleeding or bruising.    BP stable.  130/94, 130/87.  States was 98/64 yesterday.  Reports felt tired and low energy.  Advised to check BID and keep log.  Does have occas dizziness.  Denies falls.  Reviewed ambulation safety.  Uses electric wheelchair most of day.    Advised to reach out with any questions or needs  HH aide comes weekly to assist with chores.       Offered patient enrollment in the Remote Patient Monitoring (RPM) program for in-home monitoring: Yes, but did not enroll at this time: limited patient ability to navigate RPM/equipment.    Lab Results       None            Care Coordination Interventions    Referral from Primary Care Provider: No  Suggested Interventions and Community Resources          Goals Addressed                   This Visit's Progress     Conditions and Symptoms   On track     I will schedule office visits, as directed by my provider.  I will keep

## 2024-06-20 ENCOUNTER — CARE COORDINATION (OUTPATIENT)
Dept: CARE COORDINATION | Age: 79
End: 2024-06-20

## 2024-06-20 NOTE — CARE COORDINATION
Telephone call with patient.  She stated that she went to coffee at [employer] where she lives. She has all of her prescribed medications.  She gets her medications from [facility] which does sync her medications.  They also deliver for five dollars. She continues to get her Eliquis from [facility].  Also discussed that [employer] delivers her groceries-[employer].   She also uses [employer].  She continues to receive her meals-on-wheels.No new needs or concerns at this time.

## 2024-06-27 ENCOUNTER — CARE COORDINATION (OUTPATIENT)
Dept: CARE COORDINATION | Age: 79
End: 2024-06-27

## 2024-06-27 NOTE — CARE COORDINATION
Telephone call to patient.  She is looking forward to family birthday party on 7/4/2024 .  She will be in [place] for two weeks.

## 2024-07-02 ENCOUNTER — CARE COORDINATION (OUTPATIENT)
Dept: CARE COORDINATION | Age: 79
End: 2024-07-02

## 2024-07-02 NOTE — CARE COORDINATION
Patient out of town in [place] for 2 weeks visiting family  ACM will follow up upon return to [place]

## 2024-07-11 ENCOUNTER — CARE COORDINATION (OUTPATIENT)
Dept: CARE COORDINATION | Age: 79
End: 2024-07-11

## 2024-07-11 ENCOUNTER — PATIENT MESSAGE (OUTPATIENT)
Dept: FAMILY MEDICINE CLINIC | Age: 79
End: 2024-07-11

## 2024-07-11 NOTE — TELEPHONE ENCOUNTER
From: Alysha Rodriguez  To: Dr. Leroy Sorensen  Sent: 7/11/2024 7:04 AM EDT  Subject: Mineral supplement     I am considering adding magnesium L-threonate to my medication profile. Is there a contradiction or interaction that I should be aware of? Also, my potassium level is at the lowest point of normal. Considering all of my phone issues, would a potassium supplement be advised?

## 2024-07-11 NOTE — CARE COORDINATION
Telephone call to patient.  She stated that she was still in [place] and will return phone call once she is back home.

## 2024-07-18 ENCOUNTER — CARE COORDINATION (OUTPATIENT)
Dept: CARE COORDINATION | Age: 79
End: 2024-07-18

## 2024-07-18 NOTE — CARE COORDINATION
Telephone call with patient.  She is home now from her trip and had a wonderful time.  She stated that granddaughter is looking into reciprocity  between [place] and [place].  She spoke of recent death of a friend.  She is having problems with sleeping and plans on talking to PCP if it doesn't improve.  She feels that things otherwise are going okay with no other concerns.

## 2024-07-22 ENCOUNTER — APPOINTMENT (OUTPATIENT)
Dept: ORTHOPEDIC SURGERY | Facility: CLINIC | Age: 79
End: 2024-07-22
Payer: MEDICARE

## 2024-07-22 ENCOUNTER — CARE COORDINATION (OUTPATIENT)
Dept: CARE COORDINATION | Age: 79
End: 2024-07-22

## 2024-07-22 NOTE — CARE COORDINATION
understand how to read food labels?: Yes  How many restaurant meals do you eat per week?: 0  Do you salt your food before tasting it?: No     Shortness of breath (worse than baseline)      Symptoms:  CHF associated dyspnea on exertion: Pos, CHF associated leg swelling: Pos      Symptom course: stable  Patient-reported weight (lb):  (Comment: unable to balance due to leg amputation)  Weight trend: stable  Salt intake watch compared to last visit: stable     ,   General Assessment    Do you have any symptoms that are causing concern?: Yes  Progression since Onset: Gradually Worsening  Reported Symptoms: Pain (Comment: right wrist mobility)     , No linked episodes, and This patient was permanently screened out of Care Coordination on 7/22/2024 for the following reason:

## 2024-07-25 ENCOUNTER — CARE COORDINATION (OUTPATIENT)
Dept: CARE COORDINATION | Age: 79
End: 2024-07-25

## 2024-07-25 NOTE — CARE COORDINATION
Telephone call with patient.  She spoke of her pain in her wrist.  She is going to see a surgeon in the next few weeks.  Discussed if she has surgery will probably need to go to a SNF.  She spoke of her portable air conditioner breaking for her bedroom.  She is using ceiling and box fan for bedroom.  She continues to work on cleaning things out of her apartment.  Patient has her all of her medications.  She is considering getting a hospital bed to help with sleeping.  She sleeps in her recliner or her bed.  She sleeps better in recliner.  Discussed if she wants a hospital bed can assist with this need.

## 2024-08-01 ENCOUNTER — CARE COORDINATION (OUTPATIENT)
Dept: CARE COORDINATION | Age: 79
End: 2024-08-01

## 2024-08-01 NOTE — CARE COORDINATION
Telephone call with patient.  She stated doing well.  Sleeping better in recliner.  She is waiting for her instacart order from [facility].  She continues to have someone come once a week to help with household chores.  No other concerns or needs were expressed at time of phone call.

## 2024-08-05 RX ORDER — FUROSEMIDE 20 MG/1
20 TABLET ORAL 2 TIMES DAILY
Qty: 60 TABLET | Refills: 1 | Status: SHIPPED | OUTPATIENT
Start: 2024-08-05

## 2024-08-05 RX ORDER — FUROSEMIDE 20 MG/1
20 TABLET ORAL 2 TIMES DAILY
Qty: 60 TABLET | Refills: 3 | Status: CANCELLED | OUTPATIENT
Start: 2024-08-05

## 2024-08-05 NOTE — TELEPHONE ENCOUNTER
Comments:     Last Office Visit (last PCP visit):   6/5/2024    Next Visit Date:  Future Appointments   Date Time Provider Department Center   8/27/2024  2:30 PM [facility]   12/5/2024  2:00 PM [facility]   12/6/2024 11:30 AM Holiday, DO [facility]       **If hasn't been seen in over a year OR hasn't followed up according to last diabetes/ADHD visit, make appointment for patient before sending refill to provider.    Rx requested:  Requested Prescriptions     Pending Prescriptions Disp Refills    furosemide (LASIX) 20 MG tablet 60 tablet 3     Sig: Take 1 tablet by mouth 2 times daily

## 2024-08-08 ENCOUNTER — CARE COORDINATION (OUTPATIENT)
Dept: CARE COORDINATION | Age: 79
End: 2024-08-08

## 2024-08-08 NOTE — CARE COORDINATION
Telephone call with patient.  She is concerned about daughter who lives in [place] with the weather.   She is doing laundry today and  coming to visit this afternoon. She spoke of her cat and not doing well.  She has registered with [employer] in [place] which can accommodate her wheelchair.    Transportation will accommodate in [place] and ten miles into [facility].  Rides are 1.50 dollars.  She is going to use this service for transportation to medical appointments.

## 2024-08-12 ENCOUNTER — CARE COORDINATION (OUTPATIENT)
Dept: CARE COORDINATION | Age: 79
End: 2024-08-12

## 2024-08-12 ENCOUNTER — APPOINTMENT (OUTPATIENT)
Dept: ORTHOPEDIC SURGERY | Facility: CLINIC | Age: 79
End: 2024-08-12
Payer: MEDICARE

## 2024-08-12 DIAGNOSIS — M19.049 HAND ARTHRITIS: ICD-10-CM

## 2024-08-12 DIAGNOSIS — M19.039 WRIST ARTHRITIS: Primary | ICD-10-CM

## 2024-08-12 PROCEDURE — 1159F MED LIST DOCD IN RCRD: CPT | Performed by: ORTHOPAEDIC SURGERY

## 2024-08-12 PROCEDURE — 99214 OFFICE O/P EST MOD 30 MIN: CPT | Performed by: ORTHOPAEDIC SURGERY

## 2024-08-12 PROCEDURE — 1036F TOBACCO NON-USER: CPT | Performed by: ORTHOPAEDIC SURGERY

## 2024-08-12 RX ORDER — TRAZODONE HYDROCHLORIDE 50 MG/1
50 TABLET ORAL NIGHTLY PRN
COMMUNITY
Start: 2024-08-04

## 2024-08-12 RX ORDER — DILTIAZEM HYDROCHLORIDE 120 MG/1
1 CAPSULE, COATED, EXTENDED RELEASE ORAL
COMMUNITY
Start: 2024-08-04

## 2024-08-12 RX ORDER — MECLIZINE HCL 25 MG/1
1 TABLET ORAL EVERY 8 HOURS PRN
COMMUNITY
Start: 2024-05-14

## 2024-08-12 RX ORDER — FUROSEMIDE 20 MG/1
20 TABLET ORAL 2 TIMES DAILY
COMMUNITY

## 2024-08-12 NOTE — PROGRESS NOTES
History present illness: Patient presents today for evaluation of bilateral wrist pain worse on the right as compared to the left.  She has new recent diagnosis of atrial fibrillation and CHF.  She is on Eliquis now.      Past medical history: The patient's past medical history, family history, social history, and review of systems were documented on the patient medical intake.  The updated data was reviewed in the electronic medical record.  History is negative except otherwise stated in history of present illness.        Physical examination:  General: Alert and oriented to person, place, and time.  No acute distress and breathing comfortably: Pleasant and cooperative with examination.  HEENT: Head is normocephalic and atraumatic.  Neck: Supple, no visible swelling.  Cardiovascular: No palpable tachycardia  Lungs: No audible wheezing or labored breathing  Abdomen: Nondistended.  Extremities: Evaluation of the bilateral upper extremities finds the patient had palpable radial artery at the wrists with brisk capillary refill to all digits.  Patient has intact sensation to axillary radial median and ulnar nerves.  There are no open wounds.  There are no signs of infection.  There is no evidence of lymphedema or lymphatic streaking.  The patient has supple compartments to bilateral arm forearm and hand.  Gross deformity to bilateral hands and wrist consistent with advanced arthritic change      Radiology:      Assessment: Bilateral wrist arthritis      Plan: Treatment options were discussed.  We talked about operative and nonoperative strategies.  We focused and on the right today.  We talked about complete wrist fusion using dorsal plate and screw construct with resection distal ulna.  We made it clear to her that she is above average surgical risk.  She is weighing her options.  She is considering surgery but just is not quite sure.  She elects to proceed forth with the fabrication of a custom splint to basically  splint the right wrist in situ see what relief that brings.  Follow-up with me on an as-needed basis.  She knows her options.        Procedure:

## 2024-08-15 ENCOUNTER — CARE COORDINATION (OUTPATIENT)
Dept: CARE COORDINATION | Age: 79
End: 2024-08-15

## 2024-08-15 NOTE — CARE COORDINATION
Telephone call with patient.  She went to doctor about her wrist.  She is going to have splint made for her wrist vs surgery.  She continues to have someone come in once a week or as necessary.   This person also helps out with transportation to medical appointments.  She continues to be concerned about her ailing cat.

## 2024-08-19 ENCOUNTER — CARE COORDINATION (OUTPATIENT)
Dept: CARE COORDINATION | Age: 79
End: 2024-08-19

## 2024-08-19 NOTE — CARE COORDINATION
Ambulatory Care Coordination Note     8/19/2024 3:26 PM     ACM outreach attempt by this ACM today to perform care management follow up . ACM was unable to reach the patient by telephone today; left voice message requesting a return phone call to this ACM.     ACM: Tana Lyon RN     Care Summary Note: attempted to reach    PCP/Specialist follow up:   Future Appointments         Provider Specialty Dept Phone    8/27/2024 2:00 PM Ally Phillips, APRN - CNP [facility] [phone]    12/5/2024 2:00 PM Leroy Sorensen MD [facility] [phone]    12/6/2024 11:30 AM Ramu Bower, DO Cardiology [phone]            Follow Up:   Plan for next ACM outreach in approximately 1 week to complete:  - disease specific assessments  - education .

## 2024-08-20 ENCOUNTER — EVALUATION (OUTPATIENT)
Dept: OCCUPATIONAL THERAPY | Facility: CLINIC | Age: 79
End: 2024-08-20
Payer: MEDICARE

## 2024-08-20 DIAGNOSIS — M19.049 HAND ARTHRITIS: ICD-10-CM

## 2024-08-20 DIAGNOSIS — M19.039 WRIST ARTHRITIS: ICD-10-CM

## 2024-08-20 PROCEDURE — L3808 WHFO, RIGID W/O JOINTS: HCPCS

## 2024-08-20 PROCEDURE — 97165 OT EVAL LOW COMPLEX 30 MIN: CPT | Mod: GO

## 2024-08-20 ASSESSMENT — ENCOUNTER SYMPTOMS
DEPRESSION: 0
OCCASIONAL FEELINGS OF UNSTEADINESS: 0

## 2024-08-20 ASSESSMENT — PATIENT HEALTH QUESTIONNAIRE - PHQ9
10. IF YOU CHECKED OFF ANY PROBLEMS, HOW DIFFICULT HAVE THESE PROBLEMS MADE IT FOR YOU TO DO YOUR WORK, TAKE CARE OF THINGS AT HOME, OR GET ALONG WITH OTHER PEOPLE: NOT DIFFICULT AT ALL
SUM OF ALL RESPONSES TO PHQ9 QUESTIONS 1 AND 2: 1
1. LITTLE INTEREST OR PLEASURE IN DOING THINGS: SEVERAL DAYS
2. FEELING DOWN, DEPRESSED OR HOPELESS: NOT AT ALL

## 2024-08-20 NOTE — PROGRESS NOTES
Occupational Therapy Upper Extremity Orthosis Evaluation Note    Date: 2025    Time In: 1400  Time Out:1450  Total Time: 50 minutes    Charges:   OT OCCUPATIONAL THERAPY EVAL LOW COMPLEX 30 MINS  13828 (CPT®)Mods:GO  HC OT WRIST/HAND/FINGER ORTHOSIS, W/ JOINTS, CUSTOM RAFIQ; MARI TRAMMELL   1    NAME: Martha Jacobs  : 1945  MRN: [id number]    Chart reviewed:yes  Referring Physician:  Dr Obrien for: bilateral orthoses  Diagnosis:  bilateral wrist;/hand pain  Date of onset 2021               Precautions: none    Insurance  Visit Number: 1  Visits Allowed: BMN  Authorization:n/a  Date Range:n/a    Subjective  Prior level of function:Independent   Current level of function:  Pain, right wrist 3/10, left wrist 1/10 pain description; tingling  Chief Complaint: pain  Patient's goal for therapy: have splints  Patient's preferred learning style: visual, auditory, read/written, kinesthetic  Outcome Measure:  Initial evaluation Quick DASH 65.91    Objective  Observation: patient in wheelchair    Clinical presentation: complicated    Skin/ Wound/Scar: intact except a few cat scratches  Edema:moderate left  Sensation: intact to light touch   Dexterity/ Coordination: patient reports some difficulty with fine motor. She states she uses pliers to help her with opening things and putting her brace on.    Upper Extremity ROM   Elbow extension/flexion: WFL  Forearm supination/pronation: WFL  Wrist extension/flexion: minimal  Radial deviation/ulnar deviation: tends to have ulnar drift    Hand ROM  AROM     Limited motion of digits with ulnar drift MPs noted  bilateral, Left hand more              Hand Strength-NT                Gross grasp(dynamometer) (lbs)                             (2nd setting) Right         Left                Pinch (lbs)                             Key  right  left                            Dupree   right     left    ADLS/IADLS: Patient states she modifies to be independent with ADLS, per  patient.      Treatment Completed this Date: Fabricated right resting pan custom thermoplastic orthosis with purpose, wearing schedule, precautions, and care discussed. Patient demonstrated independence with donning/doffing orthosis. Patient to schedule follow up for fabrication of left orthosis. She was instructed to contact OT if she needs any modifications to orthosis for changes in swelling and/or comfort prior to next appoint.      HEP right orthosis    Assessment  Patient is an   78y/o right hand dominate female  diagnosed with wrist and hand pain. Patient reports 3 year history of wrist and hand pain. Patient reports pain in her wrists and hands.  She demonstrated independence with donning/doffing right orthosis and will return to have left one fabricated. Patient will benefit from attending occupational therapy to improve functional use of her hands.     Plan of Care  Goals to be achieved by 4  weeks    Patient's level of independence with ADLs/ IADLs will improve by at least 50% per the quick DASH by discharge.    Patient will report understanding of purpose, wearing schedule, precautions, and care of orthoses demonstrate independence and verbalize precautions.    Patient will report follow through with wearing of orthoses as instructed by therapist and demonstrate independence with donning/doffing orthoses.      Intervention     Orthoses and  patient education    Rehabilitation Potential:  good  Potential limiting factors for rehabilitation:None    Plan  Frequency 1/week  Duration to be scheduled for 1 follow up appointment to check right orthosis and fabricate one for left hand    Patient and therapist developed goals for therapy and patient verbalizing agreement to plan of care

## 2024-08-22 ENCOUNTER — CARE COORDINATION (OUTPATIENT)
Dept: CARE COORDINATION | Age: 79
End: 2024-08-22

## 2024-08-22 NOTE — CARE COORDINATION
Telephone call with patient.  She attends program at [facility] where she lives.  She got the splint for her wrist.  She feels the wrist splint has been helpful.  She maybe getting another splint for her other hand.  No other needs or concerns at time of phone call..

## 2024-08-26 ENCOUNTER — CARE COORDINATION (OUTPATIENT)
Dept: CARE COORDINATION | Age: 79
End: 2024-08-26

## 2024-08-26 ENCOUNTER — APPOINTMENT (OUTPATIENT)
Dept: ORTHOPEDIC SURGERY | Facility: CLINIC | Age: 79
End: 2024-08-26
Payer: MEDICARE

## 2024-08-26 NOTE — CARE COORDINATION
Ambulatory Care Coordination Note     2024 2:29 PM     Patient Current Location:  Home: [address] [place] [address] [place] [address]     ACM contacted the patient by telephone. Verified name and  with patient as identifiers.         ACM: Tana Lyon RN     Challenges to be reviewed by the provider   Additional needs identified to be addressed with provider No  none               Method of communication with provider: none.    Care Summary Note: spoke with patient for care coordination follow up for CHF and HTN  Patient states BLE edema is worse.  Denies redness or seeping.  States is unable to get prosthetic leg on and unable to get shoe on other leg.  Denies CP or palpitations.  Does report occas worsening sob.  Evangelina when lying flat.  Sleeps in recliner so head is elevated.  Taking Lasix 40mg as prescribed.  Appt tomorrow with PCP.  Denies cough or congestion.  Advised to keep leg elevated.  Understands low sodium diet, denies eating much sodium.    Using splint on Left hand/wrist.  States does help with discomfort and able to more support for transferring to and from wheelchair.  Follow up with ortho and will get splint for right wrist also.  States is painful, evangelina with use.  Advised splint and ice.  Takes tylenol.  Denies falls.  Ambulation safety reviewed and reviewed pivoting and transferring.    Appetite stable.  Meals on wheels.    Staying active socially  /70; pulse 72  Pulse ox 92-94% on room air.  Denies headaches.  Occas dizziness but is normal due to vertigo.    Appt tomorrow with PCP.    Prepare to graduate    Offered patient enrollment in the Remote Patient Monitoring (RPM) program for in-home monitoring: Yes, but did not enroll at this time: limited patient ability to navigate RPM/equipment.     Assessments Completed:   Congestive Heart Failure Assessment    Are you currently restricting fluids?: No Restriction  Do you understand a low sodium diet?: Yes  Do you understand how to read food  education  Confidence: 8/10  Anticipated Goal Completion Date: 9/13/24               PCP/Specialist follow up:   Future Appointments         Provider Specialty Dept Phone    8/27/2024 2:00 PM Ally Phillips APRN - CNP Family Medicine [phone]    12/5/2024 2:00 PM Leroy Sorensen MD Family Medicine [phone]    12/6/2024 11:30 AM Ramu Bower, DO Cardiology [phone]            Follow Up:   Plan for next ACM outreach in approximately 3 weeks to complete:  - disease specific assessments  - goal progression  - education .   Patient  is agreeable to this plan.

## 2024-08-27 ENCOUNTER — OFFICE VISIT (OUTPATIENT)
Dept: FAMILY MEDICINE CLINIC | Age: 79
End: 2024-08-27

## 2024-08-27 VITALS
WEIGHT: 215 LBS | DIASTOLIC BLOOD PRESSURE: 76 MMHG | OXYGEN SATURATION: 94 % | SYSTOLIC BLOOD PRESSURE: 112 MMHG | BODY MASS INDEX: 43.34 KG/M2 | HEART RATE: 64 BPM | HEIGHT: 59 IN

## 2024-08-27 DIAGNOSIS — K64.9 HEMORRHOIDS, UNSPECIFIED HEMORRHOID TYPE: ICD-10-CM

## 2024-08-27 DIAGNOSIS — R60.0 BILATERAL LOWER EXTREMITY EDEMA: ICD-10-CM

## 2024-08-27 DIAGNOSIS — R60.0 BILATERAL LOWER EXTREMITY EDEMA: Primary | ICD-10-CM

## 2024-08-27 DIAGNOSIS — R06.02 SHORTNESS OF BREATH: ICD-10-CM

## 2024-08-27 DIAGNOSIS — Z80.0 FAMILY HISTORY OF COLON CANCER: ICD-10-CM

## 2024-08-27 LAB
ALBUMIN SERPL-MCNC: 3.8 G/DL (ref 3.5–4.6)
ALP SERPL-CCNC: 118 U/L (ref 40–130)
ALT SERPL-CCNC: 13 U/L (ref 0–33)
ANION GAP SERPL CALCULATED.3IONS-SCNC: 12 MEQ/L (ref 9–15)
AST SERPL-CCNC: 28 U/L (ref 0–35)
BILIRUB SERPL-MCNC: 0.7 MG/DL (ref 0.2–0.7)
BUN SERPL-MCNC: 22 MG/DL (ref 8–23)
CALCIUM SERPL-MCNC: 9.5 MG/DL (ref 8.5–9.9)
CHLORIDE SERPL-SCNC: 104 MEQ/L (ref 95–107)
CO2 SERPL-SCNC: 28 MEQ/L (ref 20–31)
CREAT SERPL-MCNC: 0.96 MG/DL (ref 0.5–0.9)
GLOBULIN SER CALC-MCNC: 2.6 G/DL (ref 2.3–3.5)
GLUCOSE SERPL-MCNC: 86 MG/DL (ref 70–99)
POTASSIUM SERPL-SCNC: 4.1 MEQ/L (ref 3.4–4.9)
PROT SERPL-MCNC: 6.4 G/DL (ref 6.3–8)
SODIUM SERPL-SCNC: 144 MEQ/L (ref 135–144)

## 2024-08-27 RX ORDER — FUROSEMIDE 20 MG
20 TABLET ORAL 2 TIMES DAILY
Qty: 60 TABLET | Refills: 5 | Status: SHIPPED | OUTPATIENT
Start: 2024-08-27

## 2024-08-27 ASSESSMENT — ENCOUNTER SYMPTOMS
DIARRHEA: 0
CHEST TIGHTNESS: 0
ABDOMINAL PAIN: 0
EYE PAIN: 0
SHORTNESS OF BREATH: 1
BACK PAIN: 0
COLOR CHANGE: 0
TROUBLE SWALLOWING: 0
COUGH: 0
CONSTIPATION: 0

## 2024-08-27 NOTE — PROGRESS NOTES
Subjective  Chief Complaint   Patient presents with    Leg Swelling     States that lower LT leg has been swelling for over a month and getting worse.     Shortness of Breath     States that it has been going on for a few months now. States that she can not do too much at all. States that she has not really had problems with irregular heart beat or chest pain for 3-4 wks.    Establish Care       HPI    Pt here to establish care.    C/o leg swelling ongoing since May. Following with Dr. Bower for atrial fibrillation, chf. Started on lasix, currently taking 40 mg daily. Leg edema is worsening since May, has significantly worsened over the last 2 weeks. Also having some worsening shortness of breath with activity, denies shortness of breath at rest.    Dr. Silva for pain management.     Was seeing Dr. Hurtado for psychiatry; however she left the practice. Is stable on current regimen.    Follows with Dr. Melgar for back issues, Dr. William for wrist.    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    Dyspnea 7/2/2015    Family history of coronary artery disease 8/29/2014    History of blood transfusion 2011    following chemotherapy    Hx of right BKA ([facility])     Hyperlipidemia     Hypertension     Hypothyroid 6/12/2015    Lobular breast cancer ([facility]) 10/2015    NSTEMI (non-ST elevated myocardial infarction) ([facility]) 8/29/2014    Obesity     Paroxysmal atrial fibrillation ([facility]) 8/29/2014    S/P BKA (below knee amputation) ([facility])      Patient Active Problem List    Diagnosis Date Noted    Hematuria 06/20/2017    High risk medication use - 01/30/18 OARRS  cough and chest tightness.    Cardiovascular:  Positive for leg swelling. Negative for chest pain and palpitations.   Gastrointestinal:  Negative for abdominal pain, constipation and diarrhea.   Endocrine: Negative for polydipsia, polyphagia and polyuria.   Genitourinary:  Negative for difficulty urinating and dysuria.   Musculoskeletal:  Negative for arthralgias and back pain.   Skin:  Negative for color change and rash.   Neurological:  Negative for dizziness and light-headedness.   Psychiatric/Behavioral:  Negative for dysphoric mood. The patient is not nervous/anxious.        Objective  Vitals:    08/27/24 1357   BP: 112/76   Site: Left Upper Arm   Position: Sitting   Cuff Size: Large Adult   Pulse: 64   SpO2: 94%   Weight: 97.5 kg (215 lb)   Height: 1.499 m (4' 11\")     Physical Exam  Constitutional:       General: She is not in acute distress.     Appearance: Normal appearance. She is obese. She is not ill-appearing, toxic-appearing or diaphoretic.   HENT:      Head: Normocephalic and atraumatic.      Right Ear: External ear normal.      Left Ear: External ear normal.      Nose: Nose normal. No congestion or rhinorrhea.   Eyes:      Extraocular Movements: Extraocular movements intact.      Conjunctiva/sclera: Conjunctivae normal.      Pupils: Pupils are equal, round, and reactive to light.   Cardiovascular:      Rate and Rhythm: Normal rate and regular rhythm.      Pulses: Normal pulses.      Heart sounds: Normal heart sounds. No murmur heard.  Pulmonary:      Effort: Pulmonary effort is normal. No respiratory distress.      Breath sounds: Normal breath sounds. No stridor. No wheezing, rhonchi or rales.   Chest:      Chest wall: No tenderness.   Musculoskeletal:         General: Normal range of motion.      Cervical back: Normal range of motion and neck supple. No tenderness.      Right lower leg: Right lower leg edema: 2+ edema.      Left lower leg: Edema (2+ edema) present.      Comments: Right thigh

## 2024-08-27 NOTE — PATIENT INSTRUCTIONS
Take 40 mg lasix twice daily for 5 days then resume normal dosing  Schedule follow up with Dr. Bower

## 2024-08-29 ENCOUNTER — CARE COORDINATION (OUTPATIENT)
Dept: CARE COORDINATION | Age: 79
End: 2024-08-29

## 2024-08-29 NOTE — CARE COORDINATION
Telephone call with patient.  Discussed that she is arranging transportation with [facility] to upcoming appointments.  [employer] has a lift for her wheelchair.  She spoke of swelling in her legs that she is seeing doctors for this.  No other concerns or needs at this time.

## 2024-09-05 ENCOUNTER — APPOINTMENT (OUTPATIENT)
Dept: OCCUPATIONAL THERAPY | Facility: CLINIC | Age: 79
End: 2024-09-05
Payer: MEDICARE

## 2024-09-13 ENCOUNTER — OFFICE VISIT (OUTPATIENT)
Dept: CARDIOLOGY CLINIC | Age: 79
End: 2024-09-13

## 2024-09-13 VITALS — DIASTOLIC BLOOD PRESSURE: 80 MMHG | SYSTOLIC BLOOD PRESSURE: 110 MMHG | HEART RATE: 74 BPM

## 2024-09-13 DIAGNOSIS — I10 BENIGN ESSENTIAL HTN: ICD-10-CM

## 2024-09-13 DIAGNOSIS — E66.01 CLASS 3 SEVERE OBESITY WITH SERIOUS COMORBIDITY IN ADULT, UNSPECIFIED BMI, UNSPECIFIED OBESITY TYPE (HCC): ICD-10-CM

## 2024-09-13 DIAGNOSIS — E78.2 MIXED HYPERLIPIDEMIA: ICD-10-CM

## 2024-09-13 DIAGNOSIS — R60.0 BILATERAL LOWER EXTREMITY EDEMA: ICD-10-CM

## 2024-09-13 DIAGNOSIS — R06.02 SHORTNESS OF BREATH: ICD-10-CM

## 2024-09-13 DIAGNOSIS — I48.91 ATRIAL FIBRILLATION, UNSPECIFIED TYPE (HCC): Primary | ICD-10-CM

## 2024-09-13 DIAGNOSIS — E66.9 OBESITY (BMI 30-39.9): ICD-10-CM

## 2024-09-13 DIAGNOSIS — R01.1 HEART MURMUR: ICD-10-CM

## 2024-09-13 LAB
ANION GAP SERPL CALCULATED.3IONS-SCNC: 11 MEQ/L (ref 9–15)
BUN SERPL-MCNC: 29 MG/DL (ref 8–23)
CALCIUM SERPL-MCNC: 9.5 MG/DL (ref 8.5–9.9)
CHLORIDE SERPL-SCNC: 103 MEQ/L (ref 95–107)
CO2 SERPL-SCNC: 29 MEQ/L (ref 20–31)
CREAT SERPL-MCNC: 1.04 MG/DL (ref 0.5–0.9)
GLUCOSE SERPL-MCNC: 128 MG/DL (ref 70–99)
POTASSIUM SERPL-SCNC: 3.8 MEQ/L (ref 3.4–4.9)
SODIUM SERPL-SCNC: 143 MEQ/L (ref 135–144)

## 2024-09-13 RX ORDER — FUROSEMIDE 20 MG
20 TABLET ORAL 2 TIMES DAILY
Qty: 60 TABLET | Refills: 5 | Status: SHIPPED | OUTPATIENT
Start: 2024-09-13

## 2024-09-13 ASSESSMENT — ENCOUNTER SYMPTOMS
WHEEZING: 0
NAUSEA: 0
ALLERGIC/IMMUNOLOGIC NEGATIVE: 1
GASTROINTESTINAL NEGATIVE: 1
EYES NEGATIVE: 1
VOMITING: 0
SHORTNESS OF BREATH: 0
ABDOMINAL PAIN: 0

## 2024-09-16 ENCOUNTER — CARE COORDINATION (OUTPATIENT)
Dept: CARE COORDINATION | Age: 79
End: 2024-09-16

## 2024-09-16 NOTE — CARE COORDINATION
Ambulatory Care Coordination Note     2024 3:53 PM     Patient Current Location:  Home: [address] [place] [address] [address] [place] [address]     ACM contacted the patient by telephone. Verified name and  with patient as identifiers.         ACM: Tana Lyon RN     Challenges to be reviewed by the provider   Additional needs identified to be addressed with provider No  none               Method of communication with provider: none.    Care Summary Note: spoke with patient for care coordination follow up for CHF, HTN, and education  Patient states she is thinking about assisted living again.  Reports she feels like she is \"slowing down\" and could maybe use some assistance since spending most time in her electric wheelchair.  ACM advised patient to discuss with Margy VANG when she calls next week.  Voiced understanding  CHF improving.  States left leg swollen but improved slightly since cardio visit last week.  Right stump slightly swollen.  States sob is worsening compared to normal.  Does report with increase lasix, sob is slightly improved.  Cardiology increased Lasix to 20mg daily and discontinued Cardizem.  Patient voiced understanding.  ECHO ordered for 10/10.  Patient states cardio discussed leaking valve and wanting to see if worsening.  Patient advised to keep leg/stump elevated while sitting/resting.  Encouraged low sodium diet.  Encouraged activity.  Reports worsening fatigue due to sitting around more since sob and edema.  Denies cough or congestion.  Advised zone tool for reference  Denies CP or palpitations. Report dizziness at times.    Patient wants to discuss ACP and updates.  will refer to Si.  Advised patient that Si will reach out.   Denies any needs for this call.  Prepare to graduate      Offered patient enrollment in the Remote Patient Monitoring (RPM) program for in-home monitoring: Yes, but did not enroll at this time: limited patient ability to navigate RPM/equipment.     Assessments Completed:

## 2024-09-18 ENCOUNTER — CARE COORDINATION (OUTPATIENT)
Dept: CARE COORDINATION | Age: 79
End: 2024-09-18

## 2024-09-19 ENCOUNTER — CARE COORDINATION (OUTPATIENT)
Dept: CARE COORDINATION | Age: 79
End: 2024-09-19

## 2024-09-26 ENCOUNTER — CARE COORDINATION (OUTPATIENT)
Dept: CARE COORDINATION | Age: 79
End: 2024-09-26

## 2024-10-03 ENCOUNTER — CARE COORDINATION (OUTPATIENT)
Dept: CARE COORDINATION | Age: 79
End: 2024-10-03

## 2024-10-10 ENCOUNTER — HOSPITAL ENCOUNTER (OUTPATIENT)
Age: 79
Discharge: HOME | End: 2024-10-12
Attending: INTERNAL MEDICINE
Payer: MEDICARE

## 2024-10-10 ENCOUNTER — CARE COORDINATION (OUTPATIENT)
Dept: CARE COORDINATION | Age: 79
End: 2024-10-10

## 2024-10-10 VITALS
DIASTOLIC BLOOD PRESSURE: 80 MMHG | WEIGHT: 215 LBS | SYSTOLIC BLOOD PRESSURE: 110 MMHG | HEIGHT: 59 IN | BODY MASS INDEX: 43.34 KG/M2

## 2024-10-10 DIAGNOSIS — R01.1 HEART MURMUR: ICD-10-CM

## 2024-10-10 PROCEDURE — 93308 TTE F-UP OR LMTD: CPT

## 2024-10-10 NOTE — CARE COORDINATION
Telephone call to patient.  Left voicemail of  nature of call with request for return phone call.  Call back number was provided.

## 2024-10-11 ENCOUNTER — CARE COORDINATION (OUTPATIENT)
Dept: CARE COORDINATION | Age: 79
End: 2024-10-11

## 2024-10-11 LAB
ECHO AO ROOT DIAM: 3 CM
ECHO AO ROOT INDEX: 1.58 CM/M2
ECHO AV AREA PEAK VELOCITY: 1.6 CM2
ECHO AV AREA VTI: 1.9 CM2
ECHO AV AREA/BSA PEAK VELOCITY: 0.8 CM2/M2
ECHO AV AREA/BSA VTI: 1 CM2/M2
ECHO AV MEAN GRADIENT: 2 MMHG
ECHO AV MEAN VELOCITY: 0.7 M/S
ECHO AV PEAK GRADIENT: 5 MMHG
ECHO AV PEAK VELOCITY: 1.3 M/S
ECHO AV VELOCITY RATIO: 0.62
ECHO AV VTI: 22.2 CM
ECHO BSA: 2.02 M2
ECHO EST RA PRESSURE: 3 MMHG
ECHO LA DIAMETER INDEX: 3 CM/M2
ECHO LA DIAMETER: 5.7 CM
ECHO LA TO AORTIC ROOT RATIO: 1.9
ECHO LA VOL A-L A2C: 115 ML (ref 22–52)
ECHO LA VOL A-L A4C: 94 ML (ref 22–52)
ECHO LA VOL MOD A2C: 114 ML (ref 22–52)
ECHO LA VOL MOD A4C: 90 ML (ref 22–52)
ECHO LA VOLUME AREA LENGTH: 105 ML
ECHO LA VOLUME INDEX A-L A2C: 61 ML/M2 (ref 16–34)
ECHO LA VOLUME INDEX A-L A4C: 49 ML/M2 (ref 16–34)
ECHO LA VOLUME INDEX AREA LENGTH: 55 ML/M2 (ref 16–34)
ECHO LA VOLUME INDEX MOD A2C: 60 ML/M2 (ref 16–34)
ECHO LA VOLUME INDEX MOD A4C: 47 ML/M2 (ref 16–34)
ECHO LV E' LATERAL VELOCITY: 14.9 CM/S
ECHO LV E' SEPTAL VELOCITY: 11.7 CM/S
ECHO LV EDV A4C: 50 ML
ECHO LV EDV INDEX A4C: 26 ML/M2
ECHO LV EF PHYSICIAN: 55 %
ECHO LV EJECTION FRACTION A4C: 67 %
ECHO LV ESV A4C: 16 ML
ECHO LV ESV INDEX A4C: 8 ML/M2
ECHO LV FRACTIONAL SHORTENING: 33 % (ref 28–44)
ECHO LV INTERNAL DIMENSION DIASTOLE INDEX: 1.89 CM/M2
ECHO LV INTERNAL DIMENSION DIASTOLIC: 3.6 CM (ref 3.9–5.3)
ECHO LV INTERNAL DIMENSION SYSTOLIC INDEX: 1.26 CM/M2
ECHO LV INTERNAL DIMENSION SYSTOLIC: 2.4 CM
ECHO LV IVSD: 1.6 CM (ref 0.6–0.9)
ECHO LV IVSS: 1.7 CM
ECHO LV MASS 2D: 247.2 G (ref 67–162)
ECHO LV MASS INDEX 2D: 130.1 G/M2 (ref 43–95)
ECHO LV POSTERIOR WALL DIASTOLIC: 1.8 CM (ref 0.6–0.9)
ECHO LV POSTERIOR WALL SYSTOLIC: 2.3 CM
ECHO LV RELATIVE WALL THICKNESS RATIO: 1
ECHO LVOT AREA: 2.5 CM2
ECHO LVOT AV VTI INDEX: 0.8
ECHO LVOT DIAM: 1.8 CM
ECHO LVOT MEAN GRADIENT: 1 MMHG
ECHO LVOT PEAK GRADIENT: 2 MMHG
ECHO LVOT PEAK VELOCITY: 0.8 M/S
ECHO LVOT STROKE VOLUME INDEX: 23.7 ML/M2
ECHO LVOT SV: 45 ML
ECHO LVOT VTI: 17.7 CM
ECHO MV EROA PISA: 0.1 CM2
ECHO MV REGURGITANT ALIASING (NYQUIST) VELOCITY: 36 CM/S
ECHO MV REGURGITANT PEAK GRADIENT: 108 MMHG
ECHO MV REGURGITANT PEAK VELOCITY: 5.2 M/S
ECHO MV REGURGITANT RADIUS PISA: 0.37 CM
ECHO MV REGURGITANT VELOCITY PISA: 5.3 M/S
ECHO MV REGURGITANT VOLUME PISA: 9.8 ML
ECHO MV REGURGITANT VTIA: 167.9 CM
ECHO PV MAX VELOCITY: 0.8 M/S
ECHO PV PEAK GRADIENT: 2 MMHG
ECHO RIGHT VENTRICULAR SYSTOLIC PRESSURE (RVSP): 56 MMHG
ECHO RV INTERNAL DIMENSION: 3.1 CM
ECHO RV TAPSE: 1.3 CM (ref 1.7–?)
ECHO TV REGURGITANT MAX VELOCITY: 3.63 M/S
ECHO TV REGURGITANT PEAK GRADIENT: 53 MMHG

## 2024-10-11 NOTE — CARE COORDINATION
Telephone call with patient.  She spoke of her recent heart testing/ Echocardiogram.  Discussed upcoming  PASSPORT assessment on 10/28/2024l and asking if they have HHA'S availability.  She is only showering twice a week because it is so difficult.  She continues to clean out her apartment and sell things. She is pleased with [employer] for getting to appointments as they take her in her electric wheelchair.

## 2024-10-22 ENCOUNTER — CARE COORDINATION (OUTPATIENT)
Dept: CARE COORDINATION | Age: 79
End: 2024-10-22

## 2024-10-22 NOTE — CARE COORDINATION
Ambulatory Care Coordination Note     10/22/2024 3:22 PM     Patient Current Location:  Home: [address] [place] [address] [address] [place] [address]     ACM contacted the patient by telephone. Verified name and  with patient as identifiers.         ACM: Tana Lyon RN     Challenges to be reviewed by the provider   Additional needs identified to be addressed with provider No  none               Method of communication with provider: none.    Has the patient been seen in the ED since your last call? no    Care Summary Note: spoke with patient for care coordination follow up for CHF, HTN, and education  Patient active with .    Still working on de-cluttering apartment and trying to decide future needs  Appt next week with cardiology to discuss ECHO results.    Denies new or worsening symptoms.    CHF at baseline.  States left leg swelling but stable.  Sob w/ exertion but stable.  Taking lasix.  Increase fatigue.  Unable to accurately weigh self due to right leg amputation and balance issues.    Patient understands to report worsening sob, edema, abd swelling/bloating, and or cough.  Will discuss with cardio of heart is worsening.    Denies recent falls.  Uses electric wheelchair.  States bilateral wrist pain and transferring from wheelchair puts pressure and increase pain on wrists.  Using ACE wrap and splints for comfort.    Patient discussed family and being a burden.  ACM provided listening ear.  Does have HH aide come weekly for help with ADLs  Denies in home needs at this time.    Encouraged patient to stay active  Prepare to graduate    Offered patient enrollment in the Remote Patient Monitoring (RPM) program for in-home monitoring: Yes, but did not enroll at this time: limited patient ability to navigate RPM/equipment.     Assessments Completed:   Congestive Heart Failure Assessment    Are you currently restricting fluids?: No Restriction  Do you understand a low sodium diet?: Yes  Do you understand how to

## 2024-10-25 ENCOUNTER — CARE COORDINATION (OUTPATIENT)
Dept: CARE COORDINATION | Age: 79
End: 2024-10-25

## 2024-10-25 NOTE — CARE COORDINATION
Telephone call with patient. PASSPORT is coming on 10/28/2024 for assessment.  Discussed Medicaid and she indicated that her information has been going to [place]  and she lives in [facility].  She has tried to call [facility] to transfer to [facility] but could not speak to anyone after being on hold. Gave her another phone number for [employer] and she planned to call .  Offered assistance in calling [employer] and she did not want help at this time.. She is using [employer] for transportation to medical appointments.

## 2024-10-31 ENCOUNTER — OFFICE VISIT (OUTPATIENT)
Dept: CARDIOLOGY CLINIC | Age: 79
End: 2024-10-31

## 2024-10-31 VITALS — SYSTOLIC BLOOD PRESSURE: 120 MMHG | HEART RATE: 93 BPM | DIASTOLIC BLOOD PRESSURE: 80 MMHG

## 2024-10-31 DIAGNOSIS — R06.02 SHORTNESS OF BREATH: Primary | ICD-10-CM

## 2024-10-31 DIAGNOSIS — I10 BENIGN ESSENTIAL HTN: ICD-10-CM

## 2024-10-31 DIAGNOSIS — E66.813 CLASS 3 SEVERE OBESITY WITH SERIOUS COMORBIDITY IN ADULT, UNSPECIFIED BMI, UNSPECIFIED OBESITY TYPE: ICD-10-CM

## 2024-10-31 DIAGNOSIS — E66.9 OBESITY (BMI 30-39.9): ICD-10-CM

## 2024-10-31 DIAGNOSIS — I48.91 ATRIAL FIBRILLATION, UNSPECIFIED TYPE (HCC): ICD-10-CM

## 2024-10-31 DIAGNOSIS — R06.02 SHORTNESS OF BREATH: ICD-10-CM

## 2024-10-31 DIAGNOSIS — G47.33 OBSTRUCTIVE SLEEP APNEA: ICD-10-CM

## 2024-10-31 DIAGNOSIS — I50.22 CHRONIC SYSTOLIC (CONGESTIVE) HEART FAILURE (HCC): ICD-10-CM

## 2024-10-31 DIAGNOSIS — E66.01 CLASS 3 SEVERE OBESITY WITH SERIOUS COMORBIDITY IN ADULT, UNSPECIFIED BMI, UNSPECIFIED OBESITY TYPE: ICD-10-CM

## 2024-10-31 DIAGNOSIS — E78.2 MIXED HYPERLIPIDEMIA: ICD-10-CM

## 2024-10-31 LAB
ANION GAP SERPL CALCULATED.3IONS-SCNC: 5 MEQ/L (ref 9–15)
BUN SERPL-MCNC: 24 MG/DL (ref 8–23)
CALCIUM SERPL-MCNC: 9.3 MG/DL (ref 8.5–9.9)
CHLORIDE SERPL-SCNC: 103 MEQ/L (ref 95–107)
CO2 SERPL-SCNC: 30 MEQ/L (ref 20–31)
CREAT SERPL-MCNC: 0.93 MG/DL (ref 0.5–0.9)
ERYTHROCYTE [DISTWIDTH] IN BLOOD BY AUTOMATED COUNT: 15.2 % (ref 11.5–14.5)
GLUCOSE SERPL-MCNC: 106 MG/DL (ref 70–99)
HCT VFR BLD AUTO: 37.3 % (ref 37–47)
HGB BLD-MCNC: 11.9 G/DL (ref 12–16)
INR PPP: 1.6
MCH RBC QN AUTO: 29.8 PG (ref 27–31.3)
MCHC RBC AUTO-ENTMCNC: 31.9 % (ref 33–37)
MCV RBC AUTO: 93.5 FL (ref 79.4–94.8)
PLATELET # BLD AUTO: 246 K/UL (ref 130–400)
POTASSIUM SERPL-SCNC: 4.4 MEQ/L (ref 3.4–4.9)
PROTHROMBIN TIME: 19 SEC (ref 12.3–14.9)
RBC # BLD AUTO: 3.99 M/UL (ref 4.2–5.4)
SODIUM SERPL-SCNC: 138 MEQ/L (ref 135–144)
WBC # BLD AUTO: 6.2 K/UL (ref 4.8–10.8)

## 2024-10-31 RX ORDER — PREDNISONE 5 MG/1
50 TABLET ORAL ONCE
OUTPATIENT
Start: 2024-10-31 | End: 2024-10-31

## 2024-10-31 RX ORDER — SODIUM CHLORIDE 9 MG/ML
INJECTION, SOLUTION INTRAVENOUS PRN
OUTPATIENT
Start: 2024-10-31

## 2024-10-31 RX ORDER — NITROGLYCERIN 0.4 MG/1
0.4 TABLET SUBLINGUAL EVERY 5 MIN PRN
OUTPATIENT
Start: 2024-10-31

## 2024-10-31 RX ORDER — ONDANSETRON 2 MG/ML
4 INJECTION INTRAMUSCULAR; INTRAVENOUS EVERY 6 HOURS PRN
OUTPATIENT
Start: 2024-10-31

## 2024-10-31 RX ORDER — ASPIRIN 81 MG/1
81 TABLET ORAL ONCE
OUTPATIENT
Start: 2024-10-31 | End: 2024-10-31

## 2024-10-31 RX ORDER — SODIUM CHLORIDE 0.9 % (FLUSH) 0.9 %
5-40 SYRINGE (ML) INJECTION EVERY 12 HOURS SCHEDULED
OUTPATIENT
Start: 2024-10-31

## 2024-10-31 RX ORDER — DIPHENHYDRAMINE HCL 25 MG
50 TABLET ORAL ONCE
OUTPATIENT
Start: 2024-10-31 | End: 2024-10-31

## 2024-10-31 RX ORDER — SODIUM CHLORIDE 0.9 % (FLUSH) 0.9 %
5-40 SYRINGE (ML) INJECTION PRN
OUTPATIENT
Start: 2024-10-31

## 2024-10-31 RX ORDER — POTASSIUM CHLORIDE 750 MG/1
10 TABLET, EXTENDED RELEASE ORAL DAILY
Qty: 90 TABLET | Refills: 3 | Status: SHIPPED | OUTPATIENT
Start: 2024-10-31

## 2024-10-31 ASSESSMENT — ENCOUNTER SYMPTOMS
SHORTNESS OF BREATH: 0
NAUSEA: 0
EYES NEGATIVE: 1
ALLERGIC/IMMUNOLOGIC NEGATIVE: 1
WHEEZING: 0
ABDOMINAL PAIN: 0
GASTROINTESTINAL NEGATIVE: 1
VOMITING: 0

## 2024-10-31 NOTE — H&P (VIEW-ONLY)
MR    Discontinue Cardizem due to lower extremity edema    Continue with Eliquis 5 mg twice daily given history of atrial fibrillation    Continue with Lasix.  Double Lasix for the next 3 days.  Will provide potassium 10 mill milliequivalents daily    Patient was advised and encouraged to check blood pressure at home or at a pharmacy, maintain a logbook, and also call us back if blood pressure are above the target ranges or if it is low. Patient clearly understands and agrees to the instructions.      We will need to continue to monitor muscle and liver enzymes, BUN, CR, and electrolytes.     Thank you for allowing me to participate in the care of your patient, please don't hesitate to contact me if you have any further questions.    No follow-ups on file.      9}

## 2024-11-01 ENCOUNTER — CARE COORDINATION (OUTPATIENT)
Dept: CARE COORDINATION | Age: 79
End: 2024-11-01

## 2024-11-01 ENCOUNTER — TELEPHONE (OUTPATIENT)
Dept: CARDIOLOGY CLINIC | Age: 79
End: 2024-11-01

## 2024-11-01 DIAGNOSIS — I34.0 MITRAL VALVE INSUFFICIENCY, UNSPECIFIED ETIOLOGY: Primary | ICD-10-CM

## 2024-11-01 DIAGNOSIS — I34.0 NONRHEUMATIC MITRAL VALVE REGURGITATION: ICD-10-CM

## 2024-11-01 NOTE — CARE COORDINATION
Telephone call to patient.  Left voicemail of reason for call with request for return phone call.  Call back number was provided.  
Cognitively Impaired

## 2024-11-05 ENCOUNTER — CARE COORDINATION (OUTPATIENT)
Dept: CARE COORDINATION | Age: 79
End: 2024-11-05

## 2024-11-05 ENCOUNTER — PATIENT MESSAGE (OUTPATIENT)
Dept: CARDIOLOGY CLINIC | Age: 79
End: 2024-11-05

## 2024-11-05 NOTE — CARE COORDINATION
Telephone call with patient.  She stated that her Medicaid was denied and she is not sure why. Flor from [facility] told her of the denial.  .   She is worried about her heart test next Monday at 7AM.  She is in need of transportation and no one can take her so early.  Discussed [employer] and it may not work because she is wheelchair bound and would need some help with transferring.  Discussed  Medicare Waiver 3 day rule.  Her main concern is getting her issues with her heart. fixed  She feels she may need surgery.  Discussed that if she went to the hospital and had heart surgery she could go to [facility] for skilled care  3 day Medicare Waiver Program and then transition to long term care/Medicaid.  She feels being in a nursing home maybe the best option for her with her declining condition. Discussed going to [facility] 3 Day  Waiver and then if needed going to hospital to have surgery and go back to [facility].  She wants to check with cardiologist to discuss the plan and then get back to this writer.

## 2024-11-05 NOTE — CARE COORDINATION
Telephone call to Delores/[facility] [place].  She does accept patient's with approval for three day Medicare Waiver Program.  Discussed that patient would need to see PCP for more recent H & P and approval.  She will look patient up in system.  They  would also need to do a financial assessment of patient.

## 2024-11-05 NOTE — CARE COORDINATION
Telephone call with Flor Morales/[facility]..  She went out to see patient and she wants to go to [facility].   Flor did level of care for nursing home.  She stated that patient had told her that her Medicaid was denied due to  some issues with life insurance policy.  Discussed that patient maybe eligible for three day medicare waiver rule.  Discussed that [place] is a facility that accept this.  Discussed plan to reach out to patient.

## 2024-11-06 ENCOUNTER — PATIENT MESSAGE (OUTPATIENT)
Dept: CARDIOLOGY CLINIC | Age: 79
End: 2024-11-06

## 2024-11-07 ENCOUNTER — CARE COORDINATION (OUTPATIENT)
Dept: CARE COORDINATION | Age: 79
End: 2024-11-07

## 2024-11-07 NOTE — CARE COORDINATION
Telephone call with patient.  She is going to have cardiology medical testing done on Monday.  [employer] to provide transportation.

## 2024-11-07 NOTE — CARE COORDINATION
Telephone call with patient.  She has not rescheduled her test yet.  Delores from [facility] is coming out on Friday.

## 2024-11-11 ENCOUNTER — HOSPITAL ENCOUNTER (OUTPATIENT)
Age: 79
Setting detail: OBSERVATION
Discharge: HOME HEALTH | End: 2024-11-13
Attending: INTERNAL MEDICINE | Admitting: INTERNAL MEDICINE
Payer: MEDICARE

## 2024-11-11 ENCOUNTER — APPOINTMENT (OUTPATIENT)
Age: 79
End: 2024-11-11
Attending: INTERNAL MEDICINE
Payer: MEDICARE

## 2024-11-11 DIAGNOSIS — I34.0 SEVERE MITRAL REGURGITATION: ICD-10-CM

## 2024-11-11 DIAGNOSIS — I34.0 NONRHEUMATIC MITRAL VALVE REGURGITATION: ICD-10-CM

## 2024-11-11 PROBLEM — I48.91 ATRIAL FIBRILLATION WITH RVR (HCC): Status: ACTIVE | Noted: 2024-11-11

## 2024-11-11 LAB — ECHO BSA: 2.02 M2

## 2024-11-11 PROCEDURE — 6360000002 HC RX W HCPCS: Performed by: INTERNAL MEDICINE

## 2024-11-11 PROCEDURE — 6370000000 HC RX 637 (ALT 250 FOR IP): Performed by: INTERNAL MEDICINE

## 2024-11-11 PROCEDURE — 7100000011 HC PHASE II RECOVERY - ADDTL 15 MIN: Performed by: INTERNAL MEDICINE

## 2024-11-11 PROCEDURE — 96376 TX/PRO/DX INJ SAME DRUG ADON: CPT

## 2024-11-11 PROCEDURE — 2500000003 HC RX 250 WO HCPCS: Performed by: INTERNAL MEDICINE

## 2024-11-11 PROCEDURE — 7100000010 HC PHASE II RECOVERY - FIRST 15 MIN: Performed by: INTERNAL MEDICINE

## 2024-11-11 PROCEDURE — G0378 HOSPITAL OBSERVATION PER HR: HCPCS

## 2024-11-11 PROCEDURE — 96374 THER/PROPH/DIAG INJ IV PUSH: CPT

## 2024-11-11 PROCEDURE — 93312 ECHO TRANSESOPHAGEAL: CPT

## 2024-11-11 PROCEDURE — 93005 ELECTROCARDIOGRAM TRACING: CPT | Performed by: INTERNAL MEDICINE

## 2024-11-11 RX ORDER — TRAZODONE HYDROCHLORIDE 50 MG/1
50 TABLET, FILM COATED ORAL NIGHTLY PRN
Status: DISCONTINUED | OUTPATIENT
Start: 2024-11-11 | End: 2024-11-13 | Stop reason: HOSPADM

## 2024-11-11 RX ORDER — METOPROLOL TARTRATE 1 MG/ML
5 INJECTION, SOLUTION INTRAVENOUS EVERY 6 HOURS PRN
Status: DISCONTINUED | OUTPATIENT
Start: 2024-11-11 | End: 2024-11-13 | Stop reason: HOSPADM

## 2024-11-11 RX ORDER — DIPHENHYDRAMINE HCL 25 MG
50 TABLET ORAL ONCE
Status: DISCONTINUED | OUTPATIENT
Start: 2024-11-11 | End: 2024-11-11 | Stop reason: HOSPADM

## 2024-11-11 RX ORDER — DIPHENHYDRAMINE HCL 25 MG
50 TABLET ORAL ONCE
Status: DISCONTINUED | OUTPATIENT
Start: 2024-11-12 | End: 2024-11-12 | Stop reason: HOSPADM

## 2024-11-11 RX ORDER — SODIUM CHLORIDE 0.9 % (FLUSH) 0.9 %
5-40 SYRINGE (ML) INJECTION EVERY 12 HOURS SCHEDULED
Status: DISCONTINUED | OUTPATIENT
Start: 2024-11-11 | End: 2024-11-11 | Stop reason: HOSPADM

## 2024-11-11 RX ORDER — ASPIRIN 81 MG/1
81 TABLET ORAL DAILY
Status: DISCONTINUED | OUTPATIENT
Start: 2024-11-11 | End: 2024-11-13 | Stop reason: HOSPADM

## 2024-11-11 RX ORDER — METOPROLOL SUCCINATE 50 MG/1
50 TABLET, EXTENDED RELEASE ORAL 2 TIMES DAILY
Status: DISCONTINUED | OUTPATIENT
Start: 2024-11-11 | End: 2024-11-13 | Stop reason: HOSPADM

## 2024-11-11 RX ORDER — SODIUM CHLORIDE 9 MG/ML
INJECTION, SOLUTION INTRAVENOUS PRN
Status: DISCONTINUED | OUTPATIENT
Start: 2024-11-11 | End: 2024-11-11 | Stop reason: HOSPADM

## 2024-11-11 RX ORDER — ONDANSETRON 2 MG/ML
4 INJECTION INTRAMUSCULAR; INTRAVENOUS EVERY 6 HOURS PRN
Status: DISCONTINUED | OUTPATIENT
Start: 2024-11-11 | End: 2024-11-11 | Stop reason: HOSPADM

## 2024-11-11 RX ORDER — ATORVASTATIN CALCIUM 80 MG/1
80 TABLET, FILM COATED ORAL NIGHTLY
Status: DISCONTINUED | OUTPATIENT
Start: 2024-11-11 | End: 2024-11-13 | Stop reason: HOSPADM

## 2024-11-11 RX ORDER — METOPROLOL TARTRATE 1 MG/ML
INJECTION, SOLUTION INTRAVENOUS PRN
Status: COMPLETED | OUTPATIENT
Start: 2024-11-11 | End: 2024-11-11

## 2024-11-11 RX ORDER — FUROSEMIDE 20 MG/1
20 TABLET ORAL 2 TIMES DAILY
Status: DISCONTINUED | OUTPATIENT
Start: 2024-11-11 | End: 2024-11-13 | Stop reason: HOSPADM

## 2024-11-11 RX ORDER — PREDNISONE 50 MG/1
50 TABLET ORAL ONCE
Status: DISCONTINUED | OUTPATIENT
Start: 2024-11-12 | End: 2024-11-12 | Stop reason: HOSPADM

## 2024-11-11 RX ORDER — BUPROPION HYDROCHLORIDE 75 MG/1
75 TABLET ORAL 2 TIMES DAILY
Status: DISCONTINUED | OUTPATIENT
Start: 2024-11-11 | End: 2024-11-13 | Stop reason: HOSPADM

## 2024-11-11 RX ORDER — PROPOFOL 10 MG/ML
INJECTION, EMULSION INTRAVENOUS CONTINUOUS PRN
Status: COMPLETED | OUTPATIENT
Start: 2024-11-11 | End: 2024-11-11

## 2024-11-11 RX ORDER — SODIUM CHLORIDE 0.9 % (FLUSH) 0.9 %
5-40 SYRINGE (ML) INJECTION PRN
Status: DISCONTINUED | OUTPATIENT
Start: 2024-11-11 | End: 2024-11-11 | Stop reason: HOSPADM

## 2024-11-11 RX ORDER — GABAPENTIN 300 MG/1
1200 CAPSULE ORAL 2 TIMES DAILY
Status: DISCONTINUED | OUTPATIENT
Start: 2024-11-11 | End: 2024-11-13 | Stop reason: HOSPADM

## 2024-11-11 RX ORDER — PREDNISONE 50 MG/1
50 TABLET ORAL ONCE
Status: DISCONTINUED | OUTPATIENT
Start: 2024-11-11 | End: 2024-11-11 | Stop reason: HOSPADM

## 2024-11-11 RX ORDER — HYDROCODONE BITARTRATE AND ACETAMINOPHEN 5; 325 MG/1; MG/1
1 TABLET ORAL EVERY 8 HOURS PRN
Status: DISCONTINUED | OUTPATIENT
Start: 2024-11-11 | End: 2024-11-13 | Stop reason: HOSPADM

## 2024-11-11 RX ORDER — NITROGLYCERIN 0.4 MG/1
0.4 TABLET SUBLINGUAL EVERY 5 MIN PRN
Status: DISCONTINUED | OUTPATIENT
Start: 2024-11-11 | End: 2024-11-11 | Stop reason: HOSPADM

## 2024-11-11 RX ORDER — POTASSIUM CHLORIDE 1500 MG/1
10 TABLET, EXTENDED RELEASE ORAL DAILY
Status: DISCONTINUED | OUTPATIENT
Start: 2024-11-11 | End: 2024-11-13 | Stop reason: HOSPADM

## 2024-11-11 RX ORDER — DULOXETIN HYDROCHLORIDE 30 MG/1
30 CAPSULE, DELAYED RELEASE ORAL DAILY
Status: DISCONTINUED | OUTPATIENT
Start: 2024-11-11 | End: 2024-11-13 | Stop reason: HOSPADM

## 2024-11-11 RX ORDER — ASPIRIN 81 MG/1
81 TABLET ORAL ONCE
Status: DISCONTINUED | OUTPATIENT
Start: 2024-11-11 | End: 2024-11-11 | Stop reason: HOSPADM

## 2024-11-11 RX ORDER — ONDANSETRON 2 MG/ML
4 INJECTION INTRAMUSCULAR; INTRAVENOUS EVERY 6 HOURS PRN
Status: DISCONTINUED | OUTPATIENT
Start: 2024-11-11 | End: 2024-11-12 | Stop reason: HOSPADM

## 2024-11-11 RX ORDER — GABAPENTIN 300 MG/1
600 CAPSULE ORAL EVERY MORNING
Status: DISCONTINUED | OUTPATIENT
Start: 2024-11-12 | End: 2024-11-13 | Stop reason: HOSPADM

## 2024-11-11 RX ADMIN — METOPROLOL SUCCINATE 50 MG: 50 TABLET, EXTENDED RELEASE ORAL at 20:53

## 2024-11-11 RX ADMIN — FUROSEMIDE 20 MG: 20 TABLET ORAL at 20:53

## 2024-11-11 RX ADMIN — METOROPROLOL TARTRATE 5 MG: 5 INJECTION, SOLUTION INTRAVENOUS at 15:18

## 2024-11-11 RX ADMIN — HYDROCODONE BITARTRATE AND ACETAMINOPHEN 1 TABLET: 5; 325 TABLET ORAL at 20:52

## 2024-11-11 RX ADMIN — GABAPENTIN 1200 MG: 300 CAPSULE ORAL at 14:28

## 2024-11-11 RX ADMIN — METOPROLOL TARTRATE 5 MG: 5 INJECTION, SOLUTION INTRAVENOUS at 11:21

## 2024-11-11 RX ADMIN — GABAPENTIN 1200 MG: 300 CAPSULE ORAL at 21:51

## 2024-11-11 RX ADMIN — PROPOFOL 40 MG: 10 INJECTION, EMULSION INTRAVENOUS at 11:10

## 2024-11-11 RX ADMIN — BUPROPION HYDROCHLORIDE 75 MG: 75 TABLET, FILM COATED ORAL at 21:50

## 2024-11-11 RX ADMIN — ATORVASTATIN CALCIUM 80 MG: 80 TABLET, FILM COATED ORAL at 20:52

## 2024-11-11 ASSESSMENT — PAIN DESCRIPTION - ORIENTATION: ORIENTATION: MID

## 2024-11-11 ASSESSMENT — PAIN DESCRIPTION - DESCRIPTORS: DESCRIPTORS: ACHING

## 2024-11-11 ASSESSMENT — PAIN SCALES - GENERAL
PAINLEVEL_OUTOF10: 8
PAINLEVEL_OUTOF10: 0

## 2024-11-11 ASSESSMENT — PAIN DESCRIPTION - LOCATION: LOCATION: CHEST;HEAD

## 2024-11-11 ASSESSMENT — PAIN - FUNCTIONAL ASSESSMENT: PAIN_FUNCTIONAL_ASSESSMENT: ACTIVITIES ARE NOT PREVENTED

## 2024-11-11 NOTE — INTERVAL H&P NOTE
Update History & Physical    The patient's History and Physical of October 31, 24 was reviewed with the patient and I examined the patient. There was no change. The surgical site was confirmed by the patient and me.     Plan: The risks, benefits, expected outcome, and alternative to the recommended procedure have been discussed with the patient. Patient understands and wants to proceed with the procedure.     Electronically signed by Ramu Bower DO on 11/11/2024 at 10:01 AM

## 2024-11-11 NOTE — BRIEF OP NOTE
Section of Cardiology  Adult Brief AMELIA Procedure Note        Procedure(s):  AMELIA    Pre-operative Diagnosis: Abnormal echocardiogram, severe MR and TR    H&P Status: Completed and reviewed.     Post-operative Diagnosis:      Severe central mitral regurgitation  Severe central tricuspid regurgitation  EF of 50 to 55%   Severely dilated LA  Severely dilated RA  No PFO  No mass or thrombus  Normal size aorta.  Mild aorta plaque.    Findings:  See full report    Complications:  none    Primary Proceduralist:   Dr.Wes Bower DO    Plan    Admit for afib with RVR  Plan for [facility] tomorrow  Evaluation for MR and TR repair at tertiary care center.    Hold Eliquis for now for left heart catheterization  Full procedure note to follow

## 2024-11-12 ENCOUNTER — CARE COORDINATION (OUTPATIENT)
Dept: CARE COORDINATION | Age: 79
End: 2024-11-12

## 2024-11-12 LAB
ANION GAP SERPL CALCULATED.3IONS-SCNC: 11 MEQ/L (ref 9–15)
BUN SERPL-MCNC: 11 MG/DL (ref 8–23)
CALCIUM SERPL-MCNC: 9 MG/DL (ref 8.5–9.9)
CHLORIDE SERPL-SCNC: 102 MEQ/L (ref 95–107)
CO2 SERPL-SCNC: 30 MEQ/L (ref 20–31)
CREAT SERPL-MCNC: 0.67 MG/DL (ref 0.5–0.9)
EKG ATRIAL RATE: 340 BPM
EKG Q-T INTERVAL: 356 MS
EKG QRS DURATION: 62 MS
EKG QTC CALCULATION (BAZETT): 466 MS
EKG R AXIS: 12 DEGREES
EKG T AXIS: -42 DEGREES
EKG VENTRICULAR RATE: 103 BPM
ERYTHROCYTE [DISTWIDTH] IN BLOOD BY AUTOMATED COUNT: 16 % (ref 11.5–14.5)
GLUCOSE SERPL-MCNC: 90 MG/DL (ref 70–99)
HCT VFR BLD AUTO: 41.4 % (ref 37–47)
HGB BLD-MCNC: 13.2 G/DL (ref 12–16)
MCH RBC QN AUTO: 28.8 PG (ref 27–31.3)
MCHC RBC AUTO-ENTMCNC: 31.9 % (ref 33–37)
MCV RBC AUTO: 90.2 FL (ref 79.4–94.8)
PLATELET # BLD AUTO: 297 K/UL (ref 130–400)
POTASSIUM SERPL-SCNC: 4 MEQ/L (ref 3.4–4.9)
RBC # BLD AUTO: 4.59 M/UL (ref 4.2–5.4)
SODIUM SERPL-SCNC: 143 MEQ/L (ref 135–144)
WBC # BLD AUTO: 7.7 K/UL (ref 4.8–10.8)

## 2024-11-12 PROCEDURE — 85027 COMPLETE CBC AUTOMATED: CPT

## 2024-11-12 PROCEDURE — 93458 L HRT ARTERY/VENTRICLE ANGIO: CPT | Performed by: INTERNAL MEDICINE

## 2024-11-12 PROCEDURE — 36415 COLL VENOUS BLD VENIPUNCTURE: CPT

## 2024-11-12 PROCEDURE — 80048 BASIC METABOLIC PNL TOTAL CA: CPT

## 2024-11-12 PROCEDURE — 2500000003 HC RX 250 WO HCPCS: Performed by: INTERNAL MEDICINE

## 2024-11-12 PROCEDURE — 6370000000 HC RX 637 (ALT 250 FOR IP): Performed by: INTERNAL MEDICINE

## 2024-11-12 PROCEDURE — 2709999900 HC NON-CHARGEABLE SUPPLY: Performed by: INTERNAL MEDICINE

## 2024-11-12 PROCEDURE — 6360000002 HC RX W HCPCS: Performed by: INTERNAL MEDICINE

## 2024-11-12 PROCEDURE — G0378 HOSPITAL OBSERVATION PER HR: HCPCS

## 2024-11-12 PROCEDURE — 93005 ELECTROCARDIOGRAM TRACING: CPT | Performed by: INTERNAL MEDICINE

## 2024-11-12 PROCEDURE — 2700000000 HC OXYGEN THERAPY PER DAY

## 2024-11-12 PROCEDURE — 6360000004 HC RX CONTRAST MEDICATION: Performed by: INTERNAL MEDICINE

## 2024-11-12 PROCEDURE — 96375 TX/PRO/DX INJ NEW DRUG ADDON: CPT

## 2024-11-12 PROCEDURE — 2580000003 HC RX 258

## 2024-11-12 PROCEDURE — 7100000011 HC PHASE II RECOVERY - ADDTL 15 MIN: Performed by: INTERNAL MEDICINE

## 2024-11-12 PROCEDURE — C1769 GUIDE WIRE: HCPCS | Performed by: INTERNAL MEDICINE

## 2024-11-12 PROCEDURE — C1894 INTRO/SHEATH, NON-LASER: HCPCS | Performed by: INTERNAL MEDICINE

## 2024-11-12 PROCEDURE — 6360000002 HC RX W HCPCS

## 2024-11-12 PROCEDURE — 2580000003 HC RX 258: Performed by: INTERNAL MEDICINE

## 2024-11-12 PROCEDURE — 7100000010 HC PHASE II RECOVERY - FIRST 15 MIN: Performed by: INTERNAL MEDICINE

## 2024-11-12 PROCEDURE — 99152 MOD SED SAME PHYS/QHP 5/>YRS: CPT | Performed by: INTERNAL MEDICINE

## 2024-11-12 RX ORDER — DILTIAZEM HYDROCHLORIDE 30 MG/1
30 TABLET, FILM COATED ORAL EVERY 8 HOURS SCHEDULED
Status: DISCONTINUED | OUTPATIENT
Start: 2024-11-12 | End: 2024-11-13 | Stop reason: HOSPADM

## 2024-11-12 RX ORDER — NITROGLYCERIN 20 MG/100ML
INJECTION INTRAVENOUS CONTINUOUS PRN
Status: COMPLETED | OUTPATIENT
Start: 2024-11-12 | End: 2024-11-12

## 2024-11-12 RX ORDER — HEPARIN SODIUM 1000 [USP'U]/ML
INJECTION, SOLUTION INTRAVENOUS; SUBCUTANEOUS PRN
Status: DISCONTINUED | OUTPATIENT
Start: 2024-11-12 | End: 2024-11-12 | Stop reason: HOSPADM

## 2024-11-12 RX ORDER — HYDRALAZINE HYDROCHLORIDE 20 MG/ML
10 INJECTION INTRAMUSCULAR; INTRAVENOUS EVERY 6 HOURS PRN
Status: DISCONTINUED | OUTPATIENT
Start: 2024-11-12 | End: 2024-11-13 | Stop reason: HOSPADM

## 2024-11-12 RX ORDER — IOPAMIDOL 612 MG/ML
INJECTION, SOLUTION INTRAVASCULAR PRN
Status: DISCONTINUED | OUTPATIENT
Start: 2024-11-12 | End: 2024-11-12 | Stop reason: HOSPADM

## 2024-11-12 RX ORDER — ACETAMINOPHEN 325 MG/1
650 TABLET ORAL ONCE
Status: COMPLETED | OUTPATIENT
Start: 2024-11-12 | End: 2024-11-12

## 2024-11-12 RX ORDER — SODIUM CHLORIDE 9 MG/ML
INJECTION, SOLUTION INTRAVENOUS CONTINUOUS
Status: ACTIVE | OUTPATIENT
Start: 2024-11-12 | End: 2024-11-12

## 2024-11-12 RX ORDER — FUROSEMIDE 10 MG/ML
40 INJECTION INTRAMUSCULAR; INTRAVENOUS 2 TIMES DAILY
Status: DISCONTINUED | OUTPATIENT
Start: 2024-11-12 | End: 2024-11-13 | Stop reason: HOSPADM

## 2024-11-12 RX ORDER — MIDAZOLAM HYDROCHLORIDE 1 MG/ML
INJECTION, SOLUTION INTRAMUSCULAR; INTRAVENOUS PRN
Status: DISCONTINUED | OUTPATIENT
Start: 2024-11-12 | End: 2024-11-12 | Stop reason: HOSPADM

## 2024-11-12 RX ORDER — HYDRALAZINE HYDROCHLORIDE 20 MG/ML
10 INJECTION INTRAMUSCULAR; INTRAVENOUS EVERY 6 HOURS PRN
Status: DISCONTINUED | OUTPATIENT
Start: 2024-11-12 | End: 2024-11-12

## 2024-11-12 RX ORDER — FENTANYL CITRATE 50 UG/ML
INJECTION, SOLUTION INTRAMUSCULAR; INTRAVENOUS PRN
Status: DISCONTINUED | OUTPATIENT
Start: 2024-11-12 | End: 2024-11-12 | Stop reason: HOSPADM

## 2024-11-12 RX ORDER — SODIUM CHLORIDE 9 MG/ML
INJECTION, SOLUTION INTRAVENOUS
Status: COMPLETED
Start: 2024-11-12 | End: 2024-11-12

## 2024-11-12 RX ADMIN — METOPROLOL SUCCINATE 50 MG: 50 TABLET, EXTENDED RELEASE ORAL at 21:02

## 2024-11-12 RX ADMIN — FUROSEMIDE 40 MG: 10 INJECTION, SOLUTION INTRAMUSCULAR; INTRAVENOUS at 16:51

## 2024-11-12 RX ADMIN — SODIUM CHLORIDE: 9 INJECTION, SOLUTION INTRAVENOUS at 16:59

## 2024-11-12 RX ADMIN — BUPROPION HYDROCHLORIDE 75 MG: 75 TABLET, FILM COATED ORAL at 21:02

## 2024-11-12 RX ADMIN — GABAPENTIN 600 MG: 300 CAPSULE ORAL at 08:28

## 2024-11-12 RX ADMIN — BUPROPION HYDROCHLORIDE 75 MG: 75 TABLET, FILM COATED ORAL at 08:25

## 2024-11-12 RX ADMIN — GABAPENTIN 1200 MG: 300 CAPSULE ORAL at 16:51

## 2024-11-12 RX ADMIN — DILTIAZEM HYDROCHLORIDE 30 MG: 30 TABLET, FILM COATED ORAL at 16:51

## 2024-11-12 RX ADMIN — GABAPENTIN 1200 MG: 300 CAPSULE ORAL at 22:17

## 2024-11-12 RX ADMIN — DULOXETINE 30 MG: 30 CAPSULE, DELAYED RELEASE ORAL at 08:25

## 2024-11-12 RX ADMIN — ASPIRIN 81 MG: 81 TABLET, COATED ORAL at 08:25

## 2024-11-12 RX ADMIN — HYDRALAZINE HYDROCHLORIDE 10 MG: 20 INJECTION INTRAMUSCULAR; INTRAVENOUS at 09:49

## 2024-11-12 RX ADMIN — ACETAMINOPHEN 325MG 650 MG: 325 TABLET ORAL at 21:02

## 2024-11-12 RX ADMIN — FUROSEMIDE 20 MG: 20 TABLET ORAL at 08:23

## 2024-11-12 RX ADMIN — SODIUM CHLORIDE: 0.9 INJECTION, SOLUTION INTRAVENOUS at 16:59

## 2024-11-12 RX ADMIN — POTASSIUM CHLORIDE 10 MEQ: 1500 TABLET, EXTENDED RELEASE ORAL at 08:23

## 2024-11-12 RX ADMIN — METOPROLOL SUCCINATE 50 MG: 50 TABLET, EXTENDED RELEASE ORAL at 08:24

## 2024-11-12 RX ADMIN — DILTIAZEM HYDROCHLORIDE 30 MG: 30 TABLET, FILM COATED ORAL at 22:17

## 2024-11-12 RX ADMIN — ATORVASTATIN CALCIUM 80 MG: 80 TABLET, FILM COATED ORAL at 21:02

## 2024-11-12 ASSESSMENT — PAIN SCALES - GENERAL
PAINLEVEL_OUTOF10: 0
PAINLEVEL_OUTOF10: 2

## 2024-11-12 ASSESSMENT — PAIN DESCRIPTION - LOCATION: LOCATION: HEAD

## 2024-11-12 ASSESSMENT — PAIN - FUNCTIONAL ASSESSMENT: PAIN_FUNCTIONAL_ASSESSMENT: ACTIVITIES ARE NOT PREVENTED

## 2024-11-12 ASSESSMENT — PAIN DESCRIPTION - DESCRIPTORS: DESCRIPTORS: ACHING;DULL

## 2024-11-12 NOTE — CARE COORDINATION
Chart review - patient admitted to hospital and having heart cath today  Will monitor for discharge

## 2024-11-12 NOTE — PLAN OF CARE
Problem: Chronic Conditions and Co-morbidities  Goal: Patient's chronic conditions and co-morbidity symptoms are monitored and maintained or improved  Outcome: Progressing  Flowsheets (Taken 11/11/2024 2100)  Care Plan - Patient's Chronic Conditions and Co-Morbidity Symptoms are Monitored and Maintained or Improved:   Monitor and assess patient's chronic conditions and comorbid symptoms for stability, deterioration, or improvement   Collaborate with multidisciplinary team to address chronic and comorbid conditions and prevent exacerbation or deterioration   Update acute care plan with appropriate goals if chronic or comorbid symptoms are exacerbated and prevent overall improvement and discharge     Problem: Discharge Planning  Goal: Discharge to home or other facility with appropriate resources  Outcome: Progressing  Flowsheets (Taken 11/11/2024 2100)  Discharge to home or other facility with appropriate resources:   Identify barriers to discharge with patient and caregiver   Arrange for needed discharge resources and transportation as appropriate   Identify discharge learning needs (meds, wound care, etc)     Problem: Safety - Adult  Goal: Free from fall injury  Outcome: Progressing

## 2024-11-12 NOTE — CARE COORDINATION
Case Management Assessment  Initial Evaluation    Date/Time of Evaluation: 11/12/2024 9:52 AM  Assessment Completed by: MARTINEZ Leo    If patient is discharged prior to next notation, then this note serves as note for discharge by case management.    Patient Name: Alysha Rodriguez                   YOB: 1945  Diagnosis: Nonrheumatic mitral valve regurgitation [I34.0]  Atrial fibrillation with RVR (HCC) [I48.91]                   Date / Time: 11/11/2024  9:59 AM    Patient Admission Status: Observation   Readmission Risk (Low < 19, Mod (19-27), High > 27): Readmission Risk Score: 14.1    Current PCP: Ally Phillips, YURY - CNP  PCP verified by CM? Yes    Chart Reviewed: Yes      History Provided by: Patient  Patient Orientation: Alert and Oriented    Patient Cognition: Alert    Hospitalization in the last 30 days (Readmission):  No    If yes, Readmission Assessment in CM Navigator will be completed.    Advance Directives:      Code Status: Full Code   Patient's Primary Decision Maker is: Legal Next of Kin    Primary Decision Maker: Tania Stanton - Child - [phone]    Discharge Planning:    Patient lives with: Alone Type of Home: House  Primary Care Giver: Self  Patient Support Systems include: Family Members, Friends/Neighbors   Current Financial resources: Medicare  Current community resources: Other (Comment) ([facility] IS BEEN WORKING WITH PT ON GETTING IN TO [facility]. WAITING FOR MEDICAID APPROVAL)  Current services prior to admission: Transportation            Current DME:              Type of Home Care services:  None    ADLS  Prior functional level: Independent in ADLs/IADLs, Assistance with the following:, Mobility  Current functional level: Independent in ADLs/IADLs, Assistance with the following:, Mobility    PT AM-PAC:   /24  OT AM-PAC:   /24    Family can provide assistance at DC: No  Would you like Case Management to discuss the discharge plan with any other family members/significant

## 2024-11-12 NOTE — ACP (ADVANCE CARE PLANNING)
Advance Care Planning   Healthcare Decision Maker:    Primary Decision Maker: Tania Stanton - Child - [phone]    Click here to complete Healthcare Decision Makers including selection of the Healthcare Decision Maker Relationship (ie \"Primary\").  Today we documented Decision Maker(s) consistent with Legal Next of Kin hierarchy.       Electronically signed by MARTINEZ Leo on 11/12/2024 at 9:47 AM

## 2024-11-12 NOTE — INTERVAL H&P NOTE
Update History & Physical    The patient's History and Physical of October 31, 24 was reviewed with the patient and I examined the patient. There was no change. The surgical site was confirmed by the patient and me.     Plan: The risks, benefits, expected outcome, and alternative to the recommended procedure have been discussed with the patient. Patient understands and wants to proceed with the procedure.     Electronically signed by Ramu Bower DO on 11/12/2024 at 12:25 PM

## 2024-11-12 NOTE — BRIEF OP NOTE
Section of Cardiology  Adult Brief Cardiac Cath Procedure Note        Procedure(s):  LHC, b/l coronary angio    Pre-operative Diagnosis:  severe MR    H&P Status: Completed and reviewed.     Post-operative Diagnosis:        LV EF of 55%, normal LVEDP  LM large caliber vessel, mild disease  LAD large caliber vessel, tortuous, 30% mid stenosis  Circumflex large-caliber vessel, mild diffuse disease  RCA large caliber vessel, anterior takeoff.  Mild diffuse disease    Findings:  See full report    Complications:  none    Primary Proceduralist:   Dr.Wes Bower DO    Plan    Refer to [facility] for MV surgery.     Full procedure note to follow

## 2024-11-13 ENCOUNTER — TELEPHONE (OUTPATIENT)
Dept: CARDIOLOGY CLINIC | Age: 79
End: 2024-11-13

## 2024-11-13 ENCOUNTER — CARE COORDINATION (OUTPATIENT)
Dept: CARE COORDINATION | Age: 79
End: 2024-11-13

## 2024-11-13 VITALS
HEART RATE: 79 BPM | BODY MASS INDEX: 43.34 KG/M2 | WEIGHT: 215 LBS | HEIGHT: 59 IN | TEMPERATURE: 97.3 F | SYSTOLIC BLOOD PRESSURE: 120 MMHG | RESPIRATION RATE: 18 BRPM | DIASTOLIC BLOOD PRESSURE: 75 MMHG | OXYGEN SATURATION: 97 %

## 2024-11-13 LAB
ANION GAP SERPL CALCULATED.3IONS-SCNC: 8 MEQ/L (ref 9–15)
BUN SERPL-MCNC: 11 MG/DL (ref 8–23)
CALCIUM SERPL-MCNC: 9.1 MG/DL (ref 8.5–9.9)
CHLORIDE SERPL-SCNC: 100 MEQ/L (ref 95–107)
CO2 SERPL-SCNC: 29 MEQ/L (ref 20–31)
CREAT SERPL-MCNC: 0.73 MG/DL (ref 0.5–0.9)
ECHO BSA: 2.02 M2
ECHO BSA: 2.02 M2
ECHO MV EROA PISA: 0.2 CM2
ECHO MV REGURGITANT ALIASING (NYQUIST) VELOCITY: 28 CM/S
ECHO MV REGURGITANT PEAK GRADIENT: 185 MMHG
ECHO MV REGURGITANT PEAK VELOCITY: 6.8 M/S
ECHO MV REGURGITANT RADIUS PISA: 0.77 CM
ECHO MV REGURGITANT VELOCITY PISA: 6.7 M/S
ECHO MV REGURGITANT VOLUME PISA: 29.52 ML
ECHO MV REGURGITANT VTIA: 189.7 CM
ECHO TV REGURGITANT MAX VELOCITY: 4.05 M/S
ECHO TV REGURGITANT PEAK GRADIENT: 66 MMHG
EKG ATRIAL RATE: 102 BPM
EKG Q-T INTERVAL: 370 MS
EKG QRS DURATION: 76 MS
EKG QTC CALCULATION (BAZETT): 489 MS
EKG R AXIS: 18 DEGREES
EKG T AXIS: 1 DEGREES
EKG VENTRICULAR RATE: 105 BPM
ERYTHROCYTE [DISTWIDTH] IN BLOOD BY AUTOMATED COUNT: 15.9 % (ref 11.5–14.5)
GLUCOSE SERPL-MCNC: 93 MG/DL (ref 70–99)
HCT VFR BLD AUTO: 41.5 % (ref 37–47)
HGB BLD-MCNC: 13.4 G/DL (ref 12–16)
MCH RBC QN AUTO: 29.6 PG (ref 27–31.3)
MCHC RBC AUTO-ENTMCNC: 32.3 % (ref 33–37)
MCV RBC AUTO: 91.6 FL (ref 79.4–94.8)
PLATELET # BLD AUTO: 283 K/UL (ref 130–400)
POTASSIUM SERPL-SCNC: 3.8 MEQ/L (ref 3.4–4.9)
RBC # BLD AUTO: 4.53 M/UL (ref 4.2–5.4)
SODIUM SERPL-SCNC: 137 MEQ/L (ref 135–144)
WBC # BLD AUTO: 8.3 K/UL (ref 4.8–10.8)

## 2024-11-13 PROCEDURE — 97165 OT EVAL LOW COMPLEX 30 MIN: CPT

## 2024-11-13 PROCEDURE — 85027 COMPLETE CBC AUTOMATED: CPT

## 2024-11-13 PROCEDURE — 97161 PT EVAL LOW COMPLEX 20 MIN: CPT

## 2024-11-13 PROCEDURE — G0378 HOSPITAL OBSERVATION PER HR: HCPCS

## 2024-11-13 PROCEDURE — 2700000000 HC OXYGEN THERAPY PER DAY

## 2024-11-13 PROCEDURE — 6370000000 HC RX 637 (ALT 250 FOR IP): Performed by: INTERNAL MEDICINE

## 2024-11-13 PROCEDURE — 94761 N-INVAS EAR/PLS OXIMETRY MLT: CPT

## 2024-11-13 PROCEDURE — 80048 BASIC METABOLIC PNL TOTAL CA: CPT

## 2024-11-13 PROCEDURE — 36415 COLL VENOUS BLD VENIPUNCTURE: CPT

## 2024-11-13 RX ORDER — METOPROLOL SUCCINATE 50 MG/1
50 TABLET, EXTENDED RELEASE ORAL 2 TIMES DAILY
Qty: 30 TABLET | Refills: 3 | Status: SHIPPED | OUTPATIENT
Start: 2024-11-13 | End: 2024-11-13

## 2024-11-13 RX ORDER — DILTIAZEM HYDROCHLORIDE 120 MG/1
120 CAPSULE, COATED, EXTENDED RELEASE ORAL DAILY
Qty: 30 CAPSULE | Refills: 3 | Status: SHIPPED | OUTPATIENT
Start: 2024-11-13

## 2024-11-13 RX ORDER — METOPROLOL SUCCINATE 50 MG/1
50 TABLET, EXTENDED RELEASE ORAL 2 TIMES DAILY
Qty: 60 TABLET | Refills: 3 | Status: SHIPPED | OUTPATIENT
Start: 2024-11-13

## 2024-11-13 RX ADMIN — DILTIAZEM HYDROCHLORIDE 30 MG: 30 TABLET, FILM COATED ORAL at 05:43

## 2024-11-13 RX ADMIN — METOPROLOL SUCCINATE 50 MG: 50 TABLET, EXTENDED RELEASE ORAL at 11:28

## 2024-11-13 RX ADMIN — DULOXETINE 30 MG: 30 CAPSULE, DELAYED RELEASE ORAL at 11:34

## 2024-11-13 RX ADMIN — ASPIRIN 81 MG: 81 TABLET, COATED ORAL at 11:27

## 2024-11-13 RX ADMIN — BUPROPION HYDROCHLORIDE 75 MG: 75 TABLET, FILM COATED ORAL at 11:28

## 2024-11-13 RX ADMIN — GABAPENTIN 600 MG: 300 CAPSULE ORAL at 11:28

## 2024-11-13 RX ADMIN — POTASSIUM CHLORIDE 10 MEQ: 1500 TABLET, EXTENDED RELEASE ORAL at 11:28

## 2024-11-13 NOTE — CARE COORDINATION
Telephone call with patient.  She spoke of being in the hospital.  [facility] did not come out last Friday and plan is for once she is home from hospital to come out. Discussed 3 Day  Medicare Waiver Rule.  Patient stated that she is going to have to go [facility] for heart surgery and from their go to SNF..She is going home from this hospitalization.

## 2024-11-13 NOTE — FLOWSHEET NOTE
2105- HS assessment completed. Patient resting in bed at this time. Denies any chest pain. Switches between atrial fibrillation/NSR on the monitor. +1 non-pitting edema noted to left lower extremity. Right BKA noted. Shortness of breath noted with exertion. Lungs are diminished bilaterally. SATS 98% on 2-3L NC. She is A/Ox3 and can turn herself in the bed, but needs a 1 person assist with transferring from the bed to her wheelchair. Denies any pain with elimination. Pure wick catheter remains in place and set to medium wall suction. Skin remains warm, dry and intact. #20g SL to right wrist, flushed and patent. Vital signs stable and HS medications provided. Also medicated with 650mg PO tylenol for complaints of a headache rated 2-3/10. No signs of any acute distress noted. Snack provided at this time. Call light remains in reach.     2300- Patient resting in bed with her eyes closed. No signs of any acute distress noted. Respirations remains even and unlabored. Call light remains in reach.     0100- Patient remains asleep at this time with no signs of any acute distress. Respirations remain even and unlabored. Call light remains in reach.    Electronically signed by Juliane Espinoza RN on 11/13/2024 at 2:24 AM

## 2024-11-13 NOTE — PLAN OF CARE
Care plan remains ongoing. S/P cardiac catheterization today. Possible transfer to [facility] for MV replacement pending physician referral. Continue to monitor lab work and vital signs.     Electronically signed by Juliane Espinoza RN on 11/12/2024 at 11:08 PM

## 2024-11-13 NOTE — CARE COORDINATION
Telephone call with Delores/[facility] [place].  She is aware of patient at hospital and has spoken to staff their.  They need OT/PT evaluation for 3 day Medicare Waiver Referral.  Discussed talked to patient today and she said she was going home, planning on having heart surgery at [facility] and then coming to their facility.  Explained to Delores that placement with them would be permanent.

## 2024-11-13 NOTE — TELEPHONE ENCOUNTER
Requesting medication refill. Please approve or deny this request.    Rx requested:  Requested Prescriptions     Pending Prescriptions Disp Refills    metoprolol succinate (TOPROL XL) 50 MG extended release tablet [Pharmacy Med Name: metoprolol succinate ER 50 mg tablet,extended release 24 hr] 60 tablet 3     Sig: TAKE 1 TABLET BY MOUTH TWICE DAILY         Last Office Visit:   10/31/2024      Next Visit Date:  Future Appointments   Date Time Provider Department Center   11/21/2024 11:30 AM Ally Phillips APRN - CNP [facility] DEP   11/27/2024 11:00 AM Ally Phillips APRN - CNP [facility] DEP   9/12/2025 10:45 AM Ramu Bower DO [facility]

## 2024-11-13 NOTE — CARE COORDINATION
Quality round completed with care management team. Initial plan is to go home alone. [facility] following in community for LTC.     Possible [facility] [place].     DC Plan: [facility] vs [facility] (Under [facility])?    Electronically signed by MARTINEZ Leo on 11/13/2024 at 9:48 AM    Pt now want to go home now. Pending Home O2 eval.   Pt would like to follow up with [facility] outpatient for surgery then go to [facility] after.     Updated RN and [facility].     Electronically signed by MARTINEZ Leo on 11/13/2024 at 11:26 AM

## 2024-11-13 NOTE — DISCHARGE INSTR - DIET
Good nutrition is important when healing from an illness, injury, or surgery.  Follow any nutrition recommendations given to you during your hospital stay.   If you were given an oral nutrition supplement while in the hospital, continue to take this supplement at home.  You can take it with meals, in-between meals, and/or before bedtime. These supplements can be purchased at most local grocery stores, pharmacies, and chain [employer]-stores.   If you have any questions about your diet or nutrition, call the hospital and ask for the dietitian.    Low fat, low salt, heart healthy diet.

## 2024-11-13 NOTE — PROGRESS NOTES
11/13/24 1159   Resting (Room Air)   SpO2 95   HR 81   During Walk (Room Air)   SpO2 90   HR 91   Symptoms Other (comment)   Comments pt is non ambulatory pt performed a leg lift in her wheelchair   After Walk   Does the Patient Qualify for Home O2 No   Does the Patient Need Portable Oxygen Tanks No       
  Sedation Pre- Procedure Evaluation    Patient name: Alysha Rodriguez  YOB: 1945  MRN: [id number]   Allergies:   Patient has no known allergies.        Type Of Sedation  []local anesthesia  [x]moderate (conscious sedation)  []deep/analgesia      RN Focused History    Yes        No  N/A  []   [x]  Previous problem with airway anesthesia, sedation, or family history of the same    []   [x]  History Of tobacco, alcohol, or substance abuse     [x]   []  Problems associated with stridor, snoring, or sleep apnea    []   [] [x] Pediatric patient, history of premature birth or ventilator support (Moderate Sedation Only)     [x]   [] [] Moderate Sedation: Clear liquids within 6 hours of sedation and NPO within 2 hours    []   [] [x] Deep Sedation:Clear liquids within 6 hours of sedation and NPO within 2 hours      NPO since: sip of water with pills       Vitals, Cardiac Rhythm, Pain:   Vitals  Temp: 98.2 °F (36.8 °C)  Temp Source: Oral  Pulse: (!) 120  Heart Rate Source: Monitor  BP: (!) 172/112  MAP (Calculated): 132  Patient Position: Sitting  Cardiac Rhythm: Atrial fib  Pain Assessment  Pain Assessment: None - Denies Pain      EKG:  EKG Interpretation: atrial fib.      Felicity Scale: Activity: Able to move four extremities voluntarily or on command  Respiration: Able to breathe and cough freely  Circulation: Blood pressure within 20% of preanesthesia level  Consciousness: Fully awake  Oxygen: SAO2 > 92% on room air   Felicity Score: 10     Activity:  2 - Able to move 4 extremities voluntarily on command  Respiration:  2 - Able to breathe deeply and cough freely  Circulation:  2 - BP+/- 20mmHg of normal  Consciousness:  2 - Fully awake  Oxygen Saturation (color):  2 - Able to maintain oxygen saturation >92% on room air      Prior to Admission medications    Medication Sig Start Date End Date Taking? Authorizing Provider   potassium chloride (KLOR-CON M) 10 MEQ extended release tablet Take 1 tablet by mouth daily 
0835 Initial assessment complete. Patient A+OX4 awake laying in bed. Patient hypertensive. Physician notified. Patient denies any pain but does state that she feels some pressure in her chest. Patient to have cardiac cath sone later this afternoon.  0945 New orders received for patients hypertension. Blood pressure checked at this time. /107 with . 10 mg of IV hydralazine administered.   1005 Patient transferred to chair with 1 assist. Minimal assistance needed by staff.  1045 BP rechecked post IV hydralazine. New BP reading of 139/82 and HR 94.  1230 Patient transferred back to bed. Patient purewick replaced.  1250 Patient take to have cath procedure. Patient transported in bed by transport staff.  
2100: Patient developed chest pain during assessment. EKG completed. Patient given norco for pain. Dr. Moser sent secure message. No new orders.   
Arrived to pre/post from home via wheelchair.  Name and date of birth verified along with allergies.  Orders released and consents obtained.  Oriented to surroundings and attached to the monitor showing atrial fib.  No chest pain or shortness of breath. Alert and oriented x4  
Arrived to pre/post from the cath lab and report from Claire HAYDEN RN.  Quikclot to right wrist with no bleeding or hematoma.  Attached to monitor and vitals are stable.  No chest pain or shortness of breath.  Resting comfortably  
Attempted to call patient to inquire location since she was instructed to arrive at 9am for 11am AMELIA. Phone number went straight to voicemail.  
Left message reminding patient to arrive at 0900 on Monday for AMELIA.  Pt reminded to hold eliquis.  
[facility] OCCUPATIONAL THERAPY EVALUATION - ACUTE     NAME: Alysha Rodriguez  : 1945 (79 y.o.)  MRN: [id number]  CODE STATUS: Full Code  Room: 68/W168-01    Date of Service: 2024    Patient Diagnosis(es): Nonrheumatic mitral valve regurgitation [I34.0]  Atrial fibrillation with RVR ([facility]) [I48.91]   Patient Active Problem List    Diagnosis Date Noted    Nonrheumatic mitral valve regurgitation 2024    Atrial fibrillation with RVR ([facility]) 2024    Hematuria 2017    High risk medication use - 18 OARRS PM&R, 18 OARRS PM&R, 18 Urine Drug Screen positive opiates PM&R, 18 Med Contract PM&R 2013    Chronic renal disease, stage III ([facility]) [293508] 2022    Drug-induced polyneuropathy ([facility]) 2022    Severe mitral regurgitation 2024    Chronic systolic (congestive) heart failure 2024    Lumbar radiculitis 2023    Abnormality of gait and mobility 2021    Hx of BKA, right ([facility]) 2021    Gait abnormality dt cerebrovascular insufficiancy and gait atasxia 2021    Difficulty balancing 2021    Dizziness 2021    Malignant neoplasm of peritoneum, 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[facility]) 10/27/2020    Obstructive sleep apnea     Current mild episode of major depressive disorder ([facility]) 2020    Cervical fusion syndrome 2019    Chronic pain syndrome 2018    Charcot's arthropathy 2018    Moderate episode of recurrent major depressive disorder ([facility]) 2017    Chronic low back pain with sciatica 2017    Noncompliance - No Show inject 17, No Show F/U 2017    Hematuria, gross     Primary osteoarthritis involving multiple joints 2017    Left breast mass 2016    Anemia 2016    Cancer ([facility]) 2016    Morbid obesity 2016    Reactive depression 2016    Sleeping excessive 2016    Cervical spinal cord injury (HCC) 06/10/2016    Chronic low back pain 06/10/2016    
Monitor attached and transport notified  
Outer dressing of quikclot removed with no bleeding or hematoma  
Patient gag reflex returned.  Vitals remain stable.  Report called to Janeth CUTLER on one west  
Patient returned from procedure. A+OX 4. Patient denies any pain at this time. Right radial cath access site free of any bleeding or hematoma at this time. Quik clot and tegaderm in place at site. Vitals taken and stable.   
Patient returned to pre/post from the cath lab.  Attached to the monitor, remains in atrial fib in the 90's,  patient awake and talking.  No chest pain or shortness of breath.  Cardiac cath tomorrow with Dr. Bower  
Report called to Jluiane CUTLER on one west.  Monitor attached and transport notified  
Report received from Hina in pre/post cath lab.  
extremities voluntarily on command  Respiration:  2 - Able to breathe deeply and cough freely  Circulation:  2 - BP+/- 20mmHg of normal  Consciousness:  2 - Fully awake  Oxygen Saturation (color):  2 - Able to maintain oxygen saturation >92% on room air      Prior to Admission medications    Medication Sig Start Date End Date Taking? Authorizing Provider   potassium chloride (KLOR-CON M) 10 MEQ extended release tablet Take 1 tablet by mouth daily 10/31/24  Yes Ramu Bower DO   furosemide (LASIX) 20 MG tablet Take 1 tablet by mouth 2 times daily 9/13/24  Yes Ramu Bower DO   apixaban (ELIQUIS) 5 MG TABS tablet Take 1 tablet by mouth 2 times daily 5/16/24  Yes Ramu Bower DO   traZODone (DESYREL) 50 MG tablet Take 1 tablet by mouth nightly as needed for Sleep 4/29/24  Yes Cat Rizzo DO   atorvastatin (LIPITOR) 80 MG tablet Take 1 tablet by mouth nightly 4/25/24  Yes Cat Rizzo DO   buPROPion (WELLBUTRIN) 75 MG tablet Take 1 tablet by mouth 2 times daily 4/25/24  Yes Cat Rizzo DO   carvedilol (COREG) 25 MG tablet Take 1 tablet by mouth 2 times daily 4/25/24  Yes Cat Rizzo DO   diclofenac (VOLTAREN) 50 MG EC tablet Take 1 tablet by mouth 2 times daily 4/25/24  Yes Cat Rizzo DO   DULoxetine (CYMBALTA) 30 MG extended release capsule Take 1 capsule by mouth daily 4/25/24  Yes Cat Rizzo DO   gabapentin (NEURONTIN) 600 MG tablet TAKE 1 TABLET BY MOUTH IN  THE MORNING, 2 TABLETS IN  THE AFTERNOON, AND 2  TABLETS IN THE EVENING AS  TOLERATED 4/25/24 4/25/25 Yes Cat Rizzo DO   HYDROcodone-acetaminophen (NORCO) 5-325 MG per tablet Take 1 tablet by mouth every 8 hours as needed for Pain.   Yes Provider, MD Lindsey   acyclovir (ZOVIRAX) 5 % CREA Apply topically as needed 3/28/24  Yes Cat Rizzo DO   aspirin 81 MG EC tablet Take 1 tablet by mouth daily 9/17/21  Yes Antoinette Ji, APRN - CNP   Blood Pressure KIT 1 Units by Does not apply route as needed (daily 
assistance to accomplish the task

## 2024-11-13 NOTE — DISCHARGE SUMMARY
Discharge Summary    Date: 11/13/2024  Patient Name: Alysha Rodriguez    YOB: 1945     Age: 79 y.o.    Admit Date: 11/11/2024  Discharge Date: 11/13/2024  Discharge Condition: Good    Admission Diagnosis  Nonrheumatic mitral valve regurgitation [I34.0];Atrial fibrillation with RVR (HCC) [I48.91]      Discharge Diagnosis  Principal Problem:    Severe mitral regurgitation  Active Problems:    Nonrheumatic mitral valve regurgitation    Atrial fibrillation with RVR ([facility])  Resolved Problems:    * No resolved hospital problems. *      Hospital Stay  Narrative of Hospital Course:  Patient presented for elective transesophageal echocardiogram noted to have severe mitral and tricuspid regurgitation.  She was noted to be in atrial fibrillation with rapid ventricle response and decompensated congestive heart failure.  She was admitted for further management.  She was placed on Toprol-XL 50 mg twice daily instead of Coreg.  She was initiated on Cardizem  mg p.o. daily.  She was placed on IV Lasix.  She was discharged home on p.o. Lasix.  She underwent cardiac catheterization showing mild CAD with normal LV function.  She was referred to [facility] cardiothoracic surgery for evaluation of mitral and tricuspid valve repairs.    Consultants:  None    Surgeries/procedures Performed:      Treatments:    Cardiac Medications        Discharge Plan/Disposition:  Home    Hospital/Incidental Findings Requiring Follow Up:    Patient Instructions:    Diet: Cardiac Diet    Activity:Activity as Tolerated  For number of days (if applicable):      Other Instructions:    Provider Follow-Up:   No follow-ups on file.     Significant Diagnostic Studies:    Recent Labs:  Admission on 11/11/2024  Body Surface Area                             Date: 11/11/2024  Value: 2.02        Ref range: m2                 Status: Final  MR Radius PISA                                Date: 11/11/2024  Value: 0.77        Ref range: cm

## 2024-11-13 NOTE — TELEPHONE ENCOUNTER
----- Message from Dr. Ramu Bower, DO sent at 11/13/2024 11:08 AM EST -----  Please have patient see [facility] Dr. Fish Carrillo has for severe MR

## 2024-11-13 NOTE — PLAN OF CARE
See OT evaluation for all goals and OT POC. Electronically signed by Cecille Car OTGARRY/L on 11/13/2024 at 12:00 PM

## 2024-11-15 ENCOUNTER — CARE COORDINATION (OUTPATIENT)
Dept: CARE COORDINATION | Age: 79
End: 2024-11-15

## 2024-11-15 DIAGNOSIS — I34.0 SEVERE MITRAL REGURGITATION: Primary | ICD-10-CM

## 2024-11-15 PROCEDURE — 1111F DSCHRG MED/CURRENT MED MERGE: CPT | Performed by: NURSE PRACTITIONER

## 2024-11-15 NOTE — CARE COORDINATION
Ambulatory Care Coordination Note     11/15/2024 3:52 PM     Patient Current Location:  Home: [address] [place] [address] [address] [place] [address]     ACM contacted the patient by telephone. Verified name and  with patient as identifiers.         ACM: Penny Philippe RN     Challenges to be reviewed by the provider   Additional needs identified to be addressed with provider No  none               Method of communication with provider: none.    Utilization: Has the patient been discharged from the hospital since your last call? yes -   Call within 2 business days of discharge: Yes    Patient: Alysha Rodriguez    Patient : 1945   MRN: [id number]    Reason for Admission: severe valve regurgitation needed cath   Discharge Date: 24  RURS: Readmission Risk Score: 14.1      Last Discharge Facility       Date Complaint Diagnosis Description Type Department Provider    24  Nonrheumatic mitral valve regurgitation ... Admission (Discharged) from [id number] Ramu Bower, DO            Was this an external facility discharge? No    Ambulatory Care Manager reviewed discharge instructions, medical action plan, and red flags with patient. The patient was given an opportunity to ask questions; no further questions or concerns at this time.. The patient verbalized understanding.   Were discharge instructions available to patient? Yes.   Reviewed appropriate site of care based on symptoms and resources available to patient including: PCP  Specialist  When to call 911. The patient agrees to contact the primary care provider and/or specialist office for questions related to their healthcare.     Patients top risk factors for readmission:  NA    Hospital follow up appointment: Discussed follow up appointments. Patient has hospital follow up appointment scheduled within 7 days of discharge.           Medications Reviewed:   1111F entered: yes    Advance Care Planning:   Health Care Decision maker confirmed    Primary

## 2024-11-18 ENCOUNTER — CARE COORDINATION (OUTPATIENT)
Dept: CARE COORDINATION | Age: 79
End: 2024-11-18

## 2024-11-18 SDOH — ECONOMIC STABILITY: FOOD INSECURITY: WITHIN THE PAST 12 MONTHS, YOU WORRIED THAT YOUR FOOD WOULD RUN OUT BEFORE YOU GOT MONEY TO BUY MORE.: NEVER TRUE

## 2024-11-18 SDOH — ECONOMIC STABILITY: FOOD INSECURITY: WITHIN THE PAST 12 MONTHS, THE FOOD YOU BOUGHT JUST DIDN'T LAST AND YOU DIDN'T HAVE MONEY TO GET MORE.: NEVER TRUE

## 2024-11-18 SDOH — ECONOMIC STABILITY: INCOME INSECURITY: HOW HARD IS IT FOR YOU TO PAY FOR THE VERY BASICS LIKE FOOD, HOUSING, MEDICAL CARE, AND HEATING?: SOMEWHAT HARD

## 2024-11-18 NOTE — CARE COORDINATION
Telephone call with patient.  She hasn't heard from [facility] yet.  Her problem is finding transportation with  her wheelchair  to [facility].She has been in touch with [employer] and they cannot due wheelchair out of [facility].  If she went in travel chair she would need help with getting in/out of wheelchair and they don't do it because they use volunteers.  [facility] has been to her home and talked about her going their after surgery for rehabilitation and staying there.   She wants to have heart surgery before considering going into [place].  She is not interested in going into [place] before her heart surgery.

## 2024-11-19 ENCOUNTER — CARE COORDINATION (OUTPATIENT)
Dept: CARE COORDINATION | Age: 79
End: 2024-11-19

## 2024-11-19 NOTE — CARE COORDINATION
Ambulatory Care Coordination Note     2024 11:25 AM     Patient Current Location:  Home: [address] [place] [address] [address] [address]     ACM contacted the patient by telephone. Verified name and  with patient as identifiers.         ACM: Tana Lyon RN     Challenges to be reviewed by the provider   Additional needs identified to be addressed with provider No  none               Method of communication with provider: none.    Utilization: Has the patient been seen in the ED since your last call? no    Care Summary Note: spoke with patient for care coordination follow up for CHF.    Patient in hospital - after a scheduled AMELIA for Afib and mitral regurgitation.  Started ToprolXL 50mg BID and d/c coreg.  Taking Lasix 40mg in am and 20 mg before bed.  Referral to [facility] cardiothoracic surgery for mitral and tricuspid valve repairs.  Patient aware and medications reviewed.  Denies questions.    Patient will call cardiology office to discuss referral and transportation needs.    CHF is improving.  Patient states edema in leg and stump.  States still can't get prosthetic leg on due to slight swelling.  Denies cough or congestion.  Sob w/ exertion present and stable.  Denies CP or dizziness.  Does report occas fluttering or palpitations d/t Afib.  Cardio aware.  Advised to report new or worsening symptoms like cough, worsening edema, sob, or chest pain/pressure.  Voiced understanding.    Patient does not monitor weight at home due to balance.  Does not monitor BP routinely.  Advised to monitor BP few times weekly and keep log.  Voiced understanding  Denies recent falls.  Using electric wheelchair.  Reviewed ambulation safety.    Denies bleeding or bruising.  Denies headaches.    Discussed diet.  Avoid high sodium foods/drinks.  Low fat heart healthy diet.    Will prepare to graduate after next call.  Will follow until appt with cardiothoracic surgeon and to see if surgery needed.  Patient aware

## 2024-11-21 ENCOUNTER — OFFICE VISIT (OUTPATIENT)
Dept: FAMILY MEDICINE CLINIC | Age: 79
End: 2024-11-21

## 2024-11-21 VITALS
DIASTOLIC BLOOD PRESSURE: 76 MMHG | HEART RATE: 83 BPM | WEIGHT: 215 LBS | BODY MASS INDEX: 43.34 KG/M2 | SYSTOLIC BLOOD PRESSURE: 114 MMHG | OXYGEN SATURATION: 93 % | HEIGHT: 59 IN

## 2024-11-21 DIAGNOSIS — I34.0 SEVERE MITRAL REGURGITATION: Primary | ICD-10-CM

## 2024-11-21 DIAGNOSIS — I48.91 ATRIAL FIBRILLATION WITH RVR (HCC): ICD-10-CM

## 2024-11-21 DIAGNOSIS — Z23 INFLUENZA VACCINE NEEDED: ICD-10-CM

## 2024-11-21 DIAGNOSIS — Z09 HOSPITAL DISCHARGE FOLLOW-UP: ICD-10-CM

## 2024-11-21 NOTE — PROGRESS NOTES
After obtaining consent, and per orders of Ally GUZMAN, injection of HD flu vaccine was given in Left deltoid by Yessica Alexander CMA. Patient instructed to remain in clinic for 20 minutes afterwards, and to report any adverse reaction to me immediately. Tolerated well    
against medications ordered at time of hospital discharge: Yes    A comprehensive review of systems was negative except for what was noted in the HPI.    Objective:    /76 (Site: Left Upper Arm, Position: Sitting, Cuff Size: Large Adult)   Pulse 83   Ht 1.499 m (4' 11\")   Wt 97.5 kg (215 lb)   SpO2 93%   BMI 43.42 kg/m²   General Appearance: alert and oriented to person, place and time, well developed and well- nourished, in no acute distress  Skin: warm and dry, no rash or erythema  Head: normocephalic and atraumatic  Eyes: pupils equal, round, and reactive to light, extraocular eye movements intact, conjunctivae normal  ENT: tympanic membrane, external ear and ear canal normal bilaterally, nose without deformity, nasal mucosa and turbinates normal without polyps  Neck: supple and non-tender without mass, no thyromegaly or thyroid nodules, no cervical lymphadenopathy  Pulmonary/Chest: clear to auscultation bilaterally- no wheezes, rales or rhonchi, normal air movement, no respiratory distress  Cardiovascular: normal rate, regular rhythm, normal S1 and S2, no murmurs, rubs, clicks, or gallops, distal pulses intact, no carotid bruits  Extremities: no cyanosis, clubbing or edema  Musculoskeletal: normal range of motion, no joint swelling, deformity or tenderness  Neurologic: reflexes normal and symmetric, no cranial nerve deficit, coordination and speech normal, pt in wheelchair      An electronic signature was used to authenticate this note.  --Ally Phillips, APRN - CNP

## 2024-11-22 ENCOUNTER — CARE COORDINATION (OUTPATIENT)
Dept: CARE COORDINATION | Age: 79
End: 2024-11-22

## 2024-11-22 NOTE — CARE COORDINATION
Telephone call with patient.  She went to PCP.  PCP  reinitiated  referral for heart surgeon since she hasn't heard anything.  Discussed option of Lyft for transportation  to [place] as a possibility and may not be an option due to her wheelchair need and needing assistance when she gets to [facility].  She feels that she wants to get her appointment first and then work on transportation.  Discussed [employer] Program and transportation benefit. Discussed she was denied Medicaid an was unsure why that happened..  Discussed looking into [employer] Program after her appointment with hear surgeon.

## 2024-12-09 ENCOUNTER — HOSPITAL ENCOUNTER (INPATIENT)
Age: 79
LOS: 3 days | Discharge: NURSING, REHAB OR LONG-TERM CARE | DRG: 374 | End: 2024-12-12
Attending: EMERGENCY MEDICINE | Admitting: FAMILY MEDICINE
Payer: MEDICARE

## 2024-12-09 ENCOUNTER — APPOINTMENT (OUTPATIENT)
Dept: CT IMAGING | Age: 79
DRG: 374 | End: 2024-12-09
Payer: MEDICARE

## 2024-12-09 DIAGNOSIS — Z80.8 FAMILY HISTORY OF PERITONEAL CANCER: ICD-10-CM

## 2024-12-09 DIAGNOSIS — Z79.01 CHRONIC ANTICOAGULATION: ICD-10-CM

## 2024-12-09 DIAGNOSIS — R18.8 ASCITES OF LIVER: Primary | ICD-10-CM

## 2024-12-09 DIAGNOSIS — R10.84 GENERALIZED ABDOMINAL PAIN: ICD-10-CM

## 2024-12-09 PROBLEM — R18.0 MALIGNANT ASCITES: Status: ACTIVE | Noted: 2024-12-09

## 2024-12-09 LAB
ALBUMIN SERPL-MCNC: 3.7 G/DL (ref 3.5–4.6)
ALP SERPL-CCNC: 151 U/L (ref 40–130)
ALT SERPL-CCNC: 13 U/L (ref 0–33)
ANION GAP SERPL CALCULATED.3IONS-SCNC: 12 MEQ/L (ref 9–15)
AST SERPL-CCNC: 26 U/L (ref 0–35)
BASOPHILS # BLD: 0.1 K/UL (ref 0–0.2)
BASOPHILS NFR BLD: 0.6 %
BILIRUB SERPL-MCNC: 0.8 MG/DL (ref 0.2–0.7)
BUN SERPL-MCNC: 22 MG/DL (ref 8–23)
CALCIUM SERPL-MCNC: 9.6 MG/DL (ref 8.5–9.9)
CHLORIDE SERPL-SCNC: 100 MEQ/L (ref 95–107)
CO2 SERPL-SCNC: 29 MEQ/L (ref 20–31)
CREAT SERPL-MCNC: 0.85 MG/DL (ref 0.5–0.9)
EOSINOPHIL # BLD: 0.2 K/UL (ref 0–0.7)
EOSINOPHIL NFR BLD: 2.3 %
ERYTHROCYTE [DISTWIDTH] IN BLOOD BY AUTOMATED COUNT: 16.4 % (ref 11.5–14.5)
GLOBULIN SER CALC-MCNC: 2.8 G/DL (ref 2.3–3.5)
GLUCOSE SERPL-MCNC: 112 MG/DL (ref 70–99)
HCT VFR BLD AUTO: 40 % (ref 37–47)
HGB BLD-MCNC: 12.7 G/DL (ref 12–16)
INR PPP: 1.2
LACTATE BLDV-SCNC: 1.3 MMOL/L (ref 0.5–2.2)
LIPASE SERPL-CCNC: 47 U/L (ref 12–95)
LYMPHOCYTES # BLD: 1.1 K/UL (ref 1–4.8)
LYMPHOCYTES NFR BLD: 11.9 %
MCH RBC QN AUTO: 29.6 PG (ref 27–31.3)
MCHC RBC AUTO-ENTMCNC: 31.8 % (ref 33–37)
MCV RBC AUTO: 93.2 FL (ref 79.4–94.8)
MONOCYTES # BLD: 0.7 K/UL (ref 0.2–0.8)
MONOCYTES NFR BLD: 8.1 %
NEUTROPHILS # BLD: 7 K/UL (ref 1.4–6.5)
NEUTS SEG NFR BLD: 76.8 %
PERFORMED ON: NORMAL
PLATELET # BLD AUTO: 338 K/UL (ref 130–400)
POC CREATININE: 0.9 MG/DL (ref 0.6–1.2)
POC SAMPLE TYPE: NORMAL
POTASSIUM SERPL-SCNC: 4.3 MEQ/L (ref 3.4–4.9)
PROT SERPL-MCNC: 6.5 G/DL (ref 6.3–8)
PROTHROMBIN TIME: 15.4 SEC (ref 12.3–14.9)
RBC # BLD AUTO: 4.29 M/UL (ref 4.2–5.4)
SODIUM SERPL-SCNC: 141 MEQ/L (ref 135–144)
WBC # BLD AUTO: 9.1 K/UL (ref 4.8–10.8)

## 2024-12-09 PROCEDURE — 93005 ELECTROCARDIOGRAM TRACING: CPT | Performed by: EMERGENCY MEDICINE

## 2024-12-09 PROCEDURE — 36415 COLL VENOUS BLD VENIPUNCTURE: CPT

## 2024-12-09 PROCEDURE — 99285 EMERGENCY DEPT VISIT HI MDM: CPT

## 2024-12-09 PROCEDURE — 1210000000 HC MED SURG R&B

## 2024-12-09 PROCEDURE — 85610 PROTHROMBIN TIME: CPT

## 2024-12-09 PROCEDURE — 97162 PT EVAL MOD COMPLEX 30 MIN: CPT

## 2024-12-09 PROCEDURE — 74177 CT ABD & PELVIS W/CONTRAST: CPT

## 2024-12-09 PROCEDURE — 96374 THER/PROPH/DIAG INJ IV PUSH: CPT

## 2024-12-09 PROCEDURE — 97166 OT EVAL MOD COMPLEX 45 MIN: CPT

## 2024-12-09 PROCEDURE — 80053 COMPREHEN METABOLIC PANEL: CPT

## 2024-12-09 PROCEDURE — 85025 COMPLETE CBC W/AUTO DIFF WBC: CPT

## 2024-12-09 PROCEDURE — 83690 ASSAY OF LIPASE: CPT

## 2024-12-09 PROCEDURE — 6360000002 HC RX W HCPCS: Performed by: EMERGENCY MEDICINE

## 2024-12-09 PROCEDURE — 83605 ASSAY OF LACTIC ACID: CPT

## 2024-12-09 PROCEDURE — 96375 TX/PRO/DX INJ NEW DRUG ADDON: CPT

## 2024-12-09 PROCEDURE — 6360000004 HC RX CONTRAST MEDICATION: Performed by: EMERGENCY MEDICINE

## 2024-12-09 PROCEDURE — 2580000003 HC RX 258: Performed by: FAMILY MEDICINE

## 2024-12-09 RX ORDER — IOPAMIDOL 755 MG/ML
75 INJECTION, SOLUTION INTRAVASCULAR
Status: COMPLETED | OUTPATIENT
Start: 2024-12-09 | End: 2024-12-09

## 2024-12-09 RX ORDER — POLYETHYLENE GLYCOL 3350 17 G/17G
17 POWDER, FOR SOLUTION ORAL DAILY PRN
Status: DISCONTINUED | OUTPATIENT
Start: 2024-12-09 | End: 2024-12-12 | Stop reason: HOSPADM

## 2024-12-09 RX ORDER — POTASSIUM CHLORIDE 7.45 MG/ML
10 INJECTION INTRAVENOUS PRN
Status: DISCONTINUED | OUTPATIENT
Start: 2024-12-09 | End: 2024-12-12 | Stop reason: HOSPADM

## 2024-12-09 RX ORDER — SODIUM CHLORIDE 0.9 % (FLUSH) 0.9 %
5-40 SYRINGE (ML) INJECTION EVERY 12 HOURS SCHEDULED
Status: DISCONTINUED | OUTPATIENT
Start: 2024-12-09 | End: 2024-12-12 | Stop reason: HOSPADM

## 2024-12-09 RX ORDER — ONDANSETRON 4 MG/1
4 TABLET, ORALLY DISINTEGRATING ORAL EVERY 8 HOURS PRN
Status: DISCONTINUED | OUTPATIENT
Start: 2024-12-09 | End: 2024-12-12 | Stop reason: HOSPADM

## 2024-12-09 RX ORDER — ACETAMINOPHEN 325 MG/1
650 TABLET ORAL 2 TIMES DAILY
COMMUNITY

## 2024-12-09 RX ORDER — ACETAMINOPHEN 650 MG/1
650 SUPPOSITORY RECTAL EVERY 6 HOURS PRN
Status: DISCONTINUED | OUTPATIENT
Start: 2024-12-09 | End: 2024-12-12 | Stop reason: HOSPADM

## 2024-12-09 RX ORDER — ONDANSETRON 2 MG/ML
4 INJECTION INTRAMUSCULAR; INTRAVENOUS ONCE
Status: COMPLETED | OUTPATIENT
Start: 2024-12-09 | End: 2024-12-09

## 2024-12-09 RX ORDER — ACETAMINOPHEN 325 MG/1
650 TABLET ORAL EVERY 6 HOURS PRN
Status: DISCONTINUED | OUTPATIENT
Start: 2024-12-09 | End: 2024-12-12 | Stop reason: HOSPADM

## 2024-12-09 RX ORDER — POTASSIUM CHLORIDE 1500 MG/1
40 TABLET, EXTENDED RELEASE ORAL PRN
Status: DISCONTINUED | OUTPATIENT
Start: 2024-12-09 | End: 2024-12-12 | Stop reason: HOSPADM

## 2024-12-09 RX ORDER — ONDANSETRON 2 MG/ML
4 INJECTION INTRAMUSCULAR; INTRAVENOUS EVERY 6 HOURS PRN
Status: DISCONTINUED | OUTPATIENT
Start: 2024-12-09 | End: 2024-12-12 | Stop reason: HOSPADM

## 2024-12-09 RX ORDER — SODIUM CHLORIDE 9 MG/ML
INJECTION, SOLUTION INTRAVENOUS PRN
Status: DISCONTINUED | OUTPATIENT
Start: 2024-12-09 | End: 2024-12-12 | Stop reason: HOSPADM

## 2024-12-09 RX ORDER — MORPHINE SULFATE 4 MG/ML
4 INJECTION, SOLUTION INTRAMUSCULAR; INTRAVENOUS ONCE
Status: COMPLETED | OUTPATIENT
Start: 2024-12-09 | End: 2024-12-09

## 2024-12-09 RX ORDER — SODIUM CHLORIDE 0.9 % (FLUSH) 0.9 %
5-40 SYRINGE (ML) INJECTION PRN
Status: DISCONTINUED | OUTPATIENT
Start: 2024-12-09 | End: 2024-12-12 | Stop reason: HOSPADM

## 2024-12-09 RX ORDER — MAGNESIUM SULFATE IN WATER 40 MG/ML
2000 INJECTION, SOLUTION INTRAVENOUS PRN
Status: DISCONTINUED | OUTPATIENT
Start: 2024-12-09 | End: 2024-12-12 | Stop reason: HOSPADM

## 2024-12-09 RX ADMIN — ONDANSETRON 4 MG: 2 INJECTION, SOLUTION INTRAMUSCULAR; INTRAVENOUS at 09:28

## 2024-12-09 RX ADMIN — MORPHINE SULFATE 4 MG: 4 INJECTION, SOLUTION INTRAMUSCULAR; INTRAVENOUS at 09:28

## 2024-12-09 RX ADMIN — Medication 10 ML: at 21:13

## 2024-12-09 RX ADMIN — IOPAMIDOL 75 ML: 755 INJECTION, SOLUTION INTRAVENOUS at 10:19

## 2024-12-09 ASSESSMENT — PAIN SCALES - GENERAL
PAINLEVEL_OUTOF10: 0
PAINLEVEL_OUTOF10: 4
PAINLEVEL_OUTOF10: 0
PAINLEVEL_OUTOF10: 4

## 2024-12-09 ASSESSMENT — PAIN DESCRIPTION - LOCATION
LOCATION: ABDOMEN
LOCATION: CHEST

## 2024-12-09 ASSESSMENT — ENCOUNTER SYMPTOMS
EYE PAIN: 0
CHEST TIGHTNESS: 0
VOMITING: 0
ABDOMINAL PAIN: 1
SORE THROAT: 0
NAUSEA: 1
ABDOMINAL DISTENTION: 1
SHORTNESS OF BREATH: 0

## 2024-12-09 ASSESSMENT — PAIN DESCRIPTION - DESCRIPTORS: DESCRIPTORS: ACHING

## 2024-12-09 ASSESSMENT — PAIN - FUNCTIONAL ASSESSMENT: PAIN_FUNCTIONAL_ASSESSMENT: 0-10

## 2024-12-09 ASSESSMENT — PAIN DESCRIPTION - PAIN TYPE: TYPE: CHRONIC PAIN

## 2024-12-09 NOTE — H&P
Hospital Medicine  History and Physical    Patient:  Alysha Rodriguez  MRN: [id number]    CHIEF COMPLAINT:    Chief Complaint   Patient presents with    Chest Pain       History Obtained From:  patient  Primary Care Physician: Ally Phillips, APRN - CNP    HISTORY OF PRESENT ILLNESS:   The patient is a 79 y.o. female who presents with a history of hypertension, hyperlipidemia, hypothyroidism, coronary disease, gallbladder and omental cancers previously, breast cancer with bilateral mastectomy, right BKA who presents with abdominal swelling and shortness of breath.  The patient notes that she has had significant fluid development in her abdomen over the last several weeks.  She is uncomfortable.  She denies any dysuria, polyuria.  She endorses chest pain especially with deep breath.  She denies fever.    Past Medical History:      Diagnosis Date    Arthritis     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    Dyspnea 7/2/2015    Family history of coronary artery disease 8/29/2014    History of blood transfusion 2011    following chemotherapy    Hx of right BKA (HCC)     Hyperlipidemia     Hypertension     Hypothyroid 6/12/2015    Lobular breast cancer (HCC) 10/2015    NSTEMI (non-ST elevated myocardial infarction) (HCC) 8/29/2014    Obesity     Paroxysmal atrial fibrillation (HCC) 8/29/2014    S/P BKA (below knee amputation) (HCC)        Past Surgical History:      Procedure Laterality Date    ABDOMEN SURGERY Left 04/11/2017    EXCISION OF CHEST WALL MASS, UPDATE LABS ON ADMIT  performed by Dominguez Wynn MD at [facility]    APPENDECTOMY      BACK SURGERY      BLEPHAROPLASTY  02/05/2013    both eyes    CARDIAC CATHETERIZATION      CARDIAC PROCEDURE N/A 11/12/2024    Left heart  bases without interlobular septal thickening.  This may indicate air trapping Organs: Slightly heterogeneous appearance of the liver.  The gallbladder is absent.  There is slight intrahepatic biliary dilatation.  There is moderate dilatation of the common bile duct which measures 1.1 cm distally.  However, there is no clear etiology for obstruction distally.  The pancreas demonstrates some volume loss.  The spleen is normal in size.  The adrenal glands are normal.  There is moderate bilateral renal atrophy with a large superior left renal cyst. GI/Bowel: No GI dilatation or wall thickening Pelvis: No pelvic mass, adenopathy, or fluid collection. Peritoneum/Retroperitoneum: There is no lymphadenopathy.  Portal venous and venous structures are unremarkable.  Arterial calcifications are observed.  A large amount of homogeneous ascites is identified.  There appears to be some impression on the greater curvature of the stomach.  There is no wall thickening.  There is no hemorrhage.  2.4 cm ectasia of the abdominal aorta. Bones/Soft Tissues: Calcified gluteal injection granulomas.  Scoliosis of the spine with postsurgical change.     1. Large amount of homogeneous ascites. There appears to be some impression on the greater curvature of the stomach.  No wall thickening. 2. Large left pleural effusion.  Advanced cardiomegaly. 3. Moderate dilatation of the common bile duct which measures 1.1 cm distally. However, there is no clear etiology for distal obstruction.  This still may be normal. 4. Moderate bilateral renal atrophy with a large superior left renal cyst.           Assessment and Plan   Ascites, presumed malignant  Requires paracentesis with fluid analysis, cytology.  Has hx of peritoneal carcinoma.  IR consult.    Hx mitral valve regurgitation, afib  Holding a/c, awaiting outpt eval at [facility]  Hx HTN, HLD, hypothyroidism  DVT proph    Patient Active Problem List   Diagnosis Code    Neck pain on right side M54.2

## 2024-12-09 NOTE — CARE COORDINATION
REASSESSMENT COMPLETED. MET W/PT TO ASSESS NEEDS AND TO INFORM OF PT RECOMMENDATION OF HHC. AM-PAC 13. PER ED CM NOTE, PT IS AGREEABLE TO KOV IF SNF INDICATED. FOR PARACENTESIS TOMORROW, 12/10/24.

## 2024-12-09 NOTE — PROGRESS NOTES
1500: Alert and oriented x4, calm and cooperative. Admission complete. Pt from home alone where she gets around with an electric wheelchair (denies prosthetic.) RBKA ~2011 r/t Charcot's joint. Pt hopeful to go to [facility] for therapy. Family is in [place], and pt states she cannot relocate as it would take 1 year living there to get placed. Pt on 2L NC-- none at baseline, requesting at 100% 2L NC. Spoke with Dr. Mason-- aware home meds are reconciled. Pt OK to have diet as she is able to have paracentesis WITHOUT being NPO. Pt incontinent-- purewick in place per pt request. Repositioned. Bed alarm on. Safety maintained.    1700: Pt to go tomorrow, 12/10, for paracentesis at ~1430 per Lizette from specials. Repositioned throughout shift. Purewick in place. No complaints per pt at this time.     Electronically signed by Rebecca Shanks RN on 12/9/2024 at 3:03 PM

## 2024-12-09 NOTE — CARE COORDINATION
Case Management Assessment  Initial Evaluation    Date/Time of Evaluation: 12/9/2024 12:45 PM  Assessment Completed by: Carmelina Rodriguez RN    If patient is discharged prior to next notation, then this note serves as note for discharge by case management.    Patient Name: Alysha Rodriguez                   YOB: 1945  Diagnosis: Malignant ascites [R18.0]                   Date / Time: 12/9/2024  9:13 AM    Patient Admission Status: Inpatient   Readmission Risk (Low < 19, Mod (19-27), High > 27): Readmission Risk Score: 14.9    Current PCP: Ally Phillips, YUYR - CNP  PCP verified by CM? (P) Yes    Chart Reviewed: Yes      History Provided by: Patient  Patient Orientation: Alert and Oriented, Person, Place, Situation, Self    Patient Cognition: Alert    Hospitalization in the last 30 days (Readmission):  Yes    If yes, Readmission Assessment in CM Navigator will be completed.    Advance Directives:      Code Status: Full Code   Patient's Primary Decision Maker is: Legal Next of Kin (daughter Tania.)    Primary Decision Maker: Maximino,Tania - Child - [phone]    Discharge Planning:    Patient lives with: (P) Alone Type of Home: (P) Apartment  Primary Care Giver: Self  Patient Support Systems include: Children, Family Members   Current Financial resources: (P) Medicare  Current community resources: (P) Other (Comment) (meals on wheels: [facility].)  Current services prior to admission: (P) Durable Medical Equipment, Meals On Wheels            Current DME: (P) Cane, Shower Chair, Walker, Wheelchair            Type of Home Care services:  (P) None    ADLS  Prior functional level: (P) Independent in ADLs/IADLs  Current functional level: (P) Independent in ADLs/IADLs    PT AM-PAC:   /24  OT AM-PAC:   /24    Family can provide assistance at DC: (P) Other (comment) (they are supportive but [place] of [place].)  Would you like Case Management to discuss the discharge plan with any other family

## 2024-12-09 NOTE — ED NOTES
469 / 15min @1158  
Ct done  
Ct ready  
Ct wait istat  
Istat creat 0.9  
Report called to RN on [address]   
with patient

## 2024-12-09 NOTE — ED PROVIDER NOTES
[facility]  4W MED SURG UNIT  EMERGENCY DEPARTMENT ENCOUNTER      Pt Name: Alysha Rodriguez  MRN: [id number]  Birthdate 1945  Date of evaluation: 12/9/2024  Provider: Ginger Murphy DO    CHIEF COMPLAINT       Chief Complaint   Patient presents with    Chest Pain         HISTORY OF PRESENT ILLNESS   (Location/Symptom, Timing/Onset, Context/Setting, Quality, Duration, Modifying Factors, Severity)  Note limiting factors.   Alysha Rodriguez is a 79 y.o. female who presents to the emergency department .  Patient presents with several days of increasing abdominal pain and distention.  Patient states that she is very uncomfortable.  Having bowel movements.  Occasional nausea.  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  Patient also has history of peritoneal cancer and breast cancer with bilateral mastectomies.  No urinary symptoms    HPI    Nursing Notes were reviewed.    REVIEW OF SYSTEMS    (2-9 systems for level 4, 10 or more for level 5)     Review of Systems   Constitutional:  Negative for activity change, appetite change and fatigue.   HENT:  Negative for congestion and sore throat.    Eyes:  Negative for pain and visual disturbance.   Respiratory:  Negative for chest tightness and shortness of breath.    Cardiovascular:  Negative for chest pain.   Gastrointestinal:  Positive for abdominal distention, abdominal pain and nausea. Negative for vomiting.   Endocrine: Negative for polydipsia.   Genitourinary:  Negative for flank pain and urgency.   Musculoskeletal:  Negative for gait problem and neck stiffness.   Skin:  Negative for rash.   Neurological:  Negative for weakness, light-headedness and headaches.   Psychiatric/Behavioral:  Negative for confusion and sleep disturbance.        Except as noted above the remainder of the review of systems was reviewed and negative.       PAST MEDICAL HISTORY     Past Medical History:   Diagnosis Date    Arthritis     Blood  Bowel sounds are normal. There is distension.      Palpations: Abdomen is soft.      Tenderness: There is abdominal tenderness. There is no guarding.   Musculoskeletal:         General: Normal range of motion.      Cervical back: Normal range of motion and neck supple.      Right lower leg: No edema.      Left lower leg: No edema.   Skin:     General: Skin is warm and dry.      Findings: No rash.   Neurological:      Mental Status: She is alert and oriented to person, place, and time.      Cranial Nerves: No cranial nerve deficit.   Psychiatric:         Behavior: Behavior normal.         DIAGNOSTIC RESULTS     EKG: All EKG's are interpreted by the Emergency Department Physician who either signs or Co-signs this chart in the absence of a cardiologist.    Atrial fibrillation 79 bpm no acute ischemia    RADIOLOGY:   Non-plain film images such as CT, Ultrasound and MRI are read by the radiologist. Plain radiographic images are visualized and preliminarily interpreted by the emergency physician with the below findings:        Interpretation per the Radiologist below, if available at the time of this note:    CT ABDOMEN PELVIS W IV CONTRAST Additional Contrast? None   Final Result   1. Large amount of homogeneous ascites. There appears to be some impression   on the greater curvature of the stomach.  No wall thickening.   2. Large left pleural effusion.  Advanced cardiomegaly.   3. Moderate dilatation of the common bile duct which measures 1.1 cm   distally. However, there is no clear etiology for distal obstruction.  This   still may be normal.   4. Moderate bilateral renal atrophy with a large superior left renal cyst.               ED BEDSIDE ULTRASOUND:   Performed by ED Physician - none    LABS:  Labs Reviewed   CBC WITH AUTO DIFFERENTIAL - Abnormal; Notable for the following components:       Result Value    MCHC 31.8 (*)     RDW 16.4 (*)     Neutrophils Absolute 7.0 (*)     All other components within normal limits

## 2024-12-09 NOTE — PLAN OF CARE
See OT evaluation for all goals and OT POC. Electronically signed by Ronel Cha OT on 12/9/2024 at 1:54 PM

## 2024-12-09 NOTE — PROGRESS NOTES
[facility] OCCUPATIONAL THERAPY EVALUATION - ACUTE     NAME: Alysha Rodriguez  : 1945 (79 y.o.)  MRN: [id number]  CODE STATUS: Full Code  Room: 469/W469-01    Date of Service: 2024    Patient Diagnosis(es): Generalized abdominal pain [R10.84]  Chronic anticoagulation [Z79.01]  Malignant ascites [R18.0]  Family history of peritoneal cancer [Z80.8]  Ascites of liver [R18.8]   Patient Active Problem List    Diagnosis Date Noted    Nonrheumatic mitral valve regurgitation 2024    Atrial fibrillation with RVR ([facility]) 2024    Hematuria 2017    High risk medication use - 18 OARRS PM&R, 18 OARRS PM&R, 18 Urine Drug Screen positive opiates PM&R, 18 Med Contract PM&R 2013    Chronic renal disease, stage III ([facility]) [020356] 2022    Drug-induced polyneuropathy ([facility]) 2022    Malignant ascites 2024    Severe mitral regurgitation 2024    Chronic systolic (congestive) heart failure 2024    Lumbar radiculitis 2023    Abnormality of gait and mobility 2021    Hx of BKA, right ([facility]) 2021    Gait abnormality dt cerebrovascular insufficiancy and gait atasxia 2021    Difficulty balancing 2021    Dizziness 2021    Malignant neoplasm of peritoneum, 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[facility]) 10/27/2020    Obstructive sleep apnea     Current mild episode of major depressive disorder ([facility]) 2020    Cervical fusion syndrome 2019    Chronic pain syndrome 2018    Charcot's arthropathy 2018    Moderate episode of recurrent major depressive disorder ([facility]) 2017    Chronic low back pain with sciatica 2017    Noncompliance - No Show inject 17, No Show F/U 2017    Hematuria, gross     Primary osteoarthritis involving multiple joints 2017    Left breast mass 2016    Anemia 2016    Cancer (HCC) 2016    Morbid obesity 2016    Reactive depression 2016    Sleeping  Provided: Role of Therapy;Plan of Care  Education Method: Verbal  Barriers to Learning: None  Education Outcome: Verbalized understanding    OT Follow Up:   OT D/C RECOMMENDATIONS  REQUIRES OT FOLLOW-UP: Yes       Assessment/Discharge Disposition:  Assessment: pt is a 79 year old female who presents to [facility] emergency department. Patient presents with several days of increasing abdominal pain and distention.  Patient states that she is very uncomfortable.  Having bowel movements.  Occasional nausea.  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  Patient also has history of peritoneal cancer and breast cancer with bilateral mastectomies. Pt has a R BKA.  Performance deficits / Impairments: Decreased functional mobility , Decreased balance, Decreased ADL status, Decreased ROM, Decreased endurance, Decreased strength  Prognosis: Good  Decision Making: Medium Complexity     History: patients history is moderately complex  Exam: pt has 6 perfromance deficits  Assistance / Modification: pt requires MOD A    AMPAC (Six Click) Self care Score   How much help is needed for putting on and taking off regular lower body clothing?: A Lot  How much help is needed for bathing (which includes washing, rinsing, drying)?: A Lot  How much help is needed for toileting (which includes using toilet, bedpan, or urinal)?: A Lot  How much help is needed for putting on and taking off regular upper body clothing?: A Lot  How much help is needed for taking care of personal grooming?: A Little  How much help for eating meals?: A Little  AM-[facility] Inpatient Daily Activity Raw Score: 14  AM-PAC Inpatient ADL T-Scale Score : 33.39  ADL Inpatient CMS 0-100% Score: 59.67    Therapy key for assistance levels -   Independent/Mod I = Pt. is able to perform task with no assistance but may require a device   Stand by assistance = Pt. does not perform task at an independent level but does not need

## 2024-12-09 NOTE — ACP (ADVANCE CARE PLANNING)
Advance Care Planning     Advance Care Planning Activator (Inpatient)  Conversation Note      Date of ACP Conversation: 12/9/2024     Conversation Conducted with: Patient with Decision Making Capacity    ACP Activator: Carmelina Rodriguez, HE        Health Care Decision Maker:     Current Designated Health Care Decision Maker:     Primary Decision Maker: Tania Stanton - Child - [phone]

## 2024-12-09 NOTE — PROGRESS NOTES
Physical Therapy Med Surg Initial Assessment  Facility/Department: [address] MED SURG UNIT  Room: [place] [address]       NAME: Alysha Rodriguez  : 1945 (79 y.o.)  MRN: [id number]  CODE STATUS: Full Code    Date of Service: 2024    Patient Diagnosis(es): Generalized abdominal pain [R10.84]  Chronic anticoagulation [Z79.01]  Malignant ascites [R18.0]  Family history of peritoneal cancer [Z80.8]  Ascites of liver [R18.8]   Chief Complaint   Patient presents with    Chest Pain     Patient Active Problem List    Diagnosis Date Noted    Nonrheumatic mitral valve regurgitation 2024    Atrial fibrillation with RVR (HCC) 2024    Hematuria 2017    High risk medication use - 18 OARRS PM&R, 18 OARRS PM&R, 18 Urine Drug Screen positive opiates PM&R, 18 Med Contract PM&R 2013    Chronic renal disease, stage III ([facility]) [849547] 2022    Drug-induced polyneuropathy (HCC) 2022    Malignant ascites 2024    Severe mitral regurgitation 2024    Chronic systolic (congestive) heart failure 2024    Lumbar radiculitis 2023    Abnormality of gait and mobility 2021    Hx of BKA, right (HCC) 2021    Gait abnormality dt cerebrovascular insufficiancy and gait atasxia 2021    Difficulty balancing 2021    Dizziness 2021    Malignant neoplasm of peritoneum, 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HCC) 10/27/2020    Obstructive sleep apnea     Current mild episode of major depressive disorder (HCC) 2020    Cervical fusion syndrome 2019    Chronic pain syndrome 2018    Charcot's arthropathy 2018    Moderate episode of recurrent major depressive disorder (HCC) 2017    Chronic low back pain with sciatica 2017    Noncompliance - No Show inject 17, No Show F/U 2017    Hematuria, gross     Primary osteoarthritis involving multiple joints 2017    Left breast mass 2016    Anemia 2016    Cancer  Fair;-  Standing - Dynamic:  (unsafe to assess)    Bed mobility  Supine to Sit: Stand by assistance  Sit to Supine: Stand by assistance  Bed Mobility Comments: HOB elevated; increased time and effort    Transfers  Sit to Stand: Minimal Assistance  Stand to Sit: Minimal Assistance  Stand Pivot Transfers:  (NT)  Comment: WW used    Ambulation  Comments: pt is non-amb at baseline    Stairs/Curb  Stairs?: No    Activity Tolerance  Activity Tolerance: Patient tolerated evaluation without incident    Patient Education  Education Given To: Patient  Education Provided: Role of Therapy;Plan of Care  Education Method: Verbal  Education Outcome: Continued education needed     ASSESSMENT:   Body Structures, Functions, Activity Limitations Requiring Skilled Therapeutic Intervention: Decreased functional mobility ;Decreased strength;Decreased endurance;Decreased balance  Decision Making: Medium Complexity  History: high  Exam: med  Clinical Presentation: med    Therapy Prognosis: Good    DISCHARGE RECOMMENDATIONS:  Discharge Recommendations: Continue to assess pending progress    Assessment: Pt is 79 y.o. female to hospital due to chest pain.  Pt has h/o R BKA and has not worn prosthesis since July.  Pt is able to indep sit to stand and SPT without AD at baseline.  Pt required min A for transfers this date.  Continued PT is required to progress toward indep and safe d/c.  Requires PT Follow-Up: Yes       PLAN OF CARE:  Physical Therapy Plan  General Plan: 1 time a day 3-6 times a week  Current Treatment Recommendations: Strengthening, ROM, Balance training, Functional mobility training, Transfer training, Endurance training, Gait training, Stair training, Neuromuscular re-education, Home exercise program, Safety education & training, Patient/Caregiver education & training, Equipment evaluation, education, & procurement, Positioning, Therapeutic activities    Safety Devices  Type of Devices: All fall risk precautions in place, Bed

## 2024-12-09 NOTE — ED TRIAGE NOTES
Pt arrived via EMS from home with co chest pain when taking a deep breath.  Pt is aox4 pwd even unlabored respirations

## 2024-12-10 ENCOUNTER — CARE COORDINATION (OUTPATIENT)
Dept: CARE COORDINATION | Age: 79
End: 2024-12-10

## 2024-12-10 ENCOUNTER — HOSPITAL ENCOUNTER (INPATIENT)
Dept: INTERVENTIONAL RADIOLOGY/VASCULAR | Age: 79
Discharge: HOME | DRG: 374 | End: 2024-12-12
Payer: MEDICARE

## 2024-12-10 VITALS
HEART RATE: 103 BPM | OXYGEN SATURATION: 95 % | DIASTOLIC BLOOD PRESSURE: 74 MMHG | SYSTOLIC BLOOD PRESSURE: 158 MMHG | RESPIRATION RATE: 18 BRPM

## 2024-12-10 LAB
ALBUMIN SERPL-MCNC: 3.6 G/DL (ref 3.5–4.6)
ALP SERPL-CCNC: 181 U/L (ref 40–130)
ALT SERPL-CCNC: 19 U/L (ref 0–33)
ANION GAP SERPL CALCULATED.3IONS-SCNC: 7 MEQ/L (ref 9–15)
APPEARANCE FLUID: NORMAL
AST SERPL-CCNC: 34 U/L (ref 0–35)
BASOPHILS # BLD: 0.1 K/UL (ref 0–0.2)
BASOPHILS NFR BLD: 0.7 %
BILIRUB SERPL-MCNC: 0.6 MG/DL (ref 0.2–0.7)
BUN SERPL-MCNC: 19 MG/DL (ref 8–23)
CALCIUM SERPL-MCNC: 9.1 MG/DL (ref 8.5–9.9)
CELL COUNT FLUID TYPE: NORMAL
CHLORIDE SERPL-SCNC: 101 MEQ/L (ref 95–107)
CLOT EVALUATION: NORMAL
CO2 SERPL-SCNC: 30 MEQ/L (ref 20–31)
COLOR FLUID: NORMAL
CREAT SERPL-MCNC: 0.78 MG/DL (ref 0.5–0.9)
EOSINOPHIL # BLD: 0.1 K/UL (ref 0–0.7)
EOSINOPHIL NFR BLD: 1.5 %
ERYTHROCYTE [DISTWIDTH] IN BLOOD BY AUTOMATED COUNT: 16.5 % (ref 11.5–14.5)
FLUID PATH CONSULT: YES
FLUID TYPE: NORMAL
GLOBULIN SER CALC-MCNC: 2.6 G/DL (ref 2.3–3.5)
GLUCOSE BLD-MCNC: 106 MG/DL (ref 70–99)
GLUCOSE BLD-MCNC: 110 MG/DL (ref 70–99)
GLUCOSE FLD-MCNC: 61.3 MG/DL
GLUCOSE SERPL-MCNC: 131 MG/DL (ref 70–99)
HCT VFR BLD AUTO: 38.9 % (ref 37–47)
HGB BLD-MCNC: 11.9 G/DL (ref 12–16)
LYMPHOCYTES # BLD: 1.4 K/UL (ref 1–4.8)
LYMPHOCYTES NFR BLD: 15.4 %
LYMPHOCYTES, BODY FLUID: 96 %
MCH RBC QN AUTO: 28.8 PG (ref 27–31.3)
MCHC RBC AUTO-ENTMCNC: 30.6 % (ref 33–37)
MCV RBC AUTO: 94.2 FL (ref 79.4–94.8)
MONOCYTES # BLD: 0.8 K/UL (ref 0.2–0.8)
MONOCYTES NFR BLD: 9.6 %
NEUTROPHIL, FLUID: 4 %
NEUTROPHILS # BLD: 6.3 K/UL (ref 1.4–6.5)
NEUTS SEG NFR BLD: 72.5 %
NUCLEATED CELLS FLUID: 446 /CUMM
NUMBER OF CELLS COUNTED FLUID: 100
PERFORMED ON: ABNORMAL
PERFORMED ON: ABNORMAL
PLATELET # BLD AUTO: 317 K/UL (ref 130–400)
POTASSIUM SERPL-SCNC: 4.4 MEQ/L (ref 3.4–4.9)
PROT FLD-MCNC: 4.3 G/DL
PROT SERPL-MCNC: 6.2 G/DL (ref 6.3–8)
RBC # BLD AUTO: 4.13 M/UL (ref 4.2–5.4)
RBC FLUID: NORMAL /CUMM
SODIUM SERPL-SCNC: 138 MEQ/L (ref 135–144)
WBC # BLD AUTO: 8.7 K/UL (ref 4.8–10.8)

## 2024-12-10 PROCEDURE — 6360000002 HC RX W HCPCS: Performed by: INTERNAL MEDICINE

## 2024-12-10 PROCEDURE — 49083 ABD PARACENTESIS W/IMAGING: CPT

## 2024-12-10 PROCEDURE — 49083 ABD PARACENTESIS W/IMAGING: CPT | Performed by: RADIOLOGY

## 2024-12-10 PROCEDURE — 82150 ASSAY OF AMYLASE: CPT

## 2024-12-10 PROCEDURE — 80053 COMPREHEN METABOLIC PANEL: CPT

## 2024-12-10 PROCEDURE — 84157 ASSAY OF PROTEIN OTHER: CPT

## 2024-12-10 PROCEDURE — 87205 SMEAR GRAM STAIN: CPT

## 2024-12-10 PROCEDURE — P9047 ALBUMIN (HUMAN), 25%, 50ML: HCPCS | Performed by: NURSE PRACTITIONER

## 2024-12-10 PROCEDURE — 36415 COLL VENOUS BLD VENIPUNCTURE: CPT

## 2024-12-10 PROCEDURE — 82945 GLUCOSE OTHER FLUID: CPT

## 2024-12-10 PROCEDURE — 6360000002 HC RX W HCPCS: Performed by: FAMILY MEDICINE

## 2024-12-10 PROCEDURE — 88305 TISSUE EXAM BY PATHOLOGIST: CPT

## 2024-12-10 PROCEDURE — 2700000000 HC OXYGEN THERAPY PER DAY

## 2024-12-10 PROCEDURE — 87070 CULTURE OTHR SPECIMN AEROBIC: CPT

## 2024-12-10 PROCEDURE — 6370000000 HC RX 637 (ALT 250 FOR IP): Performed by: FAMILY MEDICINE

## 2024-12-10 PROCEDURE — 0W9G3ZX DRAINAGE OF PERITONEAL CAVITY, PERCUTANEOUS APPROACH, DIAGNOSTIC: ICD-10-PCS | Performed by: FAMILY MEDICINE

## 2024-12-10 PROCEDURE — 88341 IMHCHEM/IMCYTCHM EA ADD ANTB: CPT

## 2024-12-10 PROCEDURE — 88112 CYTOPATH CELL ENHANCE TECH: CPT

## 2024-12-10 PROCEDURE — 1210000000 HC MED SURG R&B

## 2024-12-10 PROCEDURE — 88342 IMHCHEM/IMCYTCHM 1ST ANTB: CPT

## 2024-12-10 PROCEDURE — 83615 LACTATE (LD) (LDH) ENZYME: CPT

## 2024-12-10 PROCEDURE — 96365 THER/PROPH/DIAG IV INF INIT: CPT

## 2024-12-10 PROCEDURE — 82042 OTHER SOURCE ALBUMIN QUAN EA: CPT

## 2024-12-10 PROCEDURE — 2580000003 HC RX 258: Performed by: FAMILY MEDICINE

## 2024-12-10 PROCEDURE — 85025 COMPLETE CBC W/AUTO DIFF WBC: CPT

## 2024-12-10 PROCEDURE — 2709999900 IR US GUIDED PARACENTESIS

## 2024-12-10 PROCEDURE — 99222 1ST HOSP IP/OBS MODERATE 55: CPT | Performed by: NURSE PRACTITIONER

## 2024-12-10 PROCEDURE — 6360000002 HC RX W HCPCS: Performed by: NURSE PRACTITIONER

## 2024-12-10 PROCEDURE — 89051 BODY FLUID CELL COUNT: CPT

## 2024-12-10 RX ORDER — ALBUMIN (HUMAN) 12.5 G/50ML
SOLUTION INTRAVENOUS CONTINUOUS PRN
Status: COMPLETED | OUTPATIENT
Start: 2024-12-10 | End: 2024-12-10

## 2024-12-10 RX ORDER — OXYCODONE HYDROCHLORIDE 5 MG/1
5 TABLET ORAL EVERY 4 HOURS PRN
Status: DISCONTINUED | OUTPATIENT
Start: 2024-12-10 | End: 2024-12-11 | Stop reason: ALTCHOICE

## 2024-12-10 RX ORDER — LIDOCAINE HYDROCHLORIDE 20 MG/ML
INJECTION, SOLUTION INFILTRATION; PERINEURAL PRN
Status: COMPLETED | OUTPATIENT
Start: 2024-12-10 | End: 2024-12-10

## 2024-12-10 RX ORDER — MORPHINE SULFATE 2 MG/ML
2 INJECTION, SOLUTION INTRAMUSCULAR; INTRAVENOUS ONCE
Status: COMPLETED | OUTPATIENT
Start: 2024-12-10 | End: 2024-12-10

## 2024-12-10 RX ADMIN — LIDOCAINE HYDROCHLORIDE 10 ML: 20 INJECTION, SOLUTION INFILTRATION; PERINEURAL at 15:48

## 2024-12-10 RX ADMIN — MORPHINE SULFATE 2 MG: 2 INJECTION, SOLUTION INTRAMUSCULAR; INTRAVENOUS at 01:24

## 2024-12-10 RX ADMIN — ALBUMIN (HUMAN) 25 G: 12.5 SOLUTION INTRAVENOUS at 16:18

## 2024-12-10 RX ADMIN — SODIUM CHLORIDE, PRESERVATIVE FREE 10 ML: 5 INJECTION INTRAVENOUS at 16:03

## 2024-12-10 RX ADMIN — Medication 10 ML: at 08:35

## 2024-12-10 RX ADMIN — ACETAMINOPHEN 650 MG: 325 TABLET ORAL at 00:07

## 2024-12-10 RX ADMIN — OXYCODONE 5 MG: 5 TABLET ORAL at 11:41

## 2024-12-10 RX ADMIN — Medication 10 ML: at 20:29

## 2024-12-10 RX ADMIN — HYDROMORPHONE HYDROCHLORIDE 0.5 MG: 1 INJECTION, SOLUTION INTRAMUSCULAR; INTRAVENOUS; SUBCUTANEOUS at 16:00

## 2024-12-10 ASSESSMENT — PAIN DESCRIPTION - LOCATION
LOCATION: ABDOMEN

## 2024-12-10 ASSESSMENT — PAIN DESCRIPTION - DESCRIPTORS
DESCRIPTORS: STABBING
DESCRIPTORS: TIGHTNESS;ACHING;SHARP
DESCRIPTORS: PRESSURE;SHARP

## 2024-12-10 ASSESSMENT — PAIN DESCRIPTION - PAIN TYPE
TYPE: ACUTE PAIN
TYPE: ACUTE PAIN

## 2024-12-10 ASSESSMENT — PAIN SCALES - GENERAL
PAINLEVEL_OUTOF10: 2
PAINLEVEL_OUTOF10: 10
PAINLEVEL_OUTOF10: 2
PAINLEVEL_OUTOF10: 7
PAINLEVEL_OUTOF10: 7
PAINLEVEL_OUTOF10: 10
PAINLEVEL_OUTOF10: 8
PAINLEVEL_OUTOF10: 7
PAINLEVEL_OUTOF10: 7

## 2024-12-10 ASSESSMENT — PAIN DESCRIPTION - ORIENTATION
ORIENTATION: UPPER;MID
ORIENTATION: UPPER;ANTERIOR

## 2024-12-10 ASSESSMENT — PAIN - FUNCTIONAL ASSESSMENT: PAIN_FUNCTIONAL_ASSESSMENT: PREVENTS OR INTERFERES WITH ALL ACTIVE AND SOME PASSIVE ACTIVITIES

## 2024-12-10 ASSESSMENT — PAIN DESCRIPTION - FREQUENCY: FREQUENCY: CONTINUOUS

## 2024-12-10 ASSESSMENT — PAIN DESCRIPTION - ONSET: ONSET: ON-GOING

## 2024-12-10 NOTE — OR NURSING
PARACENTESIS - NO SEDATION    1518 - Patient arrived to special procedures room #6 via bed from inpatient room. A&Ox4, procedure explained and consent signed. Allergies and current MAR reviewed. Positioned lying propped to left side using a pillow wedge. Initial images obtained prior to start of procedure using U/S. Pt  VSS, on 3L NC. Patient c/o pain in abdomen 10/10 which has worsened. Reports earlier pain medication given \"didn't help at all.\" Dr. Marlon campo served for orders. Emotional and tactile support given.     Patsy Joyner CNP paged to come assess and yessy procedural site. Septation in fluid noted upon U/S imaging.      1544 - Timeout completed for Ultrasound Guided Paracentesis.     Left side of abdomen cleansed with Chloraprep. After 3 minute dry time, covered with sterile drape and towels.    1548 - Site numbed with 2% lidocaine by Patsy Joyner CNP. See eMAR  [id number] Centesis 5F catheter placed using Ultrasound guidance.  Fluid appears pink tinged, hazy. Sample of fluid obtained and placed into specimen containers for ordered diagnostics. Catheter tubing then connected to [email or web address] machine to remove fluid.    1600 - IV #20 RAC flushed and patent. Patient medicated with dilaudid as ordered by Dr. Mason. See eMAR. Specimens taken to lab by HE LEON.     1608 - Spoke with patient about possible need for repeat paracentesis procedures in future if fluid re-accumulates and to contact Dr. Mo's office / [facility] dept to schedule appointment. AVS updated.     1618 - Pt tolerated well. Total 5025 ml removed. Catheter removed, pressure held and bandaid applied. Verbal and tactile reassurance given throughout. Albumin 25g IV given for paracentesis >5L. Patient reports after having drained and receiving medication her pain level is much improved - 2/10. Appears calm at this time.     Report called to HE Anthony on 4 west by HE Walters. Transport requested to return patient to her room.

## 2024-12-10 NOTE — BRIEF OP NOTE
Brief Postoperative Note      Patient: Alysha Rodriguez  YOB: 1945  MRN: [id number]    Date of Procedure: 12/10/2024    Diagnosis: Abdominal distention and pain    Post-Op Diagnosis: Same       Diagnostic/therapeutic paracentesis    YURY Gasca CNP    Anesthesia: Local: Site numbed with 2% Lidocaine    Estimated Blood Loss (mL): Minimal    Complications: None    Specimens: Sent to lab    Findings:  Infection Present At Time Of Surgery (PATOS) (choose all levels that have infection present):  No infection present  Other Findings: Pink-tinged yellow hazy fluid noted. Septations in fluid noted on ultrasound.    Electronically signed by YURY Gasca CNP on 12/10/2024 at 4:01 PM

## 2024-12-10 NOTE — PROGRESS NOTES
Physical Therapy Missed Treatment   Facility/Department: [facility] MED SURG W469/W469-01    NAME: Alysha Rodriguez    : 1945 (79 y.o.)  MRN: [id number]    Account: [id number]  Gender: female        [x] Patient Declines PT Treatment: Patient reports increased pain and discomfort in abdomen. Patient repositioned in bed, RN notified. Patient scheduled for procedure pm.                  Will attempt PT treatment again at earliest convenience.        Electronically signed by Antoinette Woo PTA on 12/10/24 at 11:26 AM EST

## 2024-12-10 NOTE — CONSULTS
Vascular Medicine and Interventional Radiology    Date of Consultation:12/10/2024    Alysha Rodriguez  : 1945  MR #: [id number]    Consultant:YURY Gasca CNP  Consulting Physician: Dr. Andrew Mo, Vascular Medicine and Interventional Radiology     Consult Ordered By: Dr. Mason            PCP:  Ally Phillips, YURY - CNP     Attending Physician: Vahid Msaon MD     Date of Admission: 2024  9:13 AM     Chief Complaint:   Chief Complaint   Patient presents with    Chest Pain      Reason for Consultation: paracentesis    History of Present Illness: 79 year old female with PMH of HTN, HLD, hypothyroidism, CAD, gallbladder cancer, omental cancer, breast cancer with bilateral mastectomy, right BKA, admitted with abdominal distention, shortness of breath. CT abdomen notable for large amount of ascites, large left pleural effusion, moderate dilation of CBD measuring 1.1 cm distally and moderate bilateral renal atrophy.   Takes Eliquis at home, last had 2024 6 PM.  Takes baby aspirin.  She denies fever, chills, nausea, vomiting, or chest pain.     Past Medical History:   has a past medical history of Arthritis, Blood transfusion, CAD (coronary artery disease), Cancer (HCC), Charcot's joint, Chest pain, Colitis, DDD (degenerative disc disease), lumbar, Depression, Disorder of peripheral nervous system, Dyspnea, Family history of coronary artery disease, History of blood transfusion, Hx of right BKA (HCC), Hyperlipidemia, Hypertension, Hypothyroid, Lobular breast cancer (HCC), NSTEMI (non-ST elevated myocardial infarction) ([facility]), Obesity, Paroxysmal atrial fibrillation ([facility]), and S/P BKA (below knee amputation) ([facility]).    Past SurgicalHistory:   has a past surgical history that includes Hysterectomy; Appendectomy; back surgery; Leg amputation below knee (Right, 2011); Spine surgery; Blepharoplasty (2013); skin biopsy; Cardiac catheterization; Mastectomy (Bilateral,  11/16/2015); pr exc cyst/aberrant breast tissue open 1/> lesion (Left, 01/24/2017); Abdomen surgery (Left, 04/11/2017); eye surgery (Bilateral, 2012); Colonoscopy (01/01/2010); cystoscopy w biopsy of bladder (N/A, 06/26/2017); joint replacement (2001, 2005, 2009); Mastectomy, bilateral (11/16/16); Tonsillectomy (1947); Partial hysterectomy (1979); and Cardiac procedure (N/A, 11/12/2024).     Allergies:Patient has no known allergies.    Home Medications:   Prior to Admission medications    Medication Sig Start Date End Date Taking? Authorizing Provider   acetaminophen (TYLENOL) 325 MG tablet Take 2 tablets by mouth in the morning and at bedtime   Yes Provider, MD Lindsey   dilTIAZem (CARDIZEM CD) 120 MG extended release capsule Take 1 capsule by mouth daily 11/13/24  Yes Ramu Bower DO   metoprolol succinate (TOPROL XL) 50 MG extended release tablet TAKE 1 TABLET BY MOUTH TWICE DAILY 11/13/24  Yes Ramu Bower DO   potassium chloride (KLOR-CON M) 10 MEQ extended release tablet Take 1 tablet by mouth daily 10/31/24  Yes Ramu Bower DO   furosemide (LASIX) 20 MG tablet Take 1 tablet by mouth 2 times daily 9/13/24  Yes Ramu Bower DO   apixaban (ELIQUIS) 5 MG TABS tablet Take 1 tablet by mouth 2 times daily 5/16/24  Yes Ramu Bower DO   traZODone (DESYREL) 50 MG tablet Take 1 tablet by mouth nightly as needed for Sleep 4/29/24  Yes Cat Rizzo DO   atorvastatin (LIPITOR) 80 MG tablet Take 1 tablet by mouth nightly 4/25/24  Yes Cat Rizzo DO   buPROPion (WELLBUTRIN) 75 MG tablet Take 1 tablet by mouth 2 times daily 4/25/24  Yes Cat Rizzo DO   diclofenac (VOLTAREN) 50 MG EC tablet Take 1 tablet by mouth 2 times daily 4/25/24  Yes Cat Rizzo DO   DULoxetine (CYMBALTA) 30 MG extended release capsule Take 1 capsule by mouth daily 4/25/24  Yes Cat Rizzo DO   gabapentin (NEURONTIN) 600 MG tablet TAKE 1 TABLET BY MOUTH IN  THE MORNING, 2 TABLETS IN  THE AFTERNOON, AND 2   Social Gatherings with Friends and Family: Once a week     Attends Shinto Services: Never     Active Member of Clubs or Organizations: No     Attends Club or Organization Meetings: Never     Marital Status:    Intimate Partner Violence: Not on file   Housing Stability: Low Risk  (2024)    Housing Stability Vital Sign     Unable to Pay for Housing in the Last Year: No     Number of Times Moved in the Last Year: 1     Homeless in the Last Year: No        Review of Systems:    Review of Systems   All other systems reviewed and are negative.      Objective:   Vitals: BP (!) 142/82   Pulse (!) 112   Temp 98.4 °F (36.9 °C) (Oral)   Resp 19   Ht 1.49 m (4' 10.66\")   Wt 90.7 kg (200 lb)   SpO2 96%   BMI 40.86 kg/m²      Physical Examination:      Physical Exam  Constitutional:       General: She is not in acute distress.     Appearance: Normal appearance. She is obese. She is not ill-appearing.   HENT:      Head: Normocephalic and atraumatic.      Nose: Nose normal.      Mouth/Throat:      Mouth: Mucous membranes are moist.   Eyes:      General: No scleral icterus.     Conjunctiva/sclera: Conjunctivae normal.   Cardiovascular:      Rate and Rhythm: Normal rate and regular rhythm.      Pulses: Normal pulses.      Heart sounds: Normal heart sounds.   Pulmonary:      Effort: Pulmonary effort is normal. No respiratory distress.      Breath sounds: Normal breath sounds.   Abdominal:      General: Bowel sounds are normal. There is distension (grade 3 ascites, not tense).      Palpations: Abdomen is soft.   Musculoskeletal:         General: Normal range of motion.      Cervical back: Normal range of motion and neck supple.      Left lower le+ Pitting Edema present.      Right Lower Extremity: Right leg is amputated below knee.   Lymphadenopathy:      Cervical: No cervical adenopathy.   Skin:     General: Skin is warm and dry.      Coloration: Skin is not jaundiced.   Neurological:      General: No focal

## 2024-12-10 NOTE — PROGRESS NOTES
Physical Therapy Missed Treatment   Facility/Department: [facility] MED SURG W469/W469-01    NAME: Alysha Rodriguez    : 1945 (79 y.o.)  MRN: [id number]    Account: [id number]  Gender: female    Chart reviewed, attempted PT at 1329. Patient unavailable 2° to:    [x] Pt declined tx and reported she's awaiting a procedure this afternoon. States \"they need to get all of this fluid off of me first\" and requested PT check back tomorrow.       Will attempt PT treatment again at earliest convenience.      Electronically signed by SARWAT HUNTER PTA on 12/10/24 at 1:31 PM EST

## 2024-12-10 NOTE — CARE COORDINATION
MET W/PT TO ASSESS NEEDS AND DISCUSS DISCHARGE PLAN WHICH IS TBD. PT WOULD LIKE TO SPEAK WITH ATTENDING FIRST AND IF THEY FEEL PT COULD BENEFIT FROM REHAB OR SNF, PT IS OPEN TO GOING. SHE ALSO WANTS TO SEE HOW SHE FEELS AFTER PARACENTESIS THIS AFTERNOON.

## 2024-12-10 NOTE — PROGRESS NOTES
[facility] Occupational Therapy      Date: 12/10/2024  Patient Name: Alysha Rodriguez        MRN: [id number]  Account: [id number]   : 1945  (79 y.o.)  Room: [place] [address]    Chart reviewed, attempted OT at 15:13 for an OT treatment. Patient not seen 2° to:    Pt. off floor for test/procedure    Will attempt again when able.    Electronically signed by BABAK Morales on 12/10/2024 at 3:13 PM

## 2024-12-10 NOTE — PROGRESS NOTES
Hospitalist Daily Progress Note  Name: Alysha Rodriguez  Age: 79 y.o.  Gender: female  CodeStatus: Full Code  Allergies: No Known Allergies    Chief Complaint:Chest Pain      Primary Care Provider: Ally Phillips APRN - CNP    InpatientTreatment Team: Treatment Team:   Vahid Mason MD Lamp, Deborah K, MA Bennett, Kathryn A, RN Sobieski, Siria, Gabrielle Espinosa RN Brown, Jennifer, Sahra Vargas RN Wehbe, Charles R, MD    Admission Date: 12/9/2024      Subjective: No chest pain, sob.  Significant abd pain and distension, awaiting paracentesis.    Physical Exam  Vitals and nursing note reviewed.   Constitutional:       Appearance: Normal appearance.   Cardiovascular:      Rate and Rhythm: Normal rate and regular rhythm.   Pulmonary:      Effort: Pulmonary effort is normal.      Breath sounds: Normal breath sounds.   Abdominal:      General: There is distension.      Tenderness: There is abdominal tenderness.   Musculoskeletal:         General: Normal range of motion.   Skin:     General: Skin is warm and dry.   Neurological:      General: No focal deficit present.      Mental Status: She is alert. Mental status is at baseline.         Medications:  Reviewed    Infusion Medications:    sodium chloride       Scheduled Medications:    sodium chloride flush  5-40 mL IntraVENous 2 times per day     PRN Meds: oxyCODONE, sodium chloride flush, sodium chloride, potassium chloride **OR** potassium alternative oral replacement **OR** potassium chloride, magnesium sulfate, ondansetron **OR** ondansetron, polyethylene glycol, acetaminophen **OR** acetaminophen    Labs:   Recent Labs     12/09/24  0934 12/10/24  0611   WBC 9.1 8.7   HGB 12.7 11.9*   HCT 40.0 38.9    317     Recent Labs     12/09/24  0934 12/09/24  0936 12/10/24  0611     --  138   K 4.3  --  4.4     --  101   CO2 29  --  30   BUN 22  --  19   CREATININE 0.85 0.9 0.78   CALCIUM 9.6  --  9.1     Recent Labs      borderline for size, but unchanged. The  ectatic and tortuous aorta is likewise unchanged.    There are no focal infiltrates or consolidation.    The pulmonary vascular pattern is normal^ no vascular congestion or  edema.    There is no pleural effusion or pneumothorax.    Mild degenerative changes are in the thoracic spine^ no acute skeletal  findings.      IMPRESSION    NO ACUTE DISEASE IN THE CHEST.    -  RADWHERE  Read By- ZACKARY PEÑA M.D.  Released By- ZACKARY PEÑA M.D.  Released Date Time- 12/07/15 0941  This document has been electronically signed.  ------------------------------------------------------------------------------       Portable: No results found for this or any previous visit.      Echo No results found for this or any previous visit.            Assessment/Plan:    Active Hospital Problems    Diagnosis Date Noted    Malignant ascites [R18.0] 12/09/2024     Ascites, presumed malignant  Requires paracentesis with fluid analysis, cytology.  Has hx of peritoneal carcinoma.  IR consult.    12/10 - for paracentesis today, pain control.  Poss dc tomorrow if improved.  Outpt workup likely.  Hx mitral valve regurgitation, afib  Holding a/c, awaiting outpt eval at [facility]  Hx HTN, HLD, hypothyroidism  DVT proph    Electronically signed by YANETH GAMEZ MD on 12/10/2024 at 11:37 AM

## 2024-12-10 NOTE — DISCHARGE INSTRUCTIONS
Ultrasound Guided Paracentesis Discharge Instructions:     Rest today. No heavy lifting, bending, stretching or pulling.  Some tenderness will be normal at the procedure site for a few days.    You may remove the bandaid in 24-48 hours.     If you experience any drainage or bleeding at the site, hold pressure for 30 minutes and lay on side opposite of the procedure site (move fluid away from the area of leakage). If drainage or bleeding does not stop and seems excessive, call your physician or proceed to the ER.      Watch for signs of infection such as fever, chills, drainage or redness at the site. If you experience any of these symptoms, call your primary doctor or go to the emergency room.       Please keep all follow-up appointments with your Hepatologist. Schedule follow-up appointment for repeat paracentesis if necessary - Dr. Mo's office at [phone]     If you have any concerns please contact the [facility] Radiology Department at [phone].

## 2024-12-10 NOTE — PLAN OF CARE
Problem: Pain  Goal: Verbalizes/displays adequate comfort level or baseline comfort level  12/10/2024 0058 by Siria Marinelli RN  Outcome: Progressing  Flowsheets (Taken 12/10/2024 0058)  Verbalizes/displays adequate comfort level or baseline comfort level:   Encourage patient to monitor pain and request assistance   Assess pain using appropriate pain scale   Administer analgesics based on type and severity of pain and evaluate response   Implement non-pharmacological measures as appropriate and evaluate response  12/9/2024 1315 by Rebecca Shanks RN  Outcome: Progressing     Problem: Skin/Tissue Integrity  Goal: Absence of new skin breakdown  Description: 1.  Monitor for areas of redness and/or skin breakdown  2.  Assess vascular access sites hourly  3.  Every 4-6 hours minimum:  Change oxygen saturation probe site  4.  Every 4-6 hours:  If on nasal continuous positive airway pressure, respiratory therapy assess nares and determine need for appliance change or resting period.  12/10/2024 0058 by Siria Marinelli RN  Outcome: Progressing  12/9/2024 1315 by Rebecca Shanks RN  Outcome: Progressing     Problem: Safety - Adult  Goal: Free from fall injury  12/10/2024 0058 by Siria Marinelli RN  Outcome: Progressing  Flowsheets (Taken 12/10/2024 0058)  Free From Fall Injury:   Instruct family/caregiver on patient safety   Based on caregiver fall risk screen, instruct family/caregiver to ask for assistance with transferring infant if caregiver noted to have fall risk factors  12/9/2024 1315 by Rebecca Shanks RN  Outcome: Progressing

## 2024-12-10 NOTE — CARE COORDINATION
Reviewed patient's chart and she is presently impatient at [facility].  Discussion of patient potentially going to [facility] upon discharge for rehabilitation.

## 2024-12-10 NOTE — CARE COORDINATION
Chart review - patient is presently admitted to [facility].  Will monitor for discharge

## 2024-12-11 ENCOUNTER — TELEPHONE (OUTPATIENT)
Dept: INTERVENTIONAL RADIOLOGY/VASCULAR | Age: 79
End: 2024-12-11

## 2024-12-11 LAB
ALBUMIN FLUID: 3 G/DL
ALBUMIN SERPL-MCNC: 3.7 G/DL (ref 3.5–4.6)
ALP SERPL-CCNC: 153 U/L (ref 40–130)
ALT SERPL-CCNC: 16 U/L (ref 0–33)
AMYLASE FLUID: 36 U/L
ANION GAP SERPL CALCULATED.3IONS-SCNC: 10 MEQ/L (ref 9–15)
AST SERPL-CCNC: 27 U/L (ref 0–35)
BASOPHILS # BLD: 0.1 K/UL (ref 0–0.2)
BASOPHILS NFR BLD: 0.6 %
BILIRUB SERPL-MCNC: 0.9 MG/DL (ref 0.2–0.7)
BUN SERPL-MCNC: 13 MG/DL (ref 8–23)
CALCIUM SERPL-MCNC: 9.1 MG/DL (ref 8.5–9.9)
CHLORIDE SERPL-SCNC: 104 MEQ/L (ref 95–107)
CO2 SERPL-SCNC: 27 MEQ/L (ref 20–31)
CREAT SERPL-MCNC: 0.56 MG/DL (ref 0.5–0.9)
EKG ATRIAL RATE: 88 BPM
EKG Q-T INTERVAL: 382 MS
EKG QRS DURATION: 72 MS
EKG QTC CALCULATION (BAZETT): 438 MS
EKG R AXIS: 40 DEGREES
EKG T AXIS: 32 DEGREES
EKG VENTRICULAR RATE: 79 BPM
EOSINOPHIL # BLD: 0.1 K/UL (ref 0–0.7)
EOSINOPHIL NFR BLD: 1 %
ERYTHROCYTE [DISTWIDTH] IN BLOOD BY AUTOMATED COUNT: 16.1 % (ref 11.5–14.5)
GLOBULIN SER CALC-MCNC: 2.6 G/DL (ref 2.3–3.5)
GLUCOSE BLD-MCNC: 140 MG/DL (ref 70–99)
GLUCOSE SERPL-MCNC: 119 MG/DL (ref 70–99)
HCT VFR BLD AUTO: 36 % (ref 37–47)
HGB BLD-MCNC: 11.2 G/DL (ref 12–16)
LACTATE DEHYDROGENASE, FLUID: 233 U/L
LYMPHOCYTES # BLD: 1 K/UL (ref 1–4.8)
LYMPHOCYTES NFR BLD: 11.4 %
MCH RBC QN AUTO: 28.6 PG (ref 27–31.3)
MCHC RBC AUTO-ENTMCNC: 31.1 % (ref 33–37)
MCV RBC AUTO: 91.8 FL (ref 79.4–94.8)
MONOCYTES # BLD: 0.8 K/UL (ref 0.2–0.8)
MONOCYTES NFR BLD: 9.4 %
NEUTROPHILS # BLD: 6.8 K/UL (ref 1.4–6.5)
NEUTS SEG NFR BLD: 77.3 %
PATH CONSULT FLUID: NORMAL
PERFORMED ON: ABNORMAL
PLATELET # BLD AUTO: 294 K/UL (ref 130–400)
POTASSIUM SERPL-SCNC: 4.2 MEQ/L (ref 3.4–4.9)
PROT SERPL-MCNC: 6.3 G/DL (ref 6.3–8)
RBC # BLD AUTO: 3.92 M/UL (ref 4.2–5.4)
SODIUM SERPL-SCNC: 141 MEQ/L (ref 135–144)
SPECIMEN TYPE: NORMAL
WBC # BLD AUTO: 8.9 K/UL (ref 4.8–10.8)

## 2024-12-11 PROCEDURE — 1210000000 HC MED SURG R&B

## 2024-12-11 PROCEDURE — 6370000000 HC RX 637 (ALT 250 FOR IP): Performed by: FAMILY MEDICINE

## 2024-12-11 PROCEDURE — 93010 ELECTROCARDIOGRAM REPORT: CPT | Performed by: INTERNAL MEDICINE

## 2024-12-11 PROCEDURE — 97535 SELF CARE MNGMENT TRAINING: CPT

## 2024-12-11 PROCEDURE — 99222 1ST HOSP IP/OBS MODERATE 55: CPT | Performed by: PHYSICAL MEDICINE & REHABILITATION

## 2024-12-11 PROCEDURE — 80053 COMPREHEN METABOLIC PANEL: CPT

## 2024-12-11 PROCEDURE — 36415 COLL VENOUS BLD VENIPUNCTURE: CPT

## 2024-12-11 PROCEDURE — 6360000002 HC RX W HCPCS: Performed by: INTERNAL MEDICINE

## 2024-12-11 PROCEDURE — 85025 COMPLETE CBC W/AUTO DIFF WBC: CPT

## 2024-12-11 PROCEDURE — 2580000003 HC RX 258: Performed by: FAMILY MEDICINE

## 2024-12-11 PROCEDURE — 6360000002 HC RX W HCPCS: Performed by: FAMILY MEDICINE

## 2024-12-11 PROCEDURE — 2700000000 HC OXYGEN THERAPY PER DAY

## 2024-12-11 RX ORDER — BUPROPION HYDROCHLORIDE 75 MG/1
75 TABLET ORAL 2 TIMES DAILY
Status: DISCONTINUED | OUTPATIENT
Start: 2024-12-11 | End: 2024-12-12 | Stop reason: HOSPADM

## 2024-12-11 RX ORDER — FUROSEMIDE 20 MG/1
20 TABLET ORAL 2 TIMES DAILY
Status: DISCONTINUED | OUTPATIENT
Start: 2024-12-11 | End: 2024-12-12 | Stop reason: HOSPADM

## 2024-12-11 RX ORDER — DULOXETIN HYDROCHLORIDE 30 MG/1
30 CAPSULE, DELAYED RELEASE ORAL DAILY
Status: DISCONTINUED | OUTPATIENT
Start: 2024-12-11 | End: 2024-12-12 | Stop reason: HOSPADM

## 2024-12-11 RX ORDER — HYDROCODONE BITARTRATE AND ACETAMINOPHEN 5; 325 MG/1; MG/1
1 TABLET ORAL EVERY 8 HOURS PRN
Status: DISCONTINUED | OUTPATIENT
Start: 2024-12-11 | End: 2024-12-12 | Stop reason: HOSPADM

## 2024-12-11 RX ORDER — HYDRALAZINE HYDROCHLORIDE 20 MG/ML
10 INJECTION INTRAMUSCULAR; INTRAVENOUS EVERY 6 HOURS PRN
Status: DISCONTINUED | OUTPATIENT
Start: 2024-12-11 | End: 2024-12-12 | Stop reason: HOSPADM

## 2024-12-11 RX ORDER — ASPIRIN 81 MG/1
81 TABLET ORAL DAILY
Status: DISCONTINUED | OUTPATIENT
Start: 2024-12-11 | End: 2024-12-12 | Stop reason: HOSPADM

## 2024-12-11 RX ORDER — DILTIAZEM HYDROCHLORIDE 120 MG/1
120 CAPSULE, COATED, EXTENDED RELEASE ORAL DAILY
Status: DISCONTINUED | OUTPATIENT
Start: 2024-12-11 | End: 2024-12-12 | Stop reason: HOSPADM

## 2024-12-11 RX ORDER — ATORVASTATIN CALCIUM 80 MG/1
80 TABLET, FILM COATED ORAL NIGHTLY
Status: DISCONTINUED | OUTPATIENT
Start: 2024-12-11 | End: 2024-12-12 | Stop reason: HOSPADM

## 2024-12-11 RX ORDER — METOPROLOL SUCCINATE 50 MG/1
50 TABLET, EXTENDED RELEASE ORAL 2 TIMES DAILY
Status: DISCONTINUED | OUTPATIENT
Start: 2024-12-11 | End: 2024-12-12 | Stop reason: HOSPADM

## 2024-12-11 RX ORDER — TRAZODONE HYDROCHLORIDE 50 MG/1
50 TABLET, FILM COATED ORAL NIGHTLY PRN
Status: DISCONTINUED | OUTPATIENT
Start: 2024-12-11 | End: 2024-12-12 | Stop reason: HOSPADM

## 2024-12-11 RX ADMIN — DULOXETINE HYDROCHLORIDE 30 MG: 30 CAPSULE, DELAYED RELEASE ORAL at 13:29

## 2024-12-11 RX ADMIN — HYDROMORPHONE HYDROCHLORIDE 0.5 MG: 1 INJECTION, SOLUTION INTRAMUSCULAR; INTRAVENOUS; SUBCUTANEOUS at 06:04

## 2024-12-11 RX ADMIN — ATORVASTATIN CALCIUM 80 MG: 80 TABLET, FILM COATED ORAL at 22:14

## 2024-12-11 RX ADMIN — ONDANSETRON 4 MG: 2 INJECTION, SOLUTION INTRAMUSCULAR; INTRAVENOUS at 04:28

## 2024-12-11 RX ADMIN — FUROSEMIDE 20 MG: 20 TABLET ORAL at 13:28

## 2024-12-11 RX ADMIN — Medication 10 ML: at 23:58

## 2024-12-11 RX ADMIN — BUPROPION HYDROCHLORIDE 75 MG: 75 TABLET, FILM COATED ORAL at 13:29

## 2024-12-11 RX ADMIN — Medication 10 ML: at 09:10

## 2024-12-11 RX ADMIN — ONDANSETRON 4 MG: 2 INJECTION, SOLUTION INTRAMUSCULAR; INTRAVENOUS at 10:22

## 2024-12-11 RX ADMIN — FUROSEMIDE 20 MG: 20 TABLET ORAL at 22:14

## 2024-12-11 RX ADMIN — ASPIRIN 81 MG: 81 TABLET, COATED ORAL at 13:28

## 2024-12-11 RX ADMIN — APIXABAN 5 MG: 5 TABLET, FILM COATED ORAL at 13:29

## 2024-12-11 RX ADMIN — METOPROLOL SUCCINATE 50 MG: 50 TABLET, FILM COATED, EXTENDED RELEASE ORAL at 13:28

## 2024-12-11 RX ADMIN — METOPROLOL SUCCINATE 50 MG: 50 TABLET, FILM COATED, EXTENDED RELEASE ORAL at 22:14

## 2024-12-11 RX ADMIN — BUPROPION HYDROCHLORIDE 75 MG: 75 TABLET, FILM COATED ORAL at 22:14

## 2024-12-11 RX ADMIN — DILTIAZEM HYDROCHLORIDE 120 MG: 120 CAPSULE, COATED, EXTENDED RELEASE ORAL at 13:29

## 2024-12-11 RX ADMIN — APIXABAN 5 MG: 5 TABLET, FILM COATED ORAL at 22:14

## 2024-12-11 RX ADMIN — OXYCODONE 5 MG: 5 TABLET ORAL at 04:40

## 2024-12-11 ASSESSMENT — ENCOUNTER SYMPTOMS
VOMITING: 0
EYE REDNESS: 0
EYE PAIN: 0
SHORTNESS OF BREATH: 1
ABDOMINAL DISTENTION: 1
NAUSEA: 1
DIARRHEA: 0
BLOOD IN STOOL: 0
COUGH: 0
SORE THROAT: 0
STRIDOR: 0
WHEEZING: 0
PHOTOPHOBIA: 0
CONSTIPATION: 1
BACK PAIN: 1
ABDOMINAL PAIN: 1

## 2024-12-11 ASSESSMENT — PAIN SCALES - GENERAL
PAINLEVEL_OUTOF10: 6
PAINLEVEL_OUTOF10: 9

## 2024-12-11 ASSESSMENT — PAIN DESCRIPTION - ORIENTATION: ORIENTATION: UPPER;MID

## 2024-12-11 ASSESSMENT — PAIN DESCRIPTION - DESCRIPTORS
DESCRIPTORS: SORE;SHARP
DESCRIPTORS: SORE

## 2024-12-11 ASSESSMENT — PAIN DESCRIPTION - LOCATION
LOCATION: ABDOMEN
LOCATION: ABDOMEN

## 2024-12-11 ASSESSMENT — CROHNS DISEASE ACTIVITY INDEX (CDAI): CDAI SCORE: 0

## 2024-12-11 NOTE — PROGRESS NOTES
Physical Therapy Med Surg Daily Treatment Note  Facility/Department: [address] MED SURG UNIT  Room: [place] [address]       NAME: Alysha Rodriguez  : 1945 (79 y.o.)  MRN: [id number]  CODE STATUS: Full Code    Date of Service: 2024    Patient Diagnosis(es): Generalized abdominal pain [R10.84]  Chronic anticoagulation [Z79.01]  Malignant ascites [R18.0]  Family history of peritoneal cancer [Z80.8]  Ascites of liver [R18.8]   Chief Complaint   Patient presents with    Chest Pain     Patient Active Problem List    Diagnosis Date Noted    Nonrheumatic mitral valve regurgitation 2024    Atrial fibrillation with RVR (HCC) 2024    Hematuria 2017    High risk medication use - 18 OARRS PM&R, 18 OARRS PM&R, 18 Urine Drug Screen positive opiates PM&R, 18 Med Contract PM&R 2013    Chronic renal disease, stage III (HCC) [945857] 2022    Drug-induced polyneuropathy (HCC) 2022    Malignant ascites 2024    Severe mitral regurgitation 2024    Chronic systolic (congestive) heart failure 2024    Lumbar radiculitis 2023    Abnormality of gait and mobility 2021    Hx of BKA, right (HCC) 2021    Gait abnormality dt cerebrovascular insufficiancy and gait atasxia 2021    Difficulty balancing 2021    Dizziness 2021    Malignant neoplasm of peritoneum, 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HCC) 10/27/2020    Obstructive sleep apnea     Current mild episode of major depressive disorder (HCC) 2020    Cervical fusion syndrome 2019    Chronic pain syndrome 2018    Charcot's arthropathy 2018    Moderate episode of recurrent major depressive disorder (HCC) 2017    Chronic low back pain with sciatica 2017    Noncompliance - No Show inject 17, No Show F/U 2017    Hematuria, gross     Primary osteoarthritis involving multiple joints 2017    Left breast mass 2016    Anemia 2016    Cancer  ([facility]) 07/11/2016    Morbid obesity 07/11/2016    Reactive depression 07/11/2016    Sleeping excessive 07/11/2016    Cervical spinal cord injury ([facility]) 06/10/2016    Chronic low back pain 06/10/2016    Fatigue due to treatment 04/11/2016    Lobular breast cancer ([facility]) 10/26/2015    Acquired hallux valgus 07/13/2015    Complete traumatic amputation at level between right knee and ankle ([facility]) 07/13/2015    Hypothyroid 06/12/2015    Melancholia 03/16/2015    Complete tear of left rotator cuff 03/16/2015    Generalized osteoarthritis 03/16/2015    Malaise and fatigue 02/17/2015    Complete rotator cuff rupture of left shoulder 12/17/2014    Clinical depression 12/17/2014    Benign essential HTN 12/17/2014    HLD (hyperlipidemia) 12/17/2014    Low back pain with sciatica 12/17/2014    Fibromyalgia muscle pain 12/17/2014    Family history of coronary artery disease 08/29/2014    NSTEMI (non-ST elevated myocardial infarction) ([facility]) 08/29/2014    Hx of right BKA ([facility])     Closed fracture of tarsal and metatarsal bones 06/02/2014    Arthritis, neuropathic 12/02/2013    Impaired mobility and activities of daily living 06/12/2013    Obesity 03/15/2013    Obesity (BMI 30-39.9) 02/12/2013    Neck pain on right side     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    Charcot's joint of foot 09/01/2010    Closed dislocation of ankle 09/01/2010    Closed dislocation of foot 09/01/2010    Disorder of peripheral nervous system 09/01/2010    Peripheral neuropathy 09/01/2010    Cervical post-laminectomy syndrome 10/22/2009    Failed back syndrome of lumbar spine 10/22/2009    Postlaminectomy syndrome of cervical region 10/22/2009    Postlaminectomy syndrome of lumbar region 10/22/2009    Hereditary and idiopathic peripheral neuropathy ([facility]) 06/18/2009    Osteoporosis 02/05/2009    Tibialis tendinitis 12/12/2007    Acquired flat foot 07/10/2007    Arthritis of ankle or foot, degenerative 07/10/2007        Past Medical History:   Diagnosis Date    Arthritis

## 2024-12-11 NOTE — PLAN OF CARE
Problem: Chronic Conditions and Co-morbidities  Goal: Patient's chronic conditions and co-morbidity symptoms are monitored and maintained or improved  Outcome: Progressing  Flowsheets (Taken 12/10/2024 2030)  Care Plan - Patient's Chronic Conditions and Co-Morbidity Symptoms are Monitored and Maintained or Improved: Monitor and assess patient's chronic conditions and comorbid symptoms for stability, deterioration, or improvement     Problem: Discharge Planning  Goal: Discharge to home or other facility with appropriate resources  Outcome: Progressing  Flowsheets (Taken 12/10/2024 2030)  Discharge to home or other facility with appropriate resources: Identify barriers to discharge with patient and caregiver     Problem: Pain  Goal: Verbalizes/displays adequate comfort level or baseline comfort level  Outcome: Progressing  Flowsheets (Taken 12/10/2024 0058)  Verbalizes/displays adequate comfort level or baseline comfort level:   Encourage patient to monitor pain and request assistance   Assess pain using appropriate pain scale   Administer analgesics based on type and severity of pain and evaluate response   Implement non-pharmacological measures as appropriate and evaluate response     Problem: Safety - Adult  Goal: Free from fall injury  Outcome: Progressing  Flowsheets (Taken 12/10/2024 0058)  Free From Fall Injury:   Instruct family/caregiver on patient safety   Based on caregiver fall risk screen, instruct family/caregiver to ask for assistance with transferring infant if caregiver noted to have fall risk factors

## 2024-12-11 NOTE — PLAN OF CARE
Problem: Chronic Conditions and Co-morbidities  Goal: Patient's chronic conditions and co-morbidity symptoms are monitored and maintained or improved  12/11/2024 1132 by Janeth Bryan RN  Outcome: Progressing  12/11/2024 0303 by Siria Marinelli RN  Outcome: Progressing  Flowsheets (Taken 12/10/2024 2030)  Care Plan - Patient's Chronic Conditions and Co-Morbidity Symptoms are Monitored and Maintained or Improved: Monitor and assess patient's chronic conditions and comorbid symptoms for stability, deterioration, or improvement     Problem: Discharge Planning  Goal: Discharge to home or other facility with appropriate resources  12/11/2024 1132 by Janeth Bryan RN  Outcome: Progressing  12/11/2024 0303 by Siria Marinelli RN  Outcome: Progressing  Flowsheets (Taken 12/10/2024 2030)  Discharge to home or other facility with appropriate resources: Identify barriers to discharge with patient and caregiver     Problem: Pain  Goal: Verbalizes/displays adequate comfort level or baseline comfort level  12/11/2024 1132 by Janeth Bryan RN  Outcome: Progressing  12/11/2024 0303 by Siria Marinelli RN  Outcome: Progressing  Flowsheets (Taken 12/10/2024 0058)  Verbalizes/displays adequate comfort level or baseline comfort level:   Encourage patient to monitor pain and request assistance   Assess pain using appropriate pain scale   Administer analgesics based on type and severity of pain and evaluate response   Implement non-pharmacological measures as appropriate and evaluate response     Problem: Skin/Tissue Integrity  Goal: Absence of new skin breakdown  Description: 1.  Monitor for areas of redness and/or skin breakdown  2.  Assess vascular access sites hourly  3.  Every 4-6 hours minimum:  Change oxygen saturation probe site  4.  Every 4-6 hours:  If on nasal continuous positive airway pressure, respiratory therapy assess nares and determine need for appliance change or resting period.  Outcome:

## 2024-12-11 NOTE — CARE COORDINATION
Inpatient Rehab referral received. Met with patient to discuss therapy needs and [facility] rehab. Patient sitting in chair at time of meeting with her and requested she be put back to bed. Assisted patient back to bed per her request. SOB upon exertion noted. Wearing [place] oxygen but denies wearing at home. Reports to feeling better overall than when first admitted but not well in general today. She had a difficult time expressing her thoughts and required a great deal of time to get out what she was trying to say. She was fixated on her daughter and her role within her job as she describes her position to be very complex and consuming. Patient reported daughter lives in [place]. When trying to inquire how daughter's job affects patient (since dtr is not local) she could not elaborate and kept repeating that her daughter has a complex job. I asked if patient's daughter feels she needs more help for patient to be able to return home she admitted she does need more help at home. Patient initially fixated on all of the furniture she has in her home. Patient did give permission for me to contact her daughter aTnia. In the mean time, I asked patient what her dc plan is and goals are. She reported to get stronger and to return home, but does want me to discuss with her daughter as well. I reviewed and explained [facility] program and requirements, including 3 hours of intense therapy daily, anticipated length of stay, weekly team meetings and goal of discharge to home. FOC provided and patient would like [facility] rehab at d/c.  Electronically signed by Malena Arambula on 12/11/2024 at 12:53 PM

## 2024-12-11 NOTE — DISCHARGE INSTR - COC
Continuity of Care Form    Patient Name: Alysha Rodriguez   :  1945  MRN:  [id number]    Admit date:  2024  Discharge date:  24    Code Status Order: Full Code   Advance Directives:   Advance Care Flowsheet Documentation             Admitting Physician:  Vahid Mason MD  PCP: Ally Phillips APRN - CNP    Discharging Nurse: danielle  Discharging Hospital Unit/Room#: W469/W469-01  Discharging Unit Phone Number: [phone]    Emergency Contact:   Extended Emergency Contact Information  Primary Emergency Contact: LeonelAfia   [facility]  Home Phone: [phone]  Work Phone: [phone]  Mobile Phone: [phone]  Relation: Other  Secondary Emergency Contact: Tania Stanton   [facility]  Home Phone: [phone]  Work Phone: [phone]  Mobile Phone: [phone]  Relation: Child    Past Surgical History:  Past Surgical History:   Procedure Laterality Date    ABDOMEN SURGERY Left 2017    EXCISION OF CHEST WALL MASS, UPDATE LABS ON ADMIT  performed by Dominguez Wynn MD at [facility]    APPENDECTOMY      BACK SURGERY      BLEPHAROPLASTY  2013    both eyes    CARDIAC CATHETERIZATION      CARDIAC PROCEDURE N/A 2024    Left heart cath / coronary angiography performed by Ramu Bower DO at [facility]    COLONOSCOPY  2010    normal    CYSTOSCOPY W BIOPSY OF BLADDER N/A 2017    CYSTOSCOPY BLADDER BIOPSY AND FULGURATION performed by Alan Izquierdo MD at [facility]    EYE SURGERY Bilateral 2012    cataract removal with IOL implants    HYSTERECTOMY (CERVIX STATUS UNKNOWN)      JOINT REPLACEMENT  , 2005, 2009    LEG AMPUTATION BELOW KNEE Right 2011    DR GALLEGO due to CHARCOTS    MASTECTOMY Bilateral 2015    Bilateral simple mastectomies with right SNB by DR WYNN    MASTECTOMY, BILATERAL  16    PARACENTESIS Left 12/10/2024    5025 ml removed by Patsy Joyner CNP - diagnostics sent    PARTIAL HYSTERECTOMY  Therapy:   - Oral Diet:  General    Routes of Feeding: Oral  Liquids: Thin Liquids  Daily Fluid Restriction: no  Last Modified Barium Swallow with Video (Video Swallowing Test): not done    Treatments at the Time of Hospital Discharge:   Respiratory Treatments: PRN  Oxygen Therapy:   2L [place] PRN, Baseline-No Home 02  Ventilator:    - No ventilator support    Rehab Therapies: Physical Therapy and Occupational Therapy  Weight Bearing Status/Restrictions: No weight bearing restrictions  Other Medical Equipment (for information only, NOT a DME order):  wheelchair and walker  Other Treatments: none    Patient's personal belongings (please select all that are sent with patient):  Glasses, Dentures upper and lower    RN SIGNATURE:  Electronically signed by Gabrielle Davis RN on 12/12/24 at 12:00 PM EST    CASE MANAGEMENT/SOCIAL WORK SECTION    Inpatient Status Date: ***    Readmission Risk Assessment Score:  Readmission Risk              Risk of Unplanned Readmission:  12           [facility]   [facility]  Address:  Phone: [phone]  Fax:    / signature: Electronically signed by MARTINEZ Chakraborty on 12/11/24 at 10:15 AM EST    PHYSICIAN SECTION    Prognosis: {Prognosis:5023866162}    Condition at Discharge: { Patient Condition:498760245}    Rehab Potential (if transferring to Rehab): {Prognosis:2142805908}    Recommended Labs or Other Treatments After Discharge: ***    Physician Certification: I certify the above information and transfer of Alysha Rodriguez  is necessary for the continuing treatment of the diagnosis listed and that she requires {Admit to Appropriate Level of Care:80470} for {GREATER/LESS:866306156} 30 days.     Update Admission H&P: {CHP DME Changes in HandP:118298884}    PHYSICIAN SIGNATURE:  {Esignature:[id number]}

## 2024-12-11 NOTE — CARE COORDINATION
Updated 4W LSW patient can admit to rehab when medically cleared to do so. Per hospitalist note today, \"12/11 - await fluid analysis, rehab consult, dc planning for poss tomorrow.\" Will follow for d/c readiness. Acute rehab portion completed for patient to admit when cleared.   Electronically signed by Malena Arambula on 12/11/2024 at 4:18 PM

## 2024-12-11 NOTE — PROGRESS NOTES
[facility] OCCUPATIONAL THERAPY MED SURG TREATMENT NOTE     Date: 2024  Patient Name: Alysha Rodriguez        MRN: [id number]  Account: [id number]   : 1945  (79 y.o.)  Room: [place] [address]    Chart Review:    Restrictions  Restrictions/Precautions  Restrictions/Precautions: Fall Risk     Safety:  Safety Devices  Type of Devices: Left in bed;Nurse notified (Janeth ADEN RN present)    Patient's birthday verified: Yes    Subjective:    Subjective: \"I'm so hot.\"       Pain at start of treatment: No    Pain at end of treatment: No    Objective:    ADL Status:  ADL  LE Dressing: Maximum assistance;Verbal cueing;Increased time to complete  LE Dressing Skilled Clinical Factors: donning hospital stretch underwear - verbal cues to identify that patient was attempting to pull up hospital absorbant brief that was already in plpace and not hospital stretch underwear - assist tothread R stump and pull up over B hips - increased time to complete 2° repeated repositioning of stump on bed for balance    Therapy key for assistance levels -   Independent/Mod I = Pt. is able to perform task with no assistance but may require a device   Stand by assistance = Pt. does not perform task at an independent level but does not need physical assistance, requires verbal cues  Minimal, Moderate, Maximal Assistance = Pt. requires physical assistance (25%, 50%, 75% assist from helper) for task but is able to actively participate in task   Dependent = Pt. requires total assistance with task and is not able to actively participate with task completion    Orientation Status:  Orientation  Overall Orientation Status: Within Functional Limits    Cognition Status:  Cognition  Overall Cognitive Status: WFL    Perception Status:  Perception  Overall Perceptual Status: WFL    Vision and Hearing Status:   Hearing - [facility]  Vision - Basic Assessment  Prior Vision: Wears glasses only for reading    GROSS ASSESSMENT AROM/PROM:   WFL       ROM:   LUE AROM  goals : \"to get better\"    Improve Balance, Improve Strength, Improve Stuart with ADLs, Improve Stuart with Functional Transfers, Improve Cognition/Safety awareness, and Improve Endurance    Therapy Time:   Individual Group Co-Treat   Time In 08       Time Out 09         Minutes 13                ADL/IADL trainin minutes    Electronically signed by:    BABAK Morales    2024, 9:18 AM

## 2024-12-11 NOTE — TELEPHONE ENCOUNTER
Post procedure phone call placed. This patient had a paracentesis done on 12/10/2024 by Patsy Joyner CNP. Per Janeth, nurse for this patient, She has had no complications,issues, or problems due to the paracentesis. Band aide on abdomen is clean,dry,intact per nurse. Per Janeth,  patient states that she has some pain to left side of abdomen but has had that complaint  for a few days even before the paracentesis was done.

## 2024-12-11 NOTE — CONSULTS
certain positions and eating. She has tried acetaminophen and oral narcotic analgesics for the symptoms. The treatment provided mild relief. Prior diagnostic workup includes surgery and GI consult. Her past medical history is significant for abdominal surgery. There is no history of colon cancer, Crohn's disease or gallstones.   Fatigue  Associated symptoms include abdominal pain, fatigue, myalgias, nausea, neck pain, numbness and weakness. Pertinent negatives include no chest pain, chills, congestion, coughing, diaphoresis, fever, headaches, rash, sore throat or vomiting.         I reviewed recent nursing notes discussed care with acute care providers, \" Patient complaining of shortness of breath and nausea. Patient SPO2 95% on 3 L NC. Patient medicated with PRN zofran for nausea  \".   Events from the previous 24 hours reviewed and discussed .      Their inpatient work up has included:    Imaging:  Imaging and other studies reviewed and discussed with patient and staff    IR US GUIDED PARACENTESIS 12/11/2024  1.  Status post technically successful ultrasound-guided paracentesis.  CLINICAL HISTORY:FILIPE DOSHI is a Female of 79 years age, referred for Ultrasound Guided Paracentesis.  PROCEDURE: Survey of the abdomen showed large amount of ascites fluid. After obtaining informed consent, the patient was positioned supine on the sonography table. Using ultrasound, the skin over the left hemiabdomen was locally anesthetized with 1% lidocaine.  Following that, a Yueh needle was advanced into the fluid pocket using ultrasound visualization. 5025 cc, of ascites fluid were aspirated and sent for cytology, and pathology.  The needle was removed, and hemostasis was obtained with pressure.  A Band-Aid was placed. Post procedure images did not demonstrate hemorrhage at the target site. The patient tolerated the procedure well. The patient left the department in good condition. A radiology nurse was in presence monitoring  face-to-face visits at least 3 days/week with anticipated need to treat, manage and modify the rehabilitation course of treatment including interdisciplinary team conferences.  They will require rehab physician care to monitor for neurogenic bowel bladder, postoperative pain, titration of opiate and high risk medications,   blood pressure and blood sugar control.    It is my opinion that they will be able to tolerate and benefit from 3 hours of therapy a day.  I reviewed the various options re: levels of care with the patient and family.  Please see pre-admission screen note for further details. I discussed acute rehab with the patient and verify that the patient is able and willing to participate in 3 hours of therapy a day.  Rehab and Acute Care Case Management has also reinforced this expectation.  This patient requires multidisciplinary rehabilitation treatment, including daily care and management from a PM&R physician, 24-hour rehabilitation nursing, Physical Therapy, Occupational Therapy, rehabilitation psychology, consideration of speech and language pathology, recreational therapy, nutritional services, and a rehabilitation .  I feel that it is reasonable to plan for a discharge to home setting after acute rehab.         Specialized nursing care to focus on:  Bowel and bladder issues-Monitor for urinary retention-check PVRs, bladder scan--cath if no void.  Wound risk and management   -pressure relief protocols-side to side turns  IVF medication administration      Monitor endurance and if necessary spread therapy out over a 7-day window-adding scheduled rest breaks when needed.  Focus on energy conservation.  Monitor heart rate and   cardiac medications effects on heart rate and blood pressure before, during and after therapy.  Progress toward endurance training with pulse ox monitoring for saturation and heart rate.    continue to monitor closely for dehydration-- Improve hydration and  nutrition by adding Vitamin B12 shot times one, adding Protein supplements and push PO fluids.    Treat and monitor for higher level cognitive deficits, focus on difficulty with sequencing and problem-solving.    Focus on higher-level balance and falls risk issues focusing on balance training and monitoring for orthostasis.      Above recommendations are indicated to address medical complexity and need for appropriate rehab services.  Will tailor individual care and rehab plan per individuals needs re and cognitive screening monitor for aphasia.    Focus of today's plan-  to acute rehab      Required Certification Data (potential inpatient rehabilitation facility patient's only)    Deficits:weakness, nutrition, mobility, impaired gait, high risk for falls, frequent falls, deconditioning , debility, balance, ADL's, and adjustment to disability    Disability:mobility, locomotion, self care, bowel/ bladder status, and cognitive    Potential barriers to progress/discharge:complex medical conditions, severe pain, complex social situations, and bowel/bladder dysfunction         It was my pleasure to evaluate Alysha Rodriguez today.  Please call [phone] with questions.    Karyn Silva, DO     FAAPMR

## 2024-12-11 NOTE — CARE COORDINATION
This LSW visited patient at bedside this am. Discharge plans discussed. Patient requesting to be evaluated for [facility] program. Dr. Mason notified with patient request.  Electronically signed by MARTINEZ Chakraborty on 12/11/24 at 9:57 AM EST     This CÉSARW spoke with Ashlyn, admissions liaison at [facility] program at 4:15pm. Per Ashlyn patients referral has been accepted, anticipated discharge date is 12/12/24.  Electronically signed by MARTINEZ Chakraborty on 12/11/24 at 4:18 PM EST

## 2024-12-11 NOTE — FLOWSHEET NOTE
1026 - Patient complaining of shortness of breath and nausea. Patient SPO2 95% on 3 L NC. Patient medicated with PRN zofran for nausea.

## 2024-12-11 NOTE — PROGRESS NOTES
Hospitalist Daily Progress Note  Name: Alysha Rodriguez  Age: 79 y.o.  Gender: female  CodeStatus: Full Code  Allergies: No Known Allergies    Chief Complaint:Chest Pain      Primary Care Provider: Ally Phillips APRN - CNP    InpatientTreatment Team: Treatment Team:   Vahid Mason MD Lamp, Deborah K, MA Bennett, Kathryn A, RN Entwistle, Amanda, RN Brown, Jennifer, LSW Reinhart, Jennifer Hipp, Lisa, RN Nimon, Sasha    Admission Date: 12/9/2024      Subjective: No chest pain, sob.  Abd pain improved significantly post paracentesis.  Report this am of drake real.    Physical Exam  Vitals and nursing note reviewed.   Constitutional:       Appearance: Normal appearance.   Cardiovascular:      Rate and Rhythm: Normal rate and regular rhythm.   Pulmonary:      Effort: Pulmonary effort is normal.      Breath sounds: Normal breath sounds.   Abdominal:      General: Bowel sounds are normal.      Palpations: Abdomen is soft.   Musculoskeletal:         General: Normal range of motion.   Skin:     General: Skin is warm and dry.   Neurological:      General: No focal deficit present.      Mental Status: She is alert. Mental status is at baseline.         Medications:  Reviewed    Infusion Medications:    sodium chloride       Scheduled Medications:    sodium chloride flush  5-40 mL IntraVENous 2 times per day     PRN Meds: oxyCODONE, sodium chloride flush, sodium chloride, potassium chloride **OR** potassium alternative oral replacement **OR** potassium chloride, magnesium sulfate, ondansetron **OR** ondansetron, polyethylene glycol, acetaminophen **OR** acetaminophen    Labs:   Recent Labs     12/09/24  0934 12/10/24  0611 12/11/24  0545   WBC 9.1 8.7 8.9   HGB 12.7 11.9* 11.2*   HCT 40.0 38.9 36.0*    317 294     Recent Labs     12/09/24  0934 12/09/24  0936 12/10/24  0611 12/11/24  0545     --  138 141   K 4.3  --  4.4 4.2     --  101 104   CO2 29  --  30 27   BUN 22  --  19 13    CREATININE 0.85 0.9 0.78 0.56   CALCIUM 9.6  --  9.1 9.1     Recent Labs     12/09/24  0934 12/10/24  0611 12/11/24  0545   AST 26 34 27   ALT 13 19 16   BILITOT 0.8* 0.6 0.9*   ALKPHOS 151* 181* 153*     Recent Labs     12/09/24  0934   INR 1.2     No results for input(s): \"CKTOTAL\", \"TROPONINI\" in the last 72 hours.    Urinalysis:   Lab Results   Component Value Date/Time    NITRU POSITIVE 09/13/2022 06:00 PM    WBCUA 20-50 09/13/2022 06:00 PM    BACTERIA MANY 09/13/2022 06:00 PM    RBCUA 0-2 09/13/2022 06:00 PM    BLOODU N 08/10/2023 02:00 PM    BLOODU Negative 09/13/2022 06:00 PM    SPECGRAV 1.020 08/10/2023 02:00 PM    GLUCOSEU N 08/10/2023 02:00 PM    GLUCOSEU Negative 09/13/2022 06:00 PM       Radiology:   Most recent    Chest CT      WITH CONTRAST:No results found for this or any previous visit.       WITHOUT CONTRAST: No results found for this or any previous visit.      CXR      2-view: Results for orders placed in visit on 05/24/17    XR CHEST STANDARD TWO VW    Narrative  EXAMINATION: CHEST X-RAY, PA AND LATERAL    CLINICAL HISTORY: BILATERAL LOWER EXTREMITY EDEMA, BILATERAL PEDAL EDEMA AND DYSPNEA, EVALUATE FOR POSSIBLE CONGESTIVE HEART FAILURE    COMPARISONS: 12/7/2015    FINDINGS: There is borderline cardiomegaly and a tortuous aorta. The lungs appear clear without pulmonary infiltrate or pleural effusion. There are no radiographic findings suggesting CHF at this time. There is degenerative bone spurring throughout the  spine and bilateral shoulder prostheses.    Impression  1. BORDERLINE CARDIOMEGALY AND A TORTUOUS AORTA.  2. LUNGS APPEAR CLEAR WITHOUT A PULMONARY INFILTRATE OR PLEURAL EFFUSION.      Results for orders placed during the hospital encounter of 12/07/15    XR Chest Standard TWO VW    Narrative  TWO VIEW CHEST RADIOGRAPH 7 December, 2015    HISTORY Breast cancer recent diagnosis, staging    COMPARISON PA / lateral chest radiograph, 1 June, 2015    TECHNIQUE Routine PA and Lateral views

## 2024-12-11 NOTE — CARE COORDINATION
[facility] Pre-Admission Screening Document      Patient Name: Alysha Rodriguez       MRN: [id number]    : 1945    Age: 79 y.o.  Gender: female   Payor: Payor: MEDICARE / Plan: MEDICARE PART A AND B / Product Type: *No Product type* /   MSSP: Yes    Admitted from: [facility] Floor: 4W  Attending Care Provider: Vahid Mason MD  Inpatient Rehab Referring Care Provider: Dr. Mason  Primary Care Provider: Ally Phillips, APRN - CNP  Inpatient Treatment Team including Consults: Treatment Team:   Vahid Mason MD Lamp, Deborah K, Danielle Mcknight RN Entwistle, Amanda, RN Brown, Jennifer, LSW Reinhart, Jennifer Hipp, Lisa, RN Nimon, Sasha    Reason for Hospitalization:   1. Ascites of liver    2. Generalized abdominal pain    3. Chronic anticoagulation    4. Family history of peritoneal cancer      Chief Complaint   Patient presents with    Chest Pain     Isolation:No active isolations    Hospital Course:  Admit Date: 2024  9:13 AM  Inpatient Rehab Referral Date: 2024  Narrative of hospital course/history of present illness: 79 y.o. female patient who presented to ER via EMS  with CP/ SOB, increasing abdominal pain and distention. CT of A/P did show large amount of ascites and left pleural effusion. Patient does have PMHx of bilateral breast and gallbladder cancer. There was initial concern for malignant ascites, therefore, patient was admitted for paracentesis (5025 ml removed on 12/10 with IR) and additional work up.     Internal Medicine:  1.Ascites, presumed malignant  Requires paracentesis with fluid analysis, cytology.  Has hx of peritoneal carcinoma.  IR consult.    12/10 - for paracentesis today, pain control.  Poss dc tomorrow if improved.  Outpt workup likely.   - await fluid analysis, rehab consult, dc planning for poss tomorrow  2.Hx mitral valve regurgitation, afib  Holding a/c, awaiting outpt  to identify that patient was attempting to pull up hospital absorbant brief that was already in plpace and not hospital stretch underwear - assist tothread R stump and pull up over B hips - increased time to complete 2° repeated repositioning of stump on bed for balance (12/11/24 0910)  Putting On/Taking Off Footwear: Moderate assistance (12/09/24 1327)  Toileting: Moderate assistance (12/09/24 1327)  Additional Comments: simulated ADLs as above. pt demonstrated decreased  strength, ROM, and endurance. Anticipate MOD-MIN A. (12/09/24 1327)  Toilet Transfers  Toilet Transfer: Minimal assistance (12/09/24 1345)  Toilet Transfers Comments: anticipate MIN-CGA d/t decreased endurance and balance (12/09/24 1345)            Speech Language Pathology n/t           Diet/Swallow:      Regular diet               Current Conditions Requiring Inpatient Rehabilitation  Bowel/Bladder Dysfunction: Yes  Intervention Required = Frequent toileting, Bowel program, and Check post void residual  Risk for Medical/Clinical Complications = moderate  Skin Healing/Breakdown Risk: Yes  Intervention Required = Side to side turns  Risk for Medical/Clinical Complications = moderate  Nutrition/Hydration Deficiency: Yes  Intervention Required = Monitor I&Os, Check Labs, and Dietary Eval  Risk for Medical/Clinical Complications = moderate  Medical Comorbidities: Yes  Intervention Required = DVT risk, Renal disease, and NSTEMI, CA,   Risk for Medical/Clinical Complications = high    Rehab/Skilled Needs:   900 minutes of Intensive Acute Rehab therapy over 7 days/week  PT Treatment Time:  1.0 hrs/day  OT Treatment Time: 1.0 hrs/day  Rehabilitation Nursing   Case management/Social work  Dietitian/Nutrition  Oxygen/CPAP/BiPAP    Cultural needs:   Ethnic, Cultural, Spiritual, and Congregation Needs  Do you have any ethnic, cultural, Restorationism practices or restrictions we should know about during your stay in the hospital?  : No  Are you able to do the  things that help you spiritually even though you are sick? : Yes  How often do you feel lonely or isolated from those around you?: Never  Do you need support with your Alevism or spiritual needs?: No   Funding needs:   Potential Assistance Purchasing Medications: No     Expected Level of Improvement with Rehab  Assist for ADL Contact Guard / Minimal Assistance  Assist for Transfers [place]  Assist for Gait R BKA, does not use prosthetic     Patient's willingness to participate: Yes  Patient's ability to tolerate proposed care: Yes  Patient/Family Goals of Rehab (in patient's/family's own words): I want to be able to get back home    Anticipated Discharge Plan:  Home with   Home Health, RN PT OT Aide to be determined      Barriers to Discharge:  Caregiver availability  Resource availability      Rehab evaluation plan: Recommend Acute Inpatient Rehab  Rehabilitation Impairment Group Code: 02.1  Rehab Impairment Group: Medical: Deconditioning  Estimated Length of Stay (days): 16  Rehab Diagnosis: Impaired mobility and ADL's due to hepatic encephalopathy  Reviewer's Signature: Electronically signed by Malena Arambula on 12/11/2024 at 1:51 PM      I have reviewed and concur with the above Preadmission Screening.   Rehab Admitting Doctor: Dr. Karyn Sliva, DO

## 2024-12-11 NOTE — PROGRESS NOTES
0933 All served Dr. Mason that patient was clammy and red and felt like she was burning up. Dr. Mason stated he would be up to elevate patient.     1310 All served Dr. Mason that patient's blood pressure was ranging from 167/102 to 165/144. New orders received.

## 2024-12-12 ENCOUNTER — HOSPITAL ENCOUNTER (INPATIENT)
Age: 79
LOS: 5 days | Discharge: HOME HEALTH | DRG: 948 | End: 2024-12-17
Attending: PHYSICAL MEDICINE & REHABILITATION | Admitting: PHYSICAL MEDICINE & REHABILITATION
Payer: MEDICARE

## 2024-12-12 VITALS
WEIGHT: 200 LBS | RESPIRATION RATE: 18 BRPM | TEMPERATURE: 97.9 F | HEIGHT: 59 IN | BODY MASS INDEX: 40.32 KG/M2 | SYSTOLIC BLOOD PRESSURE: 149 MMHG | HEART RATE: 102 BPM | DIASTOLIC BLOOD PRESSURE: 96 MMHG | OXYGEN SATURATION: 98 %

## 2024-12-12 DIAGNOSIS — M54.16 LUMBAR RADICULITIS: ICD-10-CM

## 2024-12-12 DIAGNOSIS — M14.60 CHARCOT'S ARTHROPATHY: ICD-10-CM

## 2024-12-12 DIAGNOSIS — Z74.09 IMPAIRED MOBILITY AND ACTIVITIES OF DAILY LIVING: ICD-10-CM

## 2024-12-12 DIAGNOSIS — R18.0 MALIGNANT ASCITES: Primary | ICD-10-CM

## 2024-12-12 DIAGNOSIS — Z78.9 IMPAIRED MOBILITY AND ACTIVITIES OF DAILY LIVING: ICD-10-CM

## 2024-12-12 LAB
ALBUMIN SERPL-MCNC: 3.5 G/DL (ref 3.5–4.6)
ALP SERPL-CCNC: 150 U/L (ref 40–130)
ALT SERPL-CCNC: 17 U/L (ref 0–33)
ANION GAP SERPL CALCULATED.3IONS-SCNC: 9 MEQ/L (ref 9–15)
AST SERPL-CCNC: 36 U/L (ref 0–35)
BASOPHILS # BLD: 0.1 K/UL (ref 0–0.2)
BASOPHILS NFR BLD: 0.7 %
BILIRUB SERPL-MCNC: 1.1 MG/DL (ref 0.2–0.7)
BUN SERPL-MCNC: 11 MG/DL (ref 8–23)
CALCIUM SERPL-MCNC: 9.5 MG/DL (ref 8.5–9.9)
CHLORIDE SERPL-SCNC: 98 MEQ/L (ref 95–107)
CO2 SERPL-SCNC: 31 MEQ/L (ref 20–31)
CREAT SERPL-MCNC: 0.53 MG/DL (ref 0.5–0.9)
EOSINOPHIL # BLD: 0.2 K/UL (ref 0–0.7)
EOSINOPHIL NFR BLD: 1.9 %
ERYTHROCYTE [DISTWIDTH] IN BLOOD BY AUTOMATED COUNT: 15.6 % (ref 11.5–14.5)
GLOBULIN SER CALC-MCNC: 2.8 G/DL (ref 2.3–3.5)
GLUCOSE BLD-MCNC: 115 MG/DL (ref 70–99)
GLUCOSE SERPL-MCNC: 104 MG/DL (ref 70–99)
HCT VFR BLD AUTO: 37.4 % (ref 37–47)
HGB BLD-MCNC: 12.1 G/DL (ref 12–16)
LYMPHOCYTES # BLD: 1 K/UL (ref 1–4.8)
LYMPHOCYTES NFR BLD: 10.8 %
MAGNESIUM SERPL-MCNC: 1.7 MG/DL (ref 1.7–2.4)
MCH RBC QN AUTO: 29.2 PG (ref 27–31.3)
MCHC RBC AUTO-ENTMCNC: 32.4 % (ref 33–37)
MCV RBC AUTO: 90.1 FL (ref 79.4–94.8)
MONOCYTES # BLD: 0.9 K/UL (ref 0.2–0.8)
MONOCYTES NFR BLD: 9.5 %
NEUTROPHILS # BLD: 6.9 K/UL (ref 1.4–6.5)
NEUTS SEG NFR BLD: 76.7 %
PERFORMED ON: ABNORMAL
PLATELET # BLD AUTO: 307 K/UL (ref 130–400)
POTASSIUM SERPL-SCNC: 3.4 MEQ/L (ref 3.4–4.9)
PROT SERPL-MCNC: 6.3 G/DL (ref 6.3–8)
RBC # BLD AUTO: 4.15 M/UL (ref 4.2–5.4)
SODIUM SERPL-SCNC: 138 MEQ/L (ref 135–144)
WBC # BLD AUTO: 9 K/UL (ref 4.8–10.8)

## 2024-12-12 PROCEDURE — 36415 COLL VENOUS BLD VENIPUNCTURE: CPT

## 2024-12-12 PROCEDURE — 80053 COMPREHEN METABOLIC PANEL: CPT

## 2024-12-12 PROCEDURE — 1180000000 HC REHAB R&B

## 2024-12-12 PROCEDURE — 2500000003 HC RX 250 WO HCPCS: Performed by: INTERNAL MEDICINE

## 2024-12-12 PROCEDURE — 2580000003 HC RX 258: Performed by: FAMILY MEDICINE

## 2024-12-12 PROCEDURE — 99232 SBSQ HOSP IP/OBS MODERATE 35: CPT | Performed by: PHYSICAL MEDICINE & REHABILITATION

## 2024-12-12 PROCEDURE — 6360000002 HC RX W HCPCS: Performed by: FAMILY MEDICINE

## 2024-12-12 PROCEDURE — 85025 COMPLETE CBC W/AUTO DIFF WBC: CPT

## 2024-12-12 PROCEDURE — 83735 ASSAY OF MAGNESIUM: CPT

## 2024-12-12 PROCEDURE — 97535 SELF CARE MNGMENT TRAINING: CPT

## 2024-12-12 PROCEDURE — 6370000000 HC RX 637 (ALT 250 FOR IP): Performed by: FAMILY MEDICINE

## 2024-12-12 PROCEDURE — 2700000000 HC OXYGEN THERAPY PER DAY

## 2024-12-12 RX ORDER — ATORVASTATIN CALCIUM 80 MG/1
80 TABLET, FILM COATED ORAL NIGHTLY
Status: CANCELLED | OUTPATIENT
Start: 2024-12-12

## 2024-12-12 RX ORDER — METOPROLOL TARTRATE 1 MG/ML
5 INJECTION, SOLUTION INTRAVENOUS ONCE
Status: COMPLETED | OUTPATIENT
Start: 2024-12-12 | End: 2024-12-12

## 2024-12-12 RX ORDER — METOPROLOL SUCCINATE 50 MG/1
50 TABLET, EXTENDED RELEASE ORAL 2 TIMES DAILY
Status: CANCELLED | OUTPATIENT
Start: 2024-12-12

## 2024-12-12 RX ORDER — DILTIAZEM HYDROCHLORIDE 120 MG/1
120 CAPSULE, COATED, EXTENDED RELEASE ORAL DAILY
Status: CANCELLED | OUTPATIENT
Start: 2024-12-13

## 2024-12-12 RX ORDER — DULOXETIN HYDROCHLORIDE 30 MG/1
30 CAPSULE, DELAYED RELEASE ORAL DAILY
Status: CANCELLED | OUTPATIENT
Start: 2024-12-13

## 2024-12-12 RX ORDER — TRAZODONE HYDROCHLORIDE 50 MG/1
50 TABLET, FILM COATED ORAL NIGHTLY PRN
Status: DISCONTINUED | OUTPATIENT
Start: 2024-12-12 | End: 2024-12-17 | Stop reason: HOSPADM

## 2024-12-12 RX ORDER — POLYETHYLENE GLYCOL 3350 17 G/17G
17 POWDER, FOR SOLUTION ORAL DAILY PRN
Status: CANCELLED | OUTPATIENT
Start: 2024-12-12

## 2024-12-12 RX ORDER — FUROSEMIDE 20 MG/1
20 TABLET ORAL 2 TIMES DAILY
Status: DISCONTINUED | OUTPATIENT
Start: 2024-12-12 | End: 2024-12-13

## 2024-12-12 RX ORDER — ACETAMINOPHEN 650 MG/1
650 SUPPOSITORY RECTAL EVERY 6 HOURS PRN
Status: DISCONTINUED | OUTPATIENT
Start: 2024-12-12 | End: 2024-12-13

## 2024-12-12 RX ORDER — FUROSEMIDE 20 MG/1
20 TABLET ORAL 2 TIMES DAILY
Status: CANCELLED | OUTPATIENT
Start: 2024-12-12

## 2024-12-12 RX ORDER — HYDROCODONE BITARTRATE AND ACETAMINOPHEN 5; 325 MG/1; MG/1
1 TABLET ORAL EVERY 8 HOURS PRN
Status: DISCONTINUED | OUTPATIENT
Start: 2024-12-12 | End: 2024-12-17 | Stop reason: HOSPADM

## 2024-12-12 RX ORDER — DILTIAZEM HYDROCHLORIDE 120 MG/1
120 CAPSULE, COATED, EXTENDED RELEASE ORAL DAILY
Status: DISCONTINUED | OUTPATIENT
Start: 2024-12-13 | End: 2024-12-17 | Stop reason: HOSPADM

## 2024-12-12 RX ORDER — ACETAMINOPHEN 325 MG/1
650 TABLET ORAL EVERY 6 HOURS PRN
Status: CANCELLED | OUTPATIENT
Start: 2024-12-12

## 2024-12-12 RX ORDER — ONDANSETRON 4 MG/1
4 TABLET, ORALLY DISINTEGRATING ORAL EVERY 8 HOURS PRN
Status: CANCELLED | OUTPATIENT
Start: 2024-12-12

## 2024-12-12 RX ORDER — ONDANSETRON 2 MG/ML
4 INJECTION INTRAMUSCULAR; INTRAVENOUS EVERY 6 HOURS PRN
Status: CANCELLED | OUTPATIENT
Start: 2024-12-12

## 2024-12-12 RX ORDER — TRAZODONE HYDROCHLORIDE 50 MG/1
50 TABLET, FILM COATED ORAL NIGHTLY PRN
Status: CANCELLED | OUTPATIENT
Start: 2024-12-12

## 2024-12-12 RX ORDER — BUPROPION HYDROCHLORIDE 75 MG/1
75 TABLET ORAL 2 TIMES DAILY
Status: DISCONTINUED | OUTPATIENT
Start: 2024-12-12 | End: 2024-12-17 | Stop reason: HOSPADM

## 2024-12-12 RX ORDER — ASPIRIN 81 MG/1
81 TABLET ORAL DAILY
Status: CANCELLED | OUTPATIENT
Start: 2024-12-13

## 2024-12-12 RX ORDER — DULOXETIN HYDROCHLORIDE 30 MG/1
30 CAPSULE, DELAYED RELEASE ORAL DAILY
Status: DISCONTINUED | OUTPATIENT
Start: 2024-12-13 | End: 2024-12-17 | Stop reason: HOSPADM

## 2024-12-12 RX ORDER — HYDROCODONE BITARTRATE AND ACETAMINOPHEN 5; 325 MG/1; MG/1
1 TABLET ORAL EVERY 8 HOURS PRN
Status: CANCELLED | OUTPATIENT
Start: 2024-12-12

## 2024-12-12 RX ORDER — ATORVASTATIN CALCIUM 80 MG/1
80 TABLET, FILM COATED ORAL NIGHTLY
Status: DISCONTINUED | OUTPATIENT
Start: 2024-12-12 | End: 2024-12-13

## 2024-12-12 RX ORDER — ACETAMINOPHEN 650 MG/1
650 SUPPOSITORY RECTAL EVERY 6 HOURS PRN
Status: CANCELLED | OUTPATIENT
Start: 2024-12-12

## 2024-12-12 RX ORDER — ONDANSETRON 4 MG/1
4 TABLET, ORALLY DISINTEGRATING ORAL EVERY 8 HOURS PRN
Status: DISCONTINUED | OUTPATIENT
Start: 2024-12-12 | End: 2024-12-17 | Stop reason: HOSPADM

## 2024-12-12 RX ORDER — ONDANSETRON 2 MG/ML
4 INJECTION INTRAMUSCULAR; INTRAVENOUS EVERY 6 HOURS PRN
Status: DISCONTINUED | OUTPATIENT
Start: 2024-12-12 | End: 2024-12-17 | Stop reason: HOSPADM

## 2024-12-12 RX ORDER — POLYETHYLENE GLYCOL 3350 17 G/17G
17 POWDER, FOR SOLUTION ORAL DAILY PRN
Status: DISCONTINUED | OUTPATIENT
Start: 2024-12-12 | End: 2024-12-17 | Stop reason: HOSPADM

## 2024-12-12 RX ORDER — METOPROLOL SUCCINATE 50 MG/1
50 TABLET, EXTENDED RELEASE ORAL 2 TIMES DAILY
Status: DISCONTINUED | OUTPATIENT
Start: 2024-12-12 | End: 2024-12-17 | Stop reason: HOSPADM

## 2024-12-12 RX ORDER — ASPIRIN 81 MG/1
81 TABLET ORAL DAILY
Status: DISCONTINUED | OUTPATIENT
Start: 2024-12-13 | End: 2024-12-17 | Stop reason: HOSPADM

## 2024-12-12 RX ORDER — ACETAMINOPHEN 325 MG/1
650 TABLET ORAL EVERY 6 HOURS PRN
Status: DISCONTINUED | OUTPATIENT
Start: 2024-12-12 | End: 2024-12-13

## 2024-12-12 RX ORDER — BUPROPION HYDROCHLORIDE 75 MG/1
75 TABLET ORAL 2 TIMES DAILY
Status: CANCELLED | OUTPATIENT
Start: 2024-12-12

## 2024-12-12 RX ADMIN — BUPROPION HYDROCHLORIDE 75 MG: 75 TABLET, FILM COATED ORAL at 09:13

## 2024-12-12 RX ADMIN — METOPROLOL TARTRATE 5 MG: 5 INJECTION, SOLUTION INTRAVENOUS at 22:13

## 2024-12-12 RX ADMIN — METOPROLOL SUCCINATE 50 MG: 50 TABLET, FILM COATED, EXTENDED RELEASE ORAL at 09:13

## 2024-12-12 RX ADMIN — METOPROLOL SUCCINATE 50 MG: 50 TABLET, EXTENDED RELEASE ORAL at 20:30

## 2024-12-12 RX ADMIN — ONDANSETRON 4 MG: 4 TABLET, ORALLY DISINTEGRATING ORAL at 18:31

## 2024-12-12 RX ADMIN — DILTIAZEM HYDROCHLORIDE 120 MG: 120 CAPSULE, COATED, EXTENDED RELEASE ORAL at 09:12

## 2024-12-12 RX ADMIN — FUROSEMIDE 20 MG: 20 TABLET ORAL at 09:12

## 2024-12-12 RX ADMIN — APIXABAN 5 MG: 5 TABLET, FILM COATED ORAL at 20:30

## 2024-12-12 RX ADMIN — ASPIRIN 81 MG: 81 TABLET, COATED ORAL at 09:13

## 2024-12-12 RX ADMIN — ATORVASTATIN CALCIUM 80 MG: 80 TABLET, FILM COATED ORAL at 20:30

## 2024-12-12 RX ADMIN — DULOXETINE HYDROCHLORIDE 30 MG: 30 CAPSULE, DELAYED RELEASE ORAL at 09:13

## 2024-12-12 RX ADMIN — ONDANSETRON 4 MG: 2 INJECTION, SOLUTION INTRAMUSCULAR; INTRAVENOUS at 09:12

## 2024-12-12 RX ADMIN — FUROSEMIDE 20 MG: 20 TABLET ORAL at 20:30

## 2024-12-12 RX ADMIN — APIXABAN 5 MG: 5 TABLET, FILM COATED ORAL at 09:13

## 2024-12-12 RX ADMIN — BUPROPION HYDROCHLORIDE 75 MG: 75 TABLET, FILM COATED ORAL at 20:30

## 2024-12-12 RX ADMIN — HYDROCODONE BITARTRATE AND ACETAMINOPHEN 1 TABLET: 5; 325 TABLET ORAL at 00:00

## 2024-12-12 RX ADMIN — Medication 5 ML: at 09:38

## 2024-12-12 ASSESSMENT — PAIN DESCRIPTION - ORIENTATION: ORIENTATION: RIGHT;LEFT

## 2024-12-12 ASSESSMENT — PAIN SCALES - GENERAL
PAINLEVEL_OUTOF10: 7
PAINLEVEL_OUTOF10: 0
PAINLEVEL_OUTOF10: 0

## 2024-12-12 ASSESSMENT — PAIN DESCRIPTION - DESCRIPTORS: DESCRIPTORS: ACHING;THROBBING

## 2024-12-12 ASSESSMENT — PAIN DESCRIPTION - LOCATION: LOCATION: SHOULDER

## 2024-12-12 NOTE — FLOWSHEET NOTE
Patient was assisted to reposition in bed,her respirations were unlabored,heart rate irregular,no jugular vein distention,abdomen soft with active bowel sounds, Right BKA,left foot with non-pitting edema.    20:20 she voiced that she does not have the appetite to eat her dinner,she was able to drink water without difficulty,she is alert to person ,follows commands but disoriented to place.    21:50 Abby MAURER unit manager was asked to reassess patient  after her daughter Tania Stanton called and was very concern about patient confusion,,per tania \"she has never been confuse when I talk to her.\" the   Jessie Hobbs checked the patient. And per  [facility]  the patient is fine.    0000: she was medicated for complain of  shoulders pain ,repositioning her offered little comfort 7/10 whereby ten is the worst pain there is.    00:50 patient is in bed with her eyes closed,her breathing is effortless.    05:10 patient resting in bed  after she was provided with a bed bath by Michaela.patient denies pain or shortness of breath.

## 2024-12-12 NOTE — PROGRESS NOTES
Pt assessment completed. Alert and oriented with episodes of confusion at times, pt able to be redirected with location and plan. Pt verbalized understanding. Redness to coccyx, zinc past applied. Trace edema to LLE. Denies shortness of breath or chest pain, Sp02 98% on RA. Call light within reach, Bed alarm maintained. Spoke with pts daughter, Tania, update given.

## 2024-12-12 NOTE — PROGRESS NOTES
Subjective:  The patient complains of severe acute on chronic progressive fatigue and mental fogginess partially relieved by rest, PT, OT and meds adjustments and O2 titration and exacerbated by exertion and recent hepatic encephalopathy and cardiopulmonary debility and ascites possibly due to metastasis.  Patient was admitted through the ER at [facility] on 12/9/2024 complaining with abdominal pain and distention.  She was diagnosed with ascites and admitted under the care of the hospitalist with interventional radiology consulted.     She has a past medical history significant for multiple forms of cancer most recently gallbladder cancer.  She has a history of bilateral breast cancer with bilateral mastectomies, omental cancer and a remote history of right below the knee amputation secondary to peripheral vascular disease.  She is on Eliquis at home for A-fib.     She was taken for interventional guided paracentesis with drainage of ascitic fluid by interventional radiology physician Dr. Andrew Mo on 12/10/2024.    I am concerned about patient’s medical complexities including:  Principal Problem:    Malignant ascites  Active Problems:    High risk medication use - 01/30/18 OARRS PM&R, 03/26/18 OARRS PM&R, 04/03/18 Urine Drug Screen positive opiates PM&R, 01/31/18 Med Contract PM&R    Atrial fibrillation with RVR (HCC)    Chronic renal disease, stage III (HCC) [769747]    Neck pain on right side    Obesity    Impaired mobility and activities of daily living    Hx of right BKA (HCC)  Resolved Problems:    * No resolved hospital problems. *      .    Controlled Substance Monitoring:    Acute and Chronic Pain Monitoring:   RX Monitoring Acute Pain Prescriptions Periodic Controlled Substance Monitoring   12/11/2024  11:57 AM Prescription exceeds daily limit for a specific reason. See comments or note.;Severe pain not adequately treated with lower dose.;Not required given exclusionary diagnoses...  2.3 - 3.5 g/dL   CBC with Auto Differential    Collection Time: 12/12/24  7:02 AM   Result Value Ref Range    WBC 9.0 4.8 - 10.8 K/uL    RBC 4.15 (L) 4.20 - 5.40 M/uL    Hemoglobin 12.1 12.0 - 16.0 g/dL    Hematocrit 37.4 37.0 - 47.0 %    MCV 90.1 79.4 - 94.8 fL    MCH 29.2 27.0 - 31.3 pg    MCHC 32.4 (L) 33.0 - 37.0 %    RDW 15.6 (H) 11.5 - 14.5 %    Platelets 307 130 - 400 K/uL    Neutrophils % 76.7 %    Lymphocytes % 10.8 %    Monocytes % 9.5 %    Eosinophils % 1.9 %    Basophils % 0.7 %    Neutrophils Absolute 6.9 (H) 1.4 - 6.5 K/uL    Lymphocytes Absolute 1.0 1.0 - 4.8 K/uL    Monocytes Absolute 0.9 (H) 0.2 - 0.8 K/uL    Eosinophils Absolute 0.2 0.0 - 0.7 K/uL    Basophils Absolute 0.1 0.0 - 0.2 K/uL   Magnesium    Collection Time: 12/12/24  7:02 AM   Result Value Ref Range    Magnesium 1.7 1.7 - 2.4 mg/dL     Previous extensive, complex labs, notes and diagnostics reviewed and analyzed.     ALLERGIES:    Allergies as of 12/09/2024    (No Known Allergies)      (please also verify by checking MAR)     Complex Physical Medicine & Rehab Issues Assess & Plan:   Severe abnormality of gait and mobility and impaired self-care and ADL's secondary to  hepatic encephalopathy .  Updated functional and medical status reassessed regarding patient’s ability to participate in therapies and patient found to be able to participate in:           acute intensive comprehensive inpatient rehabilitation program including PT/OT to improve balance, ambulation, ADL’s, and to improve the P/AROM.   It is my opinion that they will be able to tolerate 3 hours of therapy a day and benefit from it at an acute level. I again discussed acute rehab with the patient and verify that the patient is able and willing to participate in 3 hours of therapy a day.  Rehab and Acute Care Case Management has also reinforced this expectation.    Will continue to follow to attempt to get patient to the most efficient but most effective level of care will be

## 2024-12-12 NOTE — DISCHARGE SUMMARY
Physician Discharge Summary     Patient ID:  Alysha Rodriguez  [id number]  79 y.o.  1945    Admit date: 12/9/2024    Discharge date : 12/12/24     Admitting Physician: Vahid Gamez MD     Discharge Physician: VAHID GAMEZ MD     Admission Diagnoses: Generalized abdominal pain [R10.84]  Chronic anticoagulation [Z79.01]  Malignant ascites [R18.0]  Family history of peritoneal cancer [Z80.8]  Ascites of liver [R18.8]    Discharge Diagnoses: Large ascites, presumed malignant, mild metabolic encephalopathy    Admission Condition: fair    Discharged Condition: good    Hospital Course:   Patient mated with large volume ascites and significant abdominal discomfort.  Patient underwent IR guided paracentesis.  Fluid was lymphocytic predominant.  Cell sent for cytology.  Oncology to evaluate over rehabilitation unit.  Patient did note some mild metabolic encephalopathy waxing and waning through hospitalization.  Pending outcome of cytology or at discretion of oncology patient likely will need MRI brain.  She does have remote history of omental carcinoma per report.  She tolerated paracentesis well and pain was significantly resolved.    Consults: none    Significant Diagnostic Studies: as below    Discharge Exam:  BP (!) 152/99   Pulse 95   Temp 97.7 °F (36.5 °C) (Oral)   Resp 18   Ht 1.49 m (4' 10.66\")   Wt 90.7 kg (200 lb)   SpO2 99%   BMI 40.86 kg/m²   General appearance: alert, appears stated age, and cooperative  Lungs: clear to auscultation bilaterally  Heart: regular rate and rhythm, S1, S2 normal, no murmur, click, rub or gallop  Abdomen: soft, non-tender; bowel sounds normal; no masses,  no organomegaly  Extremities: extremities normal, atraumatic, no cyanosis or edema  Skin: Skin color, texture, turgor normal. No rashes or lesions    Labs:   Recent Labs     12/10/24  0611 12/11/24  0545 12/12/24  0702   WBC 8.7 8.9 9.0   HGB 11.9* 11.2* 12.1   HCT 38.9 36.0* 37.4    294 307     Recent Labs      12/10/24  0611 12/11/24  0545 12/12/24  0702    141 138   K 4.4 4.2 3.4    104 98   CO2 30 27 31   BUN 19 13 11   CREATININE 0.78 0.56 0.53   CALCIUM 9.1 9.1 9.5     Recent Labs     12/10/24  0611 12/11/24  0545 12/12/24  0702   AST 34 27 36*   ALT 19 16 17   BILITOT 0.6 0.9* 1.1*   ALKPHOS 181* 153* 150*     No results for input(s): \"INR\" in the last 72 hours.  No results for input(s): \"CKTOTAL\", \"TROPONINI\" in the last 72 hours.    Urinalysis:   Lab Results   Component Value Date/Time    NITRU POSITIVE 09/13/2022 06:00 PM    WBCUA 20-50 09/13/2022 06:00 PM    BACTERIA MANY 09/13/2022 06:00 PM    RBCUA 0-2 09/13/2022 06:00 PM    BLOODU N 08/10/2023 02:00 PM    BLOODU Negative 09/13/2022 06:00 PM    SPECGRAV 1.020 08/10/2023 02:00 PM    GLUCOSEU N 08/10/2023 02:00 PM    GLUCOSEU Negative 09/13/2022 06:00 PM       Radiology:   Most recent    Chest CT      WITH CONTRAST:No results found for this or any previous visit.       WITHOUT CONTRAST: No results found for this or any previous visit.      CXR      2-view: Results for orders placed in visit on 05/24/17    XR CHEST STANDARD TWO VW    Narrative  EXAMINATION: CHEST X-RAY, PA AND LATERAL    CLINICAL HISTORY: BILATERAL LOWER EXTREMITY EDEMA, BILATERAL PEDAL EDEMA AND DYSPNEA, EVALUATE FOR POSSIBLE CONGESTIVE HEART FAILURE    COMPARISONS: 12/7/2015    FINDINGS: There is borderline cardiomegaly and a tortuous aorta. The lungs appear clear without pulmonary infiltrate or pleural effusion. There are no radiographic findings suggesting CHF at this time. There is degenerative bone spurring throughout the  spine and bilateral shoulder prostheses.    Impression  1. BORDERLINE CARDIOMEGALY AND A TORTUOUS AORTA.  2. LUNGS APPEAR CLEAR WITHOUT A PULMONARY INFILTRATE OR PLEURAL EFFUSION.      Results for orders placed during the hospital encounter of 12/07/15    XR Chest Standard TWO VW    Narrative  TWO VIEW CHEST RADIOGRAPH 7 December, 2015    HISTORY Breast cancer

## 2024-12-12 NOTE — PROGRESS NOTES
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    Dyspnea 7/2/2015    Family history of coronary artery disease 8/29/2014    History of blood transfusion 2011    following chemotherapy    Hx of right BKA (HCC)     Hyperlipidemia     Hypertension     Hypothyroid 6/12/2015    Lobular breast cancer (HCC) 10/2015    NSTEMI (non-ST elevated myocardial infarction) (HCC) 8/29/2014    Obesity     Paroxysmal atrial fibrillation (HCC) 8/29/2014    S/P BKA (below knee amputation) (HCC)      Past Surgical History:   Procedure Laterality Date    ABDOMEN SURGERY Left 04/11/2017    EXCISION OF CHEST WALL MASS, UPDATE LABS ON ADMIT  performed by Dominguez Wynn MD at [facility] [place]    APPENDECTOMY      BACK SURGERY      BLEPHAROPLASTY  02/05/2013    both eyes    CARDIAC CATHETERIZATION      CARDIAC PROCEDURE N/A 11/12/2024    Left heart cath / coronary angiography performed by Ramu Bower DO at [facility] CARDIAC CATH LAB    COLONOSCOPY  01/01/2010    normal    CYSTOSCOPY W BIOPSY OF BLADDER N/A 06/26/2017    CYSTOSCOPY BLADDER BIOPSY AND FULGURATION performed by Alan Izquierdo MD at [facility] [place]    EYE SURGERY Bilateral 2012    cataract removal with IOL implants    HYSTERECTOMY (CERVIX STATUS UNKNOWN)      JOINT REPLACEMENT  2001, 2005, 2009    LEG AMPUTATION BELOW KNEE Right 02/01/2011    DR GALLEGO due to CHARCOTS    MASTECTOMY Bilateral 11/16/2015    Bilateral simple mastectomies with right SNB by DR WYNN    MASTECTOMY, BILATERAL  11/16/16    PARACENTESIS Left 12/10/2024    5025 ml removed by Patsy Joynre CNP - diagnostics sent    PARTIAL HYSTERECTOMY (CERVIX NOT REMOVED)  1979    [place] EXC CYST/ABERRANT BREAST TISSUE OPEN 1/> LESION Left 01/24/2017    CORE NEEDLE BIOPSY OF  LEFT BREAST MASS  performed by Dominguez Wynn MD at [facility] [place]    SKIN BIOPSY      EAR    SPINE SURGERY      cervical and lumbar    TONSILLECTOMY  1947       Chart Reviewed: Yes  Family/Caregiver Present: No    Restrictions:  Restrictions/Precautions: Fall Risk    SUBJECTIVE:   Subjective: I don't know what I want right now.    Pain  Pain  Pre-Pain: 0  Post-Pain: 0     OBJECTIVE:         Transfers  Sit to Stand: Minimal Assistance  Stand to Sit: Minimal Assistance  Bed to Chair: Minimal assistance;Moderate assistance  Comment: increased distance to chair required ModA for transfer decreased safety with increased distance. sat back down at bedside and completed on 2nd attempt with improved proximity.       Activity Tolerance  Activity Tolerance: Patient tolerated treatment well          ASSESSMENT   Assessment: Continued with current program this date with good results. Improved tolerance to bedside chair this date. Alysha noted no pain but increased difficulty with getting comfortable. Progress as able.     Discharge Recommendations:  Continue to assess pending progress         Goals  Long Term Goals  Long Term Goal 1: Pt will demonstrate bed mobility indep  Long Term Goal 2: Pt will demonstrate sit <> stand and SPT with or without AD indep  Long Term Goal 3: Pt will demonstrate static standing with AD indep >/= 3 min    PLAN    General Plan: 1 time a day 3-6 times a week  Safety Devices  Type of Devices: Left in chair, Call light within reach, Chair alarm in place, All fall risk precautions in place     AMPAC (6 CLICK) BASIC MOBILITY  AM-PAC Inpatient Mobility Raw Score : 14      Therapy Time   Individual   Time In 1029   Time Out 1045   Minutes 16     Timed Code Treatment Minutes: 16 Minutes    ADL: 16       Pacheco Cevallos PTA, 12/12/24 at 10:55 AM         Definitions for assistance levels  Independent = pt does not require any physical supervision or assistance from another person for activity completion. Device may be needed.  Stand  by assistance = pt requires verbal cues or instructions from another person, close to but not touching, to perform the activity  Minimal assistance= pt performs 75% or more of the activity; assistance is required to complete the activity  Moderate assistance= pt performs 50% of the activity; assistance is required to complete the activity  Maximal assistance = pt performs 25% of the activity; assistance is required to complete the activity  Dependent = pt requires total physical assistance to accomplish the task

## 2024-12-12 NOTE — CARE COORDINATION
This LSW visited patient at bedside this am. Patient anticipating transfer to [facility] program today, 12/12/24.  I completed 2nd IMM with patient, copy provided.  Electronically signed by MARTINEZ Chakraborty on 12/12/24 at 9:51 AM EST

## 2024-12-12 NOTE — FLOWSHEET NOTE
Pt admitted from 4W. Alert et oriented x4. Assessment completed. VSS.Oriented to room et call light.Electronically signed by Rosaline Garcia RN on 12/12/2024 at 4:39 PM

## 2024-12-12 NOTE — CARE COORDINATION
Spoke with LSW on 4W and pt can admit to room 260 pending medical clearance. Electronically signed by Jody Yang RN on 12/12/2024 at 10:01 AM

## 2024-12-13 PROBLEM — R32 BOWEL AND BLADDER INCONTINENCE: Status: ACTIVE | Noted: 2024-12-13

## 2024-12-13 PROBLEM — F43.21 GRIEF: Status: ACTIVE | Noted: 2024-12-13

## 2024-12-13 PROBLEM — Z78.9 IMPAIRED ACTIVITIES OF DAILY LIVING: Status: RESOLVED | Noted: 2024-12-13 | Resolved: 2024-12-13

## 2024-12-13 PROBLEM — Z78.9 IMPAIRED ACTIVITIES OF DAILY LIVING: Status: ACTIVE | Noted: 2024-12-13

## 2024-12-13 PROBLEM — R15.9 BOWEL AND BLADDER INCONTINENCE: Status: ACTIVE | Noted: 2024-12-13

## 2024-12-13 LAB
ALBUMIN SERPL-MCNC: 3.2 G/DL (ref 3.5–4.6)
ALP SERPL-CCNC: 158 U/L (ref 40–130)
ALT SERPL-CCNC: 21 U/L (ref 0–33)
ANION GAP SERPL CALCULATED.3IONS-SCNC: 15 MEQ/L (ref 9–15)
AST SERPL-CCNC: 64 U/L (ref 0–35)
BACTERIA URNS QL MICRO: ABNORMAL /HPF
BASOPHILS # BLD: 0.1 K/UL (ref 0–0.2)
BASOPHILS NFR BLD: 0.5 %
BILIRUB SERPL-MCNC: 1.2 MG/DL (ref 0.2–0.7)
BILIRUB UR QL STRIP: NEGATIVE
BUN SERPL-MCNC: 15 MG/DL (ref 8–23)
CA 125: 750 U/ML (ref 0–38)
CALCIUM SERPL-MCNC: 9.3 MG/DL (ref 8.5–9.9)
CHLORIDE SERPL-SCNC: 98 MEQ/L (ref 95–107)
CLARITY UR: CLEAR
CO2 SERPL-SCNC: 22 MEQ/L (ref 20–31)
COLOR UR: YELLOW
CREAT SERPL-MCNC: 0.44 MG/DL (ref 0.5–0.9)
EOSINOPHIL # BLD: 0.1 K/UL (ref 0–0.7)
EOSINOPHIL NFR BLD: 0.8 %
EPI CELLS #/AREA URNS AUTO: ABNORMAL /HPF (ref 0–5)
ERYTHROCYTE [DISTWIDTH] IN BLOOD BY AUTOMATED COUNT: 15.7 % (ref 11.5–14.5)
GLOBULIN SER CALC-MCNC: 3.3 G/DL (ref 2.3–3.5)
GLUCOSE SERPL-MCNC: 93 MG/DL (ref 70–99)
GLUCOSE UR STRIP-MCNC: NEGATIVE MG/DL
HCT VFR BLD AUTO: 41.2 % (ref 37–47)
HEMOCCULT STL QL IA: ABNORMAL
HGB BLD-MCNC: 13.7 G/DL (ref 12–16)
HGB UR QL STRIP: ABNORMAL
HYALINE CASTS #/AREA URNS AUTO: ABNORMAL /HPF (ref 0–5)
KETONES UR STRIP-MCNC: 15 MG/DL
LEUKOCYTE ESTERASE UR QL STRIP: ABNORMAL
LYMPHOCYTES # BLD: 1 K/UL (ref 1–4.8)
LYMPHOCYTES NFR BLD: 9.1 %
MCH RBC QN AUTO: 29.6 PG (ref 27–31.3)
MCHC RBC AUTO-ENTMCNC: 33.3 % (ref 33–37)
MCV RBC AUTO: 89 FL (ref 79.4–94.8)
MONOCYTES # BLD: 1 K/UL (ref 0.2–0.8)
MONOCYTES NFR BLD: 8.8 %
NEUTROPHILS # BLD: 9.1 K/UL (ref 1.4–6.5)
NEUTS SEG NFR BLD: 80.3 %
NITRITE UR QL STRIP: NEGATIVE
PH UR STRIP: 5.5 [PH] (ref 5–9)
PLATELET # BLD AUTO: 336 K/UL (ref 130–400)
POTASSIUM SERPL-SCNC: 4 MEQ/L (ref 3.4–4.9)
PROT SERPL-MCNC: 6.5 G/DL (ref 6.3–8)
PROT UR STRIP-MCNC: NEGATIVE MG/DL
RBC # BLD AUTO: 4.63 M/UL (ref 4.2–5.4)
RBC #/AREA URNS AUTO: ABNORMAL /HPF (ref 0–5)
SODIUM SERPL-SCNC: 135 MEQ/L (ref 135–144)
SP GR UR STRIP: 1.01 (ref 1–1.03)
URINE REFLEX TO CULTURE: YES
UROBILINOGEN UR STRIP-ACNC: 1 E.U./DL
WBC # BLD AUTO: 11.4 K/UL (ref 4.8–10.8)
WBC #/AREA URNS AUTO: ABNORMAL /HPF (ref 0–5)

## 2024-12-13 PROCEDURE — 99223 1ST HOSP IP/OBS HIGH 75: CPT | Performed by: PHYSICAL MEDICINE & REHABILITATION

## 2024-12-13 PROCEDURE — 87086 URINE CULTURE/COLONY COUNT: CPT

## 2024-12-13 PROCEDURE — 6360000002 HC RX W HCPCS: Performed by: FAMILY MEDICINE

## 2024-12-13 PROCEDURE — 36415 COLL VENOUS BLD VENIPUNCTURE: CPT

## 2024-12-13 PROCEDURE — 82274 ASSAY TEST FOR BLOOD FECAL: CPT

## 2024-12-13 PROCEDURE — 86304 IMMUNOASSAY TUMOR CA 125: CPT

## 2024-12-13 PROCEDURE — 85025 COMPLETE CBC W/AUTO DIFF WBC: CPT

## 2024-12-13 PROCEDURE — 81001 URINALYSIS AUTO W/SCOPE: CPT

## 2024-12-13 PROCEDURE — 87186 SC STD MICRODIL/AGAR DIL: CPT

## 2024-12-13 PROCEDURE — 80053 COMPREHEN METABOLIC PANEL: CPT

## 2024-12-13 PROCEDURE — 6370000000 HC RX 637 (ALT 250 FOR IP): Performed by: INTERNAL MEDICINE

## 2024-12-13 PROCEDURE — 97110 THERAPEUTIC EXERCISES: CPT

## 2024-12-13 PROCEDURE — 6370000000 HC RX 637 (ALT 250 FOR IP): Performed by: PHYSICAL MEDICINE & REHABILITATION

## 2024-12-13 PROCEDURE — 87088 URINE BACTERIA CULTURE: CPT

## 2024-12-13 PROCEDURE — 6370000000 HC RX 637 (ALT 250 FOR IP): Performed by: FAMILY MEDICINE

## 2024-12-13 PROCEDURE — 97129 THER IVNTJ 1ST 15 MIN: CPT

## 2024-12-13 PROCEDURE — 6360000002 HC RX W HCPCS: Performed by: PHYSICAL MEDICINE & REHABILITATION

## 2024-12-13 PROCEDURE — 97166 OT EVAL MOD COMPLEX 45 MIN: CPT

## 2024-12-13 PROCEDURE — 97535 SELF CARE MNGMENT TRAINING: CPT

## 2024-12-13 PROCEDURE — 2500000003 HC RX 250 WO HCPCS: Performed by: INTERNAL MEDICINE

## 2024-12-13 PROCEDURE — 97162 PT EVAL MOD COMPLEX 30 MIN: CPT

## 2024-12-13 PROCEDURE — 1180000000 HC REHAB R&B

## 2024-12-13 RX ORDER — ACETAMINOPHEN 325 MG/1
325 TABLET ORAL EVERY 6 HOURS PRN
Status: DISCONTINUED | OUTPATIENT
Start: 2024-12-13 | End: 2024-12-13

## 2024-12-13 RX ORDER — VITAMIN B COMPLEX
2000 TABLET ORAL
Status: DISCONTINUED | OUTPATIENT
Start: 2024-12-13 | End: 2024-12-17 | Stop reason: HOSPADM

## 2024-12-13 RX ORDER — PANTOPRAZOLE SODIUM 40 MG/1
40 TABLET, DELAYED RELEASE ORAL
Status: DISCONTINUED | OUTPATIENT
Start: 2024-12-14 | End: 2024-12-17 | Stop reason: HOSPADM

## 2024-12-13 RX ORDER — SPIRONOLACTONE 25 MG/1
25 TABLET ORAL 2 TIMES DAILY
Status: DISCONTINUED | OUTPATIENT
Start: 2024-12-13 | End: 2024-12-17 | Stop reason: HOSPADM

## 2024-12-13 RX ORDER — ATORVASTATIN CALCIUM 40 MG/1
40 TABLET, FILM COATED ORAL NIGHTLY
Status: DISCONTINUED | OUTPATIENT
Start: 2024-12-13 | End: 2024-12-17 | Stop reason: HOSPADM

## 2024-12-13 RX ORDER — LIDOCAINE 4 G/G
3 PATCH TOPICAL DAILY
Status: DISCONTINUED | OUTPATIENT
Start: 2024-12-13 | End: 2024-12-17 | Stop reason: HOSPADM

## 2024-12-13 RX ORDER — ACETAMINOPHEN 325 MG/1
325 TABLET ORAL EVERY 8 HOURS SCHEDULED
Status: DISCONTINUED | OUTPATIENT
Start: 2024-12-13 | End: 2024-12-13

## 2024-12-13 RX ORDER — CYANOCOBALAMIN 1000 UG/ML
1000 INJECTION, SOLUTION INTRAMUSCULAR; SUBCUTANEOUS WEEKLY
Status: DISCONTINUED | OUTPATIENT
Start: 2024-12-13 | End: 2024-12-17 | Stop reason: HOSPADM

## 2024-12-13 RX ORDER — ACETAMINOPHEN 325 MG/1
650 TABLET ORAL EVERY 4 HOURS PRN
Status: DISCONTINUED | OUTPATIENT
Start: 2024-12-13 | End: 2024-12-13

## 2024-12-13 RX ORDER — BISACODYL 10 MG
10 SUPPOSITORY, RECTAL RECTAL DAILY PRN
Status: DISCONTINUED | OUTPATIENT
Start: 2024-12-13 | End: 2024-12-17 | Stop reason: HOSPADM

## 2024-12-13 RX ORDER — SODIUM PHOSPHATE, DIBASIC AND SODIUM PHOSPHATE, MONOBASIC 7; 19 G/230ML; G/230ML
1 ENEMA RECTAL DAILY PRN
Status: DISCONTINUED | OUTPATIENT
Start: 2024-12-13 | End: 2024-12-17 | Stop reason: HOSPADM

## 2024-12-13 RX ORDER — TORSEMIDE 20 MG/1
20 TABLET ORAL DAILY
Status: DISCONTINUED | OUTPATIENT
Start: 2024-12-14 | End: 2024-12-17 | Stop reason: HOSPADM

## 2024-12-13 RX ORDER — UBIDECARENONE 100 MG
100 CAPSULE ORAL DAILY
Status: DISCONTINUED | OUTPATIENT
Start: 2024-12-13 | End: 2024-12-17 | Stop reason: HOSPADM

## 2024-12-13 RX ORDER — METOPROLOL TARTRATE 1 MG/ML
5 INJECTION, SOLUTION INTRAVENOUS ONCE
Status: COMPLETED | OUTPATIENT
Start: 2024-12-13 | End: 2024-12-13

## 2024-12-13 RX ADMIN — METOPROLOL TARTRATE 5 MG: 5 INJECTION, SOLUTION INTRAVENOUS at 00:42

## 2024-12-13 RX ADMIN — DILTIAZEM HYDROCHLORIDE 120 MG: 120 CAPSULE, COATED, EXTENDED RELEASE ORAL at 09:48

## 2024-12-13 RX ADMIN — ACETAMINOPHEN 325 MG: 325 TABLET ORAL at 10:00

## 2024-12-13 RX ADMIN — FUROSEMIDE 20 MG: 20 TABLET ORAL at 09:48

## 2024-12-13 RX ADMIN — BUPROPION HYDROCHLORIDE 75 MG: 75 TABLET, FILM COATED ORAL at 09:48

## 2024-12-13 RX ADMIN — Medication 2000 UNITS: at 16:45

## 2024-12-13 RX ADMIN — METOPROLOL SUCCINATE 50 MG: 50 TABLET, EXTENDED RELEASE ORAL at 09:48

## 2024-12-13 RX ADMIN — HYDROCODONE BITARTRATE AND ACETAMINOPHEN 1 TABLET: 5; 325 TABLET ORAL at 20:23

## 2024-12-13 RX ADMIN — SPIRONOLACTONE 25 MG: 25 TABLET ORAL at 20:23

## 2024-12-13 RX ADMIN — TRAZODONE HYDROCHLORIDE 50 MG: 50 TABLET ORAL at 20:17

## 2024-12-13 RX ADMIN — ONDANSETRON 4 MG: 2 INJECTION INTRAMUSCULAR; INTRAVENOUS at 20:16

## 2024-12-13 RX ADMIN — ATORVASTATIN CALCIUM 40 MG: 40 TABLET, FILM COATED ORAL at 20:16

## 2024-12-13 RX ADMIN — CYANOCOBALAMIN 1000 MCG: 1000 INJECTION, SOLUTION INTRAMUSCULAR; SUBCUTANEOUS at 14:25

## 2024-12-13 RX ADMIN — ONDANSETRON 4 MG: 2 INJECTION INTRAMUSCULAR; INTRAVENOUS at 03:49

## 2024-12-13 RX ADMIN — APIXABAN 5 MG: 5 TABLET, FILM COATED ORAL at 20:16

## 2024-12-13 RX ADMIN — DULOXETINE HYDROCHLORIDE 30 MG: 30 CAPSULE, DELAYED RELEASE ORAL at 09:48

## 2024-12-13 RX ADMIN — Medication 100 MG: at 10:00

## 2024-12-13 RX ADMIN — BUPROPION HYDROCHLORIDE 75 MG: 75 TABLET, FILM COATED ORAL at 20:16

## 2024-12-13 RX ADMIN — SPIRONOLACTONE 25 MG: 25 TABLET ORAL at 14:25

## 2024-12-13 RX ADMIN — METOPROLOL SUCCINATE 50 MG: 50 TABLET, EXTENDED RELEASE ORAL at 20:16

## 2024-12-13 RX ADMIN — ACETAMINOPHEN 650 MG: 325 TABLET ORAL at 03:59

## 2024-12-13 ASSESSMENT — PAIN DESCRIPTION - LOCATION
LOCATION: ABDOMEN;BUTTOCKS
LOCATION: BUTTOCKS
LOCATION: BACK

## 2024-12-13 ASSESSMENT — PAIN SCALES - GENERAL
PAINLEVEL_OUTOF10: 5
PAINLEVEL_OUTOF10: 2
PAINLEVEL_OUTOF10: 3
PAINLEVEL_OUTOF10: 2
PAINLEVEL_OUTOF10: 4

## 2024-12-13 ASSESSMENT — PAIN DESCRIPTION - DESCRIPTORS: DESCRIPTORS: DISCOMFORT

## 2024-12-13 ASSESSMENT — PAIN DESCRIPTION - ORIENTATION
ORIENTATION: LOWER
ORIENTATION: UPPER

## 2024-12-13 NOTE — PLAN OF CARE
Problem: Chronic Conditions and Co-morbidities  Goal: Patient's chronic conditions and co-morbidity symptoms are monitored and maintained or improved  12/13/2024 1230 by Carri Ruiz RN  Outcome: Progressing  12/12/2024 2346 by Alma Rosa Gonzalez RN  Outcome: Progressing     Problem: Safety - Adult  Goal: Free from fall injury  12/13/2024 1230 by Carri Ruiz RN  Outcome: Progressing  12/12/2024 2346 by Alma Rosa Gonzalez RN  Outcome: Progressing     Problem: Discharge Planning  Goal: Discharge to home or other facility with appropriate resources  12/13/2024 1230 by Carri Ruiz RN  Outcome: Progressing  12/12/2024 2346 by Alma Rosa Gonzalez RN  Outcome: Progressing     Problem: Skin/Tissue Integrity  Goal: Absence of new skin breakdown  Description: 1.  Monitor for areas of redness and/or skin breakdown  2.  Assess vascular access sites hourly  3.  Every 4-6 hours minimum:  Change oxygen saturation probe site  4.  Every 4-6 hours:  If on nasal continuous positive airway pressure, respiratory therapy assess nares and determine need for appliance change or resting period.  12/13/2024 1230 by Carri Ruiz RN  Outcome: Progressing  12/12/2024 2346 by Alma Rosa Gonzalez, RN  Outcome: Progressing     Problem: Pain  Goal: Verbalizes/displays adequate comfort level or baseline comfort level  12/13/2024 1230 by Carri Ruiz RN  Outcome: Progressing  12/12/2024 2346 by Alma Rosa Gonzalez, RN  Outcome: Progressing

## 2024-12-13 NOTE — H&P
education: Not on file    Highest education level: Not on file   Occupational History    Occupation: retired   Tobacco Use    Smoking status: Never    Smokeless tobacco: Never    Tobacco comments:     Never   Vaping Use    Vaping status: Never Used   Substance and Sexual Activity    Alcohol use: Not Currently     Comment: 1-2 drinks/month    Drug use: No    Sexual activity: Not Currently     Partners: Male   Other Topics Concern    Not on file   Social History Narrative    Lives With: Alone,     dtr in [place], works full-time    disabled son with Down's Syndrome passed away in February 2024    Type of Home: Apart[address] [place] [address]  in [place] (she calls it an [employer]-bc they all help each other)    Home Layout: One level, Level entry    Bathroom Shower/Tub: Tub/Shower unit, Equipment: Tub transfer bench, Grab bars in shower    Bathroom Accessibility: Wheelchair accessible    Home Equipment: Quad cane, Rolling walker, Wheelchair-electric (has right BK prosthesis and Left AFO)    ADL Assistance: Independent    Homemaking Assistance: Independent (warms meals up  -  receives  MOW and groceries delivered)    Homemaking Responsibilities: Yes, Meal Prep Responsibility: Secondary    Laundry Responsibility: Primary, Cleaning Responsibility: Secondary    Ambulation Assistance: Independent (last few days uses walker to get around), Transfer Assistance: Independent    Active : no    Additional Comments: prosthesis  has used power w/c    Retired --worked a day a week as a  [employer]         Social Determinants of Health     Financial Resource Strain: Medium Risk (11/18/2024)    Overall Financial Resource Strain (CARDIA)     Difficulty of Paying Living Expenses: Somewhat hard   Food Insecurity: No Food Insecurity (12/12/2024)    Hunger Vital Sign     Worried About Running Out of Food in the Last Year: Never true     Ran Out of Food in the Last Year: Never true   Transportation Needs: Unmet

## 2024-12-14 LAB
BASOPHILS # BLD: 0.1 K/UL (ref 0–0.2)
BASOPHILS NFR BLD: 0.5 %
EOSINOPHIL # BLD: 0.2 K/UL (ref 0–0.7)
EOSINOPHIL NFR BLD: 1.3 %
ERYTHROCYTE [DISTWIDTH] IN BLOOD BY AUTOMATED COUNT: 15.9 % (ref 11.5–14.5)
HCT VFR BLD AUTO: 34.9 % (ref 37–47)
HGB BLD-MCNC: 11.6 G/DL (ref 12–16)
LYMPHOCYTES # BLD: 1.3 K/UL (ref 1–4.8)
LYMPHOCYTES NFR BLD: 11.5 %
MCH RBC QN AUTO: 30 PG (ref 27–31.3)
MCHC RBC AUTO-ENTMCNC: 33.2 % (ref 33–37)
MCV RBC AUTO: 90.2 FL (ref 79.4–94.8)
MONOCYTES # BLD: 1.1 K/UL (ref 0.2–0.8)
MONOCYTES NFR BLD: 9.7 %
NEUTROPHILS # BLD: 8.8 K/UL (ref 1.4–6.5)
NEUTS SEG NFR BLD: 76.7 %
PLATELET # BLD AUTO: 313 K/UL (ref 130–400)
RBC # BLD AUTO: 3.87 M/UL (ref 4.2–5.4)
WBC # BLD AUTO: 11.5 K/UL (ref 4.8–10.8)

## 2024-12-14 PROCEDURE — 6370000000 HC RX 637 (ALT 250 FOR IP): Performed by: INTERNAL MEDICINE

## 2024-12-14 PROCEDURE — 99233 SBSQ HOSP IP/OBS HIGH 50: CPT | Performed by: PHYSICAL MEDICINE & REHABILITATION

## 2024-12-14 PROCEDURE — 97535 SELF CARE MNGMENT TRAINING: CPT

## 2024-12-14 PROCEDURE — 6370000000 HC RX 637 (ALT 250 FOR IP): Performed by: PHYSICAL MEDICINE & REHABILITATION

## 2024-12-14 PROCEDURE — 97530 THERAPEUTIC ACTIVITIES: CPT

## 2024-12-14 PROCEDURE — 85025 COMPLETE CBC W/AUTO DIFF WBC: CPT

## 2024-12-14 PROCEDURE — 92610 EVALUATE SWALLOWING FUNCTION: CPT

## 2024-12-14 PROCEDURE — 36415 COLL VENOUS BLD VENIPUNCTURE: CPT

## 2024-12-14 PROCEDURE — 6370000000 HC RX 637 (ALT 250 FOR IP): Performed by: FAMILY MEDICINE

## 2024-12-14 PROCEDURE — 92523 SPEECH SOUND LANG COMPREHEN: CPT

## 2024-12-14 PROCEDURE — 1180000000 HC REHAB R&B

## 2024-12-14 RX ADMIN — DULOXETINE HYDROCHLORIDE 30 MG: 30 CAPSULE, DELAYED RELEASE ORAL at 09:11

## 2024-12-14 RX ADMIN — APIXABAN 5 MG: 5 TABLET, FILM COATED ORAL at 21:46

## 2024-12-14 RX ADMIN — BUPROPION HYDROCHLORIDE 75 MG: 75 TABLET, FILM COATED ORAL at 09:11

## 2024-12-14 RX ADMIN — METOPROLOL SUCCINATE 50 MG: 50 TABLET, EXTENDED RELEASE ORAL at 21:46

## 2024-12-14 RX ADMIN — METOPROLOL SUCCINATE 50 MG: 50 TABLET, EXTENDED RELEASE ORAL at 09:11

## 2024-12-14 RX ADMIN — HYDROCODONE BITARTRATE AND ACETAMINOPHEN 1 TABLET: 5; 325 TABLET ORAL at 17:04

## 2024-12-14 RX ADMIN — HYDROCODONE BITARTRATE AND ACETAMINOPHEN 1 TABLET: 5; 325 TABLET ORAL at 09:11

## 2024-12-14 RX ADMIN — ATORVASTATIN CALCIUM 40 MG: 40 TABLET, FILM COATED ORAL at 21:46

## 2024-12-14 RX ADMIN — Medication 100 MG: at 09:11

## 2024-12-14 RX ADMIN — DILTIAZEM HYDROCHLORIDE 120 MG: 120 CAPSULE, COATED, EXTENDED RELEASE ORAL at 09:11

## 2024-12-14 RX ADMIN — TORSEMIDE 20 MG: 20 TABLET ORAL at 09:11

## 2024-12-14 RX ADMIN — BUPROPION HYDROCHLORIDE 75 MG: 75 TABLET, FILM COATED ORAL at 21:46

## 2024-12-14 RX ADMIN — Medication 2000 UNITS: at 17:04

## 2024-12-14 RX ADMIN — PANTOPRAZOLE SODIUM 40 MG: 40 TABLET, DELAYED RELEASE ORAL at 06:23

## 2024-12-14 RX ADMIN — SPIRONOLACTONE 25 MG: 25 TABLET ORAL at 17:04

## 2024-12-14 RX ADMIN — SPIRONOLACTONE 25 MG: 25 TABLET ORAL at 09:11

## 2024-12-14 ASSESSMENT — PAIN DESCRIPTION - ORIENTATION
ORIENTATION: LOWER
ORIENTATION: LOWER

## 2024-12-14 ASSESSMENT — PAIN DESCRIPTION - DESCRIPTORS
DESCRIPTORS: ACHING
DESCRIPTORS: ACHING

## 2024-12-14 ASSESSMENT — PAIN SCALES - GENERAL
PAINLEVEL_OUTOF10: 6
PAINLEVEL_OUTOF10: 6

## 2024-12-14 ASSESSMENT — PAIN DESCRIPTION - LOCATION
LOCATION: BACK
LOCATION: BACK

## 2024-12-14 NOTE — PLAN OF CARE
Problem: Chronic Conditions and Co-morbidities  Goal: Patient's chronic conditions and co-morbidity symptoms are monitored and maintained or improved  12/13/2024 1949 by Abisai Pierre RN  Outcome: Progressing  12/13/2024 1230 by Carri Ruiz RN  Outcome: Progressing     Problem: Safety - Adult  Goal: Free from fall injury  12/13/2024 1949 by Abisai Pierre RN  Outcome: Progressing  12/13/2024 1230 by Carri Ruiz RN  Outcome: Progressing     Problem: Discharge Planning  Goal: Discharge to home or other facility with appropriate resources  12/13/2024 1949 by Abisai Pierre RN  Outcome: Progressing  12/13/2024 1230 by Carri Ruiz RN  Outcome: Progressing     Problem: Skin/Tissue Integrity  Goal: Absence of new skin breakdown  Description: 1.  Monitor for areas of redness and/or skin breakdown  2.  Assess vascular access sites hourly  3.  Every 4-6 hours minimum:  Change oxygen saturation probe site  4.  Every 4-6 hours:  If on nasal continuous positive airway pressure, respiratory therapy assess nares and determine need for appliance change or resting period.  12/13/2024 1230 by Carri Ruiz RN  Outcome: Progressing     Problem: Pain  Goal: Verbalizes/displays adequate comfort level or baseline comfort level  12/13/2024 1230 by Carri Ruiz RN  Outcome: Progressing     Problem: Physical Therapy - Adult  Goal: By Discharge: Performs mobility at highest level of function for planned discharge setting.  See evaluation for individualized goals.  12/13/2024 1324 by Val Gardner, PT  Outcome: Progressing

## 2024-12-14 NOTE — PLAN OF CARE
Nutrition Problem #1: Inadequate oral intake  Intervention: Food and/or Nutrient Delivery: Continue Current Diet, Start Oral Nutrition Supplement (Continue regular diet  Begin a high calorie oral supplement BID)

## 2024-12-15 LAB
BACTERIA FLD AEROBE CULT: NORMAL
BACTERIA UR CULT: ABNORMAL
BACTERIA UR CULT: ABNORMAL
ORGANISM: ABNORMAL

## 2024-12-15 PROCEDURE — 6370000000 HC RX 637 (ALT 250 FOR IP): Performed by: INTERNAL MEDICINE

## 2024-12-15 PROCEDURE — 6370000000 HC RX 637 (ALT 250 FOR IP): Performed by: FAMILY MEDICINE

## 2024-12-15 PROCEDURE — 97535 SELF CARE MNGMENT TRAINING: CPT

## 2024-12-15 PROCEDURE — 6370000000 HC RX 637 (ALT 250 FOR IP): Performed by: PHYSICAL MEDICINE & REHABILITATION

## 2024-12-15 PROCEDURE — 1180000000 HC REHAB R&B

## 2024-12-15 PROCEDURE — 97110 THERAPEUTIC EXERCISES: CPT

## 2024-12-15 RX ORDER — CIPROFLOXACIN 500 MG/1
250 TABLET, FILM COATED ORAL EVERY 12 HOURS SCHEDULED
Status: DISCONTINUED | OUTPATIENT
Start: 2024-12-15 | End: 2024-12-17 | Stop reason: HOSPADM

## 2024-12-15 RX ADMIN — HYDROCODONE BITARTRATE AND ACETAMINOPHEN 1 TABLET: 5; 325 TABLET ORAL at 16:53

## 2024-12-15 RX ADMIN — BUPROPION HYDROCHLORIDE 75 MG: 75 TABLET, FILM COATED ORAL at 08:38

## 2024-12-15 RX ADMIN — METOPROLOL SUCCINATE 50 MG: 50 TABLET, EXTENDED RELEASE ORAL at 08:39

## 2024-12-15 RX ADMIN — METOPROLOL SUCCINATE 50 MG: 50 TABLET, EXTENDED RELEASE ORAL at 21:08

## 2024-12-15 RX ADMIN — TRAZODONE HYDROCHLORIDE 50 MG: 50 TABLET ORAL at 21:09

## 2024-12-15 RX ADMIN — TORSEMIDE 20 MG: 20 TABLET ORAL at 08:38

## 2024-12-15 RX ADMIN — Medication 2000 UNITS: at 16:53

## 2024-12-15 RX ADMIN — DULOXETINE HYDROCHLORIDE 30 MG: 30 CAPSULE, DELAYED RELEASE ORAL at 08:38

## 2024-12-15 RX ADMIN — APIXABAN 5 MG: 5 TABLET, FILM COATED ORAL at 08:39

## 2024-12-15 RX ADMIN — BUPROPION HYDROCHLORIDE 75 MG: 75 TABLET, FILM COATED ORAL at 21:08

## 2024-12-15 RX ADMIN — SPIRONOLACTONE 25 MG: 25 TABLET ORAL at 08:39

## 2024-12-15 RX ADMIN — CIPROFLOXACIN HYDROCHLORIDE 250 MG: 500 TABLET, FILM COATED ORAL at 21:09

## 2024-12-15 RX ADMIN — PANTOPRAZOLE SODIUM 40 MG: 40 TABLET, DELAYED RELEASE ORAL at 07:06

## 2024-12-15 RX ADMIN — HYDROCODONE BITARTRATE AND ACETAMINOPHEN 1 TABLET: 5; 325 TABLET ORAL at 06:01

## 2024-12-15 RX ADMIN — DILTIAZEM HYDROCHLORIDE 120 MG: 120 CAPSULE, COATED, EXTENDED RELEASE ORAL at 08:39

## 2024-12-15 RX ADMIN — APIXABAN 5 MG: 5 TABLET, FILM COATED ORAL at 21:09

## 2024-12-15 RX ADMIN — Medication 100 MG: at 08:39

## 2024-12-15 RX ADMIN — SPIRONOLACTONE 25 MG: 25 TABLET ORAL at 16:53

## 2024-12-15 RX ADMIN — ATORVASTATIN CALCIUM 40 MG: 40 TABLET, FILM COATED ORAL at 21:08

## 2024-12-15 ASSESSMENT — PAIN DESCRIPTION - ORIENTATION
ORIENTATION: LOWER
ORIENTATION: LOWER

## 2024-12-15 ASSESSMENT — PAIN DESCRIPTION - DESCRIPTORS: DESCRIPTORS: ACHING

## 2024-12-15 ASSESSMENT — PAIN SCALES - GENERAL
PAINLEVEL_OUTOF10: 3
PAINLEVEL_OUTOF10: 8
PAINLEVEL_OUTOF10: 4
PAINLEVEL_OUTOF10: 3

## 2024-12-15 ASSESSMENT — PAIN DESCRIPTION - LOCATION
LOCATION: BACK
LOCATION: BACK

## 2024-12-15 NOTE — PROGRESS NOTES
Physical Therapy  Facility/Department: [facility] MED SURG [id number]/W469-01  Physical Therapy Discharge      NAME: Alysha Rodriguez    : 1945 (79 y.o.)  MRN: [id number]    Account: [id number]  Gender: female      Patient has been discharged from [facility] hospital. DC patient from current PT program.      Electronically signed by Val Gardner PT on 12/15/24 at 3:25 PM EST

## 2024-12-16 ENCOUNTER — CARE COORDINATION (OUTPATIENT)
Dept: CARE COORDINATION | Age: 79
End: 2024-12-16

## 2024-12-16 PROBLEM — G47.34 NOCTURNAL HYPOXEMIA: Status: ACTIVE | Noted: 2024-12-16

## 2024-12-16 LAB
ALBUMIN SERPL-MCNC: 3.3 G/DL (ref 3.5–4.6)
ALP SERPL-CCNC: 126 U/L (ref 40–130)
ALT SERPL-CCNC: 18 U/L (ref 0–33)
ANION GAP SERPL CALCULATED.3IONS-SCNC: 9 MEQ/L (ref 9–15)
AST SERPL-CCNC: 33 U/L (ref 0–35)
BASOPHILS # BLD: 0.1 K/UL (ref 0–0.2)
BASOPHILS NFR BLD: 0.8 %
BILIRUB SERPL-MCNC: 0.6 MG/DL (ref 0.2–0.7)
BUN SERPL-MCNC: 22 MG/DL (ref 8–23)
CALCIUM SERPL-MCNC: 8.8 MG/DL (ref 8.5–9.9)
CHLORIDE SERPL-SCNC: 99 MEQ/L (ref 95–107)
CO2 SERPL-SCNC: 32 MEQ/L (ref 20–31)
CREAT SERPL-MCNC: 0.81 MG/DL (ref 0.5–0.9)
EOSINOPHIL # BLD: 0.2 K/UL (ref 0–0.7)
EOSINOPHIL NFR BLD: 2.5 %
ERYTHROCYTE [DISTWIDTH] IN BLOOD BY AUTOMATED COUNT: 16.8 % (ref 11.5–14.5)
GLOBULIN SER CALC-MCNC: 2.4 G/DL (ref 2.3–3.5)
GLUCOSE SERPL-MCNC: 102 MG/DL (ref 70–99)
HCT VFR BLD AUTO: 33.2 % (ref 37–47)
HGB BLD-MCNC: 10.8 G/DL (ref 12–16)
LYMPHOCYTES # BLD: 1.6 K/UL (ref 1–4.8)
LYMPHOCYTES NFR BLD: 17.5 %
MCH RBC QN AUTO: 29.2 PG (ref 27–31.3)
MCHC RBC AUTO-ENTMCNC: 32.5 % (ref 33–37)
MCV RBC AUTO: 89.7 FL (ref 79.4–94.8)
MONOCYTES # BLD: 1.1 K/UL (ref 0.2–0.8)
MONOCYTES NFR BLD: 11.6 %
NEUTROPHILS # BLD: 6.1 K/UL (ref 1.4–6.5)
NEUTS SEG NFR BLD: 67.1 %
PLATELET # BLD AUTO: 336 K/UL (ref 130–400)
POTASSIUM SERPL-SCNC: 2.8 MEQ/L (ref 3.4–4.9)
POTASSIUM SERPL-SCNC: 3 MEQ/L (ref 3.4–4.9)
PROT SERPL-MCNC: 5.7 G/DL (ref 6.3–8)
RBC # BLD AUTO: 3.7 M/UL (ref 4.2–5.4)
SODIUM SERPL-SCNC: 140 MEQ/L (ref 135–144)
WBC # BLD AUTO: 9.1 K/UL (ref 4.8–10.8)

## 2024-12-16 PROCEDURE — 6370000000 HC RX 637 (ALT 250 FOR IP): Performed by: FAMILY MEDICINE

## 2024-12-16 PROCEDURE — 84132 ASSAY OF SERUM POTASSIUM: CPT

## 2024-12-16 PROCEDURE — 6370000000 HC RX 637 (ALT 250 FOR IP): Performed by: INTERNAL MEDICINE

## 2024-12-16 PROCEDURE — 80053 COMPREHEN METABOLIC PANEL: CPT

## 2024-12-16 PROCEDURE — 85025 COMPLETE CBC W/AUTO DIFF WBC: CPT

## 2024-12-16 PROCEDURE — 6370000000 HC RX 637 (ALT 250 FOR IP): Performed by: PHYSICAL MEDICINE & REHABILITATION

## 2024-12-16 PROCEDURE — 36415 COLL VENOUS BLD VENIPUNCTURE: CPT

## 2024-12-16 PROCEDURE — 97535 SELF CARE MNGMENT TRAINING: CPT

## 2024-12-16 PROCEDURE — 94762 N-INVAS EAR/PLS OXIMTRY CONT: CPT

## 2024-12-16 PROCEDURE — 1180000000 HC REHAB R&B

## 2024-12-16 PROCEDURE — 97110 THERAPEUTIC EXERCISES: CPT

## 2024-12-16 PROCEDURE — 97530 THERAPEUTIC ACTIVITIES: CPT

## 2024-12-16 PROCEDURE — 99233 SBSQ HOSP IP/OBS HIGH 50: CPT | Performed by: PHYSICAL MEDICINE & REHABILITATION

## 2024-12-16 PROCEDURE — 99222 1ST HOSP IP/OBS MODERATE 55: CPT | Performed by: INTERNAL MEDICINE

## 2024-12-16 RX ORDER — POTASSIUM CHLORIDE 1500 MG/1
40 TABLET, EXTENDED RELEASE ORAL ONCE
Status: COMPLETED | OUTPATIENT
Start: 2024-12-16 | End: 2024-12-16

## 2024-12-16 RX ORDER — OXYMETAZOLINE HYDROCHLORIDE 0.05 G/100ML
2 SPRAY NASAL 2 TIMES DAILY
Status: DISCONTINUED | OUTPATIENT
Start: 2024-12-16 | End: 2024-12-17 | Stop reason: HOSPADM

## 2024-12-16 RX ORDER — HYDROCODONE BITARTRATE AND ACETAMINOPHEN 5; 325 MG/1; MG/1
1 TABLET ORAL EVERY 8 HOURS PRN
Qty: 28 TABLET | Refills: 0 | Status: SHIPPED | OUTPATIENT
Start: 2024-12-16 | End: 2024-12-23

## 2024-12-16 RX ORDER — PANTOPRAZOLE SODIUM 40 MG/1
40 TABLET, DELAYED RELEASE ORAL
Qty: 30 TABLET | Refills: 3 | Status: SHIPPED | OUTPATIENT
Start: 2024-12-17

## 2024-12-16 RX ADMIN — POTASSIUM CHLORIDE 40 MEQ: 1500 TABLET, EXTENDED RELEASE ORAL at 07:58

## 2024-12-16 RX ADMIN — CIPROFLOXACIN HYDROCHLORIDE 250 MG: 500 TABLET, FILM COATED ORAL at 08:43

## 2024-12-16 RX ADMIN — Medication 100 MG: at 08:42

## 2024-12-16 RX ADMIN — TRAZODONE HYDROCHLORIDE 50 MG: 50 TABLET ORAL at 22:44

## 2024-12-16 RX ADMIN — METOPROLOL SUCCINATE 50 MG: 50 TABLET, EXTENDED RELEASE ORAL at 20:19

## 2024-12-16 RX ADMIN — SPIRONOLACTONE 25 MG: 25 TABLET ORAL at 08:42

## 2024-12-16 RX ADMIN — CIPROFLOXACIN HYDROCHLORIDE 250 MG: 500 TABLET, FILM COATED ORAL at 20:20

## 2024-12-16 RX ADMIN — ATORVASTATIN CALCIUM 40 MG: 40 TABLET, FILM COATED ORAL at 20:19

## 2024-12-16 RX ADMIN — APIXABAN 5 MG: 5 TABLET, FILM COATED ORAL at 20:20

## 2024-12-16 RX ADMIN — PANTOPRAZOLE SODIUM 40 MG: 40 TABLET, DELAYED RELEASE ORAL at 04:16

## 2024-12-16 RX ADMIN — HYDROCODONE BITARTRATE AND ACETAMINOPHEN 1 TABLET: 5; 325 TABLET ORAL at 04:16

## 2024-12-16 RX ADMIN — HYDROCODONE BITARTRATE AND ACETAMINOPHEN 1 TABLET: 5; 325 TABLET ORAL at 21:21

## 2024-12-16 RX ADMIN — OXYMETAZOLINE HYDROCHLORIDE 2 SPRAY: 0.5 SPRAY NASAL at 14:20

## 2024-12-16 RX ADMIN — POTASSIUM CHLORIDE 40 MEQ: 1500 TABLET, EXTENDED RELEASE ORAL at 14:18

## 2024-12-16 RX ADMIN — TORSEMIDE 20 MG: 20 TABLET ORAL at 08:42

## 2024-12-16 RX ADMIN — BUPROPION HYDROCHLORIDE 75 MG: 75 TABLET, FILM COATED ORAL at 08:42

## 2024-12-16 RX ADMIN — DILTIAZEM HYDROCHLORIDE 120 MG: 120 CAPSULE, COATED, EXTENDED RELEASE ORAL at 08:42

## 2024-12-16 RX ADMIN — OXYMETAZOLINE HYDROCHLORIDE 2 SPRAY: 0.5 SPRAY NASAL at 20:21

## 2024-12-16 RX ADMIN — DULOXETINE HYDROCHLORIDE 30 MG: 30 CAPSULE, DELAYED RELEASE ORAL at 08:42

## 2024-12-16 RX ADMIN — Medication 2000 UNITS: at 17:27

## 2024-12-16 RX ADMIN — METOPROLOL SUCCINATE 50 MG: 50 TABLET, EXTENDED RELEASE ORAL at 08:43

## 2024-12-16 RX ADMIN — APIXABAN 5 MG: 5 TABLET, FILM COATED ORAL at 08:43

## 2024-12-16 RX ADMIN — BUPROPION HYDROCHLORIDE 75 MG: 75 TABLET, FILM COATED ORAL at 20:19

## 2024-12-16 ASSESSMENT — PAIN SCALES - GENERAL
PAINLEVEL_OUTOF10: 0
PAINLEVEL_OUTOF10: 4
PAINLEVEL_OUTOF10: 2
PAINLEVEL_OUTOF10: 8
PAINLEVEL_OUTOF10: 4
PAINLEVEL_OUTOF10: 4

## 2024-12-16 ASSESSMENT — PAIN DESCRIPTION - LOCATION: LOCATION: BACK

## 2024-12-16 ASSESSMENT — PAIN DESCRIPTION - DESCRIPTORS: DESCRIPTORS: ACHING

## 2024-12-16 ASSESSMENT — PAIN DESCRIPTION - ORIENTATION: ORIENTATION: LOWER

## 2024-12-16 NOTE — CARE COORDINATION
Chart review - patient still admitted to [facility]  Will monitor for discharge

## 2024-12-17 ENCOUNTER — TELEPHONE (OUTPATIENT)
Dept: FAMILY MEDICINE CLINIC | Age: 79
End: 2024-12-17

## 2024-12-17 VITALS
OXYGEN SATURATION: 93 % | WEIGHT: 193.6 LBS | SYSTOLIC BLOOD PRESSURE: 106 MMHG | BODY MASS INDEX: 39.56 KG/M2 | DIASTOLIC BLOOD PRESSURE: 67 MMHG | TEMPERATURE: 97.8 F | RESPIRATION RATE: 18 BRPM | HEART RATE: 89 BPM

## 2024-12-17 PROBLEM — Z51.5 PALLIATIVE CARE ENCOUNTER: Status: ACTIVE | Noted: 2024-12-17

## 2024-12-17 PROBLEM — Z71.89 GOALS OF CARE, COUNSELING/DISCUSSION: Status: ACTIVE | Noted: 2024-12-17

## 2024-12-17 PROBLEM — Z71.89 ENCOUNTER FOR HOSPICE CARE DISCUSSION: Status: ACTIVE | Noted: 2024-12-17

## 2024-12-17 PROBLEM — Z71.89 ADVANCED CARE PLANNING/COUNSELING DISCUSSION: Status: ACTIVE | Noted: 2024-12-17

## 2024-12-17 LAB — BACTERIA FLD AEROBE CULT: NORMAL

## 2024-12-17 PROCEDURE — 99223 1ST HOSP IP/OBS HIGH 75: CPT | Performed by: NURSE PRACTITIONER

## 2024-12-17 PROCEDURE — 6370000000 HC RX 637 (ALT 250 FOR IP): Performed by: INTERNAL MEDICINE

## 2024-12-17 PROCEDURE — 6370000000 HC RX 637 (ALT 250 FOR IP): Performed by: PHYSICAL MEDICINE & REHABILITATION

## 2024-12-17 PROCEDURE — 97110 THERAPEUTIC EXERCISES: CPT

## 2024-12-17 PROCEDURE — 97535 SELF CARE MNGMENT TRAINING: CPT

## 2024-12-17 PROCEDURE — 6370000000 HC RX 637 (ALT 250 FOR IP): Performed by: FAMILY MEDICINE

## 2024-12-17 PROCEDURE — 99239 HOSP IP/OBS DSCHRG MGMT >30: CPT | Performed by: PHYSICAL MEDICINE & REHABILITATION

## 2024-12-17 RX ORDER — POTASSIUM CHLORIDE 750 MG/1
10 TABLET, EXTENDED RELEASE ORAL 2 TIMES DAILY
Qty: 90 TABLET | Refills: 3 | Status: SHIPPED | OUTPATIENT
Start: 2024-12-17

## 2024-12-17 RX ORDER — SPIRONOLACTONE 25 MG/1
25 TABLET ORAL 2 TIMES DAILY
Qty: 30 TABLET | Refills: 3 | Status: SHIPPED | OUTPATIENT
Start: 2024-12-17

## 2024-12-17 RX ORDER — CIPROFLOXACIN 250 MG/1
250 TABLET, FILM COATED ORAL EVERY 12 HOURS SCHEDULED
Qty: 6 TABLET | Refills: 0 | Status: SHIPPED | OUTPATIENT
Start: 2024-12-17 | End: 2024-12-20

## 2024-12-17 RX ORDER — TORSEMIDE 20 MG/1
20 TABLET ORAL DAILY
Qty: 30 TABLET | Refills: 3 | Status: SHIPPED | OUTPATIENT
Start: 2024-12-18

## 2024-12-17 RX ADMIN — Medication 100 MG: at 08:47

## 2024-12-17 RX ADMIN — DILTIAZEM HYDROCHLORIDE 120 MG: 120 CAPSULE, COATED, EXTENDED RELEASE ORAL at 08:47

## 2024-12-17 RX ADMIN — PANTOPRAZOLE SODIUM 40 MG: 40 TABLET, DELAYED RELEASE ORAL at 08:47

## 2024-12-17 RX ADMIN — CIPROFLOXACIN HYDROCHLORIDE 250 MG: 500 TABLET, FILM COATED ORAL at 08:48

## 2024-12-17 RX ADMIN — HYDROCODONE BITARTRATE AND ACETAMINOPHEN 1 TABLET: 5; 325 TABLET ORAL at 08:47

## 2024-12-17 RX ADMIN — APIXABAN 5 MG: 5 TABLET, FILM COATED ORAL at 08:47

## 2024-12-17 RX ADMIN — SPIRONOLACTONE 25 MG: 25 TABLET ORAL at 08:48

## 2024-12-17 RX ADMIN — TORSEMIDE 20 MG: 20 TABLET ORAL at 08:48

## 2024-12-17 RX ADMIN — DULOXETINE HYDROCHLORIDE 30 MG: 30 CAPSULE, DELAYED RELEASE ORAL at 08:48

## 2024-12-17 RX ADMIN — BUPROPION HYDROCHLORIDE 75 MG: 75 TABLET, FILM COATED ORAL at 08:47

## 2024-12-17 RX ADMIN — METOPROLOL SUCCINATE 50 MG: 50 TABLET, EXTENDED RELEASE ORAL at 08:48

## 2024-12-17 ASSESSMENT — ENCOUNTER SYMPTOMS
DIARRHEA: 0
BACK PAIN: 1
COUGH: 0
CONSTIPATION: 0
WHEEZING: 0
SHORTNESS OF BREATH: 0

## 2024-12-17 ASSESSMENT — PAIN DESCRIPTION - LOCATION: LOCATION: BACK

## 2024-12-17 ASSESSMENT — PAIN SCALES - GENERAL: PAINLEVEL_OUTOF10: 7

## 2024-12-17 ASSESSMENT — PAIN DESCRIPTION - ORIENTATION: ORIENTATION: LOWER

## 2024-12-17 ASSESSMENT — PAIN DESCRIPTION - DESCRIPTORS: DESCRIPTORS: ACHING

## 2024-12-17 ASSESSMENT — PAIN DESCRIPTION - PAIN TYPE: TYPE: CHRONIC PAIN

## 2024-12-17 NOTE — PLAN OF CARE
Problem: Chronic Conditions and Co-morbidities  Goal: Patient's chronic conditions and co-morbidity symptoms are monitored and maintained or improved  12/17/2024 1102 by Zeynep Ring RN  Outcome: Progressing  Flowsheets (Taken 12/17/2024 0800)  Care Plan - Patient's Chronic Conditions and Co-Morbidity Symptoms are Monitored and Maintained or Improved:   Monitor and assess patient's chronic conditions and comorbid symptoms for stability, deterioration, or improvement   Update acute care plan with appropriate goals if chronic or comorbid symptoms are exacerbated and prevent overall improvement and discharge   Collaborate with multidisciplinary team to address chronic and comorbid conditions and prevent exacerbation or deterioration  12/16/2024 2143 by Alma Rosa Gonzalez, RN  Outcome: Progressing     Problem: Safety - Adult  Goal: Free from fall injury  12/17/2024 1102 by Zeynep Ring RN  Outcome: Progressing  12/16/2024 2143 by Alma Rosa Gonzalez, RN  Outcome: Progressing     Problem: Discharge Planning  Goal: Discharge to home or other facility with appropriate resources  12/17/2024 1102 by Zeynep Ring RN  Outcome: Progressing  Flowsheets (Taken 12/17/2024 0800)  Discharge to home or other facility with appropriate resources:   Identify barriers to discharge with patient and caregiver   Arrange for needed discharge resources and transportation as appropriate   Identify discharge learning needs (meds, wound care, etc)   Arrange for interpreters to assist at discharge as needed   Refer to discharge planning if patient needs post-hospital services based on physician order or complex needs related to functional status, cognitive ability or social support system  12/16/2024 2143 by Alma Rosa Gonzalez, RN  Outcome: Progressing     Problem: Skin/Tissue Integrity  Goal: Absence of new skin breakdown  Description: 1.  Monitor for areas of redness and/or skin breakdown  2.  Assess vascular access sites hourly  3.  Every 4-6 hours

## 2024-12-17 NOTE — DISCHARGE SUMMARY
daily, vitamin B12, encourage participation in rehabilitation support group and recreational therapy, adjust/add medications.     Cervical fusion syndrome, chronic back pain, chronic low back pain, Primary osteoarthritis involving multiple joints-topicals, heat, massage, lowest effective dose of pain medications limited doses of Tylenol due to liver disease    Hematuria, gross-Eliquis on hold    Malignant ascites-consult heme-onc  Mild nocturnal hypoxemia-elevate head of bed provide nasal cannula O2 at night add Afrin and Ocean nasal spray consult pulmonology      She is insisting on discharge today because she wants to go home for [employer] as she feels this may be her last 1 with friends due to her cancer diagnosis.  I am concerned about her low potassium level I will defer to medical as to whether they want to replace it and recheck before she goes.           Karyn Silva D.O., PM&R     Attending    [phone]   [facility]

## 2024-12-17 NOTE — CONSULTS
Hospital Medicine  History and Physical    Patient:  Alysha Rodriguez  MRN: [id number]    CHIEF COMPLAINT:  No chief complaint on file.      History Obtained From:  Patient, EMR  Primary Care Physician: Ally Phillips, APRN - CNP    HISTORY OF PRESENT ILLNESS:   The patient is a 79 y.o. female with PMH of breast and omental cancer requiring chemotherapy.  Patient was admitted to the medical floor with abdominal pain.  She was found to have large ascites.  She had abdominal paracentesis.  Initial pathology report is positive for atypical cells.  Patient was seen by oncology team.  Patient is to follow-up with oncology postdischarge from rehab unit.  Patient has a history of A-fib.  Mitral regurgitation.  No chest pain.  No shortness of breath.  No fever or chills.  No hematemesis or melena.    Past Medical History:      Diagnosis Date    Arthritis     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    Dyspnea 7/2/2015    Family history of coronary artery disease 8/29/2014    History of blood transfusion 2011    following chemotherapy    Hx of right BKA (HCC)     Hyperlipidemia     Hypertension     Hypothyroid 6/12/2015    Lobular breast cancer (HCC) 10/2015    NSTEMI (non-ST elevated myocardial infarction) (HCC) 8/29/2014    Obesity     Paroxysmal atrial fibrillation (HCC) 8/29/2014    S/P BKA (below knee amputation) (HCC)        Past Surgical History:      Procedure Laterality Date    ABDOMEN SURGERY Left 04/11/2017    EXCISION OF CHEST WALL MASS, UPDATE LABS ON ADMIT  performed by Dominguez Wynn MD at [facility] [place]    APPENDECTOMY      BACK SURGERY      BLEPHAROPLASTY  02/05/2013    both eyes    CARDIAC CATHETERIZATION      CARDIAC PROCEDURE N/A 11/12/2024    Left heart cath / 
Palliative Care Consult Note  Patient: Alysha Rodriguez  Gender: female  YOB: 1945  Unit/Bed: R252/R252-01  CodeStatus: Full Code  Inpatient Treatment Team: Treatment Team:   Scullin, Heather, DO Sidloski, Jay E, DO Goldberg, Zev, PhD  Tre Richey MD Makadia, Ashok P, MD Horst, Cody, OT  Zeynep Ring RN Medina, Julianna, MSW, LSW  Kwesi Calderón, Angela Anguiano, Joan RUIZ, OTR/L  Admit Date:  12/12/2024    Chief Complaint: Acute on chronic progressive fatigue    History of Presenting Illness:      Alysha Rodriguez is a 79 y.o. female on hospital day 5 with a history of hypertension, hyperlipidemia, hypothyroidism, coronary artery disease, right BKA gall bladder and breast cancer (bilateral mastectomies), and omental cancer sp surgery and chemotherapy in 2011.     Patient was brought to the emergency room with abdominal pain and distention on 12/9/2024.  Patient diagnosed with ascites requiring paracentesis with 5025 mL removed.  Fluid sent for cytology which showed atypical cells highly suspicious for malignancy.  Patient was then discharged to acute rehab unit on 12/12.     Palliative care was consulted for goals of care, CODE STATUS discussion, family support, and symptom management.      Patient lives lives at home alone.  Patient normally up in her motorized wheelchair able to do all ADLs on her own.  Patient reports having weight loss and decreased appetite.    Upon entering room patient sitting up in wheelchair alert and oriented x 4.  Patient reports having chronic back pain that she takes Norco for which does help dull her pain.  Patient denies any shortness of breath, anxiety, nausea, chest pain, fever or chills.  Patient awaiting to be discharged home today.    Most recent notable labs: Potassium 3.0, albumin 3.3, total protein 5.7, hemoglobin 10.8      Review of Systems:       Review of Systems   Constitutional:  Positive for activity change, appetite change, fatigue and 
by provider] aspirin  81 mg Oral Daily    buPROPion  75 mg Oral BID    dilTIAZem  120 mg Oral Daily    DULoxetine  30 mg Oral Daily    metoprolol succinate  50 mg Oral BID       PRN Meds:bisacodyl, sodium phosphate, camphor-menthol-methyl salicylate, HYDROcodone-acetaminophen, ondansetron **OR** ondansetron, polyethylene glycol, traZODone    REVIEW OF SYSTEMS:  As in history of present illness  Other 14 point review of system is negative.     PHYSICAL EXAM:    Vitals:  BP 93/76   Pulse 93   Temp 98.2 °F (36.8 °C) (Oral)   Resp 17   Wt 87.8 kg (193 lb 9.6 oz)   SpO2 96%   BMI 39.56 kg/m²   General:   Alert, awake, Oriented x3  .comfortable in bed, No distress.  Head: Atraumatic , Normocephalic   Eyes: PERRL. No sclera icterus. No conjunctival injection. No discharge   ENT: No nasal  discharge. Pharynx clear.  Neck:  Trachea midline. No thyromegaly, no JVD, No cervical adenopathy.  Chest : Bilaterally symmetrical ,Normal effort,  No accessory muscle use  Lung : Diminished BS bilateraly. No Rales. No wheezing. No rhonchi.   Heart:: Normal  rate. Regular rhythm. No mumur ,  Rub or gallop  ABD: Non-tender. Non-distended. No masses. No organmegaly. Normal bowel sounds.   Extremities: No pitting in both lower leg , No Cyanosis ,No clubbing  Neuro: no cranial nerve abnormality, normal reflex and sensation, no focal weakness   Skin: Warm and dry.  No erythema rash on exposed extremities.      Data Review  CBC:   Recent Labs     12/14/24  0436 12/16/24 0455   WBC 11.5* 9.1   HGB 11.6* 10.8*   HCT 34.9* 33.2*    336     BMP:    Recent Labs     12/16/24 0455 12/16/24  1120     --    K 2.8* 3.0*   CL 99  --    CO2 32*  --    BUN 22  --    CREATININE 0.81  --    GLUCOSE 102*  --    CALCIUM 8.8  --      HEPATIC:   Recent Labs     12/16/24 0455   AST 33   ALT 18   BILITOT 0.6   ALKPHOS 126     O2 Device: None (Room air)  O2 Flow Rate (L/min): 0 L/min  Lab Results   Component Value Date/Time    LACTA 1.3 
87.8 kg (193 lb 9.6 oz)   SpO2 98%   BMI 39.56 kg/m²     (2) Ambulatory and capable of self care, unable to carry out work activity, up and about > 50% or waking hours    GENERAL: In no acute distress, well- nourished, well- developed, alert and oriented to person place and time.  SKIN: Warm and dry, withoutjaundice, ecchymoses, or petechiae.  HEENT: Normocephalic, sclera anicteric, oral mucosa moist without lesion or exudate in the visible oral cavity or oropharynx.  NODES: No palpable adenopathy in the neck Levels I-V, bilateral   Supraclavicular fossae, axillary chains, or inguinal regions.  LUNGS: Decreased left base.   CARDIAC: Regular rate and rhythm, without murmurs, rubs or gallops.  ABDOMINAL: Normal bowel sounds present, soft, non-tender, no mass or  organomegaly.  MUSKL: Full ROM.  BKA right leg. Bilateral chest wall is without local recurrence.  EXTREMITIES:No edema  NEUROLOGIC: No grossly apparent focal deficits.    LAB RESULTS:  Recent Results (from the past 24 hour(s))   POCT Glucose    Collection Time: 12/12/24  6:18 PM   Result Value Ref Range    POC Glucose 115 (H) 70 - 99 mg/dl    Performed on ACCU-CHEK    BLOOD OCCULT STOOL SCREEN #1    Collection Time: 12/13/24  7:01 AM   Result Value Ref Range    Occult Blood Fecal POSITIVE  Normal Range:Negative   (A)    Comprehensive Metabolic Panel w/ Reflex to MG    Collection Time: 12/13/24  7:39 AM   Result Value Ref Range    Sodium 135 135 - 144 mEq/L    Potassium reflex Magnesium 4.0 3.4 - 4.9 mEq/L    Chloride 98 95 - 107 mEq/L    CO2 22 20 - 31 mEq/L    Anion Gap 15 9 - 15 mEq/L    Glucose 93 70 - 99 mg/dL    BUN 15 8 - 23 mg/dL    Creatinine 0.44 (L) 0.50 - 0.90 mg/dL    Est, Glom Filt Rate >90.0 >60    Calcium 9.3 8.5 - 9.9 mg/dL    Total Protein 6.5 6.3 - 8.0 g/dL    Albumin 3.2 (L) 3.5 - 4.6 g/dL    Total Bilirubin 1.2 (H) 0.2 - 0.7 mg/dL    Alkaline Phosphatase 158 (H) 40 - 130 U/L    ALT 21 0 - 33 U/L    AST 64 (H) 0 - 35 U/L    Globulin 3.3

## 2024-12-17 NOTE — PLAN OF CARE
Problem: Chronic Conditions and Co-morbidities  Goal: Patient's chronic conditions and co-morbidity symptoms are monitored and maintained or improved  12/16/2024 2143 by Alma Rosa Gonzalez RN  Outcome: Progressing  12/16/2024 1854 by Tashia Taylor RN  Outcome: Progressing     Problem: Safety - Adult  Goal: Free from fall injury  12/16/2024 2143 by Alma Rosa Gonzalez RN  Outcome: Progressing  12/16/2024 1854 by Tashia Taylor RN  Outcome: Progressing     Problem: Discharge Planning  Goal: Discharge to home or other facility with appropriate resources  Outcome: Progressing     Problem: Skin/Tissue Integrity  Goal: Absence of new skin breakdown  Description: 1.  Monitor for areas of redness and/or skin breakdown  2.  Assess vascular access sites hourly  3.  Every 4-6 hours minimum:  Change oxygen saturation probe site  4.  Every 4-6 hours:  If on nasal continuous positive airway pressure, respiratory therapy assess nares and determine need for appliance change or resting period.  12/16/2024 2143 by Alma Rosa Gonzalez RN  Outcome: Progressing  12/16/2024 1854 by Tashia Taylor RN  Outcome: Progressing     Problem: Pain  Goal: Verbalizes/displays adequate comfort level or baseline comfort level  Outcome: Progressing     Problem: Nutrition Deficit:  Goal: Optimize nutritional status  Outcome: Progressing

## 2024-12-17 NOTE — CARE COORDINATION
Case Management Initial Assessment        NAME:  Alysha Rodriguez  ROOM: R252/R252-01  :  1945  DATE: 2024        Social Functional:  Social/Functional History  Lives With: Alone  Type of Home: Apartment  Home Layout: One level  Home Access: Ramped entrance  Bathroom Shower/Tub: Tub/Shower unit;Curtain  Bathroom Toilet: Handicap height  Bathroom Equipment: Tub transfer bench;Grab bars in shower  Bathroom Accessibility: Wheelchair accessible  Home Equipment: Cane;Walker - Rolling;Wheelchair - Electric;Grab bars (Transport chair)  Prior Level of Assist for ADLs: Independent  Prior Level of Assist for Homemaking: Independent  Homemaking Responsibilities: Yes  Meal Prep Responsibility: Primary (Meals on Wheels (7 meals a day for Mon-Fri), simple meal prep for other meals)  Laundry Responsibility: Primary (in apartment)  Cleaning Responsibility: Primary  Bill Paying/Finance Responsibility: Primary (auto pay)  Shopping Responsibility: Primary (groceries are delivered by [employer])  Health Care Management: Primary (uses a pill organizer)  Prior Level of Assist for Transfers: Independent  Active : No  Patient's  Info: [employer] transportation system takes her in her power wheelchair.  Mode of Transportation: Van  Education: Masters in [employer]ing  Occupation: Retired  Type of Occupation:     Spoke with patient and explained role in the team. Patient questions answered appropriately. Explained discharge process. Patient stated understanding. Her support system consists of her dtr in [place] and some friends that are local. Her other dtr and son passed away two days apart within the last year. Pt previously resided alone in a one level apartment, with a level entry. She was independent with ADLs and IADLs. She receives Meals on Wheels, 7 meals a day for Mon-Fri. Finance mgmt is completed with auto 
 [facility]  INPATIENT REHABILITATION  TEAM CONFERENCE NOTE  Room: R252/R252-01  Admit Date: 2024       Date: 2024  Patient Name: Alysha Rodriguez        MRN: [id number]    : 1945  (79 y.o.)  Gender: female        REHAB DIAGNOSIS:   Diagnosis: Impaired mobility ADLs dt hepatic encephalopathy    CO MORBIDITIES:      Past Medical History:   Diagnosis Date    Arthritis     Blood transfusion     CAD (coronary artery disease)     Cancer (HCC)     GALLBLADDER ,  OMENTUM    Charcot's joint     left foot    Chest pain 2015    Colitis     DDD (degenerative disc disease), lumbar 2013    Depression     Disorder of peripheral nervous system 2010    Dyspnea 2015    Family history of coronary artery disease 2014    History of blood transfusion     following chemotherapy    Hx of right BKA (HCC)     Hyperlipidemia     Hypertension     Hypothyroid 2015    Lobular breast cancer (HCC) 10/2015    NSTEMI (non-ST elevated myocardial infarction) (HCC) 2014    Obesity     Paroxysmal atrial fibrillation (HCC) 2014    S/P BKA (below knee amputation) (HCC)      Past Surgical History:   Procedure Laterality Date    ABDOMEN SURGERY Left 2017    EXCISION OF CHEST WALL MASS, UPDATE LABS ON ADMIT  performed by Dominguez Wynn MD at [facility] [place]    APPENDECTOMY      BACK SURGERY      BLEPHAROPLASTY  2013    both eyes    CARDIAC CATHETERIZATION      CARDIAC PROCEDURE N/A 2024    Left heart cath / coronary angiography performed by Ramu Bower DO at [facility] [place]ARDIAC CATH [facility]    COLONOSCOPY  2010    normal    CYSTOSCOPY W BIOPSY OF BLADDER N/A 2017    CYSTOSCOPY BLADDER BIOPSY AND FULGURATION performed by Alan Izquierdo MD at [facility] [place]    EYE SURGERY Bilateral     cataract removal with IOL implants    HYSTERECTOMY (CERVIX STATUS UNKNOWN)      JOINT REPLACEMENT  2001, 2005, 2009    LEG AMPUTATION BELOW KNEE Right 2011    DR GALLEGO 
LSW provided an update to pt with projected discharge date and goals, pt verbalized understanding and confirmed she is planning to return home. Given freedom of choice, pt would like [facility] at d/c. She also stated that she would need a ride home. LSW called dtr in [place] and reviewed the discharge date and goals. Dtr confirmed all info pt had provided with LSW. She is concerned with pt reaching the goals by the discharge date. Dtr also voiced concern for pt returning home alone and noted that they have been trying to get pt placed at [facility]. She believes pt was denied medicaid due to not getting paperwork they needed in a timely manner, as pt had told her that as well. She plans to discuss further with pt this weekend. Electronically signed by PAVITHRA Bowles, MARTINEZ on 12/13/2024 at 5:46 PM    
LSW spoke earlier with pt regarding discharge for today and she confirmed she feels ready to return home where she resides alone. Order for O2 at  placed with [facility] and Molly from [facility] confirmed it will be delivered tonight. Referral made to [facility] per her request. Patient satisfaction survey was completed. Pt did request for LSW to call her dtr to provide an update with discharge today as well. LSW called and spoke with dtr Tania. Tania confirmed that the pt told her yesterday that she would be leaving today. Tania had inquired about an oncology meeting that was supposed to be held yesterday where she was to be called in. MARTINEZ explained that from the note written by the Oncology CNP, it looks like they recommended a follow up in 1-2 weeks to discuss treatment options, which may be the meeting she is looking for. Tania verbalized understanding and is concerned with pt returning home as she lives alone. Tania is also aware that pt wants to return home to also attend a Dami party tomorrow morning at her community where she resides. Tania plans to discuss further with pt, but pt is competent and able to make the decision to return home alone. Electronically signed by PAVITHRA Bowles, MARTINEZ on 12/17/2024 at 4:17 PM    
[id number])  Product Used : Soap and water;Bath wipes (12/14/24 1446)             CURRENT SELF CARE:  Grooming/Oral Hygiene  Assistance Level: Set-up;Increased time to complete (12/14/24 0910)  Skilled Clinical Factors: Oral care and wash face. (12/14/24 0910)  Upper Extremity Bathing  Assistance Level: Set-up;Increased time to complete (12/14/24 0910)  Skilled Clinical Factors: Wipe arms only. Declined further ADL tasks. (12/14/24 0910)  Lower Extremity Bathing  Skilled Clinical Factors: Declined. (12/14/24 0910)  Upper Extremity Dressing  Skilled Clinical Factors: Declined. (12/14/24 0910)  Lower Extremity Dressing  Skilled Clinical Factors: Declined. (12/14/24 0910)  Putting On/Taking Off Footwear  Skilled Clinical Factors: Declined. (12/14/24 0910)  Toileting  Assistance Level: Dependent (12/13/24 1356)  Skilled Clinical Factors: purewick (12/14/24 0910)  Toilet Transfers  Technique: Stand pivot (12/13/24 1356)  Assistance Level: Minimal assistance (12/13/24 1356)  Skilled Clinical Factors: purewick (12/14/24 0910)  Tub/Shower Transfers  Skilled Clinical Factors: Declined shower. Initially agreeable to getting washed up at bed level, but declined full ADL after completing ADL tasks. (12/14/24 0910)        LTG:  Eating:Discharge Goal: Independent  Oral Hygiene:Discharge Goal: Independent  Shower/Bathe self: Discharge Goal: Independent  Upper body dressing:Discharge Goal: Independent  Lower body dressing:Discharge Goal: Independent  Putting on taking off footwear:Discharge Goal: Independent   Toileting: Discharge Goal: Independent  Toilet transfer:Discharge Goal: Independent  OT Treatment Time: 1.5 hrs      SPEECH THERAPY       Comprehension: Exceptions  Verbal Expression: Exceptions to functional limits      Diet/Swallow:        Dysphagia Outcome Severity Scale: Level 7: Normal in all situations              LTG:                COGNITION  OT: Cognition  Overall Cognitive Status: Exceptions (12/14/24

## 2024-12-17 NOTE — TELEPHONE ENCOUNTER
Pt has a referral for home health care and they would like to know if you will follow the patient?     [phone]

## 2024-12-18 ENCOUNTER — TELEPHONE (OUTPATIENT)
Dept: FAMILY MEDICINE CLINIC | Age: 79
End: 2024-12-18

## 2024-12-18 NOTE — PROGRESS NOTES
Occupational Therapy Get up and Go Note            Date: 2024  Patient Name: Alysha Rodriguez        MRN: [id number]    Account #: [id number]  : 1945  (79 y.o.)      Subjective:  Patient states:  I will get up  Pain:  Pain at start of treatment: No    Pain at end of treatment: No    Objective:  ADL:  Feeding:  independent once seated in w/c    UE Self -Care:  Pt able to self don gown without issue     Bed mobility: SBA supine to sit  Bed to w/c: Bryant for pivot bed to w/c without AD. Pt reports \"I have my own way of getting into the wheelchair\"    Treatment consisted of:   [x] ADL Training  [] Strengthening   [] Transfer Training    [] DME Education  [] HEP   [] Patient Education  [] Other:    Safety:  Safety Devices  Safety Devices in place:   Type of devices: All fall risk precautions in place    Therapy Time:   Individual Group Co-Treat   Time In 0745       Time Out 0753         Minutes 8         ADL/IADL trainin minutes     Electronically signed by:    William Frost OT    2024, 7:57 AM  
    Hospitalist Progress Note      PCP: Ally Phillips, APRN - CNP    Date of Admission: 12/12/2024    Chief Complaint: no new complains     Subjective: patient awake/alert     Medications:  Reviewed    Infusion Medications   Scheduled Medications    oxymetazoline  2 spray Each Nostril BID    potassium chloride  40 mEq Oral Once    ciprofloxacin  250 mg Oral 2 times per day    Vitamin D  2,000 Units Oral Dinner    cyanocobalamin  1,000 mcg IntraMUSCular Weekly    coenzyme Q10  100 mg Oral Daily    lidocaine  3 patch TransDERmal Daily    pantoprazole  40 mg Oral QAM AC    spironolactone  25 mg Oral BID    torsemide  20 mg Oral Daily    atorvastatin  40 mg Oral Nightly    apixaban  5 mg Oral BID    [Held by provider] aspirin  81 mg Oral Daily    buPROPion  75 mg Oral BID    dilTIAZem  120 mg Oral Daily    DULoxetine  30 mg Oral Daily    metoprolol succinate  50 mg Oral BID     PRN Meds: bisacodyl, sodium phosphate, camphor-menthol-methyl salicylate, HYDROcodone-acetaminophen, ondansetron **OR** ondansetron, polyethylene glycol, traZODone      Intake/Output Summary (Last 24 hours) at 12/16/2024 1400  Last data filed at 12/15/2024 2111  Gross per 24 hour   Intake 240 ml   Output --   Net 240 ml       Exam:    BP (!) 104/43   Pulse 93   Temp 98.2 °F (36.8 °C) (Oral)   Resp 17   Wt 87.8 kg (193 lb 9.6 oz)   SpO2 96%   BMI 39.56 kg/m²     General appearance: No apparent distress, appears stated age and cooperative.  HEENT: Pupils equal, round, and reactive to light. Conjunctivae/corneas clear.  Neck: Supple, with full range of motion. No jugular venous distention. Trachea midline.  Respiratory:  Normal respiratory effort. Clear to auscultation, bilaterally without Rales/Wheezes/Rhonchi.  Cardiovascular: Regular rate and rhythm with normal S1/S2 without murmurs, rubs or gallops.  Abdomen: Soft, non-tender, non-distended with normal bowel sounds.  Musculoskeletal: RBKA  Skin: Skin color, texture, turgor normal.  No 
    Hospitalist Progress Note      PCP: Ally Phillips, APRN - CNP    Date of Admission: 12/12/2024    Chief Complaint: no new complains    Subjective: patient awake/alert     Medications:  Reviewed    Infusion Medications   Scheduled Medications    oxymetazoline  2 spray Each Nostril BID    ciprofloxacin  250 mg Oral 2 times per day    Vitamin D  2,000 Units Oral Dinner    cyanocobalamin  1,000 mcg IntraMUSCular Weekly    coenzyme Q10  100 mg Oral Daily    lidocaine  3 patch TransDERmal Daily    pantoprazole  40 mg Oral QAM AC    spironolactone  25 mg Oral BID    torsemide  20 mg Oral Daily    atorvastatin  40 mg Oral Nightly    apixaban  5 mg Oral BID    [Held by provider] aspirin  81 mg Oral Daily    buPROPion  75 mg Oral BID    dilTIAZem  120 mg Oral Daily    DULoxetine  30 mg Oral Daily    metoprolol succinate  50 mg Oral BID     PRN Meds: bisacodyl, sodium phosphate, camphor-menthol-methyl salicylate, HYDROcodone-acetaminophen, ondansetron **OR** ondansetron, polyethylene glycol, traZODone      Intake/Output Summary (Last 24 hours) at 12/17/2024 1305  Last data filed at 12/17/2024 0845  Gross per 24 hour   Intake 240 ml   Output --   Net 240 ml       Exam:    /67   Pulse 89   Temp 97.8 °F (36.6 °C) (Oral)   Resp 18   Wt 87.8 kg (193 lb 9.6 oz)   SpO2 93%   BMI 39.56 kg/m²     General appearance: No apparent distress, appears stated age and cooperative.  HEENT: Pupils equal, round, and reactive to light. Conjunctivae/corneas clear.  Neck: Supple, with full range of motion. No jugular venous distention. Trachea midline.  Respiratory:  Normal respiratory effort. Clear to auscultation, bilaterally without Rales/Wheezes/Rhonchi.  Cardiovascular: Regular rate and rhythm with normal S1/S2 without murmurs, rubs or gallops.  Abdomen: Soft, non-tender, non-distended with normal bowel sounds.  Musculoskeletal: RBKA  Skin: Skin color, texture, turgor normal.  No rashes or lesions.  Neurologic:  
  Subjective:  The patient complains of severe acute on chronic progressive fatigue and confusion with word finding deficits partially relieved by rest, medications, PT,  OT,   SLP and rest and exacerbated by recent   illness with ascites due to cancer.  79 y.o. female  was admitted through the ER at [facility] on 12/9/2024 complaining with abdominal pain and distention.  She was diagnosed with ascites and admitted under the care of the hospitalist with interventional radiology consulted.     She has a past medical history significant for multiple forms of cancer most recently gallbladder cancer.  She has a history of bilateral breast cancer with bilateral mastectomies, omental cancer and a remote history of right below the knee amputation secondary to peripheral vascular disease.  She is on Eliquis at home for A-fib.     She was taken for interventional guided paracentesis with drainage of ascitic fluid by interventional radiology physician Dr. Andrew Mo on 12/10/2024.    I am concerned about patient’s medical complexities and barriers to advancing in rehab goals including controlling her pain and returning home.        I reviewed current care and plans for further care with other rehab providers including nursing and case management.  According to recent nursing note, \" pt would like [facility] at d/c. She also stated that she would need a ride home. LSW called dtr in [place] and reviewed the discharge date and goals. Dtr confirmed all info pt had provided with LSW. She is concerned with pt reaching the goals by the discharge date. Dtr also voiced concern for pt returning home alone and noted that they have been trying to get pt placed at [facility]. She believes pt was denied medicaid due to not getting paperwork they needed in a timely manner, as pt had told her that as well. She plans to discuss further with pt this weekend.  \".    Heme-onc note appreciated --nocturnal pulse ox done await result-she is 
  Subjective:  The patient complains of severe acute on chronic progressive fatigue and confusion with word finding deficits partially relieved by rest, medications, PT,  OT,   SLP and rest and exacerbated by recent   illness with ascites due to cancer.  79 y.o. female  was admitted through the ER at [facility] on 12/9/2024 complaining with abdominal pain and distention.  She was diagnosed with ascites and admitted under the care of the hospitalist with interventional radiology consulted.     She has a past medical history significant for multiple forms of cancer most recently gallbladder cancer.  She has a history of bilateral breast cancer with bilateral mastectomies, omental cancer and a remote history of right below the knee amputation secondary to peripheral vascular disease.  She is on Eliquis at home for A-fib.     She was taken for interventional guided paracentesis with drainage of ascitic fluid by interventional radiology physician Dr. Andrwe Mo on 12/10/2024.    I am concerned about patient’s medical complexities and barriers to advancing in rehab goals including controlling her pain and returning home.        I reviewed current care and plans for further care with other rehab providers including nursing and case management.  According to recent nursing note, \" A&O x3. Applied Lidoderm patches to lower back. Avasys in room for safety. In bed with alarm activated. Call light in reach. \".    Heme-onc note appreciated --nocturnal pulse ox done await result-she is requiring an Avasys at night due to sundowning.  I am worried about her drop in H&H she is dropped from 13.7-11.5 her Eliquis remains on hold occult blood in her stool is positive.    I am also concerned about her nocturnal hypoxemia but is only 6 minutes less than 89% at night.  Will titrate her O2    ROS x10:  The patient also complains of severely impaired mobility and activities of daily living.  Otherwise no new problems 
Assessment completed. A&O x2-3. Medicated with PRN Norco for 6/10 pain to lower back. Pt 1:1 at beginning of shift. Pt has been cooperative through out the night. Will trial Avasys. Pt reoriented to use call light when needing assistance and how the Avasys works. Pt stated understanding. Pt had a positive occult stool on 12/13/2024. H&H today 11.6, 34.9. H&H on 12/13/2024 13.7, 41.2. Pt currently on ASA and Eliquis. This nurse holding ASA and Eliquis until Dr Babb rounds the unit. Dr Silva made aware and in agreement. In bed with alarm activated. Call light in reach. Electronically signed by Jose Alanis LPN on 12/14/2024 at 2:38 PM      Dr Babb ok'd to give Eliquis. ASA on hold. Electronically signed by Jose Alanis LPN on 12/14/2024 at 2:39 PM    
Assessment completed. A&O x3. Applied Lidoderm patches to lower back. Avasys in room for safety. In bed with alarm activated. Call light in reach. Electronically signed by Jose Alanis LPN on 12/15/2024 at 9:45 AM    
Comprehensive Nutrition Assessment    Type and Reason for Visit:  Initial, Consult    Nutrition Recommendations/Plan:   Continue regular diet   Begin a high calorie oral supplement TID     Malnutrition Assessment:  Malnutrition Status:  Insufficient data (12/14/24 5936)      Nutrition Assessment:    Pt presents with inadequate oral intake, appears to be present since initial hospital admission per intake flowsheets. Unable to assess for wt loss due to recent paracentesis with > 5 liters fluid removed. Will add oral nutrition supplements and continue to follow    Nutrition Related Findings:    hypertension, hyperlipidemia, hypothyroidism, coronary disease, gallbladder and omental cancers previously, breast cancer with bilateral mastectomy, right BKA who presents with abdominal swelling, s/p paracentesis (12/10), 2025 ml fluid removed, per oncology suspicion fo late recurrence of omental cancer, cytology is pending.  Heme positive stool noted, PPI added for GIB prophylaxis, to monitor Hgb. Cognitive deficits noted per OT. Mildy confused per notes,  Per dietary ambassador, pt has not been eating since being in Rehab unit. Unable to interview pt, multiple disciplines waiting to see pt.  Labs/meds reviewed Wound Type: None       Current Nutrition Intake & Therapies:    Average Meal Intake: 1-25% (per dietary ambassador)  Average Supplements Intake: None Ordered  ADULT DIET; Regular    Anthropometric Measures:  Height:  4' 10\"  Ideal Body Weight (IBW): Adjusted IBW  99 lb(45 kg )    Admission Body Weight: 87.5 kg (193 lb)  Current Body Weight: 87.5 kg (193 lb) (rehab adm wt), Weight Source: Bed scale  Current BMI (kg/m2): 39.4  Usual Body Weight: 97.5 kg (215 lb) (? ascites, 200 lb (12/9 prior to paracentesis))     % Weight Change (Calculated): -10.2                    BMI Categories: Obese Class 3 (BMI 40 or greater) 41.7 adjusted for amputation    Estimated Daily Nutrient Needs:  Energy Requirements Based On: 
Facility/Department: [facility] REHAB  Rehabilitation Initial Assessment: Occupational Therapy  Room: R252R252-01    NAME: Alysha Rodriguez  : 1945  MRN: [id number]    Date of Service: 2024    Rehab Diagnosis(es): Impaired mobility and ADL's due to hepatic encephalopathy  Patient Active Problem List    Diagnosis Date Noted    Nonrheumatic mitral valve regurgitation 2024    Atrial fibrillation with RVR ([facility]) 2024    Hematuria 2017    High risk medication use - 18 OARRS PM&R, 18 OARRS PM&R, 18 Urine Drug Screen positive opiates PM&R, 18 Med Contract PM&R 2013    Chronic renal disease, stage III ([facility]) [971086] 2022    Drug-induced polyneuropathy ([facility]) 2022    Grief 2024    Malignant ascites 2024    Severe mitral regurgitation 2024    Chronic systolic (congestive) heart failure 2024    Lumbar radiculitis 2023    Abnormality of gait and mobility 2021    Hx of BKA, right ([facility]) 2021    Gait abnormality dt cerebrovascular insufficiancy and gait atasxia 2021    Difficulty balancing 2021    Dizziness 2021    Malignant neoplasm of peritoneum, 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[facility]) 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/2020    Obstructive sleep apnea     Current mild episode of major depressive disorder ([facility]) 2020    Cervical fusion syndrome 2019    Chronic pain syndrome 2018    Charcot's arthropathy 2018    Moderate episode of recurrent major depressive disorder ([facility]) 2017    Chronic low back pain with sciatica 2017    Noncompliance - No Show inject 17, No Show F/U 2017    Hematuria, gross     Primary osteoarthritis involving multiple joints 2017    Left breast mass 2016    Anemia 2016    Cancer ([facility]) 2016    Morbid obesity 2016    Reactive depression 2016    Sleeping excessive 2016    Cervical spinal cord injury ([facility]) 06/10/2016    Chronic low back pain 
Follow-up on the patient.  No chest pain or palpitation.  No abdominal pain.  Patient reported that her stool is no longer dark or black in color.    General appearance: alert, appears stated age and cooperative, no apparent distress  Skin: Skin color, texture, turgor normal. No rashes or lesions  HEENT: Head: Normocephalic, no lesions, without obvious abnormality.  Neck: no adenopathy, no carotid bruit, no JVD, supple, symmetrical, trachea midline, and thyroid not enlarged, symmetric, no tenderness/mass/nodules  Lungs: clear to auscultation bilaterally  Heart: regular rate and rhythm, S1, S2 normal, no murmur, click, rub or gallop  Abdomen: soft, non-tender; bowel sounds normal; no masses,  no organomegaly.  Positive for moderate ascites  Extremities: extremities normal, atraumatic, no cyanosis or edema right below-knee amputation  Neurologic: Mental status: Alert, oriented, thought content appropriate      Functional impairment.  Please refer to physical and Occupational Therapy team notes for details on her functional status and treatment plan.    *Functional impairment.  Please refer to physical and Occupational Therapy team notes for details on her functional status and treatment plan.  Ambulation instructions, restrictions, follow-up precautions are to be addressed by physiatrist.     *Ascites.  Initial pathology report showing atypical cells highly suggestive of malignancy  .  Patient was seen by oncologist.  Please refer to his notes for details.  Patient has a history of gallbladder and breast cancer as well as omental cancer requiring chemotherapy in 2011  Patient is to follow-up with the oncology team.  .  Oncological care is deferred  .  Meanwhile continue diuretics hoping to reduce ascites accumulation  Patient may end up needing to have Pleurx drain     *Heme positive stool  Hemoglobin dropped but not significantly.  Stable hemodynamics  .  Patient is on Eliquis.  For a  .  I added PPI for GI bleed 
Hematology/Oncology  Nurse Practitioner Progress Note        CHIEF COMPLAINT/HPI: Cytology showed atypical cells highly suspicious for malignancy.  750 likely suggesting recurrence of omental cancer. She denies any abdominal discomfort or fullness.      REVIEW OF SYSTEMS:    Unremarkable except for symptoms mentioned in HPI.    Current Inpatient Medications:    Current Facility-Administered Medications: oxymetazoline (AFRIN) 0.05 % nasal spray 2 spray, 2 spray, Each Nostril, BID  ciprofloxacin (CIPRO) tablet 250 mg, 250 mg, Oral, 2 times per day  Vitamin D (CHOLECALCIFEROL) tablet 2,000 Units, 2,000 Units, Oral, Dinner  cyanocobalamin injection 1,000 mcg, 1,000 mcg, IntraMUSCular, Weekly  coenzyme Q10 capsule 100 mg, 100 mg, Oral, Daily  lidocaine 4 % external patch 3 patch, 3 patch, TransDERmal, Daily  bisacodyl (DULCOLAX) suppository 10 mg, 10 mg, Rectal, Daily PRN  sodium phosphate (FLEET) rectal enema 1 enema, 1 enema, Rectal, Daily PRN  camphor-menthol-methyl salicylate (BENGAY ULTRA STRENGTH) 4-10-30 % cream, , Apply externally, TID PRN  pantoprazole (PROTONIX) tablet 40 mg, 40 mg, Oral, QAM AC  spironolactone (ALDACTONE) tablet 25 mg, 25 mg, Oral, BID  torsemide (DEMADEX) tablet 20 mg, 20 mg, Oral, Daily  atorvastatin (LIPITOR) tablet 40 mg, 40 mg, Oral, Nightly  apixaban (ELIQUIS) tablet 5 mg, 5 mg, Oral, BID  [Held by provider] aspirin EC tablet 81 mg, 81 mg, Oral, Daily  buPROPion (WELLBUTRIN) tablet 75 mg, 75 mg, Oral, BID  dilTIAZem (CARDIZEM CD) extended release capsule 120 mg, 120 mg, Oral, Daily  DULoxetine (CYMBALTA) extended release capsule 30 mg, 30 mg, Oral, Daily  HYDROcodone-acetaminophen (NORCO) 5-325 MG per tablet 1 tablet, 1 tablet, Oral, Q8H PRN  metoprolol succinate (TOPROL XL) extended release tablet 50 mg, 50 mg, Oral, BID  ondansetron (ZOFRAN-ODT) disintegrating tablet 4 mg, 4 mg, Oral, Q8H PRN **OR** ondansetron (ZOFRAN) injection 4 mg, 4 mg, IntraVENous, Q6H PRN  polyethylene 
[facility]   Facility/Department: [facility] REHAB  Speech Language Pathology  Initial Speech/Language/Cognitive Assessment    NAME:Alysha Rodriguez  : 1945 (79 y.o.)   [x]   confirmed    MRN: [id number]  ROOM: [place] [address]  ADMISSION DATE: 2024  PATIENT DIAGNOSIS(ES): Impaired activities of daily living [Z78.9]  No chief complaint on file.    Patient Active Problem List    Diagnosis Date Noted    Nonrheumatic mitral valve regurgitation 2024    Atrial fibrillation with RVR ([facility]) 2024    Hematuria 2017    High risk medication use - 18 OARRS PM&R, 18 OARRS PM&R, 18 Urine Drug Screen positive opiates PM&R, 18 Med Contract PM&R 2013    Chronic renal disease, stage III ([facility]) [438120] 2022    Drug-induced polyneuropathy ([facility]) 2022    Grief 2024    Bowel and bladder incontinence 2024    Malignant ascites 2024    Severe mitral regurgitation 2024    Chronic systolic (congestive) heart failure 2024    Lumbar radiculitis 2023    Abnormality of gait and mobility 2021    Hx of BKA, right ([facility]) 2021    Gait abnormality dt cerebrovascular insufficiancy and gait atasxia 2021    Difficulty balancing 2021    Dizziness 2021    Malignant neoplasm of peritoneum, 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/2020    Obstructive sleep apnea     Current mild episode of major depressive disorder ([facility]) 2020    Cervical fusion syndrome 2019    Chronic pain syndrome 2018    Charcot's arthropathy 2018    Moderate episode of recurrent major depressive disorder ([facility]) 2017    Chronic low back pain with sciatica 2017    Noncompliance - No Show inject 17, No Show F/U 2017    Hematuria, gross     Primary osteoarthritis involving multiple joints 2017    Left breast mass 2016    Anemia 2016    Cancer ([facility]) 2016    Morbid obesity 2016    Reactive depression 
Nocturnal pulse ox done on room air.  
OCCUPATIONAL THERAPY  INPATIENT REHAB TREATMENT NOTE  [facility]      NAME: Alysha Rodriguez  : 1945 (79 y.o.)  MRN: [id number]  CODE STATUS: Full Code  Room: R252/R252-01    Date of Service: 2024    Referring Physician: Dr Silva  Rehab Diagnosis: Impaired mobility and ADL's due to hepatic encephalopathy    Hospital course:   Comments: 79 y.o. female patient who presented to ER via EMS  with CP/ SOB, increasing abdominal pain and distention. CT of A/P did show large amount of ascites and left pleural effusion. Patient does have PMHx of bilateral breast and gallbladder cancer. There was initial concern for malignant ascites, therefore, patient was admitted for paracentesis (5025 ml removed on 12/10 with IR) and additional work up.      Restrictions  Restrictions/Precautions  Restrictions/Precautions: Fall Risk      Position Activity Restriction  Other Position/Activity Restrictions: Telesitter    Patient's date of birth confirmed: Yes    SAFETY:  Safety Devices  Safety Devices in place: Yes  Type of devices: All fall risk precautions in place    SUBJECTIVE:  Subjective  Subjective: Pt stated that she was tired.    Pain at start of treatment: No    Pain at end of treatment: Yes: 6/10  Pt initially reported no pain, but when raising the head of the bed she reported back pain.    Location: back  Nursing notified: Yes  Intervention: Other: RN stated that she will provide pain medication.    COGNITION:  Orientation  Overall Orientation Status: Within Functional Limits  Orientation Level: Oriented to person;Oriented to place  Cognition  Overall Cognitive Status: Exceptions  Arousal/Alertness: Appears intact  Following Commands: Follows one step commands with increased time;Follows one step commands with repetition  Attention Span: Difficulty dividing attention  Safety Judgement: Decreased awareness of need for safety;Decreased awareness of need for assistance  Problem Solving: Assistance required 
OCCUPATIONAL THERAPY  INPATIENT REHAB TREATMENT NOTE  [facility]      NAME: Alysha Rodriguez  : 1945 (79 y.o.)  MRN: [id number]  CODE STATUS: Full Code  Room: R252/R252-01    Date of Service: 2024    Referring Physician: Dr Silva  Rehab Diagnosis: Impaired mobility and ADL's due to hepatic encephalopathy    Hospital course:   Comments: 79 y.o. female patient who presented to ER via EMS  with CP/ SOB, increasing abdominal pain and distention. CT of A/P did show large amount of ascites and left pleural effusion. Patient does have PMHx of bilateral breast and gallbladder cancer. There was initial concern for malignant ascites, therefore, patient was admitted for paracentesis (5025 ml removed on 12/10 with IR) and additional work up.      Restrictions  Restrictions/Precautions  Restrictions/Precautions: Fall Risk, Other  Position Activity Restriction  Other Position/Activity Restrictions: AVASYS    Patient's date of birth confirmed: Yes      SAFETY:  Safety Devices  Safety Devices in place: Yes  Type of devices: All fall risk precautions in place      SUBJECTIVE: \"I have a green dress with white stripes\" pt reports she is wearing to her upcoming [employer].     Pain   Pain at start of treatment: Yes: 6/10    Pain at end of treatment: Yes: 7/10    Location: Lower Back  Description: Chronic Pain  Nursing notified: Yes  Nurse: Zeynep  Intervention: RN provided pain medication Heat applied  Pt has K-Pad in room that was placed on lower back at end of session. RN in room passing medications at end of session.     COGNITION:  Overall Cognitive Status: Exceptions  Arousal/Alertness: Appears intact  Following Commands: Follows one step commands with increased time;Follows one step commands with repetition  Attention Span: Attends with cues to redirect  Safety Judgement: Decreased awareness of need for assistance  Insights: Decreased awareness of deficits  Initiation: Requires cues for 
OCCUPATIONAL THERAPY  INPATIENT REHAB TREATMENT NOTE  [facility]      NAME: Alysha Rodriguez  : 1945 (79 y.o.)  MRN: [id number]  CODE STATUS: Full Code  Room: R252/R252-01    Date of Service: 2024    Referring Physician: Dr Silva  Rehab Diagnosis: Impaired mobility and ADL's due to hepatic encephalopathy    Hospital course:   Comments: 79 y.o. female patient who presented to ER via EMS  with CP/ SOB, increasing abdominal pain and distention. CT of A/P did show large amount of ascites and left pleural effusion. Patient does have PMHx of bilateral breast and gallbladder cancer. There was initial concern for malignant ascites, therefore, patient was admitted for paracentesis (5025 ml removed on 12/10 with IR) and additional work up.      Restrictions  Restrictions/Precautions  Restrictions/Precautions: Fall Risk  Activity Level: Up as Tolerated            Position Activity Restriction  Other Position/Activity Restrictions: Telesitter    Patient's date of birth confirmed: Yes    SAFETY:  Safety Devices  Safety Devices in place: Yes  Type of devices: All fall risk precautions in place    SUBJECTIVE:  Subjective  Subjective: Pt stated that she still felt tired.    Pain at start of treatment: Yes: 3/10    Pain at end of treatment: Yes: 3/10    Location: back  Nursing notified: Declined  Intervention: Other: Declined    COGNITION:  Orientation  Overall Orientation Status: Within Functional Limits  Orientation Level: Oriented to person;Oriented to place  Cognition  Overall Cognitive Status: Exceptions  Arousal/Alertness: Appears intact  Following Commands: Follows one step commands with increased time;Follows one step commands with repetition  Attention Span: Difficulty dividing attention  Safety Judgement: Decreased awareness of need for safety;Decreased awareness of need for assistance  Problem Solving: Assistance required to identify errors made;Assistance required to implement 
OCCUPATIONAL THERAPY  INPATIENT REHAB TREATMENT NOTE  [facility]      NAME: Alysha Rodriguez  : 1945 (79 y.o.)  MRN: [id number]  CODE STATUS: DNR-CC  Room: R252/R252-01    Date of Service: 2024    Referring Physician: Dr Silva  Rehab Diagnosis: Impaired mobility and ADL's due to hepatic encephalopathy    Hospital course:   Comments: 79 y.o. female patient who presented to ER via EMS  with CP/ SOB, increasing abdominal pain and distention. CT of A/P did show large amount of ascites and left pleural effusion. Patient does have PMHx of bilateral breast and gallbladder cancer. There was initial concern for malignant ascites, therefore, patient was admitted for paracentesis (5025 ml removed on 12/10 with IR) and additional work up.      Restrictions  Restrictions/Precautions  Restrictions/Precautions: Fall Risk            Position Activity Restriction  Other Position/Activity Restrictions: AVASYS    Patient's date of birth confirmed: Yes    SAFETY:  Safety Devices  Safety Devices in place: Yes  Type of devices: All fall risk precautions in place    SUBJECTIVE:  Subjective  Subjective: \"My daughter said I don't understand how frustrating it is for her but I don't think she knows how frustrating it is for me\"    Patient did not report pain but was not assessed for pain level at this time     COGNITION:   WFL    OBJECTIVE:     Attempted to see patient at 1300 but as therapist walked into the room patient yelled into the phone and slammed the received down on her table. Patient reported that her daughter said someone was telling her stuff and the patient didn't know about it and she was upset. Patient was provided with some alone time to finish her phone call and calm down.   When therapist reentered the room the patient was off the phone and mildly crying. Patient was able to continue with the session and was provided with patient bags and items from the bathroom. While seated patient was able to 
OCCUPATIONAL THERAPY  INPATIENT REHAB TREATMENT NOTE  [facility]      NAME: Alysha Rodriguez  : 1945 (79 y.o.)  MRN: [id number]  CODE STATUS: Full Code  Room: R252/R252-01    Date of Service: 2024    Referring Physician: Dr Silva  Rehab Diagnosis: Impaired mobility and ADL's due to hepatic encephalopathy    Hospital course:   Comments: 79 y.o. female patient who presented to ER via EMS  with CP/ SOB, increasing abdominal pain and distention. CT of A/P did show large amount of ascites and left pleural effusion. Patient does have PMHx of bilateral breast and gallbladder cancer. There was initial concern for malignant ascites, therefore, patient was admitted for paracentesis (5025 ml removed on 12/10 with IR) and additional work up.      Restrictions  Restrictions/Precautions  Restrictions/Precautions: Fall Risk            Position Activity Restriction  Other Position/Activity Restrictions: 1:1 caregiver    Patient's date of birth confirmed: Yes    SAFETY:  Safety Devices  Type of devices: All fall risk precautions in place (1:1 present)    SUBJECTIVE:  Subjective  Subjective: \"Oh that burns\"    Pain at start of treatment: No    Pain at end of treatment: Yes: Pt. unable to rate pain- reported burning on her back from receiving Butch Flanagan on her back during the session when RN assessed her skin       COGNITION:  Patient required increased processing time during all tasks     OBJECTIVE:     Patient reported a need to complete toileting. She had started a small BM in her undergarment and then urinated on the commode. Discussed with Carri CUTLER that patient complained of burning when therapist used a wipe on her buttocks to clean the BM off and that stool was dark in color. HE Christina addressed patient's needs. Patient was able to roll without assistance and remain on her side for care    Toileting  Assistance Level: Dependent  Toilet Transfers  Technique: Stand pivot  Assistance Level: Minimal 
OCCUPATIONAL THERAPY  INPATIENT REHAB TREATMENT NOTE  [facility]      NAME: Alysha Rodriguez  : 1945 (79 y.o.)  MRN: [id number]  CODE STATUS: Full Code  Room: R252/R252-01    Date of Service: 2024    Referring Physician: Dr Silva  Rehab Diagnosis: Impaired mobility and ADL's due to hepatic encephalopathy    Hospital course:   Comments: 79 y.o. female patient who presented to ER via EMS  with CP/ SOB, increasing abdominal pain and distention. CT of A/P did show large amount of ascites and left pleural effusion. Patient does have PMHx of bilateral breast and gallbladder cancer. There was initial concern for malignant ascites, therefore, patient was admitted for paracentesis (5025 ml removed on 12/10 with IR) and additional work up.      Restrictions  Restrictions/Precautions  Restrictions/Precautions: Fall Risk  Activity Level: Up as Tolerated            Position Activity Restriction  Other Position/Activity Restrictions: 1:1 caregiver    Patient's date of birth confirmed: Yes    SAFETY:  Safety Devices  Safety Devices in place: Yes  Type of devices: Bed alarm in place;Call light within reach;Left in bed (Sitter present)    SUBJECTIVE:  Subjective  Subjective: \"I didn't like it.\" Reporting she did not enjoy her lunch    Pain at start of treatment: No    Pain at end of treatment: No    Location:   Description:   Nursing notified:   Nurse:   Intervention: None    COGNITION:    The patient completed the [employer] Mental Status (UMS) Examination on this date, which is a useful screening tool for detecting mild cognitive impairment and signs of dementia.  Range of impairments based on score are indicated in the chart below:      High school education  Scoring Less than High School Education   27-30 Normal 25-30   21-26 Mild Neurocognitive disorder 20-24   1-20 Dementia 1-19     Patient's level of education: high school  Patient scored 18/30 on this date, which indicates a possible 
OCCUPATIONAL THERAPY  INPATIENT REHAB TREATMENT NOTE  [facility]      NAME: Alysha Rodriguez  : 1945 (79 y.o.)  MRN: [id number]  CODE STATUS: Full Code  Room: R252/R252-01    Date of Service: 2024    Referring Physician: Dr Silva  Rehab Diagnosis: Impaired mobility and ADL's due to hepatic encephalopathy    Hospital course:   Comments: 79 y.o. female patient who presented to ER via EMS  with CP/ SOB, increasing abdominal pain and distention. CT of A/P did show large amount of ascites and left pleural effusion. Patient does have PMHx of bilateral breast and gallbladder cancer. There was initial concern for malignant ascites, therefore, patient was admitted for paracentesis (5025 ml removed on 12/10 with IR) and additional work up.      Restrictions  Restrictions/Precautions  Restrictions/Precautions: Fall Risk                 Patient's date of birth confirmed: Yes    SAFETY:  Safety Devices  Safety Devices in place: Yes  Type of devices: All fall risk precautions in place    SUBJECTIVE:  Subjective  Subjective: \"My daddy gave me his hair color to make up for giving me his great big ears\"  Patient reported that she would like to discharge to home tomorrow (24) instead of Wednesday (24) because her apartment Dami party is scheduled for 11:00 on Wednesday the  and she feels as though this might be her last [place] and she does not want to miss it.     Pain at start of treatment: No    Pain at end of treatment: No    COGNITION:     Patient oriented and able to participate in therapy today.    Pt's current cognitive status is:  Comprehension: Mod I  Expression: Supervision  Social Interaction: Supervision  Problem Solving: Supervision  Memory: Supervision    OBJECTIVE:     Patient was initially reporting that she did not want to get out of bed on this date. Discussed with patient that she was in rehab to work on increasing strength and increasing independence. Patient 
PULSE OX OVERNIGHT DONE ON ROOM AIR PT DESATED FOR 2 HRS 52 MIN. PT QUALIFIES FOR O2 AT HS AT HOME.   
Patient is alert and confused with poor safety awareness. Pt has been impulsive and attempting to ambulate without assistance. Nursing staff have tried to redirect the pt without success. Pt is a high risk for falls. An order was place for a virtual  for pt's safety.  
Patient moved closer to nursing station d/t poor safety awareness. Avasys reports unsuccessful attempts to re-direct patient multiple times within 15 minutes. Made 1:1 support. Electronically signed by Carri Ruiz RN on 12/13/24 at 10:32 AM EST     Rehab MD aware of positive occult stool. Eliquis and ASA held at this time. Electronically signed by Carri Ruiz RN on 12/13/24 at 10:32 AM EST     UA sent, PVR noted 86. Electronically signed by Carri Ruiz RN on 12/13/24 at 1:57 PM EST   
Patient was referred for evaluation of depression and adjustment to disability. She has multiple medical problems and she is trying to maintain her independence despite significant disability (see below). She was followed by a psychiatrist, Dr. Morgan, in the past, and she was treated for depression and anxiety. Recently, she has been taking Cymbalta, 30mg; and Wellbutrin, 75mg bid. She is experiencing back pain, and is being treated with Norco prn    I am familiar with this patient as she actively participated in the Rehab Support Group after she underwent an amputation in  and began using a prosthesis. 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    The staff have reported that she was mildly confused, and Alysha said that she felt \"a little spacy\". There was an Avasys device in the room. I asked Alysha about that, and she knew the purpose for the Avasys. She said that she had no intentions of getting out of bed on her own.  During the interview, Alysha was pleasant and her mood appeared euthymic, but her speech was somewhat rambling and she seemed to have some problems with recent memory.    We spoke about the loss of her older daughter; she  from colon cancer. Two days later, her son  from complications related to dementia. He had Down Syndrome, and had been residing in a hospice program. Her younger daughter was present when he , and she said that very shortly before he , he looked to the corner of the room, and smiled. The family believes that her son saw his older sister in the room, and \"this was a comfort\" to Alysha as her son and daughter would be together.    I asked her about moving to [place] so that she would be closer to her daughter. She said that this would be difficult because she would need to reside in [place] for six months before she would be eligible for Medicaid and her daughter's home was not wheelchair accessible.    She said that 
Physical Therapy Missed Treatment   Facility/Department: [facility] MED SURG R252/R252-01    NAME: Alysha Rodriguez  Patient Status:   : 1945 (79 y.o.)  MRN: [id number]  Account: [id number]  Gender: female        [x] Patient Declines PT Treatment            [] Patient Unavailable:     Pt in a lot of pain and requested to have PT later on if possible. Provided pt an ice pack for low back pain. Pt able to roll toward her left side to have cp placed. Dr. Goldberg arrived to see patient when this therapist was exiting. Pt missed 60 minutes of PT this am.   Will attempt PT Treatment again at earliest convenience.        Electronically signed by Vianey Mathis PTA on 24 at 9:48 AM EST    
Physical Therapy Missed Treatment   Facility/Department: [facility] MED SURG R252/R252-01    NAME: Alysha Rodriguez  Patient Status:   : 1945 (79 y.o.)  MRN: [id number]  Account: [id number]  Gender: female        [x] Patient Declines PT Treatment            [] Patient Unavailable:     Pt continues to decline PT tx as she is not feeling well. Pt c/o being very cold despite having blankets on and not wearing clothing. Provided pt with warm blanket. Offered to work with her in the room on therex and mobility. Pt declined. Will check on on pt tomorrow to see if she can make up time.pt has missed 90 minutes of PT this date.   Will attempt PT Treatment again at earliest convenience.        Electronically signed by Vianey Matihs PTA on 24 at 2:43 PM EST    
Physical Therapy Missed Treatment   Facility/Department: [facility] MED SURG R252/R252-01    NAME: Alysha Rodriguez  Patient Status:   : 1945 (79 y.o.)  MRN: [id number]  Account: [id number]  Gender: female        [] Patient Declines PT Treatment            [x] Patient Unavailable:     Arrived to patients room as she was being transported out of her room. Attempted to make up time pt missed this week. Pt currently being dc so it was no possible to do so at this time.   Will attempt PT Treatment again at earliest convenience.        Electronically signed by Vianey Mathis PTA on 24 at 4:02 PM EST    
Physical Therapy Rehab Treatment Note  Facility/Department[facility]HAB  [facility]       NAME: Alysha Rodriguez  : 1945 (79 y.o.)  MRN: [id number]  CODE STATUS: Full Code    Date of Service: 2024     Restrictions:  Restrictions/Precautions: Fall Risk  Position Activity Restriction  Other Position/Activity Restrictions: 1:1 caregiver     SUBJECTIVE:   Subjective: \"I have Lidocaine patches on my back.\"  Pt states a friend is picking her up in a car at D/C  Pt states its been a few months since she has been in a car.  States she doesn't not think she will have any trouble.    Pain  Pain: denies pain    OBJECTIVE:         Bed mobility  Rolling to Left: Modified independent  Rolling to Right: Modified independent  Supine to Sit: Modified independent  Sit to Supine: Modified independent  Bed Mobility Comments: Increased timeto complete jackson to sit required.  Reposts mild lightheadedness with sup to sit.    Transfers  Sit to Stand: Supervision  Stand to Sit: Supervision  Bed to Chair: Modified independent  Car Transfer: Stand by assistance  Comment: WC<>mat and WC<>bed transfers indep.              PT Exercises  Exercise Treatment: supine left single leg bridge, SLR, hip abd, heel slides, s/l hip abd, s/l hip ext x10 ea; seated LAQ, march, and hip add x20 ea           ASSESSMENT/PROGRESS TOWARDS GOALS:   Assessment: Pt completed bed chair stand pivot transfers independently. Initiated car transfer training.    Goals:  Long Term Goals  Long Term Goal 1: Pt to complete transfers with indep-met  Long Term Goal 2: Pt to stand 3-5min in order to demonstrate improved functional activity tolerance-not met  Long Term Goal 3: Pt to complete 10 supine bridges L LE in order to demo improved LE and core strength-met  Patient Goals   Patient Goals : \"Get home\"    PLAN OF CARE/Safety: Cont per POC.       Therapy Time:   Individual   Time In 1430   Time Out 1530   Minutes 60      Minutes:  Transfer/Bed mobility 
Physical Therapy Rehab Treatment Note  Facility/Department: [facility] REHAB  Room: [place] [address]       NAME: Alysha Rodriguez  : 1945 (79 y.o.)  MRN: [id number]  CODE STATUS: DNR-CC    Date of Service: 2024     Restrictions:  Restrictions/Precautions: Fall Risk  Position Activity Restriction  Other Position/Activity Restrictions: 1:1 caregiver     SUBJECTIVE:   Subjective: Pt states she is ready to go home.    Pain  Pain: denies pain    OBJECTIVE:            Transfers  Sit to Stand: Modified independent  Stand to Sit: Modified independent  Bed to Chair: Modified independent              PT Exercises  A/AROM Exercises: seated LAQ, march, hip add/abd x15 ea  Circulation/Endurance Exercises: Stood facing hemirail 90sec         Education Provided: Home Exercise Program  Education  Education Given To: Patient  Education Provided: Home Exercise Program  Education Provided Comments: Reviewed seated therex and supine bridges for HEP.  Pt completed indep.  Handout issued.  ASSESSMENT/PROGRESS TOWARDS GOALS:   Assessment: Pt has met goals 1 and 3.  fatigues with standing liiting tiome not meeting goal 2.    Goals:  Long Term Goals  Long Term Goal 1: Pt to complete transfers with indep-met  Long Term Goal 2: Pt to stand 3-5min in order to demonstrate improved functional activity tolerance-not met  Long Term Goal 3: Pt to complete 10 supine bridges L LE in order to demo improved LE and core strength-met  Patient Goals   Patient Goals : \"Get home\"    PLAN OF CARE/Safety: Pt to D/C home today.        Therapy Time:   Individual   Time In 1130   Time Out 1200   Minutes 30      Minutes:  Transfer/Bed mobility trainin  Gait trainin  Neuro re education:0  Therapeutic ex:25    Ashley Escalante PTA, 24 at 11:51 AM               
Physical Therapy Rehab Treatment Note  Facility/Department: [facility] [place] [place] [facility]  Room: [place] [address]       NAME: Alysha Rodriguez  : 1945 (79 y.o.)  MRN: [id number]  CODE STATUS: Full Code    Date of Service: 2024     Restrictions:  Restrictions/Precautions: Fall Risk  Position Activity Restriction  Other Position/Activity Restrictions: 1:1 caregiver     SUBJECTIVE:   Subjective: Pt states she uses an eletric WC at home.    Pain  Pain: stomach.  No # given.  Declines intervention.    OBJECTIVE:         Bed mobility  Rolling to Left: Supervision  Rolling to Right: Supervision  Supine to Sit: Stand by assistance  Sit to Supine: Stand by assistance    Transfers  Sit to Stand: Stand by assistance (STS from bed to WW)  Stand to Sit: Stand by assistance  Bed to Chair: Minimal assistance (pivot L and R without device.  Places R stump on bed when pivioting from WC to bed.)              PT Exercises  Exercise Treatment: supine L single leg bridge 2x5, SLR x10, hip abd x10  Circulation/Endurance Exercises: Stood at WW [id number] SBA            ASSESSMENT/PROGRESS TOWARDS GOALS:     Assessment: Pt fatigued quickly in standing.  Initiated supine therex to improve core and hip strength.    Goals:  Long Term Goals  Long Term Goal 1: Pt to complete transfers with indep  Long Term Goal 2: Pt to stand 3-5min in order to demonstrate improved functional activity tolerance  Long Term Goal 3: Pt to complete 10 supine bridges L LE in order to demo improved LE and core strength  Patient Goals   Patient Goals : \"Get home\"    PLAN OF CARE/Safety: Cont per POC.  Pt with 1:1 upon departure from room.       Therapy Time:   Individual   Time In 1500   Time Out 1530   Minutes 30      Minutes:  Transfer/Bed mobility training:10  Gait trainin  Neuro re education:5  Therapeutic ex:15    Ashley Escalante PTA, 24 at 3:44 PM             
Physical Therapy Rehab Treatment Note  [facility]  [facility]       NAME: Alysha Rodriguez  : 1945 (79 y.o.)  MRN: [id number]  CODE STATUS: Full Code    Date of Service: 2024       Restrictions:  Restrictions/Precautions: Fall Risk  Position Activity Restriction  Other Position/Activity Restrictions: 1:1 caregiver       SUBJECTIVE:   Subjective: \"I am trying to do what I can but I do not feel so well\"    Pain  Pain: reports stomach and back pain. not rated and pt denies need for meds.      OBJECTIVE:   Orientation  Overall Orientation Status: Within Functional Limits  Cognition  Overall Cognitive Status: Exceptions  Arousal/Alertness: Appears intact  Following Commands: Follows one step commands with increased time  Attention Span: Difficulty dividing attention  Memory: Decreased recall of precautions  Safety Judgement: Decreased awareness of need for safety;Decreased awareness of need for assistance  Problem Solving: Assistance required to identify errors made;Assistance required to implement solutions;Assistance required to generate solutions;Assistance required to correct errors made  Insights: Decreased awareness of deficits  Initiation: Requires cues for some  Sequencing: Requires cues for some  Cognition Comment: Increased processing for all tasks         Bed Mobility  Overall Assistance Level: Stand By Assist  Additional Factors: Head of bed flat;Increased time to complete  Roll Left  Assistance Level: Stand by assist  Roll Right  Assistance Level: Stand by assist  Sit to Supine  Assistance Level: Stand by assist  Scooting  Assistance Level: Stand by assist    Transfers  Surface: Wheelchair;To bed  Sit to Stand  Assistance Level: Moderate assistance  Stand to Sit  Assistance Level: Moderate assistance  Bed To/From Chair  Assistance Level: Minimal assistance;Moderate assistance  Stand Pivot  Assistance Level: Minimal assistance;Moderate assistance               
Physical Therapy Rehab Treatment Note  [facility] [place] [facility]  [facility]       NAME: Alysha Rodriguez  : 1945 (79 y.o.)  MRN: [id number]  CODE STATUS: Full Code    Date of Service: 12/15/2024       Restrictions:  Position Activity Restriction  Other Position/Activity Restrictions: Telesitter       SUBJECTIVE:   Subjective: Supine in bed, agreeable to treatement    Pain  Pain: denies pain pre and post session    OBJECTIVE:         Bed Mobility Training  Bed Mobility Training: Yes  Overall Level of Assistance: Modified independent;Supervision  Rolling: Modified independent  Supine to Sit: Modified independent  Sit to Supine: Modified independent  Scooting: Moderate assistance    Roll side to side x4     Transfer Training  Transfer Training: Yes  Overall Level of Assistance: Minimum assistance;Stand-by assistance  Sit to Stand: Stand-by assistance;Contact-guard assistance  Stand to Sit: Contact-guard assistance;Stand-by assistance  Stand Pivot Transfers: Other (comment) (declined)      PT Exercises  Exercise Treatment: supine left single leg bridge x10 x2, rt hip flexor stretch x30 seconds x2, slr x10x2, seated laq x10x2  Circulation/Endurance Exercises: Stood at WW x45 seconds sba        ASSESSMENT/PROGRESS TOWARDS GOALS:   Body Structures, Functions, Activity Limitations Requiring Skilled Therapeutic Intervention: Decreased functional mobility ;Decreased strength;Decreased endurance;Decreased balance;Decreased ADL status;Decreased coordination;Decreased safe awareness  Assessment: Challenged patient with scoot up in bed using bridge technique and bue on bed rails, patient with fatigue in standing. BLE therex supine and seated.  Declines second attempt.    Goals:  Long Term Goals  Long Term Goal 1: Pt to complete transfers with indep  Long Term Goal 2: Pt to stand 3-5min in order to demonstrate improved functional activity tolerance  Long Term Goal 3: Pt to complete 10 supine bridges L LE in 
Physical Therapy Rehab Treatment Note  [place]  [facility]       NAME: Alysha Rodriguez  : 1945 (79 y.o.)  MRN: [id number]  CODE STATUS: Full Code    Date of Service: 2024     Restrictions:  Restrictions/Precautions: Fall Risk  Position Activity Restriction  Other Position/Activity Restrictions: 1:1 caregiver    SUBJECTIVE:   Subjective: Pt states her apartment complex is having a Dami party on Wed and she would like to go.  Pt is requesting to D/C  vs .  Pt states she sleeps in a recliner.   Pt states she wears a croc when she stands at home.    Pain  Pain: denies pain    OBJECTIVE:         Bed mobility  Rolling to Left: Modified independent  Rolling to Right: Modified independent  Supine to Sit: Modified independent  Sit to Supine: Modified independent    Transfers  Sit to Stand: Supervision  Stand to Sit: Supervision  Bed to Chair: Modified independent  Comment: Pt completed WC<>bed and WC<>recliner SPT indep.              PT Exercises  Static Standing Balance Exercises: 30-40sec at WW and facing sink x4 trials            ASSESSMENT/PROGRESS TOWARDS GOALS:   Assessment: Pt completed bed chair stand pivot transfers independently.  Fatigued with standing limiting time.    Goals:  Long Term Goals  Long Term Goal 1: Pt to complete transfers with indep  Long Term Goal 2: Pt to stand 3-5min in order to demonstrate improved functional activity tolerance  Long Term Goal 3: Pt to complete 10 supine bridges L LE in order to demo improved LE and core strength  Patient Goals   Patient Goals : \"Get home\"    PLAN OF CARE/Safety: Cont per POC.       Therapy Time:   Individual   Time In 1130   Time Out 1200   Minutes 30      Minutes:  Transfer/Bed mobility trainin  Gait trainin  Neuro re education:0  Therapeutic ex:10    Ashley Escalante PTA, 24 at 1:18 PM               
Pt. Noted with blood pressure of 93/76, with heart rate of 83, held spirolactone, and perfect serve cardiologist to update.     
Spiritual Health History and Assessment/Progress Note  [facility]    Initial Encounter,  ,  ,      Name: Alysha Rodriguez MRN: [id number]    Age: 79 y.o.     Sex: female   Language: English   Mosque: Presbyterian   Impaired mobility and activities of daily living     Date: 12/15/2024            Total Time Calculated: 25 min              Spiritual Assessment began in [facility]        Referral/Consult From: Nurse   Encounter Overview/Reason: Initial Encounter  Service Provided For: Patient    Patient was on list for advance directive.   dropped one off for her to look at and explained someone would be able to help process the paperwork probably on Monday.  Patient was very receptive to a visit and shared much about her family and support system.  She seems to have a very positive outlook and supportive family and dayanara community.   will remain available as needed/referred.    Dayanara, Belief, Meaning:   Patient is connected with a dayanara tradition or spiritual practice  Family/Friends No family/friends present      Importance and Influence:  Patient has spiritual/personal beliefs that influence decisions regarding their health  Family/Friends No family/friends present    Community:  Patient feels well-supported. Support system includes: Children and Dayanara Community  Family/Friends No family/friends present    Assessment and Plan of Care:     Patient Interventions include: Facilitated expression of thoughts and feelings and Affirmed coping skills/support systems  Family/Friends Interventions include: No family/friends present    Patient Plan of Care: Spiritual Care available upon further referral  Family/Friends Plan of Care: No family/friends present    Electronically signed by Chaplain Rajat on 12/15/2024 at 2:37 PM    
Patient reported that her hand fatigued with the task            Equipment recommendations:  OT Equipment Recommendations  Other: Patient reported that she has reachers throughout her apartment and uses them for many activities during home making      ASSESSMENT:  Assessment: Patient was open to discussing home making and meal preparation that she completes at home. Patient was able to state the techniques that she uses to compensate for the AKA and that she is able to use her power wheelchair throughout the entire apartment. Patient has no concerns for discharge to home  Activity Tolerance: Patient tolerated treatment well      PLAN OF CARE:  Strengthening, ROM, Balance training, Functional mobility training, Endurance training, Safety education & training, Self-Care / ADL, Home management training, Coordination training, Pain management, Patient/Caregiver education & training, Positioning, Equipment evaluation, education, & procurement, Cognitive/Perceptual training  Continue POC    Patient goals : \"I want to be able to do my shower and dress myself\"  Time Frame for Long Term Goals : Within ten days pt to improve in the following areas in order to meet long term        Therapy Time:   Individual Group Co-Treat   Time In 1300       Time Out 1330         Minutes 30             ADL/IADL training: 15 minutes  Therapeutic activities: 15 minutes     Electronically signed by:    REGGIE Lopez/L,   12/16/2024, 4:20 PM               
      Past Medical History:   Diagnosis Date    Arthritis     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    Dyspnea 7/2/2015    Family history of coronary artery disease 8/29/2014    History of blood transfusion 2011    following chemotherapy    Hx of right BKA (HCC)     Hyperlipidemia     Hypertension     Hypothyroid 6/12/2015    Lobular breast cancer (HCC) 10/2015    NSTEMI (non-ST elevated myocardial infarction) (HCC) 8/29/2014    Obesity     Paroxysmal atrial fibrillation (HCC) 8/29/2014    S/P BKA (below knee amputation) (HCC)      Past Surgical History:   Procedure Laterality Date    ABDOMEN SURGERY Left 04/11/2017    EXCISION OF CHEST WALL MASS, UPDATE LABS ON ADMIT  performed by Dominguez Wynn MD at [facility] OR    APPENDECTOMY      BACK SURGERY      BLEPHAROPLASTY  02/05/2013    both eyes    CARDIAC CATHETERIZATION      CARDIAC PROCEDURE N/A 11/12/2024    Left heart cath / coronary angiography performed by Ramu Bower DO at [facility] CARDIAC CATH LAB    COLONOSCOPY  01/01/2010    normal    CYSTOSCOPY W BIOPSY OF BLADDER N/A 06/26/2017    CYSTOSCOPY BLADDER BIOPSY AND FULGURATION performed by Alan Izquierdo MD at [facility] OR    EYE SURGERY Bilateral 2012    cataract removal with IOL implants    HYSTERECTOMY (CERVIX STATUS UNKNOWN)      JOINT REPLACEMENT  2001, 2005, 2009    LEG AMPUTATION BELOW KNEE Right 02/01/2011    DR GALLEGO due to CHARCOTS    MASTECTOMY Bilateral 11/16/2015    Bilateral simple mastectomies with right SNB by DR WYNN    MASTECTOMY, BILATERAL  11/16/16    PARACENTESIS Left 12/10/2024    5025 ml removed by Patsy Joyner CNP - diagnostics sent    PARTIAL HYSTERECTOMY (CERVIX NOT REMOVED)  1979    MI EXC CYST/ABERRANT BREAST TISSUE OPEN 1/> LESION 
Status: WFL    Sensation Status:  Sensation  Overall Sensation Status: Impaired  Additional Comments: Neuropathy in B hands and legs, reports it goes up into calf in L leg    Vision and Hearing Status:  Vision  Vision Exceptions: Wears glasses for reading  Hearing  Hearing: Exceptions to [facility]  Hearing Exceptions: Hard of hearing/hearing concerns, No hearing aid     UE Function Status:    ROM:   LUE AROM (degrees)  LUE AROM : [facility]  Left Hand AROM (degrees)  Left Hand AROM: [facility]  Left Hand General AROM: Arthritic changes  RUE AROM (degrees)  RUE AROM : WFL  Right Hand AROM (degrees)  Right Hand AROM: [facility]  Right Hand General AROM: Arthritic changes    Strength:  LUE Strength  Gross LUE Strength: Exceptions to WFL  L Shoulder Flex: 3+/5  L Shoulder Ext: 3+/5  L Shoulder ABduction: 3+/5  L Shoulder ADduction: 3+/5  L Shoulder Horiz ABduction: 3+/5  L Shoulder Horiz ADduction: 3+/5  L Elbow Flex: 3+/5  L Elbow Ext: 3+/5  L Wrist Flex: 3+/5  L Wrist Ext: 3+/5  L Hand General: 3+/5  RUE Strength  Gross RUE Strength: Exceptions to WFL  R Shoulder Flex: 3+/5  R Shoulder Ext: 3+/5  R Shoulder ABduction: 3+/5  R Shoulder ADduction: 3+/5  R Shoulder Horiz ABduction: 3+/5  R Shoulder Horiz ADduction: 3+/5  R Elbow Flex: 3+/5  R Elbow Ext: 3+/5  R Wrist Flex: 3+/5  R Wrist Ext: 3+/5  R Hand General: 3+/5    Coordination, Tone, Quality of Movement:   Tone RUE  RUE Tone: Normotonic  Tone LUE  LUE Tone: Normotonic  Coordination  Movements Are Fluid And Coordinated: No  Coordination and Movement Description: Fine motor impairments, Gross motor impairments, Ataxia, Right UE, Left UE  Quality of Movement Other  Comment: Significant arthritic changes to B hands    D/C Recommendations:    Equipment Recommendations:   Patient has needed equipment    OT Follow Up:  OT D/C RECOMMENDATIONS  REQUIRES OT FOLLOW-UP: Yes    Home Exercise Program Provided: [x] Yes [] No  If yes, type of HEP: UE strengthening HEP     Electronically signed by:  
Impairment    Oral/Pharyngeal Phase  PO Trials  Neuromuscular Estim Used: No  Assessment Method(s): Observation  Vocal Quality: No Impairment  Consistency Presented: Regular  How Presented: Self-fed/presented  How much presented: 1 (jenny cracker)  Bolus Acceptance: No impairment  Bolus Formation/Control: No impairment  Propulsion: No impairment  Oral Residue: None  Initiation of Swallow: No impairment  Laryngeal Elevation: Functional  Aspiration Signs/Symptoms: None  Pharyngeal Phase Characteristics: No impairment, issues, or problems  Effective Modifications: None  Cues for Modifications: None  Additional PO Trials: 1      PO Trials 2  Consistency Presented: Thin  How Presented: Cup/sip  How much presented: 4 (ounces water)  Bolus Acceptance: No impairment  Bolus Formation/Control: No impairment  Propulsion: No impairment  Oral Residue: None  Initiation of Swallow: No impairment  Aspiration Signs/Symptoms: None  Pharyngeal Phase Characteristics: No impairment, issues, or problems  Effective Modifications: None  Cues for Modifications: None    Dysphagia Diagnosis  Dysphagia Diagnosis: Swallow function appears [facility]  Dysphagia Outcome Severity Scale: Level 7: Normal in all situations     Recommendations  Requires SLP Intervention: No    Prognosis       Education  Patient Education: Pt educated on results of BSE  Patient Education Response: Verbalizes understanding  Individuals consulted  Consulted and agree with results and recommendations: Patient    If a change of diet was recommended, the following was completed following evaluation:  []  Dr. Silva notified via voicemail   []  OT/PT Departments notified via mailboxes  []  Blue wrist band placed on patient (if NPO or thickened liquids was recommended)  []  Swallow guide placed in room    Safety Devices  Safety Devices  Safety Devices in place: Yes  Type of devices: All fall risk precautions in place  Restraints Initially in Place: No    Pain Assessment  Patient 
Limits  Follows Commands: Within Functional Limits    Observation/Palpation  Observation: no acute distress; pt has h/o R BKA; pleasant and motivated; mildly confused with increased time for processing in conversation    ROM:  AROM: Within functional limits  PROM: Within functional limits    Strength:  Strength: Generally decreased, functional (Grossly 3+/5 B LEs)    Neuro:  Balance  Sitting - Static: Good  Sitting - Dynamic: Fair;+  Standing - Static: Fair;+  Standing - Dynamic: Fair;+  Comments: limited by fatigue                       Tone: Normal  Sensation: Intact    Bed mobility  Rolling to Left: Supervision  Rolling to Right: Supervision  Supine to Sit: Minimal assistance;Stand by assistance  Sit to Supine: Stand by assistance  Bed Mobility Comments: Pt struggles during transitions d/t increased fatigue requring additional time and effort. Verbal coaching for efficiency.         Ambulation  Comments: pt is non-amb at baseline    Stairs/Curb  Stairs?: No              Activity Tolerance  Activity Tolerance: Patient limited by fatigue    Patient Education  Education Given To: Patient  Education Provided: Role of Therapy;Plan of Care  Education Provided Comments: Rehab expectations; scheduling  Education Method: Verbal  Education Outcome: Verbalized understanding    Quality Indicators (IRF-TUSHAR):  Rolling L and R: Supervision or Touching Assistance - 4  Sit>Supine: Supervision or Touching Assistance - 4  Supine>Sit: Partial/Moderate Assistance (helper does <50%) - 3  Sit>Stand: Partial/Moderate Assistance (helper does <50%) - 3  Chair/Bed>Chair Transfer: Partial/Moderate Assistance (helper does <50%) - 3  Car Transfers: Not attempted due to Medical Condition or Safety Concerns (I.e. unsafe or physician orders) - 88  Walk 10 ft: Not Applicable (pt did not complete item prior to admission) - 9  Walk 50 ft with two 90 degree turns: Not Applicable (pt did not complete item prior to admission) - 9  Walk 150 ft in 
     [x]  Ambulate to the bathroom in room    [x]  Add scheduled rest beaks     [x]  In room therapies as needed      Discharge date set for:              12/18/24      Home with:   alone  with help from   friends            And:     Home Health Care:     [x]  PT    [x]  OT    []  ST   [x]  Aide       [x]  RN                    Equipment:   power chair, memory reminder book--video monitor      At D/C their function is goaled at:   PT:   OT:Eating  Assistance Needed: Setup or clean-up assistance  CARE Score: 5  Discharge Goal: Independent, Oral Hygiene  Assistance Needed: Setup or clean-up assistance  CARE Score: 5  Discharge Goal: Independent, Toileting Hygiene  Assistance needed: Dependent  CARE Score: 1  Discharge Goal: Independent, Shower/Bathe Self  Assistance Needed: Partial/moderate assistance  CARE Score: 3  Discharge Goal: Independent  Upper Body Dressing  Reason if not Attempted: Not attempted due to environmental limitations  CARE Score: 10  Discharge Goal: Independent, Lower Body Dressing  Reason if not Attempted: Not attempted due to environmental limitations  CARE Score: 10  Discharge Goal: Independent, Putting On/Taking Off Footwear  Assistance Needed: Dependent  CARE Score: 1  Discharge Goal: Independent, Toilet Transfer  Assistance needed: Supervision or touching assistance  CARE Score: 4  Discharge Goal: Independent  SP:               From a cognitive standpoint they will need:        24 hr vs daily transitioning to supervision  -->progress to occasional             Significant problems/ barriers to functional progress include: Pt is at a high risk for functional loss,      []  Acute infection/UTI    []  Low BP's     []  COPD flare-up   []  Uncontrolled blood sugar     []  Progressive anemia     [x]  poor endurance    []  Severe pain     []  Impaired mental status    []  Urinary incontinence    []  Bowel incontinence           Plan to correct barriers to functional progress:   Add scheduled rest 
to nutritional and hydration deficiency: Add and titrate vitamin B12 vitamin D and CoQ10 continue to monitor I&O’s, calorie counts prn, dietary consult prn.  Add healthy snack at night.  Acute episodic insomnia with situational adjustment disorder:  prn Ambien, monitor for day time sedation.  Falls risk elevated:  patient to use call light to get nursing assistance to get up, bed and chair alarm.  Elevated DVT risk: progressive activities in PT, continue prophylaxis MERRITT hose, elevation and meds-see MAR Eliquis on hold due to-drop in H&H she is dropped from 13.7-11.5 her Eliquis remains on hold occult blood in her stool is positive..   Oral pharyngeal dysphagia-up in a degrees of feeds-modified diet as needed.  Complex discharge planning:  Weekly team meeting every Monday to re-assess progress towards goals, discuss and address social, psychological and medical comorbidities and to address difficulties they may be having progressing in therapy.  Patient and family education is in progress.  The patient is to follow-up with their family physician after discharge.        Complex Active General Medical Issues that complicate care Assess & Plan:      High risk medication use -including Eliquis and low-dose opiates    Hx of right BKA -prosthesis from home    Malaise and fatigue-spread therapy out 900 minutes over 70 window-nasal cannula O2 as needed limit toxic medications    Morbid obesity-rule out nocturnal hypoxemia check nocturnal pulse ox      Grief, Reactive depression-emotional support provided daily, vitamin B12, encourage participation in rehabilitation support group and recreational therapy, adjust/add medications.     Cervical fusion syndrome, chronic back pain, chronic low back pain, Primary osteoarthritis involving multiple joints-topicals, heat, massage, lowest effective dose of pain medications limited doses of Tylenol due to liver disease    Hematuria, gross-Eliquis on hold    Malignant ascites-consult 
varied based on the patient's daily need.        PT   1 1/2  hrs/day 5-7 days per wk      OT   1 1/2 hrs per day 5-7 days per wk     ST  1/2    hrs /day 3-5 days per week       Estimated LOS   1-2 week(s)    -Overall functional prognosis:     [x]  Good    []  Fair    []  Poor     -Medical Prognosis:   [x]  Good    []  Fair    []  Poor    This patient was made aware of the discussion of Plan of Care, their projected dicharge date and their projected function at discharge.             Karyn Silva DO, FAAPM&R  Leader of this team meeting

## 2024-12-18 NOTE — TELEPHONE ENCOUNTER
Formerly [facility]   The it is scheduled to start care on Friday the 20th.

## 2024-12-21 LAB
EKG ATRIAL RATE: 110 BPM
EKG Q-T INTERVAL: 364 MS
EKG QRS DURATION: 76 MS
EKG QTC CALCULATION (BAZETT): 478 MS
EKG R AXIS: 48 DEGREES
EKG T AXIS: 10 DEGREES
EKG VENTRICULAR RATE: 104 BPM

## 2024-12-23 ENCOUNTER — CARE COORDINATION (OUTPATIENT)
Dept: CARE COORDINATION | Age: 79
End: 2024-12-23

## 2024-12-23 NOTE — CARE COORDINATION
Ambulatory Care Coordination Note     2024 3:23 PM     Patient Current Location:  Home: [address] [place] [address] [address] [place] [address]     ACM contacted the patient by telephone. Verified name and  with patient as identifiers.         ACM: Tana Lyon RN     Challenges to be reviewed by the provider   Additional needs identified to be addressed with provider No  none               Method of communication with provider: none.    Utilization: Has the patient been discharged from the hospital since your last call? yes -   Call within 2 business days of discharge: No    Patient: Alysha Rodriguez    Patient : 1945   MRN: [id number]    Reason for Admission: abd pain/distention and confusion  Discharge Date: 24  RURS: Readmission Risk Score: 20.2      Last Discharge Facility       Date Complaint Diagnosis Description Type Department Provider    24  Malignant ascites ... Admission (Discharged) [facility] Karyn Silva, DO            Was this an external facility discharge? No    Ambulatory Care Manager reviewed discharge instructions with patient. The patient was given an opportunity to ask questions; all questions answered at this time.. The patient verbalized understanding.   Were discharge instructions available to patient? Yes.   Reviewed appropriate site of care based on symptoms and resources available to patient including: PCP  Specialist. The patient agrees to contact the primary care provider and/or specialist office for questions related to their healthcare.     Patients top risk factors for readmission: functional physical ability, multiple health system providers, support system, and transportation    Hospital follow up appointment: Discussed follow up appointments. Patient has hospital follow up appointment scheduled within 14 days of discharge.     Care Summary Note: spoke with patient for care coordination follow up for CHF, generalized weakness s/p hospitalization  Patient in hospital

## 2024-12-23 NOTE — PROGRESS NOTES
Physician Progress Note      PATIENT:               FILIPE DOSHI  CSN #:                  [id number]  :                       1945  ADMIT DATE:       2024 9:13 AM  DISCH DATE:        2024 3:10 PM  RESPONDING  PROVIDER #:        Vahid Mason MD          QUERY TEXT:    Patient admitted with ascites, presumed malignant. Noted documentation of   metabolic encephalopathy in \"Patient did note some mild metabolic   encephalopathy waxing and waning through hospitalization\" on 24. At the   time of discharge, the mental status was disoriented to place. On arrival/   admission provider and nursing notes AOx3.   In order to support the diagnosis   of metabolic encephalopathy, please include additional clinical indicators in   your documentation.  Or please document if the diagnosis of metabolic   encephalopathy has been ruled out after further study.    The medical record reflects the following:  Risk Factors: 79 yof, HTN HLD gallbladder and omental cancers,  PMH breast   cancer  Clinical Indicators: presents to ED   Mental Status: \"She is alert and   oriented to person, place, and time. \"At the time of discharge, the mental   status was  disoriented  to place. On arrival/ admission provider and nursing notes AOx3,    HGB 11.2-12.7,   creatinine 0.53-0.85,  NA+  138, 141, 138  Treatment: IR, Rehab consult,  IR guided paracentesis with cytology sent,    hold A/C,    Thank you  Yong Bolton BSN RN [facility]  [phone] M-F 6am-2pm  Options provided:  -- Metabolic encephalopathy present as evidenced by, Please document evidence.  -- Metabolic encephalopathy was ruled out  -- Other - I will add my own diagnosis  -- Disagree - Not applicable / Not valid  -- Disagree - Clinically unable to determine / Unknown  -- Refer to Clinical Documentation Reviewer    PROVIDER RESPONSE TEXT:    Metabolic encephalopathy was ruled out after study.    Query created by: Yong Bolton on 2024 1:05

## 2024-12-30 ENCOUNTER — CARE COORDINATION (OUTPATIENT)
Dept: CARE COORDINATION | Age: 79
End: 2024-12-30

## 2024-12-30 ENCOUNTER — TELEPHONE (OUTPATIENT)
Dept: FAMILY MEDICINE CLINIC | Age: 79
End: 2024-12-30

## 2024-12-30 NOTE — CARE COORDINATION
Telephone call with patient..  She spoke of her recent hospitalization and return of cancer diagnosis.  She has an appointment with oncologist today.  She wants to move to [place] to be closer to family. Daughter to be on phone at oncologist appointment today.  Two friends are taking her to her appointment today.   
No

## 2024-12-31 DIAGNOSIS — G47.00 INSOMNIA, UNSPECIFIED TYPE: ICD-10-CM

## 2024-12-31 NOTE — TELEPHONE ENCOUNTER
Comments:     Last Office Visit (last PCP visit):   9/22/2023    Next Visit Date:  Future Appointments   Date Time Provider Department Center   1/3/2025  8:45 AM Laila Cotto APRN - CNP [facility]   1/9/2025 11:30 AM Ally Phillips APRN - CNP [facility]   2/21/2025 10:45 AM Ally Phillips APRN - CNP [facility]   9/12/2025 10:45 AM Ramu Bower DO [facility]       **If hasn't been seen in over a year OR hasn't followed up according to last diabetes/ADHD visit, make appointment for patient before sending refill to provider.    Rx requested:  Requested Prescriptions     Pending Prescriptions Disp Refills    traZODone (DESYREL) 50 MG tablet 90 tablet 1     Sig: Take 1 tablet by mouth nightly as needed for Sleep

## 2025-01-01 ENCOUNTER — APPOINTMENT (OUTPATIENT)
Dept: GENERAL RADIOLOGY | Age: 80
DRG: 843 | End: 2025-01-01
Payer: MEDICARE

## 2025-01-01 ENCOUNTER — ANESTHESIA (OUTPATIENT)
Dept: OPERATING ROOM | Age: 80
DRG: 843 | End: 2025-01-01
Payer: MEDICARE

## 2025-01-01 ENCOUNTER — ANESTHESIA EVENT (OUTPATIENT)
Dept: OPERATING ROOM | Age: 80
DRG: 843 | End: 2025-01-01
Payer: MEDICARE

## 2025-01-01 ENCOUNTER — HOSPITAL ENCOUNTER (INPATIENT)
Age: 80
LOS: 5 days | Discharge: HOSPICE | DRG: 843 | End: 2025-01-10
Attending: FAMILY MEDICINE | Admitting: INTERNAL MEDICINE
Payer: MEDICARE

## 2025-01-01 VITALS
HEIGHT: 60 IN | OXYGEN SATURATION: 100 % | HEART RATE: 157 BPM | WEIGHT: 189.7 LBS | RESPIRATION RATE: 22 BRPM | BODY MASS INDEX: 37.24 KG/M2 | TEMPERATURE: 99.1 F | DIASTOLIC BLOOD PRESSURE: 104 MMHG | SYSTOLIC BLOOD PRESSURE: 150 MMHG

## 2025-01-01 DIAGNOSIS — J90 PLEURAL EFFUSION ON LEFT: Primary | ICD-10-CM

## 2025-01-01 LAB
ALBUMIN SERPL-MCNC: 3.3 G/DL (ref 3.5–4.6)
ALBUMIN SERPL-MCNC: 3.7 G/DL (ref 3.5–4.6)
ALP SERPL-CCNC: 150 U/L (ref 40–130)
ALP SERPL-CCNC: 150 U/L (ref 40–130)
ALT SERPL-CCNC: 17 U/L (ref 0–33)
ALT SERPL-CCNC: 9 U/L (ref 0–33)
ANION GAP SERPL CALCULATED.3IONS-SCNC: 12 MEQ/L (ref 9–15)
ANION GAP SERPL CALCULATED.3IONS-SCNC: 13 MEQ/L (ref 9–15)
APPEARANCE FLUID: NORMAL
APPEARANCE FLUID: NORMAL
AST SERPL-CCNC: 24 U/L (ref 0–35)
AST SERPL-CCNC: 35 U/L (ref 0–35)
B PARAP IS1001 DNA NPH QL NAA+NON-PROBE: NOT DETECTED
B PERT.PT PRMT NPH QL NAA+NON-PROBE: NOT DETECTED
BASE EXCESS ARTERIAL: 4 (ref -3–3)
BASE EXCESS ARTERIAL: 5 (ref -3–3)
BASE EXCESS ARTERIAL: 6 (ref -3–3)
BASE EXCESS ARTERIAL: 6 (ref -3–3)
BASOPHILS # BLD: 0.1 K/UL (ref 0–0.2)
BASOPHILS # BLD: 0.1 K/UL (ref 0–0.2)
BASOPHILS NFR BLD: 0.8 %
BASOPHILS NFR BLD: 0.8 %
BILIRUB SERPL-MCNC: 0.4 MG/DL (ref 0.2–0.7)
BILIRUB SERPL-MCNC: 0.6 MG/DL (ref 0.2–0.7)
BUN SERPL-MCNC: 28 MG/DL (ref 8–23)
BUN SERPL-MCNC: 31 MG/DL (ref 8–23)
C PNEUM DNA NPH QL NAA+NON-PROBE: NOT DETECTED
CALCIUM IONIZED: 1.27 MMOL/L (ref 1.12–1.32)
CALCIUM IONIZED: 1.31 MMOL/L (ref 1.12–1.32)
CALCIUM IONIZED: 1.35 MMOL/L (ref 1.12–1.32)
CALCIUM IONIZED: 1.38 MMOL/L (ref 1.12–1.32)
CALCIUM SERPL-MCNC: 8.9 MG/DL (ref 8.5–9.9)
CALCIUM SERPL-MCNC: 9.2 MG/DL (ref 8.5–9.9)
CELL COUNT FLUID TYPE: NORMAL
CHLORIDE SERPL-SCNC: 100 MEQ/L (ref 95–107)
CHLORIDE SERPL-SCNC: 100 MEQ/L (ref 95–107)
CLOT EVALUATION: NORMAL
CO2 SERPL-SCNC: 25 MEQ/L (ref 20–31)
CO2 SERPL-SCNC: 27 MEQ/L (ref 20–31)
COLOR FLUID: NORMAL
CREAT SERPL-MCNC: 0.7 MG/DL (ref 0.5–0.9)
CREAT SERPL-MCNC: 0.76 MG/DL (ref 0.5–0.9)
EOSINOPHIL # BLD: 0.1 K/UL (ref 0–0.7)
EOSINOPHIL # BLD: 0.1 K/UL (ref 0–0.7)
EOSINOPHIL FLUID: 3 %
EOSINOPHIL NFR BLD: 1.1 %
EOSINOPHIL NFR BLD: 1.3 %
ERYTHROCYTE [DISTWIDTH] IN BLOOD BY AUTOMATED COUNT: 17.9 % (ref 11.5–14.5)
ERYTHROCYTE [DISTWIDTH] IN BLOOD BY AUTOMATED COUNT: 18 % (ref 11.5–14.5)
FLUAV RNA NPH QL NAA+NON-PROBE: NOT DETECTED
FLUBV RNA NPH QL NAA+NON-PROBE: NOT DETECTED
FLUID PATH CONSULT: YES
FLUID TYPE: NORMAL
FLUID TYPE: NORMAL
GLOBULIN SER CALC-MCNC: 3.1 G/DL (ref 2.3–3.5)
GLOBULIN SER CALC-MCNC: 3.2 G/DL (ref 2.3–3.5)
GLUCOSE BLD-MCNC: 111 MG/DL (ref 70–99)
GLUCOSE BLD-MCNC: 122 MG/DL (ref 70–99)
GLUCOSE BLD-MCNC: 130 MG/DL (ref 70–99)
GLUCOSE BLD-MCNC: 132 MG/DL (ref 70–99)
GLUCOSE FLD-MCNC: 25 MG/DL
GLUCOSE SERPL-MCNC: 130 MG/DL (ref 70–99)
GLUCOSE SERPL-MCNC: 173 MG/DL (ref 70–99)
HADV DNA NPH QL NAA+NON-PROBE: NOT DETECTED
HCO3 ARTERIAL: 29.5 MMOL/L (ref 21–29)
HCO3 ARTERIAL: 32.2 MMOL/L (ref 21–29)
HCO3 ARTERIAL: 33.1 MMOL/L (ref 21–29)
HCO3 ARTERIAL: 33.4 MMOL/L (ref 21–29)
HCOV 229E RNA NPH QL NAA+NON-PROBE: NOT DETECTED
HCOV HKU1 RNA NPH QL NAA+NON-PROBE: NOT DETECTED
HCOV NL63 RNA NPH QL NAA+NON-PROBE: NOT DETECTED
HCOV OC43 RNA NPH QL NAA+NON-PROBE: NOT DETECTED
HCT VFR BLD AUTO: 26 % (ref 36–48)
HCT VFR BLD AUTO: 26.8 % (ref 37–47)
HCT VFR BLD AUTO: 27 % (ref 36–48)
HCT VFR BLD AUTO: 27.4 % (ref 37–47)
HCT VFR BLD AUTO: 28 % (ref 36–48)
HCT VFR BLD AUTO: 30 % (ref 36–48)
HGB BLD CALC-MCNC: 10 GM/DL (ref 12–16)
HGB BLD CALC-MCNC: 8.9 GM/DL (ref 12–16)
HGB BLD CALC-MCNC: 9.2 GM/DL (ref 12–16)
HGB BLD CALC-MCNC: 9.6 GM/DL (ref 12–16)
HGB BLD-MCNC: 8.1 G/DL (ref 12–16)
HGB BLD-MCNC: 8.7 G/DL (ref 12–16)
HMPV RNA NPH QL NAA+NON-PROBE: NOT DETECTED
HPIV1 RNA NPH QL NAA+NON-PROBE: NOT DETECTED
HPIV2 RNA NPH QL NAA+NON-PROBE: NOT DETECTED
HPIV3 RNA NPH QL NAA+NON-PROBE: NOT DETECTED
HPIV4 RNA NPH QL NAA+NON-PROBE: NOT DETECTED
LACTATE DEHYDROGENASE, FLUID: 493 U/L
LACTATE: 1.39 MMOL/L (ref 0.4–2)
LACTATE: 1.47 MMOL/L (ref 0.4–2)
LACTATE: 1.79 MMOL/L (ref 0.4–2)
LACTATE: 1.81 MMOL/L (ref 0.4–2)
LDH SERPL-CCNC: 864 U/L (ref 135–214)
LYMPHOCYTES # BLD: 0.8 K/UL (ref 1–4.8)
LYMPHOCYTES # BLD: 0.8 K/UL (ref 1–4.8)
LYMPHOCYTES NFR BLD: 8.2 %
LYMPHOCYTES NFR BLD: 9.1 %
LYMPHOCYTES, BODY FLUID: 15 %
M PNEUMO DNA NPH QL NAA+NON-PROBE: NOT DETECTED
MCH RBC QN AUTO: 28.3 PG (ref 27–31.3)
MCH RBC QN AUTO: 29.7 PG (ref 27–31.3)
MCHC RBC AUTO-ENTMCNC: 30.2 % (ref 33–37)
MCHC RBC AUTO-ENTMCNC: 31.8 % (ref 33–37)
MCV RBC AUTO: 93.5 FL (ref 79.4–94.8)
MCV RBC AUTO: 93.7 FL (ref 79.4–94.8)
MONOCYTES # BLD: 0.9 K/UL (ref 0.2–0.8)
MONOCYTES # BLD: 1 K/UL (ref 0.2–0.8)
MONOCYTES NFR BLD: 11.1 %
MONOCYTES NFR BLD: 8.9 %
NEUTROPHIL, FLUID: 82 %
NEUTROPHILS # BLD: 7 K/UL (ref 1.4–6.5)
NEUTROPHILS # BLD: 8.2 K/UL (ref 1.4–6.5)
NEUTS SEG NFR BLD: 77.4 %
NEUTS SEG NFR BLD: 80.7 %
NUCLEATED CELLS FLUID: 1818 /CUMM
NUMBER OF CELLS COUNTED FLUID: 100
O2 SAT, ARTERIAL: 91 % (ref 93–100)
O2 SAT, ARTERIAL: 91 % (ref 93–100)
O2 SAT, ARTERIAL: 95 % (ref 93–100)
O2 SAT, ARTERIAL: 98 % (ref 93–100)
PATH CONSULT FLUID: NORMAL
PCO2 ARTERIAL: 55 MM HG (ref 35–45)
PCO2 ARTERIAL: 75 MM HG (ref 35–45)
PCO2 ARTERIAL: 76 MM HG (ref 35–45)
PCO2 ARTERIAL: 84 MM HG (ref 35–45)
PERFORMED ON: ABNORMAL
PH ARTERIAL: 7.21 (ref 7.35–7.45)
PH ARTERIAL: 7.24 (ref 7.35–7.45)
PH ARTERIAL: 7.25 (ref 7.35–7.45)
PH ARTERIAL: 7.34 (ref 7.35–7.45)
PH, BODY FLUID: 7.5
PLATELET # BLD AUTO: 411 K/UL (ref 130–400)
PLATELET # BLD AUTO: 424 K/UL (ref 130–400)
PO2 ARTERIAL: 119 MM HG (ref 75–108)
PO2 ARTERIAL: 73 MM HG (ref 75–108)
PO2 ARTERIAL: 77 MM HG (ref 75–108)
PO2 ARTERIAL: 90 MM HG (ref 75–108)
POC CHLORIDE: 102 MEQ/L (ref 99–110)
POC CHLORIDE: 102 MEQ/L (ref 99–110)
POC CHLORIDE: 103 MEQ/L (ref 99–110)
POC CHLORIDE: 106 MEQ/L (ref 99–110)
POC CREATININE: 1.3 MG/DL (ref 0.6–1.2)
POC CREATININE: 1.3 MG/DL (ref 0.6–1.2)
POC CREATININE: 1.7 MG/DL (ref 0.6–1.2)
POC CREATININE: 2.3 MG/DL (ref 0.6–1.2)
POC FIO2: 3
POC FIO2: 40
POC FIO2: 40
POC SAMPLE TYPE: ABNORMAL
POTASSIUM SERPL-SCNC: 4.2 MEQ/L (ref 3.4–4.9)
POTASSIUM SERPL-SCNC: 4.3 MEQ/L (ref 3.4–4.9)
POTASSIUM SERPL-SCNC: 5 MEQ/L (ref 3.5–5.1)
POTASSIUM SERPL-SCNC: 5 MEQ/L (ref 3.5–5.1)
POTASSIUM SERPL-SCNC: 5.2 MEQ/L (ref 3.5–5.1)
POTASSIUM SERPL-SCNC: 5.2 MEQ/L (ref 3.5–5.1)
PROCALCITONIN SERPL IA-MCNC: 0.12 NG/ML (ref 0–0.15)
PROT FLD-MCNC: 4.7 G/DL
PROT SERPL-MCNC: 6.5 G/DL (ref 6.3–8)
PROT SERPL-MCNC: 6.6 G/DL (ref 6.3–8)
PROT SERPL-MCNC: 6.8 G/DL (ref 6.3–8)
RBC # BLD AUTO: 2.86 M/UL (ref 4.2–5.4)
RBC # BLD AUTO: 2.93 M/UL (ref 4.2–5.4)
RBC FLUID: NORMAL /CUMM
RSV RNA NPH QL NAA+NON-PROBE: NOT DETECTED
RV+EV RNA NPH QL NAA+NON-PROBE: NOT DETECTED
SARS-COV-2 RNA NPH QL NAA+NON-PROBE: NOT DETECTED
SODIUM BLD-SCNC: 141 MEQ/L (ref 136–145)
SODIUM BLD-SCNC: 141 MEQ/L (ref 136–145)
SODIUM BLD-SCNC: 142 MEQ/L (ref 136–145)
SODIUM BLD-SCNC: 144 MEQ/L (ref 136–145)
SODIUM SERPL-SCNC: 137 MEQ/L (ref 135–144)
SODIUM SERPL-SCNC: 140 MEQ/L (ref 135–144)
SPECIMEN TYPE: NORMAL
TCO2 ARTERIAL: 31 MMOL/L (ref 21–32)
TCO2 ARTERIAL: 35 MMOL/L (ref 21–32)
TCO2 ARTERIAL: 36 MMOL/L (ref 21–32)
TCO2 ARTERIAL: 36 MMOL/L (ref 21–32)
WBC # BLD AUTO: 10.2 K/UL (ref 4.8–10.8)
WBC # BLD AUTO: 9.1 K/UL (ref 4.8–10.8)

## 2025-01-01 PROCEDURE — 1210000000 HC MED SURG R&B

## 2025-01-01 PROCEDURE — 94660 CPAP INITIATION&MGMT: CPT

## 2025-01-01 PROCEDURE — 2700000000 HC OXYGEN THERAPY PER DAY

## 2025-01-01 PROCEDURE — 82435 ASSAY OF BLOOD CHLORIDE: CPT

## 2025-01-01 PROCEDURE — 84157 ASSAY OF PROTEIN OTHER: CPT

## 2025-01-01 PROCEDURE — 84145 PROCALCITONIN (PCT): CPT

## 2025-01-01 PROCEDURE — 84132 ASSAY OF SERUM POTASSIUM: CPT

## 2025-01-01 PROCEDURE — 2500000003 HC RX 250 WO HCPCS: Performed by: INTERNAL MEDICINE

## 2025-01-01 PROCEDURE — 6360000002 HC RX W HCPCS: Performed by: INTERNAL MEDICINE

## 2025-01-01 PROCEDURE — 85014 HEMATOCRIT: CPT

## 2025-01-01 PROCEDURE — 99233 SBSQ HOSP IP/OBS HIGH 50: CPT | Performed by: NURSE PRACTITIONER

## 2025-01-01 PROCEDURE — 83605 ASSAY OF LACTIC ACID: CPT

## 2025-01-01 PROCEDURE — 6360000002 HC RX W HCPCS: Performed by: NURSE PRACTITIONER

## 2025-01-01 PROCEDURE — 99232 SBSQ HOSP IP/OBS MODERATE 35: CPT | Performed by: NURSE PRACTITIONER

## 2025-01-01 PROCEDURE — 80053 COMPREHEN METABOLIC PANEL: CPT

## 2025-01-01 PROCEDURE — 7100000011 HC PHASE II RECOVERY - ADDTL 15 MIN: Performed by: THORACIC SURGERY (CARDIOTHORACIC VASCULAR SURGERY)

## 2025-01-01 PROCEDURE — 82803 BLOOD GASES ANY COMBINATION: CPT

## 2025-01-01 PROCEDURE — 99285 EMERGENCY DEPT VISIT HI MDM: CPT

## 2025-01-01 PROCEDURE — 85025 COMPLETE CBC W/AUTO DIFF WBC: CPT

## 2025-01-01 PROCEDURE — 36600 WITHDRAWAL OF ARTERIAL BLOOD: CPT

## 2025-01-01 PROCEDURE — 5A09457 ASSISTANCE WITH RESPIRATORY VENTILATION, 24-96 CONSECUTIVE HOURS, CONTINUOUS POSITIVE AIRWAY PRESSURE: ICD-10-PCS | Performed by: INTERNAL MEDICINE

## 2025-01-01 PROCEDURE — 99232 SBSQ HOSP IP/OBS MODERATE 35: CPT | Performed by: INTERNAL MEDICINE

## 2025-01-01 PROCEDURE — 83986 ASSAY PH BODY FLUID NOS: CPT

## 2025-01-01 PROCEDURE — 82565 ASSAY OF CREATININE: CPT

## 2025-01-01 PROCEDURE — 99222 1ST HOSP IP/OBS MODERATE 55: CPT | Performed by: THORACIC SURGERY (CARDIOTHORACIC VASCULAR SURGERY)

## 2025-01-01 PROCEDURE — 88342 IMHCHEM/IMCYTCHM 1ST ANTB: CPT

## 2025-01-01 PROCEDURE — 2500000003 HC RX 250 WO HCPCS: Performed by: FAMILY MEDICINE

## 2025-01-01 PROCEDURE — 84295 ASSAY OF SERUM SODIUM: CPT

## 2025-01-01 PROCEDURE — 6370000000 HC RX 637 (ALT 250 FOR IP): Performed by: INTERNAL MEDICINE

## 2025-01-01 PROCEDURE — 83615 LACTATE (LD) (LDH) ENZYME: CPT

## 2025-01-01 PROCEDURE — 36415 COLL VENOUS BLD VENIPUNCTURE: CPT

## 2025-01-01 PROCEDURE — 89051 BODY FLUID CELL COUNT: CPT

## 2025-01-01 PROCEDURE — 88112 CYTOPATH CELL ENHANCE TECH: CPT

## 2025-01-01 PROCEDURE — C1729 CATH, DRAINAGE: HCPCS | Performed by: THORACIC SURGERY (CARDIOTHORACIC VASCULAR SURGERY)

## 2025-01-01 PROCEDURE — 7100000010 HC PHASE II RECOVERY - FIRST 15 MIN: Performed by: THORACIC SURGERY (CARDIOTHORACIC VASCULAR SURGERY)

## 2025-01-01 PROCEDURE — 3600000012 HC SURGERY LEVEL 2 ADDTL 15MIN: Performed by: THORACIC SURGERY (CARDIOTHORACIC VASCULAR SURGERY)

## 2025-01-01 PROCEDURE — 82330 ASSAY OF CALCIUM: CPT

## 2025-01-01 PROCEDURE — 0202U NFCT DS 22 TRGT SARS-COV-2: CPT

## 2025-01-01 PROCEDURE — 71045 X-RAY EXAM CHEST 1 VIEW: CPT

## 2025-01-01 PROCEDURE — 99223 1ST HOSP IP/OBS HIGH 75: CPT | Performed by: INTERNAL MEDICINE

## 2025-01-01 PROCEDURE — 6370000000 HC RX 637 (ALT 250 FOR IP): Performed by: NURSE PRACTITIONER

## 2025-01-01 PROCEDURE — 2500000003 HC RX 250 WO HCPCS: Performed by: THORACIC SURGERY (CARDIOTHORACIC VASCULAR SURGERY)

## 2025-01-01 PROCEDURE — 0W9B30Z DRAINAGE OF LEFT PLEURAL CAVITY WITH DRAINAGE DEVICE, PERCUTANEOUS APPROACH: ICD-10-PCS | Performed by: THORACIC SURGERY (CARDIOTHORACIC VASCULAR SURGERY)

## 2025-01-01 PROCEDURE — 99233 SBSQ HOSP IP/OBS HIGH 50: CPT | Performed by: INTERNAL MEDICINE

## 2025-01-01 PROCEDURE — 3600000002 HC SURGERY LEVEL 2 BASE: Performed by: THORACIC SURGERY (CARDIOTHORACIC VASCULAR SURGERY)

## 2025-01-01 PROCEDURE — 88305 TISSUE EXAM BY PATHOLOGIST: CPT

## 2025-01-01 PROCEDURE — 99231 SBSQ HOSP IP/OBS SF/LOW 25: CPT | Performed by: THORACIC SURGERY (CARDIOTHORACIC VASCULAR SURGERY)

## 2025-01-01 PROCEDURE — 99223 1ST HOSP IP/OBS HIGH 75: CPT | Performed by: NURSE PRACTITIONER

## 2025-01-01 PROCEDURE — 87070 CULTURE OTHR SPECIMN AEROBIC: CPT

## 2025-01-01 PROCEDURE — 88341 IMHCHEM/IMCYTCHM EA ADD ANTB: CPT

## 2025-01-01 PROCEDURE — 6360000002 HC RX W HCPCS: Performed by: THORACIC SURGERY (CARDIOTHORACIC VASCULAR SURGERY)

## 2025-01-01 PROCEDURE — 32550 INSERT PLEURAL CATH: CPT | Performed by: THORACIC SURGERY (CARDIOTHORACIC VASCULAR SURGERY)

## 2025-01-01 PROCEDURE — 2709999900 HC NON-CHARGEABLE SUPPLY: Performed by: THORACIC SURGERY (CARDIOTHORACIC VASCULAR SURGERY)

## 2025-01-01 PROCEDURE — 84155 ASSAY OF PROTEIN SERUM: CPT

## 2025-01-01 PROCEDURE — 2580000003 HC RX 258

## 2025-01-01 PROCEDURE — 82945 GLUCOSE OTHER FLUID: CPT

## 2025-01-01 RX ORDER — LORAZEPAM 0.5 MG/1
0.25 TABLET ORAL EVERY 12 HOURS PRN
Status: DISCONTINUED | OUTPATIENT
Start: 2025-01-01 | End: 2025-01-01 | Stop reason: HOSPADM

## 2025-01-01 RX ORDER — SENNA AND DOCUSATE SODIUM 50; 8.6 MG/1; MG/1
2 TABLET, FILM COATED ORAL DAILY
Status: DISCONTINUED | OUTPATIENT
Start: 2025-01-01 | End: 2025-01-01 | Stop reason: HOSPADM

## 2025-01-01 RX ORDER — SODIUM CHLORIDE 0.9 % (FLUSH) 0.9 %
5-40 SYRINGE (ML) INJECTION EVERY 12 HOURS SCHEDULED
Status: DISCONTINUED | OUTPATIENT
Start: 2025-01-01 | End: 2025-01-01 | Stop reason: HOSPADM

## 2025-01-01 RX ORDER — VITAMIN B COMPLEX
1 CAPSULE ORAL DAILY
Status: DISCONTINUED | OUTPATIENT
Start: 2025-01-01 | End: 2025-01-01 | Stop reason: HOSPADM

## 2025-01-01 RX ORDER — ONDANSETRON 4 MG/1
4 TABLET, ORALLY DISINTEGRATING ORAL EVERY 8 HOURS PRN
OUTPATIENT
Start: 2025-01-01

## 2025-01-01 RX ORDER — DILTIAZEM HYDROCHLORIDE 120 MG/1
120 CAPSULE, COATED, EXTENDED RELEASE ORAL DAILY
Status: DISCONTINUED | OUTPATIENT
Start: 2025-01-01 | End: 2025-01-01 | Stop reason: HOSPADM

## 2025-01-01 RX ORDER — BUPROPION HYDROCHLORIDE 75 MG/1
75 TABLET ORAL 2 TIMES DAILY
Status: DISCONTINUED | OUTPATIENT
Start: 2025-01-01 | End: 2025-01-01 | Stop reason: HOSPADM

## 2025-01-01 RX ORDER — DULOXETIN HYDROCHLORIDE 30 MG/1
30 CAPSULE, DELAYED RELEASE ORAL DAILY
OUTPATIENT
Start: 2025-01-01

## 2025-01-01 RX ORDER — ACETAMINOPHEN 650 MG/1
650 SUPPOSITORY RECTAL EVERY 6 HOURS PRN
OUTPATIENT
Start: 2025-01-01

## 2025-01-01 RX ORDER — GABAPENTIN 100 MG/1
200 CAPSULE ORAL 3 TIMES DAILY
OUTPATIENT
Start: 2025-01-01

## 2025-01-01 RX ORDER — ACETAMINOPHEN 325 MG/1
650 TABLET ORAL EVERY 6 HOURS PRN
OUTPATIENT
Start: 2025-01-01

## 2025-01-01 RX ORDER — POLYETHYLENE GLYCOL 3350 17 G/17G
17 POWDER, FOR SOLUTION ORAL DAILY PRN
OUTPATIENT
Start: 2025-01-01

## 2025-01-01 RX ORDER — POTASSIUM CHLORIDE 1500 MG/1
40 TABLET, EXTENDED RELEASE ORAL PRN
Status: DISCONTINUED | OUTPATIENT
Start: 2025-01-01 | End: 2025-01-01 | Stop reason: HOSPADM

## 2025-01-01 RX ORDER — MORPHINE SULFATE 2 MG/ML
1 INJECTION, SOLUTION INTRAMUSCULAR; INTRAVENOUS ONCE
Status: COMPLETED | OUTPATIENT
Start: 2025-01-01 | End: 2025-01-01

## 2025-01-01 RX ORDER — SODIUM CHLORIDE 9 MG/ML
INJECTION, SOLUTION INTRAVENOUS PRN
Status: DISCONTINUED | OUTPATIENT
Start: 2025-01-01 | End: 2025-01-01 | Stop reason: HOSPADM

## 2025-01-01 RX ORDER — GABAPENTIN 300 MG/1
300 CAPSULE ORAL 3 TIMES DAILY
Status: DISCONTINUED | OUTPATIENT
Start: 2025-01-01 | End: 2025-01-01

## 2025-01-01 RX ORDER — SODIUM CHLORIDE 0.9 % (FLUSH) 0.9 %
5-40 SYRINGE (ML) INJECTION PRN
Status: DISCONTINUED | OUTPATIENT
Start: 2025-01-01 | End: 2025-01-01 | Stop reason: HOSPADM

## 2025-01-01 RX ORDER — MAGNESIUM SULFATE IN WATER 40 MG/ML
2000 INJECTION, SOLUTION INTRAVENOUS PRN
Status: DISCONTINUED | OUTPATIENT
Start: 2025-01-01 | End: 2025-01-01 | Stop reason: HOSPADM

## 2025-01-01 RX ORDER — MORPHINE SULFATE 2 MG/ML
2 INJECTION, SOLUTION INTRAMUSCULAR; INTRAVENOUS
OUTPATIENT
Start: 2025-01-01

## 2025-01-01 RX ORDER — BUPROPION HYDROCHLORIDE 75 MG/1
75 TABLET ORAL 2 TIMES DAILY
OUTPATIENT
Start: 2025-01-01

## 2025-01-01 RX ORDER — ASPIRIN 81 MG/1
81 TABLET ORAL DAILY
Status: DISCONTINUED | OUTPATIENT
Start: 2025-01-01 | End: 2025-01-01 | Stop reason: HOSPADM

## 2025-01-01 RX ORDER — HYDROCODONE BITARTRATE AND ACETAMINOPHEN 5; 325 MG/1; MG/1
1 TABLET ORAL EVERY 4 HOURS PRN
Status: DISCONTINUED | OUTPATIENT
Start: 2025-01-01 | End: 2025-01-01 | Stop reason: HOSPADM

## 2025-01-01 RX ORDER — TORSEMIDE 20 MG/1
20 TABLET ORAL DAILY
Status: DISCONTINUED | OUTPATIENT
Start: 2025-01-01 | End: 2025-01-01 | Stop reason: HOSPADM

## 2025-01-01 RX ORDER — SODIUM CHLORIDE 9 MG/ML
INJECTION, SOLUTION INTRAVENOUS PRN
OUTPATIENT
Start: 2025-01-01

## 2025-01-01 RX ORDER — POLYETHYLENE GLYCOL 3350 17 G/17G
17 POWDER, FOR SOLUTION ORAL DAILY PRN
Status: DISCONTINUED | OUTPATIENT
Start: 2025-01-01 | End: 2025-01-01 | Stop reason: HOSPADM

## 2025-01-01 RX ORDER — METOPROLOL TARTRATE 1 MG/ML
5 INJECTION, SOLUTION INTRAVENOUS ONCE
Status: COMPLETED | OUTPATIENT
Start: 2025-01-01 | End: 2025-01-01

## 2025-01-01 RX ORDER — DILTIAZEM HYDROCHLORIDE 120 MG/1
120 CAPSULE, COATED, EXTENDED RELEASE ORAL DAILY
OUTPATIENT
Start: 2025-01-01

## 2025-01-01 RX ORDER — MORPHINE SULFATE 2 MG/ML
2 INJECTION, SOLUTION INTRAMUSCULAR; INTRAVENOUS EVERY 4 HOURS PRN
Status: DISCONTINUED | OUTPATIENT
Start: 2025-01-01 | End: 2025-01-01

## 2025-01-01 RX ORDER — METOPROLOL SUCCINATE 50 MG/1
50 TABLET, EXTENDED RELEASE ORAL 2 TIMES DAILY
Status: DISCONTINUED | OUTPATIENT
Start: 2025-01-01 | End: 2025-01-01 | Stop reason: HOSPADM

## 2025-01-01 RX ORDER — ONDANSETRON 4 MG/1
4 TABLET, ORALLY DISINTEGRATING ORAL EVERY 8 HOURS PRN
Status: DISCONTINUED | OUTPATIENT
Start: 2025-01-01 | End: 2025-01-01 | Stop reason: HOSPADM

## 2025-01-01 RX ORDER — MORPHINE SULFATE 2 MG/ML
2 INJECTION, SOLUTION INTRAMUSCULAR; INTRAVENOUS
Status: DISCONTINUED | OUTPATIENT
Start: 2025-01-01 | End: 2025-01-01 | Stop reason: HOSPADM

## 2025-01-01 RX ORDER — SPIRONOLACTONE 25 MG/1
25 TABLET ORAL 2 TIMES DAILY
Status: DISCONTINUED | OUTPATIENT
Start: 2025-01-01 | End: 2025-01-01 | Stop reason: HOSPADM

## 2025-01-01 RX ORDER — LORAZEPAM 2 MG/ML
1 INJECTION INTRAMUSCULAR EVERY 4 HOURS PRN
OUTPATIENT
Start: 2025-01-01

## 2025-01-01 RX ORDER — DULOXETIN HYDROCHLORIDE 30 MG/1
30 CAPSULE, DELAYED RELEASE ORAL DAILY
Status: DISCONTINUED | OUTPATIENT
Start: 2025-01-01 | End: 2025-01-01 | Stop reason: HOSPADM

## 2025-01-01 RX ORDER — ACETAMINOPHEN 650 MG/1
650 SUPPOSITORY RECTAL EVERY 6 HOURS PRN
Status: DISCONTINUED | OUTPATIENT
Start: 2025-01-01 | End: 2025-01-01 | Stop reason: HOSPADM

## 2025-01-01 RX ORDER — ONDANSETRON 2 MG/ML
4 INJECTION INTRAMUSCULAR; INTRAVENOUS EVERY 6 HOURS PRN
Status: DISCONTINUED | OUTPATIENT
Start: 2025-01-01 | End: 2025-01-01 | Stop reason: HOSPADM

## 2025-01-01 RX ORDER — MAGNESIUM HYDROXIDE 1200 MG/15ML
LIQUID ORAL CONTINUOUS PRN
Status: DISCONTINUED | OUTPATIENT
Start: 2025-01-01 | End: 2025-01-01 | Stop reason: HOSPADM

## 2025-01-01 RX ORDER — ACETAMINOPHEN 325 MG/1
650 TABLET ORAL EVERY 6 HOURS PRN
Status: DISCONTINUED | OUTPATIENT
Start: 2025-01-01 | End: 2025-01-01 | Stop reason: HOSPADM

## 2025-01-01 RX ORDER — POTASSIUM CHLORIDE 7.45 MG/ML
10 INJECTION INTRAVENOUS PRN
Status: DISCONTINUED | OUTPATIENT
Start: 2025-01-01 | End: 2025-01-01 | Stop reason: HOSPADM

## 2025-01-01 RX ORDER — ONDANSETRON 2 MG/ML
4 INJECTION INTRAMUSCULAR; INTRAVENOUS EVERY 6 HOURS PRN
OUTPATIENT
Start: 2025-01-01

## 2025-01-01 RX ORDER — METOPROLOL SUCCINATE 50 MG/1
50 TABLET, EXTENDED RELEASE ORAL 2 TIMES DAILY
OUTPATIENT
Start: 2025-01-01

## 2025-01-01 RX ORDER — SODIUM CHLORIDE 9 MG/ML
INJECTION, SOLUTION INTRAVENOUS
Status: COMPLETED
Start: 2025-01-01 | End: 2025-01-01

## 2025-01-01 RX ORDER — GABAPENTIN 100 MG/1
200 CAPSULE ORAL 3 TIMES DAILY
Status: DISCONTINUED | OUTPATIENT
Start: 2025-01-01 | End: 2025-01-01 | Stop reason: HOSPADM

## 2025-01-01 RX ORDER — PROCHLORPERAZINE EDISYLATE 5 MG/ML
10 INJECTION INTRAMUSCULAR; INTRAVENOUS ONCE
Status: COMPLETED | OUTPATIENT
Start: 2025-01-01 | End: 2025-01-01

## 2025-01-01 RX ORDER — MORPHINE SULFATE 4 MG/ML
4 INJECTION, SOLUTION INTRAMUSCULAR; INTRAVENOUS
Status: DISCONTINUED | OUTPATIENT
Start: 2025-01-01 | End: 2025-01-01

## 2025-01-01 RX ORDER — LORAZEPAM 2 MG/ML
0.5 INJECTION INTRAMUSCULAR ONCE
Status: DISCONTINUED | OUTPATIENT
Start: 2025-01-01 | End: 2025-01-01

## 2025-01-01 RX ADMIN — MORPHINE SULFATE 2 MG: 2 INJECTION, SOLUTION INTRAMUSCULAR; INTRAVENOUS at 16:21

## 2025-01-01 RX ADMIN — BUPROPION HYDROCHLORIDE 75 MG: 75 TABLET, FILM COATED ORAL at 08:27

## 2025-01-01 RX ADMIN — Medication 10 ML: at 19:53

## 2025-01-01 RX ADMIN — ONDANSETRON 4 MG: 2 INJECTION, SOLUTION INTRAMUSCULAR; INTRAVENOUS at 17:17

## 2025-01-01 RX ADMIN — DILTIAZEM HYDROCHLORIDE 120 MG: 120 CAPSULE, COATED, EXTENDED RELEASE ORAL at 17:00

## 2025-01-01 RX ADMIN — DULOXETINE 30 MG: 30 CAPSULE, DELAYED RELEASE ORAL at 20:24

## 2025-01-01 RX ADMIN — HYDROCODONE BITARTRATE AND ACETAMINOPHEN 1 TABLET: 5; 325 TABLET ORAL at 07:31

## 2025-01-01 RX ADMIN — SODIUM CHLORIDE 75 ML/HR: 9 INJECTION, SOLUTION INTRAVENOUS at 08:50

## 2025-01-01 RX ADMIN — MORPHINE SULFATE 1 MG: 2 INJECTION, SOLUTION INTRAMUSCULAR; INTRAVENOUS at 22:40

## 2025-01-01 RX ADMIN — TORSEMIDE 20 MG: 20 TABLET ORAL at 17:11

## 2025-01-01 RX ADMIN — SPIRONOLACTONE 25 MG: 25 TABLET ORAL at 08:28

## 2025-01-01 RX ADMIN — Medication 10 ML: at 20:58

## 2025-01-01 RX ADMIN — GABAPENTIN 300 MG: 300 CAPSULE ORAL at 15:11

## 2025-01-01 RX ADMIN — SENNOSIDES AND DOCUSATE SODIUM 2 TABLET: 50; 8.6 TABLET ORAL at 12:02

## 2025-01-01 RX ADMIN — MORPHINE SULFATE 4 MG: 4 INJECTION, SOLUTION INTRAMUSCULAR; INTRAVENOUS at 00:44

## 2025-01-01 RX ADMIN — MORPHINE SULFATE 2 MG: 2 INJECTION, SOLUTION INTRAMUSCULAR; INTRAVENOUS at 12:04

## 2025-01-01 RX ADMIN — MORPHINE SULFATE 2 MG: 2 INJECTION, SOLUTION INTRAMUSCULAR; INTRAVENOUS at 13:15

## 2025-01-01 RX ADMIN — HYDROCODONE BITARTRATE AND ACETAMINOPHEN 1 TABLET: 5; 325 TABLET ORAL at 20:24

## 2025-01-01 RX ADMIN — Medication 10 ML: at 20:18

## 2025-01-01 RX ADMIN — BUPROPION HYDROCHLORIDE 75 MG: 75 TABLET, FILM COATED ORAL at 20:58

## 2025-01-01 RX ADMIN — PROCHLORPERAZINE EDISYLATE 10 MG: 5 INJECTION INTRAMUSCULAR; INTRAVENOUS at 23:52

## 2025-01-01 RX ADMIN — Medication 10 ML: at 10:09

## 2025-01-01 RX ADMIN — ASPIRIN 81 MG: 81 TABLET, COATED ORAL at 08:27

## 2025-01-01 RX ADMIN — DULOXETINE 30 MG: 30 CAPSULE, DELAYED RELEASE ORAL at 20:58

## 2025-01-01 RX ADMIN — GABAPENTIN 300 MG: 300 CAPSULE ORAL at 20:58

## 2025-01-01 RX ADMIN — Medication 10 ML: at 20:23

## 2025-01-01 RX ADMIN — SPIRONOLACTONE 25 MG: 25 TABLET ORAL at 17:08

## 2025-01-01 RX ADMIN — METOPROLOL TARTRATE 5 MG: 5 INJECTION INTRAVENOUS at 18:58

## 2025-01-01 RX ADMIN — MORPHINE SULFATE 4 MG: 4 INJECTION, SOLUTION INTRAMUSCULAR; INTRAVENOUS at 17:17

## 2025-01-01 RX ADMIN — GABAPENTIN 300 MG: 300 CAPSULE ORAL at 20:24

## 2025-01-01 RX ADMIN — Medication 10 ML: at 10:31

## 2025-01-01 RX ADMIN — TORSEMIDE 20 MG: 20 TABLET ORAL at 08:28

## 2025-01-01 RX ADMIN — ONDANSETRON 4 MG: 2 INJECTION, SOLUTION INTRAMUSCULAR; INTRAVENOUS at 20:17

## 2025-01-01 RX ADMIN — VITAMIN B COMPLEX 1 CAPSULE: at 08:27

## 2025-01-01 RX ADMIN — MORPHINE SULFATE 2 MG: 2 INJECTION, SOLUTION INTRAMUSCULAR; INTRAVENOUS at 09:50

## 2025-01-01 RX ADMIN — MORPHINE SULFATE 2 MG: 2 INJECTION, SOLUTION INTRAMUSCULAR; INTRAVENOUS at 19:53

## 2025-01-01 RX ADMIN — Medication 10 ML: at 08:28

## 2025-01-01 RX ADMIN — BUPROPION HYDROCHLORIDE 75 MG: 75 TABLET, FILM COATED ORAL at 20:24

## 2025-01-01 RX ADMIN — MORPHINE SULFATE 2 MG: 2 INJECTION, SOLUTION INTRAMUSCULAR; INTRAVENOUS at 22:49

## 2025-01-01 RX ADMIN — METOPROLOL SUCCINATE 50 MG: 50 TABLET, EXTENDED RELEASE ORAL at 20:58

## 2025-01-01 RX ADMIN — SPIRONOLACTONE 25 MG: 25 TABLET ORAL at 17:17

## 2025-01-01 RX ADMIN — LORAZEPAM 0.25 MG: 0.5 TABLET ORAL at 15:19

## 2025-01-01 RX ADMIN — GABAPENTIN 300 MG: 300 CAPSULE ORAL at 08:28

## 2025-01-01 RX ADMIN — GABAPENTIN 300 MG: 300 CAPSULE ORAL at 17:08

## 2025-01-01 RX ADMIN — METOPROLOL SUCCINATE 50 MG: 50 TABLET, EXTENDED RELEASE ORAL at 08:28

## 2025-01-01 RX ADMIN — DILTIAZEM HYDROCHLORIDE 120 MG: 120 CAPSULE, COATED, EXTENDED RELEASE ORAL at 08:27

## 2025-01-01 RX ADMIN — METOPROLOL SUCCINATE 50 MG: 50 TABLET, EXTENDED RELEASE ORAL at 20:24

## 2025-01-01 ASSESSMENT — PAIN DESCRIPTION - LOCATION
LOCATION: BACK
LOCATION: BACK
LOCATION: CHEST
LOCATION: BACK
LOCATION: BACK;ABDOMEN

## 2025-01-01 ASSESSMENT — ENCOUNTER SYMPTOMS
SHORTNESS OF BREATH: 1
BACK PAIN: 1
GASTROINTESTINAL NEGATIVE: 1

## 2025-01-01 ASSESSMENT — PAIN SCALES - GENERAL
PAINLEVEL_OUTOF10: 4
PAINLEVEL_OUTOF10: 7
PAINLEVEL_OUTOF10: 6
PAINLEVEL_OUTOF10: 7
PAINLEVEL_OUTOF10: 5
PAINLEVEL_OUTOF10: 8
PAINLEVEL_OUTOF10: 5
PAINLEVEL_OUTOF10: 7
PAINLEVEL_OUTOF10: 3

## 2025-01-01 ASSESSMENT — PAIN SCALES - WONG BAKER
WONGBAKER_NUMERICALRESPONSE: NO HURT

## 2025-01-01 ASSESSMENT — PAIN DESCRIPTION - ORIENTATION
ORIENTATION: LOWER
ORIENTATION: LOWER

## 2025-01-01 ASSESSMENT — PAIN DESCRIPTION - DESCRIPTORS
DESCRIPTORS: ACHING;SORE
DESCRIPTORS: ACHING;DISCOMFORT
DESCRIPTORS: SORE;ACHING
DESCRIPTORS: ACHING;DISCOMFORT

## 2025-01-01 ASSESSMENT — PAIN - FUNCTIONAL ASSESSMENT
PAIN_FUNCTIONAL_ASSESSMENT: NONE - DENIES PAIN
PAIN_FUNCTIONAL_ASSESSMENT: NONE - DENIES PAIN

## 2025-01-02 RX ORDER — TRAZODONE HYDROCHLORIDE 50 MG/1
50 TABLET, FILM COATED ORAL NIGHTLY PRN
Qty: 90 TABLET | Refills: 1 | Status: ON HOLD | OUTPATIENT
Start: 2025-01-02

## 2025-01-02 NOTE — PROGRESS NOTES
Subjective:      Patient Id: Seen Alysha at  home in [place] , for initial palliative medicine consult.  She was accompanied to the appointment by: Self.      Home visit necessary due to impaired mobility, debility, multiple comorbidities, and high burden of office visit.    Chief Complaint   Patient presents with    Follow-up    Cancer    Pain    Shortness of Breath        HPI    Alysha Rodriguez is a 79 y.o. female referred to palliative care while hospitalized for symptom management, advance care planning, and goals of care conversation. Alysha has complex medical history that includes hypertension, hyperlipidemia, hypothyroidism, coronary artery disease, right BKA, gall bladder and breast cancer (bilateral mastectomies), and omental cancer sp surgery and chemotherapy in 2011.    Patient in hospital from 12/9-12/12 and inpatient rehab 12/12-12/17 for abd pain and bloating. Diagnosis of ascites and gallbladder cancer        Patient complains of cancer, pain, sob.  Patient follows with pcp,cardiology, .    General: Upon entering the room I find the patient is alert and oriented x4. Patient is calm, cooperative and in NAD.    Functional status: Patient lives home alone. Patient with right BKA. She is up with motorized wheelchair. Transfers self. Really tired by evenings. Patient sleeps in reclining chair. New motorized wheelchair ordered. She has C coming once a week. Just skilled nursing. She was able to put on prosthetic leg but she does not walk much with it. She uses it more for stability.     Cancer History: Patient with a history of gallbladder and breast cancer.  As well as omental cancer requiring chemotherapy in 2011.  Patient hospitalized with abdominal discomfort and ascites beginning of December.  She underwent paracentesis and fluid was sent for culture.  Patient with malignant ascites.  Patient saw oncology last week.  Patient can start chemo Monday. Chemo will be palliative to help keep fluid from

## 2025-01-03 ENCOUNTER — OFFICE VISIT (OUTPATIENT)
Dept: PALLATIVE CARE | Age: 80
End: 2025-01-03

## 2025-01-03 VITALS
TEMPERATURE: 98.3 F | SYSTOLIC BLOOD PRESSURE: 119 MMHG | OXYGEN SATURATION: 91 % | HEART RATE: 91 BPM | DIASTOLIC BLOOD PRESSURE: 81 MMHG

## 2025-01-03 DIAGNOSIS — N18.30 STAGE 3 CHRONIC KIDNEY DISEASE, UNSPECIFIED WHETHER STAGE 3A OR 3B CKD (HCC): ICD-10-CM

## 2025-01-03 DIAGNOSIS — Z89.511 HX OF RIGHT BKA (HCC): ICD-10-CM

## 2025-01-03 DIAGNOSIS — Z51.5 PALLIATIVE CARE ENCOUNTER: ICD-10-CM

## 2025-01-03 DIAGNOSIS — M54.40 LOW BACK PAIN WITH SCIATICA, SCIATICA LATERALITY UNSPECIFIED, UNSPECIFIED BACK PAIN LATERALITY, UNSPECIFIED CHRONICITY: ICD-10-CM

## 2025-01-03 DIAGNOSIS — C80.1 CANCER (HCC): ICD-10-CM

## 2025-01-03 DIAGNOSIS — R06.02 SOB (SHORTNESS OF BREATH): ICD-10-CM

## 2025-01-03 DIAGNOSIS — Z71.89 ACP (ADVANCE CARE PLANNING): Primary | ICD-10-CM

## 2025-01-03 DIAGNOSIS — I50.22 CHRONIC SYSTOLIC (CONGESTIVE) HEART FAILURE (HCC): ICD-10-CM

## 2025-01-03 DIAGNOSIS — Z71.89 GOALS OF CARE, COUNSELING/DISCUSSION: ICD-10-CM

## 2025-01-03 DIAGNOSIS — R18.0 MALIGNANT ASCITES: ICD-10-CM

## 2025-01-03 RX ORDER — HYDROCODONE BITARTRATE AND ACETAMINOPHEN 5; 325 MG/1; MG/1
1 TABLET ORAL EVERY 6 HOURS PRN
Status: ON HOLD | COMMUNITY

## 2025-01-03 ASSESSMENT — ENCOUNTER SYMPTOMS
NAUSEA: 0
SHORTNESS OF BREATH: 1
BACK PAIN: 1
CONSTIPATION: 0
ABDOMINAL PAIN: 0
COUGH: 1
DIARRHEA: 0

## 2025-01-03 NOTE — PATIENT INSTRUCTIONS

## 2025-01-05 ENCOUNTER — TELEPHONE (OUTPATIENT)
Dept: PALLATIVE CARE | Age: 80
End: 2025-01-05

## 2025-01-05 PROBLEM — J90 PLEURAL EFFUSION: Status: ACTIVE | Noted: 2025-01-05

## 2025-01-05 NOTE — CARE COORDINATION
Case Management Assessment  Initial Evaluation    Date/Time of Evaluation: 1/5/2025 4:14 PM  Assessment Completed by: Juliane Huggins, RN, BSN    If patient is discharged prior to next notation, then this note serves as note for discharge by case management.    Patient Name: Alysha Rodriguez                   YOB: 1945  Diagnosis: Pleural effusion [J90]  Pleural effusion on left [J90]                   Date / Time: 1/5/2025  1:07 PM    Patient Admission Status: Inpatient   Readmission Risk (Low < 19, Mod (19-27), High > 27): Readmission Risk Score: 22.1    Current PCP: Ally Phillips, YURY - CNP  PCP verified by CM? Yes (recent rehab stay)    Chart Reviewed: Yes      History Provided by: Patient  Patient Orientation: Alert and Oriented, Person, Place, Situation, Self    Patient Cognition: Alert    Hospitalization in the last 30 days (Readmission):  Yes    If yes, Readmission Assessment in  Navigator will be completed.    Readmission Assessment  Number of Days since last admission?: 8-30 days  Previous Disposition: Home Alone  Who is being Interviewed: Patient  What was the patient's/caregiver's perception as to why they think they needed to return back to the hospital?: Did not realize care needs would be so extensive, Other (Comment) (INCREASING SOB & PANIC ATTACK)  Did you visit your Primary Care Physician after you left the hospital, before you returned this time?: No  Why weren't you able to visit your PCP?: Did not have an appointment  Did you see a specialist, such as Cardiac, Pulmonary, Orthopedic Physician, etc. after you left the hospital?: Yes ([facility])  Who advised the patient to return to the hospital?: Other (Comment) ([facility] ADVISED ER)  Does the patient report anything that got in the way of taking their medications?: No  In our efforts to provide the best possible care to you and others like you, can you think of anything that we could have done to help you  after you left the hospital the first time, so that you might not have needed to return so soon?: Teaching during hospitalization regarding your illness, Other (Comment) (IDENTIFYING PT'S GOALS & EXPLAINING HER OPTIONS THOROUGHLY & GET TO NEXT LOC WITH HER TERMINAL ILLNESS & MANAGING SYMPTOMS)       Advance Directives:      Code Status: Limited   Patient's Primary Decision Maker is: Legal Next of Kin    Primary Decision Maker: Tania Stanton - Child - [phone]    Discharge Planning:    Patient lives with: Alone Type of Home: Apartment  Primary Care Giver: Self  Patient Support Systems include: Children, [facility], Friends/Neighbors, Other (Comment), /, Meals on Wheels ([facility])   Current Financial resources: Medicare  Current community resources: Other (Comment) ([facility] & MEALS ON WHEELS)  Current services prior to admission: Durable Medical Equipment, Oxygen Therapy, Other (Comment), Meals On Wheels, Transitional Care ([facility])            Current DME: Walker, Wheelchair, Oxygen Therapy (Comment), Shower Chair, Cane            Type of Home Care services:  None    ADLS  Prior functional level: Assistance with the following:, Housework, Mobility, Shopping, Other (see comment), Cooking (HAS FRIENDS THAT DRIVE PT W/C BOUND IN HOME HAS R PROSTHETIC LEG MAINLY FOR STABILITY CAN TRANSFER STAND & PIVOT ALONE MEALS ON WHEELS)  Current functional level: Assistance with the following:, Cooking, Housework, Mobility, Other (see comment), Shopping (HAS FRIENDS THAT DRIVE PT W/C BOUND IN HOME HAS R PROSTHETIC LEG MAINLY FOR STABILITY CAN TRANSFER STAND & PIVOT ALONE MEALS & WHEELS)    Family can provide assistance at DC: No  Would you like Case Management to discuss the discharge plan with any other family members/significant others, and if so, who? Yes (HER DTR & HIPAA CODE GIVEN TO HER)  Plans to Return to Present

## 2025-01-05 NOTE — ED TRIAGE NOTES
Pt arrived via EMS from home with c/o SOB and anxiety, pt denies any respiratory disease but has 02 at home for PRN use.  Pt has required 02 use all day for the past few days.  Pt is 90% SP02 on RA and complains of SOB but is comfortable on 2 LPM.  Pt is A&O x4, breathing is labored, and pt is pale.  Pt thinks this is d/t anxiety, she does not take anxiety medication.  Pt has depression and stated she needs to be in a NH.

## 2025-01-05 NOTE — CARE COORDINATION
Pt has document stating DNRCC. I met with pt to discuss her wishes and expectations. Explained pall care & hospice philosophies.     I and Dr Centeno than met with pt and after explaining difference with DNRCC and DNRCCA and can have limited code options. Pt states she wants the latter with no intubation or CPR.she wants to receive medications and have para or thoracentesis for symptom relief and \"low dose chemo like Dr Mendosa said to stop ascites from coming back\". She was to start the low dose chemo tomorrow. I will message attending of such. New document signed.and scanned in chart.    Pt now states she has \"no business\" being at home alone any longer being at home she cannot care for herself.     Pt is [facility] participant. SNF FOC given  Will not need waiver, pt had qualifying event w her stay at [facility].12/12--12/17  She states she would like to go to #1 [facility]  #2 [facility].     Pt reports her foreseeable goal is to move to [place] by the end of February to be near her daughter & grand children. She states Dr Mendosa is aware and told her she needs to see oncologist as soon as she arrives and he will forward records. Yet her dtr has to come here to help w emptying out her apt. She has Protestant members and friends who also will help.       Friend arrived and at bedside for emotional support.    Electronically signed by Juliane Huggins, RN, BSN on 1/5/2025 at 3:39 PM

## 2025-01-05 NOTE — CARE COORDINATION
Case was discussed with Dr. Centeno re: pt wanting placement from home alone. Pt does participate in MSS[facility] with possible Medicare Waiver 3 day stay if pt chooses a participating facility.    [facility] was [facility].Per previous notes pt had mentioned would like to go to [facility]. Pt will require PT/OT evals , waiver and acceptance from [facility]. I will discuss further w pt.     Electronically signed by Juliane Huggins, RN, BSN on 1/5/2025 at 1:55 PM

## 2025-01-05 NOTE — ACP (ADVANCE CARE PLANNING)
Advance Care Planning   Healthcare Decision Maker:    Primary Decision Maker: Tania Stanton - Child - [phone]    Click here to complete Healthcare Decision Makers including selection of the Healthcare Decision Maker Relationship (ie \"Primary\").  Today we documented Decision Maker(s) consistent with Legal Next of Kin hierarchy.       Pt states dtr Tania is POA and lives in [place], enc if has copy it can be scanned in EMR.     Electronically signed by Juliane Huggins, RN, BSN on 1/5/2025 at 3:43 PM

## 2025-01-05 NOTE — ED PROVIDER NOTES
[facility]  4W MED SURG UNIT  eMERGENCY dEPARTMENT eNCOUnter      Pt Name: Alysha Rodriguez  MRN: [id number]  Birthdate 1945  Date of evaluation: 1/5/2025  Provider: NORA HERNANDEZ MD  1:39 PM EST     CHIEF COMPLAINT       Chief Complaint   Patient presents with    Shortness of Breath         HISTORY OF PRESENT ILLNESS   (Location/Symptom, Timing/Onset,Context/Setting, Quality, Duration, Modifying Factors, Severity)  Note limiting factors.   Alysha Rodriguez is a 79 y.o. female who presents to the emergency department soc       Alysha Rodriguez is a 79 y.o. female  with a  complex medical history that includes hypertension, hyperlipidemia, hypothyroidism, coronary artery disease, right BKA, gall bladder and breast cancer (bilateral mastectomies), and omental cancer sp surgery and chemotherapy in 2011.  Patient is currently DNR CC CA but no intubation and no CPR and is currently under under palliative care.  Presented today with acute shortness of breath states she feels like she had a panic attack and she felt so short of breath.  Also patient feel like she is unable to be home by herself anymore unable to care for herself..  Background patient was recently admitted for large malignant ascites ascites that required IR drainage 2 weeks    The history is provided by the patient.       NursingNotes were reviewed.    REVIEW OF SYSTEMS    (2-9 systems for level 4, 10 or more for level 5)     Review of Systems   Constitutional: Negative.    HENT: Negative.     Respiratory:  Positive for chest tightness and shortness of breath. Negative for apnea, cough, choking, wheezing and stridor.    Cardiovascular: Negative.    Skin: Negative.    Psychiatric/Behavioral: Negative.         Except as noted above the remainder of the review of systems was reviewed and negative.       PAST MEDICAL HISTORY     Past Medical History:   Diagnosis Date    Arthritis     Blood transfusion     CAD (coronary artery disease)     Cancer (HCC)      Comments: Right BKA moving all other extremities without any obvious   Skin:     General: Skin is warm and dry.   Neurological:      General: No focal deficit present.      Mental Status: She is alert and oriented to person, place, and time.      Cranial Nerves: Cranial nerve deficit present.      Sensory: No sensory deficit.      Motor: No abnormal muscle tone.      Coordination: Coordination normal.      Deep Tendon Reflexes: Reflexes normal.   Psychiatric:         Mood and Affect: Mood normal.         Behavior: Behavior normal.         Thought Content: Thought content normal.         Judgment: Judgment normal.             RESULTS     EKG: All EKG's are interpreted by the Emergency Department Physician who either signs or Co-signsthis chart in the absence of a cardiologist.         RADIOLOGY:   Non-plain filmimages such as CT, Ultrasound and MRI are read by the radiologist. Plain radiographic images are visualized and preliminarily interpreted by the emergency physician with the below findings:         Interpretation per the Radiologist below, if available at the time ofthis note:    XR CHEST PORTABLE   Final Result   Near complete opacification left hemithorax likely a mixture of collapsed   lung/infiltrate and pleural fluid.               ED BEDSIDE ULTRASOUND:   Performed by ED Physician - none    LABS:  Labs Reviewed   CBC WITH AUTO DIFFERENTIAL - Abnormal; Notable for the following components:       Result Value    RBC 2.93 (*)     Hemoglobin 8.7 (*)     Hematocrit 27.4 (*)     MCHC 31.8 (*)     RDW 17.9 (*)     Platelets 411 (*)     Neutrophils Absolute 7.0 (*)     Lymphocytes Absolute 0.8 (*)     Monocytes Absolute 1.0 (*)     All other components within normal limits   COMPREHENSIVE METABOLIC PANEL W/ REFLEX TO MG FOR LOW K - Abnormal; Notable for the following components:    Glucose 130 (*)     BUN 31 (*)     Albumin 3.3 (*)     Alkaline Phosphatase 150 (*)     All other components within normal limits

## 2025-01-05 NOTE — H&P
cervical region M96.1    Postlaminectomy syndrome of lumbar region M96.1    Tibialis tendinitis M76.829    Hereditary and idiopathic peripheral neuropathy ([facility]) G60.9    Hypothyroid E03.9    Lobular breast cancer ([facility]) C50.919    Acquired hallux valgus M20.10    Fatigue due to treatment R53.83    Anemia D64.9    Cancer ([facility]) C80.1    Cervical spinal cord injury ([facility]) S14.109A    Complete traumatic amputation at level between right knee and ankle ([facility]) S88.111A    Morbid obesity E66.01    Reactive depression F32.9    Sleeping excessive G47.10    Chronic low back pain M54.50, G89.29    Left breast mass N63.20    Primary osteoarthritis involving multiple joints M15.0    Hematuria R31.9    Hematuria, gross R31.0    Noncompliance - No Show inject 07/17/17, No Show F/U 1/11/18 Z91.199    Chronic low back pain with sciatica M54.40, G89.29    Moderate episode of recurrent major depressive disorder ([facility]) F33.1    Charcot's arthropathy M14.60    Chronic pain syndrome G89.4    Cervical fusion syndrome Q76.1    Current mild episode of major depressive disorder ([facility]) F32.0    Obstructive sleep apnea G47.33    Malignant neoplasm of peritoneum, 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[facility]) C48.2    Dizziness R42    Gait abnormality dt cerebrovascular insufficiancy and gait atasxia R26.9    Difficulty balancing R29.818    Hx of BKA, right ([facility]) Z89.511    Abnormality of gait and mobility R26.9    Drug-induced polyneuropathy ([facility]) G62.0    Chronic renal disease, stage III ([facility]) [405056] N18.30    Chronic systolic (congestive) heart failure I50.22    Lumbar radiculitis M54.16    Nonrheumatic mitral valve regurgitation I34.0    Severe mitral regurgitation I34.0    Atrial fibrillation with RVR ([facility]) I48.91    Malignant ascites R18.0    Grief F43.21    Bowel and bladder incontinence R32, R15.9    Nocturnal hypoxemia G47.34    Palliative care encounter Z51.5    Goals of care, counseling/discussion Z71.89    Advanced care planning/counseling discussion Z71.89     Encounter for hospice care discussion Z71.89    Pleural effusion J90       John Ramírez MD, MD  Admitting Hospitalist    Emergency Contact:

## 2025-01-06 ENCOUNTER — CARE COORDINATION (OUTPATIENT)
Dept: CARE COORDINATION | Age: 80
End: 2025-01-06

## 2025-01-06 PROBLEM — E44.0 MODERATE MALNUTRITION (HCC): Status: ACTIVE | Noted: 2025-01-01

## 2025-01-06 PROBLEM — Z71.89 ADVANCE CARE PLANNING: Status: ACTIVE | Noted: 2025-01-06

## 2025-01-06 PROBLEM — R06.02 SHORTNESS OF BREATH: Status: ACTIVE | Noted: 2025-01-01

## 2025-01-06 PROBLEM — G89.3 NEOPLASM RELATED PAIN: Status: ACTIVE | Noted: 2025-01-01

## 2025-01-06 NOTE — PROGRESS NOTES
Pt assessment and VS completed. Pt A&O x4 calm and cooperative. Pt anxious, but cooperative. Pt with R BKA. Meds given per MAR. Pt tolerating reg diet. Bed locked and in low position. Call light within reach.     Electronically signed by Suzanna Nichole RN on 1/6/2025 at 4:47 PM

## 2025-01-06 NOTE — CONSULTS
Hematology/Oncology Consult  Encounter Date: 2025 4:23 PM    Ms. Alysha Rodriguez is a 79 y.o. female  : 1945  MRN: [id number]  Acct Number: [id number]  Requesting Provider: dr Ramírez    Reason for request: Peritoneal cancer      CONSULTANT: Kvng Vasquez MD    HPI: Alysha was admitted for shortness of breathe. Chest x ray showed collapsed left lung. Recent diagnosis of peritoneal cancer, recent paracentesis/.  History of peritoneal cancer in . , Breast cancer, gallbladder cancer. When medically stable will start treatment for the cancer.     Patient Active Problem List   Diagnosis    Neck pain on right side    High risk medication use - 18 OARRS PM&R, 18 OARRS PM&R, 18 Urine Drug Screen positive opiates PM&R, 18 Med Contract PM&R    Impaired mobility ADLs dt hepatic encephalopathy    Hx of right BKA (HCC)    Family history of coronary artery disease    NSTEMI (non-ST elevated myocardial infarction) (HCC)    Arthritis, neuropathic    Charcot's joint of foot    Closed dislocation of ankle    Closed dislocation of foot    Complete rotator cuff rupture of left shoulder    Clinical depression    Benign essential HTN    Acquired flat foot    Closed fracture of tarsal and metatarsal bones    Myogenic ptosis    Disorder of peripheral nervous system    Arthritis of ankle or foot, degenerative    HLD (hyperlipidemia)    Cervical post-laminectomy syndrome    Failed back syndrome of lumbar spine    Low back pain with sciatica    Fibromyalgia muscle pain    Malaise and fatigue    Melancholia    Complete tear of left rotator cuff    Generalized osteoarthritis    Peripheral neuropathy    Osteoporosis    Postlaminectomy syndrome of cervical region    Postlaminectomy syndrome of lumbar region    Tibialis tendinitis    Hereditary and idiopathic peripheral neuropathy (HCC)    Hypothyroid    Lobular breast cancer (HCC)    Acquired hallux valgus    Fatigue due to treatment    Anemia    Cancer  Primary, Cleaning Responsibility: Secondary    Ambulation Assistance: Independent (last few days uses walker to get around), Transfer Assistance: Independent    Active : no    Additional Comments: prosthesis  has used power w/c    Retired --worked a day a week as a  [employer]         Social Determinants of Health     Financial Resource Strain: Medium Risk (11/18/2024)    Overall Financial Resource Strain (CARDIA)     Difficulty of Paying Living Expenses: Somewhat hard   Food Insecurity: No Food Insecurity (1/5/2025)    Hunger Vital Sign     Worried About Running Out of Food in the Last Year: Never true     Ran Out of Food in the Last Year: Never true   Transportation Needs: No Transportation Needs (1/5/2025)    PRAPARE - Transportation     Lack of Transportation (Medical): No     Lack of Transportation (Non-Medical): No   Recent Concern: Transportation Needs - Unmet Transportation Needs (12/12/2024)    PRAPARE - Transportation     Lack of Transportation (Medical): No     Lack of Transportation (Non-Medical): Yes   Physical Activity: Inactive (3/28/2024)    Exercise Vital Sign     Days of Exercise per Week: 0 days     Minutes of Exercise per Session: 0 min   Stress: Stress Concern Present (10/2/2023)    [employer] [place] [employer] - Occupational Stress Questionnaire     Feeling of Stress : Rather much   Social Connections: Socially Isolated (9/29/2022)    Social Connection and Isolation Panel [NHANES]     Frequency of Communication with Friends and Family: More than three times a week     Frequency of Social Gatherings with Friends and Family: Once a week     Attends Yazidi Services: Never     Active Member of Clubs or Organizations: No     Attends Club or Organization Meetings: Never     Marital Status:    Intimate Partner Violence: Not on file   Housing Stability: Low Risk  (1/5/2025)    Housing Stability Vital Sign     Unable to Pay for Housing in the Last

## 2025-01-06 NOTE — PLAN OF CARE
Problem: Chronic Conditions and Co-morbidities  Goal: Patient's chronic conditions and co-morbidity symptoms are monitored and maintained or improved  1/6/2025 1028 by Suzanna Nichole RN  Outcome: Progressing  1/6/2025 0301 by Siria Marinelli RN  Outcome: Progressing  Flowsheets  Taken 1/5/2025 2030 by Siria Marinelli RN  Care Plan - Patient's Chronic Conditions and Co-Morbidity Symptoms are Monitored and Maintained or Improved: Monitor and assess patient's chronic conditions and comorbid symptoms for stability, deterioration, or improvement  Taken 1/5/2025 1636 by Adelia Tinsley RN  Care Plan - Patient's Chronic Conditions and Co-Morbidity Symptoms are Monitored and Maintained or Improved:   Monitor and assess patient's chronic conditions and comorbid symptoms for stability, deterioration, or improvement   Collaborate with multidisciplinary team to address chronic and comorbid conditions and prevent exacerbation or deterioration   Update acute care plan with appropriate goals if chronic or comorbid symptoms are exacerbated and prevent overall improvement and discharge     Problem: Discharge Planning  Goal: Discharge to home or other facility with appropriate resources  1/6/2025 1028 by Suzanna Nichole RN  Outcome: Progressing  1/6/2025 0301 by Siria Marinelli RN  Outcome: Progressing  Flowsheets  Taken 1/5/2025 2030 by Siria Marinelli RN  Discharge to home or other facility with appropriate resources: Identify barriers to discharge with patient and caregiver  Taken 1/5/2025 1636 by Adelia Tinsley RN  Discharge to home or other facility with appropriate resources:   Arrange for needed discharge resources and transportation as appropriate   Identify barriers to discharge with patient and caregiver   Identify discharge learning needs (meds, wound care, etc)     Problem: Safety - Adult  Goal: Free from fall injury  1/6/2025 1028 by Suzanna Nichole RN  Outcome: Progressing  1/6/2025 0301 by Yves

## 2025-01-06 NOTE — CONSULTS
Pulmonary Medicine  Consult Note      Reason for consultation: Pleural effusion, shortness of breath    Consulting physician: Dr. Ramírez    HISTORY OF PRESENT ILLNESS:      This is a 79-year-old female with past medical history significant for coronary artery disease, paroxysmal A-fib, right BKA, gallbladder cancer, breast cancer status post mastectomies, omental cancer was presented with shortness of breath which has been progressive.  She has no chest pain no fever or chills no cough or sputum production.  On 2 L O2 via nasal cannula O2 saturation 97%.  She had chest x-ray done which shows near complete opacification of left hemithorax likely due to mixture collapse of the lungs, infiltration and pleural effusion.  Pulmonary was consulted, since likely malignant effusion in setting of ascites and recommend to have cardiothoracic surgery consult for their opinion regarding Pleurx catheter placement.    Past Medical History:        Diagnosis Date    Arthritis     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    Dyspnea 7/2/2015    Family history of coronary artery disease 8/29/2014    History of blood transfusion 2011    following chemotherapy    Hx of right BKA (HCC)     Hyperlipidemia     Hypertension     Hypothyroid 6/12/2015    Lobular breast cancer (HCC) 10/2015    NSTEMI (non-ST elevated myocardial infarction) (HCC) 8/29/2014    Obesity     Paroxysmal atrial fibrillation (HCC) 8/29/2014    S/P BKA (below knee amputation) (HCC)        Past Surgical History:        Procedure Laterality Date    ABDOMEN SURGERY Left 04/11/2017    EXCISION OF CHEST WALL MASS, UPDATE LABS ON ADMIT  performed by Dominguez Wynn MD at [facility]    APPENDECTOMY      BACK SURGERY      BLEPHAROPLASTY

## 2025-01-06 NOTE — CONSULTS
8/29/2014    History of blood transfusion 2011    following chemotherapy    Hx of right BKA (HCC)     Hyperlipidemia     Hypertension     Hypothyroid 6/12/2015    Lobular breast cancer (HCC) 10/2015    NSTEMI (non-ST elevated myocardial infarction) (HCC) 8/29/2014    Obesity     Paroxysmal atrial fibrillation (HCC) 8/29/2014    S/P BKA (below knee amputation) (HCC)        PSHx:  Past Surgical History:   Procedure Laterality Date    ABDOMEN SURGERY Left 04/11/2017    EXCISION OF CHEST WALL MASS, UPDATE LABS ON ADMIT  [place]ing[place] [facility] [place]    APPENDECTOMY      BACK SURGERY      BLEPHAROPLASTY  02/05/2013    both eyes    CARDIAC CATHETERIZATION      CARDIAC PROCEDURE N/A 11/12/2024    Left heart cath / coronary angiography performed by Ramu Bower DO [place] [facility] [place] [facility]    COLONOSCOPY  01/01/2010    normal    CYSTOSCOPY W BIOPSY OF BLADDER N/A 06/26/2017    CYSTOSCOPY BLADDER BIOPSY AND FULGURATION performed by Alan Izquierdo MD [place] [facility] [place]    EYE SURGERY Bilateral 2012    cataract removal with IOL implants    HYSTERECTOMY (CERVIX STATUS UNKNOWN)      JOINT REPLACEMENT  2001, 2005, 2009    LEG AMPUTATION BELOW KNEE Right 02/01/2011    DR GALLEGO due to CHARCOTS    MASTECTOMY Bilateral 11/16/2015    Bilateral simple mastectomies with right SNB by DR WYNN    MASTECTOMY, BILATERAL  11/16/16    PARACENTESIS Left 12/10/2024    5025 ml removed by Patsy Joyner CNP - diagnostics sent    PARTIAL HYSTERECTOMY (CERVIX NOT REMOVED)  1979    [place] EXC CYST/ABERRANT BREAST TISSUE OPEN 1/> LESION Left 01/24/2017    CORE NEEDLE BIOPSY OF  LEFT BREAST MASS performed by Dominguez Wynn MD at [facility] [place]    SKIN BIOPSY      EAR    SPINE SURGERY      cervical and lumbar    TONSILLECTOMY  1947       Social Hx:  Social History     Socioeconomic History    Marital status:     Number of children: 1   Occupational History    Occupation: retired   Tobacco Use    Smoking status: Never    Smokeless tobacco:  Never    Tobacco comments:     Never   Vaping Use    Vaping status: Never Used   Substance and Sexual Activity    Alcohol use: Not Currently     Comment: 1-2 drinks/month    Drug use: No    Sexual activity: Not Currently     Partners: Male   Social History Narrative    Lives With: Alone,     dtr in [place], works full-time    disabled son with Down's Syndrome passed away in February 2024    Type of Home: Apart[address] [place] [address] Dr in [place] (she calls it an [employer]-bc they all help each other)    Home Layout: One level, Level entry    Bathroom Shower/Tub: Tub/Shower unit, Equipment: Tub transfer bench, Grab bars in shower    Bathroom Accessibility: Wheelchair accessible    Home Equipment: Quad cane, Rolling walker, Wheelchair-electric (has right BK prosthesis and Left AFO)    ADL Assistance: Independent    Homemaking Assistance: Independent (warms meals up  -  receives  MOW and groceries delivered)    Homemaking Responsibilities: Yes, Meal Prep Responsibility: Secondary    Laundry Responsibility: Primary, Cleaning Responsibility: Secondary    Ambulation Assistance: Independent (last few days uses walker to get around), Transfer Assistance: Independent    Active : no    Additional Comments: prosthesis  has used power w/c    Retired --worked a day a week as a  [employer]         Social Determinants of Health     Financial Resource Strain: Medium Risk (11/18/2024)    Overall Financial Resource Strain (CARDIA)     Difficulty of Paying Living Expenses: Somewhat hard   Food Insecurity: No Food Insecurity (1/5/2025)    Hunger Vital Sign     Worried About Running Out of Food in the Last Year: Never true     Ran Out of Food in the Last Year: Never true   Transportation Needs: No Transportation Needs (1/5/2025)    PRAPARE - Transportation     Lack of Transportation (Medical): No     Lack of Transportation (Non-Medical): No   Recent Concern: Transportation Needs - Unmet Transportation Needs

## 2025-01-06 NOTE — PROGRESS NOTES
Spiritual Health History and Assessment/Progress Note  [facility]    (P) Initial Encounter, Palliative Care, Follow-up,  ,  ,      Name: Alysha Rodriguez MRN: [id number]    Age: 79 y.o.     Sex: female   Language: English   Baptist: Presbyterian   Pleural effusion     Date: 1/6/2025            Total Time Calculated: (P) 15 min              Spiritual Assessment began in [facility] MED SURG UNIT            Encounter Overview/Reason: (P) Initial Encounter, Palliative Care, Follow-up  Service Provided For: (P) Patient    Chapain reports, patient was resting in bed at time of chaplains visit, Patient reports that she is waiting to have procedure to drain fluid from her lungs. Patient shares and shows signs that she is not looking forward to the possible pain that she may be in after the procedure, but is hopeful that it will help her and relief to her breathing. Patient shows signs of severe labored breathing and the discomfort that it causes her when communicating.      offered words of hope, relief, healing and success in patients full recovery.     Dayanara, Belief, Meaning:   Patient is connected with a dayanara tradition or spiritual practice and has beliefs or practices that help with coping during difficult times  Family/Friends No family/friends present      Importance and Influence:  Patient has spiritual/personal beliefs that influence decisions regarding their health  Family/Friends No family/friends present    Community:  Patient is connected with a spiritual community  Family/Friends No family/friends present    Assessment and Plan of Care:     Patient Interventions include: Facilitated expression of thoughts and feelings  Family/Friends Interventions include: No family/friends present    Patient Plan of Care: Spiritual Care available upon further referral  Family/Friends Plan of Care: No family/friends present    Electronically signed by Chaplain Manolo on 1/6/2025 at 3:50 PM

## 2025-01-06 NOTE — PROGRESS NOTES
Comprehensive Nutrition Assessment    Type and Reason for Visit:  Initial, Positive nutrition screen (+ malnutrtion screen)    Nutrition Recommendations/Plan:   Continue regular diet as tolerated, consider sodium restriction of intakes > 75% of trays  Decrease high calorie high protein oral supplement to 2 x daily ( B & D) per pt request  Counseled on importance of adequate energy & protein intake, for recovery and disease management, with emphasis on nutrient rich food choices and appropriate supplement use        Malnutrition Assessment:  Malnutrition Status:  Moderate malnutrition (01/06/25 1402)    Context:  Chronic Illness     Findings of the 6 clinical characteristics of malnutrition:  Energy Intake:  75% or less estimated energy requirements for 1 month or longer  Weight Loss:  10% over 6 months     Body Fat Loss:  No body fat loss     Muscle Mass Loss:  Mild muscle mass loss Clavicles (pectoralis & deltoids)  Fluid Accumulation:  No fluid accumulation     Strength:  Not Performed    Nutrition Assessment:    Pt admitted with moderate malnutrition related to metastatic disease, poor oral intake due to early satiety from ascites, resulting in a significant weight loss of ~10% in 6 months    Nutrition Related Findings:    \"Right sided pleural effusion in the setting of malignant ascites:PMH of CAD, cancer, colitis,PAF CKD III, right BKA, gallbladder cancer, breast cancer (s/p mastectomies) and omental cancer who presents with shortness of breath.\" Noted recent admit 12/9-12/17/24, paracentesis noted 12/10/24, labs unremarkable, meds reviewed; per notes pt is terminal, wishes to continue with palliative chemo to try and slow progression of asictes Wound Type: None       Current Nutrition Intake & Therapies:    Average Meal Intake: 1-25%  Average Supplements Intake: 1-25%  ADULT DIET; Regular  ADULT ORAL NUTRITION SUPPLEMENT; Breakfast, Dinner, Lunch; Standard High Calorie/High Protein Oral

## 2025-01-06 NOTE — CARE COORDINATION
Chart review - patient admitted to [facility]  Will monitor for discharge

## 2025-01-06 NOTE — PROGRESS NOTES
Hospitalist Progress Note      PCP: Ally Phillips, APRN - CNP    Date of Admission: 1/5/2025    Chief Complaint:    Chief Complaint   Patient presents with    Shortness of Breath     Subjective:  Patient denies fevers, chills, sweats, CP,  diarrhea or burning micturition.  Feels short of breath.  12 point ROS negative other than mentioned above     Medications:  Reviewed    Infusion Medications    sodium chloride       Scheduled Medications    sennosides-docusate sodium  2 tablet Oral Daily    [Held by provider] apixaban  5 mg Oral BID    aspirin  81 mg Oral Daily    b complex vitamins  1 capsule Oral Daily    buPROPion  75 mg Oral BID    dilTIAZem  120 mg Oral Daily    DULoxetine  30 mg Oral Daily    gabapentin  300 mg Oral TID    metoprolol succinate  50 mg Oral BID    spironolactone  25 mg Oral BID    torsemide  20 mg Oral Daily    sodium chloride flush  5-40 mL IntraVENous 2 times per day     PRN Meds: morphine, [Held by provider] HYDROcodone-acetaminophen, sodium chloride flush, sodium chloride, potassium chloride **OR** potassium alternative oral replacement **OR** potassium chloride, magnesium sulfate, ondansetron **OR** ondansetron, polyethylene glycol, acetaminophen **OR** acetaminophen      Intake/Output Summary (Last 24 hours) at 1/6/2025 1202  Last data filed at 1/6/2025 0831  Gross per 24 hour   Intake 250 ml   Output 1100 ml   Net -850 ml       Exam:    BP (!) 138/90   Pulse (!) 108   Temp 97.5 °F (36.4 °C) (Oral)   Resp 20   Ht 1.524 m (5')   Wt 86 kg (189 lb 11.2 oz)   SpO2 94%   BMI 37.05 kg/m²     General appearance: No apparent distress, appears stated age and cooperative.  HEENT:  Conjunctivae/corneas clear.  Neck: Trachea midline.  Respiratory:  Diminished breath sounds on the left  Cardiovascular: tachycardic  Abdomen: Soft, non-tender, non-distended with normal bowel sounds.  Musculoskeletal: No clubbing, cyanosis or edema bilaterally  Neuro: Non Focal.        Labs:   Recent Labs      01/05/25  1356 01/06/25  0841   WBC 9.1 10.2   HGB 8.7* 8.1*   HCT 27.4* 26.8*   * 424*     Recent Labs     01/05/25  1356 01/06/25  0841    140   K 4.2 4.3    100   CO2 25 27   BUN 31* 28*   CREATININE 0.70 0.76   CALCIUM 9.2 8.9     Recent Labs     01/05/25  1356 01/06/25  0841   AST 35 24   ALT 17 9   BILITOT 0.4 0.6   ALKPHOS 150* 150*     No results for input(s): \"INR\" in the last 72 hours.  No results for input(s): \"CKTOTAL\", \"TROPONINI\" in the last 72 hours.    Urinalysis:      Lab Results   Component Value Date/Time    NITRU Negative 12/13/2024 02:05 PM    WBCUA 10-20 12/13/2024 02:05 PM    BACTERIA FEW 12/13/2024 02:05 PM    RBCUA 3-5 12/13/2024 02:05 PM    BLOODU MODERATE 12/13/2024 02:05 PM    SPECGRAV 1.020 08/10/2023 02:00 PM    GLUCOSEU Negative 12/13/2024 02:05 PM       Radiology:  XR CHEST PORTABLE   Final Result   Near complete opacification left hemithorax likely a mixture of collapsed   lung/infiltrate and pleural fluid.           Assessment/Plan:    Right sided pleural effusion in the setting of malignant ascites:  Likely malignant; discussed with [facility] and patient is DNR-CCA for the purpose of palliative chemo; oncology consulted; discussed with pulm and will consult thoracic surgery as she will likely benefit from a pleurex catheter if truly malignant ascites   CKDIII:  renally dose meds  CAD; PAF:  Continue home meds; hold eliquis until surgical plan is determined  Functional Status: Fall precautions. Up with assistance. PT OT  Diet: general  DVT ppx: SCDs  Disposition: Dependent on hospital course. Will discharge once medically stable. SW on board for discharge planning.     Active Hospital Problems    Diagnosis Date Noted    Neoplasm related pain [G89.3] 01/06/2025    Shortness of breath [R06.02] 01/06/2025    Advance care planning [Z71.89] 01/06/2025    Pleural effusion [J90] 01/05/2025        Additional work up or/and treatment plan may be added today or then

## 2025-01-06 NOTE — CARE COORDINATION
Phone call to patient to discuss updates on finding private hire options. Patient states that she is still waiting to hear from neighborhood friend. Patient states that once she gets in contact with this neighborhood friend she would like to move forward with this option. Patient declines private hire agencies. Will follow up with patient in 2 weeks for further updates. 0

## 2025-01-06 NOTE — CARE COORDINATION
This LSW visited patient at bedside this am. Patient and I discussed discharge plans.   Patient requesting to have referral sent to [facility]. This LSW called and gave patients referral to Peg, admissions coordinator at [facility].  Awaiting response at this time.  Electronically signed by MARTINEZ Chakraborty on 1/6/25 at 12:03 PM EST

## 2025-01-06 NOTE — PLAN OF CARE
Problem: Chronic Conditions and Co-morbidities  Goal: Patient's chronic conditions and co-morbidity symptoms are monitored and maintained or improved  1/6/2025 0301 by Siria Marinelli RN  Outcome: Progressing  Flowsheets  Taken 1/5/2025 2030 by Siria Marinelli RN  Care Plan - Patient's Chronic Conditions and Co-Morbidity Symptoms are Monitored and Maintained or Improved: Monitor and assess patient's chronic conditions and comorbid symptoms for stability, deterioration, or improvement  Taken 1/5/2025 1636 by Adelia Tinsley RN  Care Plan - Patient's Chronic Conditions and Co-Morbidity Symptoms are Monitored and Maintained or Improved:   Monitor and assess patient's chronic conditions and comorbid symptoms for stability, deterioration, or improvement   Collaborate with multidisciplinary team to address chronic and comorbid conditions and prevent exacerbation or deterioration   Update acute care plan with appropriate goals if chronic or comorbid symptoms are exacerbated and prevent overall improvement and discharge  1/5/2025 1634 by Adelia Tinsley RN  Outcome: Progressing     Problem: Discharge Planning  Goal: Discharge to home or other facility with appropriate resources  1/6/2025 0301 by Siria Marinelli RN  Outcome: Progressing  Flowsheets  Taken 1/5/2025 2030 by Siria Marinelli RN  Discharge to home or other facility with appropriate resources: Identify barriers to discharge with patient and caregiver  Taken 1/5/2025 1636 by Adelia Tinsley RN  Discharge to home or other facility with appropriate resources:   Arrange for needed discharge resources and transportation as appropriate   Identify barriers to discharge with patient and caregiver   Identify discharge learning needs (meds, wound care, etc)  1/5/2025 1634 by Adelia Tinsley RN  Outcome: Progressing

## 2025-01-06 NOTE — PLAN OF CARE
Nutrition Problem #1: Moderate malnutrition, in context of chronic illness  Intervention: Food and/or Nutrient Delivery: Continue Current Diet, Modify Oral Nutrition Supplement  Nutritional

## 2025-01-07 NOTE — PROGRESS NOTES
Hospitalist Progress Note      PCP: Ally Phillips, APRN - CNP    Date of Admission: 1/5/2025    Chief Complaint:    Chief Complaint   Patient presents with    Shortness of Breath     Subjective:  Patient is awake,, on bipap; more alert than earlier but not answering fully; unable to accurately assess 12 point ROS     Medications:  Reviewed    Infusion Medications    sodium chloride 75 mL/hr (01/07/25 0850)     Scheduled Medications    sennosides-docusate sodium  2 tablet Oral Daily    [Held by provider] apixaban  5 mg Oral BID    aspirin  81 mg Oral Daily    b complex vitamins  1 capsule Oral Daily    buPROPion  75 mg Oral BID    dilTIAZem  120 mg Oral Daily    DULoxetine  30 mg Oral Daily    gabapentin  300 mg Oral TID    metoprolol succinate  50 mg Oral BID    spironolactone  25 mg Oral BID    torsemide  20 mg Oral Daily    sodium chloride flush  5-40 mL IntraVENous 2 times per day     PRN Meds: morphine, [Held by provider] HYDROcodone-acetaminophen, sodium chloride flush, sodium chloride, potassium chloride **OR** potassium alternative oral replacement **OR** potassium chloride, magnesium sulfate, ondansetron **OR** ondansetron, polyethylene glycol, acetaminophen **OR** acetaminophen      Intake/Output Summary (Last 24 hours) at 1/7/2025 1238  Last data filed at 1/7/2025 1020  Gross per 24 hour   Intake 10 ml   Output 4985 ml   Net -4975 ml       Exam:    BP 93/62   Pulse (!) 112   Temp 99 °F (37.2 °C) (Axillary)   Resp 16   Ht 1.524 m (5')   Wt 86 kg (189 lb 11.2 oz)   SpO2 96%   BMI 37.05 kg/m²     General appearance: No apparent distress, appears stated age and cooperative.  HEENT:  Conjunctivae/corneas clear.  Neck: Trachea midline.  Respiratory:  Diminished breath sounds on the left; chest tube present  Cardiovascular: tachycardic  Abdomen: Soft, non-tender, non-distended with normal bowel sounds.  Musculoskeletal: No clubbing, cyanosis or edema bilaterally  Neuro: Non Focal.        Labs:

## 2025-01-07 NOTE — CONSULTS
MCV 93.5 93.7  --   --    * 424*  --   --      BMP:   Recent Labs     01/05/25  1356 01/06/25  0841 01/07/25  0256 01/07/25  0419    140  --   --    K 4.2 4.3  --   --     100  --   --    CO2 25 27  --   --    BUN 31* 28*  --   --    CREATININE 0.70 0.76 1.3* 1.3*   CALCIUM 9.2 8.9  --   --      Cardiac Enzymes: No results for input(s): \"CKTOTAL\", \"CKMB\", \"CKMBINDEX\", \"TROPONINI\" in the last 72 hours.  PT/INR: No results for input(s): \"PROTIME\", \"INR\" in the last 72 hours.  APTT: No results for input(s): \"APTT\" in the last 72 hours.  Liver Profile:  Lab Results   Component Value Date/Time    AST 24 01/06/2025 08:41 AM    ALT 9 01/06/2025 08:41 AM    BILIDIR 0.3 08/29/2020 09:15 PM    BILITOT 0.6 01/06/2025 08:41 AM    ALKPHOS 150 01/06/2025 08:41 AM    PROTEIN 6.8 01/06/2025 08:41 AM     Lab Results   Component Value Date/Time    CHOL 146 04/04/2024 09:18 AM    HDL 53 04/04/2024 09:18 AM    TRIG 114 04/04/2024 09:18 AM     TSH:  Lab Results   Component Value Date/Time    TSH 1.740 04/04/2024 09:18 AM    TSH 1.170 02/03/2020 11:25 AM     UA:   Lab Results   Component Value Date/Time    NITRITE N 08/10/2023 02:00 PM    COLORU Yellow 12/13/2024 02:05 PM    PHUR 5.5 12/13/2024 02:05 PM    PHUR 5.5 08/10/2023 02:00 PM    PHUR 6.0 09/13/2022 06:00 PM    LABCAST 1-3 Hyaline 08/12/2014 12:20 PM    WBCUA 10-20 12/13/2024 02:05 PM    RBCUA 3-5 12/13/2024 02:05 PM    MUCUS Present 08/09/2014 06:05 PM    BACTERIA FEW 12/13/2024 02:05 PM    CLARITYU Clear 12/13/2024 02:05 PM    SPECGRAV 1.020 08/10/2023 02:00 PM    LEUKOCYTESUR TRACE 12/13/2024 02:05 PM    UROBILINOGEN 1.0 12/13/2024 02:05 PM    BILIRUBINUR Negative 12/13/2024 02:05 PM    BLOODU MODERATE 12/13/2024 02:05 PM    GLUCOSEU Negative 12/13/2024 02:05 PM       History obtained: chart, pt    Risk factors:     Diagnosis:  large left effusion    Plan:   Plan left Pleurx catheter insertion this AM    Consent obtained from daughter.     CARLOS MANUEL STARR,  MD  1/7/2025  7:06 AM

## 2025-01-07 NOTE — CARE COORDINATION
ATTEMPTED TO MEET WITH PATIENT AT BEDSIDE. PATIENT CURRENTLY HAS CONTINUOUS BIPAP, LEFT CHEST TUBE PLACED TODAY. DISCHARGE PLAN REMAINS [facility] PEND ACCEPT.

## 2025-01-07 NOTE — PROGRESS NOTES
Physician Progress Note      PATIENT:               FILIPE DOSHI  CSN #:                  [id number]  :                       1945  ADMIT DATE:       2025 1:07 PM  DISCH DATE:  RESPONDING  PROVIDER #:        John Ramírez MD          QUERY TEXT:    Patient admitted with \"Right sided pleural effusion in the setting of   malignant ascites\"  Noted 25 clinical dietician PN \"Pt admitted with   moderate malnutrition related to metastatic disease, poor oral intake due to   early satiety from ascites, resulting in a significant weight loss of   10% in 6 months.\" If possible, please document in progress notes and discharge   summary if you are evaluating and /or treating any of the following:    The medical record reflects the following:  Risk Factors: female age 79  PMH of CAD, cancer, colitis,PAF, right BKA,   gallbladder cancer, breast cancer (s/p mastectomies) and omental cancer  Clinical Indicators: Moderate malnutrition (25 1402)  Context:  Chronic   Illness  Energy Intake:  75% or less estimated energy requirements for 1 month or   longer  Weight Loss:  10% over 6 months  Body Fat Loss:  No body fat loss  Muscle Mass Loss:  Mild muscle mass loss Clavicles (pectoralis & deltoids)  Treatment: malnutrition assessment  Continue regular diet as tolerated,   consider sodium restriction of intakes > 75% of trays  Decrease high calorie   high protein oral supplement to 2 x daily ( B & D) per pt request  Counseled   on importance of adequate energy & protein intake,    ASPEN Criteria:    https://aspenjournals.onlinelibrary.patel.com/doi/full/10.1177/544137467907381  5  Options provided:  -- This patient has moderate protein calorie malnutrition.  -- Other - I will add my own diagnosis  -- Disagree - Not applicable / Not valid  -- Disagree - Clinically unable to determine / Unknown  -- Refer to Clinical Documentation Reviewer    PROVIDER RESPONSE TEXT:    This patient has moderate protein calorie

## 2025-01-07 NOTE — CARE COORDINATION
This LSW visited patient at bedside this afternoon. [facility] is following patients referral.  Patient is currently on continuous bi-pap and 1:1 sitter as she is not able to remove bi-pap.  Electronically signed by MARTINEZ Chakraborty on 1/7/25 at 4:13 PM EST

## 2025-01-07 NOTE — PROGRESS NOTES
INPATIENT PROGRESS NOTES    PATIENT NAME: Alysha Rodriguez  MRN: [id number]  SERVICE DATE:  January 7, 2025   SERVICE TIME:  1:23 PM      PRIMARY SERVICE: Pulmonary Disease    CHIEF COMPLAIN: Left pleural effusion status post Pleurx placement      INTERVAL HPI: Patient seen and examined at bedside, Interval Notes, orders reviewed. Nursing notes noted    Patient was taken to the OR and had left-sided Pleurx placement.  As well as bloody.  Patient has been on BiPAP therapy due to progressive respiratory failure.  She had ABG done showing pH of 7.24 pCO2 75 pO2 90 on BiPAP.  BiPAP setting was changed from 14 over 16/7 with 45% FiO2.  Current O2 saturation is 96%.  Patient is still very sleepy.  Patient appeared comfortable in bed.  No fever or chills.  She has metastatic cancer.  Pleural effusion appeared to be bloody.  exudative effusion.         OBJECTIVE   I/O:24HR INTAKE/OUTPUT:    Intake/Output Summary (Last 24 hours) at 1/7/2025 1323  Last data filed at 1/7/2025 1020  Gross per 24 hour   Intake 10 ml   Output 4985 ml   Net -4975 ml     01/06 0701 - 01/07 0700  In: 10 [I.V.:10]  Out: 1475 [Urine:1475]  Body mass index is 37.05 kg/m².    PHYSICAL EXAM:  Vitals:  BP 93/62   Pulse (!) 112   Temp 99 °F (37.2 °C) (Axillary)   Resp 16   Ht 1.524 m (5')   Wt 86 kg (189 lb 11.2 oz)   SpO2 96%   BMI 37.05 kg/m²     General: Alert, awake .comfortable in bed, No distress.  Head: Atraumatic , Normocephalic   Eyes: PERRL. No sclera icterus. No conjunctival injection. No discharge   ENT: No nasal  discharge. Pharynx clear.  Neck:  Trachea midline. No thyromegaly, no JVD, No cervical adenopathy.  Chest : Left sided Pleurx catheter placement.    Bilaterally symmetrical ,Normal effort,  No accessory muscle use  Lung : Diminished breath sound bilaterally No Rales. No wheezing. No rhonchi.   Heart:: Normal rate. Regular rhythm. No mumur ,  Rub or gallop  ABD: Non-tender. Non-distended. No masses. No organmegaly. Normal bowel  is guarded to poor.  Discussed with Dr. Ramírez.  Continue present treatment plan    NOTE: This report was transcribed using voice recognition software. Every effort was made to ensure accuracy; however, inadvertent computerized transcription errors may be present.      Comments:   Thank you for allowing us to participate in the care of this patient. Will continue to follow.Please call if questions or concerns arise.    Electronically signed by Tyler Maki MD, FCCP on 1/7/2025 at 1:23 PM

## 2025-01-07 NOTE — PROGRESS NOTES
Palliative Care Progress Note  Patient: Alysha Rodriguez  Gender: female  YOB: 1945  Unit/Bed: W488/W488-01  CodeStatus: DNR-CCA  Inpatient Treatment Team: Treatment Team:   John Ramírez MD Abboud, Rami, MD Sidloski, Jay E, DO Robke, Jason M, MD Downs, Angela R Vasquez, Alicia, Ronaldo Diallo MD Vance, Kelly, Parvin Mart LSW  Admit Date:  1/5/2025    Chief Complaint: Shortness of breath    History of Presenting Illness:      Alysha Rodriguez is a 79 y.o. 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    Patient presented to the ER from home with progressive shortness of breath. She was found to have a right-sided pleural effusion in setting of malignant ascites.     Palliative care consulted for goals of care and overwhelming symptoms.  Patient is current with our services as an outpatient.  Pulmonology consulted for possible Pleurx catheter placement. Patient possibly planning to move to [place]. She is a DNR CC and goals for comfort/palliative treatment. Reports back pain is at a 3/10. Chest pain is about a 4/10. Pain is chronic. Patient is very short of breath. On 2 L oxygen via NC. Wears 2 L NC at home. Norco helps alleviate pain and some of her SOB. Patient has lost about 10 lbs in the last month.  She is nonambulatory at baseline except for transferring.  She does have a prosthesis for transferring.    1/7/24    Patient seen after placement of chest tube. Chest tube placed to suction and has blood drainage. Per nurse about 1 L removed during procedure. She is obtunded. Per nurse patient was obtunded all night. She has been on continuous bipap. She will open eyes to stimulation and shook her head no to pain.     Review of Systems:       Review of Systems   Unable to perform ROS: Acuity of condition       Physical Examination:       BP 93/62    CALCIUM 8.9 01/06/2025 08:41 AM    GFRAA >60.0 09/13/2022 06:00 PM    LABGLOM 41 01/07/2025 04:19 AM    LABGLOM 73.0 04/08/2024 02:15 AM    GLUCOSE 173 01/06/2025 08:41 AM    GLUCOSE 72 04/06/2012 10:37 AM     TSH:   Lab Results   Component Value Date/Time    TSH 1.740 04/04/2024 09:18 AM    TSH 1.170 02/03/2020 11:25 AM     Urinalysis:   Lab Results   Component Value Date/Time    NITRU Negative 12/13/2024 02:05 PM    WBCUA 10-20 12/13/2024 02:05 PM    BACTERIA FEW 12/13/2024 02:05 PM    RBCUA 3-5 12/13/2024 02:05 PM    BLOODU MODERATE 12/13/2024 02:05 PM    SPECGRAV 1.020 08/10/2023 02:00 PM    GLUCOSEU Negative 12/13/2024 02:05 PM     Albumin: No results found for: \"LABPROT\", \"LABALBU\"  Weight Trends  Wt Readings from Last 10 Encounters:   01/05/25 86 kg (189 lb 11.2 oz)   12/12/24 87.8 kg (193 lb 9.6 oz)   12/09/24 90.7 kg (200 lb)   11/21/24 97.5 kg (215 lb)   11/11/24 97.5 kg (215 lb)   10/10/24 97.5 kg (215 lb)   08/27/24 97.5 kg (215 lb)   06/05/24 94.8 kg (209 lb)   05/08/24 95.1 kg (209 lb 9.6 oz)   05/01/24 97.5 kg (215 lb)           Radiology: Reviewed      XR CHEST PORTABLE    Result Date: 1/5/2025  EXAMINATION: ONE XRAY VIEW OF THE CHEST 1/5/2025 1:43 pm COMPARISON: None. HISTORY: ORDERING SYSTEM PROVIDED HISTORY: sob TECHNOLOGIST PROVIDED HISTORY: Reason for exam:->sob What reading provider will be dictating this exam?->CRC FINDINGS: Portable chest reveals heart to be enlarged.  There is near complete opacification seen left hemithorax likely a mixture of collapsed lung/infiltrate and pleural fluid.  Heart is enlarged.  The right lung is clear.  Replacement identified the shoulders bilaterally.     Near complete opacification left hemithorax likely a mixture of collapsed lung/infiltrate and pleural fluid.          Assessment and plan:  Palliative care encounter  Patient is current with our services as an outpatient. She wants to continue with palliative chemo at this time. She is aware that if she

## 2025-01-07 NOTE — OP NOTE
Operative Note      Patient: Alysha Rodriguez  YOB: 1945  MRN: [id number]    Date of Procedure: 1/7/2025    Pre-Op Diagnosis Codes:      * Pleural effusion on left [J90]    Post-Op Diagnosis:  Same, hemothorax       Procedure(s):  LEFT PLEURX  CATHETER INSERTION ROOM 488  MAC+ LOCAL    Surgeon(s):  Ronaldo Frost MD    Assistant:   First Assistant: Surekha Churchill RN; Parvin Cardenas    Anesthesia: Local    Estimated Blood Loss (mL): Minimal    Complications: None    Specimens:   * No specimens in log *    Implants:  * No implants in log *      Drains:   Chest Tube Left Pleural 1 (Active)       External Urinary Catheter (Active)   Site Assessment Clean,dry & intact 01/06/25 2100   Placement Replaced 01/06/25 0831   Catheter Care Suction Canister/Tubing changed 01/06/25 0831   Perineal Care Yes 01/06/25 0831   Suction 40 mmgHg continuous 01/06/25 2100   Urine Color Yellow 01/06/25 2100   Urine Appearance Clear 01/06/25 2100   Urine Odor Other (Comment) 01/06/25 0831   Output (mL) 225 mL 01/06/25 2100       [REMOVED] External Urinary Catheter (Removed)   Site Assessment Clean,dry & intact 12/11/24 0609   Placement Replaced 12/11/24 0609   Securement Method Other (Comment) 12/09/24 1703   Catheter Care Catheter/Wick replaced 12/11/24 0609   Perineal Care Yes 12/11/24 0609   Suction 40 mmgHg continuous 12/11/24 0609   Urine Color Ver[place] 12/12/24 0801   Urine Appearance Clear 12/12/24 0801   Output (mL) 350 mL 12/12/24 0801       Findings:  Infection Present At Time Of Surgery (PATOS) (choose all levels that have infection present):  No infection present  Other Findings: Hemothorax    Detailed Description of Procedure:   Patient has recurrent metastatic cancer.  She was admitted with some breathing difficulty.  She had a large-volume paracentesis recently but on her admission she was found to have a left pleural effusion that greatly increased in size.  There is near complete compressive atelectasis of the

## 2025-01-07 NOTE — PLAN OF CARE
Problem: Chronic Conditions and Co-morbidities  Goal: Patient's chronic conditions and co-morbidity symptoms are monitored and maintained or improved  Outcome: Progressing  Flowsheets (Taken 1/5/2025 2030)  Care Plan - Patient's Chronic Conditions and Co-Morbidity Symptoms are Monitored and Maintained or Improved: Monitor and assess patient's chronic conditions and comorbid symptoms for stability, deterioration, or improvement     Problem: Discharge Planning  Goal: Discharge to home or other facility with appropriate resources  Outcome: Progressing  Flowsheets (Taken 1/5/2025 2030)  Discharge to home or other facility with appropriate resources: Identify barriers to discharge with patient and caregiver     Problem: Safety - Adult  Goal: Free from fall injury  Outcome: Progressing  Flowsheets (Taken 1/6/2025 0301)  Free From Fall Injury:   Instruct family/caregiver on patient safety   Based on caregiver fall risk screen, instruct family/caregiver to ask for assistance with transferring infant if caregiver noted to have fall risk factors     Problem: Skin/Tissue Integrity  Goal: Absence of new skin breakdown  Description: 1.  Monitor for areas of redness and/or skin breakdown  2.  Assess vascular access sites hourly  3.  Every 4-6 hours minimum:  Change oxygen saturation probe site  4.  Every 4-6 hours:  If on nasal continuous positive airway pressure, respiratory therapy assess nares and determine need for appliance change or resting period.  Outcome: Progressing

## 2025-01-07 NOTE — PROGRESS NOTES
Patient taken to short stay room 4 on continuous BIPAP with a member of the transport team.    Electronically signed by Claire Mitchell RCP on 1/7/2025 at 8:28 AM

## 2025-01-08 NOTE — DISCHARGE INSTRUCTIONS
Pleurx catheter should be drained 3 times a week.  Record the drainage amount each time.  Call the office after 3 to 4 weeks to go over drainage amounts.  If drainage less than 50 mL for at least 3 times in a row, then call the office for drain removal.

## 2025-01-08 NOTE — CARE COORDINATION
1025  MET WITH PATIENT'S DAUGHTER CALVIN AT BEDSIDE. PATIENT CURRENTLY ON CONT. BIPAP AND HAS LEFT CHEST TUBE. UPDATED DAUGHTER AT BEDSIDE REGARDING DISCHARGE PLAN CURRENTLY [facility] PEND ACCEPT.    1219 SPOKE WITH DAUGHTER CALVIN FREEDOM OF CHOICE GIVEN AND CHOSE [facility]. SPOKE WITH ZURDO AT [facility] AND REFERRAL SENT.    1330 [facility] MEETING BETWEEN 2 PM AND 3 PM TODAY.    1337  UPDATED DAUGHTER CALVIN AT BEDSIDE [facility] MEETING BETWEEN 2PM-3PM.

## 2025-01-08 NOTE — PROGRESS NOTES
Spiritual Health History and Assessment/Progress Note  [facility]    Initial Encounter,  , Anticipatory Grief,      Name: Alysha Rodriguez MRN: [id number]    Age: 79 y.o.     Sex: female   Language: English   Sikh: Presbyterian   Pleural effusion on left     Date: 2025            Total Time Calculated: 11 min              Spiritual Assessment began in [facility]  4W MED SURG UNIT            Encounter Overview/Reason: Initial Encounter  Service Provided For: Patient, Family    Patient is resting in bed with daughter Tania at bedside.  aTnia is tearful.  She shared that her mother has beat 3 types of cancer but recently has not been able to get better.  She also shared that she had two other siblings but both  last year within two days of each other.   prayed at bedside with daughter.    Dayanara, Belief, Meaning:   Patient is connected with a dayanara tradition or spiritual practice  Family/Friends are connected with a dayanara tradition or spiritual practice      Importance and Influence:  Patient unable to assess at this time  Family/Friends have spiritual/personal beliefs that influence decisions regarding the patient's health    Community:  Patient feels well-supported. Support system includes: Children  Family/Friends Other: Daughter did not say.    Assessment and Plan of Care:     Patient Interventions include: Other: pt resting  Family/Friends Interventions include: Facilitated expression of thoughts and feelings    Patient Plan of Care: No spiritual needs identified for follow-up  Family/Friends Plan of Care: Spiritual Care available upon further referral    Electronically signed by Chaplain Rajat on 2025 at 3:23 PM

## 2025-01-08 NOTE — PROGRESS NOTES
Palliative Care Progress Note  Patient: Alysha Rodriguez  Gender: female  YOB: 1945  Unit/Bed: W488/W488-01  CodeStatus: DNR-CC  Inpatient Treatment Team: Treatment Team:   Godfrey Escamilla DO Abboud, Rami, MD Sidloski, Jay E, DO Robke, Jason M, MD Robke, Jason M, MD Bennett, Kathryn A, RN Garcia, HE Lindsay Angela R Brown, Jennifer, LSW Reinhart, Jennifer Vasquez, Alicia, RN Hipp, Lisa, RN  Admit Date:  1/5/2025    Chief Complaint: Shortness of breath    History of Presenting Illness:      Alysha Rodriguez is a 79 y.o. 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    Patient presented to the ER from home with progressive shortness of breath. She was found to have a right-sided pleural effusion in setting of malignant ascites.     Palliative care consulted for goals of care and overwhelming symptoms.  Patient is current with our services as an outpatient.  Pulmonology consulted for possible Pleurx catheter placement. Patient possibly planning to move to [place]. She is a DNR CC and goals for comfort/palliative treatment. Reports back pain is at a 3/10. Chest pain is about a 4/10. Pain is chronic. Patient is very short of breath. On 2 L oxygen via NC. Wears 2 L NC at home. Norco helps alleviate pain and some of her SOB. Patient has lost about 10 lbs in the last month.  She is nonambulatory at baseline except for transferring.  She does have a prosthesis for transferring.    1/7/24    Patient seen after placement of chest tube. Chest tube placed to suction and has blood drainage. Per nurse about 1 L removed during procedure. She is obtunded. Per nurse patient was obtunded all night. She has been on continuous bipap. She will open eyes to stimulation and shook her head no to pain.     1/8/25:  Patient alert. Unable to talk. Moaning throughout  daughter reports she is leaving [place] now to be here. Discussed adding hospice but not yet ready. Will decrease morphine to 2 mg q3h prn for pain and sob. Palliative care will continue to follow.     1/8/25:  Significant change in condition from Monday. Cognition has severely deteriorated. Would not be able to tolerate further cancer treatment at this time and life expectancy limited to a matter of days. Discussed with daughter at bedside. Attending also came in to discuss. He will place order for hospice. [place] of choice offered. Referral to be sent to [facility]. Discussed with care coordination. Daughter does not want to initiate further comfort medications at this time since patient is denying pain. She is aware to call or let nurse know if she changes her mind. Re-discussed code status. Changed to DNRCC. Paper copy placed in chart.     -Patient is currently being treated for multiple medical conditions by other providers    1.  Right-sided pleural effusion in the setting of malignant ascites  2.  CKD stage III  3.  CAD  4.  PAF    MDM: High    Electronically signed by YURY Turner CNP on 1/8/2025 at 12:27 PM

## 2025-01-08 NOTE — ACP (ADVANCE CARE PLANNING)
I had extensive discussion with the patient's daughter and for the patient and palliative care nurse practitioner Thais Rizo.  At baseline, patient uses electronic scooter to ambulate and mentally is normal.  She had a recent 5 L drainage from her abdomen for suspected new malignant ascites suggesting underlying advanced stage malignancy.  From the hospitalization, she had Pleurx catheter placed for malignant pleural effusion however unfortunately she is decompensated further to the point where she is barely following commands.  She is unable to eat or drink.  She is grunting and appears to be in distress.  Daughter is interested in hospice consultation and potential transfer to inpatient hospice for comfort care strategy moving forward.    20 minutes of ACP planning spent discussing the above.    Godfrey Escamilla, DO

## 2025-01-08 NOTE — CARE COORDINATION
This LSW visited patient and daughter, Tania at bedside this am.   Patient is currently on continuous bi-pap.  Patient was scheduled to receive palliative chemotherapy today, but was unable to receive d/t hospitalization. Per daughter, palliative chemo is to be given 2x a week.  Patients referral is being followed by [facility].  I also discussed transfer to [facility]ACH if patient is not able to be transferred to SNF.  Electronically signed by MARTINEZ Chakraborty on 1/8/25 at 10:25 AM EST

## 2025-01-08 NOTE — PROGRESS NOTES
INPATIENT PROGRESS NOTES    PATIENT NAME: Alysha Rodriguez  MRN: [id number]  SERVICE DATE:  January 8, 2025   SERVICE TIME:  1:43 PM      PRIMARY SERVICE: Pulmonary Disease    CHIEF COMPLAINTS: Pleural effusion    INTERVAL HPI: Patient seen and examined at bedside, Interval Notes, orders reviewed. Nursing notes noted    Patient report patient status post Pleurx, drained 4 L, she is encephalopathic, eyes open, does not answer questions, she is on 40% FiO2 saturation 100%, transition to nasal cannula this morning, daughter elected to proceed with hospice    New information updated in the note today, rest of the examination did not change compared to yesterday.    Review of system:     GI Abdominal pain No  Skin Rash No    Social History     Tobacco Use    Smoking status: Never    Smokeless tobacco: Never    Tobacco comments:     Never   Substance Use Topics    Alcohol use: Not Currently     Comment: 1-2 drinks/month         Problem Relation Age of Onset    Heart Disease Mother     Arthritis Mother     Heart Disease Father     Arthritis Father     Cancer Sister         Colon    Thyroid Disease Son     Other Son         Down's syndrome         OBJECTIVE    Body mass index is 37.05 kg/m².    PHYSICAL EXAM:  Vitals:  /84   Pulse (!) 111   Temp 98.2 °F (36.8 °C) (Axillary)   Resp 20   Ht 1.524 m (5')   Wt 86 kg (189 lb 11.2 oz)   SpO2 100%   BMI 37.05 kg/m²     General: alert, eyes open, does not follow commands does not interact  Head: normocephalic, atraumatic  Eyes:No gross abnormalities.  ENT:  MMM no lesions  Neck:  supple and no masses  Chest : Diminished air movement, no wheezing, no rales, nontender, tympanic  Heart:: Heart sounds are normal.  Regular rate and rhythm without murmur, gallop or rub.  ABD:  symmetric, soft, non-tender  Musculoskeletal : no cyanosis, no clubbing, and no edema  Neuro:   Encephalopathic  Skin: No rashes or nodules noted.  Lymph node:  no cervical nodes  Urology: No Garcia  magnesium sulfate, ondansetron **OR** ondansetron, polyethylene glycol, acetaminophen **OR** acetaminophen    Radiology  Chest x-ray films reviewed by me improved left-sided pleural effusion        IMPRESSION AND SUGGESTION:  Patient is at risk due to   Massive malignant left-sided pleural effusion status post Pleurx cath  Malignant ascites, omental cancer  Acute encephalopathy likely hypercapnic/metabolic  Acute illness delirium  History of breast cancer  History of gallbladder cancer    Recommendation  Patient family elected hospice/comfort care  Continue comfort measures  Pleurx drain daily  O2 as needed  BiPAP as needed        Pulmonary will sign off, please call for any question or concern    Electronically signed by Maxi Raphael MD,  FCCP   on 1/8/2025 at 1:43 PM

## 2025-01-08 NOTE — PROGRESS NOTES
Patient breathing more easily than yesterday and is responding a little bit better, per the daughter, the patient is still not with a normal mental status.  Chest x-ray this morning shows a fully expanded lung and complete evacuation of the pleural effusion.    Would leave to continuous atrium drainage while in house.  When patient is ready to be transferred to her facility, the Pleurx can be capped.

## 2025-01-08 NOTE — PROGRESS NOTES
Shift assessment complete, arouses to verbal stimuli, unable to follow commands, breathes are equal and intermittently labored, chest tube  intact, avasys and family at bedside, will continue to monitor.

## 2025-01-08 NOTE — PROGRESS NOTES
Hematology/Oncology   Progress Note        CHIEF COMPLAINT/HPI:  Follow up of peritoneal cancer. Pleurx catheter placed. Her condition has deteriorated. She is not treatment candidate. Hospice appropriate.     REVIEW OF SYSTEMS:    Unremarkable except for symptoms mentioned in HPI.    Current Inpatient Medications:    Current Facility-Administered Medications   Medication Dose Route Frequency Provider Last Rate Last Admin    gabapentin (NEURONTIN) capsule 200 mg  200 mg Oral TID Godfrey Escamilla R,         morphine (PF) injection 2 mg  2 mg IntraVENous Q3H PRN Laila Cotto APRN - CNP        sennosides-docusate sodium (SENOKOT-S) 8.6-50 MG tablet 2 tablet  2 tablet Oral Daily Thais Rizo APRN - CNP   2 tablet at 01/06/25 1202    [Held by provider] apixaban (ELIQUIS) tablet 5 mg  5 mg Oral BID John Ramírez MD        aspirin EC tablet 81 mg  81 mg Oral Daily John Ramírez MD   81 mg at 01/06/25 0827    b complex vitamins capsule 1 capsule  1 capsule Oral Daily John Ramírez MD   1 capsule at 01/06/25 0827    buPROPion (WELLBUTRIN) tablet 75 mg  75 mg Oral BID John Ramírez MD   75 mg at 01/06/25 2058    dilTIAZem (CARDIZEM CD) extended release capsule 120 mg  120 mg Oral Daily John Ramírez MD   120 mg at 01/06/25 0827    DULoxetine (CYMBALTA) extended release capsule 30 mg  30 mg Oral Daily John Ramírez MD   30 mg at 01/06/25 2058    [Held by provider] HYDROcodone-acetaminophen (NORCO) 5-325 MG per tablet 1 tablet  1 tablet Oral Q4H PRN John Ramírez MD   1 tablet at 01/06/25 0731    metoprolol succinate (TOPROL XL) extended release tablet 50 mg  50 mg Oral BID John Ramírez MD   50 mg at 01/06/25 2058    spironolactone (ALDACTONE) tablet 25 mg  25 mg Oral BID John Ramírez MD   25 mg at 01/06/25 1717    torsemide (DEMADEX) tablet 20 mg  20 mg Oral Daily John Ramírez MD   20 mg at 01/06/25 0828    sodium chloride flush 0.9 % injection 5-40 mL  5-40 mL IntraVENous 2 times per day  insufficiancy and gait atasxia    Difficulty balancing    Hx of BKA, right (HCC)    Abnormality of gait and mobility    Drug-induced polyneuropathy (HCC)    Chronic renal disease, stage III (HCC) [056514]    Chronic systolic (congestive) heart failure    Lumbar radiculitis    Nonrheumatic mitral valve regurgitation    Severe mitral regurgitation    Atrial fibrillation with RVR (HCC)    Malignant ascites    Grief    Bowel and bladder incontinence    Nocturnal hypoxemia    Palliative care encounter    Goals of care, counseling/discussion    Advanced care planning/counseling discussion    Encounter for hospice care discussion    Pleural effusion on left    Neoplasm related pain    Shortness of breath    Advance care planning    Moderate malnutrition (HCC)       PLAN:  agree with hospice.          Electronically signed by Kvng Vasquez MD on 1/8/25 at 5:03 PM EST

## 2025-01-08 NOTE — PROGRESS NOTES
Assumed care of pt this shift. Pt is alert on BIPAP with a change in mental status during previous shift. Pt is not responding to questions however when her daughter entered the room she was aware and reached her hand out for her to hold.

## 2025-01-08 NOTE — DISCHARGE SUMMARY
[facility] Medicine Discharge Summary    Alysha Rodriguez  :  1945  MRN:  [id number]    Admit date:  2025    Discharge date:  2025    Admitting Physician:  John Ramírez MD  Primary Care Physician:  Ally Phillips, APRN - CNP    Discharge Diagnoses:    Principal Problem:    Pleural effusion on left  Active Problems:    Neoplasm related pain    Shortness of breath    Advance care planning    Moderate malnutrition (HCC)  Resolved Problems:    * No resolved hospital problems. *    Chief Complaint   Patient presents with    Shortness of Breath     Condition: worse   Activity: no strenuous activity   Diet: pleasure feed, if able  Disposition: hospice  Functional Status: bed bound    Significant Findings:     CT:  1. Large amount of homogeneous ascites. There appears to be some impression on the greater curvature of the stomach.  No wall thickening.  2. Large left pleural effusion.  Advanced cardiomegaly.  3. Moderate dilatation of the common bile duct which measures 1.1 cm distally. However, there is no clear etiology for distal obstruction.  This still may be normal.  4. Moderate bilateral renal atrophy with a large superior left renal cyst.      Hospital Course:   79-year-old female was hospitalized for acute respiratory failure hypoxia and hypercapnia secondary to advanced stage malignancy with malignant pleural effusion for which she had Pleurx placement by thoracic surgery.  She unfortunately continued decompensate to the point where she was minimally responsive and not eating or drinking.  , family elected to be with hospice.    If family is agreeable, patient will transfer to hospice later today.      Exam on Discharge:   /80   Pulse 92   Temp 98.5 °F (36.9 °C)   Resp 20   Ht 1.524 m (5')   Wt 86 kg (189 lb 11.2 oz)   SpO2 100%   BMI 37.05 kg/m²   General appearance: Ill-appearing.  Response to voice.  Sticks tongue out to command.  Can barely move upper

## 2025-01-09 PROBLEM — J90 PLEURAL EFFUSION: Status: ACTIVE | Noted: 2025-01-01

## 2025-01-09 NOTE — FLOWSHEET NOTE
1500 - Took over care of patient from April RN.     1519 - Patient given PRN ativan for shortness of breath. Patient started to display signs of shortness of breath, daughter at bedside and agrees with administration as explained by Nataliia from [facility]. Daughter updated of transport time for 2200 tonight.     1623 - Pain moaning out in pain, breathing fast, PRN morphine given    1744 - Patient's family states that patient's HR is too high and requested treatment, family taking HR at bedside independently, notified Dr. Escamilla and Nataliia NP from [facility]. Family worried that patient is going to \"stroke out\".    1756 - Nataliia NP states no new orders at this time.     1802 - Dr. Escamilla states to give morphine now, patient's family states that they do not want morphine given at this time.     1818 - Dr. Escamilla defers management to hospice and palliative care at this time.     1831 - Dr. Mason notified of above and will go talk to daughter to devise a plan.     1853 - After conversation with family, Dr. Mason orders one time dose of metoprolol and states to not check BP before administration. HR consistently between 150s-160s.       Patient call light within reach and bed alarm active.

## 2025-01-09 NOTE — ACP (ADVANCE CARE PLANNING)
Daughter was having a tough time fully transition to hospice care however now today family is agreeable.  We had full discussion over disease process and goals of care.  Emotional support provided.  Discharge summary updated.    15 minutes of ACP planning spent discussing the above.    Godfrey Escamilla, DO

## 2025-01-09 NOTE — CARE COORDINATION
This CÉSARW visited patient and daughter at bedside this am.  Per daughter request I called and spoke with Tonya at [facility], I notified her that daughter would like to initiate hospice services for patient.  Electronically signed by MARTINEZ Chakraborty on 1/9/25 at 11:43 AM EST

## 2025-01-09 NOTE — PROGRESS NOTES
Palliative Care Progress Note  Patient: Alysha Rodriguez  Gender: female  YOB: 1945  Unit/Bed: W488/W488-01  CodeStatus: DNR-CC  Inpatient Treatment Team: Treatment Team:   Godfrey Escamilla, Guilherme Mcneill, Danielle Verduzco RN Hipp, Lisa, Meaghan Mclean, Gabrielle Fernando RN Brown, Jennifer, Parvin Samuel  Admit Date:  1/5/2025    Chief Complaint: Shortness of breath    History of Presenting Illness:      Alysha Rodriguez is a 79 y.o. 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    Patient presented to the ER from home with progressive shortness of breath. She was found to have a right-sided pleural effusion in setting of malignant ascites.     Palliative care consulted for goals of care and overwhelming symptoms.  Patient is current with our services as an outpatient.  Pulmonology consulted for possible Pleurx catheter placement. Patient possibly planning to move to [place]. She is a DNR CC and goals for comfort/palliative treatment. Reports back pain is at a 3/10. Chest pain is about a 4/10. Pain is chronic. Patient is very short of breath. On 2 L oxygen via NC. Wears 2 L NC at home. Norco helps alleviate pain and some of her SOB. Patient has lost about 10 lbs in the last month.  She is nonambulatory at baseline except for transferring.  She does have a prosthesis for transferring.    1/7/24    Patient seen after placement of chest tube. Chest tube placed to suction and has blood drainage. Per nurse about 1 L removed during procedure. She is obtunded. Per nurse patient was obtunded all night. She has been on continuous bipap. She will open eyes to stimulation and shook her head no to pain.     1/8/25:  Patient alert. Unable to talk. Moaning throughout visits. Eyes open. Pupils dilating and constricting frequently with no change in light.  1/5/2025 1:43 pm COMPARISON: None. HISTORY: ORDERING SYSTEM PROVIDED HISTORY: sob TECHNOLOGIST PROVIDED HISTORY: Reason for exam:->sob What reading provider will be dictating this exam?->CRC FINDINGS: Portable chest reveals heart to be enlarged.  There is near complete opacification seen left hemithorax likely a mixture of collapsed lung/infiltrate and pleural fluid.  Heart is enlarged.  The right lung is clear.  Replacement identified the shoulders bilaterally.     Near complete opacification left hemithorax likely a mixture of collapsed lung/infiltrate and pleural fluid.          Assessment and plan:  Palliative care encounter  Patient is current with our services as an outpatient. She wants to continue with palliative chemo at this time. She is aware that if she wishes to withdraw aggressive care at any time, she is eligible for hospice services.    2.  Neoplasm related pain  3.  SOB  Hold Norco.     Mophine 2 mg IV q 4 hours prn pain/SOB.    Add senokot-s 2 tabs po daily to prevent constipation.     Consult Dr. Frost for pleurX consideration.    -Advance Care Planning  Discussed goals of care with patient. Explained in extensive detail nuances between full code, DNR CCA and DNR CC. Patient has made the decision to be DNRCCA. Was initially DNRCC but this was changed at [facility] due to wanting cancer treatment.     HPOA in place: Daughter, Tania, is first agent.      -Goals of Care Discussion:  Disease process and goals of treatment were discussed in basic terms. Alysha's goal is to optimize available comfort care measures and continue with palliative treatment for her cancer. We discussed the palliative care philosophy in light of those goals. We discussed all care options contingent on treatment response and QOL. Much active listening, presence, and emotional support were given.     D/W nurse and attending.     1/7/24    Patient  was DNRCC but changed to DNRCCA due to wanting palliative chemo. Patient shaking head

## 2025-01-10 ENCOUNTER — TELEPHONE (OUTPATIENT)
Dept: PALLATIVE CARE | Age: 80
End: 2025-01-10

## 2025-01-10 ENCOUNTER — CARE COORDINATION (OUTPATIENT)
Dept: CARE COORDINATION | Age: 80
End: 2025-01-10

## 2025-01-10 NOTE — CARE COORDINATION
Review of chart indicates that patient entered into hospice care.  Will discharge patient from [facility]/SW.

## 2025-01-12 LAB — BACTERIA FLD AEROBE CULT: NORMAL

## 2025-01-13 LAB — BACTERIA FLD AEROBE CULT: NORMAL

## 2025-07-06 NOTE — PROGRESS NOTES
Vaccine Information Sheet, \"Influenza - Inactivated\"  given to Orlie Monday, or parent/legal guardian of  Orlie Monday and verbalized understanding. Patient responses:    Have you ever had a reaction to a flu vaccine? No  Are you able to eat eggs without adverse effects? Yes  Do you have any current illness? No  Have you ever had [place] Syndrome? No    Flu vaccine given per order. Please see immunization tab. No

## (undated) DEVICE — GLIDESHEATH SLENDER STAINLESS STEEL KIT: Brand: GLIDESHEATH SLENDER

## (undated) DEVICE — SUTURE MCRYL SZ 4-0 L27IN ABSRB UD L19MM PS-2 1/2 CIR PRIM Y426H

## (undated) DEVICE — 3M™ TEGADERM™ TRANSPARENT FILM DRESSING FRAME STYLE, 1626W, 4 IN X 4-3/4 IN (10 CM X 12 CM), 50/CT 4CT/CASE: Brand: 3M™ TEGADERM™

## (undated) DEVICE — SLEEVE CMPR SM STD CALF SCD ANEMB LF

## (undated) DEVICE — 1842 FOAM BLOCK NEEDLE COUNTER: Brand: DEVON

## (undated) DEVICE — CATHETER COR DIAG PIGTAILS PIG 145 CRV 5FR 110CM 6 SIDE H

## (undated) DEVICE — CONVERTED USE 393139 JELLY LUBE ST 3 GM PACKET MEDICHOICE

## (undated) DEVICE — BAG DRAINAGE URIN LIGEMAN W/ ADPT SUCTION HOSE CYSTO URO

## (undated) DEVICE — KIT ANGIO W/ AT P65 PREM HND CTRL FOR CNTRST DEL ANGIOTOUCH

## (undated) DEVICE — GLOVE ORANGE PI 7 1/2   MSG9075

## (undated) DEVICE — CONVERTED USE 291618 SPONGE LAPAROTOMY POCKET POUCH RING 18

## (undated) DEVICE — COVER LT HNDL BLU PLAS

## (undated) DEVICE — BLADE ES ELASTOMERIC COAT INSUL DURABLE BEND UPTO 90DEG

## (undated) DEVICE — NEEDLE HYPO 25GA L1.5IN BLU POLYPR HUB S STL REG BVL STR

## (undated) DEVICE — TRAY PREP DRY W/ PREM GLV 2 APPL 6 SPNG 2 UNDPD 1 OVERWRAP

## (undated) DEVICE — RADIFOCUS OPTITORQUE ANGIOGRAPHIC CATHETER: Brand: OPTITORQUE

## (undated) DEVICE — ELECTRODE PT RET AD L9FT HI MOIST COND ADH HYDRGEL CORDED

## (undated) DEVICE — CATHETER ANGIO INFIN 038IN AMPLATZ 534543T

## (undated) DEVICE — SMARTGOWN BREATHABLE SPECIALTY GOWN: Brand: CONVERTORS

## (undated) DEVICE — CHLORAPREP 26ML ORANGE

## (undated) DEVICE — DBD-PACK,CYSTOSCOPY,PK VI,AURORA: Brand: MEDLINE

## (undated) DEVICE — GOWN,AURORA,NONREINFORCED,LARGE: Brand: MEDLINE

## (undated) DEVICE — DRAPE SURG BRACH STRL

## (undated) DEVICE — SYRINGE MED 10ML TRNSLUC BRL PLUNG BLK MRK POLYPR CTRL

## (undated) DEVICE — CATHETER DIAG 5FR L100CM LUMN ID0.047IN JL3.5 CRV 0 SIDE H

## (undated) DEVICE — CONTAINER,SPECIMEN,OR STERILE,4OZ: Brand: MEDLINE

## (undated) DEVICE — SKIN MARKER,REGULAR TIP WITH RULER: Brand: DEVON

## (undated) DEVICE — COUNTER NDL 40 COUNT HLD 70 FOAM BLK ADH W/ MAG

## (undated) DEVICE — DRESSING QUIKCLOT RADIAL 0.8X1.5 IN W/ADHESIVE

## (undated) DEVICE — UNIT DRNGE SGL COLL W/ INLINE CONN EXPR

## (undated) DEVICE — KIT CATH PLEUR CHLORAPREP FEN DRP FLTR STRW FOAM CATH PD

## (undated) DEVICE — SUTURE VICRYL SZ 4-0 L18IN ABSRB UD L19MM PS-2 3/8 CIR PRIM J496H

## (undated) DEVICE — GOWN,AURORA,NONRNF,XL,30/CS: Brand: MEDLINE

## (undated) DEVICE — CATHETER COR DIAG JUDKINS R 5.0 CRV 5FR 100CM 0 SIDE H

## (undated) DEVICE — Device

## (undated) DEVICE — STERILE LATEX POWDER-FREE SURGICAL GLOVESWITH NITRILE COATING: Brand: PROTEXIS

## (undated) DEVICE — GLOVE SURG SZ 6 THK91MIL LTX FREE SYN POLYISOPRENE ANTI

## (undated) DEVICE — PACK,LAPAROTOMY,NO GOWNS: Brand: MEDLINE

## (undated) DEVICE — LABEL MED MINI W/ MARKER

## (undated) DEVICE — ELECTRODE NDL 2.8IN COAT VALLEYLAB

## (undated) DEVICE — TUR/ENDOSCOPIC CABLE, 10' (3.05 M): Brand: CONMED

## (undated) DEVICE — INTENDED FOR TISSUE SEPARATION, AND OTHER PROCEDURES THAT REQUIRE A SHARP SURGICAL BLADE TO PUNCTURE OR CUT.: Brand: BARD-PARKER ® CARBON RIB-BACK BLADES

## (undated) DEVICE — TOWEL,OR,DSP,ST,BLUE,STD,4/PK,20PK/CS: Brand: MEDLINE

## (undated) DEVICE — SUTURE VICRYL + SZ 3-0 L27IN ABSRB UD L26MM SH 1/2 CIR VCP416H

## (undated) DEVICE — GUIDEWIRE VASC L260CM DIA0.035IN TIP L3MM STD EXCHG PTFE J

## (undated) DEVICE — GAUZE,SPONGE,4"X4",16PLY,XRAY,STRL,LF: Brand: MEDLINE

## (undated) DEVICE — GOWN,SIRUS,NONRNF,SETINSLV,2XL,18/CS: Brand: MEDLINE

## (undated) DEVICE — CYSTO/BLADDER IRRIGATION SET, REGULATING CLAMP

## (undated) DEVICE — PENCIL ES L3M BTTN SWCH HOLSTER W/ BLDE ELECTRD EDGE

## (undated) DEVICE — KIT MFLD ISOLATN NACL CNTRST PRT TBNG SPIK W/ PRSS TRNSDUC

## (undated) DEVICE — SHEET,DRAPE,53X77,STERILE: Brand: MEDLINE

## (undated) DEVICE — PACK,BASIC: Brand: MEDLINE

## (undated) DEVICE — SUTURE VCRL + SZ 2-0 L36IN ABSRB UD L36MM CT-1 1/2 CIR VCP945H

## (undated) DEVICE — GLOVE ORANGE PI 8   MSG9080